# Patient Record
Sex: FEMALE | Race: BLACK OR AFRICAN AMERICAN | NOT HISPANIC OR LATINO | Employment: OTHER | URBAN - METROPOLITAN AREA
[De-identification: names, ages, dates, MRNs, and addresses within clinical notes are randomized per-mention and may not be internally consistent; named-entity substitution may affect disease eponyms.]

---

## 2017-01-04 ENCOUNTER — OFFICE VISIT (OUTPATIENT)
Dept: RHEUMATOLOGY | Facility: CLINIC | Age: 52
End: 2017-01-04
Payer: COMMERCIAL

## 2017-01-04 VITALS
DIASTOLIC BLOOD PRESSURE: 103 MMHG | WEIGHT: 228.19 LBS | BODY MASS INDEX: 38.96 KG/M2 | SYSTOLIC BLOOD PRESSURE: 160 MMHG | HEIGHT: 64 IN | HEART RATE: 80 BPM

## 2017-01-04 DIAGNOSIS — M16.11 PRIMARY OSTEOARTHRITIS OF RIGHT HIP: ICD-10-CM

## 2017-01-04 DIAGNOSIS — M25.619 DECREASED RANGE OF MOTION OF SHOULDER, UNSPECIFIED LATERALITY: ICD-10-CM

## 2017-01-04 DIAGNOSIS — M25.561 ACUTE PAIN OF RIGHT KNEE: Primary | ICD-10-CM

## 2017-01-04 DIAGNOSIS — M47.819 SPONDYLOARTHRITIS: ICD-10-CM

## 2017-01-04 DIAGNOSIS — M51.36 DDD (DEGENERATIVE DISC DISEASE), LUMBAR: ICD-10-CM

## 2017-01-04 DIAGNOSIS — Z86.19 HISTORY OF HEPATITIS B: ICD-10-CM

## 2017-01-04 DIAGNOSIS — M25.611 DECREASED RIGHT SHOULDER RANGE OF MOTION: ICD-10-CM

## 2017-01-04 PROCEDURE — 99215 OFFICE O/P EST HI 40 MIN: CPT | Mod: PBBFAC | Performed by: INTERNAL MEDICINE

## 2017-01-04 PROCEDURE — 99214 OFFICE O/P EST MOD 30 MIN: CPT | Mod: S$GLB,,, | Performed by: INTERNAL MEDICINE

## 2017-01-04 PROCEDURE — 99999 PR PBB SHADOW E&M-EST. PATIENT-LVL V: CPT | Mod: PBBFAC,,, | Performed by: INTERNAL MEDICINE

## 2017-01-04 RX ORDER — ENTECAVIR 0.5 MG/1
0.5 TABLET, FILM COATED ORAL DAILY
COMMUNITY
End: 2017-04-27 | Stop reason: SDUPTHER

## 2017-01-04 ASSESSMENT — ANKYLOSING SPONDYLITIS DISEASE ACTIVITY SCORE (ASDAS-CRP)
TOTAL_SCORE: 2.71
MORNING_STIFFNESS: 5
GLOBAL_ACTIVITY: 5
NBH_PAIN: 10
CRP_MG_PER_LITER: .5
PAIN_SWELLING: 6

## 2017-01-04 ASSESSMENT — ROUTINE ASSESSMENT OF PATIENT INDEX DATA (RAPID3)
PAIN SCORE: 6
FATIGUE SCORE: 6.5
WHEN YOU AWAKENED IN THE MORNING OVER THE LAST WEEK, PLEASE INDICATE THE AMOUNT OF TIME IT TAKES UNTIL YOU ARE AS LIMBER AS YOU WILL BE FOR THE DAY: 1HR
AM STIFFNESS SCORE: 1, YES
PATIENT GLOBAL ASSESSMENT SCORE: 6
PSYCHOLOGICAL DISTRESS SCORE: 6.6
MDHAQ FUNCTION SCORE: 1.4
TOTAL RAPID3 SCORE: 5.55

## 2017-01-04 NOTE — MR AVS SNAPSHOT
[facility] - Rheumatology  [address]  [facility] [address]  Phone: [phone]  Fax: [phone]                  Althea Vazquez   2017 1:00 PM   Office Visit    Description:  Female : 1965   Provider:  Gabriel Arvizu MD   Department:  [facility] - Rheumatology           Diagnoses this Visit        Comments    Acute pain of right knee    -  Primary     DDD (degenerative disc disease), lumbar         Spondyloarthritis         History of hepatitis B         Decreased right shoulder range of motion         Decreased range of motion of shoulder, unspecified laterality         Primary osteoarthritis of right hip                To Do List           Goals (5 Years of Data)              16    HEMOGLOBIN A1C < 7.0   6.0  6.4  6.4      Follow-Up and Disposition     Return in about 3 months (around 2017).      [facility] On Call     [facility] On Call Nurse Care Line - 24/7 Assistance  Registered nurses in the [facility] On Call Center provide clinical advisement, health education, appointment booking, and other advisory services.  Call for this free service at [phone].             Medications           Message regarding Medications     Verify the changes and/or additions to your medication regime listed below are the same as discussed with your clinician today.  If any of these changes or additions are incorrect, please notify your healthcare provider.        STOP taking these medications     sulindac (CLINORIL) 200 MG Tab Do not take this medication until you have spoken with your Rheumatologist.           Verify that the below list of medications is an accurate representation of the medications you are currently taking.  If none reported, the list may be blank. If incorrect, please contact your healthcare provider. Carry this list with you in case of emergency.           Current Medications     atorvastatin (LIPITOR) 40 MG tablet TAKE 1 TABLET BY MOUTH  EVERY DAY    clotrimazole-betamethasone 1-0.05% (LOTRISONE) cream Apply topically 2 (two) times daily as needed.     cyclobenzaprine (FLEXERIL) 10 MG tablet TAKE 1 TABLET BY MOUTH EVERY DAY AT BEDTIME IF NEEDED    docusate sodium (COLACE) 50 MG capsule Take 1 capsule (50 mg total) by mouth 2 (two) times daily as needed for Constipation.    entecavir (BARACLUDE) 0.5 MG Tab Take 0.5 mg by mouth once daily.    fluoxetine (PROZAC) 40 MG capsule Take 1 capsule (40 mg total) by mouth 2 (two) times daily.    homatropine (ISOPTO HOMATROPINE) 5 % ophthalmic solution Place 1 drop into the right eye 2 (two) times daily.    HUMIRA PEN PnKt injection INJECT 1 PEN (40MG) INTO THE SKIN EVERY 14 DAYS    hydroquinone 4 % Crea Apply to dark areas qhs.  Not more than 6 months straight in same location.Use sunscreen in am    INVOKANA 300 mg Tab TAKE 1 TABLET BY MOUTH EVERY DAY    lancets Misc To use with TrueResult meter to check BG twice daily    losartan (COZAAR) 100 MG tablet Take 100 mg by mouth once daily.     metformin (GLUCOPHAGE-XR) 500 MG 24 hr tablet Take 2 tablets (1,000 mg total) by mouth daily with breakfast.    naproxen (NAPROSYN) 500 MG tablet Take 1 tablet (500 mg total) by mouth 2 (two) times daily with meals.    promethazine (PHENERGAN) 25 MG tablet Take 1 tablet (25 mg total) by mouth every 6 (six) hours as needed for Nausea.    sulfaSALAzine (AZULFIDINE) 500 MG TbEC Take 2 tablets (1,000 mg total) by mouth 2 (two) times daily.    tramadol (ULTRAM) 50 mg tablet Take 1-2 tabs PO q6 hrs PRN pain    TRUETEST TEST STRIPS Strp TEST TWICE A DAY    zolpidem (AMBIEN) 5 MG Tab Take 1 tablet (5 mg total) by mouth nightly as needed.    aspirin (ECOTRIN) 81 MG EC tablet Take 1 tablet (81 mg total) by mouth once daily.    clonazePAM (KLONOPIN) 0.5 MG tablet Take 1 tablet (0.5 mg total) by mouth daily as needed for Anxiety.    hydrochlorothiazide (MICROZIDE) 12.5 mg capsule Take 1 capsule (12.5 mg total) by mouth once daily.     "hydrocortisone 2.5 % cream Apply topically 2 (two) times daily.    metoprolol succinate (TOPROL-XL) 100 MG 24 hr tablet Take 1 tablet (100 mg total) by mouth once daily.    pantoprazole (PROTONIX) 40 MG tablet Take 1 tablet (40 mg total) by mouth once daily.           Clinical Reference Information           Vital Signs - Last Recorded  Most recent update: 1/4/2017  1:25 PM by Giselle Nelson MA    BP Pulse Ht Wt LMP BMI    (!) 160/103 80 5' 3.6" (1.615 m) 103.5 kg (228 lb 3.2 oz) 11/25/2014 39.66 kg/m2      Blood Pressure          Most Recent Value    BP  (!)  160/103      Allergies as of 1/4/2017     Bactrim [Sulfamethoxazole-trimethoprim]    Diflucan [Fluconazole]      Immunizations Administered on Date of Encounter - 1/4/2017     None      Orders Placed During Today's Visit      Normal Orders This Visit    Ambulatory Referral to Physical/Occupational Therapy       "

## 2017-01-04 NOTE — PROGRESS NOTES
Subjective:       Patient ID: Althea Vazquez is a 51 y.o. female.    Chief Complaint: spondyloarthritis(non-radiographic axial and peripheral)    HPI    First dose of Humira 11/17/16.Continues entecavir for HB prophylaxis Still has bilateral shoulder pain with limited rom, attended PT([facility]) twice a week until end of Dec. Insurance stopped. The shoulders are much improved compared with prior to Humira, but still trouble reaching up, brushing hair etc. The back is fine. Some right hip flexion pain. Some pain right knee, s/p remote arthroscopy.  Am stiffness lasts 60 min shoulders, right hip      Saw Dr. De La Vega 10/7/16: No pulmonary contraindications to treatment with Humira.  Call/return if cough worsens, or new resp symptoms arise.  Saw Lucretia Berger 10/3/16:   have personally performed a face to face diagnostic evaluation on this patient. I have reviewed and agree with today's findings and the care plan outlined by Lucretia Berger, Saritha findings are as follows:  Patient presents with isolated HBcAb. Has received High dose prednisone and now to start humira. At mod risk of reactivation. Recommend use of entecavir until 6 months after stops therapy.   saw Dr. Neil had partial excision of recurrent sebaceous cyst 10/27/16 :     There are no hospital problems to display for this patient.     1. Sebaceous cyst  Tissue Specimen To Pathology, Surgery         POSTOPERATIVE DIAGNOSIS:   1. Sebaceous cyst  Tissue Specimen To Pathology, Surgery         PROCEDURE PERFORMED: excision of sebaceous cyst     ATTENDING SURGEON: Milton Neil MD      HOUSESTAFF SURGEON: Lenore Simms     ANESTHESIA: Local       Subjective S/p back cyst removal. It was unable to be completely removed as it would have been difficult to close the skin. She has no complaints today.  Objective Wound clear, sutures removed. Path reviewed.   Assessment & Plan Rtc prn. May start arthritis medications in one week. Cyst is  "likely to recur.        Milton Neil MD  11/10/2016    Review of Systems   Constitutional: Positive for fatigue. Negative for appetite change, fever and unexpected weight change.   HENT: Negative for mouth sores.    Eyes: Negative for visual disturbance.   Respiratory: Positive for shortness of breath. Negative for cough and wheezing.    Cardiovascular: Negative for chest pain and palpitations.   Gastrointestinal: Negative for abdominal pain, anal bleeding, blood in stool, constipation, diarrhea, nausea and vomiting.   Genitourinary: Negative for dysuria, frequency and urgency.   Musculoskeletal: Negative for arthralgias, back pain, gait problem, joint swelling, myalgias, neck pain and neck stiffness.   Skin: Positive for rash.   Neurological: Positive for numbness and headaches. Negative for weakness.   Hematological: Negative for adenopathy. Does not bruise/bleed easily.   Psychiatric/Behavioral: Negative for sleep disturbance. The patient is not nervous/anxious.              Objective:     Visit Vitals    BP (!) 160/103    Pulse 80    Ht 5' 3.6" (1.615 m)    Wt 103.5 kg (228 lb 3.2 oz)    LMP 11/25/2014    BMI 39.66 kg/m2        Physical Exam   Constitutional: She is oriented to person, place, and time and well-developed, well-nourished, and in no distress.   HENT:   Head: Normocephalic and atraumatic.   Mouth/Throat: Oropharynx is clear and moist.   Eyes: Conjunctivae and EOM are normal.   Neck: Normal range of motion. Neck supple. No thyromegaly present.   Cardiovascular: Normal rate, regular rhythm, normal heart sounds and intact distal pulses.  Exam reveals no gallop and no friction rub.    No murmur heard.  Pulmonary/Chest: Breath sounds normal. She has no wheezes. She has no rales. She exhibits no tenderness.   Abdominal: Soft. She exhibits no distension and no mass. There is no tenderness.       Right Side Rheumatological Exam     Examination finds the elbow, wrist, 1st PIP, 1st MCP, 2nd " PIP, 2nd MCP, 3rd PIP, 3rd MCP, 4th PIP, 4th MCP, 5th PIP and 5th MCP normal.    The patient is tender to palpation of the shoulder    The patient has an enlarged knee    Shoulder Exam   Tenderness Location: subacromion    Range of Motion   Active Abduction: abnormal   Passive Abduction: abnormal   Extension: abnormal   Forward Flexion: abnormal   Forward Elevation: abnormal  Adduction: abnormal  External Rotation 0 degrees: 30   Internal Rotation 0 degrees:  L5 abnormal   Internal Rotation 90 degrees: abnormal   Swelling: negative    Knee Exam     Range of Motion   Extension: normal   Flexion: abnormal   Patellofemoral Crepitus: positive  Effusion: negative  Warmth: negative    Hip Exam   Tenderness Location: anterior    Range of Motion   Extension: normal   Flexion:  50 abnormal   Internal Rotation:  20 abnormal   External Rotation:  20 abnormal   Abduction:  15 abnormal   Adduction:  10 abnormal     Left Side Rheumatological Exam     Examination finds the elbow, wrist, knee, 1st PIP, 1st MCP, 2nd PIP, 2nd MCP, 3rd PIP, 3rd MCP, 4th PIP, 4th MCP, 5th PIP and 5th MCP normal.    The patient is tender to palpation of the shoulder.    Shoulder Exam   Tenderness Location: subacromion    Range of Motion   Active Abduction: abnormal   Passive Abduction: abnormal   Extension: abnormal   Forward Flexion: abnormal   Forward Elevation: abnormal  Adduction: abnormal  External Rotation 0 degrees: 30   Internal Rotation 0 degrees: L5   Swelling: negative    Hip Exam     Range of Motion   Extension: normal   Flexion: normal   Internal Rotation: 25 abnormal   External Rotation: normal   Abduction: normal   Adduction: normal       Lymphadenopathy:     She has no cervical adenopathy.   Neurological: She is alert and oriented to person, place, and time. She displays normal reflexes. Gait normal.   Nl motor strength UE and LE bilateral   Musculoskeletal: She exhibits no edema.         Schober's: 10-14 cm  Chest expansion 6cm  Neck  rom mild gen decreased rom         Results for NABOR HERNANDEZ (MRN [id number]) as of 1/4/2017 14:12   Ref. Range 10/3/2016 11:25 10/3/2016 11:25 12/12/2016 11:20   Log HBV IU/mL Latest Ref Range: <1.00 Log (10) IU/mL <1.00 <1.00 <1.00   Results for NABOR HERNANDEZ (MRN [id number]) as of 1/4/2017 14:12   Ref. Range 12/12/2016 11:20 12/27/2016 10:06   WBC Latest Ref Range: 3.90 - 12.70 K/uL 4.54 5.34   RBC Latest Ref Range: 4.00 - 5.40 M/uL 4.65 4.89   Hemoglobin Latest Ref Range: 12.0 - 16.0 g/dL 13.2 14.2   Hematocrit Latest Ref Range: 37.0 - 48.5 % 41.3 42.6   MCV Latest Ref Range: 82 - 98 fL 89 87   MCH Latest Ref Range: 27.0 - 31.0 pg 28.4 29.0   MCHC Latest Ref Range: 32.0 - 36.0 % 32.0 33.3   RDW Latest Ref Range: 11.5 - 14.5 % 16.7 (H) 16.5 (H)   Platelets Latest Ref Range: 150 - 350 K/uL 220 243   MPV Latest Ref Range: 9.2 - 12.9 fL 11.6 10.2   Gran% Latest Ref Range: 38.0 - 73.0 % 36.2 (L) 34.4 (L)   Gran # Latest Ref Range: 1.8 - 7.7 K/uL 1.6 (L) 1.8   Lymph% Latest Ref Range: 18.0 - 48.0 % 49.3 (H) 51.3 (H)   Lymph # Latest Ref Range: 1.0 - 4.8 K/uL 2.2 2.7   Mono% Latest Ref Range: 4.0 - 15.0 % 10.8 11.6   Mono # Latest Ref Range: 0.3 - 1.0 K/uL 0.5 0.6   Eosinophil% Latest Ref Range: 0.0 - 8.0 % 2.6 1.5   Eos # Latest Ref Range: 0.0 - 0.5 K/uL 0.1 0.1   Basophil% Latest Ref Range: 0.0 - 1.9 % 0.7 0.6   Baso # Latest Ref Range: 0.00 - 0.20 K/uL 0.03 0.03   Sed Rate Latest Ref Range: 0 - 20 mm/Hr 10    Sodium Latest Ref Range: 136 - 145 mmol/L 139    Potassium Latest Ref Range: 3.5 - 5.1 mmol/L 3.7    Chloride Latest Ref Range: 95 - 110 mmol/L 102    CO2 Latest Ref Range: 23 - 29 mmol/L 29    Anion Gap Latest Ref Range: 8 - 16 mmol/L 8    BUN, Bld Latest Ref Range: 7 - 17 mg/dL 10    Creatinine Latest Ref Range: 0.50 - 1.40 mg/dL 0.55    eGFR if non African American Latest Ref Range: >60 mL/min/1.73 m^2 >60.0    eGFR if African American Latest Ref Range: >60 mL/min/1.73 m^2 >60.0    Glucose  Latest Ref Range: 70 - 110 mg/dL 153 (H)    Calcium Latest Ref Range: 8.7 - 10.5 mg/dL 9.0    Alkaline Phosphatase Latest Ref Range: 38 - 126 IU/L 72    Total Protein Latest Ref Range: 6.0 - 8.4 g/dL 7.0    Albumin Latest Ref Range: 3.5 - 5.2 g/dL 4.2    Total Bilirubin Latest Ref Range: 0.1 - 1.0 mg/dL 0.4    AST Latest Ref Range: 15 - 46 IU/L 23    ALT Latest Ref Range: 10 - 44 IU/L 28    CRP Latest Ref Range: 0.00 - 1.00 mg/dL <0.50    Hep B Viral DNA IU/ML Latest Ref Range: <10 IU/mL <10    Log HBV IU/mL Latest Ref Range: <1.00 Log (10) IU/mL <1.00    Results for NABOR HERNANDEZ (MRN [id number]) as of 1/4/2017 14:12   Ref. Range 4/9/2014 12:50 7/9/2014 12:20 9/4/2014 08:57 12/26/2014 09:12 11/11/2015 10:06 9/4/2016 02:09 9/27/2016 10:25 12/12/2016 11:20   Sed Rate Latest Ref Range: 0 - 20 mm/Hr 16 12 28 (H) 28 (H) 10 64 (H) 68 (H) 10   Results for NABOR HERNANDEZ (MRN [id number]) as of 1/4/2017 14:12   Ref. Range 4/9/2014 12:50 7/9/2014 12:20 9/4/2014 08:57 12/26/2014 09:12 11/11/2015 10:06 9/4/2016 02:09 9/27/2016 10:25 12/12/2016 11:20   CRP Latest Ref Range: 0.00 - 1.00 mg/dL 14.5 (H) 13.7 (H) 17.6 (H) 10.4 (H) 9.6 (H) 135.7 (H) 110.6 (H) <0.50     Technique: Routine MRI evaluation of the SI joints performed with and without the use of 10 cc of Gadavist IV contrast.      Comparison: Radiograph 09/09/2016.    Findings:    There are enhancing inflammatory changes about the anterior superior aspect of the left SI joint in the expected location of the medial lumbar or lumbosacral ligament, findings that are highly suggestive of enthesitis.  There is apparent trace enhancement on the posterior superior aspect of the right SI joint leads is overall nonspecific.    SI joints are symmetric.  No synovitis.  No joint effusions.  No osseous erosive changes.    Visualized musculature demonstrate normal bulk and signal intensity.  Piriformis muscles are symmetric.  Ischiofemoral spaces are  unremarkable.    Adductor and abductor tendons are unremarkable.  Hamstring tendons are normal.    Subcutaneous soft tissues are normal.    Limited evaluation of the lower abdominal and pelvic organs is unremarkable.  Uterus is surgically absent.   Impression       Inflammatory enthesitis affecting the anterior superior aspect of the left sacral ala with mild associated reactive marrow edema, findings that are favored to represent sequela of patient's reported history of spondyloarthropathy.  Note is made of possible trace inflammatory changes adjacent to the posterior aspect of the right SI joints which may relate to a prominent vessel or mild additional enthesitis.    Unremarkable evaluation of the SI joints specifically without imaging findings to suggest sacroiliitis. No osseous erosive changes or joint effusions. No synovitis.  ______________________________________     Electronically signed by: ALEX CRAIG MD  Date: 09/11/16  Time: 20:30     Encounter   View         MRI RIGHT SHOULDER    TECHNIQUE: MRI of right shoulder was performed on a 1.5T magnet utilizing the following sequences: Localizer; axial PD FS; coronal T2 FS and PD FS; sagittal T1, and T2 FS.    COMPARISON: 09/16/16    FINDINGS:    Rotator cuff: There is high-grade undersurface tear of the supraspinatus tendon measuring 1.4 x 1.4 cm, near the insertion.  There is supraspinatus tendinosis.  There is subscapularis tendinosis and interstitial tear.  Muscle bulk is maintained.Note made of intramuscular edema within the subscapularis.    Labrum: Unremarkable non-arthrographic evaluation.    Biceps: Long head biceps tendon is intact.    Bone: No fracture or infiltrative process.    Acromioclavicular joint: Mild arthrosis.    Cartilage: Articular cartilage of the glenohumeral joint is preserved.    Miscellaneous: There is narrowing of the coracohumeral interval, measuring approximately 7 mm.  There is a moderate-sized joint effusion with synovitis.   There is thickening of the subacromial subdeltoid bursa.   Impression     1.  High-grade undersurface supraspinatus tendon tear.  Infraspinatus tendinosis.  2.  Subscapularis tendinosis and interstitial tear with evidence for subcoracoid impingement.  3.  Moderate-sized joint effusion with synovitis and subacromial subdeltoid bursitis.  4.  Mild acromioclavicular arthrosis.  ______________________________________     Electronically signed by: LON STARR MD  Date: 09/24/16  Time: 14:44     Encounter   View Encounter      Reviewed By   Sherrie Webber PA-C on 9/27/2016  3:42 PM     Technique: Routine MRI evaluation of the cervical, thoracic and lumbar spine performed with and without the use of 10 cc of Gadavist IV contrast.    Comparison: MRI 01/20/2014.    Findings:    CERVICAL SPINE:    There is modest straightening of the normal cervical lordosis.  No spondylolisthesis.  Vertebral body heights are well-maintained without evidence for fracture.  Visualized osseous structures demonstrate intact pars signal intensity without findings to suggest an infiltrative process.    Cervical spinal cord demonstrates normal contour and signal intensity.  Cerebellar tonsils are in their expected location.  Cervicomedullary junction is unremarkable.  Postcontrast images demonstrate no abnormal enhancement.    Prevertebral soft tissues are normal.  Vertebral artery flow voids are present.  T2 hyperintense nodules are noted within the bilateral thyroid lobes.  The spinal musculature and trace normal bulk and signal intensity.  supraspinous ligament is unremarkable without findings to suggest enthesitis.    C2-C3: No spinal canal stenosis or neural foraminal narrowing.    C3-C4: There is mild bilateral facet arthropathy and uncovertebral joint spurring and mild bilateral neural foraminal narrowing.  No spinal canal stenosis.    C4-C5: No spinal canal stenosis or neural foraminal narrowing.    C5-C6: There is a moderate  broad-based posterior disc ossified complex that partially effaces the anterior thecal sac.  There is mild left-sided uncovertebral joint spurring.  Findings contribute to mild spinal canal stenosis and mild left-sided neural foraminal narrowing.    C6-C7: No spinal canal stenosis or neural foraminal narrowing.    T7-T1: No spinal canal stenosis or neural foraminal narrowing.    THORACIC SPINE:    Thoracic spine alignment is normal.  No spondylolisthesis.  Vertebral body heights are well-maintained without evidence for fracture.  There is enhancing inflammation of the left side zygapophyseal/facet joint at T5-T6.  Remaining visualized osseous structures demonstrate intact menisci and in density without findings to suggest an infiltrative process.  Note is made of the anterior vertebral body at this demonstrate intact signal intensity without findings to suggest spondylitis.    Visualized spinal cord demonstrates normal contour and signal intensity.  Postcontrast images demonstrate normal enhancement.    Visualized thoracic and upper abdominal organs are normal.    There is a low signal described focal area of signal abnormality within the posterior subcutaneous soft tissues and appears to demonstrate subtle enhancement and is favored to be benign though nonspecific.    No significant intervertebral disc abnormalities.  No spinal canal stenosis or neural foramina narrowing.    LUMBAR SPINE:    Lumbar spine alignment is normal.  No spondylolysis or spondylolisthesis.  Vertebral body heights are well-maintained without evidence for fracture.  Visualized osseous structures demonstrate intact pars signal intensity without findings to suggest an infiltrative process.    Visualized distal spinal cord demonstrates normal contour and signal intensity.  Cauda equina is normal mild without findings to suggest arachnoiditis.  Postcontrast images demonstrate normal enhancement.    There is a subcentimeter T2 hyperintense lesion  within the right renal cortex, too small to accurately guided thighs.  Paraspinal musculature demonstrates normal bulk and signal intensity.  Subcutaneous soft tissues are within normal limits.    L1-L2: No significant intervertebral disc abnormalities.  Facet joints are well maintained.  No spinal canal stenosis or neural foraminal narrowing.    L2-L3: There is mild intervertebral disc desiccation.  Facet joints are well maintained.  No spinal canal stenosis or neural foraminal narrowing.    L3-L4: There is mild intervertebral disc desiccation.  Facet joints are well maintained.  No spinal canal stenosis or neural foraminal narrowing.    L4-L5: There is mild intervertebral disc space narrowing and desiccation with small circumferential bulge.  There is mild bilateral facet arthropathy.  No spinal canal stenosis or neural foraminal narrowing.    L5-S1: There is mild intervertebral disc space narrowing desiccation with a small circumferential bulge and a small posterior annular fissure.  Findings contribute to mild left-sided neural foraminal narrowing. There is mild bilateral facet arthropathy.  No spinal canal stenosis.   Impression       Enhancing inflammatory changes involving the left zygapophyseal/facet joint at T5-T6 that is nonspecific but can be seen with inflammatory arthropathies. No MR imaging finding to suggest enthesitis, spondylitis or spondylodiskitis.    Mild cervical and lumbar degenerative changes without significant spinal canal stenosis.    Cystic subcutaneous soft tissues within the posterior back, favored to be benign, possibly a complex sebaceous cyst.  Consider follow-up examination in 12 months.  ______________________________________     Electronically signed by: ALEX CRAIG MD  Date: 09/11/16  Time: 20:03      Technique: Single AP pelvic radiograph.    Comparison: None.    Findings:    Osseous density is normal.  No lytic or blastic lesions.  No displaced fractures.  There is acetabular  over coverage bilaterally noting an increased center edge angle.  There is mild suspected narrowing of the articular space of the bilateral femoroacetabular joints.  Sacrum is unremarkable.  SI joints are normal and symmetric.  Pubic symphysis is within normal limits.  There is a moderate amount of fecal material throughout the visualized colon.   Impression       Bilateral acetabular over coverage.  ______________________________________     Electronically signed by: ALEX CRAIG MD  Date: 09/10/16  Time: 03:40          Assessment:     Non-radiographic axial and peripheral spondylarthritis:  ADAS-CRP = 2.71(high disease activity), was 6.22(very high disease activity);  BASDAI= 5.5(high activity) was 10(very high activity)  ;  BASFI = 7(severe functional limitation) was9.4(severe functional limitation) improving but still quite active. ESR and CRP now normal.  Synovitis right shoulder and right  rotator cuff tear per MRI 9/24/16(Dr. Agrawal)  OA right knee  OA right> left  hip, likely secondary to spondyloarthritis  T2 DM  Sebaceous cyst mid back, excised 10/27/16   hypertension, not controlled, likely exacerbated by naproxen which she desperately needs at this point until Humira takes further effect.         Acute pain of right knee  -     Ambulatory Referral to Physical/Occupational Therapy    DDD (degenerative disc disease), lumbar    Spondyloarthritis  -     Ambulatory Referral to Physical/Occupational Therapy  -     Ambulatory Referral to Physical/Occupational Therapy    History of hepatitis B    Decreased right shoulder range of motion  -     Ambulatory Referral to Physical/Occupational Therapy  -     Ambulatory Referral to Physical/Occupational Therapy    Decreased range of motion of shoulder, unspecified laterality  -     Ambulatory Referral to Physical/Occupational Therapy    Primary osteoarthritis of right hip  -     Ambulatory Referral to Physical/Occupational Therapy  -     Ambulatory Referral to  Physical/Occupational Therapy        Plan:   Continue adalimumab 40mg sc q 2wks started 11/17/16  naproxen 500mg twice daily with omeprazole 20mg before breakfast  Reordered PT  Cont sulfasalazine-EN 1000mg twice daily  F/u Dr. Agrawal regarding right rotator cuff tear ? left  See Dr. Begum about hypertension, likely exacerbated by naproxen, but needs NSAID for her severe spondyloarhritis.   RTC 3  Months with standing lab

## 2017-01-27 ENCOUNTER — TELEPHONE (OUTPATIENT)
Dept: PHARMACY | Facility: CLINIC | Age: 52
End: 2017-01-27

## 2017-01-27 DIAGNOSIS — M47.819 SPONDYLOARTHRITIS: ICD-10-CM

## 2017-01-27 RX ORDER — ADALIMUMAB 40MG/0.8ML
KIT SUBCUTANEOUS
Qty: 2 EACH | Refills: 1 | Status: SHIPPED | OUTPATIENT
Start: 2017-01-27 | End: 2017-03-28 | Stop reason: SDUPTHER

## 2017-02-09 ENCOUNTER — TELEPHONE (OUTPATIENT)
Dept: ORTHOPEDICS | Facility: CLINIC | Age: 52
End: 2017-02-09

## 2017-02-09 NOTE — TELEPHONE ENCOUNTER
Spoke to pt and she was notified her appointment was scheduled wrong and we had to reschedule it with the correct provider. Pt was pleased and it was rescheduled for the same day. The appointment slip was mailed.

## 2017-02-13 ENCOUNTER — LAB VISIT (OUTPATIENT)
Dept: LAB | Facility: HOSPITAL | Age: 52
End: 2017-02-13
Attending: INTERNAL MEDICINE
Payer: COMMERCIAL

## 2017-02-13 ENCOUNTER — OFFICE VISIT (OUTPATIENT)
Dept: ORTHOPEDICS | Facility: CLINIC | Age: 52
End: 2017-02-13
Payer: COMMERCIAL

## 2017-02-13 ENCOUNTER — OFFICE VISIT (OUTPATIENT)
Dept: INTERNAL MEDICINE | Facility: CLINIC | Age: 52
End: 2017-02-13
Payer: COMMERCIAL

## 2017-02-13 VITALS
WEIGHT: 226.63 LBS | SYSTOLIC BLOOD PRESSURE: 152 MMHG | TEMPERATURE: 98 F | HEART RATE: 87 BPM | DIASTOLIC BLOOD PRESSURE: 100 MMHG | BODY MASS INDEX: 36.42 KG/M2 | RESPIRATION RATE: 16 BRPM | HEIGHT: 66 IN

## 2017-02-13 VITALS
BODY MASS INDEX: 49.51 KG/M2 | DIASTOLIC BLOOD PRESSURE: 90 MMHG | RESPIRATION RATE: 16 BRPM | WEIGHT: 229.5 LBS | HEIGHT: 57 IN | HEART RATE: 91 BPM | SYSTOLIC BLOOD PRESSURE: 150 MMHG

## 2017-02-13 DIAGNOSIS — M67.911 DYSFUNCTION OF RIGHT ROTATOR CUFF: Primary | ICD-10-CM

## 2017-02-13 DIAGNOSIS — F41.9 ANXIETY AND DEPRESSION: ICD-10-CM

## 2017-02-13 DIAGNOSIS — F32.A ANXIETY AND DEPRESSION: ICD-10-CM

## 2017-02-13 DIAGNOSIS — M25.511 RIGHT SHOULDER PAIN, UNSPECIFIED CHRONICITY: ICD-10-CM

## 2017-02-13 DIAGNOSIS — I10 ESSENTIAL HYPERTENSION: Chronic | ICD-10-CM

## 2017-02-13 DIAGNOSIS — G47.00 INSOMNIA, UNSPECIFIED TYPE: Chronic | ICD-10-CM

## 2017-02-13 DIAGNOSIS — M02.30 REACTIVE ARTHRITIS: Chronic | ICD-10-CM

## 2017-02-13 DIAGNOSIS — M47.819 SPONDYLOARTHRITIS: ICD-10-CM

## 2017-02-13 DIAGNOSIS — E66.9 NON MORBID OBESITY, UNSPECIFIED OBESITY TYPE: Chronic | ICD-10-CM

## 2017-02-13 DIAGNOSIS — T78.3XXA ANGIOEDEMA OF LIPS, INITIAL ENCOUNTER: ICD-10-CM

## 2017-02-13 DIAGNOSIS — E78.5 HYPERLIPIDEMIA, UNSPECIFIED HYPERLIPIDEMIA TYPE: Chronic | ICD-10-CM

## 2017-02-13 DIAGNOSIS — M47.819 SPONDYLARTHRITIS: ICD-10-CM

## 2017-02-13 DIAGNOSIS — E11.42 TYPE 2 DIABETES MELLITUS WITH DIABETIC POLYNEUROPATHY, WITHOUT LONG-TERM CURRENT USE OF INSULIN: Primary | Chronic | ICD-10-CM

## 2017-02-13 DIAGNOSIS — K21.9 GASTROESOPHAGEAL REFLUX DISEASE, ESOPHAGITIS PRESENCE NOT SPECIFIED: ICD-10-CM

## 2017-02-13 DIAGNOSIS — E11.42 TYPE 2 DIABETES MELLITUS WITH DIABETIC POLYNEUROPATHY, WITHOUT LONG-TERM CURRENT USE OF INSULIN: Chronic | ICD-10-CM

## 2017-02-13 PROCEDURE — 99214 OFFICE O/P EST MOD 30 MIN: CPT | Mod: S$GLB,,, | Performed by: INTERNAL MEDICINE

## 2017-02-13 PROCEDURE — 20610 DRAIN/INJ JOINT/BURSA W/O US: CPT | Mod: RT,S$GLB,, | Performed by: PHYSICIAN ASSISTANT

## 2017-02-13 PROCEDURE — 36415 COLL VENOUS BLD VENIPUNCTURE: CPT | Mod: PO

## 2017-02-13 PROCEDURE — 83036 HEMOGLOBIN GLYCOSYLATED A1C: CPT

## 2017-02-13 PROCEDURE — 99213 OFFICE O/P EST LOW 20 MIN: CPT | Mod: 25,S$GLB,, | Performed by: PHYSICIAN ASSISTANT

## 2017-02-13 PROCEDURE — 99999 PR PBB SHADOW E&M-EST. PATIENT-LVL IV: CPT | Mod: PBBFAC,,, | Performed by: PHYSICIAN ASSISTANT

## 2017-02-13 PROCEDURE — 99999 PR PBB SHADOW E&M-EST. PATIENT-LVL III: CPT | Mod: PBBFAC,,, | Performed by: INTERNAL MEDICINE

## 2017-02-13 RX ORDER — PANTOPRAZOLE SODIUM 40 MG/1
40 TABLET, DELAYED RELEASE ORAL DAILY
Qty: 30 TABLET | Refills: 11 | Status: SHIPPED | OUTPATIENT
Start: 2017-02-13 | End: 2018-02-08 | Stop reason: SDUPTHER

## 2017-02-13 RX ORDER — HYDROCHLOROTHIAZIDE 25 MG/1
25 TABLET ORAL DAILY
Qty: 30 TABLET | Refills: 11 | Status: SHIPPED | OUTPATIENT
Start: 2017-02-13 | End: 2017-03-15

## 2017-02-13 RX ORDER — HYDROCORTISONE 25 MG/G
CREAM TOPICAL 2 TIMES DAILY
Qty: 28 G | Refills: 1 | Status: SHIPPED | OUTPATIENT
Start: 2017-02-13 | End: 2021-11-12 | Stop reason: SDUPTHER

## 2017-02-13 RX ORDER — AMLODIPINE BESYLATE 5 MG/1
5 TABLET ORAL DAILY
Qty: 30 TABLET | Refills: 0 | Status: SHIPPED | OUTPATIENT
Start: 2017-02-13 | End: 2017-02-27 | Stop reason: SDUPTHER

## 2017-02-13 RX ORDER — TRIAMCINOLONE ACETONIDE 40 MG/ML
40 INJECTION, SUSPENSION INTRA-ARTICULAR; INTRAMUSCULAR
Status: COMPLETED | OUTPATIENT
Start: 2017-02-13 | End: 2017-02-13

## 2017-02-13 RX ADMIN — TRIAMCINOLONE ACETONIDE 40 MG: 40 INJECTION, SUSPENSION INTRA-ARTICULAR; INTRAMUSCULAR at 10:02

## 2017-02-13 NOTE — PROGRESS NOTES
Subjective:       Patient ID: Althea Vazquez is a 51 y.o. female.    Chief Complaint: Follow-up    HPI   Pt with T2DM with neuropathy, HLD, Blanche's disease/spondylarthritis, anxiety/depression, obesity, Insomnia, GERD is here for f/u.   Review of Systems   Constitutional: Negative for activity change, appetite change, chills, diaphoresis, fatigue, fever and unexpected weight change.   HENT: Negative for postnasal drip, rhinorrhea, sinus pressure, sneezing, sore throat, trouble swallowing and voice change.    Respiratory: Negative for cough, shortness of breath and wheezing.    Cardiovascular: Negative for chest pain, palpitations and leg swelling.   Gastrointestinal: Negative for abdominal pain, blood in stool, constipation, diarrhea, nausea and vomiting.   Genitourinary: Negative for dysuria.   Musculoskeletal: Positive for arthralgias. Negative for myalgias.   Skin: Negative for rash and wound.   Allergic/Immunologic: Negative for environmental allergies and food allergies.   Hematological: Negative for adenopathy. Does not bruise/bleed easily.   Psychiatric/Behavioral: Positive for dysphoric mood and sleep disturbance. Negative for self-injury and suicidal ideas. The patient is nervous/anxious.        Objective:      Physical Exam   Constitutional: She is oriented to person, place, and time. She appears well-developed and well-nourished. No distress.   HENT:   Head: Normocephalic and atraumatic.   Eyes: Conjunctivae and EOM are normal. Pupils are equal, round, and reactive to light. Right eye exhibits no discharge. Left eye exhibits no discharge. No scleral icterus.   Neck: Neck supple. No JVD present.   Cardiovascular: Normal rate, regular rhythm, normal heart sounds and intact distal pulses.    Pulmonary/Chest: Effort normal and breath sounds normal. No respiratory distress. She has no wheezes. She has no rales.   Abdominal: Soft. Bowel sounds are normal. There is no tenderness.   Musculoskeletal:  She exhibits tenderness. She exhibits no edema.   Lymphadenopathy:     She has no cervical adenopathy.   Neurological: She is alert and oriented to person, place, and time.   Skin: Skin is warm and dry. No rash noted. She is not diaphoretic. No pallor.       Assessment:       1. Type 2 diabetes mellitus with diabetic polyneuropathy, without long-term current use of insulin    2. Essential hypertension    3. Insomnia, unspecified type    4. Anxiety and depression    5. Gastroesophageal reflux disease, esophagitis presence not specified    6. Hyperlipidemia, unspecified hyperlipidemia type    7. Non morbid obesity, unspecified obesity type    8. Reactive arthritis    9. Spondylarthritis    10. Spondyloarthritis        Plan:    1. T2DM with neuropathy- stable with last HA1C of 6.0(9/16)<--6.4(4/16)<--5.9(8/15)       Continue Glumetza 1 gm qam/Invokana 300 mg daily   2. HTN- not controlled on Losartan 100 mg/Toprol  mg/HCTZ 25 mg daily       Start Norvasc 5 mg daily       Document BP BID   3. HLD- stable, on Lipitor 40 mg qHS   4. Reactive arthritis/spondyloarthritis- fair control on Sulfasalazine 1 gm BID, Humira started recently       Managed by Rheumatology       Rx Tramadol  mg q6 hrs PRN severe pain and continue NSAIDs PRN   5. Anxiety/Depression- stable on Prozac/Klonopin, managed by [facility]   6. Obesity- pt advised on proper diet/exercise for weight loss   7. Insomnia- stable on Ambien 5 mg qHS PRN   8. GERD- stable on PPI   9. F/u in 2 weeks for HTN

## 2017-02-13 NOTE — PROGRESS NOTES
"Chief complaint: right shoulder pain    HPI: Althea Vazquez is 51 y.o. RHD female here for f/u of right shoulder pain. MRI revealed supraspinatus tear and impingement. She has been in PT which has helped some. She still has difficulty with overhead movements. She sees rheumatolgy.    ROS: negative fevers/chills, n/v/d, chest pain, shortness of breath    Exam:  Visit Vitals    BP (!) 150/90    Pulse 91    Resp 16    Ht 4' 9" (1.448 m)    Wt 104.1 kg (229 lb 8 oz)    LMP 11/25/2014    BMI 49.66 kg/m2     Right shoulder exam:   Skin is intact.   There is no effusion.  TTP diffuse at shoulder.  AROM to 120 FF. PROM to 150 without pain. Pulling sensation above 120 FF.   + Impingement tests    XR: no significant abnormality  MRI:  1.  High-grade undersurface supraspinatus tendon tear.  Infraspinatus tendinosis.  2.  Subscapularis tendinosis and interstitial tear with evidence for subcoracoid impingement.  3.  Moderate-sized joint effusion with synovitis and subacromial subdeltoid bursitis.  4.  Mild acromioclavicular arthrosis.    Assessment: Right shoulder pain, rotator cuff dysfunction    Plan:   Continue PT ([facility])  Continue meds as prescribed by other providers.  Steroid injection today. She will carefully monitor glucose levels.   RTC PRN.    Shoulder Injection Procedure Note    Pre-operative Diagnosis: right shoulder pain    Post-operative Diagnosis: same    Indications: right shoulder pain    Anesthesia: none    Procedure Details     Verbal consent was obtained for the procedure. The injection site was identified and the skin was prepared with alcohol. The right shoulder was injected from a posterior approach with 1 ml of Kenalog and 5 ml Lidocaine under sterile technique using a 22 gauge 1 1/2 inch needle. The needle was removed and the area cleansed and dressed.    Complications:  None; patient tolerated the procedure well.    she was advised to rest the shoulder today, using ice as " needed for comfort and swelling. she did receive immediate relief of the shoulder pain. she was told this would be short lived and is secondary to the lidocaine. she may have an increase in discomfort tonight followed by steady improvement over the next several days. It may take 1-3 weeks following the injection to get the full benefit of the medication.

## 2017-02-13 NOTE — MR AVS SNAPSHOT
[facility]   [facility] [place] [address]  Phone: [phone]  Fax: [phone]                  Althea Vazquez   2017 2:00 PM   Office Visit    Description:  Female : 1965   Provider:  Weston Begum DO   Department:  [facility]           Reason for Visit     Follow-up           Diagnoses this Visit        Comments    Type 2 diabetes mellitus with diabetic polyneuropathy, without long-term current use of insulin    -  Primary     Essential hypertension         Insomnia, unspecified type         Anxiety and depression         Gastroesophageal reflux disease, esophagitis presence not specified         Hyperlipidemia, unspecified hyperlipidemia type         Non morbid obesity, unspecified obesity type         Reactive arthritis         Spondylarthritis         Spondyloarthritis         Angioedema of lips, initial encounter                To Do List           Future Appointments        Provider Department Dept Phone    2017 8:00 AM LATOYA [place] [facility] [phone]    2017 11:00 AM LAB, [place] [facility] [phone]    2017 2:00 PM Gabriel Arvizu MD [facility] [phone]      Goals (5 Years of Data)              16    HEMOGLOBIN A1C < 7.0   6.0  6.4  6.4      Follow-Up and Disposition     Return in about 2 weeks (around 2017) for HTN.    Follow-up and Disposition History       These Medications        Disp Refills Start End    amlodipine (NORVASC) 5 MG tablet 30 tablet 0 2017     Take 1 tablet (5 mg total) by mouth once daily. - Oral    Pharmacy: [facility] [place] - [address] [address] #: [phone]       pantoprazole (PROTONIX) 40 MG tablet 30 tablet 11 2017    Take 1 tablet (40 mg total) by mouth once daily. - Oral    Pharmacy: [facility]  [facility]armacy - [place] [address] Ph #: [phone]       hydrochlorothiazide (HYDRODIURIL) 25 MG tablet 30 tablet 11 2/13/2017 3/15/2017    Take 1 tablet (25 mg total) by mouth once daily. - Oral    Pharmacy: [facility] [place] [address] Ph #: [phone]       hydrocortisone 2.5 % cream 28 g 1 2/13/2017 2/23/2017    Apply topically 2 (two) times daily. - Topical (Top)    Pharmacy: [place] [id number] Ph #: [phone]         [facility]     [facility] Nurse Care Line - 24/7 Assistance  Registered nurses in the [facility] provide clinical advisement, health education, appointment booking, and other advisory services.  Call for this free service at [phone].             Medications           Message regarding Medications     Verify the changes and/or additions to your medication regime listed below are the same as discussed with your clinician today.  If any of these changes or additions are incorrect, please notify your healthcare provider.        START taking these NEW medications        Refills    amlodipine (NORVASC) 5 MG tablet 0    Sig: Take 1 tablet (5 mg total) by mouth once daily.    Class: Normal    Route: Oral    hydrochlorothiazide (HYDRODIURIL) 25 MG tablet 11    Sig: Take 1 tablet (25 mg total) by mouth once daily.    Class: Normal    Route: Oral      CHANGE how you are taking these medications     Start Taking Instead of    hydrocortisone 2.5 % cream hydrocortisone 2.5 % cream    Dosage:  Apply topically 2 (two) times daily. Dosage:  Apply topically 2 (two) times daily.    Reason for Change:  Reorder       STOP taking these medications     hydrochlorothiazide (MICROZIDE) 12.5 mg capsule Take 1 capsule (12.5 mg total) by mouth once daily.           Verify that the below list of medications is an accurate representation of the medications you are currently taking.   If none reported, the list may be blank. If incorrect, please contact your healthcare provider. Carry this list with you in case of emergency.           Current Medications     amlodipine (NORVASC) 5 MG tablet Take 1 tablet (5 mg total) by mouth once daily.    aspirin (ECOTRIN) 81 MG EC tablet Take 1 tablet (81 mg total) by mouth once daily.    atorvastatin (LIPITOR) 40 MG tablet TAKE 1 TABLET BY MOUTH EVERY DAY    clonazePAM (KLONOPIN) 0.5 MG tablet Take 1 tablet (0.5 mg total) by mouth daily as needed for Anxiety.    clotrimazole-betamethasone 1-0.05% (LOTRISONE) cream Apply topically 2 (two) times daily as needed.     cyclobenzaprine (FLEXERIL) 10 MG tablet TAKE 1 TABLET BY MOUTH EVERY DAY AT BEDTIME IF NEEDED    docusate sodium (COLACE) 50 MG capsule Take 1 capsule (50 mg total) by mouth 2 (two) times daily as needed for Constipation.    entecavir (BARACLUDE) 0.5 MG Tab Take 0.5 mg by mouth once daily.    fluoxetine (PROZAC) 40 MG capsule Take 1 capsule (40 mg total) by mouth 2 (two) times daily.    homatropine (ISOPTO HOMATROPINE) 5 % ophthalmic solution Place 1 drop into the right eye 2 (two) times daily.    HUMIRA PEN PnKt injection INJECT 1 PEN INTO THE SKIN EVERY 14 DAYS    hydrochlorothiazide (HYDRODIURIL) 25 MG tablet Take 1 tablet (25 mg total) by mouth once daily.    hydrocortisone 2.5 % cream Apply topically 2 (two) times daily.    hydroquinone 4 % Crea Apply to dark areas qhs.  Not more than 6 months straight in same location.Use sunscreen in am    INVOKANA 300 mg Tab TAKE 1 TABLET BY MOUTH EVERY DAY    lancets Misc To use with TrueResult meter to check BG twice daily    losartan (COZAAR) 100 MG tablet Take 100 mg by mouth once daily.     metformin (GLUCOPHAGE-XR) 500 MG 24 hr tablet Take 2 tablets (1,000 mg total) by mouth daily with breakfast.    metoprolol succinate (TOPROL-XL) 100 MG 24 hr tablet Take 1 tablet (100 mg total) by mouth once daily.    pantoprazole (PROTONIX) 40 MG tablet Take 1  "tablet (40 mg total) by mouth once daily.    promethazine (PHENERGAN) 25 MG tablet Take 1 tablet (25 mg total) by mouth every 6 (six) hours as needed for Nausea.    sulfaSALAzine (AZULFIDINE) 500 MG TbEC Take 2 tablets (1,000 mg total) by mouth 2 (two) times daily.    tramadol (ULTRAM) 50 mg tablet Take 1-2 tabs PO q6 hrs PRN pain    TRUETEST TEST STRIPS Strp TEST TWICE A DAY    zolpidem (AMBIEN) 5 MG Tab Take 1 tablet (5 mg total) by mouth nightly as needed.           Clinical Reference Information           Your Vitals Were     BP Pulse Temp Resp Height Weight    152/100 (BP Location: Left arm, Patient Position: Sitting, BP Method: Automatic) 87 98.2 °F (36.8 °C) (Oral) 16 5' 6" (1.676 m) 102.8 kg (226 lb 10.1 oz)    Last Period BMI             11/25/2014 36.58 kg/m2         Blood Pressure          Most Recent Value    BP  (!)  152/100      Allergies as of 2/13/2017     Bactrim [Sulfamethoxazole-trimethoprim]    Diflucan [Fluconazole]      Immunizations Administered on Date of Encounter - 2/13/2017     None      Orders Placed During Today's Visit     Future Labs/Procedures Expected by Expires    Hemoglobin A1c  2/13/2017 4/14/2018      Language Assistance Services     ATTENTION: Language assistance services are available, free of charge. Please call [phone].      ATENCIÓN: Si habla español, tiene a pelayo disposición servicios gratuitos de asistencia lingüística. Llame al [phone].     CHÚ Ý: N?u b?n nói Ti?ng Vi?t, có các d?ch v? h? tr? ngôn ng? mi?n phí dành cho b?n. G?i s? [phone].         [facility] complies with applicable Federal civil rights laws and does not discriminate on the basis of race, color, national origin, age, disability, or sex.        "

## 2017-02-14 ENCOUNTER — CLINICAL SUPPORT (OUTPATIENT)
Dept: REHABILITATION | Facility: HOSPITAL | Age: 52
End: 2017-02-14
Attending: STUDENT IN AN ORGANIZED HEALTH CARE EDUCATION/TRAINING PROGRAM
Payer: COMMERCIAL

## 2017-02-14 DIAGNOSIS — G89.29 CHRONIC RIGHT SHOULDER PAIN: ICD-10-CM

## 2017-02-14 DIAGNOSIS — M25.511 CHRONIC RIGHT SHOULDER PAIN: ICD-10-CM

## 2017-02-14 DIAGNOSIS — M25.619 DECREASED RANGE OF MOTION (ROM) OF SHOULDER: ICD-10-CM

## 2017-02-14 DIAGNOSIS — M62.81 MUSCLE WEAKNESS OF UPPER EXTREMITY: ICD-10-CM

## 2017-02-14 LAB
ESTIMATED AVG GLUCOSE: 120 MG/DL
HBA1C MFR BLD HPLC: 5.8 %

## 2017-02-14 PROCEDURE — G8984 CARRY CURRENT STATUS: HCPCS | Mod: CM

## 2017-02-14 PROCEDURE — 97166 OT EVAL MOD COMPLEX 45 MIN: CPT

## 2017-02-14 PROCEDURE — G8985 CARRY GOAL STATUS: HCPCS | Mod: CK

## 2017-02-14 RX ORDER — LOSARTAN POTASSIUM 100 MG/1
TABLET ORAL
Qty: 30 TABLET | Refills: 5 | Status: SHIPPED | OUTPATIENT
Start: 2017-02-14 | End: 2017-08-09 | Stop reason: SDUPTHER

## 2017-02-14 NOTE — PLAN OF CARE
TIME RECORD    Date: 02/14/2017    Start Time:  8:10  Stop Time:  9:00    PROCEDURES:    TIMED  Procedure Min.                 UNTIMED  Procedure Min.   IE 50         Total Timed Minutes:  0  Total Timed Units:  0  Total Untimed Units:  1  Charges Billed/# of units:  1IE    OCCUPATIONAL THERAPY INITIAL EVALUATION & PLAN OF TREATMENT    Patient Name: Althea Mondragonchester  Physician Name:  Dr. Arvizu  Primary Diagnosis:  Spondyloarthritis, R shoulder pain and decreased ROM  Treatment Diagnosis:  B shoulder pain, B shoulder decreased ROM  Onset Date:  ~August 2016  Eval Date:  2/14/17  Certification Period:  2/14/17 to 3/31/17  Past Medical History:   Past Medical History   Diagnosis Date    Acid reflux     Anxiety 10/18/2012    Arthritis     Depression     Diabetic peripheral neuropathy - mild 10/21/2014    Difficult intubation     Dry eyes     Dry mouth     Fever blister     History of hepatitis B 10/3/2016     Hep B core total Ab (+), no active/chronic infection    Hyperlipidemia     Hypertension     Insomnia     Iritis 5/13/2014    Long-term current use of steroids 9/27/2012    Nausea & vomiting 2/4/2015    VAISHNAVI (obstructive sleep apnea)     Type II diabetes mellitus 10/1/2012     Past Surgical History   Procedure Laterality Date    Tubal ligation      Knee arthroscopy  5-14-14     right    Hysterectomy  12/3/2014       Precautions:  standard  Prior Therapy:  Pt has had prior therapy on B Ue, and has PT for B LE  Signs of Abuse: no  Medications: Althea Vazquez has a current medication list which includes the following prescription(s): amlodipine, aspirin, atorvastatin, clonazepam, clotrimazole-betamethasone 1-0.05%, cyclobenzaprine, docusate sodium, entecavir, fluoxetine, homatropine, humira pen, hydrochlorothiazide, hydrocortisone, hydroquinone, invokana, lancets, losartan, metformin, metoprolol succinate, pantoprazole, promethazine, sulfasalazine, tramadol, truetest test  "strips, and zolpidem.  Nutrition:  Over weight female  Prior Level of Function: Assistive Device and assist with ADLs since onset of medical issues  DME: walker and elevated toilet  Social History:  Pt living with  and sons at home. Pt has been driving, but only short distances. Pt is currently on disability.  Functional Deficits Leading to Referral/Nature of Injury:  Over time, repetitive strain  Patient Therapy Goals:  To get better and be able to do more  Hand dominance: Right  X-Rays/Tests: MRI of R shoulder on 16 indicates the followin.  High-grade undersurface supraspinatus tendon tear.  Infraspinatus tendinosis.  2.  Subscapularis tendinosis and interstitial tear with evidence for subcoracoid impingement.  3.  Moderate-sized joint effusion with synovitis and subacromial subdeltoid bursitis.  4.  Mild acromioclavicular arthrosis.    Subjective:  Pt states "I went to the doctor yesterday. She gave me an injection in my shoulder."    Pain:  During no work: 0/10  While workin/10  Sleepin/10, reports discomfort, does not sleep well  Location of pain: posterior shoulder    Objective:  Sensation Test: occasional numbness in R hand    Observation/Inspection:  scapular elevation with attempted shoulder ROM, forward head, depressed R scapula, protracted R scapula    Range of Motion:   Shoulder Right  Left  Pain/Dysfunction with Movement    AROM MMT AROM MMT    flexion 75 3-/5 100 3-/5 Pain at end range   extension 50 3/5 60 3/5    abduction 65 3-/5 65 3-/5 Pain at end range   adduction 10 3/5 5 3/5    Internal rotation 35 3-/5 70 3/5 Pain at end range   ER at 90° abd 50 3-/5 25 3-/5 Pain at end range and with movement   ER at 0° abd 75 4/5 70 3+/5      ROM Comments:   Soft end feel    Painful Arc:   Patient demonstrates no painful arc in shoulder flexion or abduction    Special Tests:  tba    Palpation: (for pain)    denied pain upon palpation       Limitations of Functional Status:   Self Care: " requires increase time to don clothes, inability or pain with donning bra and reaching overhead, unable to groom hair, difficulty washing back and bathing  Work: unable to lift her heavy pots at home or other heavy items, difficulty cleaning bathtub, unable to mop  Leisure: difficulty cooking; has been unable to participate in most leisure activities    FOTO (Focus on Therapeutic outcomes) score: 93% limitation with UE function  G-Code: carry  Current: CM  Goal: CK    Treatment included: OT evaluation, the following exercises (HEP) were instructed and Adrence was able to demonstrate them prior to the end of the session. HEP are as follows: reviewed theraband ex (rows, ext), wall slides, dowel ex (FF), and joint protection principles.  Also reviewed modalities for pain management such as paraffin bath.  Discussed benefits of wrist cock up brace for R hand CTS.  Pt reports she has been doing HEP since last OT session in December. She has been performing wall climbs, FF in supine, and ex with theraband.       Assessment  This 51 y.o. female referred to Outpatient Occupational Therapy with diagnosis of spondyloarthritis  Encounter Diagnoses   Name Primary?    Muscle weakness of upper extremity     Chronic right shoulder pain     Decreased range of motion (ROM) of shoulder     presents with limitations as described in problem list. Patient can benefit from Occupational Therapy services for Ultrasound, moist heat, PROM, AAROM, AROM, Theraputic exercises, joint mobs, home exercise program provied with written instructions, ice, Ice massage, strengthening, Theraband Ex, UBE and pulley ex in order to maximize painfree functional use of  bilateral UE. . The following goals were discussed with the patient and she is in agreement with them as to be addressed in the treatment plan.   History Examination Decision Making Complexity Score   Occupational Profile:   Performed extensive review of pt history through interview with  pt, chart review, and previous experience with pt.  -lives with sons and , is on disability, enjoys cooking, drives short distances  -R hand dominant    Medical and Therapy History:   See above in note. Hx of OP OT and PT last year.   Comorbidities: RTC tears noted in MRI in September 2016. BMI over 30, HTN, insomnia, depression, neck pain, knee and hip pain, spondyloarthritis, osteoarthritis, DMT2, anxiety   Performance Deficits     Physical  Due to limited ROM, strength, pain, and numbness, pt requires assist with grooming, bathing. She has difficulty cooking, reaching up into cabinets, cleaning bathtub. She is unable to mop    Cognitive  -n/a    Psychosocial:    -difficulty sleeping due to pain and discomfort in shoulder, unable to participate in social activities     Performed detailed assessment of pt. Pt required min modifications during evaluation with cuing, positioning, and with ADL MODERATE       Problem List:   Decreased function of Right UE, Decreased function of Left UE, Decreased ROM, Increased pain, Decreased strength, Inability to perform work/tasks, Difficulty sleeping, Inability to perform leisure activiites and Inability to perform self care tasks    Rehab Potential: good    Goals to be met in 4 weeks: (3/14/17)  1) Initiate Hep   2) Pt will increase B shoulder AROM by 10 degrees grossly for improved performance with overhead ADL's  3) Pt will report 4/10 pain in (B)shoulder at worst  4) Pt will demonstrate increased MMT to 4/5 grossly L shoulder    Goals to be met by discharge:  1) Independent with HEP  2) Pt will demonstrate (L/R) shoulder AROM WFL grossly for [place] with ADL's  3) Pt will demonstrate (L/R) shoulder MMT WFL grossly for [place] with functional activities  4) Independent and pain free with ADL's and IADL's  5) Patient will be able to achieve FOTO score less than or equal to 48% limitation with UE function.    Plan  Recommended Treatment Plan (2 times per week for  6-8 weeks): Therapeutic Exercise, Functional Activities, Patient Education, Home Exercise Program, ADL Training, Paraffin, Ultrasound/Phonophoresis, Edema Control, Electrical Stimulation/TENS/Interferential, Moist Heat/Ice and Manual Therapy  Other Recommendations:  KT, IASTM, joint protection, energy conservation    Therapist's Name: Lesleymalcolm MadridCANDELARIO ramsey   Date: 02/14/2017    I CERTIFY THE NEED FOR THESE SERVICES FURNISHED UNDER THIS PLAN OF TREATMENT AND WHILE UNDER MY CARE    Physician's comments: ________________________________________________________________________________________________________________________________________________      Physician's Name: ___________________________________

## 2017-02-15 ENCOUNTER — TELEPHONE (OUTPATIENT)
Dept: PHARMACY | Facility: CLINIC | Age: 52
End: 2017-02-15

## 2017-02-15 NOTE — TELEPHONE ENCOUNTER
"Incoming call from patient reporting some mild erythema/swelling/itching around injection site after she injects. This lasts roughly 48hrs..she is concerned she is injecting incorrectly. She is advised that this is perfectly normal and called "injection site reactions". I did advise that she place cold pack shortly after injecting or can take low dose tylenol after injecting or shortly before injecting. She acknowledged understanding.  "

## 2017-02-16 ENCOUNTER — OFFICE VISIT (OUTPATIENT)
Dept: PSYCHIATRY | Facility: CLINIC | Age: 52
End: 2017-02-16
Payer: COMMERCIAL

## 2017-02-16 VITALS
HEIGHT: 66 IN | WEIGHT: 224 LBS | BODY MASS INDEX: 36 KG/M2 | DIASTOLIC BLOOD PRESSURE: 86 MMHG | SYSTOLIC BLOOD PRESSURE: 140 MMHG

## 2017-02-16 DIAGNOSIS — F41.1 GENERALIZED ANXIETY DISORDER: ICD-10-CM

## 2017-02-16 DIAGNOSIS — F41.0 PANIC DISORDER WITHOUT AGORAPHOBIA: ICD-10-CM

## 2017-02-16 DIAGNOSIS — F34.1 DYSTHYMIC DISORDER: ICD-10-CM

## 2017-02-16 PROCEDURE — 99999 PR PBB SHADOW E&M-EST. PATIENT-LVL II: CPT | Mod: PBBFAC,,, | Performed by: PSYCHIATRY & NEUROLOGY

## 2017-02-16 PROCEDURE — 99214 OFFICE O/P EST MOD 30 MIN: CPT | Mod: S$GLB,,, | Performed by: PSYCHIATRY & NEUROLOGY

## 2017-02-16 PROCEDURE — 99212 OFFICE O/P EST SF 10 MIN: CPT | Mod: PBBFAC | Performed by: PSYCHIATRY & NEUROLOGY

## 2017-02-16 RX ORDER — ZOLPIDEM TARTRATE 5 MG/1
5 TABLET ORAL NIGHTLY PRN
Qty: 30 TABLET | Refills: 3 | Status: SHIPPED | OUTPATIENT
Start: 2017-02-16 | End: 2017-05-17 | Stop reason: SDUPTHER

## 2017-02-16 RX ORDER — CLONAZEPAM 0.5 MG/1
0.5 TABLET ORAL DAILY PRN
Qty: 30 TABLET | Refills: 3 | Status: SHIPPED | OUTPATIENT
Start: 2017-02-16 | End: 2017-05-17 | Stop reason: SDUPTHER

## 2017-02-16 RX ORDER — FLUOXETINE HYDROCHLORIDE 40 MG/1
40 CAPSULE ORAL 2 TIMES DAILY
Qty: 180 CAPSULE | Refills: 1 | Status: SHIPPED | OUTPATIENT
Start: 2017-02-16 | End: 2017-05-17 | Stop reason: SDUPTHER

## 2017-02-16 NOTE — PROGRESS NOTES
ESTABLISHED OUTPATIENT VISIT   E/M LEVEL 4: [id number]    ENCOUNTER DATE: 2/16/2017  SITE: [facility]    HISTORY    CHIEF COMPLAINT   lAthea Vazquez is a 51 y.o. female who presents for follow up of anxiety.    HPI Recent psychosocial stress[deaths in family, son incarcerated for 45 min]. Appears psychiatrically stable. Would like to maintain current psychotropic medication regimen.    Attends [employer] at times.    Psychiatric Review Of Systems - Is patient experiencing or having changes in:  sleep: poor due to pain[shoulder]  appetite: no  weight: has gained 6 lb's since previous visit  energy/anergy: no  interest/pleasure/anhedonia: no  somatic symptoms: no  libido: no  anxiety/panic: no  guilty/hopelessness: no  concentration: no  S.I.B.s/risky behavior: no  Irritability: no  Racing thoughts: no  Impulsive behaviors: no  Paranoia:no  AVH:no    Recent alcohol: rare small amount  Recent drug: no    Medical ROS   joint pain      PAST MEDICAL, FAMILY AND SOCIAL HISTORY: The patient's past medical, family and social history have been reviewed and updated as appropriate within the electronic medical record - see encounter notes.    PSYCHOTROPIC MEDICATIONS   Prozac 40 mg bid, Clonazepam 0.5 mg prn for anxiety[has been taking 3-4 times per week], Ambien 5 mg at bedtime prn    Scheduled and PRN Medications     Current Outpatient Prescriptions:     amlodipine (NORVASC) 5 MG tablet, Take 1 tablet (5 mg total) by mouth once daily., Disp: 30 tablet, Rfl: 0    aspirin (ECOTRIN) 81 MG EC tablet, Take 1 tablet (81 mg total) by mouth once daily., Disp: 30 tablet, Rfl: 0    atorvastatin (LIPITOR) 40 MG tablet, TAKE 1 TABLET BY MOUTH EVERY DAY, Disp: 30 tablet, Rfl: 11    clonazePAM (KLONOPIN) 0.5 MG tablet, Take 1 tablet (0.5 mg total) by mouth daily as needed for Anxiety., Disp: 30 tablet, Rfl: 3    clotrimazole-betamethasone 1-0.05% (LOTRISONE) cream, Apply topically 2 (two) times daily as  needed. , Disp: , Rfl:     cyclobenzaprine (FLEXERIL) 10 MG tablet, TAKE 1 TABLET BY MOUTH EVERY DAY AT BEDTIME IF NEEDED, Disp: 60 tablet, Rfl: 3    docusate sodium (COLACE) 50 MG capsule, Take 1 capsule (50 mg total) by mouth 2 (two) times daily as needed for Constipation. (Patient taking differently: Take 50 mg by mouth 2 (two) times daily. ), Disp: 30 capsule, Rfl: 0    entecavir (BARACLUDE) 0.5 MG Tab, Take 0.5 mg by mouth once daily., Disp: , Rfl:     fluoxetine (PROZAC) 40 MG capsule, Take 1 capsule (40 mg total) by mouth 2 (two) times daily., Disp: 180 capsule, Rfl: 1    homatropine (ISOPTO HOMATROPINE) 5 % ophthalmic solution, Place 1 drop into the right eye 2 (two) times daily. (Patient taking differently: Place 1 drop into the right eye 2 (two) times daily as needed. ), Disp: 5 mL, Rfl: 1    HUMIRA PEN PnKt injection, INJECT 1 PEN INTO THE SKIN EVERY 14 DAYS, Disp: 2 each, Rfl: 1    hydrochlorothiazide (HYDRODIURIL) 25 MG tablet, Take 1 tablet (25 mg total) by mouth once daily., Disp: 30 tablet, Rfl: 11    hydrocortisone 2.5 % cream, Apply topically 2 (two) times daily., Disp: 28 g, Rfl: 1    hydroquinone 4 % Crea, Apply to dark areas qhs.  Not more than 6 months straight in same location.Use sunscreen in am (Patient taking differently: continuous prn. Apply to dark areas qhs.  Not more than 6 months straight in same location.Use sunscreen in am), Disp: 46 g, Rfl: 1    INVOKANA 300 mg Tab, TAKE 1 TABLET BY MOUTH EVERY DAY, Disp: 30 tablet, Rfl: 11    lancets Misc, To use with TrueResult meter to check BG twice daily, Disp: 100 each, Rfl: 6    losartan (COZAAR) 100 MG tablet, TAKE 1 TABLET BY MOUTH EVERY DAY, Disp: 30 tablet, Rfl: 5    metformin (GLUCOPHAGE-XR) 500 MG 24 hr tablet, Take 2 tablets (1,000 mg total) by mouth daily with breakfast., Disp: 60 tablet, Rfl: 11    metoprolol succinate (TOPROL-XL) 100 MG 24 hr tablet, Take 1 tablet (100 mg total) by mouth once daily., Disp: 90 tablet,  Rfl: 3    pantoprazole (PROTONIX) 40 MG tablet, Take 1 tablet (40 mg total) by mouth once daily., Disp: 30 tablet, Rfl: 11    promethazine (PHENERGAN) 25 MG tablet, Take 1 tablet (25 mg total) by mouth every 6 (six) hours as needed for Nausea., Disp: 15 tablet, Rfl: 0    sulfaSALAzine (AZULFIDINE) 500 MG TbEC, Take 2 tablets (1,000 mg total) by mouth 2 (two) times daily., Disp: 120 tablet, Rfl: 6    tramadol (ULTRAM) 50 mg tablet, Take 1-2 tabs PO q6 hrs PRN pain, Disp: 120 tablet, Rfl: 0    TRUETEST TEST STRIPS Strp, TEST TWICE A DAY, Disp: 60 strip, Rfl: 11    zolpidem (AMBIEN) 5 MG Tab, Take 1 tablet (5 mg total) by mouth nightly as needed., Disp: 30 tablet, Rfl: 3    EXAMINATION    Vitals:    02/16/17 1236   BP: (!) 140/86     Weight: 224 lb's    CONSTITUTIONAL  General Appearance: well nourished    MUSCULOSKELETAL  Muscle Strength and Tone: normal strength and tone  Abnormal Involuntary Movements: no abnormal movement noted  Gait and Station: normal gait    PSYCHIATRIC   Level of Consciousness: alert  Orientation: oriented to person, place and time  Grooming: well groomed  Psychomotor Behavior: no restlessness/agitation  Speech: normal in rate, rhythm and volume  Language: normal vocabulary  Mood: anxiety at times  Affect: full range and appropriate  Thought Process: logical and goal directed  Associations: intact associations  Thought Content: no SI/HI  Memory: grossly intact  Attention: intact to content of interview  Fund of Knowledge: appears adequate  Insight: good  Judgement: good    MEDICAL DECISION MAKING    DIAGNOSES  Anxiety d/o N.O.S.    PROBLEM LIST AND MANAGEMENT PLANS    - anxiety: continue Prozac, Klonopin and Ambien as above  - rtc 3 months    Time with patient: 20 min    LABORATORY DATA  Lab Visit on 02/13/2017   Component Date Value Ref Range Status    Hemoglobin A1C 02/13/2017 5.8  4.5 - 6.2 % Final    Comment: According to ADA guidelines, hemoglobin A1C <7.0% represents  optimal  control in non-pregnant diabetic patients.  Different  metrics may apply to specific populations.   Standards of Medical Care in Diabetes - 2016.  For the purpose of screening for the presence of diabetes:  <5.7%     Consistent with the absence of diabetes  5.7-6.4%  Consistent with increasing risk for diabetes   (prediabetes)  >or=6.5%  Consistent with diabetes  Currently no consensus exists for use of hemoglobin A1C  for diagnosis of diabetes for children.      Estimated Avg Glucose 02/13/2017 120  68 - 131 mg/dL Final   Lab Visit on 12/27/2016   Component Date Value Ref Range Status    WBC 12/27/2016 5.34  3.90 - 12.70 K/uL Final    RBC 12/27/2016 4.89  4.00 - 5.40 M/uL Final    Hemoglobin 12/27/2016 14.2  12.0 - 16.0 g/dL Final    Hematocrit 12/27/2016 42.6  37.0 - 48.5 % Final    MCV 12/27/2016 87  82 - 98 fL Final    MCH 12/27/2016 29.0  27.0 - 31.0 pg Final    MCHC 12/27/2016 33.3  32.0 - 36.0 % Final    RDW 12/27/2016 16.5* 11.5 - 14.5 % Final    Platelets 12/27/2016 243  150 - 350 K/uL Final    MPV 12/27/2016 10.2  9.2 - 12.9 fL Final    Gran # 12/27/2016 1.8  1.8 - 7.7 K/uL Final    Lymph # 12/27/2016 2.7  1.0 - 4.8 K/uL Final    Mono # 12/27/2016 0.6  0.3 - 1.0 K/uL Final    Eos # 12/27/2016 0.1  0.0 - 0.5 K/uL Final    Baso # 12/27/2016 0.03  0.00 - 0.20 K/uL Final    Gran% 12/27/2016 34.4* 38.0 - 73.0 % Final    Lymph% 12/27/2016 51.3* 18.0 - 48.0 % Final    Mono% 12/27/2016 11.6  4.0 - 15.0 % Final    Eosinophil% 12/27/2016 1.5  0.0 - 8.0 % Final    Basophil% 12/27/2016 0.6  0.0 - 1.9 % Final    Differential Method 12/27/2016 Automated   Final   Lab Visit on 12/12/2016   Component Date Value Ref Range Status    WBC 12/12/2016 4.54  3.90 - 12.70 K/uL Final    RBC 12/12/2016 4.65  4.00 - 5.40 M/uL Final    Hemoglobin 12/12/2016 13.2  12.0 - 16.0 g/dL Final    Hematocrit 12/12/2016 41.3  37.0 - 48.5 % Final    MCV 12/12/2016 89  82 - 98 fL Final    MCH 12/12/2016 28.4  27.0 -  31.0 pg Final    MCHC 12/12/2016 32.0  32.0 - 36.0 % Final    RDW 12/12/2016 16.7* 11.5 - 14.5 % Final    Platelets 12/12/2016 220  150 - 350 K/uL Final    MPV 12/12/2016 11.6  9.2 - 12.9 fL Final    Gran # 12/12/2016 1.6* 1.8 - 7.7 K/uL Final    Lymph # 12/12/2016 2.2  1.0 - 4.8 K/uL Final    Mono # 12/12/2016 0.5  0.3 - 1.0 K/uL Final    Eos # 12/12/2016 0.1  0.0 - 0.5 K/uL Final    Baso # 12/12/2016 0.03  0.00 - 0.20 K/uL Final    Gran% 12/12/2016 36.2* 38.0 - 73.0 % Final    Lymph% 12/12/2016 49.3* 18.0 - 48.0 % Final    Mono% 12/12/2016 10.8  4.0 - 15.0 % Final    Eosinophil% 12/12/2016 2.6  0.0 - 8.0 % Final    Basophil% 12/12/2016 0.7  0.0 - 1.9 % Final    Differential Method 12/12/2016 Automated   Final    Sodium 12/12/2016 139  136 - 145 mmol/L Final    Potassium 12/12/2016 3.7  3.5 - 5.1 mmol/L Final    Chloride 12/12/2016 102  95 - 110 mmol/L Final    CO2 12/12/2016 29  23 - 29 mmol/L Final    Glucose 12/12/2016 153* 70 - 110 mg/dL Final    BUN, Bld 12/12/2016 10  7 - 17 mg/dL Final    Creatinine 12/12/2016 0.55  0.50 - 1.40 mg/dL Final    Calcium 12/12/2016 9.0  8.7 - 10.5 mg/dL Final    Total Protein 12/12/2016 7.0  6.0 - 8.4 g/dL Final    Albumin 12/12/2016 4.2  3.5 - 5.2 g/dL Final    Total Bilirubin 12/12/2016 0.4  0.1 - 1.0 mg/dL Final    Comment: For infants and newborns, interpretation of results should be based  on gestational age, weight and in agreement with clinical  observations.  Premature Infant recommended reference ranges:  Up to 24 hours.............<8.0 mg/dL  Up to 48 hours............<12.0 mg/dL  3-5 days..................<15.0 mg/dL  6-29 days.................<15.0 mg/dL      Alkaline Phosphatase 12/12/2016 72  38 - 126 IU/L Final    AST 12/12/2016 23  15 - 46 IU/L Final    ALT 12/12/2016 28  10 - 44 IU/L Final    Anion Gap 12/12/2016 8  8 - 16 mmol/L Final    eGFR if African American 12/12/2016 >60.0  >60 mL/min/1.73 m^2 Final    eGFR if non African  American 12/12/2016 >60.0  >60 mL/min/1.73 m^2 Final    Comment: Calculation used to obtain the estimated glomerular filtration  rate (eGFR) is the CKD-EPI equation. Since race is unknown   in our information system, the eGFR values for   -American and Non--American patients are given   for each creatinine result.      Sed Rate 12/12/2016 10  0 - 20 mm/Hr Final    CRP 12/12/2016 <0.50  0.00 - 1.00 mg/dL Final    Hep B Viral DNA IU/ML 12/12/2016 <10  <10 IU/mL Final    Log HBV IU/mL 12/12/2016 <1.00  <1.00 Log (10) IU/mL Final    Comment: This procedure utilizes a real-time polymerase chain  reaction test from [employer].  The amplification   target is a conserved region in the terminal third of  the hepatitis B surface antigen gene. The lower limit of   quantitation is 10 IU/mL (1.00 Log IU/mL) and the uppper  limit of quantitation is 1 billion IU/mL (9.00 Log IU/mL).  The qualitative limit of detection is 10 IU/mL   (1.00 Log IU/mL). A  Not detected  result does not rule   out infection.  Test performed at [facility],  [address], [place]  [address] [phone]  Milton Castro MD  - Medical Director      Hepatitis B Virus DNA 12/12/2016 Not detected  Not detected Final           Tip Oliveira

## 2017-02-16 NOTE — MR AVS SNAPSHOT
[facility] - Psychiatry  [address]  [facility] [address]  Phone: [phone]  Fax: [phone]                  Althea Vazquez   2017 1:00 PM   Office Visit    Description:  Female : 1965   Provider:  Tip Oliveira MD   Department:  [facility] - Psychiatry           Diagnoses this Visit        Comments    Generalized anxiety disorder         Panic disorder without agoraphobia         Dysthymic disorder                To Do List           Future Appointments        Provider Department Dept Phone    2017 8:00 AM Lesley Duffy, OT [facility] [phone]    2017 8:00 AM Lesley Duffy, OT [facility] [phone]    2017 9:40 AM Weston Begum, DO [facility] [phone]    3/7/2017 8:00 AM Lesley Duffy, OT [facility] [phone]    3/7/2017 9:00 AM Christy Rush, PT [facility] [phone]      Goals (5 Years of Data)              17    HEMOGLOBIN A1C < 7.0   5.8  6.0  6.4       These Medications        Disp Refills Start End    fluoxetine (PROZAC) 40 MG capsule 180 capsule 1 2017     Take 1 capsule (40 mg total) by mouth 2 (two) times daily. - Oral    Pharmacy: [facility] [id number] [address] Ph #: [phone]       clonazePAM (KLONOPIN) 0.5 MG tablet 30 tablet 3 2017 3/18/2017    Take 1 tablet (0.5 mg total) by mouth daily as needed for Anxiety. - Oral    Pharmacy: [facility] [address] [address] Ph #: [phone]       zolpidem (AMBIEN) 5 MG Tab 30 tablet 3 2017    Take 1 tablet (5 mg total) by mouth nightly as needed. - Oral    Pharmacy: [facility] [place]  [address]  [address] [address] Ph #: [phone]         [facility]     [facility] -   Assistance  Registered nurses in the [facility] provide clinical advisement, health education, appointment booking, and other advisory services.  Call for this free service at [phone].             Medications           Message regarding Medications     Verify the changes and/or additions to your medication regime listed below are the same as discussed with your clinician today.  If any of these changes or additions are incorrect, please notify your healthcare provider.             Verify that the below list of medications is an accurate representation of the medications you are currently taking.  If none reported, the list may be blank. If incorrect, please contact your healthcare provider. Carry this list with you in case of emergency.           Current Medications     amlodipine (NORVASC) 5 MG tablet Take 1 tablet (5 mg total) by mouth once daily.    aspirin (ECOTRIN) 81 MG EC tablet Take 1 tablet (81 mg total) by mouth once daily.    atorvastatin (LIPITOR) 40 MG tablet TAKE 1 TABLET BY MOUTH EVERY DAY    clonazePAM (KLONOPIN) 0.5 MG tablet Take 1 tablet (0.5 mg total) by mouth daily as needed for Anxiety.    clotrimazole-betamethasone 1-0.05% (LOTRISONE) cream Apply topically 2 (two) times daily as needed.     cyclobenzaprine (FLEXERIL) 10 MG tablet TAKE 1 TABLET BY MOUTH EVERY DAY AT BEDTIME IF NEEDED    docusate sodium (COLACE) 50 MG capsule Take 1 capsule (50 mg total) by mouth 2 (two) times daily as needed for Constipation.    entecavir (BARACLUDE) 0.5 MG Tab Take 0.5 mg by mouth once daily.    fluoxetine (PROZAC) 40 MG capsule Take 1 capsule (40 mg total) by mouth 2 (two) times daily.    homatropine (ISOPTO HOMATROPINE) 5 % ophthalmic solution Place 1 drop into the right eye 2 (two) times daily.    HUMIRA PEN PnKt injection INJECT 1 PEN INTO THE SKIN EVERY 14 DAYS    hydrochlorothiazide (HYDRODIURIL) 25 MG tablet Take 1 tablet (25 mg total) by mouth once daily.    hydrocortisone 2.5 %  "cream Apply topically 2 (two) times daily.    hydroquinone 4 % Crea Apply to dark areas qhs.  Not more than 6 months straight in same location.Use sunscreen in am    INVOKANA 300 mg Tab TAKE 1 TABLET BY MOUTH EVERY DAY    lancets Misc To use with TrueResult meter to check BG twice daily    losartan (COZAAR) 100 MG tablet TAKE 1 TABLET BY MOUTH EVERY DAY    metformin (GLUCOPHAGE-XR) 500 MG 24 hr tablet Take 2 tablets (1,000 mg total) by mouth daily with breakfast.    metoprolol succinate (TOPROL-XL) 100 MG 24 hr tablet Take 1 tablet (100 mg total) by mouth once daily.    pantoprazole (PROTONIX) 40 MG tablet Take 1 tablet (40 mg total) by mouth once daily.    promethazine (PHENERGAN) 25 MG tablet Take 1 tablet (25 mg total) by mouth every 6 (six) hours as needed for Nausea.    sulfaSALAzine (AZULFIDINE) 500 MG TbEC Take 2 tablets (1,000 mg total) by mouth 2 (two) times daily.    tramadol (ULTRAM) 50 mg tablet Take 1-2 tabs PO q6 hrs PRN pain    TRUETEST TEST STRIPS Strp TEST TWICE A DAY    zolpidem (AMBIEN) 5 MG Tab Take 1 tablet (5 mg total) by mouth nightly as needed.           Clinical Reference Information           Your Vitals Were     BP Height Weight Last Period BMI    140/86 5' 6" (1.676 m) 101.6 kg (224 lb) 11/25/2014 36.15 kg/m2      Blood Pressure          Most Recent Value    BP  (!)  140/86      Allergies as of 2/16/2017     Bactrim [Sulfamethoxazole-trimethoprim]    Diflucan [Fluconazole]      Immunizations Administered on Date of Encounter - 2/16/2017     None      Language Assistance Services     ATTENTION: Language assistance services are available, free of charge. Please call [phone].      ATENCIÓN: Si carmelo christopher, tiene a pelayo disposición servicios gratuitos de asistencia lingüística. Llame al [phone].     CHÚ Ý: N?u b?n nói Ti?ng Vi?t, có các d?ch v? h? tr? ngôn ng? mi?n phí dành cho b?n. G?i s? [phone].         [facility] complies with applicable Federal civil " rights laws and does not discriminate on the basis of race, color, national origin, age, disability, or sex.

## 2017-02-21 ENCOUNTER — CLINICAL SUPPORT (OUTPATIENT)
Dept: REHABILITATION | Facility: HOSPITAL | Age: 52
End: 2017-02-21
Attending: STUDENT IN AN ORGANIZED HEALTH CARE EDUCATION/TRAINING PROGRAM
Payer: COMMERCIAL

## 2017-02-21 DIAGNOSIS — M25.619 DECREASED RANGE OF MOTION (ROM) OF SHOULDER: ICD-10-CM

## 2017-02-21 DIAGNOSIS — G89.29 CHRONIC RIGHT SHOULDER PAIN: ICD-10-CM

## 2017-02-21 DIAGNOSIS — M25.511 CHRONIC RIGHT SHOULDER PAIN: ICD-10-CM

## 2017-02-21 PROCEDURE — 97110 THERAPEUTIC EXERCISES: CPT

## 2017-02-21 NOTE — PROGRESS NOTES
"TIME RECORD    Date:  02/21/2017    Start Time:  8:05  Stop Time:  8:55    PROCEDURES:    TIMED  Procedure Min.   TE 50   MT                  UNTIMED  Procedure Min.             Total Timed Minutes:  50  Total Timed Units:  3  Total Untimed Units:  0  Charges Billed/# of units:  3TE      Progress/Current Status    Subjective:     Patient ID: Althea Vazquez is a 51 y.o. female.  Diagnosis:   1. Chronic right shoulder pain     2. Decreased range of motion (ROM) of shoulder       Pain: 0 /10  Pt states "I'm feeling good today."    Objective:     Pt seen by OT this session. Treatment consisted of the following:     Date: 2/21/17    Visit:2   UBE, L3 2' forward, 2' back   pulleys 3 min FF   Dowel -FF 10 reps, supine with head elevated   SL ABD/ER 10 reps each   SL gator 10 reps    SL FF with table 10 reps   Saws  10 reps   theraband - green    -rows 2 x 10 reps   -ext 2 x 10 reps   -IR/ER 2 x 10 reps, B UE   -biceps curls 2 x 10 reps   Wall climbs - ABD 8 reps each     Pt declined MHP and cold pack today for pain management    Assessment:     Pt tolerated treatment fairly well today. Pt c/o soreness/pain with dowel ex and ER with theraband.  Pt performed ex as tolerated today. Pt declined modalities for pain and inflammation management today.  Pt cont to present with R shoulder pain, decreased ROM, and limited functional use of R UE.  Pt with good participation and compliant with HEP. Pt would cont to benefit from skilled OT services to improve functional independence and use of R UE.    Patient Education/Response:     Cont HEP. Added dowel ex and sidelying ex (ER and abd). Pt verbalized and demonstrated understanding of education.    Plans and Goals:     Cont OT poc 2x/week for 6 weeks during certification period 2/14/17 to 3/31/17 in pursuit of established goals.    Goals to be met in 4 weeks: (3/14/17)  1) Initiate Hep   2) Pt will increase B shoulder AROM by 10 degrees grossly for improved performance with " overhead ADL's  3) Pt will report 4/10 pain in (B)shoulder at worst  4) Pt will demonstrate increased MMT to 4/5 grossly L shoulder     Goals to be met by discharge:  1) Independent with HEP  2) Pt will demonstrate (L/R) shoulder AROM WFL grossly for [place] with ADL's  3) Pt will demonstrate (L/R) shoulder MMT WFL grossly for [place] with functional activities  4) Independent and pain free with ADL's and IADL's  5) Patient will be able to achieve FOTO score less than or equal to 48% limitation with UE function.    CANDELARIO Menon

## 2017-02-23 ENCOUNTER — CLINICAL SUPPORT (OUTPATIENT)
Dept: REHABILITATION | Facility: HOSPITAL | Age: 52
End: 2017-02-23
Attending: STUDENT IN AN ORGANIZED HEALTH CARE EDUCATION/TRAINING PROGRAM
Payer: COMMERCIAL

## 2017-02-23 DIAGNOSIS — M25.511 CHRONIC RIGHT SHOULDER PAIN: ICD-10-CM

## 2017-02-23 DIAGNOSIS — M25.619 DECREASED RANGE OF MOTION (ROM) OF SHOULDER: ICD-10-CM

## 2017-02-23 DIAGNOSIS — G89.29 CHRONIC RIGHT SHOULDER PAIN: ICD-10-CM

## 2017-02-23 PROCEDURE — 97110 THERAPEUTIC EXERCISES: CPT

## 2017-02-23 NOTE — PROGRESS NOTES
"TIME RECORD    Date:  02/23/2017    Start Time:  8:10  Stop Time:  8:55    PROCEDURES:    TIMED  Procedure Min.   TE 45 (15 min 1:1; 30 min supervised)   MT                  UNTIMED  Procedure Min.             Total Timed Minutes:  45  Total Timed Units:  3  Total Untimed Units:  0  Charges Billed/# of units:  1TE      Progress/Current Status    Subjective:     Patient ID: Althea Vazquez is a 51 y.o. female.  Diagnosis:   1. Chronic right shoulder pain     2. Decreased range of motion (ROM) of shoulder       Pain: 0 /10  Pt states "I'm feeling fine today."  Pt reports compliance with HEP.    Objective:     Pt seen by OT this session. Treatment consisted of the following:     Date: 2/23/17    Visit:3   UBE, L3 2' forward, 2' back   pulleys 3 min FF   Dowel -FF 10 reps, supine with head elevated   SL ABD/ER 10 reps each   SL gator 10 reps    SL FF with table 10 reps   Saws  10 reps   Prone ext 10 reps   theraband - green    -rows 2 x 10 reps   -ext 2 x 10 reps   -IR/ER 2 x 10 reps, B UE   -biceps curls 2 x 10 reps   Wall climbs - ABD 8 reps each     Pt declined MHP and cold pack today for pain management    Assessment:     Pt tolerated treatment fairly well today. Pt c/o soreness/fatigue with ER.  Pt performed ex as tolerated today. Pt declined modalities for pain and inflammation management again today.  Pt cont to present with R shoulder pain, decreased ROM, and limited functional use of R UE. Exercise performed as tolerated.  Improved ROM of B shoulders noted today.  Pt with good participation and compliant with HEP. Pt would cont to benefit from skilled OT services to improve functional independence and use of R UE.    Patient Education/Response:     Cont HEP. Added dowel ex and sidelying ex (ER and abd). Pt verbalized and demonstrated understanding of education.    Plans and Goals:     Cont OT poc 2x/week for 6 weeks during certification period 2/14/17 to 3/31/17 in pursuit of established " goals.    Goals to be met in 4 weeks: (3/14/17)  1) Initiate Hep   2) Pt will increase B shoulder AROM by 10 degrees grossly for improved performance with overhead ADL's  3) Pt will report 4/10 pain in (B)shoulder at worst  4) Pt will demonstrate increased MMT to 4/5 grossly L shoulder     Goals to be met by discharge:  1) Independent with HEP  2) Pt will demonstrate (L/R) shoulder AROM WFL grossly for [place] with ADL's  3) Pt will demonstrate (L/R) shoulder MMT WFL grossly for [place] with functional activities  4) Independent and pain free with ADL's and IADL's  5) Patient will be able to achieve FOTO score less than or equal to 48% limitation with UE function.    CANDELARIO Menon

## 2017-02-24 DIAGNOSIS — M47.819 SPONDYLARTHRITIS: ICD-10-CM

## 2017-02-24 DIAGNOSIS — M02.30 REITER'S DISEASE, REITER'S DISEASE OF UNSPECIFIED SITE: Chronic | ICD-10-CM

## 2017-02-24 RX ORDER — TRAMADOL HYDROCHLORIDE 50 MG/1
TABLET ORAL
Qty: 120 TABLET | Refills: 0 | Status: CANCELLED | OUTPATIENT
Start: 2017-02-24

## 2017-02-24 NOTE — TELEPHONE ENCOUNTER
Giselle, the tramadol #120 was prescribed by Dr. Begum needs to get refills from him. I don't give chronic tramadol. If he does not wish, she needs to go to [facility]. Thanks. CLEMENT

## 2017-02-27 ENCOUNTER — TELEPHONE (OUTPATIENT)
Dept: PHARMACY | Facility: CLINIC | Age: 52
End: 2017-02-27

## 2017-02-27 ENCOUNTER — OFFICE VISIT (OUTPATIENT)
Dept: INTERNAL MEDICINE | Facility: CLINIC | Age: 52
End: 2017-02-27
Payer: COMMERCIAL

## 2017-02-27 VITALS
TEMPERATURE: 99 F | HEIGHT: 67 IN | BODY MASS INDEX: 35.78 KG/M2 | DIASTOLIC BLOOD PRESSURE: 80 MMHG | SYSTOLIC BLOOD PRESSURE: 120 MMHG | WEIGHT: 227.94 LBS | HEART RATE: 76 BPM

## 2017-02-27 DIAGNOSIS — I10 ESSENTIAL HYPERTENSION: Primary | Chronic | ICD-10-CM

## 2017-02-27 PROCEDURE — 99999 PR PBB SHADOW E&M-EST. PATIENT-LVL III: CPT | Mod: PBBFAC,,, | Performed by: INTERNAL MEDICINE

## 2017-02-27 PROCEDURE — 3079F DIAST BP 80-89 MM HG: CPT | Mod: S$GLB,,, | Performed by: INTERNAL MEDICINE

## 2017-02-27 PROCEDURE — 1160F RVW MEDS BY RX/DR IN RCRD: CPT | Mod: S$GLB,,, | Performed by: INTERNAL MEDICINE

## 2017-02-27 PROCEDURE — 3074F SYST BP LT 130 MM HG: CPT | Mod: S$GLB,,, | Performed by: INTERNAL MEDICINE

## 2017-02-27 PROCEDURE — 99213 OFFICE O/P EST LOW 20 MIN: CPT | Mod: S$GLB,,, | Performed by: INTERNAL MEDICINE

## 2017-02-27 RX ORDER — AMLODIPINE BESYLATE 5 MG/1
5 TABLET ORAL DAILY
Qty: 90 TABLET | Refills: 1 | Status: SHIPPED | OUTPATIENT
Start: 2017-02-27 | End: 2017-06-03 | Stop reason: SDUPTHER

## 2017-02-27 NOTE — PROGRESS NOTES
Subjective:       Patient ID: Althea Vazquez is a 51 y.o. female.    Chief Complaint: Follow-up    HPI   Pt here for 2 week f/u regarding HTN. Norvasc was added to her other medications. BP readings at home have been running WNLs. No SE's from the medication.   Review of Systems   Constitutional: Negative for activity change, appetite change, chills, diaphoresis, fatigue, fever and unexpected weight change.   HENT: Negative for postnasal drip, rhinorrhea, sinus pressure, sneezing, sore throat, trouble swallowing and voice change.    Respiratory: Negative for cough, shortness of breath and wheezing.    Cardiovascular: Negative for chest pain, palpitations and leg swelling.   Gastrointestinal: Negative for abdominal pain, blood in stool, constipation, diarrhea, nausea and vomiting.   Genitourinary: Negative for dysuria.   Musculoskeletal: Negative for arthralgias and myalgias.   Skin: Negative for rash and wound.   Allergic/Immunologic: Negative for environmental allergies and food allergies.   Hematological: Negative for adenopathy. Does not bruise/bleed easily.       Objective:      Physical Exam   Constitutional: She is oriented to person, place, and time. She appears well-developed and well-nourished. No distress.   HENT:   Head: Normocephalic and atraumatic.   Eyes: Conjunctivae and EOM are normal. Pupils are equal, round, and reactive to light. Right eye exhibits no discharge. Left eye exhibits no discharge. No scleral icterus.   Neck: Neck supple. No JVD present.   Cardiovascular: Normal rate, regular rhythm, normal heart sounds and intact distal pulses.    Pulmonary/Chest: Effort normal and breath sounds normal. No respiratory distress. She has no wheezes. She has no rales.   Musculoskeletal: She exhibits no edema.   Lymphadenopathy:     She has no cervical adenopathy.   Neurological: She is alert and oriented to person, place, and time.   Skin: Skin is warm and dry. No rash noted. She is not  diaphoretic. No pallor.       Assessment:       1. Essential hypertension        Plan:    1. HTN- controlled on Norvasc 5 mg/Losartan 100 mg/Toprol  mg/HCTZ 25 mg daily   2. F/u in 5 months for annual

## 2017-03-07 ENCOUNTER — CLINICAL SUPPORT (OUTPATIENT)
Dept: REHABILITATION | Facility: HOSPITAL | Age: 52
End: 2017-03-07
Attending: STUDENT IN AN ORGANIZED HEALTH CARE EDUCATION/TRAINING PROGRAM
Payer: COMMERCIAL

## 2017-03-07 DIAGNOSIS — R29.898 WEAKNESS OF BOTH LEGS: ICD-10-CM

## 2017-03-07 DIAGNOSIS — G89.29 CHRONIC RIGHT SHOULDER PAIN: ICD-10-CM

## 2017-03-07 DIAGNOSIS — M25.619 DECREASED RANGE OF MOTION (ROM) OF SHOULDER: ICD-10-CM

## 2017-03-07 DIAGNOSIS — Z74.09 DECREASED FUNCTIONAL MOBILITY AND ENDURANCE: ICD-10-CM

## 2017-03-07 DIAGNOSIS — M25.511 CHRONIC RIGHT SHOULDER PAIN: ICD-10-CM

## 2017-03-07 PROCEDURE — G8979 MOBILITY GOAL STATUS: HCPCS | Mod: CJ

## 2017-03-07 PROCEDURE — 97110 THERAPEUTIC EXERCISES: CPT

## 2017-03-07 PROCEDURE — G8978 MOBILITY CURRENT STATUS: HCPCS | Mod: CL

## 2017-03-07 PROCEDURE — 97162 PT EVAL MOD COMPLEX 30 MIN: CPT

## 2017-03-07 NOTE — PROGRESS NOTES
"TIME RECORD    Date:  03/7/2017    Start Time:  8:10  Stop Time:  8:55  Visit: 4    PROCEDURES:    TIMED  Procedure Min.   TE 45    MT                  UNTIMED  Procedure Min.             Total Timed Minutes:  45  Total Timed Units:  3  Total Untimed Units:  0  Charges Billed/# of units:  3TE      Progress/Current Status    Subjective:     Patient ID: Althea Vazquez is a 51 y.o. female.  Diagnosis:   1. Chronic right shoulder pain     2. Decreased range of motion (ROM) of shoulder       Pain: 0 /10  Pt states "I'm feeling ok."  Pt reports compliance with HEP. Pt reports she may need to drop down to 1x/week due to limited insurance visits.    Objective:     Pt seen by OT this session. Treatment consisted of the following:     Date: 3/7/17    Visit:4   UBE, L3 2' forward, 2' back   pulleys 3 min FF/ABD   Dowel -FF 10 reps, supine with head elevated   SL ABD/ER 10 reps each, 1# with ER (2 x 10)   SL gator 10 reps    SL FF with table 10 reps   Saws  2 x 10 reps, 1#   Prone ext 10 reps   Prone scaption 10 reps   theraband - green    -rows 2 x 10 reps   -ext 2 x 10 reps   -IR/ER 2 x 10 reps, B UE   -biceps curls 2 x 10 reps   Wall climbs - ABD/FF 8 reps each     Pt declined MHP and cold pack today for pain management    Assessment:     Pt tolerated treatment fairly well today. Pt demonstrates improved tolerance of ex today.  Pt declined modalities for pain and inflammation management again today.  Pt cont to present with R shoulder pain, decreased ROM, and limited functional use of R UE. Exercise performed as tolerated.  Improved ROM of B shoulders noted today.  Pt with good participation and compliant with HEP. Pt would cont to benefit from skilled OT services to improve functional independence and use of R UE.    Patient Education/Response:     Cont HEP. Added dowel ex and sidelying ex (ER and abd). Pt verbalized and demonstrated understanding of education.    Plans and Goals:     Cont OT poc 1-2x/week for 6 " weeks during certification period 2/14/17 to 3/31/17 in pursuit of established goals. May need to drop down to 1x/week at this time. Reassess and FOTO next week.    Goals to be met in 4 weeks: (3/14/17)  1) Initiate Hep   2) Pt will increase B shoulder AROM by 10 degrees grossly for improved performance with overhead ADL's  3) Pt will report 4/10 pain in (B)shoulder at worst  4) Pt will demonstrate increased MMT to 4/5 grossly L shoulder     Goals to be met by discharge:  1) Independent with HEP  2) Pt will demonstrate (L/R) shoulder AROM WFL grossly for [place] with ADL's  3) Pt will demonstrate (L/R) shoulder MMT WFL grossly for [place] with functional activities  4) Independent and pain free with ADL's and IADL's  5) Patient will be able to achieve FOTO score less than or equal to 48% limitation with UE function.    CANDELARIO Menon

## 2017-03-07 NOTE — PLAN OF CARE
TIME RECORD    Date: 03/12/2017    Start Time:  9:00  Stop Time:  9:45    PROCEDURES:    TIMED  Procedure Min.   TE 10                     UNTIMED  Procedure Min.   PT eval 35         Total Timed Minutes:  10  Total Timed Units:  1  Total Untimed Units:  1  Charges Billed/# of units:  2    OUTPATIENT PHYSICAL THERAPY   PATIENT EVALUATION  Onset Date: Chronic  Primary Diagnosis:   1. Weakness of both legs     2. Decreased functional mobility and endurance       Treatment Diagnosis: LE weakness, decreased functional mobility  Past Medical History:   Diagnosis Date    Acid reflux     Anxiety 10/18/2012    Arthritis     Depression     Diabetic peripheral neuropathy - mild 10/21/2014    Difficult intubation     Dry eyes     Dry mouth     Fever blister     History of hepatitis B 10/3/2016    Hep B core total Ab (+), no active/chronic infection    Hyperlipidemia     Hypertension     Insomnia     Iritis 5/13/2014    Long-term current use of steroids 9/27/2012    Nausea & vomiting 2/4/2015    VAISHNAVI (obstructive sleep apnea)     Type II diabetes mellitus 10/1/2012     Precautions: Standard  Prior Therapy: Previous therapy in this facility for LE strengthening  Medications: Althea Murali Taylor has a current medication list which includes the following prescription(s): amlodipine, aspirin, atorvastatin, clonazepam, clotrimazole-betamethasone 1-0.05%, cyclobenzaprine, docusate sodium, entecavir, fluoxetine, homatropine, humira pen, hydrochlorothiazide, hydrocortisone, hydroquinone, invokana, lancets, losartan, metformin, metoprolol succinate, pantoprazole, promethazine, sulfasalazine, tramadol, truetest test strips, and zolpidem.  Nutrition:  Obese  History of Present Illness: Continued to have LE weakness, decreased endurance, and functional mobility  Prior Level of Function: Assistance - Min A with transfers at times.  Social History: Lives with family  Place of Residence (Steps/Adaptations): single  story, three steps into the home, one handrail  Functional Deficits Leading to Referral/Nature of Injury: Difficulty with prolonged ambulation, prolonged standing, ascending/descending steps, increased reliance with transfers from low surfaces  Patient Therapy Goals: To increase her strength and walking longer    Subjective     Althea Vazquez states she is still having trouble with her legs, and it feels like is still about the same as the last time she was in therapy. She reports still having trouble getting in/out of the bed some days, has to use her hands to lift her legs(R>L) in/out of the vehicle, still requires assistance with tub transfers, and getting up/down the steps in front of her home. She has tried to begin a walking program but is not able to walk a long distance. She knows she will never be a 100% but feels she can get better.     Pain:  Location: upper legs  Description: Aching  Activities Which Increase Pain: Sitting, Bending and Lifting  Activities Which Decrease Pain: pain medication, lying down and hot bath  Pain Scale: 2/10 at best 2/10 now  10/10 at worst    Objective     Posture: rounded shoulders in sitting, forward flexed trunk in sitting  Palpation: tenderness to palpation R knee medial joint line  Sensation: light touch intact  DTRs: 2+  Range of Motion/Strength:     Hip Right  Left  Pain/Dysfunction with Movement    AROM MMT AROM MMT    Flexion WFL 3/5 WFL 3/5    Extension NT 3/5 NT 3/5    Abduction WFL 3/5 WFL 3/5       Knee Right  Left  Pain/Dysfunction with Movement    AROM MMT AROM MMT    Flexion WFL 4/5 WFL 4/5    Extension WFL 5/5 WFL 4/5      Ankle Right  Left  Pain/Dysfunction with Movement    AROM MMT AROM MMT    Plantarflexion WFL 5/5 WFL 5/5    Dorsiflexion WFL 5/5 WFL 5/5          Flexibility: SLR L 55*, SLR R 53*   Gait: Without AD  Analysis: Assistance none, decreased jane  Bed Mobility:Independent  Transfers: Independent, relies on UE leverage  Special Tests:  TUG 8.62 seconds  Other: FOTO knee 35, G code CL 60%<80%, hip 38 G code CL 60%<80%  Treatment: Patient was educated on the plan of care and treatment options. She is in agreement with the plan of care. Patient performed therapeutic exercises 1:1 with PT of seated hip flexion, knee ext, and knee flexion.     Assessment       Initial Assessment (Pertinent finding, problem list and factors affecting outcome): Mrs. Vazquez is a 51 year old female, who presents to the clinic with complaints of R LE weakness > L LE weakness along with decreased endurance. She demonstrates B LE AROM WFL but has B LE weakness that limit she ability to perform transfers without Min A of family members and reliance on UE leverage. She is also unable to ascend/descend the steps in front of her home with a reciprocal gait pattern and requires UE leverage. When ambulating she has a normal gait pattern but with decreased jane. She uses a step to gait pattern when ascending/descending steps. Her scores on FOTO hip and FOTO knee places her in the 60%<80% impaired, limited, or restricted category. She would benefit from physical therapy to improve her strength, endurance, and functional mobility in order to improve her independence with ADLs.  History  Co-morbidities and personal factors that may impact the plan of care Examination  Body Structures and Functions, activity limitations and participation restrictions that may impact the plan of care Clinical Presentation   Decision Making/ Complexity Score   Co-morbidities:   DM peripheral neuropathy, reactive arthritis, depression, anxiety      Personal Factors:   none Body Regions:B LE    Body Systems: Musculoskeletal - B LE weakness, tight hamstrings; Neuromuscular - decreased endurance, increased reliance on UEs for transfers, decreased jane with ambulation; Cardiovascular - N/A; Integumentary - N/A        Activity limitations/Participation Restrictions: depression, anxiety, and reactive  arthritis may limit her ability to fully participate in her therapy sessions.           evolving with changing clinical characteristcs Moderate complexity    FOTO knee 35, 60%<80%  FOTO hip 38, 60%<80%       Rehab Potiential: fair    Short Term Goals (4 Weeks):   1. This patient will be independent with a basic HEP.  2. This patient will increase LE strength by 1 grade in order to perform vehicle transfers without UE leverage.  3. Patient will be able to achieve greater than or equal to 55 on the FOTO knee placing patient in 40%<60% impaired, limited, or restricted category demonstrating overall improved functional ability with lower extremity.   4. Patient will be able to achieve greater than or equal to 58 on the FOTO hip placing patient in 40%<60% impaired, limited, or restricted category demonstrating overall improved functional ability with lower extremity.   Long Term Goals (8 Weeks):   1. This patient will be independent with an updated HEP.  2. This patient will have B LE strength of 5/5 in order to be able to ascend/descend steps with a reciprocal gait pattern independently.  3. Patient will be able to achieve greater than or equal to 70 on the FOTO knee placing patient in 20%<40% impaired, limited, or restricted category demonstrating overall improved functional ability with lower extremity.   4. Patient will be able to achieve greater than or equal to 70 on the FOTO hip placing patient in 20%<40% impaired, limited, or restricted category demonstrating overall improved functional ability with lower extremity.     Plan     Certification Period: 03/07/17 to 05/07/17  Recommended Treatment Plan: 1-2 times per week for 8 weeks: Group Therapy, Manual Therapy, Moist Heat/ Ice, Neuromuscular Re-ed, Patient Education, Therapeutic Exercise and Other modalities prn.  Other Recommendations: None      Therapist: Christy Rush, PT    I CERTIFY THE NEED FOR THESE SERVICES FURNISHED UNDER THIS PLAN OF TREATMENT AND  WHILE UNDER MY CARE    Physician's comments: ________________________________________________________________________________________________________________________________________________      Physician's Name: ___________________________________

## 2017-03-12 PROBLEM — Z74.09 DECREASED FUNCTIONAL MOBILITY AND ENDURANCE: Status: ACTIVE | Noted: 2017-03-12

## 2017-03-12 PROBLEM — R29.898 WEAKNESS OF BOTH LEGS: Status: ACTIVE | Noted: 2017-03-12

## 2017-03-16 ENCOUNTER — CLINICAL SUPPORT (OUTPATIENT)
Dept: REHABILITATION | Facility: HOSPITAL | Age: 52
End: 2017-03-16
Attending: STUDENT IN AN ORGANIZED HEALTH CARE EDUCATION/TRAINING PROGRAM
Payer: COMMERCIAL

## 2017-03-16 DIAGNOSIS — M25.619 DECREASED RANGE OF MOTION (ROM) OF SHOULDER: ICD-10-CM

## 2017-03-16 DIAGNOSIS — Z74.09 DECREASED FUNCTIONAL MOBILITY AND ENDURANCE: ICD-10-CM

## 2017-03-16 DIAGNOSIS — R29.898 WEAKNESS OF BOTH LEGS: ICD-10-CM

## 2017-03-16 DIAGNOSIS — M25.511 CHRONIC RIGHT SHOULDER PAIN: ICD-10-CM

## 2017-03-16 DIAGNOSIS — G89.29 CHRONIC RIGHT SHOULDER PAIN: ICD-10-CM

## 2017-03-16 PROCEDURE — 97110 THERAPEUTIC EXERCISES: CPT

## 2017-03-16 RX ORDER — NAPROXEN 500 MG/1
500 TABLET ORAL 2 TIMES DAILY
Qty: 60 TABLET | Refills: 5 | Status: SHIPPED | OUTPATIENT
Start: 2017-03-16 | End: 2017-07-21 | Stop reason: SDUPTHER

## 2017-03-16 NOTE — PROGRESS NOTES
"TIME RECORD    Date:  03/16/2017    Start Time:  9:00  Stop Time:  9:55  Visit: 5    PROCEDURES:    TIMED  Procedure Min.   TE 55    MT                  UNTIMED  Procedure Min.             Total Timed Minutes:  55  Total Timed Units:  4  Total Untimed Units:  0  Charges Billed/# of units:  4TE      Progress/Current Status    Subjective:     Patient ID: Althea Vazquez is a 51 y.o. female.  Diagnosis:   No diagnosis found.  Pain: 0 /10  Pt states "I feel good today."  Pt reports compliance with HEP.     Objective:     Measurements taken today (3/16/17): (improved since eval)  Range of Motion:   Shoulder Right   Left   Pain/Dysfunction with Movement     AROM MMT AROM MMT     flexion 108 (+33) 3+/5 (+) 122 (+22) 4/5 (+) Pain at end range   extension 38 (-12) 5/5 (+) 65 (+5) 5/5 (+)     abduction 80 (+15) 3-/5 80 (+15) 3-/5 Pain at end range   adduction 12 (+2) 3/5 12 (+7) 3/5     Internal rotation 45 (+10) 4/5 (+) 80 (+10) 4/5 (+)    ER at 90° abd 55 (+5) 4/5 (+) 70 (+45) 4/5 (+)    ER at 0° abd 80 (+5) 5/5 (+) 80 (+10) 4/5 (+)       FOTO score: 50% limitation with UE function (improved by 47% since eval )    Pt seen by OT this session. Treatment consisted of the following:     Date: 3/16/17    Visit:5   UBE, L4 3' forward, 3' back   pulleys 3 min FF/ABD   Dowel -FF 10 reps, supine with head elevated   SL ABD/ER 10 reps each, 1# with ER (2 x 10)   SL gator 10 reps    SL FF with table 10 reps (not today)   Saws  2 x 10 reps, 1#, B UE   Prone ext 10 reps, B UE   Prone scaption 10 reps, B UE   theraband - green    -rows 2 x 10 reps   -ext 2 x 10 reps   -IR/ER 2 x 10 reps, B UE   -biceps curls 2 x 10 reps   Wall climbs - ABD/FF 8 reps each     Pt declined MHP and cold pack today for pain management    Assessment:     Pt tolerated treatment fairly well today. Pt demonstrates improved tolerance of ex today.  Pt declined modalities for pain and inflammation management again today.  Pt cont to present with occasional " R shoulder pain, decreased ROM, UE weakness, and limited functional use of R UE. Exercise performed as tolerated.  Improved ROM and strength of B shoulders noted today.  Improvement noted with FOTO score today since eval. Pt with good participation and compliant with HEP. Pt would cont to benefit from skilled OT services to improve functional independence and use of R UE.    Patient Education/Response:     Cont HEP. Added dowel ex and sidelying ex (ER and abd). Pt verbalized and demonstrated understanding of education.    Plans and Goals:     Cont OT poc 1-2x/week for 6 weeks during certification period 2/14/17 to 3/31/17 in pursuit of established goals. May need to drop down to 1x/week at this time.     Goals to be met in 4 weeks: (3/14/17)  1) Initiate Hep ---met  2) Pt will increase B shoulder AROM by 10 degrees grossly for improved performance with overhead ADL's---met  3) Pt will report 4/10 pain in (B)shoulder at worst---met, but not consistent  4) Pt will demonstrate increased MMT to 4/5 grossly L shoulder---partially met     Goals to be met by discharge:  1) Independent with HEP  2) Pt will demonstrate (L/R) shoulder AROM WFL grossly for [place] with ADL's  3) Pt will demonstrate (L/R) shoulder MMT WFL grossly for [place] with functional activities  4) Independent and pain free with ADL's and IADL's  5) Patient will be able to achieve FOTO score less than or equal to 48% limitation with UE function.    CANDELARIO Menon

## 2017-03-16 NOTE — PROGRESS NOTES
TIME RECORD    Date:  03/16/2017    Start Time:  8:15  Stop Time:  9:00    PROCEDURES:    TIMED  Procedure Min.   TE 25   TE sup 15NC   Bike 5NC             UNTIMED  Procedure Min.             Total Timed Minutes:  25  Total Timed Units:  2  Total Untimed Units:  0  Charges Billed/# of units:  2      Progress/Current Status    Subjective:     Patient ID: Althea Vazquez is a 51 y.o. female.  Diagnosis:   1. Weakness of both legs     2. Decreased functional mobility and endurance       Pain: 0 /10  She reports no pain this morning and doing a lot of walking earlier this week.    Objective:     Patient initiated the session on UBE/bike x 5 minutes supervised by PT. She was educated and performed therapeutic exercises as per log, supervised by PT x 15 minutes and 1:1 with PT x 25 minutes to improve strength, balance, and functional mobility. She declined a cold pack at the end of the session.    Date  03/16/17   VISIT 2/15   Gcode 2/10   FOTO 2/5   Cap Visit   Cap Total 63.96  139.56   MT --   Long Sit HS --   Gastroc Str. --   Bike L3 x 5'       SAQ 1 x 10 x 1#   SLR 1 x 10   SL Abd 1 x 10   SL Add 1 x 10   Seated HS curl 1 x 10 YTB   LAQs X 26 YTB   Seated Hip Flex 1 x 10 YTB   Leg Press --   TKE --   Hip Abd --   Hip Flex --   Hip Ext --   HS Curls --   Knee Ext --   Step Ups --   Step Downs --   Gait --   CP declined   Initials DG       Assessment:     Patient was able to begin making progress towards her goals as she was able to perform all of today's exercises with no increase in symptoms prior to leaving the clinic. She required occasional cues to avoid substitutions and for posture.     Patient Education/Response:     Patient was given handouts of today's exercises for her HEP along with yellow and red resistance bands for HEP. She was instructed to perform her HEP twice a day to her tolerance. She verbalized understanding instructions.    Plans and Goals:     Continue with plan of care and progress  as the patient tolerates.     Short Term Goals (4 Weeks):   1. This patient will be independent with a basic HEP.  2. This patient will increase LE strength by 1 grade in order to perform vehicle transfers without UE leverage.  3. Patient will be able to achieve greater than or equal to 55 on the FOTO knee placing patient in 40%<60% impaired, limited, or restricted category demonstrating overall improved functional ability with lower extremity.   4. Patient will be able to achieve greater than or equal to 58 on the FOTO hip placing patient in 40%<60% impaired, limited, or restricted category demonstrating overall improved functional ability with lower extremity.   Long Term Goals (8 Weeks):   1. This patient will be independent with an updated HEP.  2. This patient will have B LE strength of 5/5 in order to be able to ascend/descend steps with a reciprocal gait pattern independently.  3. Patient will be able to achieve greater than or equal to 70 on the FOTO knee placing patient in 20%<40% impaired, limited, or restricted category demonstrating overall improved functional ability with lower extremity.   4. Patient will be able to achieve greater than or equal to 70 on the FOTO hip placing patient in 20%<40% impaired, limited, or restricted category demonstrating overall improved functional ability with lower extremity.

## 2017-03-21 ENCOUNTER — TELEPHONE (OUTPATIENT)
Dept: HEPATOLOGY | Facility: CLINIC | Age: 52
End: 2017-03-21

## 2017-03-21 NOTE — TELEPHONE ENCOUNTER
MA spoke to pt and rescheduled her appt with Lucretia to 4/12 and rescheduled labs scheduled on that day in [facility] to here main campus. Pt confirmed. EMS

## 2017-03-22 ENCOUNTER — CLINICAL SUPPORT (OUTPATIENT)
Dept: REHABILITATION | Facility: HOSPITAL | Age: 52
End: 2017-03-22
Attending: STUDENT IN AN ORGANIZED HEALTH CARE EDUCATION/TRAINING PROGRAM
Payer: COMMERCIAL

## 2017-03-22 DIAGNOSIS — G89.29 CHRONIC RIGHT SHOULDER PAIN: ICD-10-CM

## 2017-03-22 DIAGNOSIS — R29.898 WEAKNESS OF BOTH LEGS: ICD-10-CM

## 2017-03-22 DIAGNOSIS — M25.511 CHRONIC RIGHT SHOULDER PAIN: ICD-10-CM

## 2017-03-22 DIAGNOSIS — M25.619 DECREASED RANGE OF MOTION (ROM) OF SHOULDER: ICD-10-CM

## 2017-03-22 DIAGNOSIS — Z74.09 DECREASED FUNCTIONAL MOBILITY AND ENDURANCE: ICD-10-CM

## 2017-03-22 PROCEDURE — 97110 THERAPEUTIC EXERCISES: CPT

## 2017-03-22 NOTE — PROGRESS NOTES
"TIME RECORD    Date:  03/22/2017    Start Time:  8:55  Stop Time:  9:40  Visit: 6    PROCEDURES:    TIMED  Procedure Min.   TE 45 (25 min 1:1; 20 min supervised)   MT                  UNTIMED  Procedure Min.             Total Timed Minutes:  45  Total Timed Units:  3  Total Untimed Units:  0  Charges Billed/# of units:  2TE      Progress/Current Status    Subjective:     Patient ID: Althea Vazquez is a 51 y.o. female.  Diagnosis:   1. Chronic right shoulder pain     2. Decreased range of motion (ROM) of shoulder       Pain: 0 /10  Pt states "I feel good today."  Pt reports compliance with HEP.     Objective:     Measurements taken today (3/16/17): (improved since eval)  Range of Motion:   Shoulder Right   Left   Pain/Dysfunction with Movement     AROM MMT AROM MMT     flexion 108 (+33) 3+/5 (+) 122 (+22) 4/5 (+) Pain at end range   extension 38 (-12) 5/5 (+) 65 (+5) 5/5 (+)     abduction 80 (+15) 3-/5 80 (+15) 3-/5 Pain at end range   adduction 12 (+2) 3/5 12 (+7) 3/5     Internal rotation 45 (+10) 4/5 (+) 80 (+10) 4/5 (+)    ER at 90° abd 55 (+5) 4/5 (+) 70 (+45) 4/5 (+)    ER at 0° abd 80 (+5) 5/5 (+) 80 (+10) 4/5 (+)       FOTO score: 50% limitation with UE function (improved by 47% since eval )    Pt seen by OT this session. Treatment consisted of the following:     Date: 3/22/17    Visit:6   UBE, L4 3' forward, 3' back (not today, performed with PT)   pulleys 3 min FF/ABD   Dowel -FF 2 x 10 reps, standing    SL ABD/ER 1#, 2 x 10 reps   SL gator 10 reps    SL FF with table 10 reps    Saws  2 x 10 reps, 1#, B UE   Prone ext 1#, 10 reps, B UE   Prone scaption 10 reps, B UE   theraband - green    -rows 3 x 10 reps   -ext 2 x 10 reps   -IR/ER 2 x 10 reps, B UE   -biceps curls 2 x 10 reps   -triceps (yellow) 20 reps, B UE   Wall climbs - ABD/FF 8 reps each     Pt declined MHP and cold pack today for pain management    Assessment:     Pt tolerated treatment fairly well today. Pt tolerated progression of ex " well today without c/o increased pain.  Pt declined modalities for pain and inflammation management again today.  Pt cont to present with occasional R shoulder pain, decreased ROM, UE weakness, and limited functional use of R UE. Exercise performed as tolerated.  Pt with good participation and compliant with HEP. Pt would cont to benefit from skilled OT services to improve functional independence and use of R UE.    Patient Education/Response:     Cont HEP. Added dowel ex and sidelying ex (ER and abd). Pt verbalized and demonstrated understanding of education.    Plans and Goals:     Cont OT poc 1-2x/week for 6 weeks during certification period 2/14/17 to 3/31/17 in pursuit of established goals. May need to drop down to 1x/week at this time. Reassess next visit.     Goals to be met in 4 weeks: (3/14/17)  1) Initiate Hep ---met  2) Pt will increase B shoulder AROM by 10 degrees grossly for improved performance with overhead ADL's---met  3) Pt will report 4/10 pain in (B)shoulder at worst---met, but not consistent  4) Pt will demonstrate increased MMT to 4/5 grossly L shoulder---partially met     Goals to be met by discharge:  1) Independent with HEP  2) Pt will demonstrate (L/R) shoulder AROM WFL grossly for [place] with ADL's  3) Pt will demonstrate (L/R) shoulder MMT WFL grossly for [place] with functional activities  4) Independent and pain free with ADL's and IADL's  5) Patient will be able to achieve FOTO score less than or equal to 48% limitation with UE function.    CANDELARIO Menon

## 2017-03-22 NOTE — PROGRESS NOTES
TIME RECORD    Date:  03/22/2017    Start Time:  8:11  Stop Time:  9:00    PROCEDURES:    TIMED  Procedure Min.   TE 44   Bike 5NC                 UNTIMED  Procedure Min.             Total Timed Minutes:  44  Total Timed Units:  3  Total Untimed Units:  0  Charges Billed/# of units:  3      Progress/Current Status    Subjective:     Patient ID: Althea Vazquez is a 51 y.o. female.  Diagnosis:   1. Weakness of both legs     2. Decreased functional mobility and endurance       Pain: 0 /10  She reports no pain this morning and she has been doing her HEP on a regular basis.    Objective:     Patient initiated the session on UBE/bike x 5 minutes supervised by PT. She was educated and performed therapeutic exercises as per log,  1:1 with PT x 44 minutes to improve strength, balance, and functional mobility. She declined a cold pack at the end of the session.    Date  03/22/17 03/16/17   VISIT 3/15 2/15   Gcode 3/10 2/10   FOTO 3/5 2/5   Cap Visit   Cap Total 95.94  235.50 63.96  139.56   MT -- --   Long Sit HS -- --   Gastroc Str. -- --   Bike L3 x 5' L3 x 5'        SAQ @ home 1 x 10 x 1#   SLR @ home 1 x 10   SL Abd @ home 1 x 10   SL Add @ home 1 x 10   Seated HS curl @ home 1 x 10 YTB   LAQs @ home X 26 YTB   Seated Hip Flex @ home 1 x 10 YTB   Leg Press -- --   Hip add 1 x 10 YTB --   Hip Abd 1 x 10 YTB --   Hip Flex 1 x 10YTB --   Hip Ext 1 x 10YTB --   HS Curls 1 x 15 YTB --   Knee Ext -- --   Step Ups str and lat 1 x 10 --   Step Downs 1 x 5 --   Mini squats 2 x 10    SL mini squats 1 x 10    SL calf raises  1 x 10    CP declined declined   Initials DG DG       Assessment:     Patient was able to perform all of today's exercises with no increase in symptoms prior to leaving the clinic. She required occasional cues to keep her exercises in a pain free range of motion.     Patient Education/Response:     Patient was given handouts of today's exercises for her HEP along with red resistance bands for HEP. She  was instructed to perform her HEP twice a day to her tolerance. She verbalized understanding instructions.    Plans and Goals:     Continue with plan of care and progress as the patient tolerates.     Short Term Goals (4 Weeks):   1. This patient will be independent with a basic HEP.  2. This patient will increase LE strength by 1 grade in order to perform vehicle transfers without UE leverage.  3. Patient will be able to achieve greater than or equal to 55 on the FOTO knee placing patient in 40%<60% impaired, limited, or restricted category demonstrating overall improved functional ability with lower extremity.   4. Patient will be able to achieve greater than or equal to 58 on the FOTO hip placing patient in 40%<60% impaired, limited, or restricted category demonstrating overall improved functional ability with lower extremity.   Long Term Goals (8 Weeks):   1. This patient will be independent with an updated HEP.  2. This patient will have B LE strength of 5/5 in order to be able to ascend/descend steps with a reciprocal gait pattern independently.  3. Patient will be able to achieve greater than or equal to 70 on the FOTO knee placing patient in 20%<40% impaired, limited, or restricted category demonstrating overall improved functional ability with lower extremity.   4. Patient will be able to achieve greater than or equal to 70 on the FOTO hip placing patient in 20%<40% impaired, limited, or restricted category demonstrating overall improved functional ability with lower extremity.

## 2017-03-28 ENCOUNTER — TELEPHONE (OUTPATIENT)
Dept: RHEUMATOLOGY | Facility: CLINIC | Age: 52
End: 2017-03-28

## 2017-03-28 ENCOUNTER — TELEPHONE (OUTPATIENT)
Dept: PHARMACY | Facility: CLINIC | Age: 52
End: 2017-03-28

## 2017-03-28 DIAGNOSIS — M47.819 SPONDYLOARTHRITIS: ICD-10-CM

## 2017-03-28 RX ORDER — ADALIMUMAB 40MG/0.8ML
KIT SUBCUTANEOUS
Qty: 2 EACH | Refills: 0 | Status: SHIPPED | OUTPATIENT
Start: 2017-03-28 | End: 2017-04-26 | Stop reason: SDUPTHER

## 2017-03-30 ENCOUNTER — TELEPHONE (OUTPATIENT)
Dept: PHARMACY | Facility: CLINIC | Age: 52
End: 2017-03-30

## 2017-03-30 ENCOUNTER — CLINICAL SUPPORT (OUTPATIENT)
Dept: REHABILITATION | Facility: HOSPITAL | Age: 52
End: 2017-03-30
Attending: STUDENT IN AN ORGANIZED HEALTH CARE EDUCATION/TRAINING PROGRAM
Payer: COMMERCIAL

## 2017-03-30 DIAGNOSIS — M25.511 CHRONIC RIGHT SHOULDER PAIN: ICD-10-CM

## 2017-03-30 DIAGNOSIS — G89.29 CHRONIC RIGHT SHOULDER PAIN: ICD-10-CM

## 2017-03-30 DIAGNOSIS — M25.619 DECREASED RANGE OF MOTION (ROM) OF SHOULDER: ICD-10-CM

## 2017-03-30 PROCEDURE — G8985 CARRY GOAL STATUS: HCPCS | Mod: CK

## 2017-03-30 PROCEDURE — G8986 CARRY D/C STATUS: HCPCS | Mod: CK

## 2017-03-30 PROCEDURE — 97110 THERAPEUTIC EXERCISES: CPT

## 2017-03-30 NOTE — PROGRESS NOTES
"REHAB SERVICES OUTPATIENT DISCHARGE SUMMARY  Occupational Therapy      Name:  Althea Vazquez  Date:  3/30/17  Date of Evaluation:  2/14/17  Physician:  Dr. Arvizu  Total # Of Visits:  7  Cancelled:  1  No Shows:  0  Diagnosis:    1. Chronic right shoulder pain     2. Decreased range of motion (ROM) of shoulder         Physical/Functional Status:  At time of discharge, patient was able to demonstrate improved AROM of B shoulders and B shoulder MMT.  Pt reports improved functional use of B UE, and decreased pain.  Pt FOTO score improved.    The patient is to be discharged from our Therapy service for the following reason(s):  Patient has completed the physician's prescription and Patient has reached the maximum rehab potential for the present time. Pt requested to continue with a home program.    Degree of Goal Achievement:  Patient has partially met goals. See goals in note below.    Patient Education:  Reviewed modalities prn, shoulder stretches, dowel ex, theraband ex, scapular strengthening, and shoulder AROM. Pt verbalized understanding of home program.    Discharge Plan:  Follow-up with MD as needed        TIME RECORD    Date:  03/30/2017    Start Time:  8:10  Stop Time:  8:35  Visit: 7    PROCEDURES:    TIMED  Procedure Min.   TE 25   MT                  UNTIMED  Procedure Min.             Total Timed Minutes:  25  Total Timed Units:  2  Total Untimed Units:  0  Charges Billed/# of units:  2TE      Progress/Current Status    Subjective:     Patient ID: Althea Vazquez is a 51 y.o. female.  Diagnosis:   1. Chronic right shoulder pain     2. Decreased range of motion (ROM) of shoulder       Pain: 0 /10  Pt states "I feel fine. I think I will be ok doing my exercises at home. I don't want to use up all my visits."  Pt reports compliance with HEP.     Objective:     Measurements taken today (3/30/17): (improvement since eval)  Range of Motion:   Shoulder Right   Left   Pain/Dysfunction with " Movement     AROM MMT AROM MMT     flexion 130 (+55) 3+/5 (+) 125 (+25) 4/5 (+) Pain at end range   extension 55 (+5) 5/5 (+) 70 (+10) 5/5 (+)     abduction 95 (+30) 4/5 (+) 115 (+50) 4/5 (+) Pain at end range   adduction 12 (+2) NT 20 (+15) NT     Internal rotation 65 (+30) 5/5 (+) 65 (-5) 5/5 (+)    ER at 90° abd 75 (+25) 5/5 (+) 75 (+50) 5/5 (+)    ER at 0° abd 75 (=) 5/5 (+) 75 (+5) 5/5 (+)       FOTO score: 47% limitation with UE function (improved by 50% since eval )    Pt seen by OT this session. Treatment consisted of the following:    Pt performed pulleys 3 min FF/ABD.  Reviewed scapular strengthening, shoulder ROM stretches, AROM, theraband ex, dowel ex, and modalities as needed.  Pt verbalized understanding of education.    Assessment:     Pt at end of OT poc.  Pt reassessed and plan to d/c from OT today. Pt demonstrates improved B shoulder AROM, strength, and pain.  See measurements above.  Pt cont to present with occasional R shoulder pain, somewhat limited ROM, UE weakness, and limited functional use of R UE. Pt reports she feels comfortable continuing with a home program.  Pt with good participation and compliant with HEP. Recommended to discharge from OT services at this time.    Patient Education/Response:     Cont HEP as tolerated.     Plans and Goals:     Discharge from skilled OT services at this time.    Goals to be met in 4 weeks: (3/14/17)  1) Initiate Hep ---met  2) Pt will increase B shoulder AROM by 10 degrees grossly for improved performance with overhead ADL's---met  3) Pt will report 4/10 pain in (B)shoulder at worst---met  4) Pt will demonstrate increased MMT to 4/5 grossly L shoulder---partially met     Goals to be met by discharge:  1) Independent with HEP---met  2) Pt will demonstrate (L/R) shoulder AROM WFL grossly for [place] with ADL's---met  3) Pt will demonstrate (L/R) shoulder MMT WFL grossly for [place] with functional activities---not fully met  4) Independent and  pain free with ADL's and IADL's---not consistently met  5) Patient will be able to achieve FOTO score less than or equal to 48% limitation with UE function.---met    CANDELARIO Menon

## 2017-04-11 ENCOUNTER — TELEPHONE (OUTPATIENT)
Dept: PHARMACY | Facility: CLINIC | Age: 52
End: 2017-04-11

## 2017-04-11 NOTE — TELEPHONE ENCOUNTER
Incoming call from Ms Vazquez.    Ms Clark last Humiar dose was 4/6. She states her dermatologist scraped a small section of her underarm which revealed yeast and she was prescribed Minocycline BID x 14 days.  Her next Humira is due 4/20 and she questions how to proceed.    Please advise.    Marvel Moran, PharmD, [facility]  [facility]  [phone]

## 2017-04-12 ENCOUNTER — OFFICE VISIT (OUTPATIENT)
Dept: HEPATOLOGY | Facility: CLINIC | Age: 52
End: 2017-04-12
Payer: COMMERCIAL

## 2017-04-12 ENCOUNTER — LAB VISIT (OUTPATIENT)
Dept: LAB | Facility: HOSPITAL | Age: 52
End: 2017-04-12
Attending: INTERNAL MEDICINE
Payer: COMMERCIAL

## 2017-04-12 VITALS
RESPIRATION RATE: 20 BRPM | WEIGHT: 237 LBS | HEART RATE: 82 BPM | DIASTOLIC BLOOD PRESSURE: 78 MMHG | HEIGHT: 67 IN | SYSTOLIC BLOOD PRESSURE: 133 MMHG | OXYGEN SATURATION: 98 % | BODY MASS INDEX: 37.2 KG/M2

## 2017-04-12 DIAGNOSIS — M47.819 SPONDYLOARTHRITIS: ICD-10-CM

## 2017-04-12 DIAGNOSIS — B18.1 CHRONIC VIRAL HEPATITIS B WITHOUT DELTA AGENT AND WITHOUT COMA: ICD-10-CM

## 2017-04-12 DIAGNOSIS — Z79.620 LONG-TERM USE OF ADALIMUMAB: ICD-10-CM

## 2017-04-12 DIAGNOSIS — Z86.19 HISTORY OF HEPATITIS B: Primary | ICD-10-CM

## 2017-04-12 LAB
ALBUMIN SERPL BCP-MCNC: 3.9 G/DL
ALP SERPL-CCNC: 74 U/L
ALT SERPL W/O P-5'-P-CCNC: 16 U/L
ANION GAP SERPL CALC-SCNC: 9 MMOL/L
AST SERPL-CCNC: 16 U/L
BASOPHILS # BLD AUTO: 0.04 K/UL
BASOPHILS NFR BLD: 0.4 %
BILIRUB SERPL-MCNC: 0.4 MG/DL
BUN SERPL-MCNC: 11 MG/DL
CALCIUM SERPL-MCNC: 9.1 MG/DL
CHLORIDE SERPL-SCNC: 99 MMOL/L
CO2 SERPL-SCNC: 30 MMOL/L
CREAT SERPL-MCNC: 0.8 MG/DL
CRP SERPL-MCNC: 20.5 MG/L
DIFFERENTIAL METHOD: NORMAL
EOSINOPHIL # BLD AUTO: 0.1 K/UL
EOSINOPHIL NFR BLD: 1.2 %
ERYTHROCYTE [DISTWIDTH] IN BLOOD BY AUTOMATED COUNT: 14.4 %
ERYTHROCYTE [SEDIMENTATION RATE] IN BLOOD BY WESTERGREN METHOD: 7 MM/HR
EST. GFR  (AFRICAN AMERICAN): >60 ML/MIN/1.73 M^2
EST. GFR  (NON AFRICAN AMERICAN): >60 ML/MIN/1.73 M^2
GLUCOSE SERPL-MCNC: 107 MG/DL
HCT VFR BLD AUTO: 41.9 %
HGB BLD-MCNC: 14.2 G/DL
LYMPHOCYTES # BLD AUTO: 3.9 K/UL
LYMPHOCYTES NFR BLD: 41.3 %
MCH RBC QN AUTO: 30.4 PG
MCHC RBC AUTO-ENTMCNC: 33.9 %
MCV RBC AUTO: 90 FL
MONOCYTES # BLD AUTO: 0.7 K/UL
MONOCYTES NFR BLD: 7.7 %
NEUTROPHILS # BLD AUTO: 4.5 K/UL
NEUTROPHILS NFR BLD: 48.5 %
PLATELET # BLD AUTO: 251 K/UL
PMV BLD AUTO: 10.6 FL
POTASSIUM SERPL-SCNC: 3.8 MMOL/L
PROT SERPL-MCNC: 7.1 G/DL
RBC # BLD AUTO: 4.67 M/UL
SODIUM SERPL-SCNC: 138 MMOL/L
WBC # BLD AUTO: 9.33 K/UL

## 2017-04-12 PROCEDURE — 99999 PR PBB SHADOW E&M-EST. PATIENT-LVL V: CPT | Mod: PBBFAC,,, | Performed by: NURSE PRACTITIONER

## 2017-04-12 PROCEDURE — 3075F SYST BP GE 130 - 139MM HG: CPT | Mod: S$GLB,,, | Performed by: NURSE PRACTITIONER

## 2017-04-12 PROCEDURE — 80053 COMPREHEN METABOLIC PANEL: CPT

## 2017-04-12 PROCEDURE — 3078F DIAST BP <80 MM HG: CPT | Mod: S$GLB,,, | Performed by: NURSE PRACTITIONER

## 2017-04-12 PROCEDURE — 99213 OFFICE O/P EST LOW 20 MIN: CPT | Mod: S$GLB,,, | Performed by: NURSE PRACTITIONER

## 2017-04-12 PROCEDURE — 85651 RBC SED RATE NONAUTOMATED: CPT

## 2017-04-12 PROCEDURE — 1160F RVW MEDS BY RX/DR IN RCRD: CPT | Mod: S$GLB,,, | Performed by: NURSE PRACTITIONER

## 2017-04-12 PROCEDURE — 86140 C-REACTIVE PROTEIN: CPT

## 2017-04-12 PROCEDURE — 36415 COLL VENOUS BLD VENIPUNCTURE: CPT

## 2017-04-12 PROCEDURE — 85025 COMPLETE CBC W/AUTO DIFF WBC: CPT

## 2017-04-12 NOTE — MR AVS SNAPSHOT
[facility] - Hepatology  [address]  [facility] [address]  Phone: [phone]  Fax: [phone]                  Althea Vazquez   2017 1:40 PM   Office Visit    Description:  Female : 1965   Provider:  Lucretia Berger NP   Department:  [facility] - Hepatology           Reason for Visit     Hepatitis B                To Do List           Future Appointments        Provider Department Dept Phone    2017 2:00 PM Gabriel Arvizu MD [facility] - Rheumatology [phone]      Goals (5 Years of Data)              17    HEMOGLOBIN A1C < 7.0   5.8  6.0  6.4      [facility]     [facility] On [facility] Nurse Care Line -  Assistance  Unless otherwise directed by your provider, please contact [facility], our nurse care line that is available for  assistance.     Registered nurses in the [facility] provide: appointment scheduling, clinical advisement, health education, and other advisory services.  Call: [phone] (toll free)               Medications           Message regarding Medications     Verify the changes and/or additions to your medication regime listed below are the same as discussed with your clinician today.  If any of these changes or additions are incorrect, please notify your healthcare provider.             Verify that the below list of medications is an accurate representation of the medications you are currently taking.  If none reported, the list may be blank. If incorrect, please contact your healthcare provider. Carry this list with you in case of emergency.           Current Medications     amlodipine (NORVASC) 5 MG tablet Take 1 tablet (5 mg total) by mouth once daily.    aspirin (ECOTRIN) 81 MG EC tablet Take 1 tablet (81 mg total) by mouth once daily.    atorvastatin (LIPITOR) 40 MG tablet TAKE 1 TABLET BY MOUTH EVERY DAY    clonazePAM (KLONOPIN) 0.5 MG tablet Take 1 tablet (0.5 mg total) by mouth daily as  needed for Anxiety.    clotrimazole-betamethasone 1-0.05% (LOTRISONE) cream Apply topically 2 (two) times daily as needed.     cyclobenzaprine (FLEXERIL) 10 MG tablet TAKE 1 TABLET BY MOUTH EVERY DAY AT BEDTIME IF NEEDED    docusate sodium (COLACE) 50 MG capsule Take 1 capsule (50 mg total) by mouth 2 (two) times daily as needed for Constipation.    entecavir (BARACLUDE) 0.5 MG Tab Take 0.5 mg by mouth once daily.    fluoxetine (PROZAC) 40 MG capsule Take 1 capsule (40 mg total) by mouth 2 (two) times daily.    homatropine (ISOPTO HOMATROPINE) 5 % ophthalmic solution Place 1 drop into the right eye 2 (two) times daily.    HUMIRA PEN PnKt injection INJECT 1 PEN INTO THE SKIN EVERY 14 DAYS    hydrochlorothiazide (HYDRODIURIL) 25 MG tablet Take 1 tablet (25 mg total) by mouth once daily.    hydrocortisone 2.5 % cream Apply topically 2 (two) times daily.    hydroquinone 4 % Crea Apply to dark areas qhs.  Not more than 6 months straight in same location.Use sunscreen in am    INVOKANA 300 mg Tab TAKE 1 TABLET BY MOUTH EVERY DAY    lancets Misc To use with TrueResult meter to check BG twice daily    losartan (COZAAR) 100 MG tablet TAKE 1 TABLET BY MOUTH EVERY DAY    metformin (GLUCOPHAGE-XR) 500 MG 24 hr tablet Take 2 tablets (1,000 mg total) by mouth daily with breakfast.    metoprolol succinate (TOPROL-XL) 100 MG 24 hr tablet Take 1 tablet (100 mg total) by mouth once daily.    naproxen (NAPROSYN) 500 MG tablet Take 1 tablet (500 mg total) by mouth 2 (two) times daily.    pantoprazole (PROTONIX) 40 MG tablet Take 1 tablet (40 mg total) by mouth once daily.    promethazine (PHENERGAN) 25 MG tablet Take 1 tablet (25 mg total) by mouth every 6 (six) hours as needed for Nausea.    sulfaSALAzine (AZULFIDINE) 500 MG TbEC Take 2 tablets (1,000 mg total) by mouth 2 (two) times daily.    tramadol (ULTRAM) 50 mg tablet Take 1-2 tabs PO q6 hrs PRN pain    TRUETEST TEST STRIPS Strp TEST TWICE A DAY    zolpidem (AMBIEN) 5 MG Tab  "Take 1 tablet (5 mg total) by mouth nightly as needed.           Clinical Reference Information           Your Vitals Were     BP Pulse Resp Height Weight Last Period    133/78 (BP Location: Right arm, Patient Position: Sitting, BP Method: Automatic) 82 20 5' 7" (1.702 m) 107.5 kg (236 lb 15.9 oz) 11/25/2014    SpO2 BMI             98% 37.12 kg/m2         Blood Pressure          Most Recent Value    BP  133/78      Allergies as of 4/12/2017     Bactrim [Sulfamethoxazole-trimethoprim]    Diflucan [Fluconazole]      Immunizations Administered on Date of Encounter - 4/12/2017     None      Language Assistance Services     ATTENTION: Language assistance services are available, free of charge. Please call [phone].      ATENCIÓN: Si aleahla ashwin, tiene a pelayo disposición servicios gratuitos de asistencia lingüística. Llame al [phone].     LEONELA Ý: N?u b?n nói Ti?ng Vi?t, có các d?ch v? h? tr? ngôn ng? mi?n phí dành cho b?n. G?i s? [phone].         [facility] complies with applicable Federal civil rights laws and does not discriminate on the basis of race, color, national origin, age, disability, or sex.        "

## 2017-04-12 NOTE — PROGRESS NOTES
[facility] Established Patient Visit    Reason for Visit:  F/u hepatitis B core antibody (+)    PCP: Weston Begum    HPI:  This is a 51 y.o. female here for f/u of history of hepatitis B. 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 Baraclude was started after initial visit for HBV prophylaxis prior to starting Humira. She has had labs for monitoring and HBV DNA remains undetectable and liver enzymes remain normal. She denies any h/o jaundice, dark urine, abdominal distention, hematemesis, melena, slowed mentation. She had labs today and results are pending.      ROS:   GENERAL: Denies fever, chills, weight loss/gain, (+) fatigue, (+) arthritis pains  HEENT: Denies headaches, dizziness, vision/hearing changes  CARDIOVASCULAR: Denies chest pain, palpitations, or edema  RESPIRATORY: Denies dyspnea, cough  GI: Denies abdominal pain, rectal bleeding, nausea, vomiting. No change in bowel pattern or color  : Denies dysuria, hematuria   SKIN: Denies rash, itching   NEURO: Denies confusion, memory loss, or mood changes  PSYCH: Denies depression or anxiety  HEME/LYMPH: Denies easy bruising or bleeding      PMHX:  has a past medical history of Acid reflux; Anxiety (10/18/2012); Arthritis; Depression; Diabetic peripheral neuropathy - mild (10/21/2014); Difficult intubation; Dry eyes; Dry mouth; Fever blister; History of hepatitis B (10/3/2016); Hyperlipidemia; Hypertension; Insomnia; Iritis (5/13/2014); Long-term current use of steroids (9/27/2012); Nausea & vomiting (2/4/2015); VAISHNAVI (obstructive sleep apnea); and Type II diabetes mellitus (10/1/2012).    PSHX:  has a past surgical history that includes Tubal ligation; Knee arthroscopy (5-14-14); and Hysterectomy (12/3/2014).    The patient's social and family histories were reviewed by me and updated in the appropriate section of the electronic medical record.    Review of patient's allergies indicates:   Allergen Reactions    Bactrim  "[sulfamethoxazole-trimethoprim] Itching    Diflucan [fluconazole] Other (See Comments)     Sore on mouth       Medications reviewed in Epic      Objective Findings:    PHYSICAL EXAM:   Friendly Black or  female, in no acute distress; alert and oriented to person, place and time  VITALS: /78 (BP Location: Right arm, Patient Position: Sitting, BP Method: Automatic)  Pulse 82  Resp 20  Ht 5' 7" (1.702 m)  Wt 107.5 kg (236 lb 15.9 oz)  LMP 11/25/2014  SpO2 98%  BMI 37.12 kg/m2  HENT: Normocephalic, without obvious abnormality. Oral mucosa pink and moist. Dentition good.  EYES: Sclerae anicteric.   CARDIOVASCULAR: Regular rate and rhythm. No murmurs.  RESPIRATORY: Normal respiratory effort. BBS CTA. No wheezes or crackles.  GI: Soft, non-tender, non-distended. No hepatosplenomegaly. No masses palpable. No ascites.  EXTREMITIES:  No clubbing, cyanosis or edema.  SKIN: Warm and dry. No jaundice. No rashes noted to exposed skin. No telangectasias noted. No palmar erythema.  NEURO:  Normal gate. No asterixis.  PSYCH:  Memory intact. Thought and speech pattern appropriate. Behavior normal. No depression or anxiety noted.        Labs:  Lab Results   Component Value Date    WBC 9.33 04/12/2017    HGB 14.2 04/12/2017    HCT 41.9 04/12/2017     04/12/2017    CHOL 198 07/14/2016    TRIG 72 07/14/2016    HDL 52 07/14/2016     12/12/2016    K 3.7 12/12/2016    CREATININE 0.55 12/12/2016    ALT 28 12/12/2016    AST 23 12/12/2016    ALKPHOS 72 12/12/2016    BILITOT 0.4 12/12/2016    ALBUMIN 4.2 12/12/2016    INR 1.0 09/09/2016       ASSESSMENT:  51 y.o. Black or  female with:  1.  Hepatitis B core total antibody (+)/history of hepatitis B  -- HBV surface antigen (-) and surface antibody grayzone, HBV DNA negative indicating h/o exposure but no active/chronic infection  -- normal liver enzymes and liver functioning  -- on Baraclude for HBV prophylaxis  -- (+) hep A immunity  2. " Spondyloarthritis, on Humira  -- following with rheumatology        PLAN:  1. Continue entecavir 0.5 mg PO daily for prophylaxis  2. She will need to remain on entavir for the entire duration of biologic therapy and for 6 months after if biologics are ever stopped  3. Await today's labs  4. Continue labs Q 3-6 months with rheumatology. Check HBV DNA and CMP with each lab to monitor  5. F/u in one year    Thank you for allowing me to participate in the care of Althea Berger, EUGENEC

## 2017-04-17 ENCOUNTER — OFFICE VISIT (OUTPATIENT)
Dept: RHEUMATOLOGY | Facility: CLINIC | Age: 52
End: 2017-04-17
Payer: COMMERCIAL

## 2017-04-17 VITALS
SYSTOLIC BLOOD PRESSURE: 129 MMHG | HEIGHT: 64 IN | DIASTOLIC BLOOD PRESSURE: 83 MMHG | BODY MASS INDEX: 39.93 KG/M2 | WEIGHT: 233.88 LBS | HEART RATE: 71 BPM

## 2017-04-17 DIAGNOSIS — M25.561 CHRONIC PAIN OF RIGHT KNEE: ICD-10-CM

## 2017-04-17 DIAGNOSIS — M62.838 MUSCLE SPASM: Primary | ICD-10-CM

## 2017-04-17 DIAGNOSIS — G89.29 CHRONIC RIGHT SHOULDER PAIN: ICD-10-CM

## 2017-04-17 DIAGNOSIS — G44.86 CERVICOGENIC HEADACHE: ICD-10-CM

## 2017-04-17 DIAGNOSIS — M25.511 CHRONIC RIGHT SHOULDER PAIN: ICD-10-CM

## 2017-04-17 DIAGNOSIS — M54.2 NECK PAIN, BILATERAL: ICD-10-CM

## 2017-04-17 DIAGNOSIS — M47.819 SPONDYLOARTHRITIS: ICD-10-CM

## 2017-04-17 DIAGNOSIS — G89.29 CHRONIC PAIN OF RIGHT KNEE: ICD-10-CM

## 2017-04-17 DIAGNOSIS — M62.838 MUSCLE SPASMS OF NECK: ICD-10-CM

## 2017-04-17 PROCEDURE — 99214 OFFICE O/P EST MOD 30 MIN: CPT | Mod: S$GLB,,, | Performed by: INTERNAL MEDICINE

## 2017-04-17 PROCEDURE — 3079F DIAST BP 80-89 MM HG: CPT | Mod: S$GLB,,, | Performed by: INTERNAL MEDICINE

## 2017-04-17 PROCEDURE — 3074F SYST BP LT 130 MM HG: CPT | Mod: S$GLB,,, | Performed by: INTERNAL MEDICINE

## 2017-04-17 PROCEDURE — 1160F RVW MEDS BY RX/DR IN RCRD: CPT | Mod: S$GLB,,, | Performed by: INTERNAL MEDICINE

## 2017-04-17 PROCEDURE — 99999 PR PBB SHADOW E&M-EST. PATIENT-LVL V: CPT | Mod: PBBFAC,,, | Performed by: INTERNAL MEDICINE

## 2017-04-17 RX ORDER — MINOCYCLINE HYDROCHLORIDE 100 MG/1
100 TABLET ORAL
COMMUNITY
End: 2017-10-24

## 2017-04-17 ASSESSMENT — ROUTINE ASSESSMENT OF PATIENT INDEX DATA (RAPID3)
PAIN SCORE: 0
FATIGUE SCORE: 7.5
MDHAQ FUNCTION SCORE: 1
TOTAL RAPID3 SCORE: 3.28
AM STIFFNESS SCORE: 1, YES
PATIENT GLOBAL ASSESSMENT SCORE: 6.5
WHEN YOU AWAKENED IN THE MORNING OVER THE LAST WEEK, PLEASE INDICATE THE AMOUNT OF TIME IT TAKES UNTIL YOU ARE AS LIMBER AS YOU WILL BE FOR THE DAY: 1 HR
PSYCHOLOGICAL DISTRESS SCORE: 6.6

## 2017-04-17 ASSESSMENT — ANKYLOSING SPONDYLITIS DISEASE ACTIVITY SCORE (ASDAS-CRP)
PAIN_SWELLING: 5
TOTAL_SCORE: 4.23
CRP_MG_PER_LITER: 20.5
MORNING_STIFFNESS: 4
CRP_MG_PER_LITER: 20.5
GLOBAL_ACTIVITY: 6
NBH_PAIN: 10

## 2017-04-17 NOTE — MR AVS SNAPSHOT
[facility] - Rheumatology  [address]  [facility] [address]  Phone: [phone]  Fax: [phone]                  Althea Vazquez   2017 2:00 PM   Office Visit    Description:  Female : 1965   Provider:  Gabriel Arvizu MD   Department:  [facility] - Rheumatology                To Do List           Goals (5 Years of Data)              17    HEMOGLOBIN A1C < 7.0   5.8  6.0  6.4      Follow-Up and Disposition     Return in about 3 months (around 2017).      [facility]     [facility] -  Assistance  Unless otherwise directed by your provider, please contact [facility], our nurse care line that is available for  assistance.     Registered nurses in the [facility] provide: appointment scheduling, clinical advisement, health education, and other advisory services.  Call: [phone] (toll free)               Medications           Message regarding Medications     Verify the changes and/or additions to your medication regime listed below are the same as discussed with your clinician today.  If any of these changes or additions are incorrect, please notify your healthcare provider.             Verify that the below list of medications is an accurate representation of the medications you are currently taking.  If none reported, the list may be blank. If incorrect, please contact your healthcare provider. Carry this list with you in case of emergency.           Current Medications     amlodipine (NORVASC) 5 MG tablet Take 1 tablet (5 mg total) by mouth once daily.    atorvastatin (LIPITOR) 40 MG tablet TAKE 1 TABLET BY MOUTH EVERY DAY    clotrimazole-betamethasone 1-0.05% (LOTRISONE) cream Apply topically 2 (two) times daily as needed.     cyclobenzaprine (FLEXERIL) 10 MG tablet TAKE 1 TABLET BY MOUTH EVERY DAY AT BEDTIME IF NEEDED    docusate sodium (COLACE) 50 MG capsule Take 1 capsule (50 mg total) by  mouth 2 (two) times daily as needed for Constipation.    entecavir (BARACLUDE) 0.5 MG Tab Take 0.5 mg by mouth once daily.    fluoxetine (PROZAC) 40 MG capsule Take 1 capsule (40 mg total) by mouth 2 (two) times daily.    homatropine (ISOPTO HOMATROPINE) 5 % ophthalmic solution Place 1 drop into the right eye 2 (two) times daily.    HUMIRA PEN PnKt injection INJECT 1 PEN INTO THE SKIN EVERY 14 DAYS    hydroquinone 4 % Crea Apply to dark areas qhs.  Not more than 6 months straight in same location.Use sunscreen in am    INVOKANA 300 mg Tab TAKE 1 TABLET BY MOUTH EVERY DAY    lancets Misc To use with TrueResult meter to check BG twice daily    losartan (COZAAR) 100 MG tablet TAKE 1 TABLET BY MOUTH EVERY DAY    metformin (GLUCOPHAGE-XR) 500 MG 24 hr tablet Take 2 tablets (1,000 mg total) by mouth daily with breakfast.    minocycline (DYNACIN) 100 MG tablet Take 100 mg by mouth every 12 (twelve) hours.    naproxen (NAPROSYN) 500 MG tablet Take 1 tablet (500 mg total) by mouth 2 (two) times daily.    pantoprazole (PROTONIX) 40 MG tablet Take 1 tablet (40 mg total) by mouth once daily.    promethazine (PHENERGAN) 25 MG tablet Take 1 tablet (25 mg total) by mouth every 6 (six) hours as needed for Nausea.    sulfaSALAzine (AZULFIDINE) 500 MG TbEC Take 2 tablets (1,000 mg total) by mouth 2 (two) times daily.    tramadol (ULTRAM) 50 mg tablet Take 1-2 tabs PO q6 hrs PRN pain    TRUETEST TEST STRIPS Strp TEST TWICE A DAY    zolpidem (AMBIEN) 5 MG Tab Take 1 tablet (5 mg total) by mouth nightly as needed.    aspirin (ECOTRIN) 81 MG EC tablet Take 1 tablet (81 mg total) by mouth once daily.    clonazePAM (KLONOPIN) 0.5 MG tablet Take 1 tablet (0.5 mg total) by mouth daily as needed for Anxiety.    hydrochlorothiazide (HYDRODIURIL) 25 MG tablet Take 1 tablet (25 mg total) by mouth once daily.    hydrocortisone 2.5 % cream Apply topically 2 (two) times daily.    metoprolol succinate (TOPROL-XL) 100 MG 24 hr tablet Take 1 tablet  "(100 mg total) by mouth once daily.           Clinical Reference Information           Your Vitals Were     BP Pulse Height Weight Last Period BMI    129/83 71 5' 3.6" (1.615 m) 106.1 kg (233 lb 14.4 oz) 11/25/2014 40.66 kg/m2      Blood Pressure          Most Recent Value    BP  129/83      Allergies as of 4/17/2017     Bactrim [Sulfamethoxazole-trimethoprim]    Diflucan [Fluconazole]      Immunizations Administered on Date of Encounter - 4/17/2017     None      Instructions      Please do the following exercises for your spondylitis. Also we will place a PT consultation and recommend Flexeril 5 mg qHS for your neck spasms which should help your headaches.  Lumbar Extension (Flexibility)    1. Lie face down on your stomach, forehead on the floor. You can lie on a mat or towel.  2. Bend your arms next to your body and lift your upper body up on your forearms. Your palms and forearms should be flat on the floor. Keep your stomach and hips on the floor.  3. Hold your upper body up with your forearms for 20 seconds. Slowly lower back down to the floor.  4. Repeat 2 times, or as instructed.  Date Last Reviewed: 3/10/2016  © 5135-1384 [employer]. [address] [place] [address]. All rights reserved. This information is not intended as a substitute for professional medical care. Always follow your healthcare professional's instructions.        Lumbar Flexion (Flexibility)    5. Lie on your back on the floor, with your knees bent and your feet flat on the floor.  6. Gently pull your knees up toward your chest. Put your hands under your thighs to help pull your knees up.  7. Press your lower back down to the floor. Hold for 20 seconds.  8. Lower your legs back down to the floor and relax.  9. Repeat 2 times, or as instructed.  Date Last Reviewed: 3/10/2016  © 0125-0340 [employer]. [address] [place] [address]. All rights reserved. This information is not intended as a " substitute for professional medical care. Always follow your healthcare professional's instructions.        Lumbar Stretch (Flexibility)    10. Lie on your back on the floor, with your knees bent and your feet flat on the floor. Dont press your neck or lower back to the floor.  11. Pull one knee up toward your chest. Clasp your hands under your thigh to help pull.  12. Hold for 30 to 60 seconds. Lower your leg back down to the floor.  13. Repeat 2 times, or as instructed.  14. Switch legs and repeat.  Date Last Reviewed: 3/10/2016  © 4388-0653 [employer]. [address] [place] [address]. All rights reserved. This information is not intended as a substitute for professional medical care. Always follow your healthcare professional's instructions.             Language Assistance Services     ATTENTION: Language assistance services are available, free of charge. Please call [phone].      ATENCIÓN: Si habla español, tiene a pelayo disposición servicios gratuitos de asistencia lingüística. Llame al [phone].     CHÚ Ý: N?u b?n nói Ti?ng Vi?t, có các d?ch v? h? tr? ngôn ng? mi?n phí dành cho b?n. G?i s? [phone].         [facility] complies with applicable Federal civil rights laws and does not discriminate on the basis of race, color, national origin, age, disability, or sex.

## 2017-04-17 NOTE — PROGRESS NOTES
I have personally taken the history and examined the patient and agree with the resident,s note as stated above   First dose of Humira 11/17/16.Continues entecavir for HB prophylaxis Still has bilateral shoulder pain with limited rom, attended PT([facility]) twice a week until end of Dec. Insurance stopped. The shoulders are much improved compared with prior to Humira, but still trouble reaching up, brushing hair etc. The back is fine. Some right hip flexion pain. Some pain right knee, s/p remote arthroscopy.  Am stiffness lasts 60 min shoulders, right hip      Saw Dr. De La Vega 10/7/16: No pulmonary contraindications to treatment with Humira.  Call/return if cough worsens, or new resp symptoms arise.  Saw Lucretia Berger 10/3/16: have personally performed a face to face diagnostic evaluation on this patient. I have reviewed and agree with today's findings and the care plan outlined by Lucretia Berger, Saritha findings are as follows:  Patient presents with isolated HBcAb. Has received High dose prednisone and now to start humira    Exam: Schober's 10-15 cm  Chest expansion 9 cm  Neck rom with minimal decrease lat flex only      Results for NABOR HERNANDEZ (MRN [id number]) as of 4/17/2017 14:13   Ref. Range 4/12/2017 13:26 4/12/2017 14:24   WBC Latest Ref Range: 3.90 - 12.70 K/uL 9.33    RBC Latest Ref Range: 4.00 - 5.40 M/uL 4.67    Hemoglobin Latest Ref Range: 12.0 - 16.0 g/dL 14.2    Hematocrit Latest Ref Range: 37.0 - 48.5 % 41.9    MCV Latest Ref Range: 82 - 98 fL 90    MCH Latest Ref Range: 27.0 - 31.0 pg 30.4    MCHC Latest Ref Range: 32.0 - 36.0 % 33.9    RDW Latest Ref Range: 11.5 - 14.5 % 14.4    Platelets Latest Ref Range: 150 - 350 K/uL 251    MPV Latest Ref Range: 9.2 - 12.9 fL 10.6    Gran% Latest Ref Range: 38.0 - 73.0 % 48.5    Gran # Latest Ref Range: 1.8 - 7.7 K/uL 4.5    Lymph% Latest Ref Range: 18.0 - 48.0 % 41.3    Lymph # Latest Ref Range: 1.0 - 4.8 K/uL 3.9    Mono% Latest Ref Range: 4.0 - 15.0  % 7.7    Mono # Latest Ref Range: 0.3 - 1.0 K/uL 0.7    Eosinophil% Latest Ref Range: 0.0 - 8.0 % 1.2    Eos # Latest Ref Range: 0.0 - 0.5 K/uL 0.1    Basophil% Latest Ref Range: 0.0 - 1.9 % 0.4    Baso # Latest Ref Range: 0.00 - 0.20 K/uL 0.04    Sed Rate Latest Ref Range: 0 - 20 mm/Hr 7    Sodium Latest Ref Range: 136 - 145 mmol/L 138    Potassium Latest Ref Range: 3.5 - 5.1 mmol/L 3.8    Chloride Latest Ref Range: 95 - 110 mmol/L 99    CO2 Latest Ref Range: 23 - 29 mmol/L 30 (H)    Anion Gap Latest Ref Range: 8 - 16 mmol/L 9    BUN, Bld Latest Ref Range: 6 - 20 mg/dL 11    Creatinine Latest Ref Range: 0.5 - 1.4 mg/dL 0.8    eGFR if non African American Latest Ref Range: >60 mL/min/1.73 m^2 >60.0    eGFR if African American Latest Ref Range: >60 mL/min/1.73 m^2 >60.0    Glucose Latest Ref Range: 70 - 110 mg/dL 107    Calcium Latest Ref Range: 8.7 - 10.5 mg/dL 9.1    Alkaline Phosphatase Latest Ref Range: 55 - 135 U/L 74    Total Protein Latest Ref Range: 6.0 - 8.4 g/dL 7.1    Albumin Latest Ref Range: 3.5 - 5.2 g/dL 3.9    Total Bilirubin Latest Ref Range: 0.1 - 1.0 mg/dL 0.4    AST Latest Ref Range: 10 - 40 U/L 16    ALT Latest Ref Range: 10 - 44 U/L 16    CRP Latest Ref Range: 0.0 - 8.2 mg/L 20.5 (H)    Hep B Viral DNA IU/ML Latest Ref Range: <10 IU/mL  <10   Log HBV IU/mL Latest Ref Range: <1.00 Log (10) IU/mL  <1.00   Differential Method Unknown Automated    Hepatitis B Virus DNA Latest Ref Range: Not detected   Not detected       Results for NABOR HERNANDEZ (MRN [id number]) as of 4/17/2017 14:13   Ref. Range 4/12/2017 13:26 4/12/2017 14:24   WBC Latest Ref Range: 3.90 - 12.70 K/uL 9.33    RBC Latest Ref Range: 4.00 - 5.40 M/uL 4.67    Hemoglobin Latest Ref Range: 12.0 - 16.0 g/dL 14.2    Hematocrit Latest Ref Range: 37.0 - 48.5 % 41.9    MCV Latest Ref Range: 82 - 98 fL 90    MCH Latest Ref Range: 27.0 - 31.0 pg 30.4    MCHC Latest Ref Range: 32.0 - 36.0 % 33.9    RDW Latest Ref Range: 11.5 - 14.5 %  14.4    Platelets Latest Ref Range: 150 - 350 K/uL 251    MPV Latest Ref Range: 9.2 - 12.9 fL 10.6    Gran% Latest Ref Range: 38.0 - 73.0 % 48.5    Gran # Latest Ref Range: 1.8 - 7.7 K/uL 4.5    Lymph% Latest Ref Range: 18.0 - 48.0 % 41.3    Lymph # Latest Ref Range: 1.0 - 4.8 K/uL 3.9    Mono% Latest Ref Range: 4.0 - 15.0 % 7.7    Mono # Latest Ref Range: 0.3 - 1.0 K/uL 0.7    Eosinophil% Latest Ref Range: 0.0 - 8.0 % 1.2    Eos # Latest Ref Range: 0.0 - 0.5 K/uL 0.1    Basophil% Latest Ref Range: 0.0 - 1.9 % 0.4    Baso # Latest Ref Range: 0.00 - 0.20 K/uL 0.04    Sed Rate Latest Ref Range: 0 - 20 mm/Hr 7    Sodium Latest Ref Range: 136 - 145 mmol/L 138    Potassium Latest Ref Range: 3.5 - 5.1 mmol/L 3.8    Chloride Latest Ref Range: 95 - 110 mmol/L 99    CO2 Latest Ref Range: 23 - 29 mmol/L 30 (H)    Anion Gap Latest Ref Range: 8 - 16 mmol/L 9    BUN, Bld Latest Ref Range: 6 - 20 mg/dL 11    Creatinine Latest Ref Range: 0.5 - 1.4 mg/dL 0.8    eGFR if non African American Latest Ref Range: >60 mL/min/1.73 m^2 >60.0    eGFR if African American Latest Ref Range: >60 mL/min/1.73 m^2 >60.0    Glucose Latest Ref Range: 70 - 110 mg/dL 107    Calcium Latest Ref Range: 8.7 - 10.5 mg/dL 9.1    Alkaline Phosphatase Latest Ref Range: 55 - 135 U/L 74    Total Protein Latest Ref Range: 6.0 - 8.4 g/dL 7.1    Albumin Latest Ref Range: 3.5 - 5.2 g/dL 3.9    Total Bilirubin Latest Ref Range: 0.1 - 1.0 mg/dL 0.4    AST Latest Ref Range: 10 - 40 U/L 16    ALT Latest Ref Range: 10 - 44 U/L 16    CRP Latest Ref Range: 0.0 - 8.2 mg/L 20.5 (H)    Hep B Viral DNA IU/ML Latest Ref Range: <10 IU/mL  <10   Log HBV IU/mL Latest Ref Range: <1.00 Log (10) IU/mL  <1.00   Differential Method Unknown Automated    Hepatitis B Virus DNA Latest Ref Range: Not detected   Not detected         Non-radiographic axial and peripheral spondylarthritis: ADAS-CRP = 4.23(high disease activity);  BASDAI= 5.4(high activity)  ;  BASFI = 7(severe functional  limitation) was 9.4(severe functional limitation) improving but still quite active. CRP re-elevated mildly.  Cervical myofascial pain, cervical spinal stenosis/spondylosis  Synovitis right shoulder and right  rotator cuff tear per MRI 9/24/16(Dr. Agrawal)  OA right knee  OA right> left hip, likely secondary to spondyloarthritis  T2 DM  Sebaceous cyst mid back, excised 10/27/16      Continue adalimumab 40mg sc q 2wks started 11/17/16  naproxen 500mg twice daily with omeprazole 20mg before breakfast  Reordered PT for neck this time  Cyclobenzaprine 10mg 1/2 tab hs(10mg causes daytime sedation)  Cont sulfasalazine-EN 1000mg twice daily  F/u Dr. Agrawal regarding right rotator cuff tear ? left  RTC 3 Months with standing lab

## 2017-04-26 ENCOUNTER — TELEPHONE (OUTPATIENT)
Dept: PHARMACY | Facility: CLINIC | Age: 52
End: 2017-04-26

## 2017-04-26 DIAGNOSIS — M47.819 SPONDYLOARTHRITIS: ICD-10-CM

## 2017-04-26 RX ORDER — ADALIMUMAB 40MG/0.8ML
KIT SUBCUTANEOUS
Qty: 2 EACH | Refills: 2 | Status: SHIPPED | OUTPATIENT
Start: 2017-04-26 | End: 2017-07-18 | Stop reason: SDUPTHER

## 2017-04-27 RX ORDER — ENTECAVIR 0.5 MG/1
0.5 TABLET, FILM COATED ORAL DAILY
Qty: 30 TABLET | Refills: 5 | Status: SHIPPED | OUTPATIENT
Start: 2017-04-27 | End: 2017-07-21

## 2017-05-17 ENCOUNTER — OFFICE VISIT (OUTPATIENT)
Dept: PSYCHIATRY | Facility: CLINIC | Age: 52
End: 2017-05-17
Payer: COMMERCIAL

## 2017-05-17 VITALS
SYSTOLIC BLOOD PRESSURE: 130 MMHG | HEART RATE: 77 BPM | BODY MASS INDEX: 37.57 KG/M2 | WEIGHT: 239.38 LBS | HEIGHT: 67 IN | DIASTOLIC BLOOD PRESSURE: 78 MMHG

## 2017-05-17 DIAGNOSIS — F34.1 DYSTHYMIC DISORDER: ICD-10-CM

## 2017-05-17 DIAGNOSIS — F41.1 GENERALIZED ANXIETY DISORDER: ICD-10-CM

## 2017-05-17 DIAGNOSIS — F41.0 PANIC DISORDER WITHOUT AGORAPHOBIA: ICD-10-CM

## 2017-05-17 PROCEDURE — 99212 OFFICE O/P EST SF 10 MIN: CPT | Mod: PBBFAC | Performed by: PSYCHIATRY & NEUROLOGY

## 2017-05-17 PROCEDURE — 99999 PR PBB SHADOW E&M-EST. PATIENT-LVL II: CPT | Mod: PBBFAC,,, | Performed by: PSYCHIATRY & NEUROLOGY

## 2017-05-17 PROCEDURE — 99213 OFFICE O/P EST LOW 20 MIN: CPT | Mod: S$GLB,,, | Performed by: PSYCHIATRY & NEUROLOGY

## 2017-05-17 RX ORDER — FLUOXETINE HYDROCHLORIDE 40 MG/1
40 CAPSULE ORAL 2 TIMES DAILY
Qty: 180 CAPSULE | Refills: 1 | Status: SHIPPED | OUTPATIENT
Start: 2017-05-17 | End: 2017-08-16 | Stop reason: SDUPTHER

## 2017-05-17 RX ORDER — CLONAZEPAM 0.5 MG/1
0.5 TABLET ORAL DAILY PRN
Qty: 30 TABLET | Refills: 3 | Status: SHIPPED | OUTPATIENT
Start: 2017-05-17 | End: 2017-08-16 | Stop reason: SDUPTHER

## 2017-05-17 RX ORDER — ZOLPIDEM TARTRATE 5 MG/1
5 TABLET ORAL NIGHTLY PRN
Qty: 30 TABLET | Refills: 3 | Status: SHIPPED | OUTPATIENT
Start: 2017-05-17 | End: 2017-08-16 | Stop reason: SDUPTHER

## 2017-05-17 NOTE — MR AVS SNAPSHOT
[facility] - Psychiatry  [address]  [facility] [address]  Phone: [phone]  Fax: [phone]                  Althea Vazquez   2017 1:00 PM   Office Visit    Description:  Female : 1965   Provider:  Tip Oliveira MD   Department:  [facility]           Diagnoses this Visit        Comments    Generalized anxiety disorder         Panic disorder without agoraphobia         Dysthymic disorder                To Do List           Future Appointments        Provider Department Dept Phone    2017 1:45 PM [facility] [id number] SCREEN [facility] [phone]    2017 10:00 AM LAB, [place] [facility] [phone]    2017 2:30 PM Gabriel Arvizu MD [facility] Rheumatology [phone]      Goals (5 Years of Data)              17    HEMOGLOBIN A1C < 7.0   5.8  6.0  6.4       These Medications        Disp Refills Start End    clonazePAM (KLONOPIN) 0.5 MG tablet 30 tablet 3 2017    Take 1 tablet (0.5 mg total) by mouth daily as needed for Anxiety. - Oral    Pharmacy: [facility] [place] [address] Ph #: [phone]       fluoxetine (PROZAC) 40 MG capsule 180 capsule 1 2017     Take 1 capsule (40 mg total) by mouth 2 (two) times daily. - Oral    Pharmacy: [facility] [place] [address] Ph #: [phone]       zolpidem (AMBIEN) 5 MG Tab 30 tablet 3 2017 11/15/2017    Take 1 tablet (5 mg total) by mouth nightly as needed. - Oral    Pharmacy: [place] [address] Ph #: [phone]         [facility]     [facility] -  Assistance  Unless otherwise directed by your provider, please contact [facility], our nurse care line that is available for 24/7 assistance.     Registered nurses in the [facility]  provide: appointment scheduling, clinical advisement, health education, and other advisory services.  Call: [phone] (toll free)               Medications           Message regarding Medications     Verify the changes and/or additions to your medication regime listed below are the same as discussed with your clinician today.  If any of these changes or additions are incorrect, please notify your healthcare provider.             Verify that the below list of medications is an accurate representation of the medications you are currently taking.  If none reported, the list may be blank. If incorrect, please contact your healthcare provider. Carry this list with you in case of emergency.           Current Medications     amlodipine (NORVASC) 5 MG tablet Take 1 tablet (5 mg total) by mouth once daily.    aspirin (ECOTRIN) 81 MG EC tablet Take 1 tablet (81 mg total) by mouth once daily.    atorvastatin (LIPITOR) 40 MG tablet TAKE 1 TABLET BY MOUTH EVERY DAY    clonazePAM (KLONOPIN) 0.5 MG tablet Take 1 tablet (0.5 mg total) by mouth daily as needed for Anxiety.    clotrimazole-betamethasone 1-0.05% (LOTRISONE) cream Apply topically 2 (two) times daily as needed.     cyclobenzaprine (FLEXERIL) 10 MG tablet TAKE 1 TABLET BY MOUTH EVERY DAY AT BEDTIME IF NEEDED    docusate sodium (COLACE) 50 MG capsule Take 1 capsule (50 mg total) by mouth 2 (two) times daily as needed for Constipation.    entecavir (BARACLUDE) 0.5 MG Tab Take 1 tablet (0.5 mg total) by mouth once daily.    fluoxetine (PROZAC) 40 MG capsule Take 1 capsule (40 mg total) by mouth 2 (two) times daily.    homatropine (ISOPTO HOMATROPINE) 5 % ophthalmic solution Place 1 drop into the right eye 2 (two) times daily.    HUMIRA PEN PnKt injection INJECT 1 PEN INTO THE SKIN EVERY 14 DAYS    hydrochlorothiazide (HYDRODIURIL) 25 MG tablet Take 1 tablet (25 mg total) by mouth once daily.    hydrocortisone 2.5 % cream Apply topically 2 (two) times daily.     "hydroquinone 4 % Crea Apply to dark areas qhs.  Not more than 6 months straight in same location.Use sunscreen in am    INVOKANA 300 mg Tab TAKE 1 TABLET BY MOUTH EVERY DAY    lancets Misc To use with TrueResult meter to check BG twice daily    losartan (COZAAR) 100 MG tablet TAKE 1 TABLET BY MOUTH EVERY DAY    metformin (GLUCOPHAGE-XR) 500 MG 24 hr tablet Take 2 tablets (1,000 mg total) by mouth daily with breakfast.    metoprolol succinate (TOPROL-XL) 100 MG 24 hr tablet Take 1 tablet (100 mg total) by mouth once daily.    minocycline (DYNACIN) 100 MG tablet Take 100 mg by mouth every 12 (twelve) hours.    naproxen (NAPROSYN) 500 MG tablet Take 1 tablet (500 mg total) by mouth 2 (two) times daily.    pantoprazole (PROTONIX) 40 MG tablet Take 1 tablet (40 mg total) by mouth once daily.    promethazine (PHENERGAN) 25 MG tablet Take 1 tablet (25 mg total) by mouth every 6 (six) hours as needed for Nausea.    sulfaSALAzine (AZULFIDINE) 500 MG TbEC Take 2 tablets (1,000 mg total) by mouth 2 (two) times daily.    tramadol (ULTRAM) 50 mg tablet Take 1-2 tabs PO q6 hrs PRN pain    TRUETEST TEST STRIPS Strp TEST TWICE A DAY    zolpidem (AMBIEN) 5 MG Tab Take 1 tablet (5 mg total) by mouth nightly as needed.           Clinical Reference Information           Your Vitals Were     BP Pulse Height Weight Last Period BMI    130/78 77 5' 7" (1.702 m) 108.6 kg (239 lb 6.4 oz) 11/25/2014 37.5 kg/m2      Blood Pressure          Most Recent Value    BP  130/78      Allergies as of 5/17/2017     Bactrim [Sulfamethoxazole-trimethoprim]    Diflucan [Fluconazole]      Immunizations Administered on Date of Encounter - 5/17/2017     None      Language Assistance Services     ATTENTION: Language assistance services are available, free of charge. Please call [phone].      ATENCIÓN: Si habla ibrahimaeloina, tiene a pelayo disposición servicios gratuitos de asistencia lingüística. Llame al [phone].     CHÚ Ý: N?u b?n nói Ti?ng Vi?t, có các " d?ch v? h? tr? ngôn ng? mi?n phí juanh cho b?n. G?i s? [phone].         [facility] complies with applicable Federal civil rights laws and does not discriminate on the basis of race, color, national origin, age, disability, or sex.

## 2017-05-17 NOTE — PROGRESS NOTES
"ESTABLISHED OUTPATIENT VISIT   E/M LEVEL 3: 76611    ENCOUNTER DATE: 2017  SITE: [facility], [address]    HISTORY    CHIEF COMPLAINT   Althea Vazquez is a 51 y.o. female who presents for follow up of anxiety.    HPI   Reports ongoing difficulty with anxiety. Mood is "up and down".    Stress level has decreased overall.    Mother's Day was somewhat difficult as pt.'s mother  13 years ago around Mother's Day.    Pt. Would like to maintain current psychotropic medication regimen.    At times forgets to take meds.    Psychiatric Review Of Systems - Is patient experiencing or having changes in:  sleep: poor due to pain[shoulder]  appetite: no  weight: has gained 15 lb's since previous visit  energy/anergy: no  interest/pleasure/anhedonia: no  somatic symptoms: no  libido: no  anxiety/panic: no  guilty/hopelessness: no  concentration: no  S.I.B.s/risky behavior: no  Irritability: no  Racing thoughts: no  Impulsive behaviors: no  Paranoia:no  AVH:no    Recent alcohol: rare small amount  Recent drug: no    Medical ROS   Denies any physical complaint during today's visit.     PAST MEDICAL, FAMILY AND SOCIAL HISTORY: The patient's past medical, family and social history have been reviewed and updated as appropriate within the electronic medical record - see encounter notes.    PSYCHOTROPIC MEDICATIONS   Prozac 40 mg bid, Clonazepam 0.5 mg prn for anxiety[has been taking 3-4 times per week], Ambien 5 mg at bedtime prn[has recently been taking almost every day]    Scheduled and PRN Medications     Current Outpatient Prescriptions:     amlodipine (NORVASC) 5 MG tablet, Take 1 tablet (5 mg total) by mouth once daily., Disp: 90 tablet, Rfl: 1    aspirin (ECOTRIN) 81 MG EC tablet, Take 1 tablet (81 mg total) by mouth once daily., Disp: 30 tablet, Rfl: 0    atorvastatin (LIPITOR) 40 MG tablet, TAKE 1 TABLET BY MOUTH EVERY DAY, Disp: 30 tablet, Rfl: 11    clonazePAM (KLONOPIN) 0.5 MG tablet, " Take 1 tablet (0.5 mg total) by mouth daily as needed for Anxiety., Disp: 30 tablet, Rfl: 3    clotrimazole-betamethasone 1-0.05% (LOTRISONE) cream, Apply topically 2 (two) times daily as needed. , Disp: , Rfl:     cyclobenzaprine (FLEXERIL) 10 MG tablet, TAKE 1 TABLET BY MOUTH EVERY DAY AT BEDTIME IF NEEDED, Disp: 60 tablet, Rfl: 3    docusate sodium (COLACE) 50 MG capsule, Take 1 capsule (50 mg total) by mouth 2 (two) times daily as needed for Constipation. (Patient taking differently: Take 50 mg by mouth 2 (two) times daily. ), Disp: 30 capsule, Rfl: 0    entecavir (BARACLUDE) 0.5 MG Tab, Take 1 tablet (0.5 mg total) by mouth once daily., Disp: 30 tablet, Rfl: 5    fluoxetine (PROZAC) 40 MG capsule, Take 1 capsule (40 mg total) by mouth 2 (two) times daily., Disp: 180 capsule, Rfl: 1    homatropine (ISOPTO HOMATROPINE) 5 % ophthalmic solution, Place 1 drop into the right eye 2 (two) times daily. (Patient taking differently: Place 1 drop into the right eye 2 (two) times daily as needed. ), Disp: 5 mL, Rfl: 1    HUMIRA PEN PnKt injection, INJECT 1 PEN INTO THE SKIN EVERY 14 DAYS, Disp: 2 each, Rfl: 2    hydrochlorothiazide (HYDRODIURIL) 25 MG tablet, Take 1 tablet (25 mg total) by mouth once daily., Disp: 30 tablet, Rfl: 11    hydrocortisone 2.5 % cream, Apply topically 2 (two) times daily., Disp: 28 g, Rfl: 1    hydroquinone 4 % Crea, Apply to dark areas qhs.  Not more than 6 months straight in same location.Use sunscreen in am (Patient taking differently: continuous prn. Apply to dark areas qhs.  Not more than 6 months straight in same location.Use sunscreen in am), Disp: 46 g, Rfl: 1    INVOKANA 300 mg Tab, TAKE 1 TABLET BY MOUTH EVERY DAY, Disp: 30 tablet, Rfl: 11    lancets Misc, To use with TrueResult meter to check BG twice daily, Disp: 100 each, Rfl: 6    losartan (COZAAR) 100 MG tablet, TAKE 1 TABLET BY MOUTH EVERY DAY, Disp: 30 tablet, Rfl: 5    metformin (GLUCOPHAGE-XR) 500 MG 24 hr tablet,  Take 2 tablets (1,000 mg total) by mouth daily with breakfast., Disp: 60 tablet, Rfl: 11    metoprolol succinate (TOPROL-XL) 100 MG 24 hr tablet, Take 1 tablet (100 mg total) by mouth once daily., Disp: 90 tablet, Rfl: 3    minocycline (DYNACIN) 100 MG tablet, Take 100 mg by mouth every 12 (twelve) hours., Disp: , Rfl:     naproxen (NAPROSYN) 500 MG tablet, Take 1 tablet (500 mg total) by mouth 2 (two) times daily., Disp: 60 tablet, Rfl: 5    pantoprazole (PROTONIX) 40 MG tablet, Take 1 tablet (40 mg total) by mouth once daily., Disp: 30 tablet, Rfl: 11    promethazine (PHENERGAN) 25 MG tablet, Take 1 tablet (25 mg total) by mouth every 6 (six) hours as needed for Nausea., Disp: 15 tablet, Rfl: 0    sulfaSALAzine (AZULFIDINE) 500 MG TbEC, Take 2 tablets (1,000 mg total) by mouth 2 (two) times daily., Disp: 120 tablet, Rfl: 6    tramadol (ULTRAM) 50 mg tablet, Take 1-2 tabs PO q6 hrs PRN pain, Disp: 120 tablet, Rfl: 0    TRUETEST TEST STRIPS Strp, TEST TWICE A DAY, Disp: 60 strip, Rfl: 11    zolpidem (AMBIEN) 5 MG Tab, Take 1 tablet (5 mg total) by mouth nightly as needed., Disp: 30 tablet, Rfl: 3    EXAMINATION    Vitals:    05/17/17 1255   BP: 130/78   Pulse: 77     Weight: 239 lb's    CONSTITUTIONAL  General Appearance: well nourished    MUSCULOSKELETAL  Muscle Strength and Tone: normal strength and tone  Abnormal Involuntary Movements: no abnormal movement noted  Gait and Station: normal gait    PSYCHIATRIC   Level of Consciousness: alert  Orientation: oriented to person, place and time  Grooming: well groomed  Psychomotor Behavior: no restlessness/agitation  Speech: normal in rate, rhythm and volume  Language: normal vocabulary  Mood: anxiety at times  Affect: full range and appropriate  Thought Process: logical and goal directed  Associations: intact associations  Thought Content: no SI/HI  Memory: grossly intact  Attention: intact to content of interview  Fund of Knowledge: appears adequate  Insight:  good  Judgement: good    MEDICAL DECISION MAKING    DIAGNOSES  Anxiety d/o N.O.S.    PROBLEM LIST AND MANAGEMENT PLANS    - anxiety: continue Prozac, Klonopin and Ambien as above  - rtc 3 months    Time with patient: 15 min    LABORATORY DATA  Lab Visit on 04/12/2017   Component Date Value Ref Range Status    Hep B Viral DNA IU/ML 04/12/2017 <10  <10 IU/mL Final    Log HBV IU/mL 04/12/2017 <1.00  <1.00 Log (10) IU/mL Final    Comment: This procedure utilizes a real-time polymerase chain  reaction test from [employer].  The amplification   target is a conserved region in the terminal third of  the hepatitis B surface antigen gene. The lower limit of   quantitation is 10 IU/mL (1.00 Log IU/mL) and the uppper  limit of quantitation is 1 billion IU/mL (9.00 Log IU/mL).  The qualitative limit of detection is 10 IU/mL   (1.00 Log IU/mL). A  Not detected  result does not rule   out infection.  Test performed at [facility],  [address] , [place]  [address]     [phone]  Milton Castro MD  - Medical Director      Hepatitis B Virus DNA 04/12/2017 Not detected  Not detected Final   Lab Visit on 04/12/2017   Component Date Value Ref Range Status    WBC 04/12/2017 9.33  3.90 - 12.70 K/uL Final    RBC 04/12/2017 4.67  4.00 - 5.40 M/uL Final    Hemoglobin 04/12/2017 14.2  12.0 - 16.0 g/dL Final    Hematocrit 04/12/2017 41.9  37.0 - 48.5 % Final    MCV 04/12/2017 90  82 - 98 fL Final    MCH 04/12/2017 30.4  27.0 - 31.0 pg Final    MCHC 04/12/2017 33.9  32.0 - 36.0 % Final    RDW 04/12/2017 14.4  11.5 - 14.5 % Final    Platelets 04/12/2017 251  150 - 350 K/uL Final    MPV 04/12/2017 10.6  9.2 - 12.9 fL Final    Gran # 04/12/2017 4.5  1.8 - 7.7 K/uL Final    Lymph # 04/12/2017 3.9  1.0 - 4.8 K/uL Final    Mono # 04/12/2017 0.7  0.3 - 1.0 K/uL Final    Eos # 04/12/2017 0.1  0.0 - 0.5 K/uL Final    Baso # 04/12/2017 0.04  0.00 - 0.20 K/uL Final    Gran% 04/12/2017 48.5  38.0 - 73.0 %  Final    Lymph% 04/12/2017 41.3  18.0 - 48.0 % Final    Mono% 04/12/2017 7.7  4.0 - 15.0 % Final    Eosinophil% 04/12/2017 1.2  0.0 - 8.0 % Final    Basophil% 04/12/2017 0.4  0.0 - 1.9 % Final    Differential Method 04/12/2017 Automated   Final    Sodium 04/12/2017 138  136 - 145 mmol/L Final    Potassium 04/12/2017 3.8  3.5 - 5.1 mmol/L Final    Chloride 04/12/2017 99  95 - 110 mmol/L Final    CO2 04/12/2017 30* 23 - 29 mmol/L Final    Glucose 04/12/2017 107  70 - 110 mg/dL Final    BUN, Bld 04/12/2017 11  6 - 20 mg/dL Final    Creatinine 04/12/2017 0.8  0.5 - 1.4 mg/dL Final    Calcium 04/12/2017 9.1  8.7 - 10.5 mg/dL Final    Total Protein 04/12/2017 7.1  6.0 - 8.4 g/dL Final    Albumin 04/12/2017 3.9  3.5 - 5.2 g/dL Final    Total Bilirubin 04/12/2017 0.4  0.1 - 1.0 mg/dL Final    Comment: For infants and newborns, interpretation of results should be based  on gestational age, weight and in agreement with clinical  observations.  Premature Infant recommended reference ranges:  Up to 24 hours.............<8.0 mg/dL  Up to 48 hours............<12.0 mg/dL  3-5 days..................<15.0 mg/dL  6-29 days.................<15.0 mg/dL      Alkaline Phosphatase 04/12/2017 74  55 - 135 U/L Final    AST 04/12/2017 16  10 - 40 U/L Final    ALT 04/12/2017 16  10 - 44 U/L Final    Anion Gap 04/12/2017 9  8 - 16 mmol/L Final    eGFR if African American 04/12/2017 >60.0  >60 mL/min/1.73 m^2 Final    eGFR if non African American 04/12/2017 >60.0  >60 mL/min/1.73 m^2 Final    Comment: Calculation used to obtain the estimated glomerular filtration  rate (eGFR) is the CKD-EPI equation. Since race is unknown   in our information system, the eGFR values for   -American and Non--American patients are given   for each creatinine result.      Sed Rate 04/12/2017 7  0 - 20 mm/Hr Final    CRP 04/12/2017 20.5* 0.0 - 8.2 mg/L Final           Tip Oliveira

## 2017-05-24 ENCOUNTER — HOSPITAL ENCOUNTER (OUTPATIENT)
Dept: RADIOLOGY | Facility: HOSPITAL | Age: 52
Discharge: HOME | End: 2017-05-24
Attending: OBSTETRICS & GYNECOLOGY
Payer: COMMERCIAL

## 2017-05-24 ENCOUNTER — TELEPHONE (OUTPATIENT)
Dept: PHARMACY | Facility: CLINIC | Age: 52
End: 2017-05-24

## 2017-05-24 DIAGNOSIS — Z12.31 VISIT FOR SCREENING MAMMOGRAM: ICD-10-CM

## 2017-05-24 DIAGNOSIS — Z12.39 BREAST CANCER SCREENING: ICD-10-CM

## 2017-05-24 PROCEDURE — 77067 SCR MAMMO BI INCL CAD: CPT | Mod: 26,,, | Performed by: RADIOLOGY

## 2017-05-24 PROCEDURE — 77067 SCR MAMMO BI INCL CAD: CPT | Mod: TC

## 2017-05-24 PROCEDURE — 77063 BREAST TOMOSYNTHESIS BI: CPT | Mod: 26,,, | Performed by: RADIOLOGY

## 2017-06-03 DIAGNOSIS — E78.5 HYPERLIPIDEMIA: ICD-10-CM

## 2017-06-03 DIAGNOSIS — I10 ESSENTIAL HYPERTENSION: Chronic | ICD-10-CM

## 2017-06-05 RX ORDER — ATORVASTATIN CALCIUM 40 MG/1
TABLET, FILM COATED ORAL
Qty: 90 TABLET | Refills: 3 | Status: SHIPPED | OUTPATIENT
Start: 2017-06-05 | End: 2018-07-05 | Stop reason: SDUPTHER

## 2017-06-05 RX ORDER — METOPROLOL SUCCINATE 100 MG/1
TABLET, EXTENDED RELEASE ORAL
Qty: 90 TABLET | Refills: 3 | Status: SHIPPED | OUTPATIENT
Start: 2017-06-05 | End: 2018-05-14

## 2017-06-05 RX ORDER — AMLODIPINE BESYLATE 5 MG/1
TABLET ORAL
Qty: 90 TABLET | Refills: 1 | Status: SHIPPED | OUTPATIENT
Start: 2017-06-05 | End: 2018-04-30

## 2017-06-05 RX ORDER — HYDROCHLOROTHIAZIDE 12.5 MG/1
CAPSULE ORAL
Qty: 90 CAPSULE | Refills: 3 | Status: SHIPPED | OUTPATIENT
Start: 2017-06-05 | End: 2018-04-09 | Stop reason: SDUPTHER

## 2017-06-20 ENCOUNTER — TELEPHONE (OUTPATIENT)
Dept: PHARMACY | Facility: CLINIC | Age: 52
End: 2017-06-20

## 2017-06-20 NOTE — TELEPHONE ENCOUNTER
Humira f/u complete. Name/ confirmed. Medication list reviewed and updated. Consultation included: indication; goals of treatment; administration; storage and handling; side effects; how to handle side effects; the importance of compliance; how to handle missed doses; the importance of laboratory monitoring; the importance of keeping all follow up appointments. Patient understands to report any medication changes to [facility] and provider. All questions answered and addressed to patients satisfaction. ramón endorses strict compliance; she feels well but still suffers with some pain usually in the morning; stiffness ~60mins; hips and shoulders hurt the most; she has no questions regarding self injection process; she declines QoL assessment as she was driving; f/u in 6mos indicated

## 2017-07-08 DIAGNOSIS — M19.90 INFLAMMATORY ARTHRITIS: ICD-10-CM

## 2017-07-08 DIAGNOSIS — Z79.899 HIGH RISK MEDICATION USE: ICD-10-CM

## 2017-07-08 DIAGNOSIS — M02.30 REACTIVE ARTHRITIS: ICD-10-CM

## 2017-07-08 RX ORDER — SULFASALAZINE 500 MG/1
TABLET, DELAYED RELEASE ORAL
Qty: 360 TABLET | Refills: 0 | Status: SHIPPED | OUTPATIENT
Start: 2017-07-08 | End: 2017-07-21 | Stop reason: SDUPTHER

## 2017-07-18 ENCOUNTER — TELEPHONE (OUTPATIENT)
Dept: PHARMACY | Facility: CLINIC | Age: 52
End: 2017-07-18

## 2017-07-18 ENCOUNTER — LAB VISIT (OUTPATIENT)
Dept: LAB | Facility: HOSPITAL | Age: 52
End: 2017-07-18
Attending: INTERNAL MEDICINE

## 2017-07-18 DIAGNOSIS — B18.1 CHRONIC VIRAL HEPATITIS B WITHOUT DELTA AGENT AND WITHOUT COMA: ICD-10-CM

## 2017-07-18 DIAGNOSIS — M47.819 SPONDYLOARTHRITIS: ICD-10-CM

## 2017-07-18 DIAGNOSIS — Z79.620 LONG-TERM USE OF ADALIMUMAB: ICD-10-CM

## 2017-07-18 LAB
ALBUMIN SERPL BCP-MCNC: 4.3 G/DL
ALP SERPL-CCNC: 63 U/L
ALT SERPL W/O P-5'-P-CCNC: 25 U/L
ANION GAP SERPL CALC-SCNC: 8 MMOL/L
AST SERPL-CCNC: 22 U/L
BASOPHILS # BLD AUTO: 0.03 K/UL
BASOPHILS NFR BLD: 0.5 %
BILIRUB SERPL-MCNC: 0.6 MG/DL
BUN SERPL-MCNC: 11 MG/DL
CALCIUM SERPL-MCNC: 8.9 MG/DL
CHLORIDE SERPL-SCNC: 105 MMOL/L
CO2 SERPL-SCNC: 28 MMOL/L
CREAT SERPL-MCNC: 0.6 MG/DL
CRP SERPL-MCNC: 0.63 MG/DL
DIFFERENTIAL METHOD: NORMAL
EOSINOPHIL # BLD AUTO: 0.1 K/UL
EOSINOPHIL NFR BLD: 1.5 %
ERYTHROCYTE [DISTWIDTH] IN BLOOD BY AUTOMATED COUNT: 13.4 %
ERYTHROCYTE [SEDIMENTATION RATE] IN BLOOD BY WESTERGREN METHOD: 10 MM/HR
EST. GFR  (AFRICAN AMERICAN): >60 ML/MIN/1.73 M^2
EST. GFR  (NON AFRICAN AMERICAN): >60 ML/MIN/1.73 M^2
GLUCOSE SERPL-MCNC: 140 MG/DL
HCT VFR BLD AUTO: 43.3 %
HGB BLD-MCNC: 14.6 G/DL
LYMPHOCYTES # BLD AUTO: 2.8 K/UL
LYMPHOCYTES NFR BLD: 41.9 %
MCH RBC QN AUTO: 30.2 PG
MCHC RBC AUTO-ENTMCNC: 33.7 %
MCV RBC AUTO: 90 FL
MONOCYTES # BLD AUTO: 0.8 K/UL
MONOCYTES NFR BLD: 12.3 %
NEUTROPHILS # BLD AUTO: 2.9 K/UL
NEUTROPHILS NFR BLD: 43 %
PLATELET # BLD AUTO: 252 K/UL
PMV BLD AUTO: 10.6 FL
POTASSIUM SERPL-SCNC: 4.1 MMOL/L
PROT SERPL-MCNC: 7.4 G/DL
RBC # BLD AUTO: 4.83 M/UL
SODIUM SERPL-SCNC: 141 MMOL/L
WBC # BLD AUTO: 6.66 K/UL

## 2017-07-18 PROCEDURE — 85652 RBC SED RATE AUTOMATED: CPT

## 2017-07-18 PROCEDURE — 86140 C-REACTIVE PROTEIN: CPT | Mod: PO

## 2017-07-18 PROCEDURE — 80053 COMPREHEN METABOLIC PANEL: CPT | Mod: PO

## 2017-07-18 PROCEDURE — 85025 COMPLETE CBC W/AUTO DIFF WBC: CPT | Mod: PO

## 2017-07-18 PROCEDURE — 36415 COLL VENOUS BLD VENIPUNCTURE: CPT | Mod: PO

## 2017-07-18 RX ORDER — ADALIMUMAB 40MG/0.8ML
KIT SUBCUTANEOUS
Qty: 2 EACH | Refills: 2 | Status: SHIPPED | OUTPATIENT
Start: 2017-07-18 | End: 2017-10-09 | Stop reason: SDUPTHER

## 2017-07-21 ENCOUNTER — OFFICE VISIT (OUTPATIENT)
Dept: RHEUMATOLOGY | Facility: CLINIC | Age: 52
End: 2017-07-21
Payer: COMMERCIAL

## 2017-07-21 VITALS
WEIGHT: 244 LBS | SYSTOLIC BLOOD PRESSURE: 147 MMHG | DIASTOLIC BLOOD PRESSURE: 91 MMHG | HEART RATE: 83 BPM | HEIGHT: 64 IN | BODY MASS INDEX: 41.66 KG/M2

## 2017-07-21 DIAGNOSIS — H20.9 IRITIS: ICD-10-CM

## 2017-07-21 DIAGNOSIS — E11.9 TYPE 2 DIABETES MELLITUS WITHOUT COMPLICATION: ICD-10-CM

## 2017-07-21 DIAGNOSIS — M02.30 REACTIVE ARTHRITIS: ICD-10-CM

## 2017-07-21 DIAGNOSIS — M25.561 CHRONIC PAIN OF RIGHT KNEE: ICD-10-CM

## 2017-07-21 DIAGNOSIS — E55.9 VITAMIN D DEFICIENCY DISEASE: ICD-10-CM

## 2017-07-21 DIAGNOSIS — M54.2 NECK PAIN: ICD-10-CM

## 2017-07-21 DIAGNOSIS — M47.819 SPONDYLOARTHRITIS: Primary | ICD-10-CM

## 2017-07-21 DIAGNOSIS — G89.29 CHRONIC PAIN OF RIGHT KNEE: ICD-10-CM

## 2017-07-21 DIAGNOSIS — M19.90 INFLAMMATORY ARTHRITIS: ICD-10-CM

## 2017-07-21 DIAGNOSIS — Z86.19 HISTORY OF HEPATITIS B: ICD-10-CM

## 2017-07-21 DIAGNOSIS — Z79.899 HIGH RISK MEDICATION USE: ICD-10-CM

## 2017-07-21 PROCEDURE — 99215 OFFICE O/P EST HI 40 MIN: CPT | Mod: PBBFAC | Performed by: INTERNAL MEDICINE

## 2017-07-21 PROCEDURE — 99999 PR PBB SHADOW E&M-EST. PATIENT-LVL V: CPT | Mod: PBBFAC,,, | Performed by: INTERNAL MEDICINE

## 2017-07-21 PROCEDURE — 99214 OFFICE O/P EST MOD 30 MIN: CPT | Mod: S$GLB,,, | Performed by: INTERNAL MEDICINE

## 2017-07-21 RX ORDER — SULFASALAZINE 500 MG/1
1500 TABLET, DELAYED RELEASE ORAL 2 TIMES DAILY
Qty: 540 TABLET | Refills: 0 | Status: SHIPPED | OUTPATIENT
Start: 2017-07-21 | End: 2017-11-07 | Stop reason: SDUPTHER

## 2017-07-21 RX ORDER — NAPROXEN 500 MG/1
500 TABLET ORAL 2 TIMES DAILY
Qty: 180 TABLET | Refills: 1 | Status: SHIPPED | OUTPATIENT
Start: 2017-07-21 | End: 2017-10-09 | Stop reason: SDUPTHER

## 2017-07-21 RX ORDER — ENTECAVIR 0.5 MG/1
0.5 TABLET, FILM COATED ORAL DAILY
COMMUNITY
End: 2017-11-09 | Stop reason: SDUPTHER

## 2017-07-21 ASSESSMENT — ANKYLOSING SPONDYLITIS DISEASE ACTIVITY SCORE (ASDAS-CRP)
GLOBAL_ACTIVITY: 4
TOTAL_SCORE: 1.89
NBH_PAIN: 5
PAIN_SWELLING: 4
MORNING_STIFFNESS: 5
CRP_MG_PER_LITER: .6

## 2017-07-21 NOTE — PROGRESS NOTES
Subjective:       Patient ID: Althea Vazquez is a 51 y.o. female.    Chief Complaint: Non radiographic axial and peripheral spondyloarthritis    HPI  Most symptomatic neck especially right side. Intermittent pain and swelling right knee. Intermittent low back or mid back pain. Has some ROSENTHAL walking with son, improving. No fever. Occ papular rash left shoulder, pruritic.   Review of Systems   Constitutional: Negative for fever and unexpected weight change.   HENT: Negative for trouble swallowing.    Eyes: Negative for redness.   Respiratory: Positive for shortness of breath. Negative for cough.    Cardiovascular: Negative for chest pain.   Gastrointestinal: Negative for constipation and diarrhea.   Genitourinary: Negative for dysuria and genital sores.   Skin: Positive for rash.   Neurological: Positive for headaches.   Hematological: Does not bruise/bleed easily.         Objective:   LMP 11/25/2014      Physical Exam   Constitutional: She is oriented to person, place, and time and well-developed, well-nourished, and in no distress.   HENT:   Head: Normocephalic and atraumatic.   Mouth/Throat: Oropharynx is clear and moist.   Eyes: Conjunctivae and EOM are normal.   Neck: Normal range of motion. Neck supple. No thyromegaly present.   Cardiovascular: Normal rate, regular rhythm, normal heart sounds and intact distal pulses.  Exam reveals no gallop and no friction rub.    No murmur heard.  Pulmonary/Chest: Breath sounds normal. She has no wheezes. She has no rales. She exhibits no tenderness.   Abdominal: Soft. She exhibits no distension and no mass. There is no tenderness.       Right Side Rheumatological Exam     Examination finds the elbow, wrist, 1st PIP, 1st MCP, 2nd PIP, 2nd MCP, 3rd PIP, 3rd MCP, 4th PIP, 4th MCP, 5th PIP and 5th MCP normal.    The patient is tender to palpation of the shoulder and knee    She has swelling of the knee    The patient has an enlarged shoulder and knee    Left Side  Rheumatological Exam     Examination finds the shoulder, elbow, wrist, 1st PIP, 1st MCP, 2nd PIP, 2nd MCP, 3rd PIP, 3rd MCP, 4th PIP, 4th MCP, 5th PIP and 5th MCP normal.    She has swelling of the knee    The patient has an enlarged knee.      Lymphadenopathy:     She has no cervical adenopathy.   Neurological: She is alert and oriented to person, place, and time. She displays normal reflexes. Gait normal.   Nl motor strength UE and LE bilateral   Musculoskeletal: She exhibits no edema.         Schober's 10-14.5 cm  Chest expansion 7 cm  Neck rom mildly reduced all directions       Results for NABOR HERNANDEZ (MRN [id number]) as of 7/21/2017 14:17   Ref. Range 7/18/2017 10:16   WBC Latest Ref Range: 3.90 - 12.70 K/uL 6.66   RBC Latest Ref Range: 4.00 - 5.40 M/uL 4.83   Hemoglobin Latest Ref Range: 12.0 - 16.0 g/dL 14.6   Hematocrit Latest Ref Range: 37.0 - 48.5 % 43.3   MCV Latest Ref Range: 82 - 98 fL 90   MCH Latest Ref Range: 27.0 - 31.0 pg 30.2   MCHC Latest Ref Range: 32.0 - 36.0 % 33.7   RDW Latest Ref Range: 11.5 - 14.5 % 13.4   Platelets Latest Ref Range: 150 - 350 K/uL 252   MPV Latest Ref Range: 9.2 - 12.9 fL 10.6   Gran% Latest Ref Range: 38.0 - 73.0 % 43.0   Gran # Latest Ref Range: 1.8 - 7.7 K/uL 2.9   Lymph% Latest Ref Range: 18.0 - 48.0 % 41.9   Lymph # Latest Ref Range: 1.0 - 4.8 K/uL 2.8   Mono% Latest Ref Range: 4.0 - 15.0 % 12.3   Mono # Latest Ref Range: 0.3 - 1.0 K/uL 0.8   Eosinophil% Latest Ref Range: 0.0 - 8.0 % 1.5   Eos # Latest Ref Range: 0.0 - 0.5 K/uL 0.1   Basophil% Latest Ref Range: 0.0 - 1.9 % 0.5   Baso # Latest Ref Range: 0.00 - 0.20 K/uL 0.03   Sed Rate Latest Ref Range: 0 - 20 mm/Hr 10   Sodium Latest Ref Range: 136 - 145 mmol/L 141   Potassium Latest Ref Range: 3.5 - 5.1 mmol/L 4.1   Chloride Latest Ref Range: 95 - 110 mmol/L 105   CO2 Latest Ref Range: 23 - 29 mmol/L 28   Anion Gap Latest Ref Range: 8 - 16 mmol/L 8   BUN, Bld Latest Ref Range: 7 - 17 mg/dL 11    Creatinine Latest Ref Range: 0.50 - 1.40 mg/dL 0.60   eGFR if non African American Latest Ref Range: >60 mL/min/1.73 m^2 >60.0   eGFR if African American Latest Ref Range: >60 mL/min/1.73 m^2 >60.0   Glucose Latest Ref Range: 70 - 110 mg/dL 140 (H)   Calcium Latest Ref Range: 8.7 - 10.5 mg/dL 8.9   Alkaline Phosphatase Latest Ref Range: 38 - 126 U/L 63   Total Protein Latest Ref Range: 6.0 - 8.4 g/dL 7.4   Albumin Latest Ref Range: 3.5 - 5.2 g/dL 4.3   Total Bilirubin Latest Ref Range: 0.1 - 1.0 mg/dL 0.6   AST Latest Ref Range: 15 - 46 U/L 22   ALT Latest Ref Range: 10 - 44 U/L 25   CRP Latest Ref Range: 0.00 - 1.00 mg/dL 0.63   Differential Method Unknown Automated     Results for NABOR HERNANDEZ (MRN [id number]) as of 7/21/2017 14:17   Ref. Range 9/27/2016 10:25 10/3/2016 11:25 10/3/2016 11:25 12/12/2016 11:20 4/12/2017 14:24   Hep B Core Total Ab Unknown Positive (A)       Hep B S Ab Unknown  Grayzone (A)      Hep B Viral DNA IU/ML Latest Ref Range: <10 IU/mL  <10 <10 <10 <10   Hepatitis B Surface Ag Unknown  Negative      Log HBV IU/mL Latest Ref Range: <1.00 Log (10) IU/mL  <1.00 <1.00 <1.00 <1.00   Mitogen - Nil Latest Ref Range: See text IU/mL 2.90       NIL Latest Ref Range: See text IU/mL 0.06       TB Antigen Latest Ref Range: See text IU/mL 0.06       TB Antigen - Nil Latest Ref Range: See text IU/mL 0.00       TB Gold Unknown Negative           Results for NABOR HERNANDEZ (MRN [id number]) as of 7/21/2017 14:53   Ref. Range 9/4/2014 08:57 12/26/2014 09:12 11/11/2015 10:06 9/4/2016 02:09 9/27/2016 10:25 12/12/2016 11:20 4/12/2017 13:26 7/18/2017 10:16   Sed Rate Latest Ref Range: 0 - 20 mm/Hr 28 (H) 28 (H) 10 64 (H) 68 (H) 10 7 10   Results for NABOR HERNANDEZ (MRN [id number]) as of 7/21/2017 14:53   Ref. Range 9/4/2014 08:57 12/26/2014 09:12 11/11/2015 10:06 9/4/2016 02:09 9/27/2016 10:25 12/12/2016 11:20 4/12/2017 13:26 7/18/2017 10:16   CRP Latest Ref Range: 0.00 - 1.00 mg/dL  17.6 (H) 10.4 (H) 9.6 (H) 135.7 (H) 110.6 (H) <0.50 20.5 (H) 0.63       Technique: Routine MRI evaluation of the SI joints performed with and without the use of 10 cc of Gadavist IV contrast.      Comparison: Radiograph 09/09/2016.    Findings:    There are enhancing inflammatory changes about the anterior superior aspect of the left SI joint in the expected location of the medial lumbar or lumbosacral ligament, findings that are highly suggestive of enthesitis.  There is apparent trace enhancement on the posterior superior aspect of the right SI joint leads is overall nonspecific.    SI joints are symmetric.  No synovitis.  No joint effusions.  No osseous erosive changes.    Visualized musculature demonstrate normal bulk and signal intensity.  Piriformis muscles are symmetric.  Ischiofemoral spaces are unremarkable.    Adductor and abductor tendons are unremarkable.  Hamstring tendons are normal.    Subcutaneous soft tissues are normal.    Limited evaluation of the lower abdominal and pelvic organs is unremarkable.  Uterus is surgically absent.   Impression         Inflammatory enthesitis affecting the anterior superior aspect of the left sacral ala with mild associated reactive marrow edema, findings that are favored to represent sequela of patient's reported history of spondyloarthropathy.  Note is made of possible trace inflammatory changes adjacent to the posterior aspect of the right SI joints which may relate to a prominent vessel or mild additional enthesitis.    Unremarkable evaluation of the SI joints specifically without imaging findings to suggest sacroiliitis. No osseous erosive changes or joint effusions. No synovitis.  ______________________________________     Electronically signed by: ALEX CRAIG MD  Date: 09/11/16  Time: 20:30      Technique: Routine MRI evaluation of the cervical, thoracic and lumbar spine performed with and without the use of 10 cc of Gadavist IV contrast.    Comparison: MRI  01/20/2014.    Findings:    CERVICAL SPINE:    There is modest straightening of the normal cervical lordosis.  No spondylolisthesis.  Vertebral body heights are well-maintained without evidence for fracture.  Visualized osseous structures demonstrate intact pars signal intensity without findings to suggest an infiltrative process.    Cervical spinal cord demonstrates normal contour and signal intensity.  Cerebellar tonsils are in their expected location.  Cervicomedullary junction is unremarkable.  Postcontrast images demonstrate no abnormal enhancement.    Prevertebral soft tissues are normal.  Vertebral artery flow voids are present.  T2 hyperintense nodules are noted within the bilateral thyroid lobes.  The spinal musculature and trace normal bulk and signal intensity.  supraspinous ligament is unremarkable without findings to suggest enthesitis.    C2-C3: No spinal canal stenosis or neural foraminal narrowing.    C3-C4: There is mild bilateral facet arthropathy and uncovertebral joint spurring and mild bilateral neural foraminal narrowing.  No spinal canal stenosis.    C4-C5: No spinal canal stenosis or neural foraminal narrowing.    C5-C6: There is a moderate broad-based posterior disc ossified complex that partially effaces the anterior thecal sac.  There is mild left-sided uncovertebral joint spurring.  Findings contribute to mild spinal canal stenosis and mild left-sided neural foraminal narrowing.    C6-C7: No spinal canal stenosis or neural foraminal narrowing.    T7-T1: No spinal canal stenosis or neural foraminal narrowing.    THORACIC SPINE:    Thoracic spine alignment is normal.  No spondylolisthesis.  Vertebral body heights are well-maintained without evidence for fracture.  There is enhancing inflammation of the left side zygapophyseal/facet joint at T5-T6.  Remaining visualized osseous structures demonstrate intact menisci and in density without findings to suggest an infiltrative process.  Note is  made of the anterior vertebral body at this demonstrate intact signal intensity without findings to suggest spondylitis.    Visualized spinal cord demonstrates normal contour and signal intensity.  Postcontrast images demonstrate normal enhancement.    Visualized thoracic and upper abdominal organs are normal.    There is a low signal described focal area of signal abnormality within the posterior subcutaneous soft tissues and appears to demonstrate subtle enhancement and is favored to be benign though nonspecific.    No significant intervertebral disc abnormalities.  No spinal canal stenosis or neural foramina narrowing.    LUMBAR SPINE:    Lumbar spine alignment is normal.  No spondylolysis or spondylolisthesis.  Vertebral body heights are well-maintained without evidence for fracture.  Visualized osseous structures demonstrate intact pars signal intensity without findings to suggest an infiltrative process.    Visualized distal spinal cord demonstrates normal contour and signal intensity.  Cauda equina is normal mild without findings to suggest arachnoiditis.  Postcontrast images demonstrate normal enhancement.    There is a subcentimeter T2 hyperintense lesion within the right renal cortex, too small to accurately guided thighs.  Paraspinal musculature demonstrates normal bulk and signal intensity.  Subcutaneous soft tissues are within normal limits.    L1-L2: No significant intervertebral disc abnormalities.  Facet joints are well maintained.  No spinal canal stenosis or neural foraminal narrowing.    L2-L3: There is mild intervertebral disc desiccation.  Facet joints are well maintained.  No spinal canal stenosis or neural foraminal narrowing.    L3-L4: There is mild intervertebral disc desiccation.  Facet joints are well maintained.  No spinal canal stenosis or neural foraminal narrowing.    L4-L5: There is mild intervertebral disc space narrowing and desiccation with small circumferential bulge.  There is  mild bilateral facet arthropathy.  No spinal canal stenosis or neural foraminal narrowing.    L5-S1: There is mild intervertebral disc space narrowing desiccation with a small circumferential bulge and a small posterior annular fissure.  Findings contribute to mild left-sided neural foraminal narrowing. There is mild bilateral facet arthropathy.  No spinal canal stenosis.   Impression         Enhancing inflammatory changes involving the left zygapophyseal/facet joint at T5-T6 that is nonspecific but can be seen with inflammatory arthropathies. No MR imaging finding to suggest enthesitis, spondylitis or spondylodiskitis.    Mild cervical and lumbar degenerative changes without significant spinal canal stenosis.    Cystic subcutaneous soft tissues within the posterior back, favored to be benign, possibly a complex sebaceous cyst.  Consider follow-up examination in 12 months.  ______________________________________     Electronically signed by: ALEX CRAIG MD  Date: 09/11/16  Time: 20:03      Technique: Single AP pelvic radiograph.    Comparison: None.    Findings:    Osseous density is normal.  No lytic or blastic lesions.  No displaced fractures.  There is acetabular over coverage bilaterally noting an increased center edge angle.  There is mild suspected narrowing of the articular space of the bilateral femoroacetabular joints.  Sacrum is unremarkable.  SI joints are normal and symmetric.  Pubic symphysis is within normal limits.  There is a moderate amount of fecal material throughout the visualized colon.   Impression         Bilateral acetabular over coverage.  ______________________________________     Electronically signed by: ALEX CRAIG MD  Date: 09/10/16         Assessment:     Non-radiographic axial and peripheral spondylarthritis: ADAS-CRP = 1.89(moderate) down from  4.23(high disease activity);  BASDAI=  5.1(high activity) down from5.4(high activity)  ;  BASFI = 4.67(severe functional limitation)  was 9.4(severe functional limitation) improving , not at goal  Cervical myofascial pain, cervical spinal stenosis/spondylosis  Synovitis right shoulder and right  rotator cuff tear per MRI 9/24/16(Dr. 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

## 2017-07-25 NOTE — TELEPHONE ENCOUNTER
Humira - patient is going out of town and will not be back until Friday late night @ 11pm. She usually takes her Humira every other Thursday and would like to keep that scheduled b/c she already has it programmed on her phone. She was advised to take the Humira as soon as she gets home on Friday and proceed with her regular Thursday dosing. Pt verbalized understanding and had no further questions. TTN.

## 2017-08-01 ENCOUNTER — CLINICAL SUPPORT (OUTPATIENT)
Dept: REHABILITATION | Facility: HOSPITAL | Age: 52
End: 2017-08-01
Attending: INTERNAL MEDICINE
Payer: COMMERCIAL

## 2017-08-01 DIAGNOSIS — R29.898 DECREASED RANGE OF MOTION OF NECK: ICD-10-CM

## 2017-08-01 DIAGNOSIS — R29.898 WEAKNESS OF BOTH LEGS: Primary | ICD-10-CM

## 2017-08-01 PROBLEM — Z74.09 DECREASED FUNCTIONAL MOBILITY AND ENDURANCE: Status: RESOLVED | Noted: 2017-03-12 | Resolved: 2017-08-01

## 2017-08-01 PROCEDURE — G8978 MOBILITY CURRENT STATUS: HCPCS | Mod: CK

## 2017-08-01 PROCEDURE — 97163 PT EVAL HIGH COMPLEX 45 MIN: CPT

## 2017-08-01 PROCEDURE — G8979 MOBILITY GOAL STATUS: HCPCS | Mod: CJ

## 2017-08-01 NOTE — PLAN OF CARE
TIME RECORD    Date: 08/02/2017    Start Time:  8:06  Stop Time:  9:00    PROCEDURES:    TIMED  Procedure Min.                         UNTIMED  Procedure Min.   PT eval 54         Total Timed Minutes:  0  Total Timed Units:  0  Total Untimed Units:  1  Charges Billed/# of units:  1    OUTPATIENT PHYSICAL THERAPY   PATIENT EVALUATION  Onset Date: Chronic  Primary Diagnosis:   1. Weakness of both legs     2. Decreased range of motion of neck       Treatment Diagnosis: weakness, decreased ROM  Past Medical History:   Diagnosis Date    Acid reflux     Anxiety 10/18/2012    Arthritis     Depression     Diabetic peripheral neuropathy - mild 10/21/2014    Difficult intubation     Dry eyes     Dry mouth     Fever blister     History of hepatitis B 10/3/2016    Hep B core total Ab (+), no active/chronic infection    Hyperlipidemia     Hypertension     Insomnia     Iritis 5/13/2014    Long-term current use of steroids 9/27/2012    Nausea & vomiting 2/4/2015    VAISHNAVI (obstructive sleep apnea)     Type II diabetes mellitus 10/1/2012     Precautions: Standard, fall  Prior Therapy: Pt seen previously in this clinic for PT for back, neck, and knee  Medications: Althea Vazquez has a current medication list which includes the following prescription(s): amlodipine, aspirin, atorvastatin, clonazepam, clotrimazole-betamethasone 1-0.05%, cyclobenzaprine, docusate sodium, entecavir, fluoxetine, homatropine, humira pen, hydrochlorothiazide, hydrocortisone, hydroquinone, invokana, lancets, losartan, metformin, metoprolol succinate, minocycline, naproxen, pantoprazole, promethazine, sulfasalazine, tramadol, truetest test strips, and zolpidem.  Nutrition:  Obese  History of Present Illness: recent exacerbation of symptoms  Prior Level of Function: Independent  Social History: Lives with family  Place of Residence (Steps/Adaptations): single story home, three steps into the home, one handrail  Functional Deficits  Leading to Referral/Nature of Injury: Difficulty turning her head when driving, prolonged walking, prolonged standing, getting comfortable to sleep, increased pain with reading or using cell phone  Patient Therapy Goals: To get better    Subjective     Adrenlissette Vazquez states she is not sure why her doctor wants her in therapy. She has good days and bad days with her illness and does what she can on good days, when she is having a bad day she knows to limit her activities. She feels her pain is currently at a level she can deal with and when it gets bad she knows to limit her activities. Currently she is having some increased stiffness in her neck that limits her ability to turn her head quickly when driving and limits her ability to work on her cell phone for extended periods of time. She is having some swelling in both of her knees but it is the right knee more than the left. Sometimes when walking for exercises her knees will hurt and she will have a hard time getting up/down the steps in front of her home. Currently her neck is bothering her more than her knees and back, yesterday her back was stiff and it took awhile before she could straighten up all the way.       Pain:  Location: back , knee  and neck   Description: Aching  Activities Which Increase Pain: Standing, Bending, Walking, Lifting and Getting out of bed/chair  Activities Which Decrease Pain: massage, hot bath, rest and movement  Pain Scale: 3/10 at best 3/10 now  10/10 at worst    Objective     Posture: decreased cervical lordosis in sitting, slumped sitting posture, decreased lumbar lordosis in standing  Palpation: TTP R upper trap and cervical paraspinals, medial joint line R knee > L knee, TTP over lumbar spinous processes  Sensation: light touch intact  DTRs: intact  Range of Motion/Strength:   Cervical AROM: Pain/Dysfunction with Movement:   Flexion 50*    Extension 25*    Right side bending 40*    Left side bending 30*    Right  rotation 60*  Stiffness   Left rotation 70* stiffness     Lumbar AROM: Pain/Dysfunction with Movement:   Flexion 60* Pulling across low back   Extension 20*    Right side bending 20*    Left side bending 20*        Hip Right  Left  Pain/Dysfunction with Movement    AROM MMT AROM MMT    Flexion WFL 3/5 WFL 3/5    Extension NT NT NT NT Patient unable to lie prone   Abduction WFL 4/5 WFL 4/5       Knee Right  Left  Pain/Dysfunction with Movement    AROM MMT AROM MMT    Flexion WFL 5/5 WFL 5/5    Extension WFL 5/5 WFL 5/5      Ankle Right  Left  Pain/Dysfunction with Movement    AROM MMT AROM MMT    Plantarflexion WFL 5/5 WFL 5/5    Dorsiflexion WFL 5/5 WFL 5/5        Flexibility: SLR L 65*, R 60*  Gait: Without AD  Analysis: Assistance none, forward flexed trunk, decreased jane  Bed Mobility:Independent  Transfers: Independent, increased reliance on UEs  Special Tests: Patella grind positive B with crepitus, valgus & varus stress tests negative, Lachman's and posterior drawer negative bilaterally  Other: FOTO Neck, Back, and Knee surveys to be completed next visit.   Treatment: Patient was educated on the plan of care and treatment options. She is in agreement with the plan of care.     Assessment       Initial Assessment (Pertinent finding, problem list and factors affecting outcome): Mrs. Vazquez is a 51 year old female, who presents to the clinic with complaints of chronic neck, back, and B knee pain. She demonstrated AROM of lumbar spine and B knees WFL with no complaints of pain but her cervical AROM was slightly limits with complaints of pain at end range of R and L rotation, which limits her ability to drive safely. Her LE weakness and poor core stability make it difficult for her to use correct body mechanics with ADLs and ascend/descend the steps in front of her home without UE leverage. When ambulating she has a slightly forward flexed trunk with decreased jane. In standing and sitting she has  decreased cervical lordosis with a forward head posture. She would benefit from physical therapy to improve her strength, ROM, and flexibility to improve her functional mobility and decrease her dependence on family members with ADLs.   History  Co-morbidities and personal factors that may impact the plan of care Examination  Body Structures and Functions, activity limitations and participation restrictions that may impact the plan of care Clinical Presentation   Decision Making/ Complexity Score   Co-morbidities:   Reactive arthritis, diabetic polyneuropathy, Obesity          Personal Factors:   Anxiety, depression Body Regions: cervical spine, lumbar spine, B LE  Body Systems: Musculoskeletal - decreased cervical ROM, B LE weakness, poor core stabilization, B HS tightness; Neuromuscular - poor posture, increased reliance on UEs for transfers, decreased jane with gait; Cardiovascular - N/A; Integumentary - N/A          Activity limitations/Participation Restrictions: Difficulty with prolonged ambulation, ascending/descending steps, performing usual household duties, driving         evolving with changing clinical characteristics High complexity           Rehab Potiential: fair    Short Term Goals (3 Weeks):   1. This patient will be independent with a basic HEP.  2. This patient will increase cervical AROM to WFL with no complaints of pain to increase her safety with driving.  3. This patient will increase LE strength by 1 grade in order to be able to perform sit to stand without UE leverage.   Long Term Goals (6 Weeks):   1. This patient will be independent with an updated HEP.  2. This patient will increase B LE strength to 5/5 in order to ascend/descend three steps in a reciprocal gait pattern with no LOB.     Plan     Certification Period: 08/01/17 to 09/12/17  Recommended Treatment Plan: 1-2 times per week for 6 weeks: Gait Training, Group Therapy, Manual Therapy, Moist Heat/ Ice, Patient Education,  Therapeutic Exercise and Other modalities prn.  Other Recommendations: None      Therapist: Christy Rush, PT    I CERTIFY THE NEED FOR THESE SERVICES FURNISHED UNDER THIS PLAN OF TREATMENT AND WHILE UNDER MY CARE    Physician's comments: ________________________________________________________________________________________________________________________________________________      Physician's Name: ___________________________________

## 2017-08-09 ENCOUNTER — CLINICAL SUPPORT (OUTPATIENT)
Dept: REHABILITATION | Facility: HOSPITAL | Age: 52
End: 2017-08-09
Attending: INTERNAL MEDICINE
Payer: COMMERCIAL

## 2017-08-09 DIAGNOSIS — R29.898 WEAKNESS OF BOTH LEGS: Primary | ICD-10-CM

## 2017-08-09 DIAGNOSIS — R29.898 DECREASED RANGE OF MOTION OF NECK: ICD-10-CM

## 2017-08-09 PROCEDURE — 97110 THERAPEUTIC EXERCISES: CPT

## 2017-08-09 NOTE — PROGRESS NOTES
"TIME RECORD    Date:  08/09/2017    Start Time:  8:10  Stop Time:  9:00    PROCEDURES:    TIMED  Procedure Min.   treadmill 5NC   TE 25   TE sup 20NC             UNTIMED  Procedure Min.             Total Timed Minutes:  25  Total Timed Units:  2  Total Untimed Units:  0  Charges Billed/# of units:  2      Progress/Current Status    Subjective:     Patient ID: Althea Vazquez is a 51 y.o. female.  Diagnosis:   1. Weakness of both legs     2. Decreased range of motion of neck       Pain: 0 /10  She reports no pain this morning. She is  just tired this morning    Objective:     She initiated the session on treadmill at 1.6 mph x 5 minutes supervised by PT. She was educated and performed therapeutic exercises as per log, supervised by PT x 20 minutes and 1:1 with PT x 25 minutes to improve her strength and flexibility. She declined a cold pack or moist heat at the end of the session.    Date  08/09/17   VISIT 2/6   Gcode 2/10   FOTO 2/5   Cap Visit   Cap Total 63.96  139.56   MT --   Seated HS 10 x 10"       Treadmill 1.6 mph x 5'   UT stretch 5 x 5"   Levator stretch 5 x 5"   LAQ 1 x 10 YTB   HS curls 1 x 10 YTB   Hip flexion 1 x 10 YTB   Hip add `1 x 10 YTB   LAQs        Rows 1 x 10 YTB   Shoulder abd 1 x 10 YTB                                   CP declined   Initials DG       Assessment:     Patient was able to begin making progress towards her goals as she was able to perform all of today's exercises with no increase in symptoms prior to leaving the clinic. She required occasional cues for posture with seated exercises and hand position with shoulder exercises to avoid impingement positions.     Patient Education/Response:     Patient given handouts of today's exercises for her HEP and instructed to perform her HEP to her tolerance. She verbalized understanding instructions.     Plans and Goals:     Continue with the plan of care and progress as the patient tolerates.    Short Term Goals (3 Weeks):   1. " This patient will be independent with a basic HEP.  2. This patient will increase cervical AROM to WFL with no complaints of pain to increase her safety with driving.  3. This patient will increase LE strength by 1 grade in order to be able to perform sit to stand without UE leverage.   Long Term Goals (6 Weeks):   1. This patient will be independent with an updated HEP.  2. This patient will increase B LE strength to 5/5 in order to ascend/descend three steps in a reciprocal gait pattern with no LOB.

## 2017-08-10 RX ORDER — LOSARTAN POTASSIUM 100 MG/1
TABLET ORAL
Qty: 90 TABLET | Refills: 1 | Status: SHIPPED | OUTPATIENT
Start: 2017-08-10 | End: 2017-10-09 | Stop reason: SDUPTHER

## 2017-08-11 DIAGNOSIS — M02.30 REACTIVE ARTHRITIS: ICD-10-CM

## 2017-08-11 RX ORDER — CYCLOBENZAPRINE HCL 10 MG
TABLET ORAL
Qty: 60 TABLET | Refills: 1 | Status: SHIPPED | OUTPATIENT
Start: 2017-08-11 | End: 2017-12-13 | Stop reason: SDUPTHER

## 2017-08-15 ENCOUNTER — TELEPHONE (OUTPATIENT)
Dept: PHARMACY | Facility: CLINIC | Age: 52
End: 2017-08-15

## 2017-08-16 ENCOUNTER — PATIENT MESSAGE (OUTPATIENT)
Dept: PSYCHIATRY | Facility: CLINIC | Age: 52
End: 2017-08-16

## 2017-08-16 ENCOUNTER — TELEPHONE (OUTPATIENT)
Dept: PSYCHIATRY | Facility: CLINIC | Age: 52
End: 2017-08-16

## 2017-08-16 DIAGNOSIS — F41.1 GENERALIZED ANXIETY DISORDER: ICD-10-CM

## 2017-08-16 DIAGNOSIS — F34.1 DYSTHYMIC DISORDER: ICD-10-CM

## 2017-08-16 DIAGNOSIS — F41.0 PANIC DISORDER WITHOUT AGORAPHOBIA: ICD-10-CM

## 2017-08-16 RX ORDER — ZOLPIDEM TARTRATE 5 MG/1
5 TABLET ORAL NIGHTLY PRN
Qty: 30 TABLET | Refills: 3 | Status: SHIPPED | OUTPATIENT
Start: 2017-08-16 | End: 2017-10-09 | Stop reason: SDUPTHER

## 2017-08-16 RX ORDER — FLUOXETINE HYDROCHLORIDE 40 MG/1
40 CAPSULE ORAL 2 TIMES DAILY
Qty: 180 CAPSULE | Refills: 1 | Status: SHIPPED | OUTPATIENT
Start: 2017-08-16 | End: 2018-01-24 | Stop reason: SDUPTHER

## 2017-08-16 RX ORDER — CLONAZEPAM 0.5 MG/1
0.5 TABLET ORAL DAILY PRN
Qty: 30 TABLET | Refills: 3 | Status: SHIPPED | OUTPATIENT
Start: 2017-08-16 | End: 2017-10-09 | Stop reason: SDUPTHER

## 2017-08-16 NOTE — TELEPHONE ENCOUNTER
Recently had another death in the family. This has caused some anxiety for the pt. Pt. Unable to come to today's appointment due to lack of transportation.

## 2017-08-22 ENCOUNTER — CLINICAL SUPPORT (OUTPATIENT)
Dept: REHABILITATION | Facility: HOSPITAL | Age: 52
End: 2017-08-22
Attending: INTERNAL MEDICINE
Payer: COMMERCIAL

## 2017-08-22 DIAGNOSIS — R29.898 WEAKNESS OF BOTH LEGS: Primary | ICD-10-CM

## 2017-08-22 DIAGNOSIS — R29.898 DECREASED RANGE OF MOTION OF NECK: ICD-10-CM

## 2017-08-22 PROCEDURE — 97110 THERAPEUTIC EXERCISES: CPT

## 2017-08-22 NOTE — PROGRESS NOTES
"TIME RECORD    Date:  08/22/2017    Start Time:  8:00  Stop Time:  9:00    PROCEDURES:    TIMED  Procedure Min.   Treadmill  5 NC   TE 25   TE 30             UNTIMED  Procedure Min.             Total Timed Minutes:  25  Total Timed Units:  2  Total Untimed Units:  0  Charges Billed/# of units:  2 (TE-2)      Progress/Current Status    Subjective:     Patient ID: Althea Vazquez is a 51 y.o. female.  Diagnosis:   1. Weakness of both legs     2. Decreased range of motion of neck       Pain: 0 /10  Patient reports having no pain today.    Objective:     Patient initiated the session on treadmill at 1.6 mph x 5 minutes supervised by PTA.  She was educated and performed therapeutic exercises as per log below, along with today's progressions, 1:1 with PTA x 25 minutes and supervised exercises by PTA x 30 minutes to improve her strength and flexibility. She declined a cold pack or moist heat at the end of the session.    Date  08/22/17 08/09/17   VISIT 3/6 2/6   Gcode 3/10 2/10   FOTO 3/5 2/5   Cap Visit   Cap Total $63.96  $203.52 63.96  139.56   Face to Face 09/01/17              Treadmill  1.6 mph x 5' 1.6 mph x 5'   Seated:     Seated HS 10 x 10" 10 x 10"   UT stretch 5 x 5" 5 x 5"   Levator stretch 5 x 5" 5 x 5"   LAQ 2 x 10 YTB 1 x 10 YTB   HS curls 2 x 10 YTB 1 x 10 YTB   Hip flexion 1 x 10 YTB 1 x 10 YTB   Hip add 1 x 10 YTB 1 x 10 YTB   Hip abd 2 x 10 YTB         Rows 1 x 10 YTB 1 x 10 YTB   Shoulder abd 1 x 10 YTB 1 x 10 YTB                  CP declined declined        Initials GWA 1/6 DG       Assessment:     Patient continues to require occasional cues for posture with seated exercises and hand position with shoulder exercises to avoid impingement positions.  Patient completed her therapy along with today's  Progressions with no increase in symptoms prior to leaving the clinic.     Patient Education/Response:     Patient was instructed to continue with her HEP and instructed to perform her HEP to her " tolerance. She verbalized understanding instructions.     Plans and Goals:     Continue with the plan of care and progress as the patient tolerates.    Short Term Goals (3 Weeks):   1. This patient will be independent with a basic HEP.  2. This patient will increase cervical AROM to WFL with no complaints of pain to increase her safety with driving.  3. This patient will increase LE strength by 1 grade in order to be able to perform sit to stand without UE leverage.     Long Term Goals (6 Weeks):   1. This patient will be independent with an updated HEP.  2. This patient will increase B LE strength to 5/5 in order to ascend/descend three steps in a reciprocal gait pattern with no LOB.

## 2017-08-23 ENCOUNTER — PATIENT MESSAGE (OUTPATIENT)
Dept: OPTOMETRY | Facility: CLINIC | Age: 52
End: 2017-08-23

## 2017-09-12 ENCOUNTER — TELEPHONE (OUTPATIENT)
Dept: PHARMACY | Facility: CLINIC | Age: 52
End: 2017-09-12

## 2017-09-15 DIAGNOSIS — E11.9 TYPE 2 DIABETES MELLITUS WITHOUT COMPLICATION: ICD-10-CM

## 2017-09-15 DIAGNOSIS — Z12.11 COLON CANCER SCREENING: ICD-10-CM

## 2017-10-09 ENCOUNTER — TELEPHONE (OUTPATIENT)
Dept: PHARMACY | Facility: CLINIC | Age: 52
End: 2017-10-09

## 2017-10-09 DIAGNOSIS — F41.0 PANIC DISORDER WITHOUT AGORAPHOBIA: ICD-10-CM

## 2017-10-09 DIAGNOSIS — M47.819 SPONDYLOARTHRITIS: ICD-10-CM

## 2017-10-09 DIAGNOSIS — E11.9 TYPE 2 DIABETES MELLITUS WITHOUT COMPLICATION, WITHOUT LONG-TERM CURRENT USE OF INSULIN: ICD-10-CM

## 2017-10-09 DIAGNOSIS — F41.1 GENERALIZED ANXIETY DISORDER: ICD-10-CM

## 2017-10-09 RX ORDER — METFORMIN HYDROCHLORIDE 500 MG/1
TABLET, EXTENDED RELEASE ORAL
Qty: 180 TABLET | Refills: 3 | Status: SHIPPED | OUTPATIENT
Start: 2017-10-09 | End: 2018-07-05 | Stop reason: SDUPTHER

## 2017-10-09 RX ORDER — CLONAZEPAM 0.5 MG/1
TABLET ORAL
Qty: 30 TABLET | Status: CANCELLED | OUTPATIENT
Start: 2017-10-09

## 2017-10-09 RX ORDER — CLONAZEPAM 0.5 MG/1
0.5 TABLET ORAL DAILY PRN
Qty: 30 TABLET | Refills: 3 | Status: SHIPPED | OUTPATIENT
Start: 2017-10-09 | End: 2018-01-24 | Stop reason: SDUPTHER

## 2017-10-09 RX ORDER — CANAGLIFLOZIN 300 MG/1
TABLET, FILM COATED ORAL
Qty: 30 TABLET | OUTPATIENT
Start: 2017-10-09

## 2017-10-09 RX ORDER — ZOLPIDEM TARTRATE 5 MG/1
5 TABLET ORAL NIGHTLY PRN
Qty: 30 TABLET | Status: CANCELLED | OUTPATIENT
Start: 2017-10-09 | End: 2018-04-09

## 2017-10-09 RX ORDER — NAPROXEN 500 MG/1
500 TABLET ORAL 2 TIMES DAILY PRN
Qty: 180 TABLET | Refills: 1 | Status: SHIPPED | OUTPATIENT
Start: 2017-10-09 | End: 2018-04-09 | Stop reason: SDUPTHER

## 2017-10-09 RX ORDER — ZOLPIDEM TARTRATE 5 MG/1
5 TABLET ORAL NIGHTLY PRN
Qty: 30 TABLET | Refills: 3 | Status: SHIPPED | OUTPATIENT
Start: 2017-10-09 | End: 2018-01-24 | Stop reason: SDUPTHER

## 2017-10-09 RX ORDER — LOSARTAN POTASSIUM 100 MG/1
TABLET ORAL
Qty: 90 TABLET | Refills: 1 | Status: SHIPPED | OUTPATIENT
Start: 2017-10-09 | End: 2018-04-09 | Stop reason: SDUPTHER

## 2017-10-09 RX ORDER — ADALIMUMAB 40MG/0.8ML
KIT SUBCUTANEOUS
Qty: 2 EACH | Refills: 0 | Status: SHIPPED | OUTPATIENT
Start: 2017-10-09 | End: 2017-11-07 | Stop reason: SDUPTHER

## 2017-10-18 NOTE — TELEPHONE ENCOUNTER
Last refilled for year sept 2016.  Last seen in February.    Last 2 appts with you she no showed and cancelled and has not rescheduled yet.    Thanks geno

## 2017-10-19 ENCOUNTER — LAB VISIT (OUTPATIENT)
Dept: LAB | Facility: HOSPITAL | Age: 52
End: 2017-10-19
Attending: INTERNAL MEDICINE
Payer: MEDICARE

## 2017-10-19 DIAGNOSIS — Z79.620 LONG-TERM USE OF ADALIMUMAB: ICD-10-CM

## 2017-10-19 DIAGNOSIS — M47.819 SPONDYLOARTHRITIS: ICD-10-CM

## 2017-10-19 DIAGNOSIS — B18.1 CHRONIC VIRAL HEPATITIS B WITHOUT DELTA AGENT AND WITHOUT COMA: ICD-10-CM

## 2017-10-19 LAB
ALBUMIN SERPL BCP-MCNC: 4.2 G/DL
ALP SERPL-CCNC: 75 U/L
ALT SERPL W/O P-5'-P-CCNC: 30 U/L
ANION GAP SERPL CALC-SCNC: 8 MMOL/L
AST SERPL-CCNC: 22 U/L
BASOPHILS # BLD AUTO: 0.03 K/UL
BASOPHILS NFR BLD: 0.4 %
BILIRUB SERPL-MCNC: 0.4 MG/DL
BUN SERPL-MCNC: 10 MG/DL
CALCIUM SERPL-MCNC: 9 MG/DL
CHLORIDE SERPL-SCNC: 102 MMOL/L
CO2 SERPL-SCNC: 31 MMOL/L
CREAT SERPL-MCNC: 0.63 MG/DL
CRP SERPL-MCNC: <0.5 MG/DL
DIFFERENTIAL METHOD: NORMAL
EOSINOPHIL # BLD AUTO: 0.1 K/UL
EOSINOPHIL NFR BLD: 1.2 %
ERYTHROCYTE [DISTWIDTH] IN BLOOD BY AUTOMATED COUNT: 13.4 %
ERYTHROCYTE [SEDIMENTATION RATE] IN BLOOD BY WESTERGREN METHOD: 8 MM/HR
EST. GFR  (AFRICAN AMERICAN): >60 ML/MIN/1.73 M^2
EST. GFR  (NON AFRICAN AMERICAN): >60 ML/MIN/1.73 M^2
GLUCOSE SERPL-MCNC: 163 MG/DL
HCT VFR BLD AUTO: 43.2 %
HGB BLD-MCNC: 14.2 G/DL
LYMPHOCYTES # BLD AUTO: 3 K/UL
LYMPHOCYTES NFR BLD: 44.6 %
MCH RBC QN AUTO: 29.7 PG
MCHC RBC AUTO-ENTMCNC: 32.9 G/DL
MCV RBC AUTO: 90 FL
MONOCYTES # BLD AUTO: 0.7 K/UL
MONOCYTES NFR BLD: 10.4 %
NEUTROPHILS # BLD AUTO: 2.9 K/UL
NEUTROPHILS NFR BLD: 42.5 %
PLATELET # BLD AUTO: 247 K/UL
PMV BLD AUTO: 10.5 FL
POTASSIUM SERPL-SCNC: 3.8 MMOL/L
PROT SERPL-MCNC: 7.2 G/DL
RBC # BLD AUTO: 4.78 M/UL
SODIUM SERPL-SCNC: 141 MMOL/L
WBC # BLD AUTO: 6.71 K/UL

## 2017-10-19 PROCEDURE — 80053 COMPREHEN METABOLIC PANEL: CPT | Mod: PO

## 2017-10-19 PROCEDURE — 87517 HEPATITIS B DNA QUANT: CPT | Mod: PO

## 2017-10-19 PROCEDURE — 86140 C-REACTIVE PROTEIN: CPT | Mod: PO

## 2017-10-19 PROCEDURE — 85652 RBC SED RATE AUTOMATED: CPT

## 2017-10-19 PROCEDURE — 85025 COMPLETE CBC W/AUTO DIFF WBC: CPT | Mod: PO

## 2017-10-23 LAB
HEPATITIS B VIRAL DNA - QUANTITATIVE: <10 IU/ML
HEPATITIS B VIRUS DNA: NOT DETECTED
LOG HBV IU/ML: <1 LOG (10) IU/ML

## 2017-10-24 ENCOUNTER — OFFICE VISIT (OUTPATIENT)
Dept: INTERNAL MEDICINE | Facility: CLINIC | Age: 52
End: 2017-10-24
Payer: COMMERCIAL

## 2017-10-24 ENCOUNTER — OFFICE VISIT (OUTPATIENT)
Dept: RHEUMATOLOGY | Facility: CLINIC | Age: 52
End: 2017-10-24
Payer: MEDICARE

## 2017-10-24 ENCOUNTER — OFFICE VISIT (OUTPATIENT)
Dept: OPTOMETRY | Facility: CLINIC | Age: 52
End: 2017-10-24
Payer: MEDICARE

## 2017-10-24 VITALS
HEART RATE: 69 BPM | RESPIRATION RATE: 16 BRPM | DIASTOLIC BLOOD PRESSURE: 86 MMHG | TEMPERATURE: 99 F | SYSTOLIC BLOOD PRESSURE: 137 MMHG | BODY MASS INDEX: 38.2 KG/M2 | HEIGHT: 67 IN | WEIGHT: 243.38 LBS

## 2017-10-24 VITALS
WEIGHT: 245.5 LBS | HEIGHT: 64 IN | BODY MASS INDEX: 41.91 KG/M2 | HEART RATE: 79 BPM | DIASTOLIC BLOOD PRESSURE: 71 MMHG | SYSTOLIC BLOOD PRESSURE: 117 MMHG

## 2017-10-24 DIAGNOSIS — Z86.19 HISTORY OF HEPATITIS B: ICD-10-CM

## 2017-10-24 DIAGNOSIS — M02.30 REACTIVE ARTHRITIS: Chronic | ICD-10-CM

## 2017-10-24 DIAGNOSIS — F32.A ANXIETY AND DEPRESSION: ICD-10-CM

## 2017-10-24 DIAGNOSIS — H52.4 MYOPIA WITH PRESBYOPIA OF BOTH EYES: ICD-10-CM

## 2017-10-24 DIAGNOSIS — H26.9 CORTICAL CATARACT OF BOTH EYES: Primary | ICD-10-CM

## 2017-10-24 DIAGNOSIS — Z00.00 ANNUAL PHYSICAL EXAM: Primary | ICD-10-CM

## 2017-10-24 DIAGNOSIS — M47.819 SPONDYLOARTHRITIS: ICD-10-CM

## 2017-10-24 DIAGNOSIS — H20.9 IRITIS: ICD-10-CM

## 2017-10-24 DIAGNOSIS — M51.36 DDD (DEGENERATIVE DISC DISEASE), LUMBAR: Primary | ICD-10-CM

## 2017-10-24 DIAGNOSIS — E66.9 OBESITY (BMI 30-39.9): ICD-10-CM

## 2017-10-24 DIAGNOSIS — I10 ESSENTIAL HYPERTENSION: Chronic | ICD-10-CM

## 2017-10-24 DIAGNOSIS — M17.11 PRIMARY OSTEOARTHRITIS OF RIGHT KNEE: ICD-10-CM

## 2017-10-24 DIAGNOSIS — F41.9 ANXIETY AND DEPRESSION: ICD-10-CM

## 2017-10-24 DIAGNOSIS — E11.42 TYPE 2 DIABETES MELLITUS WITH DIABETIC POLYNEUROPATHY, WITHOUT LONG-TERM CURRENT USE OF INSULIN: Chronic | ICD-10-CM

## 2017-10-24 DIAGNOSIS — E78.5 HYPERLIPIDEMIA, UNSPECIFIED HYPERLIPIDEMIA TYPE: Chronic | ICD-10-CM

## 2017-10-24 DIAGNOSIS — H52.13 MYOPIA WITH PRESBYOPIA OF BOTH EYES: ICD-10-CM

## 2017-10-24 DIAGNOSIS — Z13.5 SCREENING FOR EYE CONDITION: ICD-10-CM

## 2017-10-24 DIAGNOSIS — E11.9 TYPE 2 DIABETES MELLITUS WITHOUT OPHTHALMIC MANIFESTATIONS: ICD-10-CM

## 2017-10-24 DIAGNOSIS — M25.611 DECREASED RANGE OF MOTION OF RIGHT SHOULDER: ICD-10-CM

## 2017-10-24 DIAGNOSIS — G47.00 INSOMNIA, UNSPECIFIED TYPE: ICD-10-CM

## 2017-10-24 PROCEDURE — 99999 PR PBB SHADOW E&M-EST. PATIENT-LVL III: CPT | Mod: PBBFAC,,, | Performed by: INTERNAL MEDICINE

## 2017-10-24 PROCEDURE — 99396 PREV VISIT EST AGE 40-64: CPT | Mod: 25,S$GLB,, | Performed by: INTERNAL MEDICINE

## 2017-10-24 PROCEDURE — 92014 COMPRE OPH EXAM EST PT 1/>: CPT | Mod: S$GLB,,, | Performed by: OPTOMETRIST

## 2017-10-24 PROCEDURE — 99999 PR PBB SHADOW E&M-EST. PATIENT-LVL II: CPT | Mod: PBBFAC,,, | Performed by: OPTOMETRIST

## 2017-10-24 PROCEDURE — 90471 IMMUNIZATION ADMIN: CPT | Mod: S$GLB,,, | Performed by: INTERNAL MEDICINE

## 2017-10-24 PROCEDURE — 92015 DETERMINE REFRACTIVE STATE: CPT | Mod: S$GLB,,, | Performed by: OPTOMETRIST

## 2017-10-24 PROCEDURE — 99214 OFFICE O/P EST MOD 30 MIN: CPT | Mod: S$GLB,,, | Performed by: INTERNAL MEDICINE

## 2017-10-24 PROCEDURE — 90686 IIV4 VACC NO PRSV 0.5 ML IM: CPT | Mod: S$GLB,,, | Performed by: INTERNAL MEDICINE

## 2017-10-24 RX ORDER — TRIAMCINOLONE ACETONIDE 1 MG/G
CREAM TOPICAL 2 TIMES DAILY
Qty: 45 G | Refills: 0 | Status: SHIPPED | OUTPATIENT
Start: 2017-10-24 | End: 2020-11-11

## 2017-10-24 ASSESSMENT — DISEASE ACTIVITY SCORE (DAS28)
TOTAL_SCORE_CRP: 1.67
ESR_MM_PER_HR: 8
CRP_MG_PER_LITER: .5
TOTAL_SCORE_ESR: 2.02
TENDER_JOINTS_COUNT: 0
GLOBAL_HEALTH_SCORE: 40
SWOLLEN_JOINTS_COUNT: 0

## 2017-10-24 NOTE — PROGRESS NOTES
"Subjective:       Patient ID: Althea Vazquez is a 52 y.o. female.    Chief Complaint: nonradiographic axial and peripheral spondylarthritis, recurrent acute iritis    HPI She is doing well, and feels better than previous visit since sulfasalazine increased to 1500mg twice daily. Tolerating it and adalimumab 40mg sc q 2 wks.  Attended PT for a while, then transport issues, but doing HEP  Review of Systems   Constitutional: Positive for unexpected weight change. Negative for fever.   HENT: Negative for trouble swallowing.    Eyes: Negative for redness.   Respiratory: Positive for shortness of breath. Negative for cough.    Cardiovascular: Negative for chest pain.   Gastrointestinal: Positive for constipation. Negative for diarrhea.   Genitourinary: Negative for dysuria and genital sores.   Skin: Positive for rash.   Neurological: Positive for headaches.   Hematological: Does not bruise/bleed easily.         Objective:   /71   Pulse 79   Ht 5' 3.6" (1.615 m)   Wt 111.4 kg (245 lb 8 oz)   LMP 11/25/2014   BMI 42.67 kg/m²      Physical Exam   Constitutional: She is oriented to person, place, and time and well-developed, well-nourished, and in no distress.   HENT:   Head: Normocephalic and atraumatic.   Mouth/Throat: Oropharynx is clear and moist.   Eyes: Conjunctivae and EOM are normal.   Neck: Normal range of motion. Neck supple. No thyromegaly present.   Cardiovascular: Normal rate, regular rhythm, normal heart sounds and intact distal pulses.  Exam reveals no gallop and no friction rub.    No murmur heard.  Pulmonary/Chest: Breath sounds normal. She has no wheezes. She has no rales. She exhibits no tenderness.   Abdominal: Soft. She exhibits no distension and no mass. There is no tenderness.       Right Side Rheumatological Exam     Examination finds the shoulder, elbow, wrist, knee, 1st PIP, 1st MCP, 2nd PIP, 2nd MCP, 3rd PIP, 3rd MCP, 4th PIP, 4th MCP, 5th PIP and 5th MCP normal.    She has " swelling of the 3rd DIP    The patient has an enlarged 3rd DIP    Muscle Strength (0-5 scale):  Iliopsoas: 5  Quadriceps:  5     Left Side Rheumatological Exam     Examination finds the shoulder, elbow, wrist, knee, 1st PIP, 1st MCP, 2nd PIP, 2nd MCP, 3rd PIP, 3rd MCP, 4th PIP, 4th MCP, 5th PIP and 5th MCP normal.    The patient is tender to palpation of the 3rd DIP.    She has swelling of the 3rd DIP    The patient has an enlarged 3rd DIP.    Muscle Strength (0-5 scale):  Iliopsoas: 5  Quadriceps:  5       Lymphadenopathy:     She has no cervical adenopathy.   Neurological: She is alert and oriented to person, place, and time. She displays normal reflexes. Gait normal.   Nl motor strength UE and LE bilateral   Musculoskeletal: She exhibits no edema.         Schobers 10-15.5 cm  Lateral flexion mildly decreased  Extension moderately decreased  Chest expansion 6 cm   neck rom normal  Fabere neg  Results for NABOR HERNANDEZ (MRN [id number]) as of 10/24/2017 15:33   Ref. Range 10/19/2017 12:56   WBC Latest Ref Range: 3.90 - 12.70 K/uL 6.71   RBC Latest Ref Range: 4.00 - 5.40 M/uL 4.78   Hemoglobin Latest Ref Range: 12.0 - 16.0 g/dL 14.2   Hematocrit Latest Ref Range: 37.0 - 48.5 % 43.2   MCV Latest Ref Range: 82 - 98 fL 90   MCH Latest Ref Range: 27.0 - 31.0 pg 29.7   MCHC Latest Ref Range: 32.0 - 36.0 g/dL 32.9   RDW Latest Ref Range: 11.5 - 14.5 % 13.4   Platelets Latest Ref Range: 150 - 350 K/uL 247   MPV Latest Ref Range: 9.2 - 12.9 fL 10.5   Gran% Latest Ref Range: 38.0 - 73.0 % 42.5   Gran # Latest Ref Range: 1.8 - 7.7 K/uL 2.9   Lymph% Latest Ref Range: 18.0 - 48.0 % 44.6   Lymph # Latest Ref Range: 1.0 - 4.8 K/uL 3.0   Mono% Latest Ref Range: 4.0 - 15.0 % 10.4   Mono # Latest Ref Range: 0.3 - 1.0 K/uL 0.7   Eosinophil% Latest Ref Range: 0.0 - 8.0 % 1.2   Eos # Latest Ref Range: 0.0 - 0.5 K/uL 0.1   Basophil% Latest Ref Range: 0.0 - 1.9 % 0.4   Baso # Latest Ref Range: 0.00 - 0.20 K/uL 0.03   Sed Rate  Latest Ref Range: 0 - 20 mm/Hr 8   Sodium Latest Ref Range: 136 - 145 mmol/L 141   Potassium Latest Ref Range: 3.5 - 5.1 mmol/L 3.8   Chloride Latest Ref Range: 95 - 110 mmol/L 102   CO2 Latest Ref Range: 23 - 29 mmol/L 31 (H)   Anion Gap Latest Ref Range: 8 - 16 mmol/L 8   BUN, Bld Latest Ref Range: 7 - 17 mg/dL 10   Creatinine Latest Ref Range: 0.50 - 1.40 mg/dL 0.63   eGFR if non African American Latest Ref Range: >60 mL/min/1.73 m^2 >60.0   eGFR if African American Latest Ref Range: >60 mL/min/1.73 m^2 >60.0   Glucose Latest Ref Range: 70 - 110 mg/dL 163 (H)   Calcium Latest Ref Range: 8.7 - 10.5 mg/dL 9.0   Alkaline Phosphatase Latest Ref Range: 38 - 126 U/L 75   Total Protein Latest Ref Range: 6.0 - 8.4 g/dL 7.2   Albumin Latest Ref Range: 3.5 - 5.2 g/dL 4.2   Total Bilirubin Latest Ref Range: 0.1 - 1.0 mg/dL 0.4   AST Latest Ref Range: 15 - 46 U/L 22   ALT Latest Ref Range: 10 - 44 U/L 30   CRP Latest Ref Range: 0.00 - 1.00 mg/dL <0.50   Hep B Viral DNA IU/ML Latest Ref Range: <10 IU/mL <10   Log HBV IU/mL Latest Ref Range: <1.00 Log (10) IU/mL <1.00   Differential Method Unknown Automated   Hepatitis B Virus DNA Latest Ref Range: Not detected  Not detected        Assessment/Plan           Problem List Items Addressed This Visit     Spondyloarthritis    Overview     Non radiographic Axial and peripheral         Current Assessment & Plan     DAS28  2.02         [id number] 1.67(both remission)    DAPSA:  TJ 1  SJ1 global 4 Pt pain 2.5 crp 0.1   = 8.6(LDA)    BASDAI  7.55(high activity)  BASFI  4.05(high functional limitation)         Iritis    Current Assessment & Plan     No recent acute iritis  Cont adalimumab 40mg sc q 2 wks         DDD (degenerative disc disease), lumbar - Primary    Osteoarthritis of right knee    History of hepatitis B    Overview     Hep B core total Ab (+), no active/chronic infection         Current Assessment & Plan     HBV DNA negative, continue checking q 3 months  Continue entecavir  0.5mg daily         Decreased range of motion (ROM) of shoulder          Answers for HPI/ROS submitted by the patient on 10/24/2017   swollen glands: No  heartburn: Yes  tingling: No

## 2017-10-24 NOTE — LETTER
October 29, 2017      Weston Begum, DO  [address]  [place] [address]           [facility]  [address] [facility] [place] [address]  Phone: [phone]  Fax: [phone]          Patient: Althea Vazquez   MR Number: [id number]   YOB: 1965   Date of Visit: 10/24/2017       Dear Dr. Weston Begum:    Thank you for referring Althea Vazquez to me for evaluation. Attached you will find relevant portions of my assessment and plan of care.    If you have questions, please do not hesitate to call me. I look forward to following Althea Vazquez along with you.    Sincerely,    Rishi Tamayo, OD    Enclosure  CC:  No Recipients    If you would like to receive this communication electronically, please contact [email or web address] or [phone] to request more information on [employer] access.    For providers and/or their staff who would like to refer a patient to [facility], please contact us through our one-stop-shop provider referral line, [facility], at [phone].    If you feel you have received this communication in error or would no longer like to receive these types of communications, please e-mail [email or web address]
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
## 2017-10-24 NOTE — PATIENT INSTRUCTIONS
History of type 2 diabetes, without evidence of diabetic retinal changes.  Early peripheral cortical cataract in both eyes, and early axial posterior (subcapsular?) lens changes in both eyes.   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   No evidence of active inflammation today.  Myopia (nearsightedness) in each eye, with satisfactory correctable VA in each eye.  Presbyopia.    Recheck in 12 months, or prior if any problems in the interim.

## 2017-10-24 NOTE — PROGRESS NOTES
Subjective:       Patient ID: Althea Vazquez is a 52 y.o. female.    Chief Complaint: Annual Exam    HPI   52 y.o. Female here for annual exam.      Cholesterol: (needs)  Vaccines: Influenza (2017); Tetanus (2014) ; Pneumovax (2016)  Sexual Screening: declined  Eye exam: 2016  Mammogram: 5/17  Gyn exam: 4/16  Colonoscopy: needs     Exercise: no  Diet: regular     Past Medical History:    Acid reflux                                                   Anxiety                                         10/18/2012    Arthritis                                                     Depression                                          Diabetic peripheral neuropathy - mild           10/21/2014    Difficult intubation                                          Dry eyes                                                      Dry mouth                                                     Fever blister                                                 Hyperlipidemia                                                Hypertension                                                  Insomnia                                                      Iritis                                          5/13/2014     Long-term current use of steroids               9/27/2012     Nausea & vomiting                               2/4/2015      VAISHNAVI (obstructive sleep apnea)                                 Type II diabetes mellitus                       10/1/2012   Past Surgical History:    TUBAL LIGATION                                                 KNEE ARTHROSCOPY                                 5-14-14         Comment:right    HYSTERECTOMY                                     12/3/2014   Social History    Marital status:              Spouse name: Edward                 Years of education:                 Number of children: 2              Occupational History  Occupation          Employer            Comment               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 Vazquez does not currently have medications on file.     Review of Systems   Constitutional: Negative for activity change, appetite change, chills, diaphoresis, fatigue, fever and unexpected weight change.   HENT: Negative for congestion, mouth sores, postnasal drip, rhinorrhea, sinus pressure, sneezing, sore throat, trouble swallowing and voice change.    Eyes: Negative for pain, discharge and visual disturbance.   Respiratory: Negative for cough, shortness of breath and wheezing.    Cardiovascular: Negative for chest pain, palpitations and leg swelling.   Gastrointestinal: Negative for abdominal pain, blood in stool, constipation, diarrhea, nausea and vomiting.   Endocrine: Negative for cold intolerance and heat intolerance.   Genitourinary: Negative for difficulty urinating, dysuria, frequency, hematuria and urgency.   Musculoskeletal: Positive for arthralgias. Negative for myalgias.   Skin: Negative for rash and wound.   Allergic/Immunologic: Negative for environmental allergies and food allergies.   Neurological: Negative for dizziness, tremors, seizures, syncope, weakness, light-headedness and headaches.   Hematological: Negative for adenopathy. Does not bruise/bleed easily.   Psychiatric/Behavioral: Positive for dysphoric mood and sleep disturbance. Negative for confusion, self-injury and suicidal ideas. The patient is nervous/anxious.        Objective:      Physical Exam   Constitutional: She is  oriented to person, place, and time. She appears well-developed and well-nourished. No distress.   HENT:   Head: Normocephalic and atraumatic.   Right Ear: External ear normal.   Left Ear: External ear normal.   Nose: Nose normal.   Mouth/Throat: Oropharynx is clear and moist. No oropharyngeal exudate.   Eyes: Conjunctivae and EOM are normal. Pupils are equal, round, and reactive to light. Right eye exhibits no discharge. Left eye exhibits no discharge. No scleral icterus.   Neck: Neck supple. No JVD present. No thyromegaly present.   Cardiovascular: Normal rate, regular rhythm, normal heart sounds and intact distal pulses.    No murmur heard.  Pulses:       Dorsalis pedis pulses are 2+ on the right side, and 2+ on the left side.        Posterior tibial pulses are 2+ on the right side, and 2+ on the left side.   Pulmonary/Chest: Effort normal and breath sounds normal. No respiratory distress. She has no wheezes. She has no rales. She exhibits no tenderness.   Abdominal: Soft. Bowel sounds are normal. She exhibits no distension. There is no tenderness. There is no guarding.   Musculoskeletal: She exhibits no edema.   Feet:   Right Foot:   Protective Sensation: 4 sites tested. 4 sites sensed.   Skin Integrity: Negative for ulcer, blister or skin breakdown.   Left Foot:   Protective Sensation: 4 sites tested. 4 sites sensed.   Skin Integrity: Negative for ulcer, blister or skin breakdown.   Lymphadenopathy:     She has no cervical adenopathy.   Neurological: She is alert and oriented to person, place, and time. No cranial nerve deficit. Coordination normal.   Skin: Skin is warm and dry. No rash noted. She is not diaphoretic. No pallor.   Psychiatric: She has a normal mood and affect. Judgment normal.   Nursing note and vitals reviewed.      Assessment:       1. Annual physical exam    2. Type 2 diabetes mellitus with diabetic polyneuropathy, without long-term current use of insulin    3. Essential hypertension    4.  Hyperlipidemia, unspecified hyperlipidemia type    5. Reactive arthritis    6. Anxiety and depression    7. Obesity (BMI 30-39.9)    8. Insomnia, unspecified type        Plan:    1. Complete blood work, UA       Vaccines: Influenza (2017); Tetanus (2014) ; Pneumovax (2016)       Sexual Screening: declined       Eye exam: 2016       Mammogram: 5/17       Gyn exam: 4/16       Colonoscopy: needs   2. T2DM with neuropathy- stable with last HA1C of 5.8(2/17)<--6.4(4/16)<--5.9(8/15)       Continue Glumetza 1 gm qam/Invokana 300 mg daily   3. HTN- continue Losartan 100 mg,Toprol  mg and HCTZ 12.5 mg daily   4. HLD- stable, on Lipitor 40 mg qHS   5. Reactive arthritis- stable on Sulfasalazine/Naproxen       Followed by Rheumatology       Refill Norco 5-325 mg q6 hrs PRN severe pain   6. Anxiety/Depression- stable on Prozac/Klonopin, managed by [facility]   7. Obesity- pt advised on proper diet/exercise for weight loss   8. Insomnia- stable on Ambien 5 mg qHS PRN   9. F/u in 6 months

## 2017-10-24 NOTE — ASSESSMENT & PLAN NOTE
DAS28  2.02         [id number] 1.67(both remission)    DAPSA:  TJ 1  SJ1 global 4 Pt pain 2.5 crp 0.1   = 8.6(LDA)    BASDAI  7.55(high activity)  BASFI  4.05(high functional limitation)

## 2017-11-07 ENCOUNTER — TELEPHONE (OUTPATIENT)
Dept: PHARMACY | Facility: CLINIC | Age: 52
End: 2017-11-07

## 2017-11-07 DIAGNOSIS — M02.30 REACTIVE ARTHRITIS: ICD-10-CM

## 2017-11-07 DIAGNOSIS — M47.819 SPONDYLOARTHRITIS: ICD-10-CM

## 2017-11-07 DIAGNOSIS — M19.90 INFLAMMATORY ARTHRITIS: ICD-10-CM

## 2017-11-07 DIAGNOSIS — Z79.899 HIGH RISK MEDICATION USE: ICD-10-CM

## 2017-11-07 RX ORDER — ADALIMUMAB 40MG/0.8ML
KIT SUBCUTANEOUS
Qty: 2 EACH | Refills: 0 | Status: SHIPPED | OUTPATIENT
Start: 2017-11-07 | End: 2017-12-08 | Stop reason: SDUPTHER

## 2017-11-07 RX ORDER — SULFASALAZINE 500 MG/1
1500 TABLET, DELAYED RELEASE ORAL 2 TIMES DAILY
Qty: 540 TABLET | Refills: 0 | Status: SHIPPED | OUTPATIENT
Start: 2017-11-07 | End: 2018-06-08 | Stop reason: SDUPTHER

## 2017-11-08 NOTE — TELEPHONE ENCOUNTER
FOR DOCUMENTATION ONLY:   Humira prior authorization approved x 3 years  Dates: 10/9/17 through 11/7/20  Case ID: [id number]  $0 copay

## 2017-11-08 NOTE — TELEPHONE ENCOUNTER
Informed patient [facility] received a prescription for Humira refill and it is rejecting for prior authorization with the insurance company.We Will contact the patient plan and update patient of status as more information is received.

## 2017-11-09 ENCOUNTER — TELEPHONE (OUTPATIENT)
Dept: PHARMACY | Facility: CLINIC | Age: 52
End: 2017-11-09

## 2017-11-09 RX ORDER — ENTECAVIR 0.5 MG/1
0.5 TABLET, FILM COATED ORAL DAILY
Qty: 30 TABLET | Refills: 5 | Status: SHIPPED | OUTPATIENT
Start: 2017-11-09 | End: 2018-04-30 | Stop reason: SDUPTHER

## 2017-12-01 ENCOUNTER — OFFICE VISIT (OUTPATIENT)
Dept: INTERNAL MEDICINE | Facility: CLINIC | Age: 52
End: 2017-12-01
Payer: MEDICARE

## 2017-12-01 VITALS
RESPIRATION RATE: 18 BRPM | DIASTOLIC BLOOD PRESSURE: 88 MMHG | SYSTOLIC BLOOD PRESSURE: 138 MMHG | TEMPERATURE: 98 F | HEART RATE: 73 BPM | BODY MASS INDEX: 37.89 KG/M2 | WEIGHT: 241.38 LBS | HEIGHT: 67 IN

## 2017-12-01 DIAGNOSIS — E66.01 SEVERE OBESITY (BMI 35.0-39.9) WITH COMORBIDITY: ICD-10-CM

## 2017-12-01 DIAGNOSIS — E11.9 TYPE 2 DIABETES MELLITUS WITHOUT COMPLICATION, WITHOUT LONG-TERM CURRENT USE OF INSULIN: ICD-10-CM

## 2017-12-01 DIAGNOSIS — L30.9 DERMATITIS: Primary | ICD-10-CM

## 2017-12-01 DIAGNOSIS — S33.5XXA LUMBAR BACK SPRAIN, INITIAL ENCOUNTER: ICD-10-CM

## 2017-12-01 PROCEDURE — 99499 UNLISTED E&M SERVICE: CPT | Mod: S$GLB,,, | Performed by: INTERNAL MEDICINE

## 2017-12-01 PROCEDURE — 96372 THER/PROPH/DIAG INJ SC/IM: CPT | Mod: S$GLB,,, | Performed by: INTERNAL MEDICINE

## 2017-12-01 PROCEDURE — 99214 OFFICE O/P EST MOD 30 MIN: CPT | Mod: S$GLB,,, | Performed by: INTERNAL MEDICINE

## 2017-12-01 PROCEDURE — 99999 PR PBB SHADOW E&M-EST. PATIENT-LVL III: CPT | Mod: PBBFAC,,, | Performed by: INTERNAL MEDICINE

## 2017-12-01 RX ORDER — KETOROLAC TROMETHAMINE 30 MG/ML
30 INJECTION, SOLUTION INTRAMUSCULAR; INTRAVENOUS
Status: COMPLETED | OUTPATIENT
Start: 2017-12-01 | End: 2017-12-01

## 2017-12-01 RX ORDER — LANCETS
1 EACH MISCELLANEOUS 2 TIMES DAILY
Qty: 200 EACH | Refills: 11 | Status: SHIPPED | OUTPATIENT
Start: 2017-12-01 | End: 2020-04-27 | Stop reason: SDUPTHER

## 2017-12-01 RX ORDER — TRIAMCINOLONE ACETONIDE 40 MG/ML
40 INJECTION, SUSPENSION INTRA-ARTICULAR; INTRAMUSCULAR
Status: COMPLETED | OUTPATIENT
Start: 2017-12-01 | End: 2017-12-01

## 2017-12-01 RX ORDER — INSULIN PUMP SYRINGE, 3 ML
EACH MISCELLANEOUS
Qty: 1 EACH | Refills: 0 | Status: SHIPPED | OUTPATIENT
Start: 2017-12-01 | End: 2018-12-01

## 2017-12-01 RX ADMIN — KETOROLAC TROMETHAMINE 30 MG: 30 INJECTION, SOLUTION INTRAMUSCULAR; INTRAVENOUS at 01:12

## 2017-12-01 RX ADMIN — TRIAMCINOLONE ACETONIDE 40 MG: 40 INJECTION, SUSPENSION INTRA-ARTICULAR; INTRAMUSCULAR at 01:12

## 2017-12-01 NOTE — PROGRESS NOTES
Subjective:       Patient ID: Althea Vazquez is a 52 y.o. female.    Chief Complaint: Itching (facial) and Low-back Pain    HPI   Pt with severe obesity is here for evaluation of generalized facial itching associated with slight irritation. Pt unable to tolerate benadryl 2/2 sedation. No new medications, food allergies, throat swelling, fevers/chills.     She is also c/o acute onset of right sided low back pain since this am. It is described as a dull ache brought in by standing/walking. No radiatio of pain, recent trauma. Some relief with Naproxen.   Review of Systems   Constitutional: Negative for activity change, appetite change, chills, diaphoresis, fatigue, fever and unexpected weight change.   HENT: Negative for postnasal drip, rhinorrhea, sinus pressure, sneezing, sore throat, trouble swallowing and voice change.    Respiratory: Negative for cough, shortness of breath and wheezing.    Cardiovascular: Negative for chest pain, palpitations and leg swelling.   Gastrointestinal: Negative for abdominal pain, blood in stool, constipation, diarrhea, nausea and vomiting.   Genitourinary: Negative for dysuria.   Musculoskeletal: Positive for arthralgias and back pain. Negative for myalgias.   Skin: Negative for rash and wound.   Allergic/Immunologic: Negative for environmental allergies and food allergies.   Hematological: Negative for adenopathy. Does not bruise/bleed easily.       Objective:      Physical Exam   Constitutional: She is oriented to person, place, and time. She appears well-developed and well-nourished. No distress.   HENT:   Head: Normocephalic and atraumatic.   Eyes: Conjunctivae and EOM are normal. Pupils are equal, round, and reactive to light. Right eye exhibits no discharge. Left eye exhibits no discharge. No scleral icterus.   Neck: Neck supple. No JVD present.   Cardiovascular: Normal rate, regular rhythm, normal heart sounds and intact distal pulses.    Pulmonary/Chest: Effort  normal and breath sounds normal. No respiratory distress. She has no wheezes. She has no rales.   Musculoskeletal: She exhibits no edema.        Back:    Lymphadenopathy:     She has no cervical adenopathy.   Neurological: She is alert and oriented to person, place, and time.   Skin: Skin is warm and dry. No rash noted. She is not diaphoretic. No pallor.       Assessment:       1. Dermatitis    2. Lumbar back sprain, initial encounter    3. Type 2 diabetes mellitus without complication, without long-term current use of insulin    4. Severe obesity (BMI 35.0-39.9) with comorbidity        Plan:    1. Pt advised to start Rx Hydrocortisone cream BID and start Claritin    2. Kenalog 40 mg/Toradol 30 mg IM       May continue with Naproxen and start Heat PRN    3. Rx for Glucometer/supplies sent   4. Stable, continue to monitor

## 2017-12-01 NOTE — PROGRESS NOTES
Patient, Althea Vazquez (MRN #[id number]), presented with a recorded BMI of 37.81 kg/m^2 and a documented comorbidity(s):  - Diabetes Mellitus Type 2  to which the severe obesity is a contributing factor. This is consistent with the definition of severe obesity (BMI 35.0-35.9) with comorbidity (ICD-10 E66.01, Z68.35). The patient's severe obesity was monitored, evaluated, addressed and/or treated. This addendum to the medical record is made on 12/01/2017.

## 2017-12-04 DIAGNOSIS — Z12.11 SPECIAL SCREENING FOR MALIGNANT NEOPLASMS, COLON: Primary | ICD-10-CM

## 2017-12-04 RX ORDER — POLYETHYLENE GLYCOL 3350, SODIUM SULFATE ANHYDROUS, SODIUM BICARBONATE, SODIUM CHLORIDE, POTASSIUM CHLORIDE 236; 22.74; 6.74; 5.86; 2.97 G/4L; G/4L; G/4L; G/4L; G/4L
4 POWDER, FOR SOLUTION ORAL ONCE
Qty: 4000 ML | Refills: 0 | Status: SHIPPED | OUTPATIENT
Start: 2017-12-04 | End: 2017-12-04

## 2017-12-08 ENCOUNTER — TELEPHONE (OUTPATIENT)
Dept: PHARMACY | Facility: CLINIC | Age: 52
End: 2017-12-08

## 2017-12-08 DIAGNOSIS — M47.819 SPONDYLOARTHRITIS: ICD-10-CM

## 2017-12-08 RX ORDER — ADALIMUMAB 40MG/0.8ML
KIT SUBCUTANEOUS
Qty: 2 EACH | Refills: 0 | Status: SHIPPED | OUTPATIENT
Start: 2017-12-08 | End: 2018-01-03 | Stop reason: SDUPTHER

## 2017-12-13 DIAGNOSIS — M02.30 REACTIVE ARTHRITIS: ICD-10-CM

## 2017-12-13 RX ORDER — CYCLOBENZAPRINE HCL 10 MG
TABLET ORAL
Qty: 60 TABLET | Refills: 3 | Status: SHIPPED | OUTPATIENT
Start: 2017-12-13 | End: 2018-08-07 | Stop reason: SDUPTHER

## 2018-01-03 DIAGNOSIS — M47.819 SPONDYLOARTHRITIS: ICD-10-CM

## 2018-01-03 RX ORDER — ADALIMUMAB 40MG/0.8ML
KIT SUBCUTANEOUS
Qty: 2 EACH | Refills: 0 | Status: SHIPPED | OUTPATIENT
Start: 2018-01-03 | End: 2018-01-30 | Stop reason: SDUPTHER

## 2018-01-08 ENCOUNTER — TELEPHONE (OUTPATIENT)
Dept: PHARMACY | Facility: CLINIC | Age: 53
End: 2018-01-08

## 2018-01-09 ENCOUNTER — SURGERY (OUTPATIENT)
Age: 53
End: 2018-01-09

## 2018-01-09 ENCOUNTER — ANESTHESIA EVENT (OUTPATIENT)
Dept: ENDOSCOPY | Facility: HOSPITAL | Age: 53
End: 2018-01-09
Payer: MEDICARE

## 2018-01-09 ENCOUNTER — ANESTHESIA (OUTPATIENT)
Dept: ENDOSCOPY | Facility: HOSPITAL | Age: 53
End: 2018-01-09
Payer: MEDICARE

## 2018-01-09 ENCOUNTER — HOSPITAL ENCOUNTER (OUTPATIENT)
Facility: HOSPITAL | Age: 53
Discharge: HOME | End: 2018-01-09
Attending: INTERNAL MEDICINE | Admitting: INTERNAL MEDICINE
Payer: MEDICARE

## 2018-01-09 VITALS
RESPIRATION RATE: 16 BRPM | DIASTOLIC BLOOD PRESSURE: 85 MMHG | TEMPERATURE: 98 F | BODY MASS INDEX: 36.37 KG/M2 | HEIGHT: 68 IN | OXYGEN SATURATION: 100 % | HEART RATE: 74 BPM | SYSTOLIC BLOOD PRESSURE: 142 MMHG | WEIGHT: 240 LBS

## 2018-01-09 DIAGNOSIS — Z12.11 COLON CANCER SCREENING: Primary | ICD-10-CM

## 2018-01-09 LAB
POCT GLUCOSE: 102 MG/DL (ref 70–110)
POCT GLUCOSE: 113 MG/DL (ref 70–110)

## 2018-01-09 PROCEDURE — 82962 GLUCOSE BLOOD TEST: CPT | Performed by: INTERNAL MEDICINE

## 2018-01-09 PROCEDURE — 88305 TISSUE EXAM BY PATHOLOGIST: CPT | Mod: 26,,, | Performed by: PATHOLOGY

## 2018-01-09 PROCEDURE — D9220A PRA ANESTHESIA: Mod: CRNA,,, | Performed by: NURSE ANESTHETIST, CERTIFIED REGISTERED

## 2018-01-09 PROCEDURE — D9220A PRA ANESTHESIA: Mod: ANES,,, | Performed by: ANESTHESIOLOGY

## 2018-01-09 PROCEDURE — 27201012 HC FORCEPS, HOT/COLD, DISP: Performed by: INTERNAL MEDICINE

## 2018-01-09 PROCEDURE — 25000003 PHARM REV CODE 250: Performed by: INTERNAL MEDICINE

## 2018-01-09 PROCEDURE — 45380 COLONOSCOPY AND BIOPSY: CPT | Mod: 33,,, | Performed by: INTERNAL MEDICINE

## 2018-01-09 PROCEDURE — 37000008 HC ANESTHESIA 1ST 15 MINUTES: Performed by: INTERNAL MEDICINE

## 2018-01-09 PROCEDURE — 37000009 HC ANESTHESIA EA ADD 15 MINS: Performed by: INTERNAL MEDICINE

## 2018-01-09 PROCEDURE — 45380 COLONOSCOPY AND BIOPSY: CPT | Performed by: INTERNAL MEDICINE

## 2018-01-09 PROCEDURE — 63600175 PHARM REV CODE 636 W HCPCS: Performed by: NURSE ANESTHETIST, CERTIFIED REGISTERED

## 2018-01-09 PROCEDURE — 88305 TISSUE EXAM BY PATHOLOGIST: CPT | Performed by: PATHOLOGY

## 2018-01-09 RX ORDER — DIPHENHYDRAMINE HYDROCHLORIDE 50 MG/ML
25 INJECTION INTRAMUSCULAR; INTRAVENOUS EVERY 6 HOURS PRN
Status: DISCONTINUED | OUTPATIENT
Start: 2018-01-09 | End: 2018-01-09 | Stop reason: HOSPADM

## 2018-01-09 RX ORDER — LIDOCAINE HCL/PF 100 MG/5ML
SYRINGE (ML) INTRAVENOUS
Status: DISCONTINUED | OUTPATIENT
Start: 2018-01-09 | End: 2018-01-09

## 2018-01-09 RX ORDER — ONDANSETRON 2 MG/ML
4 INJECTION INTRAMUSCULAR; INTRAVENOUS ONCE AS NEEDED
Status: DISCONTINUED | OUTPATIENT
Start: 2018-01-09 | End: 2018-01-09 | Stop reason: HOSPADM

## 2018-01-09 RX ORDER — HYDROMORPHONE HYDROCHLORIDE 2 MG/ML
0.2 INJECTION, SOLUTION INTRAMUSCULAR; INTRAVENOUS; SUBCUTANEOUS EVERY 5 MIN PRN
Status: DISCONTINUED | OUTPATIENT
Start: 2018-01-09 | End: 2018-01-09 | Stop reason: HOSPADM

## 2018-01-09 RX ORDER — PROPOFOL 10 MG/ML
VIAL (ML) INTRAVENOUS
Status: DISCONTINUED | OUTPATIENT
Start: 2018-01-09 | End: 2018-01-09

## 2018-01-09 RX ORDER — SODIUM CHLORIDE 9 MG/ML
INJECTION, SOLUTION INTRAVENOUS CONTINUOUS
Status: DISCONTINUED | OUTPATIENT
Start: 2018-01-09 | End: 2018-01-09 | Stop reason: HOSPADM

## 2018-01-09 RX ORDER — PROPOFOL 10 MG/ML
VIAL (ML) INTRAVENOUS CONTINUOUS PRN
Status: DISCONTINUED | OUTPATIENT
Start: 2018-01-09 | End: 2018-01-09

## 2018-01-09 RX ADMIN — PROPOFOL 70 MG: 10 INJECTION, EMULSION INTRAVENOUS at 08:01

## 2018-01-09 RX ADMIN — PROPOFOL 175 MCG/KG/MIN: 10 INJECTION, EMULSION INTRAVENOUS at 08:01

## 2018-01-09 RX ADMIN — SODIUM CHLORIDE: 0.9 INJECTION, SOLUTION INTRAVENOUS at 08:01

## 2018-01-09 RX ADMIN — LIDOCAINE HYDROCHLORIDE 100 MG: 20 INJECTION, SOLUTION INTRAVENOUS at 08:01

## 2018-01-09 NOTE — ANESTHESIA POSTPROCEDURE EVALUATION
"Anesthesia Post Evaluation    Patient: Althea Mondragonchester    Procedure(s) Performed: Procedure(s) (LRB):  COLONOSCOPY (N/A)    Final Anesthesia Type: general  Patient location during evaluation: PACU  Patient participation: Yes- Able to Participate  Level of consciousness: awake and alert  Post-procedure vital signs: reviewed and stable  Pain management: adequate  Airway patency: patent  PONV status at discharge: No PONV  Anesthetic complications: no      Cardiovascular status: blood pressure returned to baseline  Respiratory status: unassisted  Hydration status: euvolemic  Follow-up not needed.        Visit Vitals  BP (!) 142/85 (BP Location: Right arm, Patient Position: Lying)   Pulse 74   Temp 36.6 °C (97.9 °F) (Temporal)   Resp 16   Ht 5' 8" (1.727 m)   Wt 108.9 kg (240 lb)   LMP 11/25/2014   SpO2 100%   Breastfeeding? No   BMI 36.49 kg/m²       Pain/Imelda Score: Pain Assessment Performed: Yes (1/9/2018  9:45 AM)  Presence of Pain: denies (1/9/2018  9:45 AM)  Imelda Score: 10 (1/9/2018  9:45 AM)      "

## 2018-01-09 NOTE — H&P
[facility]  History & Physical    Subjective:      Chief Complaint/Reason for Admission: screening    Althea Vazquez is a 52 y.o. female.    Past Medical History:   Diagnosis Date    Acid reflux     Anxiety 10/18/2012    Arthritis     Depression     Diabetic peripheral neuropathy - mild 10/21/2014    Difficult intubation     Dry eyes     Dry mouth     Fever blister     History of hepatitis B 10/3/2016    Hep B core total Ab (+), no active/chronic infection    Hyperlipidemia     Hypertension     Insomnia     Iritis 5/13/2014    Long-term current use of steroids 9/27/2012    Nausea & vomiting 2/4/2015    VAISHNAVI (obstructive sleep apnea)     Type II diabetes mellitus 10/1/2012     Past Surgical History:   Procedure Laterality Date    HYSTERECTOMY  12/3/2014    KNEE ARTHROSCOPY  5-14-14    right    TUBAL LIGATION       Family History   Problem Relation Age of Onset    Diabetes Mother     Cataracts Mother     Stroke Mother     Heart attack Mother     Depression Mother     Stroke Father     Hypertension Father     Hyperlipidemia Father     Diabetes Maternal Aunt     Heart attack Paternal Grandmother     ADD / ADHD Son     No Known Problems Sister     No Known Problems Brother     No Known Problems Maternal Uncle     No Known Problems Paternal Aunt     No Known Problems Paternal Uncle     No Known Problems Maternal Grandmother     No Known Problems Maternal Grandfather     Cancer Paternal Grandfather      Lung CA    Melanoma Neg Hx     Eczema Neg Hx     Lupus Neg Hx     Psoriasis Neg Hx     Amblyopia Neg Hx     Blindness Neg Hx     Glaucoma Neg Hx     Macular degeneration Neg Hx     Retinal detachment Neg Hx     Strabismus Neg Hx     Thyroid disease Neg Hx     Suicide Neg Hx     Acne Neg Hx     Cirrhosis Neg Hx     Asthma Neg Hx     Emphysema Neg Hx      Social History   Substance Use Topics    Smoking status: Never Smoker    Smokeless  tobacco: Never Used    Alcohol use No       PTA Medications   Medication Sig    amlodipine (NORVASC) 5 MG tablet TAKE 1 TABLET BY MOUTH EVERY DAY    aspirin (ECOTRIN) 81 MG EC tablet Take 1 tablet (81 mg total) by mouth once daily.    atorvastatin (LIPITOR) 40 MG tablet TAKE 1 TABLET BY MOUTH EVERY DAY    blood sugar diagnostic Strp 1 strip by Misc.(Non-Drug; Combo Route) route 2 (two) times daily.    blood-glucose meter (FREESTYLE SYSTEM KIT) kit Use as instructed    canagliflozin (INVOKANA) 300 mg Tab tablet Take 1 tablet (300 mg total) by mouth once daily.    clonazePAM (KLONOPIN) 0.5 MG tablet Take 1 tablet (0.5 mg total) by mouth daily as needed for Anxiety.    clotrimazole-betamethasone 1-0.05% (LOTRISONE) cream Apply topically 2 (two) times daily as needed.     cyclobenzaprine (FLEXERIL) 10 MG tablet TAKE 1 TABLET BY MOUTH AT BEDTIME IF NEEDED    docusate sodium (COLACE) 50 MG capsule Take 1 capsule (50 mg total) by mouth 2 (two) times daily as needed for Constipation. (Patient taking differently: Take 50 mg by mouth 2 (two) times daily. )    entecavir (BARACLUDE) 0.5 MG Tab Take 1 tablet (0.5 mg total) by mouth once daily.    fluoxetine (PROZAC) 40 MG capsule Take 1 capsule (40 mg total) by mouth 2 (two) times daily.    homatropine (ISOPTO HOMATROPINE) 5 % ophthalmic solution Place 1 drop into the right eye 2 (two) times daily. (Patient taking differently: Place 1 drop into the right eye 2 (two) times daily as needed. )    HUMIRA PEN PnKt injection INJECT 1 PEN INTO THE SKIN EVERY 14 DAYS    hydrochlorothiazide (MICROZIDE) 12.5 mg capsule TAKE 1 CAPSULE BY MOUTH ONCE A DAY    hydrocortisone 2.5 % cream Apply topically 2 (two) times daily.    hydroquinone 4 % Crea Apply to dark areas qhs.  Not more than 6 months straight in same location.Use sunscreen in am (Patient taking differently: continuous prn. Apply to dark areas qhs.  Not more than 6 months straight in same location.Use sunscreen in  am)    lancets (ACCU-CHEK SOFTCLIX LANCETS) Misc 1 Device by Misc.(Non-Drug; Combo Route) route 2 (two) times daily.    losartan (COZAAR) 100 MG tablet TAKE ONE TABLET BY MOUTH EVERY DAY    metformin (GLUCOPHAGE-XR) 500 MG 24 hr tablet TAKE 2 TABLETS BY MOUTH DAILY WITH BREAKFAST    metoprolol succinate (TOPROL-XL) 100 MG 24 hr tablet TAKE 1 TABLET BY MOUTH ONCE A DAY    naproxen (NAPROSYN) 500 MG tablet Take 1 tablet (500 mg total) by mouth 2 (two) times daily as needed.    pantoprazole (PROTONIX) 40 MG tablet Take 1 tablet (40 mg total) by mouth once daily.    promethazine (PHENERGAN) 25 MG tablet Take 1 tablet (25 mg total) by mouth every 6 (six) hours as needed for Nausea.    sulfaSALAzine (AZULFIDINE) 500 MG TbEC Take 3 tablets (1,500 mg total) by mouth 2 (two) times daily.    tramadol (ULTRAM) 50 mg tablet Take 1-2 tabs PO q6 hrs PRN pain    triamcinolone acetonide 0.1% (KENALOG) 0.1 % cream Apply topically 2 (two) times daily.    zolpidem (AMBIEN) 5 MG Tab Take 1 tablet (5 mg total) by mouth nightly as needed.     Review of patient's allergies indicates:   Allergen Reactions    Bactrim [sulfamethoxazole-trimethoprim] Itching    Diflucan [fluconazole] Other (See Comments)     Sore on mouth        Review of Systems   Respiratory: Negative.    Cardiovascular: Negative.        Objective:      Vital Signs (Most Recent)       Vital Signs Range (Last 24H):       Physical Exam   Constitutional: She is oriented to person, place, and time.   Neck: Normal range of motion.   Cardiovascular: Normal rate and regular rhythm.    Pulmonary/Chest: Effort normal and breath sounds normal.   Neurological: She is alert and oriented to person, place, and time.       Data Review:     ECG: .     Assessment:      There are no hospital problems to display for this patient.      Plan:    Indication for procedure:screening    ASA:II  Airway normal  Malampati class:per anes    Personal and family history negative for  anesthesia problems    Plan:colon  Anesthesia plan: general

## 2018-01-09 NOTE — PROVATION PATIENT INSTRUCTIONS
Discharge Summary/Instructions after an Endoscopic Procedure  Patient Name: Althea Vazquez  Patient MRN: [id number]  Patient YOB: 1965 Tuesday, January 09, 2018  Juwan Lopez MD  RESTRICTIONS:  During your procedure today, you received medications for sedation.  These   medications may affect your judgment, balance and coordination.  Therefore,   for 24 hours, you have the following restrictions:   - DO NOT drive a car, operate machinery, make legal/financial decisions,   sign important papers or drink alcohol.    ACTIVITY:  The following day: return to full activity including work, except no heavy   lifting, straining or running for 3 days if polyps were removed.  DIET:  Eat and drink normally unless instructed otherwise.     TREATMENT FOR COMMON SIDE EFFECTS:  - Mild abdominal pain, belching, bloating or excessive gas: rest, eat   lightly and use a heating pad.  - Sore Throat: treat with throat lozenges and/or gargle with warm salt   water.  SYMPTOMS TO WATCH FOR AND REPORT TO YOUR PHYSICIAN:  1. Abdominal pain or bloating, other than gas cramps.  2. Chest pain.  3. Back pain.  4. Chills or fever occurring within 24 hours after the procedure.  5. Rectal bleeding, which would show as bright red, maroon, or black stools.   (A tablespoon of blood from the rectum is not serious, especially if   hemorrhoids are present.)  6. Vomiting.  7. Weakness or dizziness.  8. Because air was used during the procedure, expelling large amounts of air   from your rectum or belching is normal.  9. If a bowel prep was taken, you may not have a bowel movement for 1-3   days.  This is normal.  GO DIRECTLY TO THE NEAREST EMERGENCY ROOM IF YOU HAVE ANY OF THE FOLLOWING:      Difficulty breathing  Chills and/or fever over 101 F   Persistent vomiting and/or vomiting blood   Severe abdominal pain   Severe chest pain   Black, tarry stools   Bleeding- more than one tablespoon   Any other symptom or condition that you may feel  needs urgent attention  Your doctor recommends these additional instructions:  If any biopsies were taken, your doctor s clinic will contact you in 1 to 2   weeks with any results.  You have a contact number available for emergencies.  The signs and symptoms   of potential delayed complications were discussed with you.  You may return   to normal activities tomorrow.  Written discharge instructions were   provided to you.   You are being discharged to home.   Resume your previous diet.   Continue your present medications.   We are waiting for your pathology results.   Your physician has recommended a repeat colonoscopy in five years for   surveillance.  For questions, problems or results please call your physician - Juwan Lopez MD at Work:  [phone].  [facility], EMERGENCY ROOM PHONE NUMBER: [phone]  IF A COMPLICATION OR EMERGENCY SITUATION ARISES AND YOU ARE UNABLE TO REACH   YOUR PHYSICIAN - GO DIRECTLY TO THE EMERGENCY ROOM.  Juwan Lopez MD  1/9/2018 9:05:15 AM  This report has been verified and signed electronically.

## 2018-01-09 NOTE — ANESTHESIA PREPROCEDURE EVALUATION
01/09/2018  Althea Vazquez is a 52 y.o., female.    Anesthesia Evaluation         Review of Systems  Anesthesia Hx:  No problems with previous Anesthesia   Social:  Non-Smoker    Cardiovascular:   Exercise tolerance: good Hypertension Denies CAD.   Dysrhythmias   Denies Angina.  Functional Capacity Can you climb two flights of stairs? ==> Yes    Pulmonary:   Denies Asthma. Shortness of breath (with SVT    )  Denies Recent URI.  Denies Sleep Apnea.    Renal/:  Renal/ Normal     Hepatic/GI:   Denies PUD. Denies Hiatal Hernia. GERD Denies Liver Disease.  Denies Hepatitis.    Neurological:   Denies CVA. Denies Seizures.    Endocrine:   Diabetes Denies Hypothyroidism.        Physical Exam  General:  Well nourished    Airway/Jaw/Neck:  Airway Findings: Mouth Opening: Normal Tongue: Normal  General Airway Assessment: Adult  Mallampati: I  TM Distance: Normal, at least 6 cm  Jaw/Neck Findings:  Neck ROM: Normal ROM  Neck Findings:     Eyes/Ears/Nose:  EYES/EARS/NOSE FINDINGS: Normal   Dental:  Dental Findings: In tact   Chest/Lungs:  Chest/Lungs Findings: Clear to auscultation     Heart/Vascular:  Heart Findings: Rate: Normal  Rhythm: Regular Rhythm  Sounds: Normal        Mental Status:  Mental Status Findings:  Alert and Oriented         Anesthesia Plan  Type of Anesthesia, risks & benefits discussed:  Anesthesia Type:  general  Patient's Preference: Proceed with anesthesia understanding that the risks are very small but could be serious or life threatening.  Intra-op Monitoring Plan: standard ASA monitors  Intra-op Monitoring Plan Comments:   Post Op Pain Control Plan:   Post Op Pain Control Plan Comments:   Induction:   IV  Beta Blocker:  Patient is not currently on a Beta-Blocker (No further documentation required).       Informed Consent: Patient understands risks and agrees with Anesthesia plan.   Questions answered. Anesthesia consent signed with patient.  ASA Score: 2     Day of Surgery Review of History & Physical: I have interviewed and examined the patient. I have reviewed the patient's H&P dated:            Ready For Surgery From Anesthesia Perspective.

## 2018-01-09 NOTE — PLAN OF CARE
Discharge instructions provided to patient.  Verbalized understanding.  PT stable, tolerating po fluids.  No complaints noted.  Adequate for d/c at this time.

## 2018-01-09 NOTE — DISCHARGE INSTRUCTIONS
Colonoscopy     A camera attached to a flexible tube with a viewing lens is used to take video pictures.     Colonoscopy is a test to view the inside of your lower digestive tract (colon and rectum). Sometimes it can show the last part of the small intestine (ileum). During the test, small pieces of tissue may be removed for testing. This is called a biopsy. Small growths, such as polyps, may also be removed.   Why is colonoscopy done?  The test is done to help look for colon cancer. And it can help find the source of abdominal pain, bleeding, and changes in bowel habits. It may be needed once a year, depending on factors such as your:  · Age  · Health history  · Family health history  · Symptoms  · Results from any prior colonoscopy  Risks and possible complications  These include:  · Bleeding               · A puncture or tear in the colon   · Risks of anesthesia  · A cancer lesion not being seen  Getting ready   To prepare for the test:  · Talk with your healthcare provider about the risks of the test (see below). Also ask your healthcare provider about alternatives to the test.  · Tell your healthcare provider about any medicines you take. Also tell him or her about any health conditions you may have.  · Make sure your rectum and colon are empty for the test. Follow the diet and bowel prep instructions exactly. If you dont, the test may need to be rescheduled.  · Plan for a friend or family member to drive you home after the test.     Colonoscopy provides an inside view of the entire colon.     You may discuss the results with your doctor right away or at a future visit.  During the test   The test is usually done in the hospital on an outpatient basis. This means you go home the same day. The procedure takes about 30 minutes. During that time:  · You are given relaxing (sedating) medicine through an IV line. You may be drowsy, or fully asleep.  · The healthcare provider will first give you a physical exam to  check for anal and rectal problems.  · Then the anus is lubricated and the scope inserted.  · If you are awake, you may have a feeling similar to needing to have a bowel movement. You may also feel pressure as air is pumped into the colon. Its OK to pass gas during the procedure.  · Biopsy, polyp removal, or other treatments may be done during the test.  After the test   You may have gas right after the test. It can help to try to pass it to help prevent later bloating. Your healthcare provider may discuss the results with you right away. Or you may need to schedule a follow-up visit to talk about the results. After the test, you can go back to your normal eating and other activities. You may be tired from the sedation and need to rest for a few hours.  Date Last Reviewed: 11/1/2016 © 2000-2017 [employer] [address] [place] [address], [place] [address]. All rights reserved. This information is not intended as a substitute for professional medical care. Always follow your healthcare professional's instructions.

## 2018-01-09 NOTE — TRANSFER OF CARE
"Anesthesia Transfer of Care Note    Patient: Althea Mondragonchester    Procedure(s) Performed: Procedure(s) (LRB):  COLONOSCOPY (N/A)    Patient location: [facility]    Anesthesia Type: general    Transport from OR: Transported from OR on 2-3 L/min O2 by NC with adequate spontaneous ventilation    Post pain: adequate analgesia    Post assessment: no apparent anesthetic complications and tolerated procedure well    Post vital signs: stable    Level of consciousness: awake, alert and oriented    Nausea/Vomiting: no nausea/vomiting    Complications: none    Transfer of care protocol was followed      Last vitals:   Visit Vitals  BP (!) 150/89 (BP Location: Left arm, Patient Position: Lying)   Pulse 75   Temp 36.6 °C (97.9 °F) (Oral)   Resp 16   Ht 5' 8" (1.727 m)   Wt 108.9 kg (240 lb)   LMP 11/25/2014   SpO2 98%   Breastfeeding? No   BMI 36.49 kg/m²     "

## 2018-01-12 ENCOUNTER — TELEPHONE (OUTPATIENT)
Dept: GASTROENTEROLOGY | Facility: CLINIC | Age: 53
End: 2018-01-12

## 2018-01-12 NOTE — TELEPHONE ENCOUNTER
----- Message from Juwan Lopez MD sent at 1/11/2018  5:30 PM CST -----  Please call, the small polyp was benign as expected

## 2018-01-24 ENCOUNTER — OFFICE VISIT (OUTPATIENT)
Dept: PSYCHIATRY | Facility: CLINIC | Age: 53
End: 2018-01-24
Payer: MEDICARE

## 2018-01-24 VITALS
HEART RATE: 74 BPM | BODY MASS INDEX: 36.65 KG/M2 | WEIGHT: 241.81 LBS | SYSTOLIC BLOOD PRESSURE: 147 MMHG | DIASTOLIC BLOOD PRESSURE: 92 MMHG | HEIGHT: 68 IN

## 2018-01-24 DIAGNOSIS — F41.1 GENERALIZED ANXIETY DISORDER: ICD-10-CM

## 2018-01-24 DIAGNOSIS — F34.1 DYSTHYMIC DISORDER: ICD-10-CM

## 2018-01-24 DIAGNOSIS — F41.0 PANIC DISORDER WITHOUT AGORAPHOBIA: ICD-10-CM

## 2018-01-24 PROCEDURE — 99213 OFFICE O/P EST LOW 20 MIN: CPT | Mod: S$GLB,,, | Performed by: PSYCHIATRY & NEUROLOGY

## 2018-01-24 RX ORDER — ZOLPIDEM TARTRATE 5 MG/1
5 TABLET ORAL NIGHTLY PRN
Qty: 30 TABLET | Refills: 3 | Status: SHIPPED | OUTPATIENT
Start: 2018-01-24 | End: 2018-02-08 | Stop reason: SDUPTHER

## 2018-01-24 RX ORDER — CLONAZEPAM 0.5 MG/1
0.5 TABLET ORAL DAILY PRN
Qty: 30 TABLET | Refills: 3 | Status: SHIPPED | OUTPATIENT
Start: 2018-01-24 | End: 2018-02-08 | Stop reason: SDUPTHER

## 2018-01-24 RX ORDER — FLUOXETINE HYDROCHLORIDE 40 MG/1
40 CAPSULE ORAL 2 TIMES DAILY
Qty: 180 CAPSULE | Refills: 1 | Status: SHIPPED | OUTPATIENT
Start: 2018-01-24 | End: 2019-01-30 | Stop reason: ALTCHOICE

## 2018-01-24 NOTE — PROGRESS NOTES
ESTABLISHED OUTPATIENT VISIT   E/M LEVEL 3: [id number]    ENCOUNTER DATE: 2018  SITE: [facility], [address]    HISTORY    CHIEF COMPLAINT   Althea Vazquez is a 52 y.o. female who presents for follow up of anxiety.    HPI     Overall appears stable. Anxiety at times.    Financial stress. Second cousin  2 days ago. Multiple deaths of loved ones since 2016. Spiritual beliefs are supportive.    Pt. Would like to maintain current psychotropic medication regimen.    Psychiatric Review Of Systems - Is patient experiencing or having changes in:  sleep: poor due to pain[shoulder]  appetite: no  weight: has gained 15 lb's since previous visit  energy/anergy: no  interest/pleasure/anhedonia: no  somatic symptoms: no  libido: no  anxiety/panic: no  guilty/hopelessness: no  concentration: no  S.I.B.s/risky behavior: no  Irritability: no  Racing thoughts: no  Impulsive behaviors: no  Paranoia:no  AVH:no    Recent alcohol: rare small amount  Recent drug: no    Medical ROS   Denies any physical complaint during today's visit.     PAST MEDICAL, FAMILY AND SOCIAL HISTORY: The patient's past medical, family and social history have been reviewed and updated as appropriate within the electronic medical record - see encounter notes.    PSYCHOTROPIC MEDICATIONS   Prozac 40 mg bid, Clonazepam 0.5 mg prn for anxiety[has been taking 3-4 times per week], Ambien 5 mg at bedtime prn[has recently been taking almost every day]    Scheduled and PRN Medications     Current Outpatient Prescriptions:     amlodipine (NORVASC) 5 MG tablet, TAKE 1 TABLET BY MOUTH EVERY DAY, Disp: 90 tablet, Rfl: 1    aspirin (ECOTRIN) 81 MG EC tablet, Take 1 tablet (81 mg total) by mouth once daily., Disp: 30 tablet, Rfl: 0    atorvastatin (LIPITOR) 40 MG tablet, TAKE 1 TABLET BY MOUTH EVERY DAY, Disp: 90 tablet, Rfl: 3    blood sugar diagnostic Strp, 1 strip by Misc.(Non-Drug; Combo Route) route 2 (two) times daily., Disp:  150 strip, Rfl: 11    blood-glucose meter (FREESTYLE SYSTEM KIT) kit, Use as instructed, Disp: 1 each, Rfl: 0    canagliflozin (INVOKANA) 300 mg Tab tablet, Take 1 tablet (300 mg total) by mouth once daily., Disp: 30 tablet, Rfl: 11    clonazePAM (KLONOPIN) 0.5 MG tablet, Take 1 tablet (0.5 mg total) by mouth daily as needed for Anxiety., Disp: 30 tablet, Rfl: 3    clotrimazole-betamethasone 1-0.05% (LOTRISONE) cream, Apply topically 2 (two) times daily as needed. , Disp: , Rfl:     cyclobenzaprine (FLEXERIL) 10 MG tablet, TAKE 1 TABLET BY MOUTH AT BEDTIME IF NEEDED, Disp: 60 tablet, Rfl: 3    docusate sodium (COLACE) 50 MG capsule, Take 1 capsule (50 mg total) by mouth 2 (two) times daily as needed for Constipation. (Patient taking differently: Take 50 mg by mouth 2 (two) times daily. ), Disp: 30 capsule, Rfl: 0    entecavir (BARACLUDE) 0.5 MG Tab, Take 1 tablet (0.5 mg total) by mouth once daily., Disp: 30 tablet, Rfl: 5    fluoxetine (PROZAC) 40 MG capsule, Take 1 capsule (40 mg total) by mouth 2 (two) times daily., Disp: 180 capsule, Rfl: 1    homatropine (ISOPTO HOMATROPINE) 5 % ophthalmic solution, Place 1 drop into the right eye 2 (two) times daily. (Patient taking differently: Place 1 drop into the right eye 2 (two) times daily as needed. ), Disp: 5 mL, Rfl: 1    HUMIRA PEN PnKt injection, INJECT 1 PEN INTO THE SKIN EVERY 14 DAYS, Disp: 2 each, Rfl: 0    hydrochlorothiazide (MICROZIDE) 12.5 mg capsule, TAKE 1 CAPSULE BY MOUTH ONCE A DAY, Disp: 90 capsule, Rfl: 3    hydrocortisone 2.5 % cream, Apply topically 2 (two) times daily., Disp: 28 g, Rfl: 1    hydroquinone 4 % Crea, Apply to dark areas qhs.  Not more than 6 months straight in same location.Use sunscreen in am (Patient taking differently: continuous prn. Apply to dark areas qhs.  Not more than 6 months straight in same location.Use sunscreen in am), Disp: 46 g, Rfl: 1    lancets (ACCU-CHEK SOFTCLIX LANCETS) Misc, 1 Device by  Misc.(Non-Drug; Combo Route) route 2 (two) times daily., Disp: 200 each, Rfl: 11    losartan (COZAAR) 100 MG tablet, TAKE ONE TABLET BY MOUTH EVERY DAY, Disp: 90 tablet, Rfl: 1    metformin (GLUCOPHAGE-XR) 500 MG 24 hr tablet, TAKE 2 TABLETS BY MOUTH DAILY WITH BREAKFAST, Disp: 180 tablet, Rfl: 3    metoprolol succinate (TOPROL-XL) 100 MG 24 hr tablet, TAKE 1 TABLET BY MOUTH ONCE A DAY, Disp: 90 tablet, Rfl: 3    naproxen (NAPROSYN) 500 MG tablet, Take 1 tablet (500 mg total) by mouth 2 (two) times daily as needed., Disp: 180 tablet, Rfl: 1    pantoprazole (PROTONIX) 40 MG tablet, Take 1 tablet (40 mg total) by mouth once daily., Disp: 30 tablet, Rfl: 11    promethazine (PHENERGAN) 25 MG tablet, Take 1 tablet (25 mg total) by mouth every 6 (six) hours as needed for Nausea., Disp: 15 tablet, Rfl: 0    sulfaSALAzine (AZULFIDINE) 500 MG TbEC, Take 3 tablets (1,500 mg total) by mouth 2 (two) times daily., Disp: 540 tablet, Rfl: 0    tramadol (ULTRAM) 50 mg tablet, Take 1-2 tabs PO q6 hrs PRN pain, Disp: 120 tablet, Rfl: 0    triamcinolone acetonide 0.1% (KENALOG) 0.1 % cream, Apply topically 2 (two) times daily., Disp: 45 g, Rfl: 0    zolpidem (AMBIEN) 5 MG Tab, Take 1 tablet (5 mg total) by mouth nightly as needed., Disp: 30 tablet, Rfl: 3    EXAMINATION    There were no vitals filed for this visit.  Weight: pt. To have vitals and weight done after today's visit    CONSTITUTIONAL  General Appearance: well nourished    MUSCULOSKELETAL  Muscle Strength and Tone: normal strength and tone  Abnormal Involuntary Movements: no abnormal movement noted  Gait and Station: normal gait    PSYCHIATRIC   Level of Consciousness: alert  Orientation: oriented to person, place and time  Grooming: well groomed  Psychomotor Behavior: no restlessness/agitation  Speech: normal in rate, rhythm and volume  Language: normal vocabulary  Mood: anxiety at times  Affect: full range and appropriate  Thought Process: logical and goal  directed  Associations: intact associations  Thought Content: no SI/HI  Memory: grossly intact  Attention: intact to content of interview  Fund of Knowledge: appears adequate  Insight: good  Judgement: good    MEDICAL DECISION MAKING    DIAGNOSES  Anxiety d/o N.O.S.    PROBLEM LIST AND MANAGEMENT PLANS    - anxiety: continue Prozac, Klonopin and Ambien as above  - rtc 3-6 months    Time with patient: 15 min    LABORATORY DATA  Admission on 01/09/2018, Discharged on 01/09/2018   Component Date Value Ref Range Status    POCT Glucose 01/09/2018 102  70 - 110 mg/dL Final    POCT Glucose 01/09/2018 113* 70 - 110 mg/dL Final   Lab Visit on 10/31/2017   Component Date Value Ref Range Status    Microalbum.,U,Random 10/31/2017 10.0  ug/mL Final    Creatinine, Random Ur 10/31/2017 132.0  15.0 - 325.0 mg/dL Final    Comment: The random urine reference ranges provided were established   for 24 hour urine collections.  No reference ranges exist for  random urine specimens.  Correlate clinically.      Microalb Creat Ratio 10/31/2017 7.6  0.0 - 30.0 ug/mg Final   Lab Visit on 10/31/2017   Component Date Value Ref Range Status    Cholesterol 10/31/2017 183  120 - 199 mg/dL Final    Comment: The National Cholesterol Education Program (NCEP) has set the  following guidelines (reference ranges) for Cholesterol:  Optimal.....................<200 mg/dL  Borderline High.............200-239 mg/dL  High........................> or = 240 mg/dL      Triglycerides 10/31/2017 65  30 - 150 mg/dL Final    Comment: The National Cholesterol Education Program (NCEP) has set the  following guidelines (reference values) for triglycerides:  Normal......................<150 mg/dL  Borderline High.............150-199 mg/dL  High........................200-499 mg/dL      HDL 10/31/2017 55  40 - 75 mg/dL Final    Comment: The National Cholesterol Education Program (NCEP) has set the  following guidelines (reference values) for HDL  Cholesterol:  Low...............<40 mg/dL  Optimal...........>60 mg/dL      LDL Cholesterol 10/31/2017 115.0  63.0 - 159.0 mg/dL Final    Comment: The National Cholesterol Education Program (NCEP) has set the  following guidelines (reference values) for LDL Cholesterol:  Optimal.......................<130 mg/dL  Borderline High...............130-159 mg/dL  High..........................160-189 mg/dL  Very High.....................>190 mg/dL      HDL/Chol Ratio 10/31/2017 30.1  20.0 - 50.0 % Final    Total Cholesterol/HDL Ratio 10/31/2017 3.3  2.0 - 5.0 Final    Non-HDL Cholesterol 10/31/2017 128  mg/dL Final    Comment: Risk category and Non-HDL cholesterol goals:  Coronary heart disease (CHD)or equivalent (10-year risk of CHD >20%):  Non-HDL cholesterol goal     <130 mg/dL  Two or more CHD risk factors and 10-year risk of CHD <= 20%:  Non-HDL cholesterol goal     <160 mg/dL  0 to 1 CHD risk factor:  Non-HDL cholesterol goal     <190 mg/dL      Hemoglobin A1C 10/31/2017 6.1* 4.0 - 5.6 % Final    Comment: According to ADA guidelines, hemoglobin A1c <7.0% represents  optimal control in non-pregnant diabetic patients. Different  metrics may apply to specific patient populations.   Standards of Medical Care in Diabetes-2016.  For the purpose of screening for the presence of diabetes:  <5.7%     Consistent with the absence of diabetes  5.7-6.4%  Consistent with increasing risk for diabetes   (prediabetes)  >or=6.5%  Consistent with diabetes  Currently, no consensus exists for use of hemoglobin A1c  for diagnosis of diabetes for children.  This Hemoglobin A1c assay has significant interference with fetal   hemoglobin   (HbF). The results are invalid for patients with abnormal amounts of   HbF,   including those with known Hereditary Persistence   of Fetal Hemoglobin. Heterozygous hemoglobin variants (HbAS, HbAC,   HbAD, HbAE, HbA2) do not significantly interfere with this assay;   however, presence of multiple  variants in a sample may impact the %   interference.      Estimated Avg Glucose 10/31/2017 128  68 - 131 mg/dL Final    TSH 10/31/2017 2.260  0.400 - 4.000 uIU/mL Final    Comment: Warning:  Heterophilic antibodies in serum or plasma of   certain individuals are known to cause interference with   immunoassays. These antibodies may be present in blood samples   from individuals regularly exposed to animal or who have been   treated with animal products.              Tip Oliveira

## 2018-01-30 ENCOUNTER — TELEPHONE (OUTPATIENT)
Dept: RHEUMATOLOGY | Facility: CLINIC | Age: 53
End: 2018-01-30

## 2018-01-30 DIAGNOSIS — M47.819 SPONDYLOARTHRITIS: ICD-10-CM

## 2018-01-30 RX ORDER — ADALIMUMAB 40MG/0.8ML
KIT SUBCUTANEOUS
Qty: 2 EACH | Refills: 0 | Status: SHIPPED | OUTPATIENT
Start: 2018-01-30 | End: 2018-04-10 | Stop reason: SDUPTHER

## 2018-01-30 NOTE — TELEPHONE ENCOUNTER
Please schedule overdue standing labs this week so I can refill meds.   Also overdue for f/u appt, please schedule. Thanks CLEMENT

## 2018-01-31 ENCOUNTER — PATIENT MESSAGE (OUTPATIENT)
Dept: RHEUMATOLOGY | Facility: CLINIC | Age: 53
End: 2018-01-31

## 2018-02-06 ENCOUNTER — TELEPHONE (OUTPATIENT)
Dept: PHARMACY | Facility: CLINIC | Age: 53
End: 2018-02-06

## 2018-02-08 DIAGNOSIS — F41.1 GENERALIZED ANXIETY DISORDER: ICD-10-CM

## 2018-02-08 DIAGNOSIS — F41.0 PANIC DISORDER WITHOUT AGORAPHOBIA: ICD-10-CM

## 2018-02-08 RX ORDER — ZOLPIDEM TARTRATE 5 MG/1
TABLET ORAL
Qty: 30 TABLET | OUTPATIENT
Start: 2018-02-08

## 2018-02-08 RX ORDER — CLONAZEPAM 0.5 MG/1
TABLET ORAL
Qty: 30 TABLET | OUTPATIENT
Start: 2018-02-08

## 2018-02-08 RX ORDER — PANTOPRAZOLE SODIUM 40 MG/1
TABLET, DELAYED RELEASE ORAL
Qty: 90 TABLET | Refills: 3 | Status: SHIPPED | OUTPATIENT
Start: 2018-02-08 | End: 2019-01-02 | Stop reason: SDUPTHER

## 2018-02-08 RX ORDER — CLONAZEPAM 0.5 MG/1
0.5 TABLET ORAL DAILY PRN
Qty: 30 TABLET | Refills: 3 | Status: SHIPPED | OUTPATIENT
Start: 2018-02-08 | End: 2018-07-05 | Stop reason: SDUPTHER

## 2018-02-08 RX ORDER — ZOLPIDEM TARTRATE 5 MG/1
5 TABLET ORAL NIGHTLY PRN
Qty: 30 TABLET | Refills: 3 | Status: SHIPPED | OUTPATIENT
Start: 2018-02-08 | End: 2018-06-07 | Stop reason: SDUPTHER

## 2018-02-20 NOTE — TELEPHONE ENCOUNTER
Dr Arvizu and Staff,    [facility] has been attempting to reach Mrs. Vazquez regarding prescription for Humira. After numerous unsuccessful attempts, we are sending a postcard to the address on file. At this point in time, no further contact will be made from [facility] until patient responds to our mail correspondence.    If there is anything else we can do to further assist, please let us know.     Thanks,  Sandra Mahajan, PharmD  [facility]- Clinical Pharmacist  [phone]

## 2018-02-23 ENCOUNTER — LAB VISIT (OUTPATIENT)
Dept: LAB | Facility: HOSPITAL | Age: 53
End: 2018-02-23
Attending: INTERNAL MEDICINE
Payer: MEDICARE

## 2018-02-23 DIAGNOSIS — M47.819 SPONDYLOARTHRITIS: ICD-10-CM

## 2018-02-23 DIAGNOSIS — Z79.620 LONG-TERM USE OF ADALIMUMAB: ICD-10-CM

## 2018-02-23 DIAGNOSIS — B18.1 CHRONIC VIRAL HEPATITIS B WITHOUT DELTA AGENT AND WITHOUT COMA: ICD-10-CM

## 2018-02-23 LAB
ALBUMIN SERPL BCP-MCNC: 4.6 G/DL
ALP SERPL-CCNC: 71 U/L
ALT SERPL W/O P-5'-P-CCNC: 19 U/L
ANION GAP SERPL CALC-SCNC: 11 MMOL/L
AST SERPL-CCNC: 23 U/L
BASOPHILS # BLD AUTO: 0.02 K/UL
BASOPHILS NFR BLD: 0.3 %
BILIRUB SERPL-MCNC: 0.4 MG/DL
BUN SERPL-MCNC: 12 MG/DL
CALCIUM SERPL-MCNC: 9.5 MG/DL
CHLORIDE SERPL-SCNC: 102 MMOL/L
CO2 SERPL-SCNC: 31 MMOL/L
CREAT SERPL-MCNC: 0.69 MG/DL
CRP SERPL-MCNC: <0.5 MG/DL
DIFFERENTIAL METHOD: NORMAL
EOSINOPHIL # BLD AUTO: 0.1 K/UL
EOSINOPHIL NFR BLD: 0.8 %
ERYTHROCYTE [DISTWIDTH] IN BLOOD BY AUTOMATED COUNT: 13.6 %
ERYTHROCYTE [SEDIMENTATION RATE] IN BLOOD BY WESTERGREN METHOD: 9 MM/HR
EST. GFR  (AFRICAN AMERICAN): >60 ML/MIN/1.73 M^2
EST. GFR  (NON AFRICAN AMERICAN): >60 ML/MIN/1.73 M^2
GLUCOSE SERPL-MCNC: 93 MG/DL
HCT VFR BLD AUTO: 46.8 %
HGB BLD-MCNC: 15.3 G/DL
LYMPHOCYTES # BLD AUTO: 2.7 K/UL
LYMPHOCYTES NFR BLD: 41.8 %
MCH RBC QN AUTO: 30.1 PG
MCHC RBC AUTO-ENTMCNC: 32.7 G/DL
MCV RBC AUTO: 92 FL
MONOCYTES # BLD AUTO: 0.7 K/UL
MONOCYTES NFR BLD: 11 %
NEUTROPHILS # BLD AUTO: 2.9 K/UL
NEUTROPHILS NFR BLD: 45.6 %
PLATELET # BLD AUTO: 252 K/UL
PMV BLD AUTO: 10.6 FL
POTASSIUM SERPL-SCNC: 4.4 MMOL/L
PROT SERPL-MCNC: 7.7 G/DL
RBC # BLD AUTO: 5.08 M/UL
SODIUM SERPL-SCNC: 144 MMOL/L
WBC # BLD AUTO: 6.43 K/UL

## 2018-02-23 PROCEDURE — 36415 COLL VENOUS BLD VENIPUNCTURE: CPT | Mod: PO

## 2018-02-23 PROCEDURE — 87517 HEPATITIS B DNA QUANT: CPT | Mod: PO

## 2018-02-23 PROCEDURE — 86140 C-REACTIVE PROTEIN: CPT | Mod: PO

## 2018-02-23 PROCEDURE — 80053 COMPREHEN METABOLIC PANEL: CPT | Mod: PO

## 2018-02-23 PROCEDURE — 85652 RBC SED RATE AUTOMATED: CPT

## 2018-02-23 PROCEDURE — 85025 COMPLETE CBC W/AUTO DIFF WBC: CPT | Mod: PO

## 2018-04-09 RX ORDER — HYDROCHLOROTHIAZIDE 12.5 MG/1
CAPSULE ORAL
Qty: 90 CAPSULE | Refills: 1 | Status: SHIPPED | OUTPATIENT
Start: 2018-04-09 | End: 2018-04-30

## 2018-04-09 RX ORDER — NAPROXEN 500 MG/1
500 TABLET ORAL 2 TIMES DAILY PRN
Qty: 180 TABLET | Refills: 0 | Status: SHIPPED | OUTPATIENT
Start: 2018-04-09 | End: 2018-06-08 | Stop reason: SDUPTHER

## 2018-04-09 RX ORDER — LOSARTAN POTASSIUM 100 MG/1
TABLET ORAL
Qty: 90 TABLET | Refills: 1 | Status: SHIPPED | OUTPATIENT
Start: 2018-04-09 | End: 2018-10-01 | Stop reason: SDUPTHER

## 2018-04-10 DIAGNOSIS — M47.819 SPONDYLOARTHRITIS: ICD-10-CM

## 2018-04-11 ENCOUNTER — TELEPHONE (OUTPATIENT)
Dept: PHARMACY | Facility: CLINIC | Age: 53
End: 2018-04-11

## 2018-04-23 ENCOUNTER — PATIENT MESSAGE (OUTPATIENT)
Dept: RHEUMATOLOGY | Facility: CLINIC | Age: 53
End: 2018-04-23

## 2018-04-23 ENCOUNTER — PATIENT MESSAGE (OUTPATIENT)
Dept: HEPATOLOGY | Facility: CLINIC | Age: 53
End: 2018-04-23

## 2018-04-30 ENCOUNTER — LAB VISIT (OUTPATIENT)
Dept: LAB | Facility: HOSPITAL | Age: 53
End: 2018-04-30
Attending: INTERNAL MEDICINE
Payer: MEDICARE

## 2018-04-30 ENCOUNTER — OFFICE VISIT (OUTPATIENT)
Dept: INTERNAL MEDICINE | Facility: CLINIC | Age: 53
End: 2018-04-30
Payer: MEDICARE

## 2018-04-30 ENCOUNTER — OFFICE VISIT (OUTPATIENT)
Dept: HEPATOLOGY | Facility: CLINIC | Age: 53
End: 2018-04-30
Payer: MEDICARE

## 2018-04-30 VITALS
TEMPERATURE: 97 F | WEIGHT: 244.94 LBS | HEIGHT: 67 IN | HEART RATE: 76 BPM | RESPIRATION RATE: 18 BRPM | BODY MASS INDEX: 38.44 KG/M2 | DIASTOLIC BLOOD PRESSURE: 86 MMHG | SYSTOLIC BLOOD PRESSURE: 149 MMHG | OXYGEN SATURATION: 97 %

## 2018-04-30 VITALS
BODY MASS INDEX: 38.65 KG/M2 | HEART RATE: 72 BPM | RESPIRATION RATE: 18 BRPM | HEIGHT: 67 IN | WEIGHT: 246.25 LBS | SYSTOLIC BLOOD PRESSURE: 156 MMHG | TEMPERATURE: 98 F | DIASTOLIC BLOOD PRESSURE: 98 MMHG

## 2018-04-30 DIAGNOSIS — E78.5 HYPERLIPIDEMIA ASSOCIATED WITH TYPE 2 DIABETES MELLITUS: ICD-10-CM

## 2018-04-30 DIAGNOSIS — I10 ESSENTIAL HYPERTENSION: ICD-10-CM

## 2018-04-30 DIAGNOSIS — E11.69 HYPERLIPIDEMIA ASSOCIATED WITH TYPE 2 DIABETES MELLITUS: ICD-10-CM

## 2018-04-30 DIAGNOSIS — M47.819 SPONDYLOARTHRITIS: ICD-10-CM

## 2018-04-30 DIAGNOSIS — Z86.19 HISTORY OF HEPATITIS B: Primary | ICD-10-CM

## 2018-04-30 DIAGNOSIS — R76.8 HEPATITIS B CORE ANTIBODY POSITIVE: ICD-10-CM

## 2018-04-30 DIAGNOSIS — E66.01 SEVERE OBESITY (BMI 35.0-39.9) WITH COMORBIDITY: ICD-10-CM

## 2018-04-30 DIAGNOSIS — F41.9 ANXIETY: ICD-10-CM

## 2018-04-30 DIAGNOSIS — G47.00 INSOMNIA, UNSPECIFIED TYPE: Chronic | ICD-10-CM

## 2018-04-30 DIAGNOSIS — E11.42 TYPE 2 DIABETES MELLITUS WITH DIABETIC POLYNEUROPATHY, WITHOUT LONG-TERM CURRENT USE OF INSULIN: Primary | Chronic | ICD-10-CM

## 2018-04-30 DIAGNOSIS — E11.42 TYPE 2 DIABETES MELLITUS WITH DIABETIC POLYNEUROPATHY, WITHOUT LONG-TERM CURRENT USE OF INSULIN: Chronic | ICD-10-CM

## 2018-04-30 PROBLEM — Z12.11 COLON CANCER SCREENING: Status: RESOLVED | Noted: 2018-01-09 | Resolved: 2018-04-30

## 2018-04-30 LAB
ALBUMIN SERPL BCP-MCNC: 4.3 G/DL
ALP SERPL-CCNC: 73 U/L
ALT SERPL W/O P-5'-P-CCNC: 18 U/L
ANION GAP SERPL CALC-SCNC: 7 MMOL/L
AST SERPL-CCNC: 18 U/L
BILIRUB SERPL-MCNC: 0.5 MG/DL
BUN SERPL-MCNC: 10 MG/DL
CALCIUM SERPL-MCNC: 9.4 MG/DL
CHLORIDE SERPL-SCNC: 102 MMOL/L
CO2 SERPL-SCNC: 28 MMOL/L
CREAT SERPL-MCNC: 0.7 MG/DL
EST. GFR  (AFRICAN AMERICAN): >60 ML/MIN/1.73 M^2
EST. GFR  (NON AFRICAN AMERICAN): >60 ML/MIN/1.73 M^2
ESTIMATED AVG GLUCOSE: 123 MG/DL
GLUCOSE SERPL-MCNC: 114 MG/DL
HBA1C MFR BLD HPLC: 5.9 %
POTASSIUM SERPL-SCNC: 4.1 MMOL/L
PROT SERPL-MCNC: 7.4 G/DL
SODIUM SERPL-SCNC: 137 MMOL/L

## 2018-04-30 PROCEDURE — 99214 OFFICE O/P EST MOD 30 MIN: CPT | Mod: S$GLB,,, | Performed by: INTERNAL MEDICINE

## 2018-04-30 PROCEDURE — 99999 PR PBB SHADOW E&M-EST. PATIENT-LVL III: CPT | Mod: PBBFAC,,, | Performed by: NURSE PRACTITIONER

## 2018-04-30 PROCEDURE — 36415 COLL VENOUS BLD VENIPUNCTURE: CPT | Mod: PO

## 2018-04-30 PROCEDURE — 83036 HEMOGLOBIN GLYCOSYLATED A1C: CPT

## 2018-04-30 PROCEDURE — 80053 COMPREHEN METABOLIC PANEL: CPT

## 2018-04-30 PROCEDURE — 3079F DIAST BP 80-89 MM HG: CPT | Mod: S$GLB,,, | Performed by: NURSE PRACTITIONER

## 2018-04-30 PROCEDURE — 99213 OFFICE O/P EST LOW 20 MIN: CPT | Mod: S$GLB,,, | Performed by: NURSE PRACTITIONER

## 2018-04-30 PROCEDURE — 3077F SYST BP >= 140 MM HG: CPT | Mod: S$GLB,,, | Performed by: NURSE PRACTITIONER

## 2018-04-30 PROCEDURE — 99999 PR PBB SHADOW E&M-EST. PATIENT-LVL III: CPT | Mod: PBBFAC,,, | Performed by: INTERNAL MEDICINE

## 2018-04-30 PROCEDURE — 87517 HEPATITIS B DNA QUANT: CPT

## 2018-04-30 PROCEDURE — 3044F HG A1C LEVEL LT 7.0%: CPT | Mod: S$GLB,,, | Performed by: INTERNAL MEDICINE

## 2018-04-30 PROCEDURE — 3080F DIAST BP >= 90 MM HG: CPT | Mod: S$GLB,,, | Performed by: INTERNAL MEDICINE

## 2018-04-30 PROCEDURE — 3077F SYST BP >= 140 MM HG: CPT | Mod: S$GLB,,, | Performed by: INTERNAL MEDICINE

## 2018-04-30 RX ORDER — AMLODIPINE BESYLATE 10 MG/1
10 TABLET ORAL DAILY
Qty: 90 TABLET | Refills: 3 | Status: SHIPPED | OUTPATIENT
Start: 2018-04-30 | End: 2019-04-01 | Stop reason: SDUPTHER

## 2018-04-30 RX ORDER — HYDROCHLOROTHIAZIDE 25 MG/1
25 TABLET ORAL DAILY
Qty: 90 TABLET | Refills: 3 | Status: SHIPPED | OUTPATIENT
Start: 2018-04-30 | End: 2019-04-01 | Stop reason: SDUPTHER

## 2018-04-30 RX ORDER — ENTECAVIR 0.5 MG/1
0.5 TABLET, FILM COATED ORAL DAILY
Qty: 30 TABLET | Refills: 11 | Status: SHIPPED | OUTPATIENT
Start: 2018-04-30 | End: 2020-06-25 | Stop reason: SDUPTHER

## 2018-04-30 NOTE — Clinical Note
Please continue to check HBV DNA Q 3-6 months. I checked it today so not due again until late July at the earliest. I have orders in if you want to use those.

## 2018-04-30 NOTE — PROGRESS NOTES
Subjective:       Patient ID: Althea Vazquez is a 52 y.o. female.    Chief Complaint: Follow-up (need lab) and Medication Refill (creams & lotions)    HPI   Pt with T2DM, HTN, HLD, Reactive arthritis, Severe obesity, Insomnia, Hx of Hep B is here for 6 month f/u. No acute complaints today.   Review of Systems   Constitutional: Negative for activity change, appetite change, chills, diaphoresis, fatigue, fever and unexpected weight change.   HENT: Negative for postnasal drip, rhinorrhea, sinus pressure, sneezing, sore throat, trouble swallowing and voice change.    Respiratory: Negative for cough, shortness of breath and wheezing.    Cardiovascular: Negative for chest pain, palpitations and leg swelling.   Gastrointestinal: Negative for abdominal pain, blood in stool, constipation, diarrhea, nausea and vomiting.   Genitourinary: Negative for dysuria.   Musculoskeletal: Positive for arthralgias. Negative for myalgias.   Skin: Negative for rash and wound.   Allergic/Immunologic: Negative for environmental allergies and food allergies.   Hematological: Negative for adenopathy. Does not bruise/bleed easily.   Psychiatric/Behavioral: Negative for self-injury and suicidal ideas. The patient is nervous/anxious.        Objective:      Physical Exam   Constitutional: She is oriented to person, place, and time. She appears well-developed and well-nourished. No distress.   HENT:   Head: Normocephalic and atraumatic.   Eyes: Conjunctivae and EOM are normal. Pupils are equal, round, and reactive to light. Right eye exhibits no discharge. Left eye exhibits no discharge. No scleral icterus.   Neck: Neck supple. No JVD present.   Cardiovascular: Normal rate, regular rhythm, normal heart sounds and intact distal pulses.    Pulmonary/Chest: Effort normal and breath sounds normal. No respiratory distress. She has no wheezes. She has no rales.   Abdominal: Soft. Bowel sounds are normal. There is no tenderness.    Musculoskeletal: She exhibits no edema.   Lymphadenopathy:     She has no cervical adenopathy.   Neurological: She is alert and oriented to person, place, and time.   Skin: Skin is warm and dry. No rash noted. She is not diaphoretic. No pallor.       Assessment:       1. Type 2 diabetes mellitus with diabetic polyneuropathy, without long-term current use of insulin    2. Essential hypertension    3. Hyperlipidemia associated with type 2 diabetes mellitus    4. Spondyloarthritis    5. Anxiety    6. Severe obesity (BMI 35.0-39.9) with comorbidity    7. Insomnia, unspecified type        Plan:    1. T2DM with neuropathy- stable with last HA1C of 5.8(2/17)<--6.4(4/16)<--5.9(8/15)       Continue Glumetza 1 gm qam/Invokana 300 mg daily   2. HTN- continue Losartan 100 mg, Toprol  mg, increase Norvasc to 10 mg and HCTZ to 25 mg daily   3. HLD- stable, on Lipitor 40 mg qHS   4. Spondyloarthritis- stable on Sulfasalazine/Naproxen       Followed by Rheumatology       Stable on Norco 5-325 mg q6 hrs PRN severe pain   5. Anxiety- stable on Prozac/Klonopin, managed by [facility]   6. Severe obesity- pt advised on proper diet/exercise for weight loss   7. Insomnia- stable on Ambien 5 mg qHS PRN   8.  Hx of Hep B- followed by Hepatology    9. F/u in 2 weeks for HTN

## 2018-04-30 NOTE — PROGRESS NOTES
[facility] Established Patient Visit    Reason for Visit:  F/u hepatitis B core antibody (+)    PCP: Weston Begum    HPI:  This is a 52 y.o. female here for f/u of history of hepatitis B. 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 Baraclude was started after initial visit 10/2016 for HBV prophylaxis prior to starting Humira. She has had labs for monitoring and HBV DNA remains undetectable and liver enzymes remain normal. She denies any h/o jaundice, dark urine, abdominal distention, hematemesis, melena, slowed mentation. She had labs today and results are pending.      ROS:   GENERAL: Denies fever, chills, weight loss/gain, (+) fatigue, (+) arthritis pains  HEENT: Denies headaches, dizziness, vision/hearing changes  CARDIOVASCULAR: Denies chest pain, palpitations, or edema  RESPIRATORY: Denies dyspnea, cough  GI: Denies abdominal pain, rectal bleeding, nausea, vomiting. No change in bowel pattern or color  : Denies dysuria, hematuria   SKIN: Denies rash, itching   NEURO: Denies confusion, memory loss, or mood changes  PSYCH: Denies depression or anxiety  HEME/LYMPH: Denies easy bruising or bleeding      PMHX:  has a past medical history of Acid reflux; Anxiety (10/18/2012); Arthritis; Depression; Diabetic peripheral neuropathy - mild (10/21/2014); Difficult intubation; Dry eyes; Dry mouth; Fever blister; History of hepatitis B (10/3/2016); Hyperlipidemia; Hypertension; Insomnia; Iritis (5/13/2014); Long-term current use of steroids (9/27/2012); Nausea & vomiting (2/4/2015); VAISHNAVI (obstructive sleep apnea); and Type II diabetes mellitus (10/1/2012).    PSHX:  has a past surgical history that includes Tubal ligation; Knee arthroscopy (5-14-14); Hysterectomy (12/3/2014); and Colonoscopy (N/A, 1/9/2018).    The patient's social and family histories were reviewed by me and updated in the appropriate section of the electronic medical record.    Review of patient's allergies indicates:   Allergen  "Reactions    Bactrim [sulfamethoxazole-trimethoprim] Itching    Diflucan [fluconazole] Other (See Comments)     Sore on mouth       Medications reviewed in Epic      Objective Findings:    PHYSICAL EXAM:   Friendly Black or  female, in no acute distress; alert and oriented to person, place and time  VITALS: BP (!) 149/86 (BP Location: Right arm, Patient Position: Sitting, BP Method: Medium (Automatic))   Pulse 76   Temp 97.1 °F (36.2 °C) (Oral)   Resp 18   Ht 5' 7" (1.702 m)   Wt 111.1 kg (244 lb 14.9 oz)   LMP 11/25/2014   SpO2 97%   BMI 38.36 kg/m²   HENT: Normocephalic, without obvious abnormality. Oral mucosa pink and moist. Dentition good.  EYES: Sclerae anicteric.   CARDIOVASCULAR: Regular rate and rhythm. No murmurs.  RESPIRATORY: Normal respiratory effort. BBS CTA. No wheezes or crackles.  GI: Soft, non-tender, non-distended. No hepatosplenomegaly. No masses palpable. No ascites.  EXTREMITIES:  No clubbing, cyanosis or edema.  SKIN: Warm and dry. No jaundice. No rashes noted to exposed skin. No telangectasias noted. No palmar erythema.  NEURO:  Normal gate.   PSYCH:  Memory intact. Thought and speech pattern appropriate. Behavior normal. No depression or anxiety noted.        Labs:  Lab Results   Component Value Date    WBC 6.43 02/23/2018    HGB 15.3 02/23/2018    HCT 46.8 02/23/2018     02/23/2018    CHOL 183 10/31/2017    TRIG 65 10/31/2017    HDL 55 10/31/2017     02/23/2018    K 4.4 02/23/2018    CREATININE 0.69 02/23/2018    ALT 19 02/23/2018    AST 23 02/23/2018    ALKPHOS 71 02/23/2018    BILITOT 0.4 02/23/2018    ALBUMIN 4.6 02/23/2018    INR 1.0 09/09/2016       ASSESSMENT:  52 y.o. Black or  female with:  1.  Hepatitis B core total antibody (+)/history of hepatitis B  -- HBV surface antigen (-) and surface antibody grayzone, HBV DNA negative indicating h/o exposure but no active/chronic infection  -- normal liver enzymes and liver " functioning  -- on Baraclude for HBV prophylaxis  -- (+) hep A immunity  2. Spondyloarthritis, on Humira  -- following with rheumatology        PLAN:  1. Continue entecavir 0.5 mg PO daily for prophylaxis, refilled  2. She will need to remain on entavir for the entire duration of biologic therapy and for 6 months after if biologics are ever stopped  3. Await today's labs  4. Continue labs Q 3-6 months with rheumatology. Check HBV DNA and CMP with each lab to monitor  5. F/u in one year    Thank you for allowing me to participate in the care of Althea Berger, NP-C

## 2018-05-14 ENCOUNTER — HOSPITAL ENCOUNTER (OUTPATIENT)
Dept: RADIOLOGY | Facility: HOSPITAL | Age: 53
Discharge: HOME | End: 2018-05-14
Attending: INTERNAL MEDICINE
Payer: MEDICARE

## 2018-05-14 ENCOUNTER — OFFICE VISIT (OUTPATIENT)
Dept: INTERNAL MEDICINE | Facility: CLINIC | Age: 53
End: 2018-05-14
Payer: MEDICARE

## 2018-05-14 VITALS
HEART RATE: 74 BPM | TEMPERATURE: 98 F | HEIGHT: 67 IN | SYSTOLIC BLOOD PRESSURE: 148 MMHG | WEIGHT: 245.81 LBS | DIASTOLIC BLOOD PRESSURE: 94 MMHG | BODY MASS INDEX: 38.58 KG/M2 | RESPIRATION RATE: 18 BRPM

## 2018-05-14 DIAGNOSIS — I10 HTN, GOAL BELOW 130/80: Primary | ICD-10-CM

## 2018-05-14 DIAGNOSIS — R04.2 HEMOPTYSIS: ICD-10-CM

## 2018-05-14 DIAGNOSIS — J20.9 ACUTE BRONCHITIS, UNSPECIFIED ORGANISM: ICD-10-CM

## 2018-05-14 PROCEDURE — 71046 X-RAY EXAM CHEST 2 VIEWS: CPT | Mod: 26,,, | Performed by: RADIOLOGY

## 2018-05-14 PROCEDURE — 3077F SYST BP >= 140 MM HG: CPT | Mod: S$GLB,,, | Performed by: INTERNAL MEDICINE

## 2018-05-14 PROCEDURE — 71046 X-RAY EXAM CHEST 2 VIEWS: CPT | Mod: TC,PO

## 2018-05-14 PROCEDURE — 99214 OFFICE O/P EST MOD 30 MIN: CPT | Mod: S$GLB,,, | Performed by: INTERNAL MEDICINE

## 2018-05-14 PROCEDURE — 3080F DIAST BP >= 90 MM HG: CPT | Mod: S$GLB,,, | Performed by: INTERNAL MEDICINE

## 2018-05-14 PROCEDURE — 99999 PR PBB SHADOW E&M-EST. PATIENT-LVL III: CPT | Mod: PBBFAC,,, | Performed by: INTERNAL MEDICINE

## 2018-05-14 PROCEDURE — 3008F BODY MASS INDEX DOCD: CPT | Mod: S$GLB,,, | Performed by: INTERNAL MEDICINE

## 2018-05-14 RX ORDER — ATENOLOL 100 MG/1
100 TABLET ORAL DAILY
Qty: 90 TABLET | Refills: 3 | Status: SHIPPED | OUTPATIENT
Start: 2018-05-14 | End: 2019-04-01 | Stop reason: SDUPTHER

## 2018-05-14 RX ORDER — CLONAZEPAM 0.5 MG/1
0.5 TABLET ORAL DAILY
Refills: 3 | COMMUNITY
Start: 2018-05-07 | End: 2019-01-17 | Stop reason: SDUPTHER

## 2018-05-14 NOTE — PROGRESS NOTES
Subjective:       Patient ID: Althea Vazquez is a 52 y.o. female.    Chief Complaint: Follow-up (2 week HTN); Back Pain; and Cough (blood tinged mucus)    HPI   Pt here for 2 week f/u regarding HTN. She is currently on Losartan 100, Toprol , Norvasc 10 and HCTZ 25. BP readings at home are still running slightly high.     Pt admits to 1 week of URI associated hemoptysis without any fevers/chills, wheezing/SOB, hx of TB, etc.   Review of Systems   Constitutional: Negative for activity change, appetite change, chills, diaphoresis, fatigue, fever and unexpected weight change.   HENT: Negative for postnasal drip, rhinorrhea, sinus pressure, sneezing, sore throat, trouble swallowing and voice change.    Respiratory: Negative for cough, shortness of breath and wheezing.    Cardiovascular: Negative for chest pain, palpitations and leg swelling.   Gastrointestinal: Negative for abdominal pain, blood in stool, constipation, diarrhea, nausea and vomiting.   Genitourinary: Negative for dysuria.   Musculoskeletal: Negative for arthralgias and myalgias.   Skin: Negative for rash and wound.   Allergic/Immunologic: Negative for environmental allergies and food allergies.   Hematological: Negative for adenopathy. Does not bruise/bleed easily.       Objective:      Physical Exam   Constitutional: She is oriented to person, place, and time. She appears well-developed and well-nourished. No distress.   HENT:   Head: Normocephalic and atraumatic.   Eyes: Conjunctivae and EOM are normal. Pupils are equal, round, and reactive to light. Right eye exhibits no discharge. Left eye exhibits no discharge. No scleral icterus.   Neck: Neck supple. No JVD present.   Cardiovascular: Normal rate, regular rhythm, normal heart sounds and intact distal pulses.    Pulmonary/Chest: Effort normal and breath sounds normal. No respiratory distress. She has no wheezes. She has no rales.   Musculoskeletal: She exhibits no edema.    Lymphadenopathy:     She has no cervical adenopathy.   Neurological: She is alert and oriented to person, place, and time.   Skin: Skin is warm and dry. No rash noted. She is not diaphoretic. No pallor.       Assessment:       1. HTN, goal below 130/80    2. Hemoptysis    3. Acute bronchitis, unspecified organism        Plan:    1. Change Toprol to Atenolol 100 mg and continue all other meds       Document BP BID       Message in 1 week with numbers    2/3. CXR today           Call if no resolution over the next week

## 2018-05-17 ENCOUNTER — TELEPHONE (OUTPATIENT)
Dept: PHARMACY | Facility: CLINIC | Age: 53
End: 2018-05-17

## 2018-06-04 ENCOUNTER — LAB VISIT (OUTPATIENT)
Dept: LAB | Facility: HOSPITAL | Age: 53
End: 2018-06-04
Attending: INTERNAL MEDICINE
Payer: MEDICARE

## 2018-06-04 DIAGNOSIS — M47.819 SPONDYLOARTHRITIS: ICD-10-CM

## 2018-06-04 DIAGNOSIS — Z79.620 LONG-TERM USE OF ADALIMUMAB: ICD-10-CM

## 2018-06-04 DIAGNOSIS — B18.1 CHRONIC VIRAL HEPATITIS B WITHOUT DELTA AGENT AND WITHOUT COMA: ICD-10-CM

## 2018-06-04 LAB
ALBUMIN SERPL BCP-MCNC: 4.4 G/DL
ALP SERPL-CCNC: 73 U/L
ALT SERPL W/O P-5'-P-CCNC: 31 U/L
ANION GAP SERPL CALC-SCNC: 12 MMOL/L
AST SERPL-CCNC: 25 U/L
BASOPHILS # BLD AUTO: 0.03 K/UL
BASOPHILS NFR BLD: 0.4 %
BILIRUB SERPL-MCNC: 0.4 MG/DL
BUN SERPL-MCNC: 13 MG/DL
CALCIUM SERPL-MCNC: 9.6 MG/DL
CHLORIDE SERPL-SCNC: 102 MMOL/L
CO2 SERPL-SCNC: 30 MMOL/L
CREAT SERPL-MCNC: 0.7 MG/DL
CRP SERPL-MCNC: 0.56 MG/DL
DIFFERENTIAL METHOD: NORMAL
EOSINOPHIL # BLD AUTO: 0.1 K/UL
EOSINOPHIL NFR BLD: 1.6 %
ERYTHROCYTE [DISTWIDTH] IN BLOOD BY AUTOMATED COUNT: 13.8 %
ERYTHROCYTE [SEDIMENTATION RATE] IN BLOOD BY WESTERGREN METHOD: 6 MM/HR
EST. GFR  (AFRICAN AMERICAN): >60 ML/MIN/1.73 M^2
EST. GFR  (NON AFRICAN AMERICAN): >60 ML/MIN/1.73 M^2
GLUCOSE SERPL-MCNC: 111 MG/DL
HCT VFR BLD AUTO: 44.9 %
HGB BLD-MCNC: 14.8 G/DL
LYMPHOCYTES # BLD AUTO: 3 K/UL
LYMPHOCYTES NFR BLD: 43.8 %
MCH RBC QN AUTO: 29.7 PG
MCHC RBC AUTO-ENTMCNC: 33 G/DL
MCV RBC AUTO: 90 FL
MONOCYTES # BLD AUTO: 0.7 K/UL
MONOCYTES NFR BLD: 10.6 %
NEUTROPHILS # BLD AUTO: 3 K/UL
NEUTROPHILS NFR BLD: 42.7 %
PLATELET # BLD AUTO: 270 K/UL
PMV BLD AUTO: 11.1 FL
POTASSIUM SERPL-SCNC: 3.8 MMOL/L
PROT SERPL-MCNC: 7.5 G/DL
RBC # BLD AUTO: 4.98 M/UL
SODIUM SERPL-SCNC: 144 MMOL/L
WBC # BLD AUTO: 6.9 K/UL

## 2018-06-04 PROCEDURE — 36415 COLL VENOUS BLD VENIPUNCTURE: CPT | Mod: PO

## 2018-06-04 PROCEDURE — 80053 COMPREHEN METABOLIC PANEL: CPT | Mod: PO

## 2018-06-04 PROCEDURE — 85025 COMPLETE CBC W/AUTO DIFF WBC: CPT | Mod: PO

## 2018-06-04 PROCEDURE — 86140 C-REACTIVE PROTEIN: CPT | Mod: PO

## 2018-06-04 PROCEDURE — 85652 RBC SED RATE AUTOMATED: CPT

## 2018-06-07 RX ORDER — ZOLPIDEM TARTRATE 5 MG/1
TABLET ORAL
Qty: 30 TABLET | Refills: 2 | Status: SHIPPED | OUTPATIENT
Start: 2018-06-07 | End: 2018-09-05 | Stop reason: SDUPTHER

## 2018-06-08 ENCOUNTER — OFFICE VISIT (OUTPATIENT)
Dept: RHEUMATOLOGY | Facility: CLINIC | Age: 53
End: 2018-06-08
Payer: MEDICARE

## 2018-06-08 ENCOUNTER — PATIENT MESSAGE (OUTPATIENT)
Dept: ADMINISTRATIVE | Facility: OTHER | Age: 53
End: 2018-06-08

## 2018-06-08 VITALS
WEIGHT: 240.31 LBS | DIASTOLIC BLOOD PRESSURE: 71 MMHG | SYSTOLIC BLOOD PRESSURE: 111 MMHG | BODY MASS INDEX: 41.03 KG/M2 | HEART RATE: 68 BPM | HEIGHT: 64 IN

## 2018-06-08 DIAGNOSIS — M54.50 CHRONIC MIDLINE LOW BACK PAIN WITHOUT SCIATICA: ICD-10-CM

## 2018-06-08 DIAGNOSIS — M45.A0 NON-RADIOGRAPHIC AXIAL SPONDYLOARTHRITIS: ICD-10-CM

## 2018-06-08 DIAGNOSIS — M47.819 SPONDYLOARTHRITIS: ICD-10-CM

## 2018-06-08 DIAGNOSIS — E66.01 MORBID OBESITY WITH BMI OF 40.0-44.9, ADULT: ICD-10-CM

## 2018-06-08 DIAGNOSIS — M19.90 INFLAMMATORY ARTHRITIS: ICD-10-CM

## 2018-06-08 DIAGNOSIS — G89.29 CHRONIC MIDLINE LOW BACK PAIN WITHOUT SCIATICA: ICD-10-CM

## 2018-06-08 DIAGNOSIS — E11.69 HYPERLIPIDEMIA ASSOCIATED WITH TYPE 2 DIABETES MELLITUS: Primary | ICD-10-CM

## 2018-06-08 DIAGNOSIS — Z79.620 LONG-TERM USE OF ADALIMUMAB: ICD-10-CM

## 2018-06-08 DIAGNOSIS — M02.30 REACTIVE ARTHRITIS: ICD-10-CM

## 2018-06-08 DIAGNOSIS — M46.90 AXIAL SPONDYLOARTHRITIS: Chronic | ICD-10-CM

## 2018-06-08 DIAGNOSIS — Z79.899 HIGH RISK MEDICATION USE: ICD-10-CM

## 2018-06-08 DIAGNOSIS — E78.5 HYPERLIPIDEMIA ASSOCIATED WITH TYPE 2 DIABETES MELLITUS: Primary | ICD-10-CM

## 2018-06-08 DIAGNOSIS — Z86.19 HISTORY OF HEPATITIS B: ICD-10-CM

## 2018-06-08 PROCEDURE — 3078F DIAST BP <80 MM HG: CPT | Mod: S$GLB,,, | Performed by: INTERNAL MEDICINE

## 2018-06-08 PROCEDURE — 3044F HG A1C LEVEL LT 7.0%: CPT | Mod: S$GLB,,, | Performed by: INTERNAL MEDICINE

## 2018-06-08 PROCEDURE — 99214 OFFICE O/P EST MOD 30 MIN: CPT | Mod: S$GLB,,, | Performed by: INTERNAL MEDICINE

## 2018-06-08 PROCEDURE — 99999 PR PBB SHADOW E&M-EST. PATIENT-LVL IV: CPT | Mod: PBBFAC,,, | Performed by: INTERNAL MEDICINE

## 2018-06-08 PROCEDURE — 3074F SYST BP LT 130 MM HG: CPT | Mod: S$GLB,,, | Performed by: INTERNAL MEDICINE

## 2018-06-08 PROCEDURE — 3008F BODY MASS INDEX DOCD: CPT | Mod: S$GLB,,, | Performed by: INTERNAL MEDICINE

## 2018-06-08 RX ORDER — NAPROXEN 500 MG/1
500 TABLET ORAL 2 TIMES DAILY PRN
Qty: 180 TABLET | Refills: 0 | Status: SHIPPED | OUTPATIENT
Start: 2018-06-08 | End: 2018-09-17 | Stop reason: SDUPTHER

## 2018-06-08 RX ORDER — SULFASALAZINE 500 MG/1
1500 TABLET, DELAYED RELEASE ORAL 2 TIMES DAILY
Qty: 540 TABLET | Refills: 0 | Status: SHIPPED | OUTPATIENT
Start: 2018-06-08 | End: 2018-07-05 | Stop reason: SDUPTHER

## 2018-06-08 ASSESSMENT — ANKYLOSING SPONDYLITIS DISEASE ACTIVITY SCORE (ASDAS-CRP)
GLOBAL_ACTIVITY: 4
PAIN_SWELLING: 4
CRP_MG_PER_LITER: .6
NBH_PAIN: 5
TOTAL_SCORE: 1.77
MORNING_STIFFNESS: 3

## 2018-06-08 ASSESSMENT — ROUTINE ASSESSMENT OF PATIENT INDEX DATA (RAPID3)
MDHAQ FUNCTION SCORE: 1.2
PATIENT GLOBAL ASSESSMENT SCORE: 5
PAIN SCORE: 5
WHEN YOU AWAKENED IN THE MORNING OVER THE LAST WEEK, PLEASE INDICATE THE AMOUNT OF TIME IT TAKES UNTIL YOU ARE AS LIMBER AS YOU WILL BE FOR THE DAY: 45 MINS
FATIGUE SCORE: 7
AM STIFFNESS SCORE: 1, YES
TOTAL RAPID3 SCORE: 4.67
PSYCHOLOGICAL DISTRESS SCORE: 3.3

## 2018-06-08 NOTE — PROGRESS NOTES
"Subjective:       Patient ID: Althea Vazquez is a 52 y.o. female.    Chief Complaint: non-radiographic axial spondyloarthritis; OA hips(mod severe)   HPI 45 min am stiffness The low back remains painful with walking or prolonged standing. Recently Lower back pain in am + loosens up with activity. Massage. Under lots of stress due to multiple close relatives deaths  Hips are fine. Only low back pain and stiffness at present. Taking only one naproxen daily  Review of Systems   Constitutional: Positive for fatigue. Negative for appetite change, fever and unexpected weight change.   HENT: Negative for mouth sores.    Eyes: Negative for visual disturbance.   Respiratory: Negative for cough, shortness of breath and wheezing.    Cardiovascular: Negative for chest pain and palpitations.   Gastrointestinal: Negative for abdominal pain, anal bleeding, blood in stool, constipation, diarrhea, nausea and vomiting.   Genitourinary: Negative for dysuria, frequency and urgency.   Musculoskeletal: Negative for arthralgias, back pain, gait problem, joint swelling, myalgias, neck pain and neck stiffness.   Skin: Negative for rash.   Neurological: Positive for headaches. Negative for weakness and numbness.   Hematological: Negative for adenopathy. Does not bruise/bleed easily.   Psychiatric/Behavioral: Negative for sleep disturbance. The patient is not nervous/anxious.          Objective:   /71   Pulse 68   Ht 5' 3.6" (1.615 m)   Wt 109 kg (240 lb 4.8 oz)   LMP 11/25/2014   BMI 41.77 kg/m²      Physical Exam   Constitutional: She is oriented to person, place, and time and well-developed, well-nourished, and in no distress.   HENT:   Head: Normocephalic and atraumatic.   Mouth/Throat: Oropharynx is clear and moist.   Eyes: Conjunctivae and EOM are normal.   Neck: Normal range of motion. Neck supple. No thyromegaly present.   Cardiovascular: Normal rate, regular rhythm, normal heart sounds and intact distal pulses.  " Exam reveals no gallop and no friction rub.    No murmur heard.  Pulmonary/Chest: Breath sounds normal. She has no wheezes. She has no rales. She exhibits no tenderness.   Abdominal: Soft. She exhibits no distension and no mass. There is no tenderness.       Right Side Rheumatological Exam     Examination finds the shoulder, elbow, wrist, knee, 1st PIP, 1st MCP, 2nd PIP, 2nd MCP, 3rd PIP, 3rd MCP, 4th PIP, 4th MCP, 5th PIP and 5th MCP normal.    Left Side Rheumatological Exam     Examination finds the shoulder, elbow, wrist, knee, 1st PIP, 1st MCP, 2nd PIP, 2nd MCP, 3rd PIP, 3rd MCP, 4th PIP, 4th MCP, 5th PIP and 5th MCP normal.      Lymphadenopathy:     She has no cervical adenopathy.   Neurological: She is alert and oriented to person, place, and time. She displays normal reflexes. Gait normal.   Nl motor strength UE and LE bilateral   Musculoskeletal: She exhibits no edema.       Schober's 10-14 cm  Lateral flex mildly reduced  Extension mildly reduced  Fabere -  ASIS-  Prone sacral compression + but < lumbar compression  Chest expansion 6 cm  Neck rom normal, O-W 0     Results for NABOR HERNANDEZ (MRN [id number]) as of 6/8/2018 15:12   Ref. Range 6/4/2018 12:39   WBC Latest Ref Range: 3.90 - 12.70 K/uL 6.90   RBC Latest Ref Range: 4.00 - 5.40 M/uL 4.98   Hemoglobin Latest Ref Range: 12.0 - 16.0 g/dL 14.8   Hematocrit Latest Ref Range: 37.0 - 48.5 % 44.9   MCV Latest Ref Range: 82 - 98 fL 90   MCH Latest Ref Range: 27.0 - 31.0 pg 29.7   MCHC Latest Ref Range: 32.0 - 36.0 g/dL 33.0   RDW Latest Ref Range: 11.5 - 14.5 % 13.8   Platelets Latest Ref Range: 150 - 350 K/uL 270   MPV Latest Ref Range: 9.2 - 12.9 fL 11.1   Gran% Latest Ref Range: 38.0 - 73.0 % 42.7   Gran # (ANC) Latest Ref Range: 1.8 - 7.7 K/uL 3.0   Lymph% Latest Ref Range: 18.0 - 48.0 % 43.8   Lymph # Latest Ref Range: 1.0 - 4.8 K/uL 3.0   Mono% Latest Ref Range: 4.0 - 15.0 % 10.6   Mono # Latest Ref Range: 0.3 - 1.0 K/uL 0.7   Eosinophil%  Latest Ref Range: 0.0 - 8.0 % 1.6   Eos # Latest Ref Range: 0.0 - 0.5 K/uL 0.1   Basophil% Latest Ref Range: 0.0 - 1.9 % 0.4   Baso # Latest Ref Range: 0.00 - 0.20 K/uL 0.03   Sed Rate Latest Ref Range: 0 - 20 mm/Hr 6   Sodium Latest Ref Range: 136 - 145 mmol/L 144   Potassium Latest Ref Range: 3.5 - 5.1 mmol/L 3.8   Chloride Latest Ref Range: 95 - 110 mmol/L 102   CO2 Latest Ref Range: 23 - 29 mmol/L 30 (H)   Anion Gap Latest Ref Range: 8 - 16 mmol/L 12   BUN, Bld Latest Ref Range: 7 - 17 mg/dL 13   Creatinine Latest Ref Range: 0.50 - 1.40 mg/dL 0.70   eGFR if non African American Latest Ref Range: >60 mL/min/1.73 m^2 >60.0   eGFR if African American Latest Ref Range: >60 mL/min/1.73 m^2 >60.0   Glucose Latest Ref Range: 70 - 110 mg/dL 111 (H)   Calcium Latest Ref Range: 8.7 - 10.5 mg/dL 9.6   Alkaline Phosphatase Latest Ref Range: 38 - 126 U/L 73   Total Protein Latest Ref Range: 6.0 - 8.4 g/dL 7.5   Albumin Latest Ref Range: 3.5 - 5.2 g/dL 4.4   Total Bilirubin Latest Ref Range: 0.1 - 1.0 mg/dL 0.4   AST Latest Ref Range: 15 - 46 U/L 25   ALT Latest Ref Range: 10 - 44 U/L 31   CRP Latest Ref Range: 0.00 - 1.00 mg/dL 0.56   Differential Method Unknown Automated     Assessment/Plan         Problem List Items Addressed This Visit     Axial spondyloarthritis (Chronic)    Overview     Non radiographic Axial and peripheral         Relevant Medications    sulfaSALAzine (AZULFIDINE) 500 MG TbEC    adalimumab (HUMIRA) PnKt injection    High risk medication use-sulfasalazine 2 gm    Relevant Medications    sulfaSALAzine (AZULFIDINE) 500 MG TbEC    Non-radiographic axial spondyloarthritis    Current Assessment & Plan     Schober's 10-14 cm  Lateral flex mildly reduced  Extension mildly reduced  Fabere -  ASIS-  Prone sacral compression + but < lumbar compression  Chest expansion 6 cm  Neck rom normal, O-W 0      ASDAS-CRP 1.77(mild activity)  BASDAI  4.9( high activity)  BASFI 4.85(severe functional  limitation)      Increase naproxen to 500mg twice daily wm with pantoprazole 40mg daily for  prevention  Cont daily home BP monitor with increased dose  Ref to PT(ext)  Lumbar and pelvis updated x-rays  ACR handout on secukinumab, spondyloarthritis  RTC 3 months if no improvement, change adalimumab to secukinumab         Relevant Orders    Ambulatory Referral to Physical/Occupational Therapy    Ambulatory Referral to Physical/Occupational Therapy    X-Ray Lumbar Spine AP And Lateral    X-Ray Hips Bilateral 2 View Inc AP Pelvis    History of hepatitis B    Overview     Hep B core total Ab (+), no active/chronic infection         Current Assessment & Plan     Results for NABOR HERNANDEZ (MRN [id number]) as of 6/8/2018 15:12   Ref. Range 4/30/2018 10:30   Hep B Viral DNA IU/ML Latest Ref Range: <10 IU/mL <10   Log HBV IU/mL Latest Ref Range: <1.00 Log (10) IU/mL <1.00            Hyperlipidemia associated with type 2 diabetes mellitus - Primary    Overview     Results for NABOR HERNANDEZ (MRN [id number]) as of 6/8/2018 15:12   Ref. Range 10/31/2017 08:35   Cholesterol Latest Ref Range: 120 - 199 mg/dL 183   HDL Latest Ref Range: 40 - 75 mg/dL 55   LDL Cholesterol Latest Ref Range: 63.0 - 159.0 mg/dL 115.0   Total Cholesterol/HDL Ratio Latest Ref Range: 2.0 - 5.0  3.3   Triglycerides Latest Ref Range: 30 - 150 mg/dL 65            Morbid obesity with BMI of 40.0-44.9, adult    Relevant Orders    Ambulatory Referral to Bariatric Medicine      Other Visit Diagnoses     Long-term use of adalimumab        Relevant Orders    (In Office Administered) Zoster Recombinant Vaccine    Quantiferon Gold TB    Chronic midline low back pain without sciatica        Relevant Orders    Ambulatory Referral to Physical/Occupational Therapy    Ambulatory Referral to Physical/Occupational Therapy    X-Ray Lumbar Spine AP And Lateral    X-Ray Hips Bilateral 2 View Inc AP Pelvis    Reactive arthritis        Relevant Medications     sulfaSALAzine (AZULFIDINE) 500 MG TbEC    Inflammatory arthritis        Relevant Medications    sulfaSALAzine (AZULFIDINE) 500 MG TbEC    Spondyloarthritis        Relevant Medications    sulfaSALAzine (AZULFIDINE) 500 MG TbEC    adalimumab (HUMIRA) PnKt injection    Other Relevant Orders    X-Ray Lumbar Spine AP And Lateral    X-Ray Hips Bilateral 2 View Inc AP Pelvis

## 2018-06-08 NOTE — ASSESSMENT & PLAN NOTE
Results for [place] (MRN [id number]) as of 6/8/2018 15:12   Ref. Range 4/30/2018 10:30   Hep B Viral DNA IU/ML Latest Ref Range: <10 IU/mL <10   Log HBV IU/mL Latest Ref Range: <1.00 Log (10) IU/mL <1.00

## 2018-06-08 NOTE — ASSESSMENT & PLAN NOTE
Schober's 10-14 cm  Lateral flex mildly reduced  Extension mildly reduced  Fabere -  ASIS-  Prone sacral compression + but < lumbar compression  Chest expansion 6 cm  Neck rom normal, O-W 0      ASDAS-CRP 1.77(mild activity)  BASDAI  4.9( high activity)  BASFI 4.85(severe functional limitation)      Increase naproxen to 500mg twice daily wm with pantoprazole 40mg daily for  prevention  Cont daily home BP monitor with increased dose  Ref to PT(ext)  Lumbar and pelvis updated x-rays  ACR handout on secukinumab, spondyloarthritis  RTC 3 months if no improvement, change adalimumab to secukinumab

## 2018-06-11 ENCOUNTER — TELEPHONE (OUTPATIENT)
Dept: PHARMACY | Facility: CLINIC | Age: 53
End: 2018-06-11

## 2018-07-05 DIAGNOSIS — F41.0 PANIC DISORDER WITHOUT AGORAPHOBIA: ICD-10-CM

## 2018-07-05 DIAGNOSIS — M47.819 SPONDYLOARTHRITIS: ICD-10-CM

## 2018-07-05 DIAGNOSIS — F41.1 GENERALIZED ANXIETY DISORDER: ICD-10-CM

## 2018-07-05 DIAGNOSIS — E78.5 HYPERLIPIDEMIA: ICD-10-CM

## 2018-07-05 DIAGNOSIS — M02.30 REACTIVE ARTHRITIS: ICD-10-CM

## 2018-07-05 DIAGNOSIS — M19.90 INFLAMMATORY ARTHRITIS: ICD-10-CM

## 2018-07-05 DIAGNOSIS — E11.9 TYPE 2 DIABETES MELLITUS WITHOUT COMPLICATION, WITHOUT LONG-TERM CURRENT USE OF INSULIN: ICD-10-CM

## 2018-07-05 DIAGNOSIS — Z79.899 HIGH RISK MEDICATION USE: ICD-10-CM

## 2018-07-05 RX ORDER — CLONAZEPAM 0.5 MG/1
TABLET ORAL
Qty: 30 TABLET | Status: CANCELLED | OUTPATIENT
Start: 2018-07-05

## 2018-07-05 RX ORDER — METOPROLOL SUCCINATE 100 MG/1
TABLET, EXTENDED RELEASE ORAL
Qty: 90 TABLET | Refills: 3 | OUTPATIENT
Start: 2018-07-05

## 2018-07-05 RX ORDER — ATORVASTATIN CALCIUM 40 MG/1
40 TABLET, FILM COATED ORAL DAILY
Qty: 90 TABLET | Refills: 3 | Status: SHIPPED | OUTPATIENT
Start: 2018-07-05 | End: 2019-04-01 | Stop reason: SDUPTHER

## 2018-07-05 RX ORDER — METFORMIN HYDROCHLORIDE 500 MG/1
TABLET, EXTENDED RELEASE ORAL
Qty: 180 TABLET | Refills: 3 | Status: SHIPPED | OUTPATIENT
Start: 2018-07-05 | End: 2018-08-01 | Stop reason: SDUPTHER

## 2018-07-05 RX ORDER — SULFASALAZINE 500 MG/1
1500 TABLET, DELAYED RELEASE ORAL 2 TIMES DAILY
Qty: 540 TABLET | Refills: 0 | Status: SHIPPED | OUTPATIENT
Start: 2018-07-05 | End: 2018-09-17 | Stop reason: SDUPTHER

## 2018-07-05 RX ORDER — CLONAZEPAM 0.5 MG/1
0.5 TABLET ORAL DAILY PRN
Qty: 30 TABLET | Refills: 3 | Status: SHIPPED | OUTPATIENT
Start: 2018-07-05 | End: 2018-10-12 | Stop reason: SDUPTHER

## 2018-07-13 ENCOUNTER — TELEPHONE (OUTPATIENT)
Dept: PHARMACY | Facility: CLINIC | Age: 53
End: 2018-07-13

## 2018-07-13 NOTE — TELEPHONE ENCOUNTER
Humira and entecavir refill and f/u attempted.  No answer and no ability to LVM.  Will f/u. Co-pay $0.00.

## 2018-07-17 NOTE — TELEPHONE ENCOUNTER
"Humira and entecavir refill confirmed. We will ship Humira and entecavir refill on  via fedex to arrive on . $0.00 copay- 004. Confirmed 2 patient identifiers - name and .     Patient has 7-8 tablets of entecavir and 0 of Humira (injects next Wednesday) remaining.  Pt reports they are not having any side effects so far. No missed doses, no new medications, no new allergies or health conditions reported at this time. All questions answered and addressed to patients satisfaction. Pt verbalized understanding.    Ms. Vazquez declined clinical consultation due to being in a rush and phone being on "1% charge."  Will attempt to f/u with patient in 3 months.   "

## 2018-07-31 ENCOUNTER — PATIENT MESSAGE (OUTPATIENT)
Dept: BARIATRICS | Facility: CLINIC | Age: 53
End: 2018-07-31

## 2018-08-01 ENCOUNTER — INITIAL CONSULT (OUTPATIENT)
Dept: BARIATRICS | Facility: CLINIC | Age: 53
End: 2018-08-01
Payer: MEDICARE

## 2018-08-01 VITALS
HEART RATE: 86 BPM | SYSTOLIC BLOOD PRESSURE: 120 MMHG | WEIGHT: 240.31 LBS | HEIGHT: 66 IN | BODY MASS INDEX: 38.62 KG/M2 | DIASTOLIC BLOOD PRESSURE: 66 MMHG

## 2018-08-01 DIAGNOSIS — E11.9 TYPE 2 DIABETES MELLITUS WITHOUT COMPLICATION, WITHOUT LONG-TERM CURRENT USE OF INSULIN: ICD-10-CM

## 2018-08-01 PROCEDURE — 3078F DIAST BP <80 MM HG: CPT | Mod: S$GLB,,, | Performed by: INTERNAL MEDICINE

## 2018-08-01 PROCEDURE — 99999 PR PBB SHADOW E&M-EST. PATIENT-LVL III: CPT | Mod: PBBFAC,,, | Performed by: INTERNAL MEDICINE

## 2018-08-01 PROCEDURE — 99215 OFFICE O/P EST HI 40 MIN: CPT | Mod: S$GLB,,, | Performed by: INTERNAL MEDICINE

## 2018-08-01 PROCEDURE — 3074F SYST BP LT 130 MM HG: CPT | Mod: S$GLB,,, | Performed by: INTERNAL MEDICINE

## 2018-08-01 PROCEDURE — 3044F HG A1C LEVEL LT 7.0%: CPT | Mod: S$GLB,,, | Performed by: INTERNAL MEDICINE

## 2018-08-01 PROCEDURE — 3008F BODY MASS INDEX DOCD: CPT | Mod: S$GLB,,, | Performed by: INTERNAL MEDICINE

## 2018-08-01 RX ORDER — METFORMIN HYDROCHLORIDE 500 MG/1
500 TABLET, EXTENDED RELEASE ORAL
Qty: 90 TABLET | Refills: 3
Start: 2018-08-01 | End: 2019-08-02

## 2018-08-01 NOTE — PROGRESS NOTES
Subjective:       Patient ID: Althea Vazquez is a 52 y.o. female.    Chief Complaint: Consult    CC:    Current attempts at weight loss: New pt to me, referred by Gabriel Arvizu MD  [address]  [place] [address] , with Patient Active Problem List:     Fatigue     Hyperpigmentation of skin     Axial spondyloarthritis     Idiopathic edema     Vitamin D deficiency disease     High risk medication use-sulfasalazine 2 gm     Iritis     Status post arthroscopy of right knee 5/14/2014     Numbness and tingling of right arm     Neck pain, bilateral     Menorrhagia     Obesity     S/P laparoscopic hysterectomy     Angioedema of lips     Insomnia     Type 2 diabetes mellitus with diabetic polyneuropathy     Weakness of right lower extremity     Weakness of right upper extremity     Decreased right shoulder range of motion     DDD (degenerative disc disease), lumbar     Osteoarthritis of right knee     Non-radiographic axial spondyloarthritis     History of hepatitis B     Abnormal chest CT     Decreased range of motion of neck     Gastroesophageal reflux disease     Right shoulder pain     Decreased range of motion (ROM) of shoulder     Weakness of both legs     Muscle spasms of neck     Cervicogenic headache     Severe obesity (BMI 35.0-39.9) with comorbidity     Essential hypertension     Hyperlipidemia associated with type 2 diabetes mellitus     Anxiety     Morbid obesity with BMI of 40.0-44.9, adult    Lab Results       Component                Value               Date                       HGBA1C                   5.9 (H)             04/30/2018                 HGBA1C                   6.1 (H)             10/31/2017                 HGBA1C                   5.8                 02/13/2017            Lab Results       Component                Value               Date                       LDLCALC                  115.0               10/31/2017                 CREATININE               0.70            "     06/04/2018               No efforts currently. No exercise.     Previous diet attempts: WW in past. States did well.     History of medication for loss:   checked today. States years ago took. Adipex.     Heaviest weight: 270 # . Was on steroids at the time    Lightest weight: 145#    Goal weight: 170#      Last eye exam:    No glaucoma per pt.                                    Provider:Dr. Tamayo.     Typical eating patterns:  Disabled. Lives with sons. Pt cooks.   Breakfast: grits or egg and turkey. Cheerios and milk.  Weekends: same.    Lunch: Red beans and rice with chicken or pork chop.     Dinner: Same as lunch. [employer]. Moss garden for pasta. Oysters all kinds of way.     Snacks: canned peaches. Candy.     Beverages: Soda- 1-2 /day. Water. Juice. ETOH- wine on occasion.     Willingness to change: 6/10    EKG:Normal sinus rhythm  Minimal voltage criteria for LVH, may be normal variant  Borderline Abnormal ECG  No previous ECGs available    BMR: 1728      Review of Systems   Constitutional: Negative for chills and fever.   Respiratory: Positive for shortness of breath.         + snores. Has had PSG in past. Did not need CPAP.    Cardiovascular: Positive for leg swelling. Negative for chest pain.   Gastrointestinal: Negative for constipation and diarrhea.        + GERD   Genitourinary: Negative for difficulty urinating and dysuria.   Musculoskeletal: Positive for arthralgias and back pain.   Neurological: Negative for dizziness and light-headedness.   Psychiatric/Behavioral: Negative for dysphoric mood. The patient is nervous/anxious.         Partly controled with meds.        Objective:     /66   Pulse 86   Ht 5' 6" (1.676 m)   Wt 109 kg (240 lb 4.8 oz)   LMP 11/25/2014   BMI 38.79 kg/m²     Physical Exam   Constitutional: She is oriented to person, place, and time. She appears well-developed. No distress.   Obese   HENT:   Head: Normocephalic and atraumatic.   Eyes: EOM are normal. " Pupils are equal, round, and reactive to light. No scleral icterus.   Neck: Normal range of motion. Neck supple. No thyromegaly present.   Cardiovascular: Normal rate and normal heart sounds.  Exam reveals no gallop and no friction rub.    No murmur heard.  Pulmonary/Chest: Effort normal and breath sounds normal. No respiratory distress. She has no wheezes.   Abdominal: Soft. Bowel sounds are normal. She exhibits no distension. There is no tenderness.   Musculoskeletal: Normal range of motion. She exhibits no edema.   Neurological: She is alert and oriented to person, place, and time. No cranial nerve deficit.   Skin: Skin is warm and dry. No erythema.   Psychiatric: She has a normal mood and affect. Her behavior is normal. Judgment normal.   Vitals reviewed.      Assessment:       1. Obesity, Class II, BMI 35-39.9, with comorbidity    2. Type 2 diabetes mellitus without complication, without long-term current use of insulin        Plan:         1. Obesity, Class II, BMI 35-39.9, with comorbidity  3 meals a day made up of the following:  Unlimited green vegetables, tomatoes, mushrooms, spaghetti squash, cauliflower, meat, poultry, seafood, eggs and hard cheeses.   Milk and plain yogurt  Dressings, seasonings, condiments, etc should have less than 2 g sugars.   Beans (1-1.5 cups) or nuts (1/4 cup) can have 1 x a day.   1-2 servings of citrus fruits, berries, pineapple or melon a day (1/2 cup)  Avoid fried foods    No grains, rice, pasta, potatoes, bread, corn, peas, oatmeal, grits, tortillas, crackers, chips    No soda, sweet tea, juices or lemonade    [email or web address] for recipes. Moderate carb intake      One meal a week you can have whatever you'd like    Meal ideas and spring recipes given.     2. Type 2 diabetes mellitus without complication, without long-term current use of insulin    - metFORMIN (GLUCOPHAGE-XR) 500 MG 24 hr tablet; Take 1 tablet (500 mg total) by mouth daily with breakfast.  Dispense: 90  tablet; Refill: 3    Start Trulicity once a week.     Decrease portions as soon as you start Trulicity. Some nausea in the first 2 weeks is not unusual.     If you get pain across the upper abdomen and around to your back, please call the office.

## 2018-08-01 NOTE — LETTER
August 2, 2018      Gabriel Arvizu MD  [address]  [facility] [address]           [facility] - Bariatric Surgery  [address]  [facility] [address]  Phone: [phone]  Fax: [phone]          Patient: Althea Vazquez   MR Number: [id number]   YOB: 1965   Date of Visit: 8/1/2018       Dear Dr. Gabriel Arvizu:    Thank you for referring Althea Vazquez to me for evaluation. Attached you will find relevant portions of my assessment and plan of care.    If you have questions, please do not hesitate to call me. I look forward to following Althea Vazquez along with you.    Sincerely,    Neelam Moya MD    Enclosure  CC:  No Recipients    If you would like to receive this communication electronically, please contact [email or web address] or [phone] to request more information on [employer] access.    For providers and/or their staff who would like to refer a patient to [facility], please contact us through our one-stop-shop provider referral line, [facility], at [phone].    If you feel you have received this communication in error or would no longer like to receive these types of communications, please e-mail [email or web address]

## 2018-08-01 NOTE — PATIENT INSTRUCTIONS
Start Trulicity once a week.     Decrease portions as soon as you start Trulicity. Some nausea in the first 2 weeks is not unusual.     If you get pain across the upper abdomen and around to your back, please call the office.       3 meals a day made up of the following:  Unlimited green vegetables, tomatoes, mushrooms, spaghetti squash, cauliflower, meat, poultry, seafood, eggs and hard cheeses.   Milk and plain yogurt  Dressings, seasonings, condiments, etc should have less than 2 g sugars.   Beans (1-1.5 cups) or nuts (1/4 cup) can have 1 x a day.   1-2 servings of citrus fruits, berries, pineapple or melon a day (1/2 cup)  Avoid fried foods    No grains, rice, pasta, potatoes, bread, corn, peas, oatmeal, grits, tortillas, crackers, chips    No soda, sweet tea, juices or lemonade    [email or web address].dietdoctor.[email or web address] for recipes. Moderate carb intake      One meal a week you can have whatever you'd like    Meal Ideas for Regular Bariatric Diet  *Recipes and products available at www.bariatriceating.com      Breakfast: (15-20g protein)    - Egg white omelet: 2 egg whites or ½ cup Egg Beaters. (Optional proteins: cheese, shrimp, black beans, chicken, sliced turkey) (Optional veggies: tomatoes, salsa, spinach, mushrooms, onions, green peppers, or small slice avocado)     - Egg and sausage: 1 egg or ¼ cup Egg Beaters (any variety), with 1 jonathon or 2 links of Turkey sausage or Veggie breakfast sausage ([employer] or [employer])    - Crust-less breakfast quiche: To make a glass pie dish, mix 4oz part skim Ricotta, 1 cup skim milk, and 2 eggs as your base. Add protein: shredded cheese, sliced lean ham or turkey, turkey kyle/sausage. Add veggies: tomato, onion, green onion, mushroom, green pepper, spinach, etc.    - Yogurt parfait: Mix 1 - 6oz container Dannon Light N Fit vanilla yogurt, with ¼ cup Kashi Go Lean cereal    - Cottage cheese and fruit: ½ cup part-skim cottage cheese or ricotta cheese topped with fresh fruit or sugar  free preserves     - Moni Booker's Vanilla Egg custard* (add 2 Tbsp instant coffee granules to make Cappuccino Custard*)    - Hi-Protein café latte (skim milk, decaf coffee, 1 scoop protein powder). Optional to add Sugar free syrup or extract flavoring.    Lunch: (20-30g protein)    - ½ cup Black bean soup (Homemade or Progresso), with ¼ cup shredded low-fat cheese. Top with chopped tomato or fresh salsa.     - Lean deli turkey breast and low-fat sliced cheese, mustard or light mendes to moisten, rolled up together, or wrapped in a [place] lettuce leaf    - Chicken salad made from dinner leftovers, moisten with low-fat salad dressing or light mendes. Also try leftover salmon, shrimp, tuna or boiled eggs. Serve ½ cup over dark green salad    - Fat-free canned refried beans, topped with ¼ cup shredded low-fat cheese. Top with chopped tomato or fresh salsa.     - Greek salad: Top mixed greens with 1-2oz grilled chicken, tomatoes, red onions, 2-3 kalamata olives, and sprinkle lightly with feta cheese. Spritz with Balsamic vinegar to taste.     - Crust-less lunch quiche: To make a glass pie dish, mix 4oz part skim Ricotta, 1 cup skim milk, and 2 eggs as your base. Add protein: shredded cheese, sliced lean ham or turkey, shrimp, chicken. Add veggies: tomato, onion, green onion, mushroom, green pepper, spinach, artichoke, broccoli, etc.    - Pizza bake: tomato sauce, low-fat shredded mozzarella and turkey pepperoni or Macedonian kyle. Add any veggies.    - Cucumber crab bites: Spread ¼ cup crab dip (lump crabmeat + light cream cheese and green onions) over sliced cucumber.     - Chicken with light spinach and artichoke dip*: Puree in : 6oz cooked and drained spinach, 2 cloves garlic, 1 can cannelloni beans, ½ cup chopped green onions, 1 can drained artichoke hearts (not marinated in oil), lemon juice and basil. Mix in 2oz chopped up chicken.    Supper: (20-30g protein)    - Serve grilled fish over dark green salad  tossed with low-fat dressing, served with grilled asparagus pickens     - Rotisserie chicken salad: served with sliced strawberries, walnuts, fat-free feta cheese crumbles and 1 tbsp Kees Own Light Raspberry [place] Vinaigrette    - Shrimp cocktail: Dip cold boiled shrimp in homemade low-sugar cocktail sauce (1/2 cup Elle One Carb ketchup, 2 tbsp horseradish, 1/4 tsp hot sauce, 1 tsp Worcestershire sauce, 1 tbsp freshly-squeezed lemon juice). Serve with dark green salad, walnuts, and crumbled blue cheese drizzled with olive oil and Balsamic vinegar    - Tuna Melt: Spread tuna salad onto 2 thick slices of tomato. Top with low-fat cheese and broil until cheese is melted. May also be made with chicken salad of shrimp salad. [place] with different types of cheeses.    - Homemade low-fat Chili using extra lean ground beef or ground turkey. Top with shredded cheese and salsa as desired. May add dollop fat-free sour cream if desired    - Dinner Omelet with shrimp or chicken and onion, green peppers and chives.    - No noodle lasagna: Use sliced zucchini or eggplant in place of noodles.  Layer with part skim ricotta cheese and low sugar meat sauce (use very lean ground beef or ground turkey).    - Mexican chicken bake: Bake chunks of chicken breast or thigh with taco seasoning, Pace brand enchilada sauce, green onions and low-fat cheese. Serve with ¼ cup black beans or fat free refried beans topped with chopped tomatoes or salsa.    - Sole frozen meatballs, simmered in Classico Marinara sauce. Different flavors of salsa or spaghetti sauce create different dishes! Sprinkle with parmesan cheese. Serve with grilled or steamed veggies, or a dark green salad.    - Simmer boneless skinless chicken thigh chunks in Classico Marinara sauce or roasted salsa until tender with chopped onion, bell pepper, garlic, mushrooms, spinach, etc.     - Hamburger, without the bun, dressed the way you like. Served with grilled or  steamed veggies.    - Eggplant parmesan: Bake slices of eggplant at 350 degrees for 15 minutes. Layer tomato sauce, sliced eggplant and low-fat mozzarella cheese in a baking dish and cover with foil. Bake 30-40 more minutes or until bubbly. Uncover and bake at 400 degrees for about 15 more minutes, or until top is slightly crisp.    - Fish tacos: grilled/baked white fish, wrapped in Haider lettuce leaf, topped with salsa, shredded low-fat cheese, and light coleslaw.    Snacks: (100-200 calories; >5g protein)    - 1 low-fat cheese stick with 8 cherry tomatoes or 1 serving fresh fruit  - 4 thin slices fat-free turkey breast and 1 slice low-fat cheese  - 4 thin slices fat-free honey ham with wedge of melon  - 1/4 cup unsalted nuts with ½ cup fruit  - 6-oz container Dannon Light n Fit vanilla yogurt, topped with 1oz unsalted nuts         - apple, celery or baby carrots spread with 2 Tbsp natural peanut butter or almond butter   - apple slices with 1 oz slice low-fat cheese  - celery, cucumber, bell pepper or baby carrots dipped in ¼ cup hummus bean spread or light spinach and artichoke dip (*recipe in lunch section)  - 100 calorie bag microwave light popcorn with 3 tbsp grated parmesan cheese  - Ron Links Beef Steak - 14g protein! (similar to beef jerky)  - 2 wedges [place] - Light Herb & Garlic Cheese with sliced cucumber or green bell pepper  - 1/2 cup low-fat cottage cheese with ¼ cup fruit or ¼ cup salsa  - RTD Protein drinks: Atkins, Low Carb Slim Fast, EAS light, Muscle Milk Light, etc.  - Homemade Protein drinks: GNC Soy95, Isopure, Nectar, UNJURY, Whey Gourmet, etc. Mix 1 scoop powder with 8oz skim/1% milk or light soymilk.  - Protein bars: Atkins, EAS, Pure Protein, Think Thin, Detour, etc. Must have 0-4 grams sugar - Read the label.    Takeout Options: No more than twice/week  Deli - Salads (no pasta or rice), meats, cheeses. Roasted chicken. Lox (salmon)    Mexican - Platters which don't include  "tortillas, chips, or rice. Go easy on the beans. Example: Fajitas without the tortillas. Ask the  not to bring chips to the table if they are too tempting.    Greek - Meat or fish and vegetable, but no bread or rice. Including hummus, baba ganoush, etc, is OK. Most sit-down Greek restaurants can provide you with cucumber slices for dipping instead of rio bread.    Fast Food (Avoid as much as possible) - Salads (no croutons and limit salad dressing to 2 tbsp), grilled chicken sandwich without the bun and ask for no mendes. Kianas low fat chili or Taco Bell pintos and cheese.    BBQ - The meats are fine if you ask for sauces on the side, but most of the traditional side dishes are loaded with carbs. Kevin slaw, baked beans and BBQ sauce are typically made with sugar.    Chinese - Nothing deep-fried, no rice or noodles. Many Chinese sauces have starch and sugar in them, so you'll have to use your judgement. If you find that these sauces trigger cravings, or cause Dumping, you can ask for the sauce to be made without sugar or just use soy sauce.    Spring Recipes:    Jeanie Aguilarke:     Ingredients  ½ cup low fat cottage cheese  ¼ cup vanilla protein powder  1/8 tsp mint extract  2-3 packets of stevia or artificial sweetener of choice  5-10 ice cubes (more or less depending on how thick you would like it)  4 oz of water  2-3 drops of green food coloring OR a small handful of spinach to make it green    Put all ingredients in  and  until desired consistency.      "Unstuffed" Cabbage Rolls:    Ingredients:  1 1/2 to 2 pounds lean ground beef or turkey  1 large onion, chopped  1 clove garlic, minced  1 small cabbage, chopped  2 cans (14.5 ounces each) diced tomatoes  1 can (8 ounces) tomato sauce  ¼ - ½ cup water depending on desired consistency  1 teaspoon ground black pepper, salt to taste    Spray large skillet with nonstick cookspray. Heat pan on medium heat. Sauté the onions until tender, and then " add the ground beef (or turkey) and cook until the meat is browned.    Add the garlic, cook an additional minute before adding the remaining ingredients. Cover, reduce the heat and simmer about 25 minutes (or until the cabbage is  fork tender)        Not so Quan's Pie:    Ingredients  2 (or more) pounds of lean ground beef, or turkey  2 heads of cauliflower or 3 bags of frozen cauliflower, chopped  1 bag of frozen mixed veggies          (NO Corn, Peas or Potatoes!)  1-2 onions  1 egg  1 teaspoon each of basil, garlic powder, pepper, oregano and a little cayenne  4-5 sprays of I cant believe its not butter spray  4 ounces of fat free cream cheese (optional)  Low fat Cheese to top (optional)    Roast cauliflower in oven on 350* F for about 15-20 minutes, until browned and fork tender. (begin storm meat while cauliflower is cooking). Place cooked cauliflower in . Add 4 ounces of fat free cream cheese, 4-5 sprays of I cant believe its not butter SPRAY, and spices and puree until creamy.     While cauliflower is roasting, brown meat in large skillet and season to taste. Set aside. Sauté diced onion in skillet until somewhat soft. Set aside with meat. Pour mixed veggies in the skillet to heat on low heat.     Mix the meat, onions, mixed veggies, raw egg and any additional seasonings and put in bottom of 9x13 baking dish. Spread mashed cauliflower mixture over it until smooth.    Bake at 350 for approximately 30 minutes. Add cheese and bake 5 additional minutes (optional). Serve warm (or reheat later).    Crock Pot Balsamic Pork Tenderloin:    Ingredients:  1 tsp dried thyme  1 tsp ground pepper  ½ tsp salt  3 tbsp dried minced onion  2 tsp dried basil  ¼ cup low sodium broth  ½ cup balsamic vinegar  2 cloves garlic, minced  2 lbs Pork Tenderloin    Mix first 5 ingredients together. Rub pork tenderloin with dry seasoning mixture.  In a large sauté pan, heat ¼ cup balsamic vinegar and garlic on medium  heat. Add pork to sauté solitario and sear, storm all sides. Add low sodium broth, 1 tbsp at a time to keep tenderloin from sticking or use nonstick cookspray.     Transfer meat to crock pot. Pour remaining balsamic vinegar into sauté pan and continue  to stir for a few minutes to deglaze the pan. Pour juices over the top of the tenderloin in crockpot. Cook on high for 3 hours or until meat thermometer says 170*. Let rest 5-10 minutes and then slice.       Side Dish: Steamed carrots w/ garlic and renaldo    Ingredients:  2 garlic cloves, minced  1 lb baby carrots  5-6 sprays of I cant believe its not butter SPRAY  1 tsp minced peeled fresh renaldo  1 tbsp chopped fresh cilantro  ½ tsp grated lime rind  1 tbsp fresh lime juice   ¼ tsp salt    Prepare garlic; let stand 10 minutes. Steam carrots, covered in pot, about 10 minutes or until tender.     In a nonstick skillet over medium heat, spray pan w/ I cant believe its not butter SPRAY. Add garlic and renaldo and cook 1 minute, stirring constantly. Remove from  heat; stir in carrots, cilantro and remaining ingredients.         Side Dish: Green Bean Almondine    Ingredients:  1 pound fresh green beans, trimmed  1 tbsp zora vinegar  1 ½ tsp water  1 tsp Sachin Mustard  ¼ tsp salt  ¼ tsp freshly ground black pepper  1 tbsp sliced almonds, toasted    Cook green beans in boiling water for 4 minutes or until crisp-tender. Drain and plunge beans into ice water. Drain well. Pat beans dry w/ paper towel.     Combine vinegar, water, mustard, salt and pepper in a medium bowl. Add beans to vinegar mixture; toss to coat well. Sprinkle with toasted almonds.      How to Cook the Perfect Hard Boiled Egg:    Steam in water: Fill a large pot with 1 inch of water. Place steamer insert inside, cover, and bring to a boil over high heat. Add eggs to steamer basket, cover, and continue cooking 12 minute. If serving cold, immediately place eggs in a bowl of ice water and allow to cool for at  least 15 minutes before peeling under cool running water. Store in the refrigerator for up to 5 days.    OR    Purchase Dash Go Rapid Egg Boiler: available @ [employer], [facility] and [employer], [employer], [employer] for ~$15-$20      Classic Egg Salad Recipe:    Ingredients:  8 hard cooked eggs, peeled and coarsely chopped  4-6 tbsp of low fat or fat free mendes  2 tbsp celery, chopped  2 tsp shanice mustard  Dash of cayenne pepper (for spice)  Black pepper, salt to taste    In a medium bowl, combine mendes, celery, mustard and cayenne pepper with chopped eggs. Season w/ pepper and salt. Serve over Lettuce leaves.     Get Creative! Add chopped turkey kyle and tomato and serve on lettuce leaves for an egg-cellent BLT egg salad or mix lean diced ham, low fat cheddar and tomatoes for  egg salad    Tuna Deviled Eggs:    Ingredients:  12 hard boiled eggs  1 can of tuna packed in water  1 rib celery, diced  ¼ cup low fat mendes  1 tsp mustard  Garlic powder, black pepper to taste  Dash of paprika (for finish)    Cut eggs in half lengthwise. Remove yolk and mash in bowl. In separate bowl, mix tuna, celery, low fat mendes, mustard and seasonings.  Stir in egg yolks until blended. Stuff eggs and sprinkle dash of paprika      Kyle Jalapeno Deviled Eggs:    Ingredients:  12 hard boiled eggs, peeled  ½ cup low fat mendes  1 ½ tsp rice vinegar  ¾ tsp ground mustard  ½ packet splenda  2 jalapenos, seeded and chopped, 6 pieces turkey kyle, cooked crisp and crumbled  Dash of paprika (for finish)    Cut eggs in half lengthwise. Remove yolk and mash in bowl. Add mendes, vinegar, spices and Splenda until well combined. Mix in jalapenos and kyle. Stuff eggs and sprinkle w/ dash of paprika      Sugar-Free High Protein Brownie Recipe:    Ingredients:  2 cups of pureed zucchini  4 oz fat free cream cheese  1 large egg  2 scoops chocolate protein powder  1 tbsp pure vanilla extract  1/3 cup unsweetened cocoa powder    ¼ tsp salt  2 tbsp chopped  nuts  *8-9 packets of splenda or artificial sweetener of choice    Preheat oven to 350* F.     Line an 8x8 pan w/ parchment paper or spray with nonstick cookspray.     In a medium bowl, blend the fat free cream cheese with egg until creamy. Add chocolate protein powder and cocoa powder until creamy. Add vanilla extract. Stir in pureed zucchini and salt.     The batter will be thick, but not dry. If batter seems dry, add 1-2 tbsp water. Fold in Nuts. Pour batter in prepared pan.     Bake for 30 minutes. Allow to cool completely. Cut into squares and chill to semi-set.     *best if eaten within 2 days but may last 3-4 days in fridge.         Lemon Whip:    Ingredients:  Small box of sugar-free vanilla instant pudding mix  1 small packet of crystal light lemonade mix  2 cups skim milk or fat free fairlife milk  Sugar-free, fat free cool whip     Make sugar-free pudding using 2 cups skim milk according to package. Stir in 1 small packet of crystal light lemonade mix.  Fold in a dollop of sugar-free, fat free cool whip and stir to combine.     Home-made Sugar-free Pudding Pops:    Ingredients:  1 small pkg of sugar-free instant pudding mix (flavor of choice!)  2 cups fat free milk     Beat ingredients with whisk for 2 minutes. Pour into 6 paper or plastic cups or into a popsicle mold. Insert wooden pop stick in center of each cup.     Freeze for 5 hours or until firm. Peel off paper or pull out of popsicle mold.     Variations: add sugar-free syrups to change up the flavor, such as sugar-free chocolate pudding + sugar free raspberry syrup = chocolate raspberry fudgesicle.

## 2018-08-02 ENCOUNTER — TELEPHONE (OUTPATIENT)
Dept: BARIATRICS | Facility: CLINIC | Age: 53
End: 2018-08-02

## 2018-08-02 DIAGNOSIS — E11.9 TYPE 2 DIABETES MELLITUS WITHOUT COMPLICATION, WITHOUT LONG-TERM CURRENT USE OF INSULIN: ICD-10-CM

## 2018-08-02 NOTE — TELEPHONE ENCOUNTER
----- Message from Elma Thakur sent at 8/2/2018 11:45 AM CDT -----  Regarding: Trulicity  Contact: [phone]  Good morning.    We received a prescription for Trulicity for Ms. Vazquez.  [facility] does not handle this medication.  The patient requested it be filled through [facility], which is in her [employer] profile.  Please send a new prescription for Trulicity to [facility] as soon as possible, as the patient is eager to get started on the medication.    Thank you,  Elma  [id number]

## 2018-08-07 DIAGNOSIS — M02.30 REACTIVE ARTHRITIS: ICD-10-CM

## 2018-08-07 RX ORDER — CYCLOBENZAPRINE HCL 10 MG
TABLET ORAL
Qty: 60 TABLET | Refills: 3 | Status: SHIPPED | OUTPATIENT
Start: 2018-08-07 | End: 2019-04-01 | Stop reason: SDUPTHER

## 2018-08-09 ENCOUNTER — TELEPHONE (OUTPATIENT)
Dept: PHARMACY | Facility: CLINIC | Age: 53
End: 2018-08-09

## 2018-08-09 DIAGNOSIS — M47.819 SPONDYLOARTHRITIS: ICD-10-CM

## 2018-08-16 ENCOUNTER — TELEPHONE (OUTPATIENT)
Dept: BARIATRICS | Facility: CLINIC | Age: 53
End: 2018-08-16

## 2018-08-16 DIAGNOSIS — R11.10 VOMITING, INTRACTABILITY OF VOMITING NOT SPECIFIED, PRESENCE OF NAUSEA NOT SPECIFIED, UNSPECIFIED VOMITING TYPE: Primary | ICD-10-CM

## 2018-08-16 RX ORDER — ONDANSETRON 4 MG/1
4 TABLET, ORALLY DISINTEGRATING ORAL EVERY 8 HOURS PRN
Qty: 20 TABLET | Refills: 0 | Status: SHIPPED | OUTPATIENT
Start: 2018-08-16 | End: 2022-05-06

## 2018-08-16 NOTE — TELEPHONE ENCOUNTER
----- Message from Ashley Jaramillo sent at 8/16/2018  8:57 AM CDT -----  Contact: Pt  Needs Advice    Reason for call: Pt calling to speak with someone in regards to her being in pain, pt didn't specify exactly where the pain is located.       Communication Preference: [phone]  Additional Information: N/A

## 2018-08-16 NOTE — TELEPHONE ENCOUNTER
I spoke to the patient about her first dosage of Trulicity which was August 6. She took her last dosage of Trulicity August 13th. I told her stop the metformin for now, which she stated she has not taken anything including the metformin for about 3 days because she cannot hold nothing down she throws it all up. I let her know that the Zofran was sent to the pharmacy and to also go buy the OTC Imodium.

## 2018-08-16 NOTE — TELEPHONE ENCOUNTER
When did she take her first dose of the trulicity?   When did she last take the trulicity?   She should stop metformin for now.   I will send in some zofran for the nausea.   Clear sugar free liquids, and bland soft food only for now.   She can try OTC immodium for a few doses as well.

## 2018-08-17 ENCOUNTER — LAB VISIT (OUTPATIENT)
Dept: LAB | Facility: HOSPITAL | Age: 53
End: 2018-08-17
Attending: INTERNAL MEDICINE
Payer: MEDICARE

## 2018-08-17 ENCOUNTER — PATIENT MESSAGE (OUTPATIENT)
Dept: BARIATRICS | Facility: CLINIC | Age: 53
End: 2018-08-17

## 2018-08-17 DIAGNOSIS — R11.10 VOMITING, INTRACTABILITY OF VOMITING NOT SPECIFIED, PRESENCE OF NAUSEA NOT SPECIFIED, UNSPECIFIED VOMITING TYPE: ICD-10-CM

## 2018-08-17 LAB
AMYLASE SERPL-CCNC: 81 U/L
LIPASE SERPL-CCNC: 46 U/L

## 2018-08-17 PROCEDURE — 83690 ASSAY OF LIPASE: CPT | Mod: PO

## 2018-08-17 PROCEDURE — 36415 COLL VENOUS BLD VENIPUNCTURE: CPT | Mod: PO

## 2018-08-17 PROCEDURE — 82150 ASSAY OF AMYLASE: CPT | Mod: PO

## 2018-08-20 DIAGNOSIS — E11.9 TYPE 2 DIABETES MELLITUS: ICD-10-CM

## 2018-08-30 ENCOUNTER — PATIENT MESSAGE (OUTPATIENT)
Dept: BARIATRICS | Facility: CLINIC | Age: 53
End: 2018-08-30

## 2018-08-30 DIAGNOSIS — E11.42 TYPE 2 DIABETES MELLITUS WITH DIABETIC POLYNEUROPATHY, WITHOUT LONG-TERM CURRENT USE OF INSULIN: Primary | Chronic | ICD-10-CM

## 2018-09-04 ENCOUNTER — PATIENT MESSAGE (OUTPATIENT)
Dept: BARIATRICS | Facility: CLINIC | Age: 53
End: 2018-09-04

## 2018-09-04 DIAGNOSIS — M47.819 SPONDYLOARTHRITIS: ICD-10-CM

## 2018-09-04 RX ORDER — TOPIRAMATE 25 MG/1
25 TABLET ORAL 2 TIMES DAILY
Qty: 60 TABLET | Refills: 3 | Status: SHIPPED | OUTPATIENT
Start: 2018-09-04 | End: 2018-09-05

## 2018-09-05 ENCOUNTER — TELEPHONE (OUTPATIENT)
Dept: BARIATRICS | Facility: CLINIC | Age: 53
End: 2018-09-05

## 2018-09-05 DIAGNOSIS — M47.819 SPONDYLOARTHRITIS: ICD-10-CM

## 2018-09-05 RX ORDER — ZOLPIDEM TARTRATE 5 MG/1
TABLET ORAL
Qty: 30 TABLET | Refills: 2 | Status: SHIPPED | OUTPATIENT
Start: 2018-09-05 | End: 2018-10-12 | Stop reason: SDUPTHER

## 2018-09-05 RX ORDER — CANAGLIFLOZIN 300 MG/1
TABLET, FILM COATED ORAL
Qty: 30 TABLET | Refills: 11 | Status: SHIPPED | OUTPATIENT
Start: 2018-09-05 | End: 2019-09-16 | Stop reason: SDUPTHER

## 2018-09-05 RX ORDER — TOPIRAMATE 100 MG/1
100 TABLET, FILM COATED ORAL 2 TIMES DAILY
COMMUNITY
End: 2023-02-16 | Stop reason: SDUPTHER

## 2018-09-05 NOTE — TELEPHONE ENCOUNTER
Called to let patient know to continue her Topiramate from Dr Dennison 100mg 2x's daily. And that I canceled the RX Dr Moya prescribed

## 2018-09-07 ENCOUNTER — TELEPHONE (OUTPATIENT)
Dept: PHARMACY | Facility: CLINIC | Age: 53
End: 2018-09-07

## 2018-09-12 ENCOUNTER — OFFICE VISIT (OUTPATIENT)
Dept: INTERNAL MEDICINE | Facility: CLINIC | Age: 53
End: 2018-09-12
Payer: MEDICARE

## 2018-09-12 ENCOUNTER — LAB VISIT (OUTPATIENT)
Dept: LAB | Facility: HOSPITAL | Age: 53
End: 2018-09-12
Attending: INTERNAL MEDICINE
Payer: MEDICARE

## 2018-09-12 VITALS
HEIGHT: 66 IN | SYSTOLIC BLOOD PRESSURE: 130 MMHG | WEIGHT: 231.94 LBS | DIASTOLIC BLOOD PRESSURE: 80 MMHG | TEMPERATURE: 98 F | HEART RATE: 63 BPM | RESPIRATION RATE: 18 BRPM | BODY MASS INDEX: 37.28 KG/M2

## 2018-09-12 DIAGNOSIS — Z79.620 LONG-TERM USE OF ADALIMUMAB: ICD-10-CM

## 2018-09-12 DIAGNOSIS — E11.59 HYPERTENSION ASSOCIATED WITH DIABETES: ICD-10-CM

## 2018-09-12 DIAGNOSIS — N30.00 ACUTE CYSTITIS WITHOUT HEMATURIA: ICD-10-CM

## 2018-09-12 DIAGNOSIS — M46.90 AXIAL SPONDYLOARTHRITIS: Chronic | ICD-10-CM

## 2018-09-12 DIAGNOSIS — E11.69 HYPERLIPIDEMIA ASSOCIATED WITH TYPE 2 DIABETES MELLITUS: ICD-10-CM

## 2018-09-12 DIAGNOSIS — I15.2 HYPERTENSION ASSOCIATED WITH DIABETES: ICD-10-CM

## 2018-09-12 DIAGNOSIS — Z86.19 HISTORY OF HEPATITIS B: ICD-10-CM

## 2018-09-12 DIAGNOSIS — E78.5 HYPERLIPIDEMIA ASSOCIATED WITH TYPE 2 DIABETES MELLITUS: ICD-10-CM

## 2018-09-12 DIAGNOSIS — F41.9 ANXIETY: ICD-10-CM

## 2018-09-12 DIAGNOSIS — G47.00 INSOMNIA, UNSPECIFIED TYPE: Chronic | ICD-10-CM

## 2018-09-12 DIAGNOSIS — R30.0 DYSURIA: ICD-10-CM

## 2018-09-12 DIAGNOSIS — E11.42 TYPE 2 DIABETES MELLITUS WITH DIABETIC POLYNEUROPATHY, WITHOUT LONG-TERM CURRENT USE OF INSULIN: Primary | Chronic | ICD-10-CM

## 2018-09-12 DIAGNOSIS — E66.01 SEVERE OBESITY (BMI 35.0-39.9) WITH COMORBIDITY: ICD-10-CM

## 2018-09-12 PROBLEM — I10 ESSENTIAL HYPERTENSION: Status: RESOLVED | Noted: 2018-04-30 | Resolved: 2018-09-12

## 2018-09-12 PROCEDURE — 86480 TB TEST CELL IMMUN MEASURE: CPT

## 2018-09-12 PROCEDURE — 99214 OFFICE O/P EST MOD 30 MIN: CPT | Mod: S$PBB,,, | Performed by: INTERNAL MEDICINE

## 2018-09-12 PROCEDURE — 99999 PR PBB SHADOW E&M-EST. PATIENT-LVL III: CPT | Mod: PBBFAC,,, | Performed by: INTERNAL MEDICINE

## 2018-09-12 PROCEDURE — 99213 OFFICE O/P EST LOW 20 MIN: CPT | Mod: PBBFAC,PO | Performed by: INTERNAL MEDICINE

## 2018-09-12 PROCEDURE — 36415 COLL VENOUS BLD VENIPUNCTURE: CPT | Mod: PO

## 2018-09-12 PROCEDURE — 3079F DIAST BP 80-89 MM HG: CPT | Mod: ,,, | Performed by: INTERNAL MEDICINE

## 2018-09-12 PROCEDURE — 3008F BODY MASS INDEX DOCD: CPT | Mod: ,,, | Performed by: INTERNAL MEDICINE

## 2018-09-12 PROCEDURE — 3075F SYST BP GE 130 - 139MM HG: CPT | Mod: ,,, | Performed by: INTERNAL MEDICINE

## 2018-09-12 PROCEDURE — 3044F HG A1C LEVEL LT 7.0%: CPT | Mod: ,,, | Performed by: INTERNAL MEDICINE

## 2018-09-12 RX ORDER — AMOXICILLIN AND CLAVULANATE POTASSIUM 875; 125 MG/1; MG/1
1 TABLET, FILM COATED ORAL EVERY 12 HOURS
Qty: 14 TABLET | Refills: 0 | Status: SHIPPED | OUTPATIENT
Start: 2018-09-12 | End: 2019-01-17 | Stop reason: ALTCHOICE

## 2018-09-12 RX ORDER — ACETAMINOPHEN AND CODEINE PHOSPHATE 300; 30 MG/1; MG/1
1 TABLET ORAL
Refills: 0 | COMMUNITY
Start: 2018-07-09 | End: 2019-05-20

## 2018-09-12 NOTE — PROGRESS NOTES
Subjective:       Patient ID: Althea Vazquez is a 53 y.o. female.    Chief Complaint: Medication Problem and Urinary Tract Infection    HPI   Pt with T2DM, HTN, HLD, Reactive arthritis, Severe obesity, Insomnia, Hx of Hep B is here for f/u. Pt was recently started on Trulicity but stopped it 2/2 severe diarrhea. She will restart Metformin/Invokana.     Pt c/o 1 week of intermittent dysuria, increased frequency with urgency. No blood, fevers/chills.   Review of Systems   Constitutional: Negative for activity change, appetite change, chills, diaphoresis, fatigue, fever and unexpected weight change.   HENT: Negative for postnasal drip, rhinorrhea, sinus pressure, sneezing, sore throat, trouble swallowing and voice change.    Respiratory: Negative for cough, shortness of breath and wheezing.    Cardiovascular: Negative for chest pain, palpitations and leg swelling.   Gastrointestinal: Negative for abdominal pain, blood in stool, constipation, diarrhea, nausea and vomiting.   Genitourinary: Positive for dysuria, frequency and urgency. Negative for hematuria, pelvic pain and vaginal pain.   Musculoskeletal: Positive for arthralgias. Negative for myalgias.   Skin: Negative for rash and wound.   Allergic/Immunologic: Negative for environmental allergies and food allergies.   Hematological: Negative for adenopathy. Does not bruise/bleed easily.   Psychiatric/Behavioral: Positive for sleep disturbance. Negative for self-injury and suicidal ideas. The patient is nervous/anxious.        Objective:      Physical Exam   Constitutional: She is oriented to person, place, and time. She appears well-developed and well-nourished. No distress.   HENT:   Head: Normocephalic and atraumatic.   Eyes: Conjunctivae and EOM are normal. Pupils are equal, round, and reactive to light. Right eye exhibits no discharge. Left eye exhibits no discharge. No scleral icterus.   Neck: Neck supple. No JVD present.   Cardiovascular: Normal  rate, regular rhythm, normal heart sounds and intact distal pulses.   Pulmonary/Chest: Effort normal and breath sounds normal. No respiratory distress. She has no wheezes. She has no rales.   Musculoskeletal: She exhibits no edema.   Lymphadenopathy:     She has no cervical adenopathy.   Neurological: She is alert and oriented to person, place, and time.   Skin: Skin is warm and dry. No rash noted. She is not diaphoretic. No pallor.       Assessment:       1. Type 2 diabetes mellitus with diabetic polyneuropathy, without long-term current use of insulin    2. Hypertension associated with diabetes    3. Hyperlipidemia associated with type 2 diabetes mellitus    4. Axial spondyloarthritis    5. Anxiety    6. Severe obesity (BMI 35.0-39.9) with comorbidity    7. Insomnia, unspecified type    8. History of hepatitis B    9. Dysuria    10. Acute cystitis without hematuria        Plan:    1. T2DM with neuropathy- stable with last HA1C of 5.9(4/18)<--5.8(2/17)<--6.4(4/16)<--5.9(8/15)       Restart Metformin 1 gm qam/Invokana 300 mg daily   2. HTN- continue Losartan 100 mg, Toprol  mg, Norvasc to 10 mg, HCTZ to 25 mg daily   3. HLD- stable, on Lipitor 40 mg qHS   4. Spondyloarthritis- stable on Sulfasalazine/Naproxen       Followed by Rheumatology       Stable on Norco 5-325 mg q6 hrs PRN severe pain   5. Anxiety- stable on Prozac/Klonopin, managed by Psyc   6. Severe obesity- pt advised on proper diet/exercise for weight loss   7. Insomnia- stable on Ambien 5 mg qHS PRN   8. Hx of Hep B- followed by Hepatology    9/10. Check UA/Urine Cx            Rx Augmentin BID x 7 days

## 2018-09-14 LAB
M TB IFN-G CD4+ BCKGRND COR BLD-ACNC: -0.07 IU/ML
MITOGEN IGNF BCKGRD COR BLD-ACNC: >10 IU/ML
MITOGEN IGNF BCKGRD COR BLD-ACNC: NEGATIVE [IU]/ML
NIL: 0.17 IU/ML
TB2 - NIL: -0.03 IU/ML

## 2018-09-17 ENCOUNTER — OFFICE VISIT (OUTPATIENT)
Dept: RHEUMATOLOGY | Facility: CLINIC | Age: 53
End: 2018-09-17
Payer: MEDICARE

## 2018-09-17 VITALS
SYSTOLIC BLOOD PRESSURE: 116 MMHG | WEIGHT: 226 LBS | BODY MASS INDEX: 36.32 KG/M2 | HEART RATE: 86 BPM | HEIGHT: 66 IN | DIASTOLIC BLOOD PRESSURE: 81 MMHG

## 2018-09-17 DIAGNOSIS — E66.01 MORBID OBESITY WITH BMI OF 40.0-44.9, ADULT: ICD-10-CM

## 2018-09-17 DIAGNOSIS — M46.90 AXIAL SPONDYLOARTHRITIS: Chronic | ICD-10-CM

## 2018-09-17 DIAGNOSIS — M02.30 REACTIVE ARTHRITIS: ICD-10-CM

## 2018-09-17 DIAGNOSIS — Z79.899 HIGH RISK MEDICATION USE: ICD-10-CM

## 2018-09-17 DIAGNOSIS — M47.819 SPONDYLOARTHRITIS: ICD-10-CM

## 2018-09-17 DIAGNOSIS — M19.90 INFLAMMATORY ARTHRITIS: ICD-10-CM

## 2018-09-17 DIAGNOSIS — R93.89 ABNORMAL CHEST CT: ICD-10-CM

## 2018-09-17 DIAGNOSIS — I15.2 HYPERTENSION ASSOCIATED WITH DIABETES: ICD-10-CM

## 2018-09-17 DIAGNOSIS — Z86.19 HISTORY OF HEPATITIS B: ICD-10-CM

## 2018-09-17 DIAGNOSIS — E11.59 HYPERTENSION ASSOCIATED WITH DIABETES: ICD-10-CM

## 2018-09-17 PROCEDURE — 3044F HG A1C LEVEL LT 7.0%: CPT | Mod: ,,, | Performed by: INTERNAL MEDICINE

## 2018-09-17 PROCEDURE — 99999 PR PBB SHADOW E&M-EST. PATIENT-LVL III: CPT | Mod: PBBFAC,,, | Performed by: INTERNAL MEDICINE

## 2018-09-17 PROCEDURE — 3008F BODY MASS INDEX DOCD: CPT | Mod: ,,, | Performed by: INTERNAL MEDICINE

## 2018-09-17 PROCEDURE — 3079F DIAST BP 80-89 MM HG: CPT | Mod: ,,, | Performed by: INTERNAL MEDICINE

## 2018-09-17 PROCEDURE — 99214 OFFICE O/P EST MOD 30 MIN: CPT | Mod: S$PBB,,, | Performed by: INTERNAL MEDICINE

## 2018-09-17 PROCEDURE — 99213 OFFICE O/P EST LOW 20 MIN: CPT | Mod: PBBFAC | Performed by: INTERNAL MEDICINE

## 2018-09-17 PROCEDURE — 3074F SYST BP LT 130 MM HG: CPT | Mod: ,,, | Performed by: INTERNAL MEDICINE

## 2018-09-17 RX ORDER — NAPROXEN 500 MG/1
500 TABLET ORAL 2 TIMES DAILY PRN
Qty: 180 TABLET | Refills: 0 | Status: SHIPPED | OUTPATIENT
Start: 2018-09-17 | End: 2019-01-30 | Stop reason: SDUPTHER

## 2018-09-17 RX ORDER — SULFASALAZINE 500 MG/1
1500 TABLET, DELAYED RELEASE ORAL 2 TIMES DAILY
Qty: 540 TABLET | Refills: 0 | Status: SHIPPED | OUTPATIENT
Start: 2018-09-17 | End: 2019-01-30 | Stop reason: SDUPTHER

## 2018-09-17 ASSESSMENT — ANKYLOSING SPONDYLITIS DISEASE ACTIVITY SCORE (ASDAS-CRP)
TOTAL_SCORE: 2.33
MORNING_STIFFNESS: 4
GLOBAL_ACTIVITY: 4
CRP_MG_PER_LITER: 4.5
PAIN_SWELLING: 6
NBH_PAIN: 2

## 2018-09-17 ASSESSMENT — ROUTINE ASSESSMENT OF PATIENT INDEX DATA (RAPID3)
PSYCHOLOGICAL DISTRESS SCORE: 6.6
TOTAL RAPID3 SCORE: 4.78
WHEN YOU AWAKENED IN THE MORNING OVER THE LAST WEEK, PLEASE INDICATE THE AMOUNT OF TIME IT TAKES UNTIL YOU ARE AS LIMBER AS YOU WILL BE FOR THE DAY: 1 HR
FATIGUE SCORE: 8
MDHAQ FUNCTION SCORE: 1
AM STIFFNESS SCORE: 1, YES
PAIN SCORE: 5
PATIENT GLOBAL ASSESSMENT SCORE: 6

## 2018-09-17 NOTE — PROGRESS NOTES
"Subjective:       Patient ID: Althea Vazquez is a 53 y.o. female.    Chief Complaint: non-radiographic axial spondyloarthritis: OA hips(mod-severe)  HPI 1 hr am stiffness. Took duraglutide  For 3 wks but stopped b/o persistent diarrhea, N/V, now resolved. Also currently under treatment for Klebsiella UTI on Augmentin, next dose of adalimumab not due until 9/24.  Back is fine at present. Yesterday "legs gave way" but no LOC, associated symptoms or pain.  Review of Systems   Constitutional: Positive for fatigue. Negative for appetite change, fever and unexpected weight change.   HENT: Negative for mouth sores.    Eyes: Negative for visual disturbance.   Respiratory: Negative for cough, shortness of breath and wheezing.    Cardiovascular: Negative for chest pain and palpitations.   Gastrointestinal: Negative for abdominal pain, anal bleeding, blood in stool, constipation, diarrhea, nausea and vomiting.   Genitourinary: Negative for dysuria, frequency and urgency.   Musculoskeletal: Negative for arthralgias, back pain, gait problem, joint swelling, myalgias, neck pain and neck stiffness.   Skin: Negative for rash.   Neurological: Negative for weakness, numbness and headaches.   Hematological: Negative for adenopathy. Does not bruise/bleed easily.   Psychiatric/Behavioral: Negative for sleep disturbance. The patient is not nervous/anxious.          Objective:   /81   Pulse 86   Ht 5' 6" (1.676 m)   Wt 102.5 kg (226 lb)   LMP 11/25/2014   BMI 36.48 kg/m²      Physical Exam   Constitutional: She is oriented to person, place, and time and well-developed, well-nourished, and in no distress.   HENT:   Head: Normocephalic and atraumatic.   Mouth/Throat: Oropharynx is clear and moist.   Eyes: Conjunctivae and EOM are normal.   Neck: Normal range of motion. Neck supple. No thyromegaly present.   Cardiovascular: Normal rate, regular rhythm, normal heart sounds and intact distal pulses.  Exam reveals no " gallop and no friction rub.    No murmur heard.  Pulmonary/Chest: Breath sounds normal. She has no wheezes. She has no rales. She exhibits no tenderness.   Abdominal: Soft. She exhibits no distension and no mass. There is no tenderness.       Right Side Rheumatological Exam     Examination finds the shoulder, elbow, wrist, knee, 1st PIP, 1st MCP, 2nd PIP, 2nd MCP, 3rd PIP, 3rd MCP, 4th PIP, 4th MCP, 5th PIP and 5th MCP normal.    Muscle Strength (0-5 scale):  Iliopsoas: 5  Quadriceps:  5   Distal Lower Extremity: 5    Left Side Rheumatological Exam     Examination finds the shoulder, elbow, wrist, knee, 1st PIP, 1st MCP, 2nd PIP, 2nd MCP, 3rd PIP, 3rd MCP, 4th PIP, 4th MCP, 5th PIP and 5th MCP normal.    Muscle Strength (0-5 scale):  Iliopsoas: 5  Quadriceps:  5   Distal Lower Extremity: 5      Lymphadenopathy:     She has no cervical adenopathy.   Neurological: She is alert and oriented to person, place, and time. She displays normal reflexes. Gait normal.   Reflex Scores:       Patellar reflexes are 2+ on the right side and 2+ on the left side.       Achilles reflexes are 1+ on the right side and 1+ on the left side.  Nl motor strength UE and LE bilateral   Musculoskeletal: She exhibits no edema.         Results for NABOR HERNANDEZ (MRN [id number]) as of 9/17/2018 15:21   Ref. Range 9/12/2018 11:11 9/12/2018 11:26   WBC Latest Ref Range: 3.90 - 12.70 K/uL  6.82   RBC Latest Ref Range: 4.00 - 5.40 M/uL  4.80   Hemoglobin Latest Ref Range: 12.0 - 16.0 g/dL  14.4   Hematocrit Latest Ref Range: 37.0 - 48.5 %  44.2   MCV Latest Ref Range: 82 - 98 fL  92   MCH Latest Ref Range: 27.0 - 31.0 pg  30.0   MCHC Latest Ref Range: 32.0 - 36.0 g/dL  32.6   RDW Latest Ref Range: 11.5 - 14.5 %  13.3   Platelets Latest Ref Range: 150 - 350 K/uL  307   MPV Latest Ref Range: 9.2 - 12.9 fL  11.3   Gran% Latest Ref Range: 38.0 - 73.0 %  39.8   Gran # (ANC) Latest Ref Range: 1.8 - 7.7 K/uL  2.7   Immature Granulocytes Latest Ref  Range: 0.0 - 0.5 %  0.6 (H)   Immature Grans (Abs) Latest Ref Range: 0.00 - 0.04 K/uL  0.04   Lymph% Latest Ref Range: 18.0 - 48.0 %  44.9   Lymph # Latest Ref Range: 1.0 - 4.8 K/uL  3.1   Mono% Latest Ref Range: 4.0 - 15.0 %  7.5   Mono # Latest Ref Range: 0.3 - 1.0 K/uL  0.5   Eosinophil% Latest Ref Range: 0.0 - 8.0 %  6.2   Eos # Latest Ref Range: 0.0 - 0.5 K/uL  0.4   Basophil% Latest Ref Range: 0.0 - 1.9 %  1.0   Baso # Latest Ref Range: 0.00 - 0.20 K/uL  0.07   nRBC Latest Ref Range: 0 /100 WBC  0   Sed Rate Latest Ref Range: 0 - 36 mm/Hr  7   Sodium Latest Ref Range: 136 - 145 mmol/L  139   Potassium Latest Ref Range: 3.5 - 5.1 mmol/L  3.7   Chloride Latest Ref Range: 95 - 110 mmol/L  105   CO2 Latest Ref Range: 23 - 29 mmol/L  27   Anion Gap Latest Ref Range: 8 - 16 mmol/L  7 (L)   BUN, Bld Latest Ref Range: 6 - 20 mg/dL  13   Creatinine Latest Ref Range: 0.5 - 1.4 mg/dL  0.8   eGFR if non African American Latest Ref Range: >60 mL/min/1.73 m^2  >60.0   eGFR if African American Latest Ref Range: >60 mL/min/1.73 m^2  >60.0   Glucose Latest Ref Range: 70 - 110 mg/dL  116 (H)   Calcium Latest Ref Range: 8.7 - 10.5 mg/dL  9.3   Alkaline Phosphatase Latest Ref Range: 55 - 135 U/L  78   Total Protein Latest Ref Range: 6.0 - 8.4 g/dL  7.5   Albumin Latest Ref Range: 3.5 - 5.2 g/dL  4.2   Total Bilirubin Latest Ref Range: 0.1 - 1.0 mg/dL  0.8   AST Latest Ref Range: 10 - 40 U/L  23   ALT Latest Ref Range: 10 - 44 U/L  27   CRP Latest Ref Range: 0.0 - 8.2 mg/L  4.5   Mitogen - Nil Latest Ref Range: See text IU/mL  >10.000   NIL Latest Ref Range: See text IU/mL  0.170   TB Gold Plus Unknown  Negative   TB1 - Nil Latest Ref Range: See text IU/mL  -0.070   TB2 - Nil Latest Ref Range: See text IU/mL  -0.030   Specimen UA Unknown Urine, Clean Catch    Color, UA Latest Ref Range: Yellow, Straw, Norma  Norma    pH, UA Latest Ref Range: 5.0 - 8.0  5.0    Specific Gravity, UA Latest Ref Range: 1.005 - 1.030  1.020     Appearance, UA Latest Ref Range: Clear  Cloudy (A)    Protein, UA Latest Ref Range: Negative  Negative    Glucose, UA Latest Ref Range: Negative  Negative    Ketones, UA Latest Ref Range: Negative  Negative    Occult Blood UA Latest Ref Range: Negative  1+ (A)    Nitrite, UA Latest Ref Range: Negative  Positive (A)    Urobilinogen, UA Latest Ref Range: <2.0 EU/dL Negative    Bilirubin (UA) Latest Ref Range: Negative  Negative    Leukocytes, UA Latest Ref Range: Negative  2+ (A)    RBC, UA Latest Ref Range: 0 - 4 /hpf 5 (H)    WBC, UA Latest Ref Range: 0 - 5 /hpf 16 (H)    WBC Clumps, UA Latest Ref Range: None-Rare  Rare    Bacteria, UA Latest Ref Range: None-Occ /hpf Many (A)    Yeast, UA Latest Ref Range: None  None    Microscopic Comment Unknown SEE COMMENT    CULTURE, URINE Unknown Rpt    Urine Culture, Routine Unknown KLEBSIELLA PNEUMO...    Differential Method Unknown  Automated     Assessment/Plan         Problem List Items Addressed This Visit     Axial spondyloarthritis (Chronic)    Overview     Non radiographic Axial and peripheral  Results for NABOR HERNANDEZ (MRN [id number]) as of 9/17/2018 15:21   Ref. Range 9/12/2018 11:26   Mitogen - Nil Latest Ref Range: See text IU/mL >10.000   NIL Latest Ref Range: See text IU/mL 0.170   TB Gold Plus Unknown Negative   TB1 - Nil Latest Ref Range: See text IU/mL -0.070   TB2 - Nil Latest Ref Range: See text IU/mL -0.030            Current Assessment & Plan     Schober's 10 -15.5 cm  Mildly decreased lateral flexion and extension  Fabere neg  Chest expansion 5 cm  O-W 0 cm  Neck rom normal    ASDAS- CRP 2.33(MDA)  BASDAI 4  BASFI  2.75(moderate functional limitation)    Cont adalimumab 40mg sc q 2 wks, next dose 9/20 can proceed if UTI resolved and Augmentin completed  Sulfasalazine  1500mg twice daily  Naproxen 500mg twice daily prn with pantoprazole 40mg daily  Resume stretches  Increase aerobic exercise: walk, bike, pool, gym  RTC 3 months with standing  labs if still active could consider change to secukinumab.          Relevant Medications    sulfaSALAzine (AZULFIDINE) 500 MG TbEC    adalimumab (HUMIRA) PnKt injection    High risk medication use-sulfasalazine 2 gm    Relevant Medications    sulfaSALAzine (AZULFIDINE) 500 MG TbEC    History of hepatitis B    Overview     Hep B core total Ab (+), no active/chronic infection         Current Assessment & Plan     Cont entecavir 0.5mg daily per Lucretiadeshaun Berger  HBV DNA q 3 months         Abnormal chest CT    Overview     Maru Hernández MA on 5/14/2018 14:54   Weston Begum DO on 5/14/2018 12:47   Result Notes for X-Ray Chest PA And Lateral     Notes recorded by Weston Begum DO on 5/14/2018 at 12:47 PM CDT  Normal X-ray of the chest   X-Ray Chest PA And Lateral   Order: [id number]   Status:  Final result   Visible to patient:  Yes (Patient Portal)   Next appt:  09/18/2018 at 09:00 AM in Obstetrics and Gynecology (Jodi Echeverria MD)   Dx:  Hemoptysis   Details     Reading Physician Reading Date Result Priority   Juwan Manzo MD 5/14/2018       Narrative     EXAMINATION:  XR CHEST PA AND LATERAL    CLINICAL HISTORY:  Hemoptysis    TECHNIQUE:  PA and lateral views of the chest were performed.    COMPARISON:  None    FINDINGS:  The lungs are clear, with normal appearance of pulmonary vasculature and no pleural effusion or pneumothorax.    The cardiac silhouette is normal in size. The hilar and mediastinal contours are unremarkable.    Bones are intact.      Impression       No acute abnormality.      Electronically signed by: Juwan Manzo MD  Date: 05/14/2018                    Morbid obesity with BMI of 40.0-44.9, adult    Current Assessment & Plan     GI intolerance of duraglutide  Now on topiramate 100mg twice daily  F/u Dr. Moya         Hypertension associated with diabetes    Current Assessment & Plan     116/81           Other Visit Diagnoses     Spondyloarthritis        Relevant Medications     sulfaSALAzine (AZULFIDINE) 500 MG TbEC    adalimumab (HUMIRA) PnKt injection    Reactive arthritis        Relevant Medications    sulfaSALAzine (AZULFIDINE) 500 MG TbEC    Inflammatory arthritis        Relevant Medications    sulfaSALAzine (AZULFIDINE) 500 MG TbEC

## 2018-09-17 NOTE — ASSESSMENT & PLAN NOTE
Schober's 10 -15.5 cm  Mildly decreased lateral flexion and extension  Fabere neg  Chest expansion 5 cm  O-W 0 cm  Neck rom normal    ASDAS- CRP 2.33(MDA)  BASDAI 4  BASFI  2.75(moderate functional limitation)    Cont adalimumab 40mg sc q 2 wks, next dose 9/20 can proceed if UTI resolved and Augmentin completed  Sulfasalazine  1500mg twice daily  Naproxen 500mg twice daily prn with pantoprazole 40mg daily  Resume stretches  Increase aerobic exercise: walk, bike, pool, gym  RTC 3 months with standing labs if still active could consider change to secukinumab.

## 2018-09-18 ENCOUNTER — OFFICE VISIT (OUTPATIENT)
Dept: OBSTETRICS AND GYNECOLOGY | Facility: CLINIC | Age: 53
End: 2018-09-18
Payer: MEDICARE

## 2018-09-18 VITALS
BODY MASS INDEX: 36.56 KG/M2 | WEIGHT: 227.5 LBS | HEIGHT: 66 IN | DIASTOLIC BLOOD PRESSURE: 96 MMHG | SYSTOLIC BLOOD PRESSURE: 140 MMHG

## 2018-09-18 DIAGNOSIS — Z90.710 S/P HYSTERECTOMY: ICD-10-CM

## 2018-09-18 DIAGNOSIS — E11.42 TYPE 2 DIABETES MELLITUS WITH DIABETIC POLYNEUROPATHY, WITHOUT LONG-TERM CURRENT USE OF INSULIN: ICD-10-CM

## 2018-09-18 DIAGNOSIS — Z12.39 BREAST CANCER SCREENING: ICD-10-CM

## 2018-09-18 DIAGNOSIS — Z01.419 ENCOUNTER FOR GYNECOLOGICAL EXAMINATION WITHOUT ABNORMAL FINDING: Primary | ICD-10-CM

## 2018-09-18 DIAGNOSIS — B37.9 CANDIDA INFECTION: ICD-10-CM

## 2018-09-18 DIAGNOSIS — I10 HYPERTENSION, UNSPECIFIED TYPE: ICD-10-CM

## 2018-09-18 PROCEDURE — 99213 OFFICE O/P EST LOW 20 MIN: CPT | Mod: PBBFAC,PN,25 | Performed by: OBSTETRICS & GYNECOLOGY

## 2018-09-18 PROCEDURE — 99999 PR PBB SHADOW E&M-EST. PATIENT-LVL III: CPT | Mod: PBBFAC,,, | Performed by: OBSTETRICS & GYNECOLOGY

## 2018-09-18 PROCEDURE — G0101 CA SCREEN;PELVIC/BREAST EXAM: HCPCS | Mod: PBBFAC,PN | Performed by: OBSTETRICS & GYNECOLOGY

## 2018-09-18 PROCEDURE — G0101 CA SCREEN;PELVIC/BREAST EXAM: HCPCS | Mod: S$PBB,,, | Performed by: OBSTETRICS & GYNECOLOGY

## 2018-09-18 RX ORDER — TERCONAZOLE 4 MG/G
1 CREAM VAGINAL NIGHTLY
Qty: 45 G | Refills: 2 | Status: SHIPPED | OUTPATIENT
Start: 2018-09-18 | End: 2020-02-06

## 2018-09-18 RX ORDER — NYSTATIN 100000 [USP'U]/G
POWDER TOPICAL
Qty: 1 BOTTLE | Refills: 3 | Status: SHIPPED | OUTPATIENT
Start: 2018-09-18 | End: 2023-11-09

## 2018-09-18 NOTE — PROGRESS NOTES
CC: Well woman exam    Althea Vazquez is a 53 y.o. female  presents for a well woman exam.  She is having yeast infection after   Taking Abx for UTI.  Trying to control her DM.   NO PMB.       Past Medical History:   Diagnosis Date    Acid reflux     Anxiety 10/18/2012    Arthritis     Depression     Diabetic peripheral neuropathy - mild 10/21/2014    Difficult intubation     Dry eyes     Dry mouth     Fever blister     History of hepatitis B 10/3/2016    Hep B core total Ab (+), no active/chronic infection    Hyperlipidemia     Hypertension     Insomnia     Iritis 2014    Long-term current use of steroids 2012    Nausea & vomiting 2015    VAISHNAVI (obstructive sleep apnea)     Type II diabetes mellitus 10/1/2012     Past Surgical History:   Procedure Laterality Date    ARTHROSCOPY-KNEE Right 2014    Performed by [facility] at [facility] OR 2ND FLR    COLONOSCOPY N/A 2018    Procedure: COLONOSCOPY;  Surgeon: Juwan Lopez MD;  Location: [facility] (2ND FLR);  Service: Endoscopy;  Laterality: N/A;  per anesthesia, pt. needs to be on 2nd floor due to past Anesthesia problems    COLONOSCOPY N/A 2018    Performed by Juwan Lopez MD at [facility] (2ND FLR)    CYSTOSCOPY  12/3/2014    [facility]    HYSTERECTOMY  12/3/2014    KNEE ARTHROSCOPY  14    right    ROBOTIC ASSISTED LAPAROSCOPIC HYSTERECTOMY N/A 12/3/2014    [facility] [place] OR    TUBAL LIGATION       Family History   Problem Relation Age of Onset    Diabetes Mother     Cataracts Mother     Stroke Mother     Heart attack Mother     Depression Mother     Stroke Father     Hypertension Father     Hyperlipidemia Father     Diabetes Maternal Aunt     Heart attack Paternal Grandmother     ADD / ADHD Son     No Known Problems Sister     No Known Problems Brother     No Known Problems Maternal Uncle     No Known Problems  "Paternal Aunt     No Known Problems Paternal Uncle     No Known Problems Maternal Grandmother     No Known Problems Maternal Grandfather     Cancer Paternal Grandfather         Lung CA    Melanoma Neg Hx     Eczema Neg Hx     Lupus Neg Hx     Psoriasis Neg Hx     Amblyopia Neg Hx     Blindness Neg Hx     Glaucoma Neg Hx     Macular degeneration Neg Hx     Retinal detachment Neg Hx     Strabismus Neg Hx     Thyroid disease Neg Hx     Suicide Neg Hx     Acne Neg Hx     Cirrhosis Neg Hx     Asthma Neg Hx     Emphysema Neg Hx      Social History     Tobacco Use    Smoking status: Never Smoker    Smokeless tobacco: Never Used   Substance Use Topics    Alcohol use: Yes    Drug use: No     OB History      Para Term  AB Living    2 2       2    SAB TAB Ectopic Multiple Live Births            2          BP (!) 140/96 (BP Location: Right arm, Patient Position: Sitting)   Ht 5' 6" (1.676 m)   Wt 103.2 kg (227 lb 8.2 oz)   LMP 2014   BMI 36.72 kg/m²     ROS:  GENERAL: Denies weight gain or weight loss. Feeling well overall.   SKIN: Denies rash or lesions.   HEAD: Denies head injury or headache.   NODES: Denies enlarged lymph nodes.   CHEST: Denies chest pain or shortness of breath.   CARDIOVASCULAR: Denies palpitations or left sided chest pain.   ABDOMEN: No abdominal pain, constipation, diarrhea, nausea, vomiting or rectal bleeding.   URINARY: No frequency, dysuria, hematuria, or burning on urination.  REPRODUCTIVE: See HPI.   BREASTS: The patient performs breast self-examination and denies pain, lumps, or nipple discharge.   HEMATOLOGIC: No easy bruisability or excessive bleeding.   MUSCULOSKELETAL: Denies joint pain or swelling.   NEUROLOGIC: Denies syncope or weakness.   PSYCHIATRIC: Denies depression, anxiety or mood swings.    PE:   APPEARANCE: Well nourished, well developed, in no acute distress.  AFFECT: WNL, alert and oriented x 3.  SKIN: No acne or hirsutism.  NECK: Neck " symmetric without masses or thyromegaly.  NODES: No inguinal, cervical, axillary or femoral lymph node enlargement.  CHEST: Good respiratory effort.   ABDOMEN: Soft. No tenderness or masses. No hepatosplenomegaly. No hernias.  BREASTS: Symmetrical, no skin changes or visible lesions. No palpable masses, nipple discharge bilaterally.  PELVIC: Normal external female genitalia without lesions. Normal hair distribution. Adequate perineal body, normal urethral meatus. Vagina atrophic without lesions or discharge. No significant cystocele or rectocele. Bimanual exam shows uterus and cervix to be surgically absent. Adnexa without masses or tenderness.  RECTAL: Rectovaginal exam confirms above with normal sphincter tone, no masses.  EXTREMITIES: No edema.      ICD-10-CM ICD-9-CM    1. Encounter for gynecological examination without abnormal finding Z01.419 V72.31    2. Breast cancer screening Z12.31 V76.10 Mammo Digital Screening Bilat with Tomosynthesis_CAD   3. S/P hysterectomy Z90.710 V88.01    4. Candida infection B37.9 112.9 terconazole (TERAZOL 7) 0.4 % Crea      nystatin (MYCOSTATIN) powder   5. Hypertension, unspecified type I10 401.9    6. Type 2 diabetes mellitus with diabetic polyneuropathy, without long-term current use of insulin E11.42 250.60      357.2    Vaginitis prevention including :   a. avoiding feminine products such as deoderant soaps, body wash, bubble bath, douches, scented toilet paper, deoderant tampons or pads, baby or feminine wipes, chronic pad use, etc. and   b. avoiding other vulvovaginal irritants such as long hot baths, humidity, tight, synthetic clothing, chlorine and sitting around in wet bathing suits and   c. wearing cotton underwear, avoiding thong underwear and no underwear to bed and   d. taking showers instead of baths and use a hair dryer on cool setting afterwards to dry and   e.wearing cotton to exercise and shower immediately after exercise and change clothes and   f. using  polyurethane condoms without spermicide if sexually active and symptoms are triggered by intercourse;   Diflucan and Mycolog cream use and potential side effects;   -pelvic rest until symptoms resolve.     Probiotics  Fiber  Exercise and diet        Patient was counseled today on A.C.S. Pap guidelines and recommendations for yearly pelvic exams, mammograms and monthly self breast exams; to see her PCP for other health maintenance.     Follow-up in about 1 year (around 9/18/2019).

## 2018-10-02 ENCOUNTER — TELEPHONE (OUTPATIENT)
Dept: OPTOMETRY | Facility: CLINIC | Age: 53
End: 2018-10-02

## 2018-10-02 ENCOUNTER — TELEPHONE (OUTPATIENT)
Dept: PHARMACY | Facility: CLINIC | Age: 53
End: 2018-10-02

## 2018-10-02 RX ORDER — LOSARTAN POTASSIUM 100 MG/1
TABLET ORAL
Qty: 90 TABLET | Refills: 1 | Status: SHIPPED | OUTPATIENT
Start: 2018-10-02 | End: 2019-04-01 | Stop reason: SDUPTHER

## 2018-10-12 ENCOUNTER — OFFICE VISIT (OUTPATIENT)
Dept: PSYCHIATRY | Facility: CLINIC | Age: 53
End: 2018-10-12
Payer: MEDICARE

## 2018-10-12 VITALS
WEIGHT: 227.19 LBS | DIASTOLIC BLOOD PRESSURE: 79 MMHG | BODY MASS INDEX: 36.67 KG/M2 | HEART RATE: 56 BPM | SYSTOLIC BLOOD PRESSURE: 138 MMHG

## 2018-10-12 DIAGNOSIS — F41.0 PANIC DISORDER WITHOUT AGORAPHOBIA: ICD-10-CM

## 2018-10-12 DIAGNOSIS — F41.1 GENERALIZED ANXIETY DISORDER: ICD-10-CM

## 2018-10-12 PROCEDURE — 99213 OFFICE O/P EST LOW 20 MIN: CPT | Mod: S$PBB,,, | Performed by: PSYCHIATRY & NEUROLOGY

## 2018-10-12 PROCEDURE — 3008F BODY MASS INDEX DOCD: CPT | Mod: ,,, | Performed by: PSYCHIATRY & NEUROLOGY

## 2018-10-12 RX ORDER — FLUOXETINE HYDROCHLORIDE 40 MG/1
40 CAPSULE ORAL EVERY MORNING
Qty: 90 CAPSULE | Refills: 1 | Status: SHIPPED | OUTPATIENT
Start: 2018-10-12 | End: 2019-01-02 | Stop reason: SDUPTHER

## 2018-10-12 RX ORDER — CLONAZEPAM 0.5 MG/1
0.5 TABLET ORAL DAILY PRN
Qty: 90 TABLET | Refills: 1 | Status: SHIPPED | OUTPATIENT
Start: 2018-10-12 | End: 2019-05-08 | Stop reason: SDUPTHER

## 2018-10-12 RX ORDER — ZOLPIDEM TARTRATE 5 MG/1
5 TABLET ORAL NIGHTLY PRN
Qty: 90 TABLET | Refills: 1 | Status: SHIPPED | OUTPATIENT
Start: 2018-10-12 | End: 2019-01-30 | Stop reason: SDUPTHER

## 2018-10-12 RX ORDER — FLUOXETINE HYDROCHLORIDE 20 MG/1
20 CAPSULE ORAL NIGHTLY
Qty: 90 CAPSULE | Refills: 1 | Status: SHIPPED | OUTPATIENT
Start: 2018-10-12 | End: 2019-01-02 | Stop reason: SDUPTHER

## 2018-10-12 NOTE — PROGRESS NOTES
ESTABLISHED OUTPATIENT VISIT   E/M LEVEL 3: [id number]    ENCOUNTER DATE: 10/12/2018  SITE: [facility], [address]    HISTORY    CHIEF COMPLAINT   lAthea Vazquez is a 53 y.o. female who presents for follow up of anxiety.    HPI     Overall appears stable. Anxiety at times. Significant stress recently.    Spiritual beliefs are supportive.    Pt. Interested in decreasing dose of Prozac.    Psychiatric Review Of Systems - Is patient experiencing or having changes in:  sleep: some difficulty  appetite: no  weight: working on losing weight  energy/anergy: no  interest/pleasure/anhedonia: no  somatic symptoms: no  libido: no  anxiety/panic: no  guilty/hopelessness: no  concentration: no  S.I.B.s/risky behavior: no  Irritability: no  Racing thoughts: no  Impulsive behaviors: no  Paranoia:no  AVH:no    Recent alcohol: rare small amount  Recent drug: no    Medical ROS   Denies any physical complaint during today's visit.     PAST MEDICAL, FAMILY AND SOCIAL HISTORY: The patient's past medical, family and social history have been reviewed and updated as appropriate within the electronic medical record - see encounter notes.    PSYCHOTROPIC MEDICATIONS   Prozac 40 mg bid, Clonazepam 0.5 mg prn for anxiety[has been taking up to 3 times per week], Ambien 5 mg at bedtime prn[has recently been taking every day], Topiramate 100 mg bid[for weight loss]    Scheduled and PRN Medications     Current Outpatient Medications:     acetaminophen-codeine 300-30mg (TYLENOL #3) 300-30 mg Tab, Take 1 tablet by mouth every 4 to 6 hours as needed., Disp: , Rfl: 0    adalimumab (HUMIRA) PnKt injection, Inject 0.8 mLs (40 mg total) into the skin every 14 (fourteen) days., Disp: 2 pen, Rfl: 2    amLODIPine (NORVASC) 10 MG tablet, Take 1 tablet (10 mg total) by mouth once daily., Disp: 90 tablet, Rfl: 3    amoxicillin-clavulanate 875-125mg (AUGMENTIN) 875-125 mg per tablet, Take 1 tablet by mouth every 12 (twelve) hours.,  Disp: 14 tablet, Rfl: 0    aspirin (ECOTRIN) 81 MG EC tablet, Take 1 tablet (81 mg total) by mouth once daily., Disp: 30 tablet, Rfl: 0    atenolol (TENORMIN) 100 MG tablet, Take 1 tablet (100 mg total) by mouth once daily., Disp: 90 tablet, Rfl: 3    atorvastatin (LIPITOR) 40 MG tablet, Take 1 tablet (40 mg total) by mouth once daily., Disp: 90 tablet, Rfl: 3    blood sugar diagnostic Strp, 1 strip by Misc.(Non-Drug; Combo Route) route 2 (two) times daily., Disp: 150 strip, Rfl: 11    blood-glucose meter (FREESTYLE SYSTEM KIT) kit, Use as instructed, Disp: 1 each, Rfl: 0    clonazePAM (KLONOPIN) 0.5 MG tablet, Take 0.5 mg by mouth once daily., Disp: , Rfl: 3    clotrimazole-betamethasone 1-0.05% (LOTRISONE) cream, Apply topically 2 (two) times daily as needed. , Disp: , Rfl:     cyclobenzaprine (FLEXERIL) 10 MG tablet, TAKE 1 TABLET BY MOUTH AT BEDTIME IF NEEDED, Disp: 60 tablet, Rfl: 3    docusate sodium (COLACE) 50 MG capsule, Take 1 capsule (50 mg total) by mouth 2 (two) times daily as needed for Constipation. (Patient taking differently: Take 50 mg by mouth 2 (two) times daily. ), Disp: 30 capsule, Rfl: 0    entecavir (BARACLUDE) 0.5 MG Tab, Take 1 tablet (0.5 mg total) by mouth once daily., Disp: 30 tablet, Rfl: 11    FLUoxetine (PROZAC) 40 MG capsule, Take 1 capsule (40 mg total) by mouth 2 (two) times daily., Disp: 180 capsule, Rfl: 1    homatropine (ISOPTO HOMATROPINE) 5 % ophthalmic solution, Place 1 drop into the right eye 2 (two) times daily. (Patient taking differently: Place 1 drop into the right eye 2 (two) times daily as needed. ), Disp: 5 mL, Rfl: 1    hydroCHLOROthiazide (HYDRODIURIL) 25 MG tablet, Take 1 tablet (25 mg total) by mouth once daily., Disp: 90 tablet, Rfl: 3    hydrocortisone 2.5 % cream, Apply topically 2 (two) times daily., Disp: 28 g, Rfl: 1    hydroquinone 4 % Crea, Apply to dark areas qhs.  Not more than 6 months straight in same location.Use sunscreen in am  (Patient taking differently: continuous prn. Apply to dark areas qhs.  Not more than 6 months straight in same location.Use sunscreen in am), Disp: 46 g, Rfl: 1    INVOKANA 300 mg Tab tablet, TAKE ONE TABLET BY MOUTH ONCE DAILY, Disp: 30 tablet, Rfl: 11    lancets (ACCU-CHEK SOFTCLIX LANCETS) Misc, 1 Device by Misc.(Non-Drug; Combo Route) route 2 (two) times daily., Disp: 200 each, Rfl: 11    losartan (COZAAR) 100 MG tablet, TAKE ONE TABLET BY MOUTH EVERY DAY, Disp: 90 tablet, Rfl: 1    metFORMIN (GLUCOPHAGE-XR) 500 MG 24 hr tablet, Take 1 tablet (500 mg total) by mouth daily with breakfast. (Patient taking differently: Take 1,000 mg by mouth daily with breakfast. ), Disp: 90 tablet, Rfl: 3    naproxen (NAPROSYN) 500 MG tablet, Take 1 tablet (500 mg total) by mouth 2 (two) times daily as needed., Disp: 180 tablet, Rfl: 0    nystatin (MYCOSTATIN) powder, Apply to affected area 3 times daily, Disp: 1 Bottle, Rfl: 3    ondansetron (ZOFRAN-ODT) 4 MG TbDL, Take 1 tablet (4 mg total) by mouth every 8 (eight) hours as needed (nausea and vomiting)., Disp: 20 tablet, Rfl: 0    pantoprazole (PROTONIX) 40 MG tablet, TAKE 1 TABLET BY MOUTH EVERY DAY, Disp: 90 tablet, Rfl: 3    promethazine (PHENERGAN) 25 MG tablet, Take 1 tablet (25 mg total) by mouth every 6 (six) hours as needed for Nausea., Disp: 15 tablet, Rfl: 0    sulfaSALAzine (AZULFIDINE) 500 MG TbEC, Take 3 tablets (1,500 mg total) by mouth 2 (two) times daily., Disp: 540 tablet, Rfl: 0    topiramate (TOPAMAX) 100 MG tablet, Take 100 mg by mouth 2 (two) times daily., Disp: , Rfl:     tramadol (ULTRAM) 50 mg tablet, Take 1-2 tabs PO q6 hrs PRN pain, Disp: 120 tablet, Rfl: 0    triamcinolone acetonide 0.1% (KENALOG) 0.1 % cream, Apply topically 2 (two) times daily., Disp: 45 g, Rfl: 0    zolpidem (AMBIEN) 5 MG Tab, TAKE ONE TABLET BY MOUTH NIGHTLY AS NEEDED, Disp: 30 tablet, Rfl: 2    EXAMINATION    There were no vitals filed for this visit.  Weight: pt. To  have vitals and weight done after today's visit    CONSTITUTIONAL  General Appearance: well nourished    MUSCULOSKELETAL  Muscle Strength and Tone: normal strength and tone  Abnormal Involuntary Movements: no abnormal movement noted  Gait and Station: normal gait    PSYCHIATRIC   Level of Consciousness: alert  Orientation: oriented to person, place and time  Grooming: well groomed  Psychomotor Behavior: no restlessness/agitation  Speech: normal in rate, rhythm and volume  Language: normal vocabulary  Mood: anxiety at times  Affect: full range and appropriate  Thought Process: logical and goal directed  Associations: intact associations  Thought Content: no SI/HI  Memory: grossly intact  Attention: intact to content of interview  Fund of Knowledge: appears adequate  Insight: good  Judgement: good    MEDICAL DECISION MAKING    DIAGNOSES  Anxiety d/o N.O.S.    PROBLEM LIST AND MANAGEMENT PLANS    - anxiety: decrease Prozac to 40 mg qam and 20 mg qpm, continue Klonopin and Ambien as above  - rtc 3-6 months    Time with patient: 20 min    LABORATORY DATA  Lab Visit on 09/12/2018   Component Date Value Ref Range Status    WBC 09/12/2018 6.82  3.90 - 12.70 K/uL Final    RBC 09/12/2018 4.80  4.00 - 5.40 M/uL Final    Hemoglobin 09/12/2018 14.4  12.0 - 16.0 g/dL Final    Hematocrit 09/12/2018 44.2  37.0 - 48.5 % Final    MCV 09/12/2018 92  82 - 98 fL Final    MCH 09/12/2018 30.0  27.0 - 31.0 pg Final    MCHC 09/12/2018 32.6  32.0 - 36.0 g/dL Final    RDW 09/12/2018 13.3  11.5 - 14.5 % Final    Platelets 09/12/2018 307  150 - 350 K/uL Final    MPV 09/12/2018 11.3  9.2 - 12.9 fL Final    Immature Granulocytes 09/12/2018 0.6* 0.0 - 0.5 % Final    Gran # (ANC) 09/12/2018 2.7  1.8 - 7.7 K/uL Final    Immature Grans (Abs) 09/12/2018 0.04  0.00 - 0.04 K/uL Final    Comment: Mild elevation in immature granulocytes is non specific and   can be seen in a variety of conditions including stress response,   acute  inflammation, trauma and pregnancy. Correlation with other   laboratory and clinical findings is essential.      Lymph # 09/12/2018 3.1  1.0 - 4.8 K/uL Final    Mono # 09/12/2018 0.5  0.3 - 1.0 K/uL Final    Eos # 09/12/2018 0.4  0.0 - 0.5 K/uL Final    Baso # 09/12/2018 0.07  0.00 - 0.20 K/uL Final    nRBC 09/12/2018 0  0 /100 WBC Final    Gran% 09/12/2018 39.8  38.0 - 73.0 % Final    Lymph% 09/12/2018 44.9  18.0 - 48.0 % Final    Mono% 09/12/2018 7.5  4.0 - 15.0 % Final    Eosinophil% 09/12/2018 6.2  0.0 - 8.0 % Final    Basophil% 09/12/2018 1.0  0.0 - 1.9 % Final    Differential Method 09/12/2018 Automated   Final    Sodium 09/12/2018 139  136 - 145 mmol/L Final    Potassium 09/12/2018 3.7  3.5 - 5.1 mmol/L Final    Chloride 09/12/2018 105  95 - 110 mmol/L Final    CO2 09/12/2018 27  23 - 29 mmol/L Final    Glucose 09/12/2018 116* 70 - 110 mg/dL Final    BUN, Bld 09/12/2018 13  6 - 20 mg/dL Final    Creatinine 09/12/2018 0.8  0.5 - 1.4 mg/dL Final    Calcium 09/12/2018 9.3  8.7 - 10.5 mg/dL Final    Total Protein 09/12/2018 7.5  6.0 - 8.4 g/dL Final    Albumin 09/12/2018 4.2  3.5 - 5.2 g/dL Final    Total Bilirubin 09/12/2018 0.8  0.1 - 1.0 mg/dL Final    Comment: For infants and newborns, interpretation of results should be based  on gestational age, weight and in agreement with clinical  observations.  Premature Infant recommended reference ranges:  Up to 24 hours.............<8.0 mg/dL  Up to 48 hours............<12.0 mg/dL  3-5 days..................<15.0 mg/dL  6-29 days.................<15.0 mg/dL      Alkaline Phosphatase 09/12/2018 78  55 - 135 U/L Final    AST 09/12/2018 23  10 - 40 U/L Final    ALT 09/12/2018 27  10 - 44 U/L Final    Anion Gap 09/12/2018 7* 8 - 16 mmol/L Final    eGFR if African American 09/12/2018 >60.0  >60 mL/min/1.73 m^2 Final    eGFR if non African American 09/12/2018 >60.0  >60 mL/min/1.73 m^2 Final    Comment: Calculation used to obtain the estimated  glomerular filtration  rate (eGFR) is the CKD-EPI equation.       Sed Rate 09/12/2018 7  0 - 36 mm/Hr Final    Comment: [place] [facility] [place] [facility] [place] [facility]  Normal range:  Male   0-10 mm/Hr  Female 0-20 mm/Hr      CRP 09/12/2018 4.5  0.0 - 8.2 mg/L Final   Lab Visit on 09/12/2018   Component Date Value Ref Range Status    NIL 09/12/2018 0.170  See text IU/mL Final    TB1 - Nil 09/12/2018 -0.070  See text IU/mL Final    TB2 - Nil 09/12/2018 -0.030  See text IU/mL Final    Mitogen - Nil 09/12/2018 >10.000  See text IU/mL Final    TB Gold Plus 09/12/2018 Negative   Final    Comment: The Nil tube value is used to determine if the patient has a  preexisting immune response which could cause a false-positive  reading on the test.  In order for a test to be valid, the   NIL tube must have a value of less than or equal to 8.0 IU/mL.  The mitogen control tube is used to assure the patient has a   healthy immune status and also serves as a control for correct  blood handling and incubation.  It is used to detect false-  negative readings.  The mitogen tube must have a gamma   interferon value of greater than or equal to 0.5 IU/mL higher  that the value of the Nil tube.  The TB antigen tubes are coated with the M. tuberculosis   specific antigens. For a test to be considered positive,the  TB antigen tube values minus the Nil tube value must be   greater than or equal to 0.35 IU/mL.  Diagnosing or excluding tuberculosis disease, and assessing  the probability of LTNI, requires a combination of   epidemiological, historical, medical, and diagnostic findings  that should be                            taken into account when interpreting   Quantiferon-TB Gold Plus results.     Lab Visit on 09/12/2018   Component Date Value Ref Range Status    Specimen UA 09/12/2018 Urine, Clean Catch   Final    Color, UA 09/12/2018 Norma  Yellow, Straw, Norma Final    Appearance, UA 09/12/2018 Cloudy* Clear Final    pH, UA  09/12/2018 5.0  5.0 - 8.0 Final    Specific Gravity, UA 09/12/2018 1.020  1.005 - 1.030 Final    Protein, UA 09/12/2018 Negative  Negative Final    Comment: Recommend a 24 hour urine protein or a urine   protein/creatinine ratio if globulin induced proteinuria is  clinically suspected.      Glucose, UA 09/12/2018 Negative  Negative Final    Ketones, UA 09/12/2018 Negative  Negative Final    Bilirubin (UA) 09/12/2018 Negative  Negative Final    Occult Blood UA 09/12/2018 1+* Negative Final    Nitrite, UA 09/12/2018 Positive* Negative Final    Urobilinogen, UA 09/12/2018 Negative  <2.0 EU/dL Final    Leukocytes, UA 09/12/2018 2+* Negative Final    Urine Culture, Routine 09/12/2018    Final                    Value:KLEBSIELLA PNEUMONIAE  >100,000 cfu/ml      RBC, UA 09/12/2018 5* 0 - 4 /hpf Final    WBC, UA 09/12/2018 16* 0 - 5 /hpf Final    WBC Clumps, UA 09/12/2018 Rare  None-Rare Final    Bacteria, UA 09/12/2018 Many* None-Occ /hpf Final    Yeast, UA 09/12/2018 None  None Final    Microscopic Comment 09/12/2018 SEE COMMENT   Final    Comment: Other formed elements not mentioned in the report are not   present in the microscopic examination.      Lab Visit on 08/17/2018   Component Date Value Ref Range Status    Lipase Result 08/17/2018 46  23 - 300 U/L Final    Amylase 08/17/2018 81  30 - 110 U/L Final           Tip Oliveira

## 2018-10-23 ENCOUNTER — TELEPHONE (OUTPATIENT)
Dept: DERMATOLOGY | Facility: CLINIC | Age: 53
End: 2018-10-23

## 2018-10-23 NOTE — TELEPHONE ENCOUNTER
----- Message from Geoffrey Magdaleno sent at 10/23/2018  8:01 AM CDT -----  Contact: [employer]  Message from [facility], System Message sent at 10/21/2018  4:43 AM CDT -----    Appointment Request From: Althea Vazquez    With Provider: Dermatologist    Preferred Date Range: Any date 10/25/2018 or later    Preferred Times: Wednesday Morning    Reason for visit: For my hair and neck    Comments:  Having problem with my scalp  itching all over and my ear and neck.

## 2018-10-25 ENCOUNTER — HOSPITAL ENCOUNTER (OUTPATIENT)
Dept: RADIOLOGY | Facility: HOSPITAL | Age: 53
Discharge: HOME | End: 2018-10-25
Attending: OBSTETRICS & GYNECOLOGY
Payer: MEDICARE

## 2018-10-25 ENCOUNTER — OFFICE VISIT (OUTPATIENT)
Dept: OPTOMETRY | Facility: CLINIC | Age: 53
End: 2018-10-25
Payer: COMMERCIAL

## 2018-10-25 DIAGNOSIS — Z13.5 SCREENING FOR EYE CONDITION: ICD-10-CM

## 2018-10-25 DIAGNOSIS — H52.11 MYOPIA OF RIGHT EYE: ICD-10-CM

## 2018-10-25 DIAGNOSIS — H52.222 REGULAR ASTIGMATISM OF LEFT EYE: ICD-10-CM

## 2018-10-25 DIAGNOSIS — E11.9 TYPE 2 DIABETES MELLITUS WITHOUT OPHTHALMIC MANIFESTATIONS: ICD-10-CM

## 2018-10-25 DIAGNOSIS — H52.4 PRESBYOPIA OF BOTH EYES: ICD-10-CM

## 2018-10-25 DIAGNOSIS — Z12.39 BREAST CANCER SCREENING: ICD-10-CM

## 2018-10-25 DIAGNOSIS — Z01.00 EXAMINATION OF EYES AND VISION: Primary | ICD-10-CM

## 2018-10-25 PROCEDURE — 77067 SCR MAMMO BI INCL CAD: CPT | Mod: TC

## 2018-10-25 PROCEDURE — 92014 COMPRE OPH EXAM EST PT 1/>: CPT | Mod: S$GLB,,, | Performed by: OPTOMETRIST

## 2018-10-25 PROCEDURE — 77063 BREAST TOMOSYNTHESIS BI: CPT | Mod: 26,,, | Performed by: RADIOLOGY

## 2018-10-25 PROCEDURE — 77067 SCR MAMMO BI INCL CAD: CPT | Mod: 26,,, | Performed by: RADIOLOGY

## 2018-10-25 PROCEDURE — 77063 BREAST TOMOSYNTHESIS BI: CPT | Mod: TC

## 2018-10-25 PROCEDURE — 92015 DETERMINE REFRACTIVE STATE: CPT | Mod: S$GLB,,, | Performed by: OPTOMETRIST

## 2018-10-25 PROCEDURE — 99999 PR PBB SHADOW E&M-EST. PATIENT-LVL II: CPT | Mod: PBBFAC,,, | Performed by: OPTOMETRIST
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
## 2018-10-25 NOTE — PATIENT INSTRUCTIONS
History of type 2 diabetes, without evidence of diabetic retinal changes.    Early peripheral cortical cataract in both eyes, and early axial posterior (subcapsular?) lens changes in both eyes, as noted previously.  Monitor.    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   No evidence of active inflammation today.    Myopia (nearsightedness) in the right eye.  Astigmatic refractive error in the left eye.  Satisfactory best-corrected VA in each eye.  Presbyopia.     Recheck in 12 months, or prior if any problems in the interim.

## 2018-10-30 ENCOUNTER — TELEPHONE (OUTPATIENT)
Dept: PHARMACY | Facility: CLINIC | Age: 53
End: 2018-10-30

## 2018-11-07 DIAGNOSIS — F41.0 PANIC DISORDER WITHOUT AGORAPHOBIA: ICD-10-CM

## 2018-11-07 DIAGNOSIS — F41.1 GENERALIZED ANXIETY DISORDER: ICD-10-CM

## 2018-11-07 RX ORDER — CLONAZEPAM 0.5 MG/1
TABLET ORAL
Qty: 30 TABLET | OUTPATIENT
Start: 2018-11-07

## 2018-11-09 DIAGNOSIS — E11.9 TYPE 2 DIABETES MELLITUS WITHOUT COMPLICATION: ICD-10-CM

## 2018-11-27 ENCOUNTER — TELEPHONE (OUTPATIENT)
Dept: PHARMACY | Facility: CLINIC | Age: 53
End: 2018-11-27

## 2018-12-21 ENCOUNTER — LAB VISIT (OUTPATIENT)
Dept: LAB | Facility: HOSPITAL | Age: 53
End: 2018-12-21
Attending: INTERNAL MEDICINE
Payer: MEDICARE

## 2018-12-21 DIAGNOSIS — Z79.620 LONG-TERM USE OF ADALIMUMAB: ICD-10-CM

## 2018-12-21 DIAGNOSIS — B18.1 CHRONIC VIRAL HEPATITIS B WITHOUT DELTA AGENT AND WITHOUT COMA: ICD-10-CM

## 2018-12-21 DIAGNOSIS — M47.819 SPONDYLOARTHRITIS: ICD-10-CM

## 2018-12-21 LAB
ALBUMIN SERPL BCP-MCNC: 4.7 G/DL
ALP SERPL-CCNC: 73 U/L
ALT SERPL W/O P-5'-P-CCNC: 17 U/L
ANION GAP SERPL CALC-SCNC: 8 MMOL/L
AST SERPL-CCNC: 22 U/L
BASOPHILS # BLD AUTO: 0.03 K/UL
BASOPHILS NFR BLD: 0.4 %
BILIRUB SERPL-MCNC: 0.5 MG/DL
BUN SERPL-MCNC: 15 MG/DL
CALCIUM SERPL-MCNC: 9.7 MG/DL
CHLORIDE SERPL-SCNC: 104 MMOL/L
CO2 SERPL-SCNC: 28 MMOL/L
CREAT SERPL-MCNC: 0.79 MG/DL
CRP SERPL-MCNC: 0.28 MG/DL
DIFFERENTIAL METHOD: ABNORMAL
EOSINOPHIL # BLD AUTO: 0.1 K/UL
EOSINOPHIL NFR BLD: 1.7 %
ERYTHROCYTE [DISTWIDTH] IN BLOOD BY AUTOMATED COUNT: 13.3 %
ERYTHROCYTE [SEDIMENTATION RATE] IN BLOOD BY WESTERGREN METHOD: 10 MM/HR
EST. GFR  (AFRICAN AMERICAN): >60 ML/MIN/1.73 M^2
EST. GFR  (NON AFRICAN AMERICAN): >60 ML/MIN/1.73 M^2
GLUCOSE SERPL-MCNC: 107 MG/DL
HCT VFR BLD AUTO: 45.9 %
HGB BLD-MCNC: 14.9 G/DL
LYMPHOCYTES # BLD AUTO: 3.7 K/UL
LYMPHOCYTES NFR BLD: 52.4 %
MCH RBC QN AUTO: 29.8 PG
MCHC RBC AUTO-ENTMCNC: 32.5 G/DL
MCV RBC AUTO: 92 FL
MONOCYTES # BLD AUTO: 0.7 K/UL
MONOCYTES NFR BLD: 9.9 %
NEUTROPHILS # BLD AUTO: 2.5 K/UL
NEUTROPHILS NFR BLD: 35.3 %
PLATELET # BLD AUTO: 241 K/UL
PMV BLD AUTO: 11.1 FL
POTASSIUM SERPL-SCNC: 4 MMOL/L
PROT SERPL-MCNC: 8.3 G/DL
RBC # BLD AUTO: 5 M/UL
SODIUM SERPL-SCNC: 140 MMOL/L
WBC # BLD AUTO: 7.08 K/UL

## 2018-12-21 PROCEDURE — 85025 COMPLETE CBC W/AUTO DIFF WBC: CPT | Mod: PO

## 2018-12-21 PROCEDURE — 87517 HEPATITIS B DNA QUANT: CPT | Mod: PO

## 2018-12-21 PROCEDURE — 80053 COMPREHEN METABOLIC PANEL: CPT | Mod: PO

## 2018-12-21 PROCEDURE — 85652 RBC SED RATE AUTOMATED: CPT

## 2018-12-21 PROCEDURE — 36415 COLL VENOUS BLD VENIPUNCTURE: CPT | Mod: PO

## 2018-12-21 PROCEDURE — 86140 C-REACTIVE PROTEIN: CPT | Mod: PO

## 2018-12-24 DIAGNOSIS — M47.819 SPONDYLOARTHRITIS: ICD-10-CM

## 2018-12-25 LAB
HBV DNA SERPL NAA+PROBE-ACNC: <10 IU/ML
HBV DNA SERPL NAA+PROBE-LOG IU: <1 LOG (10) IU/ML
HBV DNA SERPL QL NAA+PROBE: NOT DETECTED

## 2019-01-02 ENCOUNTER — OFFICE VISIT (OUTPATIENT)
Dept: INTERNAL MEDICINE | Facility: CLINIC | Age: 54
End: 2019-01-02
Payer: MEDICARE

## 2019-01-02 VITALS
SYSTOLIC BLOOD PRESSURE: 136 MMHG | HEIGHT: 66 IN | HEART RATE: 81 BPM | TEMPERATURE: 99 F | DIASTOLIC BLOOD PRESSURE: 80 MMHG | RESPIRATION RATE: 18 BRPM | BODY MASS INDEX: 38.16 KG/M2 | WEIGHT: 237.44 LBS

## 2019-01-02 DIAGNOSIS — E11.59 HYPERTENSION ASSOCIATED WITH DIABETES: ICD-10-CM

## 2019-01-02 DIAGNOSIS — E11.69 HYPERLIPIDEMIA ASSOCIATED WITH TYPE 2 DIABETES MELLITUS: ICD-10-CM

## 2019-01-02 DIAGNOSIS — M45.A0 NON-RADIOGRAPHIC AXIAL SPONDYLOARTHRITIS: ICD-10-CM

## 2019-01-02 DIAGNOSIS — I15.2 HYPERTENSION ASSOCIATED WITH DIABETES: ICD-10-CM

## 2019-01-02 DIAGNOSIS — E11.42 TYPE 2 DIABETES MELLITUS WITH DIABETIC POLYNEUROPATHY, WITHOUT LONG-TERM CURRENT USE OF INSULIN: Primary | Chronic | ICD-10-CM

## 2019-01-02 DIAGNOSIS — Z86.19 HISTORY OF HEPATITIS B: ICD-10-CM

## 2019-01-02 DIAGNOSIS — J32.9 VIRAL SINUSITIS: ICD-10-CM

## 2019-01-02 DIAGNOSIS — J20.9 ACUTE BRONCHITIS, UNSPECIFIED ORGANISM: ICD-10-CM

## 2019-01-02 DIAGNOSIS — G47.00 INSOMNIA, UNSPECIFIED TYPE: Chronic | ICD-10-CM

## 2019-01-02 DIAGNOSIS — E78.5 HYPERLIPIDEMIA ASSOCIATED WITH TYPE 2 DIABETES MELLITUS: ICD-10-CM

## 2019-01-02 DIAGNOSIS — F41.9 ANXIETY: ICD-10-CM

## 2019-01-02 DIAGNOSIS — B97.89 VIRAL SINUSITIS: ICD-10-CM

## 2019-01-02 DIAGNOSIS — E66.01 SEVERE OBESITY (BMI 35.0-39.9) WITH COMORBIDITY: ICD-10-CM

## 2019-01-02 PROCEDURE — 3044F PR MOST RECENT HEMOGLOBIN A1C LEVEL <7.0%: ICD-10-PCS | Mod: S$GLB,,, | Performed by: INTERNAL MEDICINE

## 2019-01-02 PROCEDURE — 3079F DIAST BP 80-89 MM HG: CPT | Mod: S$GLB,,, | Performed by: INTERNAL MEDICINE

## 2019-01-02 PROCEDURE — 96372 THER/PROPH/DIAG INJ SC/IM: CPT | Mod: S$GLB,,, | Performed by: INTERNAL MEDICINE

## 2019-01-02 PROCEDURE — 3075F SYST BP GE 130 - 139MM HG: CPT | Mod: S$GLB,,, | Performed by: INTERNAL MEDICINE

## 2019-01-02 PROCEDURE — 99999 PR PBB SHADOW E&M-EST. PATIENT-LVL III: ICD-10-PCS | Mod: PBBFAC,,, | Performed by: INTERNAL MEDICINE

## 2019-01-02 PROCEDURE — 3008F BODY MASS INDEX DOCD: CPT | Mod: S$GLB,,, | Performed by: INTERNAL MEDICINE

## 2019-01-02 PROCEDURE — 99999 PR PBB SHADOW E&M-EST. PATIENT-LVL III: CPT | Mod: PBBFAC,,, | Performed by: INTERNAL MEDICINE

## 2019-01-02 PROCEDURE — 3075F PR MOST RECENT SYSTOLIC BLOOD PRESS GE 130-139MM HG: ICD-10-PCS | Mod: S$GLB,,, | Performed by: INTERNAL MEDICINE

## 2019-01-02 PROCEDURE — 3044F HG A1C LEVEL LT 7.0%: CPT | Mod: S$GLB,,, | Performed by: INTERNAL MEDICINE

## 2019-01-02 PROCEDURE — 96372 PR INJECTION,THERAP/PROPH/DIAG2ST, IM OR SUBCUT: ICD-10-PCS | Mod: S$GLB,,, | Performed by: INTERNAL MEDICINE

## 2019-01-02 PROCEDURE — 3008F PR BODY MASS INDEX (BMI) DOCUMENTED: ICD-10-PCS | Mod: S$GLB,,, | Performed by: INTERNAL MEDICINE

## 2019-01-02 PROCEDURE — 99214 PR OFFICE/OUTPT VISIT, EST, LEVL IV, 30-39 MIN: ICD-10-PCS | Mod: 25,S$GLB,, | Performed by: INTERNAL MEDICINE

## 2019-01-02 PROCEDURE — 3079F PR MOST RECENT DIASTOLIC BLOOD PRESSURE 80-89 MM HG: ICD-10-PCS | Mod: S$GLB,,, | Performed by: INTERNAL MEDICINE

## 2019-01-02 PROCEDURE — 99214 OFFICE O/P EST MOD 30 MIN: CPT | Mod: 25,S$GLB,, | Performed by: INTERNAL MEDICINE

## 2019-01-02 RX ORDER — FLUOXETINE HYDROCHLORIDE 20 MG/1
CAPSULE ORAL
Qty: 90 CAPSULE | Refills: 1 | Status: SHIPPED | OUTPATIENT
Start: 2019-01-02 | End: 2019-02-27 | Stop reason: SDUPTHER

## 2019-01-02 RX ORDER — CODEINE PHOSPHATE AND GUAIFENESIN 10; 100 MG/5ML; MG/5ML
5 SOLUTION ORAL 3 TIMES DAILY PRN
Qty: 236 ML | Refills: 0 | Status: SHIPPED | OUTPATIENT
Start: 2019-01-02 | End: 2019-01-12

## 2019-01-02 RX ORDER — FLUTICASONE PROPIONATE 50 MCG
2 SPRAY, SUSPENSION (ML) NASAL DAILY
Qty: 16 G | Refills: 12 | Status: SHIPPED | OUTPATIENT
Start: 2019-01-02 | End: 2019-02-01

## 2019-01-02 RX ORDER — LANCETS 28 GAUGE
EACH MISCELLANEOUS
COMMUNITY
End: 2020-04-27 | Stop reason: SDUPTHER

## 2019-01-02 RX ORDER — TRIAMCINOLONE ACETONIDE 40 MG/ML
40 INJECTION, SUSPENSION INTRA-ARTICULAR; INTRAMUSCULAR
Status: COMPLETED | OUTPATIENT
Start: 2019-01-02 | End: 2019-01-02

## 2019-01-02 RX ORDER — LEVOCETIRIZINE DIHYDROCHLORIDE 5 MG/1
5 TABLET, FILM COATED ORAL NIGHTLY
Qty: 30 TABLET | Refills: 11 | Status: SHIPPED | OUTPATIENT
Start: 2019-01-02 | End: 2019-12-02

## 2019-01-02 RX ORDER — FLUOXETINE HYDROCHLORIDE 40 MG/1
CAPSULE ORAL
Qty: 90 CAPSULE | Refills: 1 | Status: SHIPPED | OUTPATIENT
Start: 2019-01-02 | End: 2019-01-17 | Stop reason: SDUPTHER

## 2019-01-02 RX ORDER — INSULIN PUMP SYRINGE, 3 ML
EACH MISCELLANEOUS
COMMUNITY
End: 2020-04-27 | Stop reason: SDUPTHER

## 2019-01-02 RX ORDER — PANTOPRAZOLE SODIUM 40 MG/1
TABLET, DELAYED RELEASE ORAL
Qty: 90 TABLET | Refills: 3 | Status: SHIPPED | OUTPATIENT
Start: 2019-01-02 | End: 2020-02-06

## 2019-01-02 RX ORDER — METHYLNALTREXONE BROMIDE 150 MG/1
450 TABLET ORAL
Status: ON HOLD | COMMUNITY
End: 2021-04-21

## 2019-01-02 RX ADMIN — TRIAMCINOLONE ACETONIDE 40 MG: 40 INJECTION, SUSPENSION INTRA-ARTICULAR; INTRAMUSCULAR at 02:01

## 2019-01-02 NOTE — PROGRESS NOTES
kenalogSubjective:       Patient ID: Althea Vazquez is a 53 y.o. female.    Chief Complaint: Cough; Otalgia (right); Sore Throat (right side); and Headache    HPI   Pt with T2DM, HTN, HLD, Reactive arthritis, Severe obesity, Insomnia, Hx of Hep B is here c/o 4 days of worsening sinus/chest congestion, mostly dry cough, sore throat, ear pressure, wheezing/SOB. She has not tried any OTC meds.   Review of Systems   Constitutional: Positive for fatigue. Negative for activity change, appetite change, chills, diaphoresis, fever and unexpected weight change.   HENT: Positive for congestion, postnasal drip, rhinorrhea, sinus pressure, sinus pain and sore throat. Negative for sneezing, trouble swallowing and voice change.    Respiratory: Positive for cough. Negative for shortness of breath and wheezing.    Cardiovascular: Negative for chest pain, palpitations and leg swelling.   Gastrointestinal: Negative for abdominal pain, blood in stool, constipation, diarrhea, nausea and vomiting.   Genitourinary: Negative for dysuria.   Musculoskeletal: Positive for arthralgias. Negative for myalgias.   Skin: Negative for rash and wound.   Allergic/Immunologic: Negative for environmental allergies and food allergies.   Hematological: Negative for adenopathy. Does not bruise/bleed easily.   Psychiatric/Behavioral: Positive for sleep disturbance. Negative for self-injury and suicidal ideas.       Objective:      Physical Exam   Constitutional: She is oriented to person, place, and time. She appears well-developed and well-nourished. No distress.   HENT:   Head: Normocephalic and atraumatic.   Right Ear: External ear normal.   Left Ear: External ear normal.   Nose: Mucosal edema and rhinorrhea present.   Mouth/Throat: Oropharynx is clear and moist. No oropharyngeal exudate.   Eyes: Conjunctivae and EOM are normal. Pupils are equal, round, and reactive to light. Right eye exhibits no discharge. Left eye exhibits no discharge. No  scleral icterus.   Neck: Neck supple. No JVD present.   Cardiovascular: Normal rate, regular rhythm, normal heart sounds and intact distal pulses.   Pulses:       Dorsalis pedis pulses are 2+ on the right side, and 2+ on the left side.        Posterior tibial pulses are 2+ on the right side, and 2+ on the left side.   Pulmonary/Chest: Effort normal and breath sounds normal. No respiratory distress. She has no wheezes. She has no rales.   Musculoskeletal: She exhibits no edema.   Feet:   Right Foot:   Protective Sensation: 4 sites tested. 4 sites sensed.   Skin Integrity: Negative for ulcer, blister or skin breakdown.   Left Foot:   Protective Sensation: 4 sites tested. 4 sites sensed.   Skin Integrity: Negative for ulcer, blister or skin breakdown.   Lymphadenopathy:     She has no cervical adenopathy.   Neurological: She is alert and oriented to person, place, and time.   Skin: Skin is warm and dry. No rash noted. She is not diaphoretic. No pallor.       Assessment:       1. Type 2 diabetes mellitus with diabetic polyneuropathy, without long-term current use of insulin    2. Hypertension associated with diabetes    3. Hyperlipidemia associated with type 2 diabetes mellitus    4. Non-radiographic axial spondyloarthritis    5. Anxiety    6. Severe obesity (BMI 35.0-39.9) with comorbidity    7. Insomnia, unspecified type    8. History of hepatitis B    9. Viral sinusitis    10. Acute bronchitis, unspecified organism        Plan:    1. T2DM with neuropathy- stable with last HA1C of 5.9(4/18)<--5.8(2/17)<--6.4(4/16)<--5.9(8/15)      Continue Metformin 1 gm qam/Invokana 300 mg daily   2. HTN- continue Losartan 100 mg, Toprol  mg, Norvasc to 10 mg, HCTZ to 25 mg daily   3. HLD- stable, on Lipitor 40 mg qHS   4. Spondyloarthritis- stable on Sulfasalazine/Naproxen       Followed by Rheumatology       Stable on Norco 5-325 mg q6 hrs PRN severe pain   5. Anxiety- stable on Prozac/Klonopin, managed by Psyc   6. Severe  obesity- pt advised on proper diet/exercise for weight loss   7. Insomnia- stable on Ambien 5 mg qHS PRN   8. Hx of Hep B- followed by Hepatology    9. Rx Xyzal/Flonase, Kenalog 40 mg IM  10. Rx Cheratussin AC TID PRN

## 2019-01-07 ENCOUNTER — TELEPHONE (OUTPATIENT)
Dept: INTERNAL MEDICINE | Facility: CLINIC | Age: 54
End: 2019-01-07

## 2019-01-07 NOTE — TELEPHONE ENCOUNTER
----- Message from Rosaura Figueroa sent at 1/7/2019 11:23 AM CST -----  Contact: Self [phone]  Pt will like to speak to the nurse or doctor in regards to last visit on 1/2, pt states she not feeling any better.

## 2019-01-17 ENCOUNTER — OFFICE VISIT (OUTPATIENT)
Dept: INTERNAL MEDICINE | Facility: CLINIC | Age: 54
End: 2019-01-17
Payer: MEDICARE

## 2019-01-17 ENCOUNTER — OFFICE VISIT (OUTPATIENT)
Dept: PSYCHIATRY | Facility: CLINIC | Age: 54
End: 2019-01-17
Payer: MEDICARE

## 2019-01-17 ENCOUNTER — LAB VISIT (OUTPATIENT)
Dept: LAB | Facility: HOSPITAL | Age: 54
End: 2019-01-17
Attending: INTERNAL MEDICINE
Payer: MEDICARE

## 2019-01-17 VITALS
HEIGHT: 67 IN | DIASTOLIC BLOOD PRESSURE: 76 MMHG | WEIGHT: 235.25 LBS | TEMPERATURE: 98 F | RESPIRATION RATE: 18 BRPM | HEART RATE: 89 BPM | SYSTOLIC BLOOD PRESSURE: 132 MMHG | BODY MASS INDEX: 36.92 KG/M2

## 2019-01-17 DIAGNOSIS — J32.9 VIRAL SINUSITIS: ICD-10-CM

## 2019-01-17 DIAGNOSIS — E11.9 TYPE 2 DIABETES MELLITUS WITHOUT COMPLICATION: ICD-10-CM

## 2019-01-17 DIAGNOSIS — F41.0 PANIC DISORDER WITHOUT AGORAPHOBIA: ICD-10-CM

## 2019-01-17 DIAGNOSIS — F41.1 GENERALIZED ANXIETY DISORDER: Primary | ICD-10-CM

## 2019-01-17 DIAGNOSIS — J20.8 VIRAL BRONCHITIS: Primary | ICD-10-CM

## 2019-01-17 DIAGNOSIS — B97.89 VIRAL SINUSITIS: ICD-10-CM

## 2019-01-17 LAB
CHOLEST SERPL-MCNC: 177 MG/DL
CHOLEST/HDLC SERPL: 2.8 {RATIO}
ESTIMATED AVG GLUCOSE: 134 MG/DL
HBA1C MFR BLD HPLC: 6.3 %
HDLC SERPL-MCNC: 63 MG/DL
HDLC SERPL: 35.6 %
LDLC SERPL CALC-MCNC: 104.6 MG/DL
NONHDLC SERPL-MCNC: 114 MG/DL
TRIGL SERPL-MCNC: 47 MG/DL

## 2019-01-17 PROCEDURE — 3075F SYST BP GE 130 - 139MM HG: CPT | Mod: S$GLB,,, | Performed by: INTERNAL MEDICINE

## 2019-01-17 PROCEDURE — 83036 HEMOGLOBIN GLYCOSYLATED A1C: CPT

## 2019-01-17 PROCEDURE — 3008F BODY MASS INDEX DOCD: CPT | Mod: S$GLB,,, | Performed by: INTERNAL MEDICINE

## 2019-01-17 PROCEDURE — 99213 OFFICE O/P EST LOW 20 MIN: CPT | Mod: S$GLB,,, | Performed by: INTERNAL MEDICINE

## 2019-01-17 PROCEDURE — 99999 PR PBB SHADOW E&M-EST. PATIENT-LVL I: CPT | Mod: PBBFAC,,, | Performed by: SOCIAL WORKER

## 2019-01-17 PROCEDURE — 3008F PR BODY MASS INDEX (BMI) DOCUMENTED: ICD-10-PCS | Mod: S$GLB,,, | Performed by: INTERNAL MEDICINE

## 2019-01-17 PROCEDURE — 99999 PR PBB SHADOW E&M-EST. PATIENT-LVL I: ICD-10-PCS | Mod: PBBFAC,,, | Performed by: SOCIAL WORKER

## 2019-01-17 PROCEDURE — 3078F PR MOST RECENT DIASTOLIC BLOOD PRESSURE < 80 MM HG: ICD-10-PCS | Mod: S$GLB,,, | Performed by: INTERNAL MEDICINE

## 2019-01-17 PROCEDURE — 3078F DIAST BP <80 MM HG: CPT | Mod: S$GLB,,, | Performed by: INTERNAL MEDICINE

## 2019-01-17 PROCEDURE — 3075F PR MOST RECENT SYSTOLIC BLOOD PRESS GE 130-139MM HG: ICD-10-PCS | Mod: S$GLB,,, | Performed by: INTERNAL MEDICINE

## 2019-01-17 PROCEDURE — 90791 PR PSYCHIATRIC DIAGNOSTIC EVALUATION: ICD-10-PCS | Mod: S$GLB,,, | Performed by: SOCIAL WORKER

## 2019-01-17 PROCEDURE — 99213 PR OFFICE/OUTPT VISIT, EST, LEVL III, 20-29 MIN: ICD-10-PCS | Mod: S$GLB,,, | Performed by: INTERNAL MEDICINE

## 2019-01-17 PROCEDURE — 80061 LIPID PANEL: CPT

## 2019-01-17 PROCEDURE — 90791 PSYCH DIAGNOSTIC EVALUATION: CPT | Mod: S$GLB,,, | Performed by: SOCIAL WORKER

## 2019-01-17 PROCEDURE — 36415 COLL VENOUS BLD VENIPUNCTURE: CPT | Mod: PO

## 2019-01-17 PROCEDURE — 99999 PR PBB SHADOW E&M-EST. PATIENT-LVL III: ICD-10-PCS | Mod: PBBFAC,,, | Performed by: INTERNAL MEDICINE

## 2019-01-17 PROCEDURE — 99999 PR PBB SHADOW E&M-EST. PATIENT-LVL III: CPT | Mod: PBBFAC,,, | Performed by: INTERNAL MEDICINE

## 2019-01-17 RX ORDER — VALACYCLOVIR HYDROCHLORIDE 1 G/1
TABLET, FILM COATED ORAL
Status: ON HOLD | COMMUNITY
End: 2024-02-07 | Stop reason: ALTCHOICE

## 2019-01-17 RX ORDER — METOPROLOL SUCCINATE 100 MG/1
TABLET, EXTENDED RELEASE ORAL
COMMUNITY
End: 2019-04-02

## 2019-01-17 RX ORDER — HYDROCODONE BITARTRATE AND ACETAMINOPHEN 10; 325 MG/1; MG/1
TABLET ORAL
COMMUNITY
End: 2019-05-20

## 2019-01-17 RX ORDER — BENZONATATE 200 MG/1
200 CAPSULE ORAL 3 TIMES DAILY PRN
Qty: 45 CAPSULE | Refills: 0 | Status: SHIPPED | OUTPATIENT
Start: 2019-01-17 | End: 2019-01-27

## 2019-01-17 RX ORDER — CODEINE PHOSPHATE AND GUAIFENESIN 10; 100 MG/5ML; MG/5ML
5 SOLUTION ORAL 3 TIMES DAILY PRN
Qty: 236 ML | Refills: 0 | Status: SHIPPED | OUTPATIENT
Start: 2019-01-17 | End: 2019-01-27

## 2019-01-17 NOTE — PROGRESS NOTES
Subjective:       Patient ID: Althea Vazquez is a 53 y.o. female.    Chief Complaint: Cough    HPI   Pt here for f/u regarding URI. She was seen on 1/2/19 and prescribed Xyzal/Flonase and cough medication which has helped some. Pt's main symptoms is a dry cough a/w wheezing, post nasal drip and runny nose.   Review of Systems   Constitutional: Negative for activity change, appetite change, chills, diaphoresis, fatigue, fever and unexpected weight change.   HENT: Positive for congestion, postnasal drip and rhinorrhea. Negative for sinus pressure, sneezing, sore throat, trouble swallowing and voice change.    Respiratory: Positive for cough and wheezing. Negative for shortness of breath.    Cardiovascular: Negative for chest pain, palpitations and leg swelling.   Gastrointestinal: Negative for abdominal pain, blood in stool, constipation, diarrhea, nausea and vomiting.   Genitourinary: Negative for dysuria.   Musculoskeletal: Negative for arthralgias and myalgias.   Skin: Negative for rash and wound.   Allergic/Immunologic: Negative for environmental allergies and food allergies.   Hematological: Negative for adenopathy. Does not bruise/bleed easily.       Objective:      Physical Exam   Constitutional: She is oriented to person, place, and time. She appears well-developed and well-nourished. No distress.   HENT:   Head: Normocephalic and atraumatic.   Right Ear: External ear normal.   Left Ear: External ear normal.   Nose: Mucosal edema and rhinorrhea present.   Mouth/Throat: Oropharynx is clear and moist. No oropharyngeal exudate.   Eyes: Conjunctivae and EOM are normal. Pupils are equal, round, and reactive to light. Right eye exhibits no discharge. Left eye exhibits no discharge. No scleral icterus.   Neck: Neck supple. No JVD present.   Cardiovascular: Normal rate, regular rhythm, normal heart sounds and intact distal pulses.   Pulmonary/Chest: Effort normal and breath sounds normal. No respiratory  distress. She has no wheezes. She has no rales.   Musculoskeletal: She exhibits no edema.   Lymphadenopathy:     She has no cervical adenopathy.   Neurological: She is alert and oriented to person, place, and time.   Skin: Skin is warm and dry. No rash noted. She is not diaphoretic. No pallor.       Assessment:       1. Viral bronchitis    2. Viral sinusitis        Plan:    1. Refill Cheratussin AC and tessalon perles for daytime    2. Continue Xyzal/Flonase
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
## 2019-01-30 ENCOUNTER — OFFICE VISIT (OUTPATIENT)
Dept: PSYCHIATRY | Facility: CLINIC | Age: 54
End: 2019-01-30
Payer: MEDICARE

## 2019-01-30 ENCOUNTER — HOSPITAL ENCOUNTER (OUTPATIENT)
Dept: RADIOLOGY | Facility: HOSPITAL | Age: 54
Discharge: HOME | End: 2019-01-30
Attending: INTERNAL MEDICINE
Payer: MEDICARE

## 2019-01-30 ENCOUNTER — TELEPHONE (OUTPATIENT)
Dept: PHARMACY | Facility: CLINIC | Age: 54
End: 2019-01-30

## 2019-01-30 ENCOUNTER — OFFICE VISIT (OUTPATIENT)
Dept: RHEUMATOLOGY | Facility: CLINIC | Age: 54
End: 2019-01-30
Payer: MEDICARE

## 2019-01-30 VITALS
HEART RATE: 86 BPM | WEIGHT: 235 LBS | DIASTOLIC BLOOD PRESSURE: 85 MMHG | BODY MASS INDEX: 37.77 KG/M2 | SYSTOLIC BLOOD PRESSURE: 135 MMHG | HEIGHT: 66 IN

## 2019-01-30 VITALS
HEART RATE: 77 BPM | DIASTOLIC BLOOD PRESSURE: 74 MMHG | HEIGHT: 67 IN | WEIGHT: 235 LBS | BODY MASS INDEX: 36.88 KG/M2 | SYSTOLIC BLOOD PRESSURE: 151 MMHG

## 2019-01-30 DIAGNOSIS — M45.A0 NON-RADIOGRAPHIC AXIAL SPONDYLOARTHRITIS: ICD-10-CM

## 2019-01-30 DIAGNOSIS — M47.819 SPONDYLOARTHRITIS: ICD-10-CM

## 2019-01-30 DIAGNOSIS — M02.30 REACTIVE ARTHRITIS: ICD-10-CM

## 2019-01-30 DIAGNOSIS — Z79.899 HIGH RISK MEDICATION USE: ICD-10-CM

## 2019-01-30 DIAGNOSIS — M54.50 CHRONIC MIDLINE LOW BACK PAIN WITHOUT SCIATICA: ICD-10-CM

## 2019-01-30 DIAGNOSIS — M19.90 INFLAMMATORY ARTHRITIS: ICD-10-CM

## 2019-01-30 DIAGNOSIS — R53.83 FATIGUE, UNSPECIFIED TYPE: ICD-10-CM

## 2019-01-30 DIAGNOSIS — G89.29 CHRONIC MIDLINE LOW BACK PAIN WITHOUT SCIATICA: ICD-10-CM

## 2019-01-30 DIAGNOSIS — F41.9 ANXIETY: Primary | ICD-10-CM

## 2019-01-30 DIAGNOSIS — M67.951 TENDINOPATHY OF RIGHT GLUTEAL REGION: ICD-10-CM

## 2019-01-30 DIAGNOSIS — M25.551 RIGHT HIP PAIN: Primary | ICD-10-CM

## 2019-01-30 PROCEDURE — 3078F DIAST BP <80 MM HG: CPT | Mod: S$GLB,,, | Performed by: PSYCHIATRY & NEUROLOGY

## 2019-01-30 PROCEDURE — 3079F PR MOST RECENT DIASTOLIC BLOOD PRESSURE 80-89 MM HG: ICD-10-PCS | Mod: S$GLB,,, | Performed by: INTERNAL MEDICINE

## 2019-01-30 PROCEDURE — 99213 PR OFFICE/OUTPT VISIT, EST, LEVL III, 20-29 MIN: ICD-10-PCS | Mod: S$GLB,,, | Performed by: PSYCHIATRY & NEUROLOGY

## 2019-01-30 PROCEDURE — 99214 OFFICE O/P EST MOD 30 MIN: CPT | Mod: 25,S$GLB,, | Performed by: INTERNAL MEDICINE

## 2019-01-30 PROCEDURE — 99999 PR PBB SHADOW E&M-EST. PATIENT-LVL II: CPT | Mod: PBBFAC,,, | Performed by: PSYCHIATRY & NEUROLOGY

## 2019-01-30 PROCEDURE — 73521 X-RAY EXAM HIPS BI 2 VIEWS: CPT | Mod: 26,,, | Performed by: RADIOLOGY

## 2019-01-30 PROCEDURE — 72100 X-RAY EXAM L-S SPINE 2/3 VWS: CPT | Mod: 26,,, | Performed by: RADIOLOGY

## 2019-01-30 PROCEDURE — 3008F PR BODY MASS INDEX (BMI) DOCUMENTED: ICD-10-PCS | Mod: S$GLB,,, | Performed by: PSYCHIATRY & NEUROLOGY

## 2019-01-30 PROCEDURE — 99214 PR OFFICE/OUTPT VISIT, EST, LEVL IV, 30-39 MIN: ICD-10-PCS | Mod: 25,S$GLB,, | Performed by: INTERNAL MEDICINE

## 2019-01-30 PROCEDURE — 73521 XR HIPS BILATERAL 2 VIEW INCL AP PELVIS: ICD-10-PCS | Mod: 26,,, | Performed by: RADIOLOGY

## 2019-01-30 PROCEDURE — 99999 PR PBB SHADOW E&M-EST. PATIENT-LVL V: ICD-10-PCS | Mod: PBBFAC,,, | Performed by: INTERNAL MEDICINE

## 2019-01-30 PROCEDURE — 3008F PR BODY MASS INDEX (BMI) DOCUMENTED: ICD-10-PCS | Mod: S$GLB,,, | Performed by: INTERNAL MEDICINE

## 2019-01-30 PROCEDURE — 72100 X-RAY EXAM L-S SPINE 2/3 VWS: CPT | Mod: TC

## 2019-01-30 PROCEDURE — 99999 PR PBB SHADOW E&M-EST. PATIENT-LVL II: ICD-10-PCS | Mod: PBBFAC,,, | Performed by: PSYCHIATRY & NEUROLOGY

## 2019-01-30 PROCEDURE — 3075F PR MOST RECENT SYSTOLIC BLOOD PRESS GE 130-139MM HG: ICD-10-PCS | Mod: S$GLB,,, | Performed by: INTERNAL MEDICINE

## 2019-01-30 PROCEDURE — 99999 PR PBB SHADOW E&M-EST. PATIENT-LVL V: CPT | Mod: PBBFAC,,, | Performed by: INTERNAL MEDICINE

## 2019-01-30 PROCEDURE — 73521 X-RAY EXAM HIPS BI 2 VIEWS: CPT | Mod: TC

## 2019-01-30 PROCEDURE — 20610 LARGE JOINT ASPIRATION/INJECTION: R GREATER TROCHANTERIC BURSA: ICD-10-PCS | Mod: RT,S$GLB,, | Performed by: INTERNAL MEDICINE

## 2019-01-30 PROCEDURE — 3075F SYST BP GE 130 - 139MM HG: CPT | Mod: S$GLB,,, | Performed by: INTERNAL MEDICINE

## 2019-01-30 PROCEDURE — 3008F BODY MASS INDEX DOCD: CPT | Mod: S$GLB,,, | Performed by: INTERNAL MEDICINE

## 2019-01-30 PROCEDURE — 99213 OFFICE O/P EST LOW 20 MIN: CPT | Mod: S$GLB,,, | Performed by: PSYCHIATRY & NEUROLOGY

## 2019-01-30 PROCEDURE — 3079F DIAST BP 80-89 MM HG: CPT | Mod: S$GLB,,, | Performed by: INTERNAL MEDICINE

## 2019-01-30 PROCEDURE — 3077F PR MOST RECENT SYSTOLIC BLOOD PRESSURE >= 140 MM HG: ICD-10-PCS | Mod: S$GLB,,, | Performed by: PSYCHIATRY & NEUROLOGY

## 2019-01-30 PROCEDURE — 20610 DRAIN/INJ JOINT/BURSA W/O US: CPT | Mod: RT,S$GLB,, | Performed by: INTERNAL MEDICINE

## 2019-01-30 PROCEDURE — 3008F BODY MASS INDEX DOCD: CPT | Mod: S$GLB,,, | Performed by: PSYCHIATRY & NEUROLOGY

## 2019-01-30 PROCEDURE — 3077F SYST BP >= 140 MM HG: CPT | Mod: S$GLB,,, | Performed by: PSYCHIATRY & NEUROLOGY

## 2019-01-30 PROCEDURE — 3078F PR MOST RECENT DIASTOLIC BLOOD PRESSURE < 80 MM HG: ICD-10-PCS | Mod: S$GLB,,, | Performed by: PSYCHIATRY & NEUROLOGY

## 2019-01-30 PROCEDURE — 72100 XR LUMBAR SPINE AP AND LATERAL: ICD-10-PCS | Mod: 26,,, | Performed by: RADIOLOGY

## 2019-01-30 RX ORDER — ZOLPIDEM TARTRATE 5 MG/1
5 TABLET ORAL NIGHTLY PRN
Qty: 90 TABLET | Refills: 1 | Status: SHIPPED | OUTPATIENT
Start: 2019-01-30 | End: 2019-05-08 | Stop reason: SDUPTHER

## 2019-01-30 RX ORDER — FLUOXETINE HYDROCHLORIDE 20 MG/1
20 CAPSULE ORAL 2 TIMES DAILY
Qty: 180 CAPSULE | Refills: 1 | Status: SHIPPED | OUTPATIENT
Start: 2019-01-30 | End: 2019-05-08 | Stop reason: SDUPTHER

## 2019-01-30 RX ORDER — SULFASALAZINE 500 MG/1
1500 TABLET, DELAYED RELEASE ORAL 2 TIMES DAILY
Qty: 540 TABLET | Refills: 0 | Status: SHIPPED | OUTPATIENT
Start: 2019-01-30 | End: 2019-05-20 | Stop reason: SDUPTHER

## 2019-01-30 RX ORDER — TRIAMCINOLONE ACETONIDE 40 MG/ML
40 INJECTION, SUSPENSION INTRA-ARTICULAR; INTRAMUSCULAR
Status: DISCONTINUED | OUTPATIENT
Start: 2019-01-30 | End: 2019-01-30 | Stop reason: HOSPADM

## 2019-01-30 RX ORDER — NAPROXEN 500 MG/1
500 TABLET ORAL 2 TIMES DAILY PRN
Qty: 180 TABLET | Refills: 0 | Status: SHIPPED | OUTPATIENT
Start: 2019-01-30 | End: 2020-07-30 | Stop reason: SDUPTHER

## 2019-01-30 RX ADMIN — TRIAMCINOLONE ACETONIDE 40 MG: 40 INJECTION, SUSPENSION INTRA-ARTICULAR; INTRAMUSCULAR at 01:01

## 2019-01-30 ASSESSMENT — ANKYLOSING SPONDYLITIS DISEASE ACTIVITY SCORE (ASDAS-CRP)
NBH_PAIN: 4
MORNING_STIFFNESS: 4
GLOBAL_ACTIVITY: 4
TOTAL_SCORE: 2.14
CRP_MG_PER_LITER: 2.8
PAIN_SWELLING: 3

## 2019-01-30 ASSESSMENT — ROUTINE ASSESSMENT OF PATIENT INDEX DATA (RAPID3)
FATIGUE SCORE: 7
PSYCHOLOGICAL DISTRESS SCORE: 6.6
PATIENT GLOBAL ASSESSMENT SCORE: 6
WHEN YOU AWAKENED IN THE MORNING OVER THE LAST WEEK, PLEASE INDICATE THE AMOUNT OF TIME IT TAKES UNTIL YOU ARE AS LIMBER AS YOU WILL BE FOR THE DAY: 1 HR
MDHAQ FUNCTION SCORE: 1
TOTAL RAPID3 SCORE: 5.44
PAIN SCORE: 7
AM STIFFNESS SCORE: 1, YES

## 2019-01-30 NOTE — PATIENT INSTRUCTIONS
-X-rays of hips and low back  -Rest hip for 72 hours and then gradually increase activity to baseline  -Please start physical therapy for right hip pain (prescription attached)  -Please follow up with Dr. Shine February 27  -Continue adalimumab 40mg sc q 2 wks  -Continue Sulfasalazine  1500mg twice daily  -Bloodwork before next visit  -Return to clinic in 3 months

## 2019-01-30 NOTE — PROGRESS NOTES
I have personally taken the history and examined the patient and agree with the resident,s note as stated above      Schober's 10 -13 cm  Mildly decreased lateral flexion and extension  Fabere neg  Chest expansion 4 cm  O-W 0 cm  Neck rom normal       Axial spondlyloarthritis:  ASDAS- CRP 2.14(LDA) was 2.33(MDA)  BASDAI 5, was 4  BASFI  2.45(moderate functional limitation)  Right gluteal teninopathy  Hepatitis B on entecavir, HBV DNA neg  Morbid obesity, following with Dr. Moya in Bariat[facility] Medicine on topiramate  hypertension       * After verbal consent and cleansing with Chloraprep the right gr. Trochanteric bursa injected with Kenalog 40mg with 1 ml 1 % lidocaine. Patient tolerated procedure well.  Cont adalimumab 40mg sc q 2 wks,   Cont Sulfasalazine  1500mg twice daily  Cont Naproxen 500mg twice daily prn with pantoprazole 40mg daily  Resume stretches  Increase aerobic exercise: walk, bike, pool, gym  X-ray hips and lumbar spine as previously ordered  RTC 3 months with standing labs if still active could consider change to secukinumab in the future.

## 2019-01-30 NOTE — PROGRESS NOTES
"Subjective:       Patient ID: Althea Vazquez is a 53 y.o. female.    Chief Complaint: "My right hip"    Althea Vazquez is a 53 y.o. female with non-radiographic axial spondyloarthritis, OA hips(mod-severe), and HBV that presents for 3 month f/u. Since last visit, she has had increased right pain. 3 weeks ago, it started when it was cold. Pain is sharp, lateral hip, 5/10 at rest, 710 at maximum, 4/10 today, and Naproxen 500mg once nightly everyday for 3 weeks not helping.  Laying on right hip provokes pain.  Walking does not affect it. Has not tried heat, cold, topicals, PT, previous injections or doctors, no recent scans.  Denies sleep interruption, swelling, or warmth of the joint. Also endorses increased fatigue and whole body coldness especially in the anterior legs.  She has not exercised due to fatigue and the cold.  There is no gym accessible nearby her home in [place].      She had bronchitis in the beginning of January and was treated with tessalon and flonase.  She stopped the Humira for 1 dose and has only received one dose on February 28.  Taking adalimumab 40mg sc q 2 wks,  Sulfasalazine  1500mg twice daily, and Naproxen 500mg twice daily prn with pantoprazole 40mg daily.  She has seen Dr. Shine.  Allergic reaction to Trulicity.  Now on 100mg topiramate.  Gained 4kgs.  Constipation controlled with miralax; BM every 2 days.    Review of Systems   Constitutional: Positive for activity change, fatigue and unexpected weight change. Negative for chills and fever.   HENT: Negative for congestion.         Dry Mouth   Eyes: Positive for redness.   Respiratory: Positive for cough and shortness of breath. Negative for chest tightness.    Cardiovascular: Negative for chest pain.   Gastrointestinal: Positive for constipation.   Endocrine: Negative for polyuria.   Genitourinary: Negative for dysuria.   Musculoskeletal: Positive for arthralgias, back pain and myalgias. Negative for gait " problem and joint swelling.   Skin: Negative for rash.   Neurological: Positive for headaches. Negative for dizziness, weakness and numbness.   Hematological: Negative for adenopathy.   Psychiatric/Behavioral: Negative for confusion and sleep disturbance.         Objective:   LMP 11/25/2014      Physical Exam   Vitals reviewed.  Constitutional: She is oriented to person, place, and time and well-developed, well-nourished, and in no distress.   HENT:   Head: Normocephalic and atraumatic.   Eyes: EOM are normal. Pupils are equal, round, and reactive to light.   Neck: Normal range of motion. Neck supple.   Cardiovascular: Normal rate and regular rhythm.    Pulmonary/Chest: Effort normal and breath sounds normal.   Abdominal: Soft. Bowel sounds are normal.       Right Side Rheumatological Exam     Hip Exam   Tenderness Location: greater trochanter    Range of Motion   The patient has normal right hip ROM.    Muscle Strength (0-5 scale):  Neck Flexion:  5  Neck Extension: 5  Deltoid:  5  Biceps: 5/5   Triceps:  5  : 5/5   Iliopsoas: 5  Quadriceps:  5   Distal Lower Extremity: 5    Left Side Rheumatological Exam     Hip Exam   Tenderness Location: no tenderness    Range of Motion   The patient has normal left hip ROM.    Muscle Strength (0-5 scale):  Neck Flexion:  5  Neck Extension: 5  Deltoid:  5  Biceps: 5/5   Triceps:  5  :  5/5   Iliopsoas: 5  Quadriceps:  5   Distal Lower Extremity: 5      Neurological: She is alert and oriented to person, place, and time.   Reflex Scores:       Tricep reflexes are 2+ on the right side and 2+ on the left side.       Bicep reflexes are 2+ on the right side and 2+ on the left side.       Brachioradialis reflexes are 2+ on the right side and 2+ on the left side.       Patellar reflexes are 2+ on the right side and 2+ on the left side.       Achilles reflexes are 2+ on the right side and 2+ on the left side.  Skin: Skin is warm and dry.     Musculoskeletal: Normal range of  motion.         Schober's 10 - 13 (15 cm last visit)  Mildly decreased lateral flexion and extension  Fabere neg  Chest expansion 4 cm (4cm last visit)  O-W 0 cm  Neck rom normal      Assessment:       Non-radiographic axial spondyloarthritis  OA hips(mod-severe)  HBV  Plan:       -X-rays of hips and low back  -Right GTB steroid injection today  -Advised patient to rest hip for 72 hours and then gradually increase activity to baseline  -Advised patient to start physical therapy for right hip pain (prescription attached)  -Follow up with Dr. Shine February 27  -Continue adalimumab 40mg sc q 2 wks  -Continue sulfasalazine  1500mg twice daily  -Bloodwork before next visit  -RTC 3 months with standing labs &TSH; may consider secukinumab.

## 2019-01-30 NOTE — PROCEDURES
Large Joint Aspiration/Injection: R greater trochanteric bursa  Date/Time: 1/30/2019 1:07 PM  Performed by: Gabriel Arvizu MD  Authorized by: Gabriel Arvizu MD     Consent Done?:  Yes (Verbal)  Indications:  Pain  Procedure site marked: Yes    Timeout: Prior to procedure the correct patient, procedure, and site was verified      Location:  Hip  Site:  R greater trochanteric bursa  Prep: Patient was prepped and draped in usual sterile fashion    Ultrasonic Guidance for needle placement: No  Needle size:  25 G  Medications:  40 mg triamcinolone acetonide 40 mg/mL  Aspirate amount (ml):  0  Patient tolerance:  Patient tolerated the procedure well with no immediate complications

## 2019-01-30 NOTE — PROGRESS NOTES
ESTABLISHED OUTPATIENT VISIT   E/M LEVEL 3: [id number]    ENCOUNTER DATE: 1/30/2019  SITE: [facility], [address]    HISTORY    CHIEF COMPLAINT   Althea Vazquez is a 53 y.o. female who presents for follow up of anxiety.    HPI     Overall appears stable. Anxiety at times.   Spiritual beliefs are supportive.    Pt. Interested in again decreasing dose of Prozac.    Psychiatric Review Of Systems - Is patient experiencing or having changes in:  sleep: no  appetite: no  weight: working on losing weight  energy/anergy: no  interest/pleasure/anhedonia: no  somatic symptoms: no  libido: no  anxiety/panic: no  guilty/hopelessness: no  concentration: no  S.I.B.s/risky behavior: no  Irritability: no  Racing thoughts: no  Impulsive behaviors: no  Paranoia:no  AVH:no    Recent alcohol: rare small amount  Recent drug: no    Medical ROS   Denies any physical complaint during today's visit.     PAST MEDICAL, FAMILY AND SOCIAL HISTORY: The patient's past medical, family and social history have been reviewed and updated as appropriate within the electronic medical record - see encounter notes.    PSYCHOTROPIC MEDICATIONS   Prozac 40 mg qam and 20 mg qpm, Clonazepam 0.5 mg prn for anxiety[has been taking up to 3 times per week], Ambien 5 mg at bedtime prn[does not take every day], Topiramate 100 mg bid[for weight loss]    Scheduled and PRN Medications     Current Outpatient Medications:     acetaminophen-codeine 300-30mg (TYLENOL #3) 300-30 mg Tab, Take 1 tablet by mouth every 4 to 6 hours as needed., Disp: , Rfl: 0    adalimumab (HUMIRA) PnKt injection, Inject 0.8 mLs (40 mg total) into the skin every 14 (fourteen) days., Disp: 2 pen, Rfl: 2    amLODIPine (NORVASC) 10 MG tablet, Take 1 tablet (10 mg total) by mouth once daily., Disp: 90 tablet, Rfl: 3    aspirin (ECOTRIN) 81 MG EC tablet, Take 1 tablet (81 mg total) by mouth once daily., Disp: 30 tablet, Rfl: 0    atenolol (TENORMIN) 100 MG tablet, Take 1  tablet (100 mg total) by mouth once daily., Disp: 90 tablet, Rfl: 3    atorvastatin (LIPITOR) 40 MG tablet, Take 1 tablet (40 mg total) by mouth once daily., Disp: 90 tablet, Rfl: 3    blood sugar diagnostic Strp, 1 strip by Misc.(Non-Drug; Combo Route) route 2 (two) times daily., Disp: 150 strip, Rfl: 11    blood-glucose meter (FREESTYLE LITE METER) kit, FreeStyle Lite Meter kit, Disp: , Rfl:     clonazePAM (KLONOPIN) 0.5 MG tablet, Take 1 tablet (0.5 mg total) by mouth daily as needed for Anxiety., Disp: 90 tablet, Rfl: 1    clotrimazole-betamethasone 1-0.05% (LOTRISONE) cream, Apply topically 2 (two) times daily as needed. , Disp: , Rfl:     cyclobenzaprine (FLEXERIL) 10 MG tablet, TAKE 1 TABLET BY MOUTH AT BEDTIME IF NEEDED, Disp: 60 tablet, Rfl: 3    docusate sodium (COLACE) 50 MG capsule, Take 1 capsule (50 mg total) by mouth 2 (two) times daily as needed for Constipation. (Patient taking differently: Take 50 mg by mouth 2 (two) times daily. ), Disp: 30 capsule, Rfl: 0    entecavir (BARACLUDE) 0.5 MG Tab, Take 1 tablet (0.5 mg total) by mouth once daily., Disp: 30 tablet, Rfl: 11    FLUoxetine (PROZAC) 40 MG capsule, Take 1 capsule (40 mg total) by mouth 2 (two) times daily., Disp: 180 capsule, Rfl: 1    FLUoxetine 20 MG capsule, TAKE ONE CAPSULE BY MOUTH EVERY EVENING, Disp: 90 capsule, Rfl: 1    fluticasone (FLONASE) 50 mcg/actuation nasal spray, 2 sprays (100 mcg total) by Each Nare route once daily., Disp: 16 g, Rfl: 12    homatropine (ISOPTO HOMATROPINE) 5 % ophthalmic solution, Place 1 drop into the right eye 2 (two) times daily. (Patient taking differently: Place 1 drop into the right eye 2 (two) times daily as needed. ), Disp: 5 mL, Rfl: 1    hydroCHLOROthiazide (HYDRODIURIL) 25 MG tablet, Take 1 tablet (25 mg total) by mouth once daily., Disp: 90 tablet, Rfl: 3    HYDROcodone-acetaminophen (NORCO)  mg per tablet, hydrocodone 10 mg-acetaminophen 325 mg tablet  Take 1 tablet every 4  hours by oral route as needed., Disp: , Rfl:     hydrocortisone 2.5 % cream, Apply topically 2 (two) times daily., Disp: 28 g, Rfl: 1    hydroquinone 4 % Crea, Apply to dark areas qhs.  Not more than 6 months straight in same location.Use sunscreen in am (Patient taking differently: continuous prn. Apply to dark areas qhs.  Not more than 6 months straight in same location.Use sunscreen in am), Disp: 46 g, Rfl: 1    INVOKANA 300 mg Tab tablet, TAKE ONE TABLET BY MOUTH ONCE DAILY, Disp: 30 tablet, Rfl: 11    lancets (ACCU-CHEK SOFTCLIX LANCETS) Misc, 1 Device by Misc.(Non-Drug; Combo Route) route 2 (two) times daily., Disp: 200 each, Rfl: 11    lancets (FREESTYLE LANCETS) 28 gauge Misc, FreeStyle Lancets 28 gauge, Disp: , Rfl:     levocetirizine (XYZAL) 5 MG tablet, Take 1 tablet (5 mg total) by mouth every evening., Disp: 30 tablet, Rfl: 11    losartan (COZAAR) 100 MG tablet, TAKE ONE TABLET BY MOUTH EVERY DAY, Disp: 90 tablet, Rfl: 1    metFORMIN (GLUCOPHAGE-XR) 500 MG 24 hr tablet, Take 1 tablet (500 mg total) by mouth daily with breakfast. (Patient taking differently: Take 1,000 mg by mouth daily with breakfast. ), Disp: 90 tablet, Rfl: 3    methylnaltrexone (RELISTOR) 150 mg Tab, Take 450 mg by mouth., Disp: , Rfl:     metoprolol succinate (TOPROL-XL) 100 MG 24 hr tablet, metoprolol succinate  mg tablet,extended release 24 hr  take one tablet daily, Disp: , Rfl:     naproxen (NAPROSYN) 500 MG tablet, Take 1 tablet (500 mg total) by mouth 2 (two) times daily as needed., Disp: 180 tablet, Rfl: 0    nystatin (MYCOSTATIN) powder, Apply to affected area 3 times daily, Disp: 1 Bottle, Rfl: 3    ondansetron (ZOFRAN-ODT) 4 MG TbDL, Take 1 tablet (4 mg total) by mouth every 8 (eight) hours as needed (nausea and vomiting)., Disp: 20 tablet, Rfl: 0    pantoprazole (PROTONIX) 40 MG tablet, TAKE ONE TABLET BY MOUTH EVERY DAY, Disp: 90 tablet, Rfl: 3    promethazine (PHENERGAN) 25 MG tablet, Take 1 tablet  (25 mg total) by mouth every 6 (six) hours as needed for Nausea., Disp: 15 tablet, Rfl: 0    sulfaSALAzine (AZULFIDINE) 500 MG TbEC, Take 3 tablets (1,500 mg total) by mouth 2 (two) times daily., Disp: 540 tablet, Rfl: 0    topiramate (TOPAMAX) 100 MG tablet, Take 100 mg by mouth 2 (two) times daily., Disp: , Rfl:     tramadol (ULTRAM) 50 mg tablet, Take 1-2 tabs PO q6 hrs PRN pain, Disp: 120 tablet, Rfl: 0    triamcinolone acetonide 0.1% (KENALOG) 0.1 % cream, Apply topically 2 (two) times daily., Disp: 45 g, Rfl: 0    valACYclovir (VALTREX) 1000 MG tablet, valacyclovir 1 gram tablet  Take 0.5 tablets every 12 hours by oral route., Disp: , Rfl:     zolpidem (AMBIEN) 5 MG Tab, Take 1 tablet (5 mg total) by mouth nightly as needed., Disp: 90 tablet, Rfl: 1    EXAMINATION    Vitals:    01/30/19 0904   BP: (!) 151/74   Pulse: 77     Weight: 235 lb's    CONSTITUTIONAL  General Appearance: well nourished    MUSCULOSKELETAL  Muscle Strength and Tone: normal strength and tone  Abnormal Involuntary Movements: no abnormal movement noted  Gait and Station: normal gait    PSYCHIATRIC   Level of Consciousness: alert  Orientation: oriented to person, place and time  Grooming: well groomed  Psychomotor Behavior: no restlessness/agitation  Speech: normal in rate, rhythm and volume  Language: normal vocabulary  Mood: anxiety at times  Affect: full range and appropriate  Thought Process: logical and goal directed  Associations: intact associations  Thought Content: no SI/HI  Memory: grossly intact  Attention: intact to content of interview  Fund of Knowledge: appears adequate  Insight: good  Judgement: good    MEDICAL DECISION MAKING    DIAGNOSES  Anxiety d/o N.O.S.    PROBLEM LIST AND MANAGEMENT PLANS    - anxiety: decrease Prozac to 20 mg bid, continue Klonopin and Ambien as above  - rtc 3 months    Time with patient: 20 min    LABORATORY DATA  Lab Visit on 01/17/2019   Component Date Value Ref Range Status    Hemoglobin  A1C 01/17/2019 6.3* 4.0 - 5.6 % Final    Comment: ADA Screening Guidelines:  5.7-6.4%  Consistent with prediabetes  >or=6.5%  Consistent with diabetes  High levels of fetal hemoglobin interfere with the HbA1C  assay. Heterozygous hemoglobin variants (HbS, HgC, etc)do  not significantly interfere with this assay.   However, presence of multiple variants may affect accuracy.      Estimated Avg Glucose 01/17/2019 134* 68 - 131 mg/dL Final    Cholesterol 01/17/2019 177  120 - 199 mg/dL Final    Comment: The National Cholesterol Education Program (NCEP) has set the  following guidelines (reference ranges) for Cholesterol:  Optimal.....................<200 mg/dL  Borderline High.............200-239 mg/dL  High........................> or = 240 mg/dL      Triglycerides 01/17/2019 47  30 - 150 mg/dL Final    Comment: The National Cholesterol Education Program (NCEP) has set the  following guidelines (reference values) for triglycerides:  Normal......................<150 mg/dL  Borderline High.............150-199 mg/dL  High........................200-499 mg/dL      HDL 01/17/2019 63  40 - 75 mg/dL Final    Comment: The National Cholesterol Education Program (NCEP) has set the  following guidelines (reference values) for HDL Cholesterol:  Low...............<40 mg/dL  Optimal...........>60 mg/dL      LDL Cholesterol 01/17/2019 104.6  63.0 - 159.0 mg/dL Final    Comment: The National Cholesterol Education Program (NCEP) has set the  following guidelines (reference values) for LDL Cholesterol:  Optimal.......................<130 mg/dL  Borderline High...............130-159 mg/dL  High..........................160-189 mg/dL  Very High.....................>190 mg/dL      HDL/Chol Ratio 01/17/2019 35.6  20.0 - 50.0 % Final    Total Cholesterol/HDL Ratio 01/17/2019 2.8  2.0 - 5.0 Final    Non-HDL Cholesterol 01/17/2019 114  mg/dL Final    Comment: Risk category and Non-HDL cholesterol goals:  Coronary heart disease (CHD)or  equivalent (10-year risk of CHD >20%):  Non-HDL cholesterol goal     <130 mg/dL  Two or more CHD risk factors and 10-year risk of CHD <= 20%:  Non-HDL cholesterol goal     <160 mg/dL  0 to 1 CHD risk factor:  Non-HDL cholesterol goal     <190 mg/dL     Lab Visit on 12/21/2018   Component Date Value Ref Range Status    WBC 12/21/2018 7.08  3.90 - 12.70 K/uL Final    RBC 12/21/2018 5.00  4.00 - 5.40 M/uL Final    Hemoglobin 12/21/2018 14.9  12.0 - 16.0 g/dL Final    Hematocrit 12/21/2018 45.9  37.0 - 48.5 % Final    MCV 12/21/2018 92  82 - 98 fL Final    MCH 12/21/2018 29.8  27.0 - 31.0 pg Final    MCHC 12/21/2018 32.5  32.0 - 36.0 g/dL Final    RDW 12/21/2018 13.3  11.5 - 14.5 % Final    Platelets 12/21/2018 241  150 - 350 K/uL Final    MPV 12/21/2018 11.1  9.2 - 12.9 fL Final    Gran # (ANC) 12/21/2018 2.5  1.8 - 7.7 K/uL Final    Lymph # 12/21/2018 3.7  1.0 - 4.8 K/uL Final    Mono # 12/21/2018 0.7  0.3 - 1.0 K/uL Final    Eos # 12/21/2018 0.1  0.0 - 0.5 K/uL Final    Baso # 12/21/2018 0.03  0.00 - 0.20 K/uL Final    Gran% 12/21/2018 35.3* 38.0 - 73.0 % Final    Lymph% 12/21/2018 52.4* 18.0 - 48.0 % Final    Mono% 12/21/2018 9.9  4.0 - 15.0 % Final    Eosinophil% 12/21/2018 1.7  0.0 - 8.0 % Final    Basophil% 12/21/2018 0.4  0.0 - 1.9 % Final    Differential Method 12/21/2018 Automated   Final    Sodium 12/21/2018 140  136 - 145 mmol/L Final    Potassium 12/21/2018 4.0  3.5 - 5.1 mmol/L Final    Chloride 12/21/2018 104  95 - 110 mmol/L Final    CO2 12/21/2018 28  23 - 29 mmol/L Final    Glucose 12/21/2018 107  70 - 110 mg/dL Final    BUN, Bld 12/21/2018 15  7 - 17 mg/dL Final    Creatinine 12/21/2018 0.79  0.50 - 1.40 mg/dL Final    Calcium 12/21/2018 9.7  8.7 - 10.5 mg/dL Final    Total Protein 12/21/2018 8.3  6.0 - 8.4 g/dL Final    Albumin 12/21/2018 4.7  3.5 - 5.2 g/dL Final    Total Bilirubin 12/21/2018 0.5  0.1 - 1.0 mg/dL Final    Comment: For infants and newborns,  interpretation of results should be based  on gestational age, weight and in agreement with clinical  observations.  Premature Infant recommended reference ranges:  Up to 24 hours.............<8.0 mg/dL  Up to 48 hours............<12.0 mg/dL  3-5 days..................<15.0 mg/dL  6-29 days.................<15.0 mg/dL      Alkaline Phosphatase 12/21/2018 73  38 - 126 U/L Final    AST 12/21/2018 22  15 - 46 U/L Final    ALT 12/21/2018 17  10 - 44 U/L Final    Anion Gap 12/21/2018 8  8 - 16 mmol/L Final    eGFR if African American 12/21/2018 >60.0  >60 mL/min/1.73 m^2 Final    eGFR if non African American 12/21/2018 >60.0  >60 mL/min/1.73 m^2 Final    Comment: Calculation used to obtain the estimated glomerular filtration  rate (eGFR) is the CKD-EPI equation.       Sed Rate 12/21/2018 10  0 - 20 mm/Hr Final    CRP 12/21/2018 0.28  0.00 - 1.00 mg/dL Final    Hep B Viral DNA IU/ML 12/21/2018 <10  <10 IU/mL Final    Log HBV IU/mL 12/21/2018 <1.00  <1.00 Log (10) IU/mL Final    Comment: This procedure utilizes a real-time polymerase chain  reaction test from [employer].  The amplification   target is a conserved region in the terminal third of  the hepatitis B surface antigen gene. The lower limit of   quantitation is 10 IU/mL (1.00 Log IU/mL) and the uppper  limit of quantitation is 1 billion IU/mL (9.00 Log IU/mL).  The qualitative limit of detection is 10 IU/mL   (1.00 Log IU/mL). A  Not detected  result does not rule   out infection.  Test performed at [facility],  [address], [place]  [address]     [phone]  Milton Castro MD  - Medical Director      Hepatitis B Virus DNA 12/21/2018 Not detected  Not detected Final           Tip Oliveira

## 2019-02-07 NOTE — TELEPHONE ENCOUNTER
Attempted to contact patient for refill/follow up Humira and entecavir.  Unable to contact patient - [facility].

## 2019-02-11 NOTE — TELEPHONE ENCOUNTER
Pt confirmed shipping of Humira on  to arrive on . Address and  verified. $8.50 copay in 004, pt was driving and will call back with CC. Pt has 1 pen on hand for today's dose. Next dose due . Pt does not need a new sharps container at this time. Pt does not need Entecavir refill at this time, she picked up refill at local pharmacy and reported 1 month on hand. Pt reported no missed doses. Pt did not start any new medications. Pt had no further questions or concerns.

## 2019-02-18 ENCOUNTER — OFFICE VISIT (OUTPATIENT)
Dept: PSYCHIATRY | Facility: CLINIC | Age: 54
End: 2019-02-18
Payer: MEDICARE

## 2019-02-18 DIAGNOSIS — F41.1 GENERALIZED ANXIETY DISORDER: Primary | ICD-10-CM

## 2019-02-18 PROCEDURE — 90834 PR PSYCHOTHERAPY W/PATIENT, 45 MIN: ICD-10-PCS | Mod: S$GLB,,, | Performed by: SOCIAL WORKER

## 2019-02-18 PROCEDURE — 90834 PSYTX W PT 45 MINUTES: CPT | Mod: S$GLB,,, | Performed by: SOCIAL WORKER

## 2019-02-18 NOTE — PROGRESS NOTES
"Individual Psychotherapy (PhD/LCSW)    2/18/2019    Site:  [facility]         Therapeutic Intervention: Met with patient.  Outpatient - Insight oriented psychotherapy 45 min - CPT code 37031    Chief complaint/reason for encounter: anxiety     Interval history and content of current session: First follow-up with pt.  She states that she is having trouble getting thoughts out of her mind about her  and his "relationship" with another woman which she feels he has lied about.  She finds herself getting irritable and angry with him often.  She feels she cannot control these thoughts and describes them as "flashbacks".  As she talks it becomes clearer that she is very touchy with , expects him to conform to her often unrealistic expectations which cause him to become defensive and angry in return.  Suggested she have him come in with her but she thinks he will refuse to do so.  She wants more attention from him and we discussed more positive ways to get it.  She has an aunt who she often talks to about all of this who seems to give her good advice.  She was able to handle a confrontation from me about how her behavior with her  is aggravating the situation and preventing her from getting what she wants.    Treatment plan:  · Target symptoms: anxiety   · Why chosen therapy is appropriate versus another modality: relevant to diagnosis  · Outcome monitoring methods: self-report, observation  · Therapeutic intervention type: insight oriented psychotherapy    Risk parameters:  Patient reports no suicidal ideation  Patient reports no homicidal ideation  Patient reports no self-injurious behavior  Patient reports no violent behavior    Verbal deficits: None    Patient's response to intervention:  The patient's response to intervention is motivated.    Progress toward goals and other mental status changes:  The patient's progress toward goals is fair .    Diagnosis:     ICD-10-CM ICD-9-CM   1. " Generalized anxiety disorder F41.1 300.02       Plan:  individual psychotherapy and medication management by physician    Return to clinic: as scheduled    Length of Service (minutes): 45

## 2019-02-27 ENCOUNTER — OFFICE VISIT (OUTPATIENT)
Dept: BARIATRICS | Facility: CLINIC | Age: 54
End: 2019-02-27
Payer: MEDICARE

## 2019-02-27 VITALS
HEIGHT: 66 IN | WEIGHT: 233.94 LBS | BODY MASS INDEX: 37.6 KG/M2 | DIASTOLIC BLOOD PRESSURE: 70 MMHG | HEART RATE: 70 BPM | SYSTOLIC BLOOD PRESSURE: 110 MMHG

## 2019-02-27 DIAGNOSIS — E66.01 CLASS 2 SEVERE OBESITY WITH BODY MASS INDEX (BMI) OF 35 TO 39.9 WITH SERIOUS COMORBIDITY: Primary | ICD-10-CM

## 2019-02-27 PROCEDURE — 3074F SYST BP LT 130 MM HG: CPT | Mod: S$GLB,,, | Performed by: INTERNAL MEDICINE

## 2019-02-27 PROCEDURE — 3008F BODY MASS INDEX DOCD: CPT | Mod: S$GLB,,, | Performed by: INTERNAL MEDICINE

## 2019-02-27 PROCEDURE — 3078F DIAST BP <80 MM HG: CPT | Mod: S$GLB,,, | Performed by: INTERNAL MEDICINE

## 2019-02-27 PROCEDURE — 99213 OFFICE O/P EST LOW 20 MIN: CPT | Mod: S$GLB,,, | Performed by: INTERNAL MEDICINE

## 2019-02-27 PROCEDURE — 3078F PR MOST RECENT DIASTOLIC BLOOD PRESSURE < 80 MM HG: ICD-10-PCS | Mod: S$GLB,,, | Performed by: INTERNAL MEDICINE

## 2019-02-27 PROCEDURE — 99999 PR PBB SHADOW E&M-EST. PATIENT-LVL III: ICD-10-PCS | Mod: PBBFAC,,, | Performed by: INTERNAL MEDICINE

## 2019-02-27 PROCEDURE — 99999 PR PBB SHADOW E&M-EST. PATIENT-LVL III: CPT | Mod: PBBFAC,,, | Performed by: INTERNAL MEDICINE

## 2019-02-27 PROCEDURE — 3008F PR BODY MASS INDEX (BMI) DOCUMENTED: ICD-10-PCS | Mod: S$GLB,,, | Performed by: INTERNAL MEDICINE

## 2019-02-27 PROCEDURE — 99213 PR OFFICE/OUTPT VISIT, EST, LEVL III, 20-29 MIN: ICD-10-PCS | Mod: S$GLB,,, | Performed by: INTERNAL MEDICINE

## 2019-02-27 PROCEDURE — 3074F PR MOST RECENT SYSTOLIC BLOOD PRESSURE < 130 MM HG: ICD-10-PCS | Mod: S$GLB,,, | Performed by: INTERNAL MEDICINE

## 2019-02-27 RX ORDER — PHENTERMINE HYDROCHLORIDE 37.5 MG/1
TABLET ORAL
Qty: 30 TABLET | Refills: 1 | Status: SHIPPED | OUTPATIENT
Start: 2019-02-27 | End: 2020-08-11

## 2019-02-27 NOTE — PATIENT INSTRUCTIONS
3 meals a day made up of the following:  Unlimited green vegetables, tomatoes, mushrooms, spaghetti squash, cauliflower, meat, poultry, seafood, eggs and hard cheeses.   Milk and plain yogurt  Dressings, seasonings, condiments, etc should have less than 2 g sugars.   Beans (1-1.5 cups) or nuts (1/4 cup) can have 1 x a day.   1-2 servings of citrus fruits, berries, pineapple or melon a day (1/2 cup)  Avoid fried foods    No grains, rice, pasta, potatoes, bread, corn, peas, oatmeal, grits, tortillas, crackers, chips    No soda, sweet tea, juices or lemonade    [email or web address] for recipes. Moderate carb intake      Eating well to be healthy and lose weight does not have to be hard. It also does not have to be time consuming or expensive. There a lots of ways you can work in healthy choices into your day. Many of these are easy, quick and even family friendly!    Homemade hazelnut au lait  Brew your favorite brand of hazelnut flavored coffee ([employer] makes a good one). Microwave 1/2 cup of milk that fits your eating plan (whole, skim or sugar-free almond milk can all work). Add half to 1 oz sugar free hazelnut syrup.     Quick and easy breakfast  1-2 boiled eggs or mini-frittatas with a tangerine. The boiled eggs and mini-frittatas can both be made ahead and last for up to 4 days in the refrigerator. Bonus if you portion them out in ready to go containers or zipper bags.     Breakfast Egg Muffins with Kyle and Spinach  Makes 12 muffins  Ingredients    6 eggs  ¼ cup milk  ¼ teaspoon salt  2 cups grated cheddar cheese  3/4 cup spinach, cooked and drained (about 8 oz fresh spinach)  6 kyle slices, cooked, drained of fat, and chopped  1/2 cup grated Parmesan cheese (optional)    Instructions      Preheat oven to 350 degrees. Use a regular 12-cup muffin pan. Spray the muffin pan with non-stick cooking spray.  In a large bowl, beat eggs until smooth. Add milk, salt, Cheddar cheese and mix. Stir spinach, cooked kyle  into the egg mixture. Ladle the egg mixture into greased muffin cups ¾ full.  Top each muffin cup with grated Parmesan cheese.  Bake for 25 minutes. Remove from the oven, let the muffins cool for 30 minutes before removing them from the pan.      Be a brown bagger! When you make dinner, plan for an extra helping. When you serve your plate for dinner, serve an additional helping into a container that you can take with you the next day. If you don't have a refrigerator available during the day, an insulated lunch bag and ice packs will help you safely store you lunch.     Cold Brewed Iced tea. Fill a pitcher with 64 oz filtered water. Add either 4 regular tea bags of your choice or a large iced tea bag. Refrigerate over night then remove the tea bags. The tea will not be bitter and is super flavorful. Get creative! Try combinations like green tea and hibiscus tea or black tea with lemon zinger. Add orange or lemon slices for even more flavor.     Snack wisely. Protein filled snacks will fill you up, allowing you to get by with fewer calories. String cheese, pork skins (chicharrones), turkey pepperoni, or celery with cream cheese will all fit the bill.       Ditch the take out. Turkey tacos (with or without a low carb tortilla), burgers (without the bun), or fun stir fries are all quick and easy. The whole family will be happy, and you can save calories and money.      Orange Chicken Stir lee with asparagus   Makes 6 servings  Ingredients:    1.5 lbs boneless skinless chicken breast/tenders, diced into 1-inch pieces  1 Tbsp extra virgin olive or avocado oil, divided  2 lb asparagus, end portions trimmed and remainder diced into 1 1/2-inch pieces  1 small yellow onion, sliced into thin strips  8 oz button mushrooms, sliced  1 Tbsp peeled and finely grated fresh renaldo  4 cloves garlic, minced  1/2 cup low-sodium chicken broth  Juice of 2 fresh oranges  2 Tbsp low sodium soy sauce  2 Tbsp cornstarch  Sea salt and freshly  ground black pepper    Directions:    In a 12-inch non-stick wok, heat 1/2 oil over moderately high heat. Once oil is hot, add diced chicken and season lightly with salt and pepper. Sauté until cooked through, tossing occasionally, about 5-6 minutes.  Place chicken on a large plate and set aside. Return wok, reduce to medium-high heat, add remaining oil.  Once oil is hot, add asparagus, yellow onion and mushrooms, and sauté until tender-crisp, about 4 - 5 minutes, adding in garlic and renaldo during the last 1 minute of sautéing.  Meanwhile, in a mixing bowl whisk together chicken broth, orange juice, soy sauce and cornstarch until well blended.  Pour chicken broth mixture into skillet with veggies, season with salt and pepper to taste, and bring mixture to a light boil, stirring constantly. Allow mixture to gently boil, stirring constantly, until thickened, about 1 minute.  Toss chicken into mixture and serve immediately over cauliflower rice or Shirataki noodles.      Skinny Chicken Tortilla Soup  Makes 7 servings    2 teaspoons olive oil  1 cup onion, chopped (about 1 small)  2 cups celery, sliced (about 4 medium stalks)  4 garlic cloves, minced  4 medium tomatoes, chopped  2 cups water  4 cups low-sodium organic chicken broth  3 cups chopped and/or shredded rotisserie chicken, skinless  2 cups sliced carrots (about 3 medium)  1 teaspoon dried oregano leaves  2 teaspoons chili powder  1 teaspoon garlic powder  2 teaspoons cumin  ½ teaspoon cayenne pepper (add less or omit, if you don't want a spicy soup)  ½ teaspoon sea salt + more to taste  ½ teaspoon pepper + more to taste    Directions:   Put all ingredients into a large crock pot. Cook on low for 5-6 hours.     Optional garnish with chopped avocado, chopped fresh cilantro, crumbled Cotija cheese, sour cream, Greek yogurt, your favorite hot sauce.           Vegan Avocado Banana Chocolate Pudding  Makes 4 servings  Ingredients    1 1/2 ripe avocados  2 ripe  bananas  6 tbsp raw cacao powder or unsweetened cocoa powder  2-3 tbsp maple syrup (or calorie free sweetener)  1/4 cup almond milk  Instructions    Blend everything together in a  until the consistency is smooth and velvety. Taste and see if more sweetener is needed and stir to make sure everything is evenly mixed. Blend a second time if needed.  Top with banana slices, raw cacao nibs, almond butter, or any other toppings and enjoy!

## 2019-02-27 NOTE — PROGRESS NOTES
"Subjective:       Patient ID: Althea Vazquez is a 53 y.o. female.    Chief Complaint: Follow-up    Pt here today for follow-up. Has lost 7 lbs. Started trulicity with LCHF diet. She had GI SE with the trulicity. So back on invokana. Has had a hard time with with diet changes. Started topiramate. Was notified by pharmacy she was already on 100 mg BID topiramate from neurology.  She does not notice any SE with it. Says she took phentermine in the past, but it just made her hyper and thirsty from dry mouth. Has not been using as much clonazepam.   checked today     Lab Results       Component                Value               Date                       HGBA1C                   6.3 (H)             01/17/2019                 HGBA1C                   5.9 (H)             04/30/2018                 HGBA1C                   6.1 (H)             10/31/2017            Lab Results       Component                Value               Date                       LDLCALC                  104.6               01/17/2019                 CREATININE               0.79                12/21/2018                  Review of Systems   Constitutional: Negative for chills and fever.   Respiratory: Positive for shortness of breath.         + snores. Has had PSG in past. Did not need CPAP.    Cardiovascular: Positive for leg swelling. Negative for chest pain.   Gastrointestinal: Negative for constipation and diarrhea.        + GERD   Genitourinary: Negative for difficulty urinating and dysuria.   Musculoskeletal: Positive for arthralgias and back pain.   Neurological: Negative for dizziness and light-headedness.   Psychiatric/Behavioral: Negative for dysphoric mood. The patient is nervous/anxious.         Partly controled with meds.        Objective:     /70   Pulse 70   Ht 5' 6" (1.676 m)   Wt 106.1 kg (233 lb 14.5 oz)   LMP 11/25/2014   BMI 37.75 kg/m²     Physical Exam   Constitutional: She is oriented to person, place, " and time. She appears well-developed. No distress.   Obese   HENT:   Head: Normocephalic and atraumatic.   Eyes: EOM are normal. Pupils are equal, round, and reactive to light. No scleral icterus.   Neck: Normal range of motion. Neck supple. No thyromegaly present.   Cardiovascular: Normal rate and normal heart sounds. Exam reveals no gallop and no friction rub.   No murmur heard.  Pulmonary/Chest: Effort normal and breath sounds normal. No respiratory distress. She has no wheezes.   Abdominal: Soft. Bowel sounds are normal. She exhibits no distension. There is no tenderness.   Musculoskeletal: Normal range of motion. She exhibits no edema.   Neurological: She is alert and oriented to person, place, and time. No cranial nerve deficit.   Skin: Skin is warm and dry. No erythema.   Psychiatric: She has a normal mood and affect. Her behavior is normal. Judgment normal.   Vitals reviewed.      Assessment:       1. Class 2 severe obesity with body mass index (BMI) of 35 to 39.9 with serious comorbidity        Plan:         Althea was seen today for follow-up.    Diagnoses and all orders for this visit:    Class 2 severe obesity with body mass index (BMI) of 35 to 39.9 with serious comorbidity  -     phentermine (ADIPEX-P) 37.5 mg tablet; 1/2 tab po q am      Patient warned of common side effects of phentermine including anxiety, insomnia, palpitations and increased blood pressure. It was also explained that it is for short-term usage along with diet and exercise, and that stopping the medication without making lifestyle changes will result in regain of weight. Patient states understanding.     Weight loss medications are controlled substances.  They require routine follow up. Prescription or pills that are lost or destroyed will not be replaced.           3 meals a day made up of the following:  Unlimited green vegetables, tomatoes, mushrooms, spaghetti squash, cauliflower, meat, poultry, seafood, eggs and hard cheeses.    Milk and plain yogurt  Dressings, seasonings, condiments, etc should have less than 2 g sugars.   Beans (1-1.5 cups) or nuts (1/4 cup) can have 1 x a day.   1-2 servings of citrus fruits, berries, pineapple or melon a day (1/2 cup)  Avoid fried foods    No grains, rice, pasta, potatoes, bread, corn, peas, oatmeal, grits, tortillas, crackers, chips    No soda, sweet tea, juices or lemonade    [email or web address] for recipes. Moderate carb intake      One meal a week you can have whatever you'd like    Healthy all day tips given.

## 2019-03-13 ENCOUNTER — TELEPHONE (OUTPATIENT)
Dept: PHARMACY | Facility: CLINIC | Age: 54
End: 2019-03-13

## 2019-03-28 NOTE — TELEPHONE ENCOUNTER
Humira refill confirmed. We will ship Humira refill on  via fedex to arrive on . $0.00 copay- 004. Confirmed 2 patient identifiers - name and .     Patient has 0 doses of Humira remaining and injections every other Monday; next injection scheduled for .  Pt reports they are not having any side effects so far. No missed doses, no new medications, no new allergies or health conditions reported at this time. She has been receiving entecavir at an outside pharmacy and will let [facility] know if she needs it. All questions answered and addressed to patients satisfaction. Pt verbalized understanding.

## 2019-04-01 DIAGNOSIS — M02.30 REACTIVE ARTHRITIS: ICD-10-CM

## 2019-04-01 DIAGNOSIS — E78.5 HYPERLIPIDEMIA: ICD-10-CM

## 2019-04-01 RX ORDER — ZOLPIDEM TARTRATE 5 MG/1
TABLET ORAL
Qty: 90 TABLET | Refills: 0 | Status: SHIPPED | OUTPATIENT
Start: 2019-04-01 | End: 2019-09-04 | Stop reason: SDUPTHER

## 2019-04-02 RX ORDER — ATENOLOL 100 MG/1
TABLET ORAL
Qty: 90 TABLET | Refills: 3 | Status: SHIPPED | OUTPATIENT
Start: 2019-04-02 | End: 2020-05-29

## 2019-04-02 RX ORDER — HYDROCHLOROTHIAZIDE 25 MG/1
TABLET ORAL
Qty: 90 TABLET | Refills: 3 | Status: SHIPPED | OUTPATIENT
Start: 2019-04-02 | End: 2020-05-29

## 2019-04-02 RX ORDER — ATORVASTATIN CALCIUM 40 MG/1
TABLET, FILM COATED ORAL
Qty: 90 TABLET | Refills: 3 | Status: SHIPPED | OUTPATIENT
Start: 2019-04-02 | End: 2020-05-29

## 2019-04-02 RX ORDER — CYCLOBENZAPRINE HCL 10 MG
TABLET ORAL
Qty: 60 TABLET | Refills: 3 | Status: SHIPPED | OUTPATIENT
Start: 2019-04-02 | End: 2020-02-06

## 2019-04-02 RX ORDER — LOSARTAN POTASSIUM 100 MG/1
TABLET ORAL
Qty: 90 TABLET | Refills: 3 | Status: SHIPPED | OUTPATIENT
Start: 2019-04-02 | End: 2020-05-29

## 2019-04-02 RX ORDER — AMLODIPINE BESYLATE 10 MG/1
TABLET ORAL
Qty: 90 TABLET | Refills: 3 | Status: SHIPPED | OUTPATIENT
Start: 2019-04-02 | End: 2020-05-29

## 2019-04-23 ENCOUNTER — TELEPHONE (OUTPATIENT)
Dept: PHARMACY | Facility: CLINIC | Age: 54
End: 2019-04-23

## 2019-05-08 ENCOUNTER — OFFICE VISIT (OUTPATIENT)
Dept: PSYCHIATRY | Facility: CLINIC | Age: 54
End: 2019-05-08
Payer: MEDICARE

## 2019-05-08 VITALS
HEART RATE: 79 BPM | WEIGHT: 235.88 LBS | DIASTOLIC BLOOD PRESSURE: 87 MMHG | BODY MASS INDEX: 37.91 KG/M2 | SYSTOLIC BLOOD PRESSURE: 149 MMHG | HEIGHT: 66 IN

## 2019-05-08 DIAGNOSIS — F41.1 GENERALIZED ANXIETY DISORDER: ICD-10-CM

## 2019-05-08 DIAGNOSIS — F41.0 PANIC DISORDER WITHOUT AGORAPHOBIA: ICD-10-CM

## 2019-05-08 PROCEDURE — 3008F BODY MASS INDEX DOCD: CPT | Mod: S$GLB,,, | Performed by: PSYCHIATRY & NEUROLOGY

## 2019-05-08 PROCEDURE — 3008F PR BODY MASS INDEX (BMI) DOCUMENTED: ICD-10-PCS | Mod: S$GLB,,, | Performed by: PSYCHIATRY & NEUROLOGY

## 2019-05-08 PROCEDURE — 99213 OFFICE O/P EST LOW 20 MIN: CPT | Mod: S$GLB,,, | Performed by: PSYCHIATRY & NEUROLOGY

## 2019-05-08 PROCEDURE — 99213 PR OFFICE/OUTPT VISIT, EST, LEVL III, 20-29 MIN: ICD-10-PCS | Mod: S$GLB,,, | Performed by: PSYCHIATRY & NEUROLOGY

## 2019-05-08 RX ORDER — FLUOXETINE HYDROCHLORIDE 20 MG/1
20 CAPSULE ORAL 2 TIMES DAILY
Qty: 180 CAPSULE | Refills: 1 | Status: SHIPPED | OUTPATIENT
Start: 2019-05-08 | End: 2019-08-02 | Stop reason: SDUPTHER

## 2019-05-08 RX ORDER — CLONAZEPAM 0.5 MG/1
0.5 TABLET ORAL DAILY PRN
Qty: 90 TABLET | Refills: 1 | Status: SHIPPED | OUTPATIENT
Start: 2019-05-08 | End: 2020-04-29 | Stop reason: SDUPTHER

## 2019-05-08 RX ORDER — ZOLPIDEM TARTRATE 5 MG/1
5 TABLET ORAL NIGHTLY PRN
Qty: 90 TABLET | Refills: 1 | Status: SHIPPED | OUTPATIENT
Start: 2019-05-08 | End: 2019-05-20

## 2019-05-08 NOTE — PROGRESS NOTES
ESTABLISHED OUTPATIENT VISIT   E/M LEVEL 3: [id number]    ENCOUNTER DATE: 5/8/2019  SITE: [facility], [address]    HISTORY    CHIEF COMPLAINT   Althea Vazquez is a 53 y.o. female who presents for follow up of anxiety.    HPI     Overall appears to be doing reasonably well psychiatrically.. Anxiety at times. Crowds of people are difficult to cope with at times.  Spiritual beliefs remain supportive.    Takes walks.    Psychiatric Review Of Systems - Is patient experiencing or having changes in:  sleep: no  appetite: no  weight: working on losing weight  energy/anergy: no  interest/pleasure/anhedonia: no  somatic symptoms: no  libido: no  anxiety/panic: no  guilty/hopelessness: no  concentration: no  S.I.B.s/risky behavior: no  Irritability: no  Racing thoughts: no  Impulsive behaviors: no  Paranoia:no  AVH:no    Recent alcohol: rare small amount  Recent drug: no    Medical ROS   Denies any physical complaint during today's visit.     PAST MEDICAL, FAMILY AND SOCIAL HISTORY: The patient's past medical, family and social history have been reviewed and updated as appropriate within the electronic medical record - see encounter notes.    PSYCHOTROPIC MEDICATIONS   Prozac 20 mg qam and 20 mg qpm, Clonazepam 0.5 mg prn for anxiety[has been taking up to 3 times per week], Ambien 5 mg at bedtime prn[does not take every day, recently has been taking about 3 times per week], Topiramate 100 mg bid[for weight loss], Phentermine 18.75 mg bid    Scheduled and PRN Medications     Current Outpatient Medications:     amLODIPine (NORVASC) 10 MG tablet, TAKE ONE TABLET BY MOUTH EVERY DAY, Disp: 90 tablet, Rfl: 3    atenolol (TENORMIN) 100 MG tablet, TAKE ONE TABLET BY MOUTH EVERY DAY, Disp: 90 tablet, Rfl: 3    atorvastatin (LIPITOR) 40 MG tablet, TAKE ONE TABLET BY MOUTH EVERY DAY, Disp: 90 tablet, Rfl: 3    cyclobenzaprine (FLEXERIL) 10 MG tablet, TAKE 1 TABLET BY MOUTH AT BEDTIME IF NEEDED, Disp: 60 tablet,  Rfl: 3    hydroCHLOROthiazide (HYDRODIURIL) 25 MG tablet, TAKE ONE TABLET BY MOUTH EVERY DAY, Disp: 90 tablet, Rfl: 3    losartan (COZAAR) 100 MG tablet, TAKE ONE TABLET BY MOUTH EVERY DAY, Disp: 90 tablet, Rfl: 3    acetaminophen-codeine 300-30mg (TYLENOL #3) 300-30 mg Tab, Take 1 tablet by mouth every 4 to 6 hours as needed., Disp: , Rfl: 0    adalimumab (HUMIRA) PnKt injection, Inject 0.8 mLs (40 mg total) into the skin every 14 (fourteen) days., Disp: 2 pen, Rfl: 2    aspirin (ECOTRIN) 81 MG EC tablet, Take 1 tablet (81 mg total) by mouth once daily., Disp: 30 tablet, Rfl: 0    blood sugar diagnostic Strp, 1 strip by Misc.(Non-Drug; Combo Route) route 2 (two) times daily., Disp: 150 strip, Rfl: 11    blood-glucose meter (FREESTYLE LITE METER) kit, FreeStyle Lite Meter kit, Disp: , Rfl:     clonazePAM (KLONOPIN) 0.5 MG tablet, Take 1 tablet (0.5 mg total) by mouth daily as needed for Anxiety., Disp: 90 tablet, Rfl: 1    clotrimazole-betamethasone 1-0.05% (LOTRISONE) cream, Apply topically 2 (two) times daily as needed. , Disp: , Rfl:     docusate sodium (COLACE) 50 MG capsule, Take 1 capsule (50 mg total) by mouth 2 (two) times daily as needed for Constipation. (Patient taking differently: Take 50 mg by mouth 2 (two) times daily. ), Disp: 30 capsule, Rfl: 0    entecavir (BARACLUDE) 0.5 MG Tab, Take 1 tablet (0.5 mg total) by mouth once daily., Disp: 30 tablet, Rfl: 11    FLUoxetine 20 MG capsule, Take 1 capsule (20 mg total) by mouth 2 (two) times daily., Disp: 180 capsule, Rfl: 1    homatropine (ISOPTO HOMATROPINE) 5 % ophthalmic solution, Place 1 drop into the right eye 2 (two) times daily. (Patient taking differently: Place 1 drop into the right eye 2 (two) times daily as needed. ), Disp: 5 mL, Rfl: 1    HYDROcodone-acetaminophen (NORCO)  mg per tablet, hydrocodone 10 mg-acetaminophen 325 mg tablet  Take 1 tablet every 4 hours by oral route as needed., Disp: , Rfl:     hydrocortisone 2.5 %  cream, Apply topically 2 (two) times daily., Disp: 28 g, Rfl: 1    hydroquinone 4 % Crea, Apply to dark areas qhs.  Not more than 6 months straight in same location.Use sunscreen in am (Patient taking differently: continuous prn. Apply to dark areas qhs.  Not more than 6 months straight in same location.Use sunscreen in am), Disp: 46 g, Rfl: 1    INVOKANA 300 mg Tab tablet, TAKE ONE TABLET BY MOUTH ONCE DAILY, Disp: 30 tablet, Rfl: 11    lancets (ACCU-CHEK SOFTCLIX LANCETS) Misc, 1 Device by Misc.(Non-Drug; Combo Route) route 2 (two) times daily., Disp: 200 each, Rfl: 11    lancets (FREESTYLE LANCETS) 28 gauge Misc, FreeStyle Lancets 28 gauge, Disp: , Rfl:     levocetirizine (XYZAL) 5 MG tablet, Take 1 tablet (5 mg total) by mouth every evening., Disp: 30 tablet, Rfl: 11    metFORMIN (GLUCOPHAGE-XR) 500 MG 24 hr tablet, Take 1 tablet (500 mg total) by mouth daily with breakfast. (Patient taking differently: Take 1,000 mg by mouth daily with breakfast. ), Disp: 90 tablet, Rfl: 3    methylnaltrexone (RELISTOR) 150 mg Tab, Take 450 mg by mouth., Disp: , Rfl:     naproxen (NAPROSYN) 500 MG tablet, Take 1 tablet (500 mg total) by mouth 2 (two) times daily as needed., Disp: 180 tablet, Rfl: 0    nystatin (MYCOSTATIN) powder, Apply to affected area 3 times daily, Disp: 1 Bottle, Rfl: 3    ondansetron (ZOFRAN-ODT) 4 MG TbDL, Take 1 tablet (4 mg total) by mouth every 8 (eight) hours as needed (nausea and vomiting)., Disp: 20 tablet, Rfl: 0    pantoprazole (PROTONIX) 40 MG tablet, TAKE ONE TABLET BY MOUTH EVERY DAY, Disp: 90 tablet, Rfl: 3    phentermine (ADIPEX-P) 37.5 mg tablet, 1/2 tab po q am, Disp: 30 tablet, Rfl: 1    promethazine (PHENERGAN) 25 MG tablet, Take 1 tablet (25 mg total) by mouth every 6 (six) hours as needed for Nausea., Disp: 15 tablet, Rfl: 0    sulfaSALAzine (AZULFIDINE) 500 MG TbEC, Take 3 tablets (1,500 mg total) by mouth 2 (two) times daily., Disp: 540 tablet, Rfl: 0    topiramate  (TOPAMAX) 100 MG tablet, Take 100 mg by mouth 2 (two) times daily., Disp: , Rfl:     tramadol (ULTRAM) 50 mg tablet, Take 1-2 tabs PO q6 hrs PRN pain, Disp: 120 tablet, Rfl: 0    triamcinolone acetonide 0.1% (KENALOG) 0.1 % cream, Apply topically 2 (two) times daily., Disp: 45 g, Rfl: 0    valACYclovir (VALTREX) 1000 MG tablet, valacyclovir 1 gram tablet  Take 0.5 tablets every 12 hours by oral route., Disp: , Rfl:     zolpidem (AMBIEN) 5 MG Tab, Take 1 tablet (5 mg total) by mouth nightly as needed., Disp: 90 tablet, Rfl: 1    zolpidem (AMBIEN) 5 MG Tab, TAKE ONE TABLET BY MOUTH nightly AS NEEDED, Disp: 90 tablet, Rfl: 0    EXAMINATION    There were no vitals filed for this visit.  Pt. To have vitals and weight done after today's visit.    CONSTITUTIONAL  General Appearance: well nourished    MUSCULOSKELETAL  Muscle Strength and Tone: normal strength and tone  Abnormal Involuntary Movements: no abnormal movement noted  Gait and Station: normal gait    PSYCHIATRIC   Level of Consciousness: alert  Orientation: oriented to person, place and time  Grooming: well groomed  Psychomotor Behavior: no restlessness/agitation  Speech: normal in rate, rhythm and volume  Language: normal vocabulary  Mood: anxiety at times  Affect: full range and appropriate  Thought Process: logical and goal directed  Associations: intact associations  Thought Content: no SI/HI  Memory: grossly intact  Attention: intact to content of interview  Fund of Knowledge: appears adequate  Insight: good  Judgement: good    MEDICAL DECISION MAKING    DIAGNOSES  Anxiety d/o N.O.S.    PROBLEM LIST AND MANAGEMENT PLANS    - anxiety: decrease Prozac to 20 mg bid, continue Klonopin and Ambien as above  - rtc 3 months    Time with patient: 20 min    LABORATORY DATA  No visits with results within 3 Month(s) from this visit.   Latest known visit with results is:   Lab Visit on 01/17/2019   Component Date Value Ref Range Status    Hemoglobin A1C 01/17/2019  6.3* 4.0 - 5.6 % Final    Comment: ADA Screening Guidelines:  5.7-6.4%  Consistent with prediabetes  >or=6.5%  Consistent with diabetes  High levels of fetal hemoglobin interfere with the HbA1C  assay. Heterozygous hemoglobin variants (HbS, HgC, etc)do  not significantly interfere with this assay.   However, presence of multiple variants may affect accuracy.      Estimated Avg Glucose 01/17/2019 134* 68 - 131 mg/dL Final    Cholesterol 01/17/2019 177  120 - 199 mg/dL Final    Comment: The National Cholesterol Education Program (NCEP) has set the  following guidelines (reference ranges) for Cholesterol:  Optimal.....................<200 mg/dL  Borderline High.............200-239 mg/dL  High........................> or = 240 mg/dL      Triglycerides 01/17/2019 47  30 - 150 mg/dL Final    Comment: The National Cholesterol Education Program (NCEP) has set the  following guidelines (reference values) for triglycerides:  Normal......................<150 mg/dL  Borderline High.............150-199 mg/dL  High........................200-499 mg/dL      HDL 01/17/2019 63  40 - 75 mg/dL Final    Comment: The National Cholesterol Education Program (NCEP) has set the  following guidelines (reference values) for HDL Cholesterol:  Low...............<40 mg/dL  Optimal...........>60 mg/dL      LDL Cholesterol 01/17/2019 104.6  63.0 - 159.0 mg/dL Final    Comment: The National Cholesterol Education Program (NCEP) has set the  following guidelines (reference values) for LDL Cholesterol:  Optimal.......................<130 mg/dL  Borderline High...............130-159 mg/dL  High..........................160-189 mg/dL  Very High.....................>190 mg/dL      Hdl/Cholesterol Ratio 01/17/2019 35.6  20.0 - 50.0 % Final    Total Cholesterol/HDL Ratio 01/17/2019 2.8  2.0 - 5.0 Final    Non-HDL Cholesterol 01/17/2019 114  mg/dL Final    Comment: Risk category and Non-HDL cholesterol goals:  Coronary heart disease (CHD)or  equivalent (10-year risk of CHD >20%):  Non-HDL cholesterol goal     <130 mg/dL  Two or more CHD risk factors and 10-year risk of CHD <= 20%:  Non-HDL cholesterol goal     <160 mg/dL  0 to 1 CHD risk factor:  Non-HDL cholesterol goal     <190 mg/dL             Tip Oliveira

## 2019-05-09 ENCOUNTER — TELEPHONE (OUTPATIENT)
Dept: PHARMACY | Facility: CLINIC | Age: 54
End: 2019-05-09

## 2019-05-16 ENCOUNTER — LAB VISIT (OUTPATIENT)
Dept: LAB | Facility: HOSPITAL | Age: 54
End: 2019-05-16
Attending: INTERNAL MEDICINE
Payer: MEDICARE

## 2019-05-16 DIAGNOSIS — M47.819 SPONDYLOARTHRITIS: ICD-10-CM

## 2019-05-16 DIAGNOSIS — Z79.620 LONG-TERM USE OF ADALIMUMAB: ICD-10-CM

## 2019-05-16 DIAGNOSIS — B18.1 CHRONIC VIRAL HEPATITIS B WITHOUT DELTA AGENT AND WITHOUT COMA: ICD-10-CM

## 2019-05-16 LAB
ALBUMIN SERPL BCP-MCNC: 4.5 G/DL (ref 3.5–5.2)
ALP SERPL-CCNC: 68 U/L (ref 38–126)
ALT SERPL W/O P-5'-P-CCNC: 18 U/L (ref 10–44)
ANION GAP SERPL CALC-SCNC: 9 MMOL/L (ref 8–16)
AST SERPL-CCNC: 18 U/L (ref 15–46)
BASOPHILS # BLD AUTO: 0.03 K/UL (ref 0–0.2)
BASOPHILS NFR BLD: 0.5 % (ref 0–1.9)
BILIRUB SERPL-MCNC: 0.4 MG/DL (ref 0.1–1)
BUN SERPL-MCNC: 10 MG/DL (ref 7–17)
CALCIUM SERPL-MCNC: 9.3 MG/DL (ref 8.7–10.5)
CHLORIDE SERPL-SCNC: 106 MMOL/L (ref 95–110)
CO2 SERPL-SCNC: 26 MMOL/L (ref 23–29)
CREAT SERPL-MCNC: 0.71 MG/DL (ref 0.5–1.4)
CRP SERPL-MCNC: 0.28 MG/DL (ref 0–1)
DIFFERENTIAL METHOD: NORMAL
EOSINOPHIL # BLD AUTO: 0.1 K/UL (ref 0–0.5)
EOSINOPHIL NFR BLD: 0.8 % (ref 0–8)
ERYTHROCYTE [DISTWIDTH] IN BLOOD BY AUTOMATED COUNT: 13.9 % (ref 11.5–14.5)
ERYTHROCYTE [SEDIMENTATION RATE] IN BLOOD BY WESTERGREN METHOD: 10 MM/HR (ref 0–20)
EST. GFR  (AFRICAN AMERICAN): >60 ML/MIN/1.73 M^2
EST. GFR  (NON AFRICAN AMERICAN): >60 ML/MIN/1.73 M^2
GLUCOSE SERPL-MCNC: 168 MG/DL (ref 70–110)
HCT VFR BLD AUTO: 46.7 % (ref 37–48.5)
HGB BLD-MCNC: 15.2 G/DL (ref 12–16)
LYMPHOCYTES # BLD AUTO: 2.9 K/UL (ref 1–4.8)
LYMPHOCYTES NFR BLD: 43.9 % (ref 18–48)
MCH RBC QN AUTO: 29.8 PG (ref 27–31)
MCHC RBC AUTO-ENTMCNC: 32.5 G/DL (ref 32–36)
MCV RBC AUTO: 92 FL (ref 82–98)
MONOCYTES # BLD AUTO: 0.6 K/UL (ref 0.3–1)
MONOCYTES NFR BLD: 9 % (ref 4–15)
NEUTROPHILS # BLD AUTO: 3 K/UL (ref 1.8–7.7)
NEUTROPHILS NFR BLD: 45.2 % (ref 38–73)
PLATELET # BLD AUTO: 222 K/UL (ref 150–350)
PMV BLD AUTO: 10.7 FL (ref 9.2–12.9)
POTASSIUM SERPL-SCNC: 3.8 MMOL/L (ref 3.5–5.1)
PROT SERPL-MCNC: 7.6 G/DL (ref 6–8.4)
RBC # BLD AUTO: 5.1 M/UL (ref 4–5.4)
SODIUM SERPL-SCNC: 141 MMOL/L (ref 136–145)
WBC # BLD AUTO: 6.54 K/UL (ref 3.9–12.7)

## 2019-05-16 PROCEDURE — 36415 COLL VENOUS BLD VENIPUNCTURE: CPT | Mod: PO

## 2019-05-16 PROCEDURE — 85652 RBC SED RATE AUTOMATED: CPT

## 2019-05-16 PROCEDURE — 80053 COMPREHEN METABOLIC PANEL: CPT | Mod: PO

## 2019-05-16 PROCEDURE — 86140 C-REACTIVE PROTEIN: CPT | Mod: PO

## 2019-05-16 PROCEDURE — 85025 COMPLETE CBC W/AUTO DIFF WBC: CPT | Mod: PO

## 2019-05-20 ENCOUNTER — LAB VISIT (OUTPATIENT)
Dept: LAB | Facility: HOSPITAL | Age: 54
End: 2019-05-20
Payer: MEDICARE

## 2019-05-20 ENCOUNTER — OFFICE VISIT (OUTPATIENT)
Dept: RHEUMATOLOGY | Facility: CLINIC | Age: 54
End: 2019-05-20
Payer: MEDICARE

## 2019-05-20 VITALS
DIASTOLIC BLOOD PRESSURE: 92 MMHG | HEART RATE: 80 BPM | WEIGHT: 240.88 LBS | SYSTOLIC BLOOD PRESSURE: 137 MMHG | HEIGHT: 67 IN | BODY MASS INDEX: 37.81 KG/M2

## 2019-05-20 DIAGNOSIS — Z79.899 HIGH RISK MEDICATION USE: ICD-10-CM

## 2019-05-20 DIAGNOSIS — M47.819 SPONDYLOARTHRITIS: ICD-10-CM

## 2019-05-20 DIAGNOSIS — Z86.19 HISTORY OF HEPATITIS B: ICD-10-CM

## 2019-05-20 DIAGNOSIS — M46.90 AXIAL SPONDYLOARTHRITIS: Chronic | ICD-10-CM

## 2019-05-20 DIAGNOSIS — I15.2 HYPERTENSION ASSOCIATED WITH DIABETES: ICD-10-CM

## 2019-05-20 DIAGNOSIS — M19.90 INFLAMMATORY ARTHRITIS: ICD-10-CM

## 2019-05-20 DIAGNOSIS — E11.59 HYPERTENSION ASSOCIATED WITH DIABETES: ICD-10-CM

## 2019-05-20 DIAGNOSIS — M02.30 REACTIVE ARTHRITIS: ICD-10-CM

## 2019-05-20 DIAGNOSIS — E66.01 SEVERE OBESITY (BMI 35.0-39.9) WITH COMORBIDITY: ICD-10-CM

## 2019-05-20 PROCEDURE — 99999 PR PBB SHADOW E&M-EST. PATIENT-LVL IV: CPT | Mod: PBBFAC,,, | Performed by: INTERNAL MEDICINE

## 2019-05-20 PROCEDURE — 3075F PR MOST RECENT SYSTOLIC BLOOD PRESS GE 130-139MM HG: ICD-10-PCS | Mod: S$GLB,,, | Performed by: INTERNAL MEDICINE

## 2019-05-20 PROCEDURE — 3080F DIAST BP >= 90 MM HG: CPT | Mod: S$GLB,,, | Performed by: INTERNAL MEDICINE

## 2019-05-20 PROCEDURE — 3044F PR MOST RECENT HEMOGLOBIN A1C LEVEL <7.0%: ICD-10-PCS | Mod: S$GLB,,, | Performed by: INTERNAL MEDICINE

## 2019-05-20 PROCEDURE — 3008F BODY MASS INDEX DOCD: CPT | Mod: S$GLB,,, | Performed by: INTERNAL MEDICINE

## 2019-05-20 PROCEDURE — 3080F PR MOST RECENT DIASTOLIC BLOOD PRESSURE >= 90 MM HG: ICD-10-PCS | Mod: S$GLB,,, | Performed by: INTERNAL MEDICINE

## 2019-05-20 PROCEDURE — 3075F SYST BP GE 130 - 139MM HG: CPT | Mod: S$GLB,,, | Performed by: INTERNAL MEDICINE

## 2019-05-20 PROCEDURE — 3044F HG A1C LEVEL LT 7.0%: CPT | Mod: S$GLB,,, | Performed by: INTERNAL MEDICINE

## 2019-05-20 PROCEDURE — 99214 OFFICE O/P EST MOD 30 MIN: CPT | Mod: S$GLB,,, | Performed by: INTERNAL MEDICINE

## 2019-05-20 PROCEDURE — 3008F PR BODY MASS INDEX (BMI) DOCUMENTED: ICD-10-PCS | Mod: S$GLB,,, | Performed by: INTERNAL MEDICINE

## 2019-05-20 PROCEDURE — 36415 COLL VENOUS BLD VENIPUNCTURE: CPT

## 2019-05-20 PROCEDURE — 99999 PR PBB SHADOW E&M-EST. PATIENT-LVL IV: ICD-10-PCS | Mod: PBBFAC,,, | Performed by: INTERNAL MEDICINE

## 2019-05-20 PROCEDURE — 87517 HEPATITIS B DNA QUANT: CPT

## 2019-05-20 PROCEDURE — 99214 PR OFFICE/OUTPT VISIT, EST, LEVL IV, 30-39 MIN: ICD-10-PCS | Mod: S$GLB,,, | Performed by: INTERNAL MEDICINE

## 2019-05-20 RX ORDER — SULFASALAZINE 500 MG/1
1500 TABLET, DELAYED RELEASE ORAL 2 TIMES DAILY
Qty: 540 TABLET | Refills: 0 | Status: SHIPPED | OUTPATIENT
Start: 2019-05-20 | End: 2019-08-30 | Stop reason: SDUPTHER

## 2019-05-20 ASSESSMENT — ANKYLOSING SPONDYLITIS DISEASE ACTIVITY SCORE (ASDAS-CRP)
GLOBAL_ACTIVITY: 4
CRP_MG_PER_LITER: 2.8
MORNING_STIFFNESS: 4
PAIN_SWELLING: 4
TOTAL_SCORE: 2.33
NBH_PAIN: 5

## 2019-05-20 NOTE — PROGRESS NOTES
"Subjective:       Patient ID: Althea Vazquez is a 53 y.o. female.    Chief Complaint: Axial spondyloarthritis B27+  HPI Feels well. No complaints. No infections. Taking naproxen only 2-3/wk. Walking for exercise.  Review of Systems   Constitutional: Negative for appetite change, fatigue, fever and unexpected weight change.   HENT: Negative for mouth sores.    Eyes: Negative for visual disturbance.   Respiratory: Negative for cough, shortness of breath and wheezing.    Cardiovascular: Negative for chest pain and palpitations.   Gastrointestinal: Negative for abdominal pain, anal bleeding, blood in stool, constipation, diarrhea, nausea and vomiting.   Genitourinary: Negative for dysuria, frequency and urgency.   Musculoskeletal: Negative for arthralgias, back pain, gait problem, joint swelling, myalgias, neck pain and neck stiffness.   Skin: Negative for rash.   Neurological: Negative for weakness, numbness and headaches.   Hematological: Negative for adenopathy. Does not bruise/bleed easily.   Psychiatric/Behavioral: Negative for sleep disturbance. The patient is not nervous/anxious.          Objective:   BP (!) 137/92   Pulse 80   Ht 5' 7.2" (1.707 m)   Wt 109.3 kg (240 lb 14.4 oz)   LMP 11/25/2014   BMI 37.51 kg/m²      Physical Exam   Constitutional: She is oriented to person, place, and time and well-developed, well-nourished, and in no distress.   HENT:   Head: Normocephalic and atraumatic.   Mouth/Throat: Oropharynx is clear and moist.   Eyes: Conjunctivae and EOM are normal.   Neck: Normal range of motion. Neck supple. No thyromegaly present.   Cardiovascular: Normal rate, regular rhythm, normal heart sounds and intact distal pulses.  Exam reveals no gallop and no friction rub.    No murmur heard.  Pulmonary/Chest: Breath sounds normal. She has no wheezes. She has no rales. She exhibits no tenderness.   Abdominal: Soft. She exhibits no distension and no mass. There is no tenderness.       Right " Side Rheumatological Exam     Examination finds the shoulder, elbow, wrist, knee, 1st PIP, 1st MCP, 2nd PIP, 2nd MCP, 3rd PIP, 3rd MCP, 4th PIP, 4th MCP, 5th PIP and 5th MCP normal.    Left Side Rheumatological Exam     Examination finds the shoulder, elbow, wrist, knee, 1st PIP, 1st MCP, 2nd PIP, 2nd MCP, 3rd PIP, 3rd MCP, 4th PIP, 4th MCP, 5th PIP and 5th MCP normal.      Lymphadenopathy:     She has no cervical adenopathy.   Neurological: She is alert and oriented to person, place, and time. She displays normal reflexes. Gait normal.   Nl motor strength UE and LE bilateral   Musculoskeletal: She exhibits no edema.           Assessment/Plan         Problem List Items Addressed This Visit     Axial spondyloarthritis (Chronic)    Overview     Results for NABOR HERNANDEZ (MRN [id number]) as of 5/20/2019 09:17   Ref. 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  Cont adalimumab 40mg sc q 2 wks  Cont sulfasalazine 1500mg twice daily  Cont naproxen 500mg prn            Relevant Medications    adalimumab (HUMIRA) PnKt injection    sulfaSALAzine (AZULFIDINE) 500 MG TbEC    High risk medication use-sulfasalazine 2 gm    Relevant Medications    sulfaSALAzine (AZULFIDINE) 500 MG  TbEC    History of hepatitis B    Overview     Hep B core total Ab (+), no active/chronic infection         Current Assessment & Plan     Cont entecavir, HBV DNA today         Severe obesity (BMI 35.0-39.9) with comorbidity    Current Assessment & Plan     Gained 5# since last visit         Hypertension associated with diabetes    Current Assessment & Plan     137/92, but didn't take anti-hypertensives today           Other Visit Diagnoses     Spondyloarthritis        Relevant Medications    adalimumab (HUMIRA) PnKt injection    sulfaSALAzine (AZULFIDINE) 500 MG TbEC    Reactive arthritis        Relevant Medications    sulfaSALAzine (AZULFIDINE) 500 MG TbEC    Inflammatory arthritis        Relevant Medications    sulfaSALAzine (AZULFIDINE) 500 MG TbEC

## 2019-05-20 NOTE — ASSESSMENT & PLAN NOTE
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  Cont adalimumab 40mg sc q 2 wks  Cont sulfasalazine 1500mg twice daily  Cont naproxen 500mg prn

## 2019-06-04 ENCOUNTER — TELEPHONE (OUTPATIENT)
Dept: PHARMACY | Facility: CLINIC | Age: 54
End: 2019-06-04

## 2019-06-04 ENCOUNTER — PATIENT MESSAGE (OUTPATIENT)
Dept: PHARMACY | Facility: CLINIC | Age: 54
End: 2019-06-04

## 2019-06-11 ENCOUNTER — TELEPHONE (OUTPATIENT)
Dept: PHARMACY | Facility: CLINIC | Age: 54
End: 2019-06-11

## 2019-06-11 NOTE — TELEPHONE ENCOUNTER
Patient confirmed shipping of Humira on  to arrive . Address and  verified. $0 copay in 004. Patient missed her dose Monday 6/3 due to lack of cell phone service and reports increased fatigue. Informed patient that she can use Patient Portal if that is an option next time she does not have phone service, and pt voiced understanding.  Next dose will be due upon arrival on . Patient reports no new medications. Patient had no further questions or concerns.

## 2019-07-12 ENCOUNTER — TELEPHONE (OUTPATIENT)
Dept: PHARMACY | Facility: CLINIC | Age: 54
End: 2019-07-12

## 2019-08-02 DIAGNOSIS — E11.9 TYPE 2 DIABETES MELLITUS WITHOUT COMPLICATION, WITHOUT LONG-TERM CURRENT USE OF INSULIN: ICD-10-CM

## 2019-08-02 DIAGNOSIS — E11.9 TYPE 2 DIABETES MELLITUS WITHOUT COMPLICATION: ICD-10-CM

## 2019-08-02 RX ORDER — FLUOXETINE HYDROCHLORIDE 20 MG/1
CAPSULE ORAL
Qty: 180 CAPSULE | Refills: 1 | Status: SHIPPED | OUTPATIENT
Start: 2019-08-02 | End: 2019-10-15

## 2019-08-02 RX ORDER — FLUOXETINE HYDROCHLORIDE 40 MG/1
CAPSULE ORAL
Qty: 90 CAPSULE | Refills: 1 | OUTPATIENT
Start: 2019-08-02

## 2019-08-02 RX ORDER — METFORMIN HYDROCHLORIDE 500 MG/1
TABLET, EXTENDED RELEASE ORAL
Qty: 180 TABLET | Refills: 1 | Status: SHIPPED | OUTPATIENT
Start: 2019-08-02 | End: 2019-10-28 | Stop reason: SDUPTHER

## 2019-08-26 ENCOUNTER — LAB VISIT (OUTPATIENT)
Dept: LAB | Facility: HOSPITAL | Age: 54
End: 2019-08-26
Attending: INTERNAL MEDICINE
Payer: MEDICARE

## 2019-08-26 ENCOUNTER — PATIENT MESSAGE (OUTPATIENT)
Dept: RHEUMATOLOGY | Facility: CLINIC | Age: 54
End: 2019-08-26

## 2019-08-26 ENCOUNTER — TELEPHONE (OUTPATIENT)
Dept: PHARMACY | Facility: CLINIC | Age: 54
End: 2019-08-26

## 2019-08-26 ENCOUNTER — TELEPHONE (OUTPATIENT)
Dept: RHEUMATOLOGY | Facility: CLINIC | Age: 54
End: 2019-08-26

## 2019-08-26 DIAGNOSIS — M47.819 SPONDYLOARTHRITIS: ICD-10-CM

## 2019-08-26 DIAGNOSIS — B18.1 CHRONIC VIRAL HEPATITIS B WITHOUT DELTA AGENT AND WITHOUT COMA: ICD-10-CM

## 2019-08-26 DIAGNOSIS — E83.51 HYPOCALCEMIA: Primary | ICD-10-CM

## 2019-08-26 DIAGNOSIS — Z79.620 LONG-TERM USE OF ADALIMUMAB: ICD-10-CM

## 2019-08-26 LAB
ALBUMIN SERPL BCP-MCNC: 4 G/DL (ref 3.5–5.2)
ALP SERPL-CCNC: 86 U/L (ref 55–135)
ALT SERPL W/O P-5'-P-CCNC: 21 U/L (ref 10–44)
ANION GAP SERPL CALC-SCNC: 9 MMOL/L (ref 8–16)
AST SERPL-CCNC: 18 U/L (ref 10–40)
BASOPHILS # BLD AUTO: 0.04 K/UL (ref 0–0.2)
BASOPHILS NFR BLD: 0.6 % (ref 0–1.9)
BILIRUB SERPL-MCNC: 0.5 MG/DL (ref 0.1–1)
BUN SERPL-MCNC: 9 MG/DL (ref 6–20)
CALCIUM SERPL-MCNC: 8.2 MG/DL (ref 8.7–10.5)
CHLORIDE SERPL-SCNC: 103 MMOL/L (ref 95–110)
CO2 SERPL-SCNC: 28 MMOL/L (ref 23–29)
CREAT SERPL-MCNC: 0.7 MG/DL (ref 0.5–1.4)
CRP SERPL-MCNC: 3.7 MG/L (ref 0–8.2)
DIFFERENTIAL METHOD: ABNORMAL
EOSINOPHIL # BLD AUTO: 0.1 K/UL (ref 0–0.5)
EOSINOPHIL NFR BLD: 1.4 % (ref 0–8)
ERYTHROCYTE [DISTWIDTH] IN BLOOD BY AUTOMATED COUNT: 13.2 % (ref 11.5–14.5)
ERYTHROCYTE [SEDIMENTATION RATE] IN BLOOD BY WESTERGREN METHOD: 13 MM/HR (ref 0–36)
EST. GFR  (AFRICAN AMERICAN): >60 ML/MIN/1.73 M^2
EST. GFR  (NON AFRICAN AMERICAN): >60 ML/MIN/1.73 M^2
GLUCOSE SERPL-MCNC: 142 MG/DL (ref 70–110)
HCT VFR BLD AUTO: 44.4 % (ref 37–48.5)
HGB BLD-MCNC: 14.4 G/DL (ref 12–16)
IMM GRANULOCYTES # BLD AUTO: 0.04 K/UL (ref 0–0.04)
IMM GRANULOCYTES NFR BLD AUTO: 0.6 % (ref 0–0.5)
LYMPHOCYTES # BLD AUTO: 2.8 K/UL (ref 1–4.8)
LYMPHOCYTES NFR BLD: 44.1 % (ref 18–48)
MCH RBC QN AUTO: 30.1 PG (ref 27–31)
MCHC RBC AUTO-ENTMCNC: 32.4 G/DL (ref 32–36)
MCV RBC AUTO: 93 FL (ref 82–98)
MONOCYTES # BLD AUTO: 0.6 K/UL (ref 0.3–1)
MONOCYTES NFR BLD: 9 % (ref 4–15)
NEUTROPHILS # BLD AUTO: 2.8 K/UL (ref 1.8–7.7)
NEUTROPHILS NFR BLD: 44.3 % (ref 38–73)
NRBC BLD-RTO: 0 /100 WBC
PLATELET # BLD AUTO: 247 K/UL (ref 150–350)
PMV BLD AUTO: 11 FL (ref 9.2–12.9)
POTASSIUM SERPL-SCNC: 3.7 MMOL/L (ref 3.5–5.1)
PROT SERPL-MCNC: 7 G/DL (ref 6–8.4)
RBC # BLD AUTO: 4.79 M/UL (ref 4–5.4)
SODIUM SERPL-SCNC: 140 MMOL/L (ref 136–145)
WBC # BLD AUTO: 6.23 K/UL (ref 3.9–12.7)

## 2019-08-26 PROCEDURE — 80053 COMPREHEN METABOLIC PANEL: CPT

## 2019-08-26 PROCEDURE — 36415 COLL VENOUS BLD VENIPUNCTURE: CPT

## 2019-08-26 PROCEDURE — 86140 C-REACTIVE PROTEIN: CPT

## 2019-08-26 PROCEDURE — 85025 COMPLETE CBC W/AUTO DIFF WBC: CPT

## 2019-08-26 PROCEDURE — 87517 HEPATITIS B DNA QUANT: CPT

## 2019-08-26 PROCEDURE — 85652 RBC SED RATE AUTOMATED: CPT

## 2019-08-26 NOTE — TELEPHONE ENCOUNTER
Patient confirmed shipping of Humira on  to arrive . Address and  verified. $0 copay in 004. Patient reports 0 doses on hand. Next dose due  . Patient stated she has missed 0 doses. Patient said she has not started any new medications or developed any new allergies/health conditions. Patient had no further questions or concerns.

## 2019-08-30 ENCOUNTER — OFFICE VISIT (OUTPATIENT)
Dept: RHEUMATOLOGY | Facility: CLINIC | Age: 54
End: 2019-08-30
Payer: MEDICARE

## 2019-08-30 VITALS
HEART RATE: 90 BPM | HEIGHT: 67 IN | BODY MASS INDEX: 37.67 KG/M2 | SYSTOLIC BLOOD PRESSURE: 133 MMHG | DIASTOLIC BLOOD PRESSURE: 86 MMHG | WEIGHT: 240 LBS

## 2019-08-30 DIAGNOSIS — M46.90 AXIAL SPONDYLOARTHRITIS: Chronic | ICD-10-CM

## 2019-08-30 DIAGNOSIS — M19.90 INFLAMMATORY ARTHRITIS: ICD-10-CM

## 2019-08-30 DIAGNOSIS — E78.00 PURE HYPERCHOLESTEROLEMIA: Chronic | ICD-10-CM

## 2019-08-30 DIAGNOSIS — M47.819 SPONDYLOARTHRITIS: ICD-10-CM

## 2019-08-30 DIAGNOSIS — R53.83 OTHER FATIGUE: ICD-10-CM

## 2019-08-30 DIAGNOSIS — E55.9 VITAMIN D DEFICIENCY: ICD-10-CM

## 2019-08-30 DIAGNOSIS — Z78.0 MENOPAUSE: Primary | ICD-10-CM

## 2019-08-30 DIAGNOSIS — E55.9 VITAMIN D DEFICIENCY DISEASE: ICD-10-CM

## 2019-08-30 DIAGNOSIS — Z79.899 HIGH RISK MEDICATION USE: ICD-10-CM

## 2019-08-30 DIAGNOSIS — M02.30 REACTIVE ARTHRITIS: ICD-10-CM

## 2019-08-30 DIAGNOSIS — E83.51 HYPOCALCEMIA: ICD-10-CM

## 2019-08-30 PROCEDURE — 3075F PR MOST RECENT SYSTOLIC BLOOD PRESS GE 130-139MM HG: ICD-10-PCS | Mod: S$GLB,,, | Performed by: INTERNAL MEDICINE

## 2019-08-30 PROCEDURE — 3008F BODY MASS INDEX DOCD: CPT | Mod: S$GLB,,, | Performed by: INTERNAL MEDICINE

## 2019-08-30 PROCEDURE — 99214 PR OFFICE/OUTPT VISIT, EST, LEVL IV, 30-39 MIN: ICD-10-PCS | Mod: S$GLB,,, | Performed by: INTERNAL MEDICINE

## 2019-08-30 PROCEDURE — 3079F PR MOST RECENT DIASTOLIC BLOOD PRESSURE 80-89 MM HG: ICD-10-PCS | Mod: S$GLB,,, | Performed by: INTERNAL MEDICINE

## 2019-08-30 PROCEDURE — 99999 PR PBB SHADOW E&M-EST. PATIENT-LVL V: CPT | Mod: PBBFAC,,, | Performed by: INTERNAL MEDICINE

## 2019-08-30 PROCEDURE — 3079F DIAST BP 80-89 MM HG: CPT | Mod: S$GLB,,, | Performed by: INTERNAL MEDICINE

## 2019-08-30 PROCEDURE — 99214 OFFICE O/P EST MOD 30 MIN: CPT | Mod: S$GLB,,, | Performed by: INTERNAL MEDICINE

## 2019-08-30 PROCEDURE — 3075F SYST BP GE 130 - 139MM HG: CPT | Mod: S$GLB,,, | Performed by: INTERNAL MEDICINE

## 2019-08-30 PROCEDURE — 99999 PR PBB SHADOW E&M-EST. PATIENT-LVL V: ICD-10-PCS | Mod: PBBFAC,,, | Performed by: INTERNAL MEDICINE

## 2019-08-30 PROCEDURE — 3008F PR BODY MASS INDEX (BMI) DOCUMENTED: ICD-10-PCS | Mod: S$GLB,,, | Performed by: INTERNAL MEDICINE

## 2019-08-30 RX ORDER — SULFASALAZINE 500 MG/1
1500 TABLET, DELAYED RELEASE ORAL 2 TIMES DAILY
Qty: 540 TABLET | Refills: 0 | Status: SHIPPED | OUTPATIENT
Start: 2019-08-30 | End: 2019-12-10 | Stop reason: SDUPTHER

## 2019-08-30 ASSESSMENT — ANKYLOSING SPONDYLITIS DISEASE ACTIVITY SCORE (ASDAS-CRP)
NBH_PAIN: 4
TOTAL_SCORE: 2.05
PAIN_SWELLING: 4
CRP_MG_PER_LITER: 3.7
GLOBAL_ACTIVITY: 3
MORNING_STIFFNESS: 1

## 2019-08-30 ASSESSMENT — ROUTINE ASSESSMENT OF PATIENT INDEX DATA (RAPID3)
PAIN SCORE: 4.5
AM STIFFNESS SCORE: 1, YES
PATIENT GLOBAL ASSESSMENT SCORE: 6
MDHAQ FUNCTION SCORE: .8
WHEN YOU AWAKENED IN THE MORNING OVER THE LAST WEEK, PLEASE INDICATE THE AMOUNT OF TIME IT TAKES UNTIL YOU ARE AS LIMBER AS YOU WILL BE FOR THE DAY: 1 HR
TOTAL RAPID3 SCORE: 4.39
PSYCHOLOGICAL DISTRESS SCORE: 3.3
FATIGUE SCORE: 7

## 2019-08-30 NOTE — ASSESSMENT & PLAN NOTE
Schober's 10 -15 cm  Mildly decreased lateral flexion and extension  Fabere neg  Chest expansion 5 cm  O-W 0 cm  Neck rom normal        Axial spondlyloarthritis:  ASDAS- CRP 2.05(LDA)  BASDAI 4.5(HDA)  BASFI  3.65(moderate functional limitation)    *Shingrix(not covered, would cost her $400 out of pocket  *high dose flu vaccine before end of Oct  DXA  Standing labs and HBV DNA q 3 months.  Cont adalimumab 40mg sc q 2 wks  Cont sulfasalazine 1500mg twice daily  Cont naproxen 500mg prn  Cont entecavir 0.5mg daily  RTC 3 months

## 2019-08-30 NOTE — PROGRESS NOTES
"Subjective:       Patient ID: Nabor Hernandez is a 53 y.o. female.    Chief Complaint: Axial spondyloarthritis with hip involvement  HPI major complaint is fatigue. Only exercise is walking. No specific musculoskeletal complaints  Review of Systems   Constitutional: Negative for fever and unexpected weight change.   HENT: Negative for trouble swallowing.    Eyes: Negative for redness.   Respiratory: Positive for shortness of breath. Negative for cough.    Cardiovascular: Negative for chest pain.   Gastrointestinal: Negative for constipation and diarrhea.   Genitourinary: Negative for dysuria and genital sores.   Skin: Positive for rash.   Neurological: Negative for headaches.   Hematological: Does not bruise/bleed easily.         Objective:   /86   Pulse 90   Ht 5' 7.2" (1.707 m)   Wt 108.9 kg (240 lb)   LMP 11/25/2014   BMI 37.37 kg/m²      Physical Exam    Results for NABOR HERNANDEZ (MRN [id number]) as of 8/30/2019 08:47   Ref. Range 8/26/2019 08:56   WBC Latest Ref Range: 3.90 - 12.70 K/uL 6.23   RBC Latest Ref Range: 4.00 - 5.40 M/uL 4.79   Hemoglobin Latest Ref Range: 12.0 - 16.0 g/dL 14.4   Hematocrit Latest Ref Range: 37.0 - 48.5 % 44.4   MCV Latest Ref Range: 82 - 98 fL 93   MCH Latest Ref Range: 27.0 - 31.0 pg 30.1   MCHC Latest Ref Range: 32.0 - 36.0 g/dL 32.4   RDW Latest Ref Range: 11.5 - 14.5 % 13.2   Platelets Latest Ref Range: 150 - 350 K/uL 247   MPV Latest Ref Range: 9.2 - 12.9 fL 11.0   Gran% Latest Ref Range: 38.0 - 73.0 % 44.3   Gran # (ANC) Latest Ref Range: 1.8 - 7.7 K/uL 2.8   Lymph% Latest Ref Range: 18.0 - 48.0 % 44.1   Lymph # Latest Ref Range: 1.0 - 4.8 K/uL 2.8   Mono% Latest Ref Range: 4.0 - 15.0 % 9.0   Mono # Latest Ref Range: 0.3 - 1.0 K/uL 0.6   Eosinophil% Latest Ref Range: 0.0 - 8.0 % 1.4   Eos # Latest Ref Range: 0.0 - 0.5 K/uL 0.1   Basophil% Latest Ref Range: 0.0 - 1.9 % 0.6   Baso # Latest Ref Range: 0.00 - 0.20 K/uL 0.04   nRBC Latest Ref Range: 0 " /100 WBC 0   Differential Method Unknown Automated   Immature Grans (Abs) Latest Ref Range: 0.00 - 0.04 K/uL 0.04   Immature Granulocytes Latest Ref Range: 0.0 - 0.5 % 0.6 (H)   Sed Rate Latest Ref Range: 0 - 36 mm/Hr 13   Sodium Latest Ref Range: 136 - 145 mmol/L 140   Potassium Latest Ref Range: 3.5 - 5.1 mmol/L 3.7   Chloride Latest Ref Range: 95 - 110 mmol/L 103   CO2 Latest Ref Range: 23 - 29 mmol/L 28   Anion Gap Latest Ref Range: 8 - 16 mmol/L 9   BUN, Bld Latest Ref Range: 6 - 20 mg/dL 9   Creatinine Latest Ref Range: 0.5 - 1.4 mg/dL 0.7   eGFR if non African American Latest Ref Range: >60 mL/min/1.73 m^2 >60.0   eGFR if African American Latest Ref Range: >60 mL/min/1.73 m^2 >60.0   Glucose Latest Ref Range: 70 - 110 mg/dL 142 (H)   Calcium Latest Ref Range: 8.7 - 10.5 mg/dL 8.2 (L)   Alkaline Phosphatase Latest Ref Range: 55 - 135 U/L 86   PROTEIN TOTAL Latest Ref Range: 6.0 - 8.4 g/dL 7.0   Albumin Latest Ref Range: 3.5 - 5.2 g/dL 4.0   BILIRUBIN TOTAL Latest Ref Range: 0.1 - 1.0 mg/dL 0.5   AST Latest Ref Range: 10 - 40 U/L 18   ALT Latest Ref Range: 10 - 44 U/L 21   CRP Latest Ref Range: 0.0 - 8.2 mg/L 3.7   Hep B Viral DNA IU/ML Latest Ref Range: <10 IU/mL <10   Hepatitis B Virus DNA Latest Ref Range: Not detected  Not detected   Log HBV IU/mL Latest Ref Range: <1.00 Log (10) IU/mL <1.00     Assessment/Plan         Menopause  -     DXA Bone Density Spine And Hip; Future; Expected date: 08/30/2019    Axial spondyloarthritis  -     adalimumab (HUMIRA) PnKt injection; Inject 0.8 mLs (40 mg total) into the skin every 14 (fourteen) days.  Dispense: 2 pen; Refill: 2    Spondyloarthritis  -     adalimumab (HUMIRA) PnKt injection; Inject 0.8 mLs (40 mg total) into the skin every 14 (fourteen) days.  Dispense: 2 pen; Refill: 2  -     sulfaSALAzine (AZULFIDINE) 500 MG TbEC; Take 3 tablets (1,500 mg total) by mouth 2 (two) times daily.  Dispense: 540 tablet; Refill: 0    Reactive arthritis  -     sulfaSALAzine  (AZULFIDINE) 500 MG TbEC; Take 3 tablets (1,500 mg total) by mouth 2 (two) times daily.  Dispense: 540 tablet; Refill: 0    Inflammatory arthritis  -     sulfaSALAzine (AZULFIDINE) 500 MG TbEC; Take 3 tablets (1,500 mg total) by mouth 2 (two) times daily.  Dispense: 540 tablet; Refill: 0    High risk medication use  -     sulfaSALAzine (AZULFIDINE) 500 MG TbEC; Take 3 tablets (1,500 mg total) by mouth 2 (two) times daily.  Dispense: 540 tablet; Refill: 0    Pure hypercholesterolemia    Other fatigue  -     TSH; Future; Expected date: 08/30/2019  -     T4, FREE; Future; Expected date: 08/30/2019    Vitamin D deficiency  -     Vitamin D; Future; Expected date: 08/30/2019    Hypocalcemia    Vitamin D deficiency disease    Other orders  -     Influenza - High Dose (65+) (PF) (IM)       Problem List Items Addressed This Visit     Axial spondyloarthritis (Chronic)    Overview     Results for NABOR HERNANDEZ (MRN [id number]) as of 5/20/2019 09:17   Ref. Range 9/12/2018 11:26 12/21/2018 11:15   Hep B Viral DNA IU/ML Latest Ref Range: <10 IU/mL  <10   Hepatitis B Virus DNA Latest Ref Range: Not detected   Not detected   Log HBV IU/mL Latest Ref Range: <1.00 Log (10) IU/mL  <1.00   Mitogen - Nil Latest Ref Range: See text IU/mL >10.000    NIL Latest Ref Range: See text IU/mL 0.170    TB Gold Plus Unknown Negative    TB1 - Nil Latest Ref Range: See text IU/mL -0.070    TB2 - Nil Latest Ref Range: See text IU/mL -0.030             Current Assessment & Plan     Schober's 10 -15 cm  Mildly decreased lateral flexion and extension  Fabere neg  Chest expansion 5 cm  O-W 0 cm  Neck rom normal        Axial spondlyloarthritis:  ASDAS- CRP 2.05(LDA)  BASDAI 4.5(HDA)  BASFI  3.65(moderate functional limitation)    *Shingrix(not covered, would cost her $400 out of pocket  *high dose flu vaccine before end of Oct  DXA  Standing labs and HBV DNA q 3 months.  Cont adalimumab 40mg sc q 2 wks  Cont sulfasalazine 1500mg twice daily  Cont  naproxen 500mg prn  Cont entecavir 0.5mg daily  RTC 3 months         Relevant Medications    adalimumab (HUMIRA) PnKt injection    sulfaSALAzine (AZULFIDINE) 500 MG TbEC    Fatigue    Current Assessment & Plan     Check TSH, free T4  F/u Dr. Begum         Relevant Orders    TSH    T4, FREE    RESOLVED: Hyperlipidemia (Chronic)    Overview     Results for NABOR HERNANDEZ (MRN [id number]) as of 8/30/2019 08:47   Ref. Range 1/17/2019 08:59   Cholesterol Latest Ref Range: 120 - 199 mg/dL 177   HDL Latest Ref Range: 40 - 75 mg/dL 63   Hdl/Cholesterol Ratio Latest Ref Range: 20.0 - 50.0 % 35.6   LDL Cholesterol External Latest Ref Range: 63.0 - 159.0 mg/dL 104.6   Non-HDL Cholesterol Latest Units: mg/dL 114   Total Cholesterol/HDL Ratio Latest Ref Range: 2.0 - 5.0  2.8   Triglycerides Latest Ref Range: 30 - 150 mg/dL 47            Current Assessment & Plan     Cont atorvastatin 40mg each pm         Vitamin D deficiency disease    Current Assessment & Plan     Vitamin D level         High risk medication use-sulfasalazine 2 gm    Relevant Medications    sulfaSALAzine (AZULFIDINE) 500 MG TbEC    Hypocalcemia    Current Assessment & Plan     Vitamin D, calcium, ionized calcium, PO4, Mg calcitriol, iPTH           Other Visit Diagnoses     Menopause    -  Primary    Relevant Orders    DXA Bone Density Spine And Hip    Spondyloarthritis        Relevant Medications    adalimumab (HUMIRA) PnKt injection    sulfaSALAzine (AZULFIDINE) 500 MG TbEC    Reactive arthritis        Relevant Medications    sulfaSALAzine (AZULFIDINE) 500 MG TbEC    Inflammatory arthritis        Relevant Medications    sulfaSALAzine (AZULFIDINE) 500 MG TbEC    Vitamin D deficiency        Relevant Orders    Vitamin D

## 2019-08-30 NOTE — PATIENT INSTRUCTIONS
* high dose flu vaccine by end of Oct.  See Dr. Begum for check up in view of the fatigue  Will check thyroid and vitamin with lab 9/4/19

## 2019-09-01 ENCOUNTER — PATIENT MESSAGE (OUTPATIENT)
Dept: INTERNAL MEDICINE | Facility: CLINIC | Age: 54
End: 2019-09-01

## 2019-09-01 DIAGNOSIS — Z00.00 ANNUAL PHYSICAL EXAM: ICD-10-CM

## 2019-09-01 DIAGNOSIS — E11.42 TYPE 2 DIABETES MELLITUS WITH DIABETIC POLYNEUROPATHY, WITHOUT LONG-TERM CURRENT USE OF INSULIN: Primary | ICD-10-CM

## 2019-09-04 ENCOUNTER — LAB VISIT (OUTPATIENT)
Dept: LAB | Facility: HOSPITAL | Age: 54
End: 2019-09-04
Attending: INTERNAL MEDICINE
Payer: MEDICARE

## 2019-09-04 ENCOUNTER — OFFICE VISIT (OUTPATIENT)
Dept: INTERNAL MEDICINE | Facility: CLINIC | Age: 54
End: 2019-09-04
Payer: MEDICARE

## 2019-09-04 ENCOUNTER — OFFICE VISIT (OUTPATIENT)
Dept: PSYCHIATRY | Facility: CLINIC | Age: 54
End: 2019-09-04
Payer: MEDICARE

## 2019-09-04 VITALS
SYSTOLIC BLOOD PRESSURE: 132 MMHG | BODY MASS INDEX: 37.34 KG/M2 | HEIGHT: 67 IN | HEART RATE: 76 BPM | WEIGHT: 237.88 LBS | DIASTOLIC BLOOD PRESSURE: 84 MMHG

## 2019-09-04 VITALS
DIASTOLIC BLOOD PRESSURE: 68 MMHG | BODY MASS INDEX: 37.35 KG/M2 | HEART RATE: 72 BPM | RESPIRATION RATE: 16 BRPM | SYSTOLIC BLOOD PRESSURE: 118 MMHG | TEMPERATURE: 98 F | WEIGHT: 238 LBS | HEIGHT: 67 IN

## 2019-09-04 DIAGNOSIS — E11.69 HYPERLIPIDEMIA ASSOCIATED WITH TYPE 2 DIABETES MELLITUS: ICD-10-CM

## 2019-09-04 DIAGNOSIS — E55.9 VITAMIN D DEFICIENCY: Primary | ICD-10-CM

## 2019-09-04 DIAGNOSIS — F41.9 ANXIETY: Primary | ICD-10-CM

## 2019-09-04 DIAGNOSIS — E78.5 HYPERLIPIDEMIA ASSOCIATED WITH TYPE 2 DIABETES MELLITUS: ICD-10-CM

## 2019-09-04 DIAGNOSIS — E11.59 HYPERTENSION ASSOCIATED WITH DIABETES: ICD-10-CM

## 2019-09-04 DIAGNOSIS — I15.2 HYPERTENSION ASSOCIATED WITH DIABETES: ICD-10-CM

## 2019-09-04 DIAGNOSIS — Z86.19 HISTORY OF HEPATITIS B: ICD-10-CM

## 2019-09-04 DIAGNOSIS — E11.42 TYPE 2 DIABETES MELLITUS WITH DIABETIC POLYNEUROPATHY, WITHOUT LONG-TERM CURRENT USE OF INSULIN: ICD-10-CM

## 2019-09-04 DIAGNOSIS — G47.00 INSOMNIA, UNSPECIFIED TYPE: Chronic | ICD-10-CM

## 2019-09-04 DIAGNOSIS — E83.51 HYPOCALCEMIA: ICD-10-CM

## 2019-09-04 DIAGNOSIS — M46.90 AXIAL SPONDYLOARTHRITIS: Chronic | ICD-10-CM

## 2019-09-04 DIAGNOSIS — F41.9 ANXIETY: ICD-10-CM

## 2019-09-04 DIAGNOSIS — E66.01 SEVERE OBESITY (BMI 35.0-39.9) WITH COMORBIDITY: ICD-10-CM

## 2019-09-04 DIAGNOSIS — E11.42 TYPE 2 DIABETES MELLITUS WITH DIABETIC POLYNEUROPATHY, WITHOUT LONG-TERM CURRENT USE OF INSULIN: Primary | Chronic | ICD-10-CM

## 2019-09-04 DIAGNOSIS — Z00.00 ANNUAL PHYSICAL EXAM: ICD-10-CM

## 2019-09-04 LAB
25(OH)D3+25(OH)D2 SERPL-MCNC: 11 NG/ML (ref 30–96)
25(OH)D3+25(OH)D2 SERPL-MCNC: 11 NG/ML (ref 30–96)
ALBUMIN SERPL BCP-MCNC: 4.5 G/DL (ref 3.5–5.2)
ALP SERPL-CCNC: 101 U/L (ref 55–135)
ALT SERPL W/O P-5'-P-CCNC: 24 U/L (ref 10–44)
ANION GAP SERPL CALC-SCNC: 11 MMOL/L (ref 8–16)
AST SERPL-CCNC: 21 U/L (ref 10–40)
BASOPHILS # BLD AUTO: 0.07 K/UL (ref 0–0.2)
BASOPHILS NFR BLD: 0.8 % (ref 0–1.9)
BILIRUB SERPL-MCNC: 0.3 MG/DL (ref 0.1–1)
BUN SERPL-MCNC: 10 MG/DL (ref 6–20)
CA-I BLDV-SCNC: 1.25 MMOL/L (ref 1.06–1.42)
CALCIUM SERPL-MCNC: 10.4 MG/DL (ref 8.7–10.5)
CALCIUM SERPL-MCNC: 10.4 MG/DL (ref 8.7–10.5)
CHLORIDE SERPL-SCNC: 104 MMOL/L (ref 95–110)
CHOLEST SERPL-MCNC: 209 MG/DL (ref 120–199)
CHOLEST/HDLC SERPL: 2.8 {RATIO} (ref 2–5)
CO2 SERPL-SCNC: 24 MMOL/L (ref 23–29)
CREAT SERPL-MCNC: 0.8 MG/DL (ref 0.5–1.4)
DIFFERENTIAL METHOD: ABNORMAL
EOSINOPHIL # BLD AUTO: 0.2 K/UL (ref 0–0.5)
EOSINOPHIL NFR BLD: 1.9 % (ref 0–8)
ERYTHROCYTE [DISTWIDTH] IN BLOOD BY AUTOMATED COUNT: 13.2 % (ref 11.5–14.5)
EST. GFR  (AFRICAN AMERICAN): >60 ML/MIN/1.73 M^2
EST. GFR  (NON AFRICAN AMERICAN): >60 ML/MIN/1.73 M^2
ESTIMATED AVG GLUCOSE: 146 MG/DL (ref 68–131)
GLUCOSE SERPL-MCNC: 132 MG/DL (ref 70–110)
HBA1C MFR BLD HPLC: 6.7 % (ref 4–5.6)
HCT VFR BLD AUTO: 50.1 % (ref 37–48.5)
HDLC SERPL-MCNC: 75 MG/DL (ref 40–75)
HDLC SERPL: 35.9 % (ref 20–50)
HGB BLD-MCNC: 16.4 G/DL (ref 12–16)
IMM GRANULOCYTES # BLD AUTO: 0.12 K/UL (ref 0–0.04)
IMM GRANULOCYTES NFR BLD AUTO: 1.4 % (ref 0–0.5)
IRON SERPL-MCNC: 65 UG/DL (ref 30–160)
LDLC SERPL CALC-MCNC: 118.8 MG/DL (ref 63–159)
LYMPHOCYTES # BLD AUTO: 3.4 K/UL (ref 1–4.8)
LYMPHOCYTES NFR BLD: 39.8 % (ref 18–48)
MAGNESIUM SERPL-MCNC: 2.2 MG/DL (ref 1.6–2.6)
MCH RBC QN AUTO: 29.9 PG (ref 27–31)
MCHC RBC AUTO-ENTMCNC: 32.7 G/DL (ref 32–36)
MCV RBC AUTO: 91 FL (ref 82–98)
MONOCYTES # BLD AUTO: 0.8 K/UL (ref 0.3–1)
MONOCYTES NFR BLD: 9 % (ref 4–15)
NEUTROPHILS # BLD AUTO: 4 K/UL (ref 1.8–7.7)
NEUTROPHILS NFR BLD: 47.1 % (ref 38–73)
NONHDLC SERPL-MCNC: 134 MG/DL
NRBC BLD-RTO: 0 /100 WBC
PHOSPHATE SERPL-MCNC: 3.1 MG/DL (ref 2.7–4.5)
PLATELET # BLD AUTO: 288 K/UL (ref 150–350)
PMV BLD AUTO: 10.9 FL (ref 9.2–12.9)
POTASSIUM SERPL-SCNC: 4.3 MMOL/L (ref 3.5–5.1)
PROT SERPL-MCNC: 8.4 G/DL (ref 6–8.4)
PTH-INTACT SERPL-MCNC: 52 PG/ML (ref 9–77)
RBC # BLD AUTO: 5.49 M/UL (ref 4–5.4)
SATURATED IRON: 16 % (ref 20–50)
SODIUM SERPL-SCNC: 139 MMOL/L (ref 136–145)
T4 FREE SERPL-MCNC: 0.84 NG/DL (ref 0.71–1.51)
TOTAL IRON BINDING CAPACITY: 401 UG/DL (ref 250–450)
TRANSFERRIN SERPL-MCNC: 271 MG/DL (ref 200–375)
TRIGL SERPL-MCNC: 76 MG/DL (ref 30–150)
WBC # BLD AUTO: 8.52 K/UL (ref 3.9–12.7)

## 2019-09-04 PROCEDURE — 83036 HEMOGLOBIN GLYCOSYLATED A1C: CPT

## 2019-09-04 PROCEDURE — 3078F PR MOST RECENT DIASTOLIC BLOOD PRESSURE < 80 MM HG: ICD-10-PCS | Mod: S$GLB,,, | Performed by: INTERNAL MEDICINE

## 2019-09-04 PROCEDURE — 3075F PR MOST RECENT SYSTOLIC BLOOD PRESS GE 130-139MM HG: ICD-10-PCS | Mod: S$GLB,,, | Performed by: PSYCHIATRY & NEUROLOGY

## 2019-09-04 PROCEDURE — 96372 PR INJECTION,THERAP/PROPH/DIAG2ST, IM OR SUBCUT: ICD-10-PCS | Mod: S$GLB,,, | Performed by: INTERNAL MEDICINE

## 2019-09-04 PROCEDURE — 99213 PR OFFICE/OUTPT VISIT, EST, LEVL III, 20-29 MIN: ICD-10-PCS | Mod: S$GLB,,, | Performed by: PSYCHIATRY & NEUROLOGY

## 2019-09-04 PROCEDURE — 83735 ASSAY OF MAGNESIUM: CPT

## 2019-09-04 PROCEDURE — 82330 ASSAY OF CALCIUM: CPT

## 2019-09-04 PROCEDURE — 83970 ASSAY OF PARATHORMONE: CPT

## 2019-09-04 PROCEDURE — 82306 VITAMIN D 25 HYDROXY: CPT

## 2019-09-04 PROCEDURE — 3074F PR MOST RECENT SYSTOLIC BLOOD PRESSURE < 130 MM HG: ICD-10-PCS | Mod: S$GLB,,, | Performed by: INTERNAL MEDICINE

## 2019-09-04 PROCEDURE — 3008F PR BODY MASS INDEX (BMI) DOCUMENTED: ICD-10-PCS | Mod: S$GLB,,, | Performed by: INTERNAL MEDICINE

## 2019-09-04 PROCEDURE — 84100 ASSAY OF PHOSPHORUS: CPT

## 2019-09-04 PROCEDURE — 3079F DIAST BP 80-89 MM HG: CPT | Mod: S$GLB,,, | Performed by: PSYCHIATRY & NEUROLOGY

## 2019-09-04 PROCEDURE — 99999 PR PBB SHADOW E&M-EST. PATIENT-LVL II: CPT | Mod: PBBFAC,,, | Performed by: PSYCHIATRY & NEUROLOGY

## 2019-09-04 PROCEDURE — 3008F PR BODY MASS INDEX (BMI) DOCUMENTED: ICD-10-PCS | Mod: S$GLB,,, | Performed by: PSYCHIATRY & NEUROLOGY

## 2019-09-04 PROCEDURE — 99999 PR PBB SHADOW E&M-EST. PATIENT-LVL III: ICD-10-PCS | Mod: PBBFAC,,, | Performed by: INTERNAL MEDICINE

## 2019-09-04 PROCEDURE — 80061 LIPID PANEL: CPT

## 2019-09-04 PROCEDURE — 3074F SYST BP LT 130 MM HG: CPT | Mod: S$GLB,,, | Performed by: INTERNAL MEDICINE

## 2019-09-04 PROCEDURE — 3079F PR MOST RECENT DIASTOLIC BLOOD PRESSURE 80-89 MM HG: ICD-10-PCS | Mod: S$GLB,,, | Performed by: PSYCHIATRY & NEUROLOGY

## 2019-09-04 PROCEDURE — 3044F HG A1C LEVEL LT 7.0%: CPT | Mod: S$GLB,,, | Performed by: INTERNAL MEDICINE

## 2019-09-04 PROCEDURE — 3044F PR MOST RECENT HEMOGLOBIN A1C LEVEL <7.0%: ICD-10-PCS | Mod: S$GLB,,, | Performed by: INTERNAL MEDICINE

## 2019-09-04 PROCEDURE — 3075F SYST BP GE 130 - 139MM HG: CPT | Mod: S$GLB,,, | Performed by: PSYCHIATRY & NEUROLOGY

## 2019-09-04 PROCEDURE — 3078F DIAST BP <80 MM HG: CPT | Mod: S$GLB,,, | Performed by: INTERNAL MEDICINE

## 2019-09-04 PROCEDURE — 80053 COMPREHEN METABOLIC PANEL: CPT

## 2019-09-04 PROCEDURE — 96372 THER/PROPH/DIAG INJ SC/IM: CPT | Mod: S$GLB,,, | Performed by: INTERNAL MEDICINE

## 2019-09-04 PROCEDURE — 84439 ASSAY OF FREE THYROXINE: CPT

## 2019-09-04 PROCEDURE — 99213 OFFICE O/P EST LOW 20 MIN: CPT | Mod: S$GLB,,, | Performed by: PSYCHIATRY & NEUROLOGY

## 2019-09-04 PROCEDURE — 99214 OFFICE O/P EST MOD 30 MIN: CPT | Mod: 25,S$GLB,, | Performed by: INTERNAL MEDICINE

## 2019-09-04 PROCEDURE — 3008F BODY MASS INDEX DOCD: CPT | Mod: S$GLB,,, | Performed by: PSYCHIATRY & NEUROLOGY

## 2019-09-04 PROCEDURE — 85025 COMPLETE CBC W/AUTO DIFF WBC: CPT

## 2019-09-04 PROCEDURE — 36415 COLL VENOUS BLD VENIPUNCTURE: CPT

## 2019-09-04 PROCEDURE — 99999 PR PBB SHADOW E&M-EST. PATIENT-LVL III: CPT | Mod: PBBFAC,,, | Performed by: INTERNAL MEDICINE

## 2019-09-04 PROCEDURE — 3008F BODY MASS INDEX DOCD: CPT | Mod: S$GLB,,, | Performed by: INTERNAL MEDICINE

## 2019-09-04 PROCEDURE — 99999 PR PBB SHADOW E&M-EST. PATIENT-LVL II: ICD-10-PCS | Mod: PBBFAC,,, | Performed by: PSYCHIATRY & NEUROLOGY

## 2019-09-04 PROCEDURE — 83540 ASSAY OF IRON: CPT

## 2019-09-04 PROCEDURE — 99214 PR OFFICE/OUTPT VISIT, EST, LEVL IV, 30-39 MIN: ICD-10-PCS | Mod: 25,S$GLB,, | Performed by: INTERNAL MEDICINE

## 2019-09-04 RX ORDER — ERGOCALCIFEROL 1.25 MG/1
50000 CAPSULE ORAL
Qty: 12 CAPSULE | Refills: 3 | Status: SHIPPED | OUTPATIENT
Start: 2019-09-04 | End: 2020-04-13 | Stop reason: SDUPTHER

## 2019-09-04 RX ORDER — TRIAMCINOLONE ACETONIDE 40 MG/ML
40 INJECTION, SUSPENSION INTRA-ARTICULAR; INTRAMUSCULAR
Status: COMPLETED | OUTPATIENT
Start: 2019-09-04 | End: 2019-09-04

## 2019-09-04 RX ORDER — ZOLPIDEM TARTRATE 5 MG/1
5 TABLET ORAL NIGHTLY PRN
Qty: 90 TABLET | Refills: 0 | Status: SHIPPED | OUTPATIENT
Start: 2019-09-04 | End: 2020-04-29 | Stop reason: SDUPTHER

## 2019-09-04 RX ADMIN — TRIAMCINOLONE ACETONIDE 40 MG: 40 INJECTION, SUSPENSION INTRA-ARTICULAR; INTRAMUSCULAR at 01:09

## 2019-09-04 NOTE — PROGRESS NOTES
Subjective:       Patient ID: Althea Vazquez is a 53 y.o. female.    Chief Complaint: Back Pain (pain at 9.  started weekend.   ) and Diabetes (ck up)    HPI   Pt with T2DM, HTN, HLD, Reactive arthritis, Severe obesity, Insomnia, Hx of Hep B is here for f/u to review labs. Pt is c/o a few days of right sided mid back pain without radiation. Minimal relief with Naproxen. No urinary symptoms, hematuria, fevers/chills.   Review of Systems   Constitutional: Negative for activity change, appetite change, chills, diaphoresis, fatigue, fever and unexpected weight change.   HENT: Positive for congestion, ear pain and sore throat. Negative for drooling, ear discharge, mouth sores, postnasal drip, rhinorrhea, sinus pressure, sneezing, trouble swallowing and voice change.    Eyes: Negative for pain, discharge and visual disturbance.   Respiratory: Negative for cough, shortness of breath, wheezing and stridor.    Cardiovascular: Negative for chest pain, palpitations and leg swelling.   Gastrointestinal: Negative for abdominal pain, blood in stool, constipation, diarrhea, nausea and vomiting.   Endocrine: Negative for cold intolerance and heat intolerance.   Genitourinary: Negative for difficulty urinating, dysuria, frequency, hematuria and urgency.   Musculoskeletal: Positive for back pain. Negative for arthralgias, myalgias and neck pain.   Skin: Negative for rash and wound.   Allergic/Immunologic: Negative for environmental allergies and food allergies.   Neurological: Positive for headaches. Negative for dizziness, tremors, seizures, syncope, weakness and light-headedness.   Hematological: Negative for adenopathy. Does not bruise/bleed easily.   Psychiatric/Behavioral: Negative for confusion and sleep disturbance. The patient is not nervous/anxious.        Objective:      Physical Exam   Constitutional: She is oriented to person, place, and time. She appears well-developed and well-nourished. No distress.   HENT:    Head: Normocephalic and atraumatic.   Right Ear: External ear normal.   Left Ear: External ear normal.   Nose: Nose normal.   Mouth/Throat: Oropharynx is clear and moist. No oropharyngeal exudate.   Eyes: Pupils are equal, round, and reactive to light. Conjunctivae and EOM are normal. Right eye exhibits no discharge. Left eye exhibits no discharge. No scleral icterus.   Neck: Neck supple. No JVD present. No thyromegaly present.   Cardiovascular: Normal rate, regular rhythm, normal heart sounds and intact distal pulses.   No murmur heard.  Pulmonary/Chest: Effort normal and breath sounds normal. No respiratory distress. She has no wheezes. She has no rales. She exhibits no tenderness.   Abdominal: Soft. Bowel sounds are normal. She exhibits no distension. There is no tenderness. There is no guarding.   Musculoskeletal: She exhibits no edema.        Lumbar back: She exhibits tenderness.   Lymphadenopathy:     She has no cervical adenopathy.   Neurological: She is alert and oriented to person, place, and time. No cranial nerve deficit. Coordination normal.   Skin: Skin is warm and dry. No rash noted. She is not diaphoretic. No pallor.   Psychiatric: She has a normal mood and affect. Judgment normal.   Nursing note and vitals reviewed.      Assessment:       1. Type 2 diabetes mellitus with diabetic polyneuropathy, without long-term current use of insulin    2. Hypertension associated with diabetes    3. Hyperlipidemia associated with type 2 diabetes mellitus    4. Axial spondyloarthritis    5. Anxiety    6. Severe obesity (BMI 35.0-39.9) with comorbidity    7. Insomnia, unspecified type    8. History of hepatitis B        Plan:    1. T2DM with neuropathy- stable with last HA1C of 6.7(9/19)<--5.9(4/18)<--5.8(2/17)<--6.4(4/16)       Continue Metformin 1 gm qam/Invokana 300 mg daily   2. HTN- continue Losartan 100 mg, Toprol  mg, Norvasc 10 mg, HCTZ 25 mg daily   3. HLD- stable, on Lipitor 40 mg  qHS   4. Spondyloarthritis- stable on Sulfasalazine/Naproxen        Followed by Rheumatology       Kenalog 40 mg IM for acute back sprain   5. Anxiety- stable on Prozac/Klonopin, managed by [facility]   6. Severe obesity- pt advised on proper diet/exercise for weight loss   7. Insomnia- stable on Ambien 5 mg qHS PRN   8. Hx of Hep B- followed by Hepatology    9. F/u in 6 months

## 2019-09-04 NOTE — PROGRESS NOTES
ESTABLISHED OUTPATIENT VISIT   E/M LEVEL 3: [id number]    ENCOUNTER DATE: 9/4/2019  SITE: [facility], [address]    HISTORY    CHIEF COMPLAINT   Althea Vazquez is a 53 y.o. female who presents for follow up of anxiety.    HPI     Reports doing well. Plans to go on vacation for her upcoming birthday.  Overall appears to be doing reasonably well psychiatrically.   Spiritual beliefs remain supportive.    Takes walks.    Psychiatric Review Of Systems - Is patient experiencing or having changes in:  sleep: 4-6 hours at night, at times takes naps during the day  appetite: no  weight: working on losing weight  energy/anergy: no  interest/pleasure/anhedonia: no  somatic symptoms: no  libido: no  anxiety/panic: no  guilty/hopelessness: no  concentration: no  S.I.B.s/risky behavior: no  Irritability: no  Racing thoughts: no  Impulsive behaviors: no  Paranoia:no  AVH:no    Recent alcohol: rare small amount  Recent drug: no    Medical ROS   Denies any physical complaint during today's visit.     PAST MEDICAL, FAMILY AND SOCIAL HISTORY: The patient's past medical, family and social history have been reviewed and updated as appropriate within the electronic medical record - see encounter notes.    PSYCHOTROPIC MEDICATIONS   Prozac 20 mg qam and 20 mg qpm, Clonazepam 0.5 mg prn for anxiety[has been taking up to 2 times per week], Ambien 5 mg at bedtime prn[does not take every day, recently has been taking about 3 times per week], Topiramate 100 mg bid[for weight loss]    Scheduled and PRN Medications     Current Outpatient Medications:     adalimumab (HUMIRA) PnKt injection, Inject 0.8 mLs (40 mg total) into the skin every 14 (fourteen) days., Disp: 2 pen, Rfl: 2    amLODIPine (NORVASC) 10 MG tablet, TAKE ONE TABLET BY MOUTH EVERY DAY, Disp: 90 tablet, Rfl: 3    aspirin (ECOTRIN) 81 MG EC tablet, Take 1 tablet (81 mg total) by mouth once daily., Disp: 30 tablet, Rfl: 0    atenolol (TENORMIN) 100 MG  tablet, TAKE ONE TABLET BY MOUTH EVERY DAY, Disp: 90 tablet, Rfl: 3    atorvastatin (LIPITOR) 40 MG tablet, TAKE ONE TABLET BY MOUTH EVERY DAY, Disp: 90 tablet, Rfl: 3    blood sugar diagnostic Strp, 1 strip by Misc.(Non-Drug; Combo Route) route 2 (two) times daily., Disp: 150 strip, Rfl: 11    blood-glucose meter (FREESTYLE LITE METER) kit, FreeStyle Lite Meter kit, Disp: , Rfl:     clonazePAM (KLONOPIN) 0.5 MG tablet, Take 1 tablet (0.5 mg total) by mouth daily as needed for Anxiety., Disp: 90 tablet, Rfl: 1    clotrimazole-betamethasone 1-0.05% (LOTRISONE) cream, Apply topically 2 (two) times daily as needed. , Disp: , Rfl:     cyclobenzaprine (FLEXERIL) 10 MG tablet, TAKE 1 TABLET BY MOUTH AT BEDTIME IF NEEDED, Disp: 60 tablet, Rfl: 3    docusate sodium (COLACE) 50 MG capsule, Take 1 capsule (50 mg total) by mouth 2 (two) times daily as needed for Constipation. (Patient taking differently: Take 50 mg by mouth 2 (two) times daily. ), Disp: 30 capsule, Rfl: 0    entecavir (BARACLUDE) 0.5 MG Tab, Take 1 tablet (0.5 mg total) by mouth once daily., Disp: 30 tablet, Rfl: 11    FLUoxetine 20 MG capsule, TAKE ONE CAPSULE BY MOUTH TWICE DAILY, Disp: 180 capsule, Rfl: 1    homatropine (ISOPTO HOMATROPINE) 5 % ophthalmic solution, Place 1 drop into the right eye 2 (two) times daily. (Patient taking differently: Place 1 drop into the right eye 2 (two) times daily as needed. ), Disp: 5 mL, Rfl: 1    hydroCHLOROthiazide (HYDRODIURIL) 25 MG tablet, TAKE ONE TABLET BY MOUTH EVERY DAY, Disp: 90 tablet, Rfl: 3    hydrocortisone 2.5 % cream, Apply topically 2 (two) times daily., Disp: 28 g, Rfl: 1    hydroquinone 4 % Crea, Apply to dark areas qhs.  Not more than 6 months straight in same location.Use sunscreen in am (Patient taking differently: continuous prn. Apply to dark areas qhs.  Not more than 6 months straight in same location.Use sunscreen in am), Disp: 46 g, Rfl: 1    INVOKANA 300 mg Tab tablet, TAKE ONE  TABLET BY MOUTH ONCE DAILY, Disp: 30 tablet, Rfl: 11    lancets (ACCU-CHEK SOFTCLIX LANCETS) Misc, 1 Device by Misc.(Non-Drug; Combo Route) route 2 (two) times daily., Disp: 200 each, Rfl: 11    lancets (FREESTYLE LANCETS) 28 gauge Misc, FreeStyle Lancets 28 gauge, Disp: , Rfl:     levocetirizine (XYZAL) 5 MG tablet, Take 1 tablet (5 mg total) by mouth every evening., Disp: 30 tablet, Rfl: 11    losartan (COZAAR) 100 MG tablet, TAKE ONE TABLET BY MOUTH EVERY DAY, Disp: 90 tablet, Rfl: 3    metFORMIN (GLUCOPHAGE-XR) 500 MG 24 hr tablet, TAKE TWO TABLETS BY MOUTH AT BREAKFAST, Disp: 180 tablet, Rfl: 1    methylnaltrexone (RELISTOR) 150 mg Tab, Take 450 mg by mouth., Disp: , Rfl:     naproxen (NAPROSYN) 500 MG tablet, Take 1 tablet (500 mg total) by mouth 2 (two) times daily as needed., Disp: 180 tablet, Rfl: 0    nystatin (MYCOSTATIN) powder, Apply to affected area 3 times daily, Disp: 1 Bottle, Rfl: 3    ondansetron (ZOFRAN-ODT) 4 MG TbDL, Take 1 tablet (4 mg total) by mouth every 8 (eight) hours as needed (nausea and vomiting)., Disp: 20 tablet, Rfl: 0    pantoprazole (PROTONIX) 40 MG tablet, TAKE ONE TABLET BY MOUTH EVERY DAY, Disp: 90 tablet, Rfl: 3    phentermine (ADIPEX-P) 37.5 mg tablet, 1/2 tab po q am, Disp: 30 tablet, Rfl: 1    sulfaSALAzine (AZULFIDINE) 500 MG TbEC, Take 3 tablets (1,500 mg total) by mouth 2 (two) times daily., Disp: 540 tablet, Rfl: 0    topiramate (TOPAMAX) 100 MG tablet, Take 100 mg by mouth 2 (two) times daily., Disp: , Rfl:     tramadol (ULTRAM) 50 mg tablet, Take 1-2 tabs PO q6 hrs PRN pain, Disp: 120 tablet, Rfl: 0    triamcinolone acetonide 0.1% (KENALOG) 0.1 % cream, Apply topically 2 (two) times daily., Disp: 45 g, Rfl: 0    valACYclovir (VALTREX) 1000 MG tablet, valacyclovir 1 gram tablet  Take 0.5 tablets every 12 hours by oral route., Disp: , Rfl:     zolpidem (AMBIEN) 5 MG Tab, TAKE ONE TABLET BY MOUTH nightly AS NEEDED, Disp: 90 tablet, Rfl:  0    EXAMINATION    Vitals:    09/04/19 1000   BP: 132/84   Pulse: 76     Pt. To have vitals and weight done after today's visit.    CONSTITUTIONAL  General Appearance: well nourished    MUSCULOSKELETAL  Muscle Strength and Tone: normal strength and tone  Abnormal Involuntary Movements: no abnormal movement noted  Gait and Station: normal gait    PSYCHIATRIC   Level of Consciousness: alert  Orientation: oriented to person, place and time  Grooming: well groomed  Psychomotor Behavior: no restlessness/agitation  Speech: normal in rate, rhythm and volume  Language: normal vocabulary  Mood: anxiety at times  Affect: full range and appropriate  Thought Process: logical and goal directed  Associations: intact associations  Thought Content: no SI/HI  Memory: grossly intact  Attention: intact to content of interview  Fund of Knowledge: appears adequate  Insight: good  Judgement: good    MEDICAL DECISION MAKING    DIAGNOSES  Anxiety d/o N.O.S.    PROBLEM LIST AND MANAGEMENT PLANS    - anxiety: continue Prozac, Klonopin and Ambien as above  - rtc 3 months    Time with patient: 20 min    LABORATORY DATA  Lab Visit on 08/26/2019   Component Date Value Ref Range Status    WBC 08/26/2019 6.23  3.90 - 12.70 K/uL Final    RBC 08/26/2019 4.79  4.00 - 5.40 M/uL Final    Hemoglobin 08/26/2019 14.4  12.0 - 16.0 g/dL Final    Hematocrit 08/26/2019 44.4  37.0 - 48.5 % Final    Mean Corpuscular Volume 08/26/2019 93  82 - 98 fL Final    Mean Corpuscular Hemoglobin 08/26/2019 30.1  27.0 - 31.0 pg Final    Mean Corpuscular Hemoglobin Conc 08/26/2019 32.4  32.0 - 36.0 g/dL Final    RDW 08/26/2019 13.2  11.5 - 14.5 % Final    Platelets 08/26/2019 247  150 - 350 K/uL Final    MPV 08/26/2019 11.0  9.2 - 12.9 fL Final    Immature Granulocytes 08/26/2019 0.6* 0.0 - 0.5 % Final    Gran # (ANC) 08/26/2019 2.8  1.8 - 7.7 K/uL Final    Immature Grans (Abs) 08/26/2019 0.04  0.00 - 0.04 K/uL Final    Comment: Mild elevation in immature  granulocytes is non specific and   can be seen in a variety of conditions including stress response,   acute inflammation, trauma and pregnancy. Correlation with other   laboratory and clinical findings is essential.      Lymph # 08/26/2019 2.8  1.0 - 4.8 K/uL Final    Mono # 08/26/2019 0.6  0.3 - 1.0 K/uL Final    Eos # 08/26/2019 0.1  0.0 - 0.5 K/uL Final    Baso # 08/26/2019 0.04  0.00 - 0.20 K/uL Final    nRBC 08/26/2019 0  0 /100 WBC Final    Gran% 08/26/2019 44.3  38.0 - 73.0 % Final    Lymph% 08/26/2019 44.1  18.0 - 48.0 % Final    Mono% 08/26/2019 9.0  4.0 - 15.0 % Final    Eosinophil% 08/26/2019 1.4  0.0 - 8.0 % Final    Basophil% 08/26/2019 0.6  0.0 - 1.9 % Final    Differential Method 08/26/2019 Automated   Final    Sodium 08/26/2019 140  136 - 145 mmol/L Final    Potassium 08/26/2019 3.7  3.5 - 5.1 mmol/L Final    Chloride 08/26/2019 103  95 - 110 mmol/L Final    CO2 08/26/2019 28  23 - 29 mmol/L Final    Glucose 08/26/2019 142* 70 - 110 mg/dL Final    BUN, Bld 08/26/2019 9  6 - 20 mg/dL Final    Creatinine 08/26/2019 0.7  0.5 - 1.4 mg/dL Final    Calcium 08/26/2019 8.2* 8.7 - 10.5 mg/dL Final    Total Protein 08/26/2019 7.0  6.0 - 8.4 g/dL Final    Albumin 08/26/2019 4.0  3.5 - 5.2 g/dL Final    Total Bilirubin 08/26/2019 0.5  0.1 - 1.0 mg/dL Final    Comment: For infants and newborns, interpretation of results should be based  on gestational age, weight and in agreement with clinical  observations.  Premature Infant recommended reference ranges:  Up to 24 hours.............<8.0 mg/dL  Up to 48 hours............<12.0 mg/dL  3-5 days..................<15.0 mg/dL  6-29 days.................<15.0 mg/dL      Alkaline Phosphatase 08/26/2019 86  55 - 135 U/L Final    AST 08/26/2019 18  10 - 40 U/L Final    ALT 08/26/2019 21  10 - 44 U/L Final    Anion Gap 08/26/2019 9  8 - 16 mmol/L Final    eGFR if African American 08/26/2019 >60.0  >60 mL/min/1.73 m^2 Final    eGFR if non African  American 08/26/2019 >60.0  >60 mL/min/1.73 m^2 Final    Comment: Calculation used to obtain the estimated glomerular filtration  rate (eGFR) is the CKD-EPI equation.       Sed Rate 08/26/2019 13  0 - 36 mm/Hr Final    CRP 08/26/2019 3.7  0.0 - 8.2 mg/L Final    Hep B Viral DNA IU/ML 08/26/2019 <10  <10 IU/mL Final    Log HBV IU/mL 08/26/2019 <1.00  <1.00 Log (10) IU/mL Final    Comment: This procedure utilizes a real-time polymerase chain  reaction test from [employer].  The amplification   target is a conserved region in the terminal third of  the hepatitis B surface antigen gene. The lower limit of   quantitation is 10 IU/mL (1.00 Log IU/mL) and the uppper  limit of quantitation is 1 billion IU/mL (9.00 Log IU/mL).  The qualitative limit of detection is 10 IU/mL   (1.00 Log IU/mL). A  Not detected  result does not rule   out infection.  Test performed at [facility],  [address] , [place]  [address]     [phone]  Milton Castro MD  - Medical Director      Hepatitis B Virus DNA 08/26/2019 Not detected  Not detected Final           Tip Oliveira

## 2019-09-16 ENCOUNTER — HOSPITAL ENCOUNTER (OUTPATIENT)
Dept: RADIOLOGY | Facility: HOSPITAL | Age: 54
Discharge: HOME | End: 2019-09-16
Attending: INTERNAL MEDICINE
Payer: MEDICARE

## 2019-09-16 DIAGNOSIS — Z78.0 MENOPAUSE: ICD-10-CM

## 2019-09-16 PROCEDURE — 77080 DXA BONE DENSITY AXIAL: CPT | Mod: TC,PO

## 2019-09-24 ENCOUNTER — TELEPHONE (OUTPATIENT)
Dept: INTERNAL MEDICINE | Facility: CLINIC | Age: 54
End: 2019-09-24

## 2019-09-24 ENCOUNTER — TELEPHONE (OUTPATIENT)
Dept: PHARMACY | Facility: CLINIC | Age: 54
End: 2019-09-24

## 2019-09-24 NOTE — TELEPHONE ENCOUNTER
----- Message from Tessy Guillen sent at 9/23/2019  4:44 PM CDT -----  Contact: [place]/Shai Bruner [phone]  Please call to gp over medication mgmt as required by insurance.    Please call and advise.    Thank You

## 2019-09-24 NOTE — TELEPHONE ENCOUNTER
Refill call regarding Humira at [facility]. Will prepare for shipment on  to arrive  with pt consent. Copay 0.00. Patient has not started any new medications, no missed doses/ side effects. Patient did not have any concerns or questions for the pharmacist at this time.  & address verified.  ~ next injection with 0 doses on hand.

## 2019-10-12 NOTE — PROGRESS NOTES
Internal Medicine Subjective:       Patient ID: Althea Vazquez is a 51 y.o. female.    Chief Complaint: spondyloarthritis    HPI 51 yF with spondyloarthritis and currently on entecavir for HB prophylaxis last seen on 1/4/17 presents for f/u. Humira on hold since last dose on 4/6/17 while on minocycline (14 days from 4/11) for yeast infection.    Today, patient reports that she is doing pretty well. She remarks that she has improved greatly since she started this combination of medications, remarking that she can now raise her arms above her head and her hips are also bothering her less. She has received a shoulder injection into her right shoulder in February which has also provided significant relief. She has completed PT at this point and continues home exercises. She continues the adalimumab 40 mg sc q 2 weeks (on hold currently as above), naproxen 500 mg twice daily with omeprazole, and sulfasalazine 1000 mg twice daily.    She reports that she experiences episodes of fatigue regularly 2x/week. Unsure of how long this has been going on for. She also reports that she has started having headaches for the last month, 3-4x/week, frontal headache, no radiation. She does note that she does always have neck pain associated with the headache.    2/13/17 Received right shoulder injection for rotator cuff tear and tendinosis, also with synovitis with joint effusion and subdeltoid bursitis, also with mild ac arthrosis    2/13 T2DM, HTN, HLD, obesity, insomnia, and GERD managed by Internal Medicine    2/16 Anxiety/Depression managed by Psych (Prozac, Klonopin, and Ambien)    4/12 Followed by Hepatology for entecavir therapy. Will need to stay on this medication for as long as biologics are used and then 6 months after if ever stopped.    Review of Systems   Constitutional: Negative for fever and unexpected weight change.   HENT: Negative for mouth sores and trouble swallowing.    Eyes: Negative for redness.   Respiratory: Positive  "for shortness of breath. Negative for cough.    Cardiovascular: Negative for chest pain.   Gastrointestinal: Negative for constipation and diarrhea.        Heartburn   Genitourinary: Negative for dysuria and genital sores.   Skin: Negative for color change and rash.   Neurological: Positive for headaches.   Hematological: Does not bruise/bleed easily.   Psychiatric/Behavioral: Negative for agitation.         Objective:   /83  Pulse 71  Ht 5' 3.6" (1.615 m)  Wt 106.1 kg (233 lb 14.4 oz)  LMP 11/25/2014  BMI 40.66 kg/m2     Physical Exam   Constitutional: She is oriented to person, place, and time and well-developed, well-nourished, and in no distress.   HENT:   Head: Normocephalic and atraumatic.   Eyes: EOM are normal.   Cardiovascular: Normal rate, regular rhythm, normal heart sounds and intact distal pulses.    Pulmonary/Chest: Effort normal and breath sounds normal.       Right Side Rheumatological Exam     Examination finds the elbow, wrist, knee, 1st PIP, 1st MCP, 2nd PIP, 2nd MCP, 3rd PIP, 3rd MCP, 4th PIP, 4th MCP, 5th PIP and 5th MCP normal.    The patient is tender to palpation of the shoulder and 3rd DIP    The patient has an enlarged 3rd DIP    Shoulder Exam   Tenderness Location: acromioclavicular joint    Range of Motion   Active Abduction:  100 abnormal   Passive Abduction:  100 abnormal   Forward Flexion:  90 abnormal     Crepitus: negative  Swelling: negative  Sensation: normal    Knee Exam   Sensation: normal    Hip Exam   Sensation: normal    Elbow/Wrist Exam   Sensation: normal    Muscle Strength (0-5 scale):  Neck Flexion:  5  Neck Extension: 5  Deltoid:  5  Biceps: 5/5   Triceps:  5  : 5/5   Iliopsoas: 5  Quadriceps:  5   Distal Lower Extremity: 5    Left Side Rheumatological Exam     Examination finds the shoulder, elbow, wrist, knee, 1st PIP, 1st MCP, 2nd PIP, 2nd MCP, 3rd PIP, 3rd MCP, 4th PIP, 4th MCP, 5th PIP and 5th MCP normal.    Shoulder Exam   Sensation: normal    Knee " Exam   Sensation: normal    Hip Exam   Sensation: normal    Elbow/Wrist Exam   Sensation: normal    Muscle Strength (0-5 scale):  Neck Extension: 5  Deltoid:  5  Biceps: 5/5   Triceps:  5  :  5/5   Iliopsoas: 5  Quadriceps:  5   Distal Lower Extremity: 5      Back/Neck Exam     Comments:  Tightness of paraspinal muscles appreciated bilaterally in upper c-spine    Neurological: She is alert and oriented to person, place, and time.   Reflex Scores:       Tricep reflexes are 2+ on the right side and 2+ on the left side.       Bicep reflexes are 2+ on the right side and 2+ on the left side.       Brachioradialis reflexes are 2+ on the right side and 2+ on the left side.       Patellar reflexes are 2+ on the right side and 2+ on the left side.       Achilles reflexes are 2+ on the right side and 2+ on the left side.  Skin: Skin is warm and dry.     Psychiatric: Affect normal.   Musculoskeletal: She exhibits no edema.        Schober's: 10-15 cm  Chest expansion 9 cm  Neck rom mild gen decreased rom on lateral flexion    Results for NABOR HERNANDEZ (MRN [id number]) as of 4/17/2017 14:13   Ref. Range 2/13/2017 14:40 4/12/2017 13:26 4/12/2017 14:24   WBC Latest Ref Range: 3.90 - 12.70 K/uL  9.33    RBC Latest Ref Range: 4.00 - 5.40 M/uL  4.67    Hemoglobin Latest Ref Range: 12.0 - 16.0 g/dL  14.2    Hematocrit Latest Ref Range: 37.0 - 48.5 %  41.9    MCV Latest Ref Range: 82 - 98 fL  90    MCH Latest Ref Range: 27.0 - 31.0 pg  30.4    MCHC Latest Ref Range: 32.0 - 36.0 %  33.9    RDW Latest Ref Range: 11.5 - 14.5 %  14.4    Platelets Latest Ref Range: 150 - 350 K/uL  251    MPV Latest Ref Range: 9.2 - 12.9 fL  10.6    Gran% Latest Ref Range: 38.0 - 73.0 %  48.5    Gran # Latest Ref Range: 1.8 - 7.7 K/uL  4.5    Lymph% Latest Ref Range: 18.0 - 48.0 %  41.3    Lymph # Latest Ref Range: 1.0 - 4.8 K/uL  3.9    Mono% Latest Ref Range: 4.0 - 15.0 %  7.7    Mono # Latest Ref Range: 0.3 - 1.0 K/uL  0.7    Eosinophil%  Latest Ref Range: 0.0 - 8.0 %  1.2    Eos # Latest Ref Range: 0.0 - 0.5 K/uL  0.1    Basophil% Latest Ref Range: 0.0 - 1.9 %  0.4    Baso # Latest Ref Range: 0.00 - 0.20 K/uL  0.04    Sed Rate Latest Ref Range: 0 - 20 mm/Hr  7    Sodium Latest Ref Range: 136 - 145 mmol/L  138    Potassium Latest Ref Range: 3.5 - 5.1 mmol/L  3.8    Chloride Latest Ref Range: 95 - 110 mmol/L  99    CO2 Latest Ref Range: 23 - 29 mmol/L  30 (H)    Anion Gap Latest Ref Range: 8 - 16 mmol/L  9    BUN, Bld Latest Ref Range: 6 - 20 mg/dL  11    Creatinine Latest Ref Range: 0.5 - 1.4 mg/dL  0.8    eGFR if non African American Latest Ref Range: >60 mL/min/1.73 m^2  >60.0    eGFR if African American Latest Ref Range: >60 mL/min/1.73 m^2  >60.0    Glucose Latest Ref Range: 70 - 110 mg/dL  107    Calcium Latest Ref Range: 8.7 - 10.5 mg/dL  9.1    Alkaline Phosphatase Latest Ref Range: 55 - 135 U/L  74    Total Protein Latest Ref Range: 6.0 - 8.4 g/dL  7.1    Albumin Latest Ref Range: 3.5 - 5.2 g/dL  3.9    Total Bilirubin Latest Ref Range: 0.1 - 1.0 mg/dL  0.4    AST Latest Ref Range: 10 - 40 U/L  16    ALT Latest Ref Range: 10 - 44 U/L  16    CRP Latest Ref Range: 0.0 - 8.2 mg/L  20.5 (H)    Hemoglobin A1C Latest Ref Range: 4.5 - 6.2 % 5.8     Estimated Avg Glucose Latest Ref Range: 68 - 131 mg/dL 120     Hep B Viral DNA IU/ML Latest Ref Range: <10 IU/mL   <10   Log HBV IU/mL Latest Ref Range: <1.00 Log (10) IU/mL   <1.00   Differential Method Unknown  Automated    Hepatitis B Virus DNA Latest Ref Range: Not detected    Not detected     MRI C-spine 9/11/16  There is modest straightening of the normal cervical lordosis.  Assessment:       Non-radiographic axial and peripheral spondylarthritis:  ASDAS-CRP = 4.23 (severe disease activity);  BASDAI= 5.4 ;  BASFI = 6.1 (severe functional limitation). Patient clinically does not appear to this limited but she does have a fungal infection which could be affecting her CRP.CRP now 20.5.  Synovitis right  shoulder and right  rotator cuff tear per MRI 9/24/16(Dr. 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 Agrawal regarding right rotator cuff tear   Continue follow up with Dr. Begum about hypertension, much improved on today's visit.  Continue entecavir therapy for Hep B prophylaxis per Hepatology recommendations  RTC 3 Months with standing labs

## 2019-10-13 ENCOUNTER — PATIENT OUTREACH (OUTPATIENT)
Dept: ADMINISTRATIVE | Facility: OTHER | Age: 54
End: 2019-10-13

## 2019-10-15 ENCOUNTER — OFFICE VISIT (OUTPATIENT)
Dept: OBSTETRICS AND GYNECOLOGY | Facility: CLINIC | Age: 54
End: 2019-10-15
Payer: MEDICARE

## 2019-10-15 VITALS — WEIGHT: 240.31 LBS | BODY MASS INDEX: 37.64 KG/M2

## 2019-10-15 DIAGNOSIS — B37.9 CANDIDA INFECTION: ICD-10-CM

## 2019-10-15 DIAGNOSIS — N39.0 URINARY TRACT INFECTION WITH HEMATURIA, SITE UNSPECIFIED: Primary | ICD-10-CM

## 2019-10-15 DIAGNOSIS — R31.9 URINARY TRACT INFECTION WITH HEMATURIA, SITE UNSPECIFIED: Primary | ICD-10-CM

## 2019-10-15 LAB
BILIRUB SERPL-MCNC: ABNORMAL MG/DL
BLOOD, POC UA: 250
GLUCOSE UR QL STRIP: 1000
KETONES UR QL STRIP: ABNORMAL
LEUKOCYTE ESTERASE URINE, POC: 2
NITRITE, POC UA: ABNORMAL
PH, POC UA: 5
PROTEIN, POC: 2
SPECIFIC GRAVITY, POC UA: 1.02
UROBILINOGEN, POC UA: ABNORMAL

## 2019-10-15 PROCEDURE — 81003 URINALYSIS AUTO W/O SCOPE: CPT | Mod: QW,S$GLB,, | Performed by: OBSTETRICS & GYNECOLOGY

## 2019-10-15 PROCEDURE — 90686 FLU VACCINE (QUAD) GREATER THAN OR EQUAL TO 3YO PRESERVATIVE FREE IM: ICD-10-PCS | Mod: S$GLB,,, | Performed by: OBSTETRICS & GYNECOLOGY

## 2019-10-15 PROCEDURE — 87186 SC STD MICRODIL/AGAR DIL: CPT

## 2019-10-15 PROCEDURE — 99999 PR PBB SHADOW E&M-EST. PATIENT-LVL IV: ICD-10-PCS | Mod: PBBFAC,,, | Performed by: OBSTETRICS & GYNECOLOGY

## 2019-10-15 PROCEDURE — 81003 POCT URINALYSIS: ICD-10-PCS | Mod: QW,S$GLB,, | Performed by: OBSTETRICS & GYNECOLOGY

## 2019-10-15 PROCEDURE — 3008F BODY MASS INDEX DOCD: CPT | Mod: S$GLB,,, | Performed by: OBSTETRICS & GYNECOLOGY

## 2019-10-15 PROCEDURE — 99214 OFFICE O/P EST MOD 30 MIN: CPT | Mod: 25,S$GLB,, | Performed by: OBSTETRICS & GYNECOLOGY

## 2019-10-15 PROCEDURE — 3008F PR BODY MASS INDEX (BMI) DOCUMENTED: ICD-10-PCS | Mod: S$GLB,,, | Performed by: OBSTETRICS & GYNECOLOGY

## 2019-10-15 PROCEDURE — 87088 URINE BACTERIA CULTURE: CPT

## 2019-10-15 PROCEDURE — 99999 PR PBB SHADOW E&M-EST. PATIENT-LVL IV: CPT | Mod: PBBFAC,,, | Performed by: OBSTETRICS & GYNECOLOGY

## 2019-10-15 PROCEDURE — 99214 PR OFFICE/OUTPT VISIT, EST, LEVL IV, 30-39 MIN: ICD-10-PCS | Mod: 25,S$GLB,, | Performed by: OBSTETRICS & GYNECOLOGY

## 2019-10-15 PROCEDURE — 90686 IIV4 VACC NO PRSV 0.5 ML IM: CPT | Mod: S$GLB,,, | Performed by: OBSTETRICS & GYNECOLOGY

## 2019-10-15 PROCEDURE — 87077 CULTURE AEROBIC IDENTIFY: CPT

## 2019-10-15 PROCEDURE — 87086 URINE CULTURE/COLONY COUNT: CPT

## 2019-10-15 PROCEDURE — G0008 ADMIN INFLUENZA VIRUS VAC: HCPCS | Mod: S$GLB,,, | Performed by: OBSTETRICS & GYNECOLOGY

## 2019-10-15 PROCEDURE — G0008 FLU VACCINE (QUAD) GREATER THAN OR EQUAL TO 3YO PRESERVATIVE FREE IM: ICD-10-PCS | Mod: S$GLB,,, | Performed by: OBSTETRICS & GYNECOLOGY

## 2019-10-15 RX ORDER — NITROFURANTOIN 25; 75 MG/1; MG/1
100 CAPSULE ORAL 2 TIMES DAILY
Qty: 10 CAPSULE | Refills: 0 | Status: SHIPPED | OUTPATIENT
Start: 2019-10-15 | End: 2019-10-20

## 2019-10-15 RX ORDER — PHENAZOPYRIDINE HYDROCHLORIDE 200 MG/1
200 TABLET, FILM COATED ORAL 3 TIMES DAILY PRN
Qty: 20 TABLET | Refills: 0 | Status: SHIPPED | OUTPATIENT
Start: 2019-10-15 | End: 2020-10-14

## 2019-10-15 NOTE — PROGRESS NOTES
.After obtaining consent, and per orders of Dr. Echeverria, injection of FLUARIX Lot [id number] Exp 06/30/20 given in the RD by Clifford aCceres. Patient tolerated well and band aid applied. Patient instructed to remain in clinic for 15 minutes afterwards, and to report any adverse reaction to me immediately.

## 2019-10-15 NOTE — PROGRESS NOTES
CC: dysuria  Urinary pressure  Vaginal itching and irritation    Althea Vazquez is a 54 y.o. female  presents dysuria, urinary hesitancy.  She has bladder spasms.    Past Medical History:   Diagnosis Date    Acid reflux     Anxiety 10/18/2012    Arthritis     Depression     Diabetic peripheral neuropathy - mild 10/21/2014    Difficult intubation     Dry eyes     Dry mouth     Fever blister     History of hepatitis B 10/3/2016    Hep B core total Ab (+), no active/chronic infection    Hyperlipidemia     Hypertension     Insomnia     Iritis 2014    Long-term current use of steroids 2012    Nausea & vomiting 2015    VAISHNAVI (obstructive sleep apnea)     Type II diabetes mellitus 10/1/2012       Past Surgical History:   Procedure Laterality Date    COLONOSCOPY N/A 2018    Procedure: COLONOSCOPY;  Surgeon: Juwan Lopez MD;  Location: [facility] ([address] [place] [address]);  Service: Endoscopy;  Laterality: N/A;  per anesthesia, pt. needs to be on 2nd floor due to past Anesthesia problems    HYSTERECTOMY  12/3/2014    KNEE ARTHROSCOPY  14    right    TUBAL LIGATION         OB History    Para Term  AB Living   4 4 2     2   SAB TAB Ectopic Multiple Live Births           2      # Outcome Date GA Lbr Nikhil/2nd Weight Sex Delivery Anes PTL Lv   4 Term            3 Term            2 Para     M Vag-Spont  N NATALIA   1 Para     M Vag-Spont  N NATALIA       Family History   Problem Relation Age of Onset    Diabetes Mother     Cataracts Mother     Stroke Mother     Heart attack Mother     Depression Mother     Stroke Father     Hypertension Father     Hyperlipidemia Father     Diabetes Maternal Aunt     Heart attack Paternal Grandmother     ADD / ADHD Son     No Known Problems Sister     No Known Problems Brother     No Known Problems Maternal Uncle     No Known Problems Paternal Aunt     No Known Problems Paternal Uncle     No Known Problems Maternal Grandmother      No Known Problems Maternal Grandfather     Cancer Paternal Grandfather         Lung CA    Melanoma Neg Hx     Eczema Neg Hx     Lupus Neg Hx     Psoriasis Neg Hx     Amblyopia Neg Hx     Blindness Neg Hx     Glaucoma Neg Hx     Macular degeneration Neg Hx     Retinal detachment Neg Hx     Strabismus Neg Hx     Thyroid disease Neg Hx     Suicide Neg Hx     Acne Neg Hx     Cirrhosis Neg Hx     Asthma Neg Hx     Emphysema Neg Hx        Social History     Tobacco Use    Smoking status: Never Smoker    Smokeless tobacco: Never Used   Substance Use Topics    Alcohol use: Yes     Comment: occas.    Drug use: No       Wt 109 kg (240 lb 4.8 oz)   LMP 11/25/2014   BMI 37.64 kg/m²     ROS:  GENERAL: Denies weight gain or weight loss. Feeling well overall.   SKIN: Denies rash or lesions.   HEAD: Denies head injury or headache.   NODES: Denies enlarged lymph nodes.   CHEST: Denies chest pain or shortness of breath.   CARDIOVASCULAR: Denies palpitations or left sided chest pain.   ABDOMEN: No abdominal pain, constipation, diarrhea, nausea, vomiting or rectal bleeding.   URINARY: No frequency, dysuria, hematuria, or burning on urination.  REPRODUCTIVE: See HPI.   BREASTS: The patient performs breast self-examination and denies pain, lumps, or nipple discharge.   HEMATOLOGIC: No easy bruisability or excessive bleeding.  MUSCULOSKELETAL: Denies joint pain or swelling.   NEUROLOGIC: Denies syncope or weakness.   PSYCHIATRIC: Denies depression, anxiety or mood swings.    Physical Exam:    APPEARANCE: Well nourished, well developed, in no acute distress.  AFFECT: WNL, alert and oriented x 3  SKIN: No acne or hirsutism  NECK: Neck symmetric without masses or thyromegaly  NODES: No inguinal, cervical, axillary, or femoral lymph node enlargement  CHEST: Good respiratory effect      ASSESSMENT AND PLAN  1. Urinary tract infection with hematuria, site unspecified  POCT URINALYSIS    Urine culture    nitrofurantoin,  macrocrystal-monohydrate, (MACROBID) 100 MG capsule    phenazopyridine (PYRIDIUM) 200 MG tablet    CANCELED: POCT Urine Pregnancy   2. Candida infection  boric acid (BORIC ACID) vaginal suppository     Boric acid Supp Rx sent    Patient was counseled today on A.C.S. Pap guidelines and recommendations for yearly pelvic exams, mammograms and monthly self breast exams; to see her PCP for other health maintenance.     Follow up if symptoms worsen or fail to improve.

## 2019-10-16 ENCOUNTER — TELEPHONE (OUTPATIENT)
Dept: OBSTETRICS AND GYNECOLOGY | Facility: CLINIC | Age: 54
End: 2019-10-16

## 2019-10-16 DIAGNOSIS — B37.9 CANDIDA INFECTION: Primary | ICD-10-CM

## 2019-10-16 NOTE — TELEPHONE ENCOUNTER
Patient is requesting a different medication for yeast. Patient is unable to get her boric acid and is allergic to diflucan. Pharmacy stated that it does not dispense boric acid and the next available compounding pharmacy is too far away from patient.

## 2019-10-16 NOTE — TELEPHONE ENCOUNTER
Boric acid can be mailed from [facility] pharmacy  Please notify the patient and will send there AP

## 2019-10-16 NOTE — TELEPHONE ENCOUNTER
----- Message from Sofia Caceres LPN sent at 10/15/2019  5:15 PM CDT -----  Contact: Buster with [facility] pharmacy       ----- Message -----  From: Karolyn Donnelly  Sent: 10/15/2019  12:03 PM CDT  To: Jacki HERNANDEZ Staff    Name of Who is Calling: Buster with [facility] pharmacy     What is the request in detail: Buster states pharmacy does not dispense Boric Acid and would like medication sent to another pharmacy. Please call     Can the clinic reply by [facility]: no    What Number to Call Back if not in [facility]: [phone]

## 2019-10-18 LAB — BACTERIA UR CULT: ABNORMAL

## 2019-10-18 NOTE — TELEPHONE ENCOUNTER
Per pharmacy, patient needs to call to to set up payment information before medication can be shipped. Patient verbalized understanding.

## 2019-10-22 ENCOUNTER — TELEPHONE (OUTPATIENT)
Dept: PHARMACY | Facility: CLINIC | Age: 54
End: 2019-10-22

## 2019-10-27 ENCOUNTER — PATIENT OUTREACH (OUTPATIENT)
Dept: ADMINISTRATIVE | Facility: OTHER | Age: 54
End: 2019-10-27

## 2019-10-28 ENCOUNTER — OFFICE VISIT (OUTPATIENT)
Dept: OPTOMETRY | Facility: CLINIC | Age: 54
End: 2019-10-28
Payer: MEDICARE

## 2019-10-28 DIAGNOSIS — H25.13 NUCLEAR SCLEROSIS OF BOTH EYES: ICD-10-CM

## 2019-10-28 DIAGNOSIS — Z01.00 DIABETIC EYE EXAM: Primary | ICD-10-CM

## 2019-10-28 DIAGNOSIS — E11.9 DIABETIC EYE EXAM: Primary | ICD-10-CM

## 2019-10-28 DIAGNOSIS — H52.4 PRESBYOPIA OF BOTH EYES: ICD-10-CM

## 2019-10-28 DIAGNOSIS — F41.0 PANIC DISORDER WITHOUT AGORAPHOBIA: ICD-10-CM

## 2019-10-28 DIAGNOSIS — H52.11 MYOPIA OF RIGHT EYE: ICD-10-CM

## 2019-10-28 DIAGNOSIS — H52.222 REGULAR ASTIGMATISM OF LEFT EYE: ICD-10-CM

## 2019-10-28 DIAGNOSIS — Z13.5 SCREENING FOR EYE CONDITION: ICD-10-CM

## 2019-10-28 DIAGNOSIS — F41.1 GENERALIZED ANXIETY DISORDER: ICD-10-CM

## 2019-10-28 DIAGNOSIS — E11.9 TYPE 2 DIABETES MELLITUS WITHOUT COMPLICATION, WITHOUT LONG-TERM CURRENT USE OF INSULIN: ICD-10-CM

## 2019-10-28 DIAGNOSIS — E11.9 TYPE 2 DIABETES MELLITUS WITHOUT OPHTHALMIC MANIFESTATIONS: ICD-10-CM

## 2019-10-28 PROCEDURE — 92014 COMPRE OPH EXAM EST PT 1/>: CPT | Mod: S$GLB,,, | Performed by: OPTOMETRIST

## 2019-10-28 PROCEDURE — 92014 PR EYE EXAM, EST PATIENT,COMPREHESV: ICD-10-PCS | Mod: S$GLB,,, | Performed by: OPTOMETRIST

## 2019-10-28 PROCEDURE — 99999 PR PBB SHADOW E&M-EST. PATIENT-LVL III: CPT | Mod: PBBFAC,,, | Performed by: OPTOMETRIST

## 2019-10-28 PROCEDURE — 92015 PR REFRACTION: ICD-10-PCS | Mod: S$GLB,,, | Performed by: OPTOMETRIST

## 2019-10-28 PROCEDURE — 99999 PR PBB SHADOW E&M-EST. PATIENT-LVL III: ICD-10-PCS | Mod: PBBFAC,,, | Performed by: OPTOMETRIST

## 2019-10-28 PROCEDURE — 92015 DETERMINE REFRACTIVE STATE: CPT | Mod: S$GLB,,, | Performed by: OPTOMETRIST

## 2019-10-28 RX ORDER — ZOLPIDEM TARTRATE 5 MG/1
TABLET ORAL
Qty: 90 TABLET | OUTPATIENT
Start: 2019-10-28

## 2019-10-28 RX ORDER — METFORMIN HYDROCHLORIDE 500 MG/1
TABLET, EXTENDED RELEASE ORAL
Qty: 180 TABLET | Refills: 1 | Status: SHIPPED | OUTPATIENT
Start: 2019-10-28 | End: 2020-03-17

## 2019-10-28 RX ORDER — FLUOXETINE HYDROCHLORIDE 40 MG/1
CAPSULE ORAL
Qty: 90 CAPSULE | Refills: 1 | OUTPATIENT
Start: 2019-10-28

## 2019-10-28 RX ORDER — CLONAZEPAM 0.5 MG/1
TABLET ORAL
Qty: 90 TABLET | OUTPATIENT
Start: 2019-10-28

## 2019-10-28 NOTE — PATIENT INSTRUCTIONS
History of type 2 diabetes, without evidence of diabetic retinal changes.     Early peripheral cortical cataract in both eyes, and early axial posterior (subcapsular?) lens changes in both eyes, as noted previously.  No need for cataract surgery in either eye.  Monitor only.    Ms. Vazquez reports concern about yellowing of eyes, but no evidence of icterus in either eye.  Reassured Ms. Vazquez.     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   No evidence of active inflammation today.     Myopia (nearsightedness) in the right eye.  Astigmatic refractive error in the left eye.  Satisfactory best-corrected VA in each eye.  Presbyopia.     Recheck in 12 months, or prior if any problems in the interim.
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
## 2019-11-05 ENCOUNTER — TELEPHONE (OUTPATIENT)
Dept: PHARMACY | Facility: CLINIC | Age: 54
End: 2019-11-05

## 2019-11-05 NOTE — TELEPHONE ENCOUNTER
Patient confirmed shipping of Humira on  to arrive . Address and  verified. $0 copay in 004. Patient stated she has 0 doses on hand. Patient reported 0 missed doses. Per dates with previous shipment, patient would have missed 1 dose. She stated she had a UTI that is now treated. She stated she knows to hold her Humira with infxn present, and said she did not need to miss any doses with the course of treatment. Appears patient may have missed 1 when reviewing notes and shipment dates. Patient denies any new medications or dose changes. She stated she has not developed any new allergies or health conditions. Patient had no further questions or concerns.

## 2019-11-27 ENCOUNTER — TELEPHONE (OUTPATIENT)
Dept: INTERNAL MEDICINE | Facility: CLINIC | Age: 54
End: 2019-11-27

## 2019-11-27 PROBLEM — M62.838 MUSCLE SPASMS OF NECK: Status: RESOLVED | Noted: 2017-04-17 | Resolved: 2019-11-27

## 2019-12-02 ENCOUNTER — OFFICE VISIT (OUTPATIENT)
Dept: INTERNAL MEDICINE | Facility: CLINIC | Age: 54
End: 2019-12-02
Payer: MEDICARE

## 2019-12-02 ENCOUNTER — TELEPHONE (OUTPATIENT)
Dept: PHARMACY | Facility: CLINIC | Age: 54
End: 2019-12-02

## 2019-12-02 VITALS
DIASTOLIC BLOOD PRESSURE: 90 MMHG | RESPIRATION RATE: 18 BRPM | HEART RATE: 72 BPM | HEIGHT: 67 IN | WEIGHT: 242 LBS | SYSTOLIC BLOOD PRESSURE: 130 MMHG | TEMPERATURE: 99 F | BODY MASS INDEX: 37.98 KG/M2

## 2019-12-02 VITALS
SYSTOLIC BLOOD PRESSURE: 130 MMHG | DIASTOLIC BLOOD PRESSURE: 78 MMHG | BODY MASS INDEX: 38.13 KG/M2 | HEIGHT: 67 IN | WEIGHT: 242.94 LBS

## 2019-12-02 DIAGNOSIS — E66.01 SEVERE OBESITY (BMI 35.0-39.9) WITH COMORBIDITY: ICD-10-CM

## 2019-12-02 DIAGNOSIS — E11.9 DM TYPE 2, NOT AT GOAL: ICD-10-CM

## 2019-12-02 DIAGNOSIS — R29.898 WEAKNESS OF BOTH LEGS: ICD-10-CM

## 2019-12-02 DIAGNOSIS — Z00.00 ENCOUNTER FOR PREVENTIVE HEALTH EXAMINATION: Primary | ICD-10-CM

## 2019-12-02 DIAGNOSIS — E11.69 HYPERLIPIDEMIA ASSOCIATED WITH TYPE 2 DIABETES MELLITUS: ICD-10-CM

## 2019-12-02 DIAGNOSIS — M25.611 DECREASED RIGHT SHOULDER RANGE OF MOTION: ICD-10-CM

## 2019-12-02 DIAGNOSIS — M67.951 TENDINOPATHY OF RIGHT GLUTEAL REGION: ICD-10-CM

## 2019-12-02 DIAGNOSIS — G89.29 CHRONIC RIGHT SHOULDER PAIN: ICD-10-CM

## 2019-12-02 DIAGNOSIS — J20.9 ACUTE BRONCHITIS, UNSPECIFIED ORGANISM: ICD-10-CM

## 2019-12-02 DIAGNOSIS — E11.59 HYPERTENSION ASSOCIATED WITH DIABETES: ICD-10-CM

## 2019-12-02 DIAGNOSIS — Z86.19 HISTORY OF HEPATITIS B: ICD-10-CM

## 2019-12-02 DIAGNOSIS — E11.65 TYPE 2 DIABETES MELLITUS WITH HYPERGLYCEMIA, UNSPECIFIED WHETHER LONG TERM INSULIN USE: ICD-10-CM

## 2019-12-02 DIAGNOSIS — M45.A0 NON-RADIOGRAPHIC AXIAL SPONDYLOARTHRITIS: ICD-10-CM

## 2019-12-02 DIAGNOSIS — G47.00 INSOMNIA, UNSPECIFIED TYPE: Chronic | ICD-10-CM

## 2019-12-02 DIAGNOSIS — M17.11 OSTEOARTHRITIS OF RIGHT KNEE, UNSPECIFIED OSTEOARTHRITIS TYPE: ICD-10-CM

## 2019-12-02 DIAGNOSIS — E08.65 DIABETES MELLITUS DUE TO UNDERLYING CONDITION WITH HYPERGLYCEMIA, UNSPECIFIED WHETHER LONG TERM INSULIN USE: ICD-10-CM

## 2019-12-02 DIAGNOSIS — R29.898 WEAKNESS OF RIGHT UPPER EXTREMITY: ICD-10-CM

## 2019-12-02 DIAGNOSIS — R29.898 WEAKNESS OF RIGHT LOWER EXTREMITY: ICD-10-CM

## 2019-12-02 DIAGNOSIS — F41.9 ANXIETY: ICD-10-CM

## 2019-12-02 DIAGNOSIS — I15.2 HYPERTENSION ASSOCIATED WITH DIABETES: ICD-10-CM

## 2019-12-02 DIAGNOSIS — M46.90 AXIAL SPONDYLOARTHRITIS: Chronic | ICD-10-CM

## 2019-12-02 DIAGNOSIS — R60.9 IDIOPATHIC EDEMA: ICD-10-CM

## 2019-12-02 DIAGNOSIS — M51.36 DDD (DEGENERATIVE DISC DISEASE), LUMBAR: ICD-10-CM

## 2019-12-02 DIAGNOSIS — E55.9 VITAMIN D DEFICIENCY DISEASE: ICD-10-CM

## 2019-12-02 DIAGNOSIS — Z12.39 SCREENING FOR BREAST CANCER: ICD-10-CM

## 2019-12-02 DIAGNOSIS — Z13.31 POSITIVE DEPRESSION SCREENING: ICD-10-CM

## 2019-12-02 DIAGNOSIS — J32.9 VIRAL SINUSITIS: Primary | ICD-10-CM

## 2019-12-02 DIAGNOSIS — B97.89 VIRAL SINUSITIS: Primary | ICD-10-CM

## 2019-12-02 DIAGNOSIS — G44.86 CERVICOGENIC HEADACHE: ICD-10-CM

## 2019-12-02 DIAGNOSIS — D84.9 IMMUNOSUPPRESSED STATUS: ICD-10-CM

## 2019-12-02 DIAGNOSIS — R93.89 ABNORMAL CHEST CT: ICD-10-CM

## 2019-12-02 DIAGNOSIS — K21.9 GASTROESOPHAGEAL REFLUX DISEASE, ESOPHAGITIS PRESENCE NOT SPECIFIED: ICD-10-CM

## 2019-12-02 DIAGNOSIS — E11.9 DIABETES MELLITUS TYPE 2 WITHOUT RETINOPATHY: ICD-10-CM

## 2019-12-02 DIAGNOSIS — E78.5 HYPERLIPIDEMIA ASSOCIATED WITH TYPE 2 DIABETES MELLITUS: ICD-10-CM

## 2019-12-02 DIAGNOSIS — R51.9 SINUS HEADACHE: ICD-10-CM

## 2019-12-02 DIAGNOSIS — E83.51 HYPOCALCEMIA: ICD-10-CM

## 2019-12-02 DIAGNOSIS — M54.2 NECK PAIN, BILATERAL: ICD-10-CM

## 2019-12-02 DIAGNOSIS — M25.511 CHRONIC RIGHT SHOULDER PAIN: ICD-10-CM

## 2019-12-02 DIAGNOSIS — Z79.899 HIGH RISK MEDICATION USE: ICD-10-CM

## 2019-12-02 DIAGNOSIS — E11.42 TYPE 2 DIABETES MELLITUS WITH DIABETIC POLYNEUROPATHY, UNSPECIFIED WHETHER LONG TERM INSULIN USE: Chronic | ICD-10-CM

## 2019-12-02 PROBLEM — M25.619 DECREASED RANGE OF MOTION (ROM) OF SHOULDER: Status: RESOLVED | Noted: 2017-02-14 | Resolved: 2019-12-02

## 2019-12-02 PROCEDURE — 3075F PR MOST RECENT SYSTOLIC BLOOD PRESS GE 130-139MM HG: ICD-10-PCS | Mod: S$GLB,,, | Performed by: NURSE PRACTITIONER

## 2019-12-02 PROCEDURE — G0439 PPPS, SUBSEQ VISIT: HCPCS | Mod: S$GLB,,, | Performed by: NURSE PRACTITIONER

## 2019-12-02 PROCEDURE — 3078F DIAST BP <80 MM HG: CPT | Mod: S$GLB,,, | Performed by: NURSE PRACTITIONER

## 2019-12-02 PROCEDURE — 3044F PR MOST RECENT HEMOGLOBIN A1C LEVEL <7.0%: ICD-10-PCS | Mod: S$GLB,,, | Performed by: NURSE PRACTITIONER

## 2019-12-02 PROCEDURE — 3078F PR MOST RECENT DIASTOLIC BLOOD PRESSURE < 80 MM HG: ICD-10-PCS | Mod: S$GLB,,, | Performed by: NURSE PRACTITIONER

## 2019-12-02 PROCEDURE — 3044F HG A1C LEVEL LT 7.0%: CPT | Mod: S$GLB,,, | Performed by: NURSE PRACTITIONER

## 2019-12-02 PROCEDURE — 96372 THER/PROPH/DIAG INJ SC/IM: CPT | Mod: S$GLB,,, | Performed by: INTERNAL MEDICINE

## 2019-12-02 PROCEDURE — 3078F DIAST BP <80 MM HG: CPT | Mod: S$GLB,,, | Performed by: INTERNAL MEDICINE

## 2019-12-02 PROCEDURE — 99999 PR PBB SHADOW E&M-EST. PATIENT-LVL V: ICD-10-PCS | Mod: PBBFAC,,, | Performed by: NURSE PRACTITIONER

## 2019-12-02 PROCEDURE — 3075F SYST BP GE 130 - 139MM HG: CPT | Mod: S$GLB,,, | Performed by: INTERNAL MEDICINE

## 2019-12-02 PROCEDURE — 3008F PR BODY MASS INDEX (BMI) DOCUMENTED: ICD-10-PCS | Mod: S$GLB,,, | Performed by: INTERNAL MEDICINE

## 2019-12-02 PROCEDURE — 3078F PR MOST RECENT DIASTOLIC BLOOD PRESSURE < 80 MM HG: ICD-10-PCS | Mod: S$GLB,,, | Performed by: INTERNAL MEDICINE

## 2019-12-02 PROCEDURE — 99214 PR OFFICE/OUTPT VISIT, EST, LEVL IV, 30-39 MIN: ICD-10-PCS | Mod: 25,S$GLB,, | Performed by: INTERNAL MEDICINE

## 2019-12-02 PROCEDURE — G0439 PR MEDICARE ANNUAL WELLNESS SUBSEQUENT VISIT: ICD-10-PCS | Mod: S$GLB,,, | Performed by: NURSE PRACTITIONER

## 2019-12-02 PROCEDURE — 99999 PR PBB SHADOW E&M-EST. PATIENT-LVL V: CPT | Mod: PBBFAC,,, | Performed by: NURSE PRACTITIONER

## 2019-12-02 PROCEDURE — 99999 PR PBB SHADOW E&M-EST. PATIENT-LVL III: ICD-10-PCS | Mod: PBBFAC,,, | Performed by: INTERNAL MEDICINE

## 2019-12-02 PROCEDURE — 3075F PR MOST RECENT SYSTOLIC BLOOD PRESS GE 130-139MM HG: ICD-10-PCS | Mod: S$GLB,,, | Performed by: INTERNAL MEDICINE

## 2019-12-02 PROCEDURE — 3008F BODY MASS INDEX DOCD: CPT | Mod: S$GLB,,, | Performed by: INTERNAL MEDICINE

## 2019-12-02 PROCEDURE — 99999 PR PBB SHADOW E&M-EST. PATIENT-LVL III: CPT | Mod: PBBFAC,,, | Performed by: INTERNAL MEDICINE

## 2019-12-02 PROCEDURE — 3075F SYST BP GE 130 - 139MM HG: CPT | Mod: S$GLB,,, | Performed by: NURSE PRACTITIONER

## 2019-12-02 PROCEDURE — 99214 OFFICE O/P EST MOD 30 MIN: CPT | Mod: 25,S$GLB,, | Performed by: INTERNAL MEDICINE

## 2019-12-02 PROCEDURE — 96372 PR INJECTION,THERAP/PROPH/DIAG2ST, IM OR SUBCUT: ICD-10-PCS | Mod: S$GLB,,, | Performed by: INTERNAL MEDICINE

## 2019-12-02 RX ORDER — FLUTICASONE PROPIONATE 50 MCG
2 SPRAY, SUSPENSION (ML) NASAL DAILY
Qty: 16 G | Refills: 12 | Status: SHIPPED | OUTPATIENT
Start: 2019-12-02 | End: 2022-02-07 | Stop reason: SDUPTHER

## 2019-12-02 RX ORDER — LEVOCETIRIZINE DIHYDROCHLORIDE 5 MG/1
5 TABLET, FILM COATED ORAL NIGHTLY
Qty: 30 TABLET | Refills: 11 | Status: SHIPPED | OUTPATIENT
Start: 2019-12-02 | End: 2020-11-06

## 2019-12-02 RX ORDER — CODEINE PHOSPHATE AND GUAIFENESIN 10; 100 MG/5ML; MG/5ML
5 SOLUTION ORAL 3 TIMES DAILY PRN
Qty: 236 ML | Refills: 0 | Status: SHIPPED | OUTPATIENT
Start: 2019-12-02 | End: 2019-12-12

## 2019-12-02 RX ORDER — TRIAMCINOLONE ACETONIDE 40 MG/ML
40 INJECTION, SUSPENSION INTRA-ARTICULAR; INTRAMUSCULAR
Status: COMPLETED | OUTPATIENT
Start: 2019-12-02 | End: 2019-12-02

## 2019-12-02 RX ADMIN — TRIAMCINOLONE ACETONIDE 40 MG: 40 INJECTION, SUSPENSION INTRA-ARTICULAR; INTRAMUSCULAR at 08:12

## 2019-12-02 NOTE — PROGRESS NOTES
Subjective:       Patient ID: Althea Vazquez is a 54 y.o. female.    Chief Complaint: Otalgia and Sore Throat    HPI   Pt c/o 1 wk of persistent sinus/chest congestion, dry cough, post nasal drip, sore throat, sinus headaches. Minimal relief with OTC meds.   Review of Systems   Constitutional: Negative for activity change, appetite change, chills, diaphoresis, fatigue, fever and unexpected weight change.   HENT: Positive for congestion, postnasal drip, rhinorrhea, sinus pressure, sinus pain and sore throat. Negative for sneezing, trouble swallowing and voice change.    Respiratory: Positive for cough. Negative for shortness of breath and wheezing.    Cardiovascular: Negative for chest pain, palpitations and leg swelling.   Gastrointestinal: Negative for abdominal pain, blood in stool, constipation, diarrhea, nausea and vomiting.   Genitourinary: Negative for dysuria.   Musculoskeletal: Negative for arthralgias and myalgias.   Skin: Negative for rash and wound.   Allergic/Immunologic: Negative for environmental allergies and food allergies.   Neurological: Positive for headaches.   Hematological: Negative for adenopathy. Does not bruise/bleed easily.       Objective:      Physical Exam   Constitutional: She is oriented to person, place, and time. She appears well-developed and well-nourished. No distress.   HENT:   Head: Normocephalic and atraumatic.   Right Ear: External ear normal.   Left Ear: External ear normal.   Nose: Mucosal edema and rhinorrhea present.   Mouth/Throat: Oropharynx is clear and moist. No oropharyngeal exudate.   Eyes: Pupils are equal, round, and reactive to light. Conjunctivae and EOM are normal. Right eye exhibits no discharge. Left eye exhibits no discharge. No scleral icterus.   Neck: Neck supple. No JVD present.   Cardiovascular: Normal rate, regular rhythm, normal heart sounds and intact distal pulses.   Pulmonary/Chest: Effort normal and breath sounds normal. No respiratory  distress. She has no wheezes. She has no rales.   Abdominal: Soft. Bowel sounds are normal. There is no tenderness.   Musculoskeletal: She exhibits no edema.   Lymphadenopathy:     She has no cervical adenopathy.   Neurological: She is alert and oriented to person, place, and time.   Skin: Skin is warm and dry. No rash noted. She is not diaphoretic. No pallor.       Assessment:       1. Viral sinusitis    2. Acute bronchitis, unspecified organism    3. Sinus headache        Plan:    1. Rx Xyzal/Flonase, Kenalog 40 mg IM   2. Rx Cheratussin AC TID PRN   3. NSAIDs PRN

## 2019-12-02 NOTE — PATIENT INSTRUCTIONS
Counseling and Referral of Other Preventative  (Italic type indicates deductible and co-insurance are waived)    Patient Name: Althea Vazquez  Today's Date: 12/2/2019    Health Maintenance       Date Due Completion Date    Foot Exam 01/02/2020 1/2/2019    Override on 1/2/2019: Done    Shingles Vaccine (1 of 2) cdc handout given-    ---    Hemoglobin A1c 03/04/2020 9/4/2019    Lipid Panel 09/04/2020 9/4/2019    Mammogram 10/25/2020   10/25/2018    Eye Exam 10/28/2020   10/28/2019    Low Dose Statin 11/02/2020 11/2/2019    TETANUS VACCINE 08/05/2025 8/5/2015    Colonoscopy    Bone density  01/09/2023 9/16/2021 1/9/2018 9/16/2019          No orders of the defined types were placed in this encounter.    The following information is provided to all patients.  This information is to help you find resources for any of the problems found today that may be affecting your health:                Living healthy guide: www.[facility].[place].[facility]      Understanding Diabetes: www.diabetes.org      Eating healthy: www.cdc.gov/healthyweight      [facility] home safety checklist: www.cdc.gov/steadi/patient.html      Agency on Aging: www.goea.[place].gov      Alcoholics anonymous (AA): www.aa.org      Physical Activity: www.anabelle.nih.gov/io9rpdy      Tobacco use: www.quitwithusla.org     Understanding Carbohydrates, Fats, and Protein  Food is a source of fuel and nourishment for your body. Its also a source of pleasure. Having diabetes doesnt mean you have to eat special foods or give up desserts. Instead, your dietitian can show you how to plan meals to suit your body. To start, learn how different foods affect blood sugar.  Carbohydrates  Carbohydrates are the main source of fuel for the body. Carbohydrates raise blood sugar. Many people think carbohydrates are only found in pasta or bread. But carbohydrates are actually in many kinds of foods:  · Sugars occur naturally in foods such as fruit, milk, honey, and molasses.  Sugars can also be added to many foods, from cereals and yogurt to candy and desserts. Sugars raise blood sugar.  · Starches are found in bread, cereals, pasta, and dried beans. Theyre also found in corn, peas, potatoes, yam, acorn squash, and butternut squash. Starches also raise blood sugar.   · Fiber is found in foods such as vegetables, fruits, beans, and whole grains. Unlike other carbs, fiber isnt digested or absorbed. So it doesnt raise blood sugar. In fact, fiber can help keep blood sugar from rising too fast. It also helps keep blood cholesterol at a healthy level.  Did you know?  Even though carbohydrates raise blood sugar, its best to have some in every meal. They are an important part of a healthy diet.   Fat  Fat is an energy source that can be stored until needed. Fat does not raise blood sugar. However, it can raise blood cholesterol, increasing the risk of heart disease. Fat is also high in calories, which can cause weight gain. Not all types of fat are the same.  More Healthy:  · Monounsaturated fats are mostly found in vegetable oils, such as olive, canola, and peanut oils. They are also found in avocados and some nuts. Monounsaturated fats are healthy for your heart. Thats because they lower LDL (unhealthy) cholesterol.  · Polyunsaturated fats are mostly found in vegetable oils, such as corn, safflower, and soybean oils. They are also found in some seeds, nuts, and fish. Polyunsaturated fats lower LDL (unhealthy) cholesterol. So, choosing them instead of saturated fats is healthy for your heart. Certain unsaturated fats can help lower triglycerides.   Less Healthy:  · Saturated fats are found in animal products, such as meat, poultry, whole milk, lard, and butter. Saturated fats raise LDL cholesterol and are not healthy for your heart.  · Hydrogenated oils and trans fats are formed when vegetable oils are processed into solid fats. They are found in many processed foods. Hydrogenated oils and  trans fats raise LDL cholesterol and lower HDL (healthy) cholesterol. They are not healthy for your heart.  Protein  Protein helps the body build and repair muscle and other tissue. Protein has little or no effect on blood sugar. However, many foods that contain protein also contain saturated fat. By choosing low-fat protein sources, you can get the benefits of protein without the extra fat:  · Plant protein is found in dry beans and peas, nuts, and soy products, such as tofu and soymilk. These sources tend to be cholesterol-free and low in saturated fat.  · Animal protein is found in fish, poultry, meat, cheese, milk, and eggs. These contain cholesterol and can be high in saturated fat. Aim for lean, lower-fat choices.  Date Last Reviewed: 3/1/2016  © 5617-0550 [employer]. [address] [place] [address]. All rights reserved. This information is not intended as a substitute for professional medical care. Always follow your healthcare professional's instructions.        Diet: Diabetes  Food is an important tool that you can use to control diabetes and stay healthy. Eating well-balanced meals in the correct amounts will help you control your blood glucose levels and prevent low blood sugar reactions. It will also help you reduce the health risks of diabetes. There is no one specific diet that is right for everyone with diabetes. But there are general guidelines to follow. A registered dietitian (RD) will create a tailored diet approach thats just right for you. He or she will also help you plan healthy meals and snacks. If you have any questions, call your dietitian for advice.     Guidelines for success  Talk with your healthcare provider before starting a diabetes diet or weight loss program. If you haven't talked with a dietitian yet, ask your provider for a referral. The following guidelines can help you succeed:  · Select foods from the 6 food groups below. Your dietitian will help you  find food choices within each group. He or she will also show you serving sizes and how many servings you can have at each meal.  ¨ Grains, beans, and starchy vegetables  ¨ Vegetables  ¨ Fruit  ¨ Milk or yogurt  ¨ Meat, poultry, fish, or tofu  ¨ Healthy fats  · Check your blood sugar levels as directed by your provider. Take any medicine as prescribed by your provider.  · Learn to read food labels and pick the right portion sizes.  · Eat only the amount of food in your meal plan. Eat about the same amount of food at regular times each day. Dont skip meals. Eat meals 4 to 5 hours apart, with snacks in between.  · Limit alcohol. It raises blood sugar levels. Drink water or calorie-free diet drinks that use safe sweeteners.  · Eat less fat to help lower your risk of heart disease. Use nonfat or low-fat dairy products and lean meats. Avoid fried foods. Use cooking oils that are unsaturated, such as olive, canola, or peanut oil.  · Talk with your dietitian about safe sugar substitutes.  · Avoid added salt. It can contribute to high blood pressure, which can cause heart disease. People with diabetes already have a risk of high blood pressure and heart disease.  · Stay at a healthy weight. If you need to lose weight, cut down on your portion sizes. But dont skip meals. Exercise is an important part of any weight management program. Talk with your provider about an exercise program thats right for you.  · For more information about the best diet plan for you, talk with a registered dietitian (RD). To find an RD in your area, contact:  ¨ Academy of Nutrition and Dietetics www.eatright.org  ¨ The American Diabetes Association [phone] www.diabetes.org  Date Last Reviewed: 8/1/2016  © 8128-7558 [employer]. [address] [place] [address], [place] [address]. All rights reserved. This information is not intended as a substitute for professional medical care. Always follow your healthcare professional's  instructions.        Diabetes: Meal Planning    You can help keep your blood sugar level in your target range by eating healthy foods. Your healthcare team can help you create a low-fat, nutritious meal plan. Take an active role in your diabetes management by following your meal plan and working with your healthcare team.  Make your meal plan  A meal plan gives guidelines for the types and amounts of food you should eat. The goal is to balance food and insulin (or other diabetes medications) so your blood sugars will be in your target range. Your dietitian will help you make a flexible meal plan that includes many foods that you like.  Watch serving sizes  Your meal plan will group foods by servings. To learn how much a serving is, start by measuring food portions at each meal. Soon youll know what a serving looks like on your plate. Ask your healthcare provider about how to balance servings of different foods.  Eat from all the food groups  The basis of a healthy meal plan is variety (eating lots of different foods). Choose lean meats, fresh fruits and vegetables, whole grains, and low-fat or nonfat dairy products. Eating a wide variety of foods provides the nutrients your body needs. It can also keep you from getting bored with your meal plan.  Learn about carbohydrates, fats, and protein  · Carbohydrates are starches, sugars, and fiber. They are found in many foods, including fruit, bread, pasta, milk, and sweets. Of all the foods you eat, carbohydrates have the most effect on your blood sugar. Your dietitian may teach you about carb counting, a way to figure out the number of carbohydrates in a meal.  · Fats have the most calories. They also have the most effect on your weight and your risk of heart disease. When you have diabetes, its important to control your weight and protect your heart. Foods that are high in fat include whole milk, cheese, snack foods, and desserts.  · Protein is important for building  and repairing muscles and bones. Choose low-fat protein sources, such as fish, egg whites, and skinless chicken.  Reduce liquid sugars  Extra calories from sodas, sports drinks, and fruit drinks make it hard to keep blood sugar in range. Cut as many liquid sugars from your meal plan as you can.  This includes most fruit juices, which are often high in natural or added sugar. Instead, drink plenty of water and other sugar-free beverages.  Eat less fat  If you need to lose weight, try to reduce the amount of fat in your diet. This can also help lower your cholesterol level to keep blood vessels healthier. Cut fat by using only small amounts of liquid oil for cooking. Read food labels carefully to avoid foods with unhealthy trans fats.  Timing your meals  When it comes to blood sugar control, when you eat is as important as what you eat. You may need to eat several small meals spaced evenly throughout the day to stay in your target range. So dont skip breakfast or wait until late in the day to get most of your calories. Doing so can cause your blood sugar to rise too high or fall too low.   Date Last Reviewed: 3/1/2016  © 3220-5256 [employer]. [address] [place] [address], [place] [address]. All rights reserved. This information is not intended as a substitute for professional medical care. Always follow your healthcare professional's instructions.

## 2019-12-02 NOTE — TELEPHONE ENCOUNTER
Pt confirmed shipping of Humira on  to arrive on 12/3. Address and  verified. $0 copay in 004. Pt is in need of a new sharps container. Pt has 0 doses on hand, next dose due . Pt reported no missed doses. Pt did not start any new medications. Pt had no further questions or concerns.

## 2019-12-02 NOTE — PROGRESS NOTES
"Althea Vazquez presented for a  Medicare AWV and comprehensive Health Risk Assessment today. The following components were reviewed and updated:    · Medical history  · Family History  · Social history  · Allergies and Current Medications  · Health Risk Assessment  · Health Maintenance  · Care Team     ** See Completed Assessments for Annual Wellness Visit within the encounter summary.**       The following assessments were completed:  · Living Situation  · CAGE  · Depression Screening  · Timed Get Up and Go  · Whisper Test  · Cognitive Function Screening  · Nutrition Screening  · ADL Screening  · PAQ Screening    Vitals:    12/02/19 0734   BP: 130/78   BP Location: Left arm   Patient Position: Sitting   Weight: 110.2 kg (242 lb 15.2 oz)   Height: 5' 7" (1.702 m)     Body mass index is 38.05 kg/m².  Physical Exam   Constitutional: She is oriented to person, place, and time. She appears well-developed and well-nourished.   HENT:   Head: Normocephalic.   Right Ear: External ear normal.   Mouth/Throat: No oropharyngeal exudate.   Eyes: Pupils are equal, round, and reactive to light. Conjunctivae and EOM are normal. No scleral icterus.   Neck: Normal range of motion. Neck supple.   Cardiovascular: Normal rate and regular rhythm.   Pulmonary/Chest: Effort normal and breath sounds normal. No respiratory distress.   Abdominal: Soft. Bowel sounds are normal. She exhibits no distension.   obese   Neurological: She is alert and oriented to person, place, and time. She has normal reflexes. She displays normal reflexes. No cranial nerve deficit. She exhibits normal muscle tone. Coordination normal.   Skin: Skin is warm and dry. No rash noted.   Psychiatric: She has a normal mood and affect. Her behavior is normal. Judgment and thought content normal.         Lab Results   Component Value Date    HGBA1C 6.7 (H) 09/04/2019    CHOL 209 (H) 09/04/2019    HDL 75 09/04/2019    LDLCALC 118.8 09/04/2019    TRIG 76 09/04/2019    " CHOLHDL 35.9 09/04/2019      Results for orders placed during the hospital encounter of 09/16/19   DXA Bone Density Spine And Hip    Narrative EXAMINATION:  DEXA BONE DENSITY SPINE HIP    CLINICAL HISTORY:  Asymptomatic menopausal statesuspected osteoporosis;    COMPARISON:  None    FINDINGS:  The T score associated with the lumbar spine is 2.8.  The T score associated with the left femoral neck is 1.4.  The T score associated with the right femoral neck is 0.9.      Impression Normal study      Electronically signed by: Gabriel Duarte MD  Date:    09/16/2019  Time:    12:15     Results for orders placed during the hospital encounter of 11/13/15   Mammo Digital Screening Bilat with CAD    Narrative The present examination has been compared to a prior imaging study dated  10/14/2014.    BILATERAL MAMMOGRAM FINDINGS:  There are scattered fibroglandular densities.    No significant abnormalities are seen.  There are no significant changes from  the prior study.    These images  were processed to produce digital images analyzed for potential  abnormalities.      Impression There is no mammographic evidence of malignancy.    Mammogram in 1 year is recommended.    ACR BI-RADS Category 1: Negative      NORA GRUBBS, TITO  11/13/2015       Diagnoses and health risks identified today and associated recommendations/orders:    1. Idiopathic edema  Stable- followed by PCP    2. Vitamin D deficiency disease  Stable- followed by PCP    3. High risk medication use-sulfasalazine 2 gm  Stable- followed by rheumatology    4. DDD (degenerative disc disease), lumbar  Stable- followed by rheumatology    5. Osteoarthritis of right knee, unspecified osteoarthritis type  Stable- followed by rheumatology    6. Non-radiographic axial spondyloarthritis  Stable- followed by rheumatology    7. History of hepatitis B  Stable- followed by PCp    8. Abnormal chest CT  Stable- followed by PCP    9. Gastroesophageal reflux disease, esophagitis  presence not specified  Stable- followed by PCP    10. Type 2 diabetes mellitus with diabetic polyneuropathy, unspecified whether long term insulin use  Stable- followed by PCp  - Ambulatory consult to Diabetic Education; Future    11. Insomnia, unspecified type  Stable- followed by PCP    12. Axial spondyloarthritis  Stable- followed by rheumatology    13. Hypertension associated with diabetes  Stable- followed by PCP    14. Anxiety  Stable- followed by psych    15. Hyperlipidemia associated with type 2 diabetes mellitus  Stable- followed by PCP    16. Cervicogenic headache  Stable- followed by PCP    17. Screening for breast cancer  Stable- followed by PCP  - Mammo Digital Screening Bilat w/ Martinez; Future    18. Encounter for preventive health examination  Assessments completed. Preventative health recommendations reviewed.       19. Type 2 diabetes mellitus with hyperglycemia, unspecified whether long term insulin use  Stable- followed by PCP  - Ambulatory consult to Diabetic Education; Future    20. Neck pain, bilateral  Stable- followed by PCP    21. Weakness of right lower extremity  Stable- followed by PCP    22. Weakness of right upper extremity  Stable- followed by PCP    23. Decreased right shoulder range of motion  Stable- followed by PCP    24. Chronic right shoulder pain  Stable- followed by PCP    25. Weakness of both legs  Stable- followed by PCP    26. Severe obesity (BMI 35.0-39.9) with comorbidity  Chronic. Followed by PCP.   Centers for Disease Control and Prevention (CDC)  weight recommendations for current BMI & ideal BMI range discussed with patient.  Recommended  Low fat DM diet, regular exercise  as tolerated every day.  States exercise limited by pain.     27. Tendinopathy of right gluteal region  Stable- followed by PCP    28. Hypocalcemia  Stable- followed by PCP    29. Diabetes mellitus type 2 without retinopathy  Stable- followed by PCP, opth    30. Diabetes mellitus due to underlying  condition with hyperglycemia, unspecified whether long term insulin use  Stable- followed by PCP    31. DM type 2, not at goal  Stable- followed by PCP    32. Immunosuppressed status  Stable- followed by PCP, rheumatology    33. Positive depression screening  Stable- followed by PCP      Provided Adrence with a 5-10 year written screening schedule and personal prevention plan. Recommendations were developed using the USPSTF age appropriate recommendations. Education, counseling, and referrals were provided as needed. After Visit Summary printed and given to patient which includes a list of additional screenings\tests needed. Appt scheduled w PCP today for c/o sore throat-immunosuppressed. Home blood sugar nonfasting range 129-150. H/o falling - pt states r/t tripping over feet. Recommended PMR eval for gait & fall prevention- Declined.fall risk- handouts given. referral fro additional DM education.    Follow up in about 1 year (around 12/2/2020) for HRA.    Abril Morrow NP I offered to discuss end of life issues, including information on how to make advance directives that the patient could use to name someone who would make medical decisions on their behalf if they became too ill to make themselves.    _X_Patient declined  ___Patient is interested, I provided paper work and offered to discuss.

## 2019-12-04 ENCOUNTER — HOSPITAL ENCOUNTER (OUTPATIENT)
Dept: RADIOLOGY | Facility: HOSPITAL | Age: 54
Discharge: HOME | End: 2019-12-04
Attending: NURSE PRACTITIONER
Payer: MEDICARE

## 2019-12-04 DIAGNOSIS — Z12.39 SCREENING FOR BREAST CANCER: ICD-10-CM

## 2019-12-04 PROCEDURE — 77063 BREAST TOMOSYNTHESIS BI: CPT | Mod: 26,,, | Performed by: RADIOLOGY

## 2019-12-04 PROCEDURE — 77067 SCR MAMMO BI INCL CAD: CPT | Mod: TC,PO

## 2019-12-04 PROCEDURE — 77067 MAMMO DIGITAL SCREENING BILAT WITH TOMOSYNTHESIS_CAD: ICD-10-PCS | Mod: 26,,, | Performed by: RADIOLOGY

## 2019-12-04 PROCEDURE — 77063 MAMMO DIGITAL SCREENING BILAT WITH TOMOSYNTHESIS_CAD: ICD-10-PCS | Mod: 26,,, | Performed by: RADIOLOGY

## 2019-12-04 PROCEDURE — 77067 SCR MAMMO BI INCL CAD: CPT | Mod: 26,,, | Performed by: RADIOLOGY

## 2019-12-10 ENCOUNTER — TELEPHONE (OUTPATIENT)
Dept: RHEUMATOLOGY | Facility: CLINIC | Age: 54
End: 2019-12-10

## 2019-12-10 ENCOUNTER — OFFICE VISIT (OUTPATIENT)
Dept: RHEUMATOLOGY | Facility: CLINIC | Age: 54
End: 2019-12-10
Payer: MEDICARE

## 2019-12-10 VITALS
HEIGHT: 67 IN | BODY MASS INDEX: 38.4 KG/M2 | WEIGHT: 244.69 LBS | DIASTOLIC BLOOD PRESSURE: 98 MMHG | SYSTOLIC BLOOD PRESSURE: 158 MMHG | HEART RATE: 74 BPM

## 2019-12-10 DIAGNOSIS — M19.90 INFLAMMATORY ARTHRITIS: ICD-10-CM

## 2019-12-10 DIAGNOSIS — M46.90 AXIAL SPONDYLOARTHRITIS: Chronic | ICD-10-CM

## 2019-12-10 DIAGNOSIS — G89.29 CHRONIC LOW BACK PAIN, UNSPECIFIED BACK PAIN LATERALITY, UNSPECIFIED WHETHER SCIATICA PRESENT: Primary | ICD-10-CM

## 2019-12-10 DIAGNOSIS — M47.819 SPONDYLOARTHRITIS: ICD-10-CM

## 2019-12-10 DIAGNOSIS — M54.50 CHRONIC LOW BACK PAIN, UNSPECIFIED BACK PAIN LATERALITY, UNSPECIFIED WHETHER SCIATICA PRESENT: Primary | ICD-10-CM

## 2019-12-10 DIAGNOSIS — R10.9 FLANK PAIN: Primary | ICD-10-CM

## 2019-12-10 DIAGNOSIS — Z79.899 HIGH RISK MEDICATION USE: ICD-10-CM

## 2019-12-10 DIAGNOSIS — M02.30 REACTIVE ARTHRITIS: ICD-10-CM

## 2019-12-10 PROCEDURE — 3008F BODY MASS INDEX DOCD: CPT | Mod: S$GLB,,, | Performed by: INTERNAL MEDICINE

## 2019-12-10 PROCEDURE — 3077F SYST BP >= 140 MM HG: CPT | Mod: S$GLB,,, | Performed by: INTERNAL MEDICINE

## 2019-12-10 PROCEDURE — 99999 PR PBB SHADOW E&M-EST. PATIENT-LVL V: ICD-10-PCS | Mod: PBBFAC,,, | Performed by: INTERNAL MEDICINE

## 2019-12-10 PROCEDURE — 99214 PR OFFICE/OUTPT VISIT, EST, LEVL IV, 30-39 MIN: ICD-10-PCS | Mod: S$GLB,,, | Performed by: INTERNAL MEDICINE

## 2019-12-10 PROCEDURE — 99214 OFFICE O/P EST MOD 30 MIN: CPT | Mod: S$GLB,,, | Performed by: INTERNAL MEDICINE

## 2019-12-10 PROCEDURE — 3077F PR MOST RECENT SYSTOLIC BLOOD PRESSURE >= 140 MM HG: ICD-10-PCS | Mod: S$GLB,,, | Performed by: INTERNAL MEDICINE

## 2019-12-10 PROCEDURE — 3080F PR MOST RECENT DIASTOLIC BLOOD PRESSURE >= 90 MM HG: ICD-10-PCS | Mod: S$GLB,,, | Performed by: INTERNAL MEDICINE

## 2019-12-10 PROCEDURE — 3008F PR BODY MASS INDEX (BMI) DOCUMENTED: ICD-10-PCS | Mod: S$GLB,,, | Performed by: INTERNAL MEDICINE

## 2019-12-10 PROCEDURE — 3080F DIAST BP >= 90 MM HG: CPT | Mod: S$GLB,,, | Performed by: INTERNAL MEDICINE

## 2019-12-10 PROCEDURE — 99999 PR PBB SHADOW E&M-EST. PATIENT-LVL V: CPT | Mod: PBBFAC,,, | Performed by: INTERNAL MEDICINE

## 2019-12-10 RX ORDER — SULFASALAZINE 500 MG/1
1500 TABLET, DELAYED RELEASE ORAL 2 TIMES DAILY
Qty: 540 TABLET | Refills: 0 | Status: SHIPPED | OUTPATIENT
Start: 2019-12-10 | End: 2020-07-30 | Stop reason: SDUPTHER

## 2019-12-10 RX ORDER — SULFASALAZINE 500 MG/1
1500 TABLET, DELAYED RELEASE ORAL 2 TIMES DAILY
Qty: 540 TABLET | Refills: 0 | Status: CANCELLED | OUTPATIENT
Start: 2019-12-10

## 2019-12-10 ASSESSMENT — ANKYLOSING SPONDYLITIS DISEASE ACTIVITY SCORE (ASDAS-CRP)
CRP_MG_PER_LITER: 2.7
GLOBAL_ACTIVITY: 4
TOTAL_SCORE: 2.56
NBH_PAIN: 7
PAIN_SWELLING: 4
MORNING_STIFFNESS: 4

## 2019-12-10 ASSESSMENT — ROUTINE ASSESSMENT OF PATIENT INDEX DATA (RAPID3)
PSYCHOLOGICAL DISTRESS SCORE: 6.6
MDHAQ FUNCTION SCORE: 1
PAIN SCORE: 7.5
PATIENT GLOBAL ASSESSMENT SCORE: 5
TOTAL RAPID3 SCORE: 5.28
WHEN YOU AWAKENED IN THE MORNING OVER THE LAST WEEK, PLEASE INDICATE THE AMOUNT OF TIME IT TAKES UNTIL YOU ARE AS LIMBER AS YOU WILL BE FOR THE DAY: 50 MINS
AM STIFFNESS SCORE: 1, YES
FATIGUE SCORE: 7.5

## 2019-12-10 NOTE — TELEPHONE ENCOUNTER
Please tell pt that since her low back pain is predominantly in the flank and her plain x-rays of the low back with minimal abnormalities, would like to be sure the pain is not kidney related. Please schedule UA and renal ultrasound. [facility]. Thanks  CLEMENT

## 2019-12-10 NOTE — PROGRESS NOTES
I have personally taken the history and examined the patient and agree with the resident,s note as stated above     Axial spondyloarthritis with hip involvement:    Schober's 10 -14.5 cm  moderately decreased lateral flexion and extension  Fabere neg  Chest expansion 5 cm  O-W 0 cm  Neck rom normal        Axial spondlyloarthritis:  ASDAS- CRP 2.56(HDA)  BASDAI 5 (HDA)  BASFI  2.9(moderate functional limitation)      Low back pain, major issue at present       [facility]  *Shingrix(not covered, would cost her $400 out of pocket  HBV DNA today  Standing labs and HBV DNA q 3 months.  Cont adalimumab 40mg sc q 2 wks  Cont sulfasalazine 1500mg twice daily  Cont naproxen 500mg prn  Cont entecavir 0.5mg daily  RTC 3 months

## 2019-12-10 NOTE — PROGRESS NOTES
"Subjective:       Patient ID: Althea Vazquez is a 54 y.o. female.    Chief Complaint: Axial spondyloarthritis with hip involvement    Overall, she is doing well. She continues to have right sided low back pain that prevents her from doing many home exercises or walking. She denies radiation or numbness. She also is still fatigued and only getting 4 hours of interrupted sleep each night. She does not like taking medication to sleep because it makes her more fatigued the next day. Otherwise, she denies any new issues at this time.       Review of Systems   Constitutional: Positive for fatigue. Negative for chills and fever.   HENT: Negative for ear pain, mouth sores and trouble swallowing.    Eyes: Negative for pain and redness.   Respiratory: Negative for cough and shortness of breath.    Cardiovascular: Negative for chest pain and palpitations.   Gastrointestinal: Negative for nausea and vomiting.   Genitourinary: Negative for difficulty urinating and flank pain.   Musculoskeletal: Positive for arthralgias and myalgias.   Skin: Negative for rash and wound.   Neurological: Positive for headaches. Negative for dizziness and weakness.   Hematological: Does not bruise/bleed easily.   All other systems reviewed and are negative.        Objective:   BP (!) 158/98   Pulse 74   Ht 5' 7.2" (1.707 m)   Wt 111 kg (244 lb 11.2 oz)   LMP 11/25/2014   BMI 38.10 kg/m²      Physical Exam   Constitutional: She is oriented to person, place, and time and well-developed, well-nourished, and in no distress.   HENT:   Head: Normocephalic and atraumatic.   Eyes: Conjunctivae and EOM are normal. Pupils are equal, round, and reactive to light.   Neck: Normal range of motion. Neck supple.   Cardiovascular: Normal rate, regular rhythm and normal heart sounds.    Pulmonary/Chest: Effort normal and breath sounds normal.   Abdominal: Soft. Bowel sounds are normal.       Right Side Rheumatological Exam     Muscle Strength (0-5 " scale):  Deltoid:  5  Biceps: 5/5   Triceps:  5  : 5/5   Iliopsoas: 5  Quadriceps:  5     Left Side Rheumatological Exam     Muscle Strength (0-5 scale):  Deltoid:  5  Biceps: 5/5   Triceps:  5  :  5/5   Iliopsoas: 5  Quadriceps:  5       Back/Neck Exam   Tenderness Right paramedian tenderness of the Lower L-Spine.    Back Range of Motion   Extension: 30  Flexion: 70  Lateral Bend Right: normalBack lateral bend right: painful.  Lateral Bend Left: normalBack lateral bend left: painful.  Rotation Right: normal  Rotation Left: normal      Neurological: She is alert and oriented to person, place, and time.   Skin: Skin is warm and dry.           Assessment:       1. Chronic low back pain, unspecified back pain laterality, unspecified whether sciatica present    2. Spondyloarthritis    3. Axial spondyloarthritis    4. Reactive arthritis    5. Inflammatory arthritis    6. High risk medication use            Plan:       Problem List Items Addressed This Visit        Neuro    Axial spondyloarthritis (Chronic)    Relevant Medications    adalimumab (HUMIRA) PnKt injection    sulfaSALAzine (AZULFIDINE) 500 MG TbEC    Other Relevant Orders    Ambulatory Referral to [facility]       Other    High risk medication use-sulfasalazine 2 gm    Relevant Medications    sulfaSALAzine (AZULFIDINE) 500 MG TbEC      Other Visit Diagnoses     Chronic low back pain, unspecified back pain laterality, unspecified whether sciatica present    -  Primary    Relevant Orders    Ambulatory Referral to [facility]    Spondyloarthritis        Relevant Medications    adalimumab (HUMIRA) PnKt injection    sulfaSALAzine (AZULFIDINE) 500 MG TbEC    Other Relevant Orders    Ambulatory Referral to [facility]    Reactive arthritis        Relevant Medications    sulfaSALAzine (AZULFIDINE) 500 MG TbEC    Inflammatory arthritis        Relevant Medications    sulfaSALAzine (AZULFIDINE) 500 MG TbEC        Axial  Spondyloarthritis  - DXA stable  - Cont Humira 40mg every other week  - Cont Sulfasalazine 1500mg twice daily  - Continue HEP    Low back pain  - right lumbar paraspinal tenderness without radiation or numbness  - does not take her flexeril due to increased fatigue the next day  - Amb referral to [facility]    RTC 3 months with standing labs

## 2019-12-11 ENCOUNTER — TELEPHONE (OUTPATIENT)
Dept: RHEUMATOLOGY | Facility: CLINIC | Age: 54
End: 2019-12-11

## 2019-12-24 ENCOUNTER — TELEPHONE (OUTPATIENT)
Dept: PHARMACY | Facility: CLINIC | Age: 54
End: 2019-12-24

## 2019-12-26 ENCOUNTER — TELEPHONE (OUTPATIENT)
Dept: PHARMACY | Facility: CLINIC | Age: 54
End: 2019-12-26

## 2019-12-26 NOTE — TELEPHONE ENCOUNTER
Patient confirmed shipping of Humira on  to arrive .  Address and  verified. $0 copay in 004. Patient reported 0 doses on hand. She stated she has missed 0 doses. Next dose due . Patient stated she has not started any new medications or dose changes. She stated she has not developed any new allergies or health conditions. Patient had no further questions or concerns.

## 2020-01-21 ENCOUNTER — TELEPHONE (OUTPATIENT)
Dept: PHARMACY | Facility: CLINIC | Age: 55
End: 2020-01-21

## 2020-02-06 DIAGNOSIS — B37.9 CANDIDA INFECTION: ICD-10-CM

## 2020-02-06 DIAGNOSIS — M02.30 REACTIVE ARTHRITIS: ICD-10-CM

## 2020-02-06 RX ORDER — FLUOXETINE HYDROCHLORIDE 40 MG/1
CAPSULE ORAL
Qty: 90 CAPSULE | Refills: 1 | OUTPATIENT
Start: 2020-02-06

## 2020-02-06 RX ORDER — CYCLOBENZAPRINE HCL 10 MG
TABLET ORAL
Qty: 90 TABLET | Refills: 1 | Status: SHIPPED | OUTPATIENT
Start: 2020-02-06 | End: 2020-11-06

## 2020-02-06 RX ORDER — PANTOPRAZOLE SODIUM 40 MG/1
TABLET, DELAYED RELEASE ORAL
Qty: 90 TABLET | Refills: 3 | Status: SHIPPED | OUTPATIENT
Start: 2020-02-06 | End: 2020-11-06

## 2020-02-06 RX ORDER — TERCONAZOLE 4 MG/G
CREAM VAGINAL
Qty: 45 G | Refills: 2 | Status: SHIPPED | OUTPATIENT
Start: 2020-02-06 | End: 2020-04-23 | Stop reason: SDUPTHER

## 2020-02-07 RX ORDER — FLUOXETINE HYDROCHLORIDE 40 MG/1
CAPSULE ORAL
Qty: 90 CAPSULE | Refills: 1 | OUTPATIENT
Start: 2020-02-07

## 2020-03-03 ENCOUNTER — TELEPHONE (OUTPATIENT)
Dept: PHARMACY | Facility: CLINIC | Age: 55
End: 2020-03-03

## 2020-03-17 ENCOUNTER — OFFICE VISIT (OUTPATIENT)
Dept: INTERNAL MEDICINE | Facility: CLINIC | Age: 55
End: 2020-03-17
Payer: MEDICARE

## 2020-03-17 VITALS
BODY MASS INDEX: 37.67 KG/M2 | HEIGHT: 67 IN | SYSTOLIC BLOOD PRESSURE: 110 MMHG | DIASTOLIC BLOOD PRESSURE: 78 MMHG | RESPIRATION RATE: 16 BRPM | HEART RATE: 68 BPM | WEIGHT: 240 LBS | TEMPERATURE: 98 F

## 2020-03-17 DIAGNOSIS — E11.42 TYPE 2 DIABETES MELLITUS WITH DIABETIC POLYNEUROPATHY, UNSPECIFIED WHETHER LONG TERM INSULIN USE: Chronic | ICD-10-CM

## 2020-03-17 DIAGNOSIS — F41.9 ANXIETY: ICD-10-CM

## 2020-03-17 DIAGNOSIS — E66.01 SEVERE OBESITY (BMI 35.0-39.9) WITH COMORBIDITY: ICD-10-CM

## 2020-03-17 DIAGNOSIS — I15.2 HYPERTENSION ASSOCIATED WITH DIABETES: ICD-10-CM

## 2020-03-17 DIAGNOSIS — M46.90 AXIAL SPONDYLOARTHRITIS: Chronic | ICD-10-CM

## 2020-03-17 DIAGNOSIS — E11.69 HYPERLIPIDEMIA ASSOCIATED WITH TYPE 2 DIABETES MELLITUS: ICD-10-CM

## 2020-03-17 DIAGNOSIS — G47.00 INSOMNIA, UNSPECIFIED TYPE: Chronic | ICD-10-CM

## 2020-03-17 DIAGNOSIS — Z00.00 ANNUAL PHYSICAL EXAM: Primary | ICD-10-CM

## 2020-03-17 DIAGNOSIS — Z86.19 HISTORY OF HEPATITIS B: ICD-10-CM

## 2020-03-17 DIAGNOSIS — E11.59 HYPERTENSION ASSOCIATED WITH DIABETES: ICD-10-CM

## 2020-03-17 DIAGNOSIS — E78.5 HYPERLIPIDEMIA ASSOCIATED WITH TYPE 2 DIABETES MELLITUS: ICD-10-CM

## 2020-03-17 PROBLEM — E11.9 DM TYPE 2, NOT AT GOAL: Status: RESOLVED | Noted: 2019-12-02 | Resolved: 2020-03-17

## 2020-03-17 PROBLEM — D84.9 IMMUNOSUPPRESSED STATUS: Status: RESOLVED | Noted: 2019-12-02 | Resolved: 2020-03-17

## 2020-03-17 PROBLEM — E11.9 DIABETES MELLITUS TYPE 2 WITHOUT RETINOPATHY: Status: RESOLVED | Noted: 2019-12-02 | Resolved: 2020-03-17

## 2020-03-17 PROCEDURE — 3044F PR MOST RECENT HEMOGLOBIN A1C LEVEL <7.0%: ICD-10-PCS | Mod: S$GLB,,, | Performed by: INTERNAL MEDICINE

## 2020-03-17 PROCEDURE — 99396 PREV VISIT EST AGE 40-64: CPT | Mod: S$GLB,,, | Performed by: INTERNAL MEDICINE

## 2020-03-17 PROCEDURE — 99999 PR PBB SHADOW E&M-EST. PATIENT-LVL III: ICD-10-PCS | Mod: PBBFAC,,, | Performed by: INTERNAL MEDICINE

## 2020-03-17 PROCEDURE — 3044F HG A1C LEVEL LT 7.0%: CPT | Mod: S$GLB,,, | Performed by: INTERNAL MEDICINE

## 2020-03-17 PROCEDURE — 99999 PR PBB SHADOW E&M-EST. PATIENT-LVL III: CPT | Mod: PBBFAC,,, | Performed by: INTERNAL MEDICINE

## 2020-03-17 PROCEDURE — 3078F PR MOST RECENT DIASTOLIC BLOOD PRESSURE < 80 MM HG: ICD-10-PCS | Mod: S$GLB,,, | Performed by: INTERNAL MEDICINE

## 2020-03-17 PROCEDURE — 3074F PR MOST RECENT SYSTOLIC BLOOD PRESSURE < 130 MM HG: ICD-10-PCS | Mod: S$GLB,,, | Performed by: INTERNAL MEDICINE

## 2020-03-17 PROCEDURE — 3078F DIAST BP <80 MM HG: CPT | Mod: S$GLB,,, | Performed by: INTERNAL MEDICINE

## 2020-03-17 PROCEDURE — 99396 PR PREVENTIVE VISIT,EST,40-64: ICD-10-PCS | Mod: S$GLB,,, | Performed by: INTERNAL MEDICINE

## 2020-03-17 PROCEDURE — 3074F SYST BP LT 130 MM HG: CPT | Mod: S$GLB,,, | Performed by: INTERNAL MEDICINE

## 2020-03-17 RX ORDER — FLUOXETINE HYDROCHLORIDE 20 MG/1
20 CAPSULE ORAL 2 TIMES DAILY
COMMUNITY
Start: 2020-02-06 | End: 2020-04-29 | Stop reason: SDUPTHER

## 2020-03-17 RX ORDER — FLUTICASONE PROPIONATE 50 MCG
2 SPRAY, SUSPENSION (ML) NASAL DAILY
Qty: 16 G | Refills: 12 | Status: SHIPPED | OUTPATIENT
Start: 2020-03-17 | End: 2022-02-07 | Stop reason: SDUPTHER

## 2020-03-17 NOTE — PROGRESS NOTES
Subjective:       Patient ID: Althea Vazquez is a 54 y.o. female.    Chief Complaint: Annual Exam (also have allergies problems.  allergy meds knock her out. 0 pan. )    HPI   54 y.o. Female here for annual exam.      Vaccines: Influenza (2019); Tetanus (2014); PNA (done)  Sexual Screening: declined  Eye exam: 2016  Mammogram: 12/19  Gyn exam: 9/18  Colonoscopy: 1/18     Exercise: no  Diet: regular     Past Medical History:    Acid reflux                                                   Anxiety                                         10/18/2012    Arthritis                                                     Depression                                          Diabetic peripheral neuropathy - mild           10/21/2014    Difficult intubation                                          Dry eyes                                                      Dry mouth                                                     Fever blister                                                 Hyperlipidemia                                                Hypertension                                                  Insomnia                                                      Iritis                                          5/13/2014     Long-term current use of steroids               9/27/2012     Nausea & vomiting                               2/4/2015      VAISHNAVI (obstructive sleep apnea)                                 Type II diabetes mellitus                       10/1/2012   Past Surgical History:    TUBAL LIGATION                                                 KNEE ARTHROSCOPY                                 5-14-14         Comment:right    HYSTERECTOMY                                     12/3/2014   Social History    Marital status:              Spouse name: Edward                 Years of education:                 Number of children: 2              Occupational History  Occupation          Employer            Comment                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[employer]                       DISABLED                 Social History Main Topics    Smoking status: Never Smoker                                                                 Smokeless status: Never Used                        Alcohol use: No              Drug use: No              Sexual activity: Yes               Partners with: Male     Other Topics            Concern  Are you pregnant or th* No  Breast-feeding          No      -- Diflucan [Fluconazole] -- Other (See Comments)    --  Sore on mouth  Review of Systems   Constitutional: Negative for activity change, appetite change, chills, diaphoresis, fatigue, fever and unexpected weight change.   HENT: Positive for congestion, ear pain and sore throat. Negative for drooling, ear discharge, mouth sores, postnasal drip, rhinorrhea, sinus pressure, sneezing, trouble swallowing and voice change.    Eyes: Negative for pain, discharge and visual disturbance.   Respiratory: Negative for cough, shortness of breath, wheezing and stridor.    Cardiovascular: Negative for chest pain, palpitations and leg swelling.   Gastrointestinal: Negative for abdominal pain, blood in stool, constipation, diarrhea, nausea and vomiting.   Endocrine: Negative for cold intolerance and heat intolerance.   Genitourinary: Negative for difficulty urinating, dysuria, frequency, hematuria and urgency.   Musculoskeletal: Positive for neck pain. Negative for arthralgias and myalgias.   Skin: Negative for rash and wound.   Allergic/Immunologic: Negative for environmental allergies and food allergies.   Neurological: Positive for headaches. Negative for dizziness, tremors, seizures, syncope, weakness and light-headedness.   Hematological: Negative for adenopathy. Does not bruise/bleed easily.   Psychiatric/Behavioral: Negative for confusion and sleep disturbance. The patient is not nervous/anxious.        Objective:      Physical Exam   Constitutional:  She is oriented to person, place, and time. She appears well-developed and well-nourished. No distress.   HENT:   Head: Normocephalic and atraumatic.   Right Ear: External ear normal.   Left Ear: External ear normal.   Nose: Nose normal.   Mouth/Throat: Oropharynx is clear and moist. No oropharyngeal exudate.   Eyes: Pupils are equal, round, and reactive to light. Conjunctivae and EOM are normal. Right eye exhibits no discharge. Left eye exhibits no discharge. No scleral icterus.   Neck: Neck supple. No JVD present. No thyromegaly present.   Cardiovascular: Normal rate, regular rhythm, normal heart sounds and intact distal pulses.   No murmur heard.  Pulses:       Dorsalis pedis pulses are 2+ on the right side, and 2+ on the left side.        Posterior tibial pulses are 2+ on the right side, and 2+ on the left side.   Pulmonary/Chest: Effort normal and breath sounds normal. No respiratory distress. She has no wheezes. She has no rales. She exhibits no tenderness.   Abdominal: Soft. She exhibits no distension. There is no tenderness. There is no guarding.   Musculoskeletal: She exhibits no edema.   Feet:   Right Foot:   Protective Sensation: 4 sites tested. 4 sites sensed.   Skin Integrity: Negative for ulcer, blister or skin breakdown.   Left Foot:   Protective Sensation: 4 sites tested. 4 sites sensed.   Skin Integrity: Negative for ulcer, blister or skin breakdown.   Lymphadenopathy:     She has no cervical adenopathy.   Neurological: She is alert and oriented to person, place, and time.   Skin: Skin is warm and dry. No rash noted. She is not diaphoretic. No pallor.   Psychiatric: She has a normal mood and affect. Judgment normal.   Nursing note and vitals reviewed.      Assessment:       1. Annual physical exam    2. Type 2 diabetes mellitus with diabetic polyneuropathy, unspecified whether long term insulin use    3. Hypertension associated with diabetes    4. Hyperlipidemia associated with type 2 diabetes  mellitus    5. Axial spondyloarthritis    6. Anxiety    7. Severe obesity (BMI 35.0-39.9) with comorbidity    8. Insomnia, unspecified type    9. History of hepatitis B        Plan:    Blood work ordered   Vaccines: Influenza (2019); Tetanus (2014); PNA (done)   Sexual Screening: declined   Eye exam: 2016   Mammogram: 12/19   Gyn exam: 9/18   Colonoscopy: 1/18      1. T2DM with neuropathy- stable with last HA1C of 6.7(9/19)<--5.9(4/18)<--5.8(2/17)<--6.4(4/16)       Continue on Invokana 300 mg daily, pt stopped the Metformin 2/2 GI SE's   2. HTN- continue Losartan 100 mg, Toprol  mg, Norvasc 10 mg, HCTZ 25 mg daily   3. HLD- stable, on Lipitor 40 mg qHS   4. Spondyloarthritis- stable on Sulfasalazine/Naproxen        Followed by Rheumatology   5. Anxiety- stable on Prozac/Klonopin, managed by [facility]   6. Severe obesity- pt advised on proper diet/exercise for weight loss   7. Insomnia- stable on Ambien 5 mg qHS PRN   8. Hx of Hep B- followed by Hepatology

## 2020-03-18 ENCOUNTER — PATIENT MESSAGE (OUTPATIENT)
Dept: INTERNAL MEDICINE | Facility: CLINIC | Age: 55
End: 2020-03-18

## 2020-03-18 DIAGNOSIS — R05.9 COUGH: Primary | ICD-10-CM

## 2020-03-18 RX ORDER — CODEINE PHOSPHATE AND GUAIFENESIN 10; 100 MG/5ML; MG/5ML
5 SOLUTION ORAL 3 TIMES DAILY PRN
Qty: 236 ML | Refills: 0 | Status: SHIPPED | OUTPATIENT
Start: 2020-03-18 | End: 2020-03-28

## 2020-03-25 ENCOUNTER — TELEPHONE (OUTPATIENT)
Dept: PHARMACY | Facility: CLINIC | Age: 55
End: 2020-03-25

## 2020-04-01 ENCOUNTER — PATIENT MESSAGE (OUTPATIENT)
Dept: INTERNAL MEDICINE | Facility: CLINIC | Age: 55
End: 2020-04-01

## 2020-04-08 ENCOUNTER — TELEPHONE (OUTPATIENT)
Dept: RHEUMATOLOGY | Facility: CLINIC | Age: 55
End: 2020-04-08

## 2020-04-09 ENCOUNTER — TELEPHONE (OUTPATIENT)
Dept: RHEUMATOLOGY | Facility: CLINIC | Age: 55
End: 2020-04-09

## 2020-04-09 ENCOUNTER — LAB VISIT (OUTPATIENT)
Dept: LAB | Facility: HOSPITAL | Age: 55
End: 2020-04-09
Attending: INTERNAL MEDICINE
Payer: MEDICARE

## 2020-04-09 DIAGNOSIS — Z79.620 LONG-TERM USE OF ADALIMUMAB: ICD-10-CM

## 2020-04-09 DIAGNOSIS — M47.819 SPONDYLOARTHRITIS: ICD-10-CM

## 2020-04-09 DIAGNOSIS — B18.1 CHRONIC VIRAL HEPATITIS B WITHOUT DELTA AGENT AND WITHOUT COMA: ICD-10-CM

## 2020-04-09 LAB
ALBUMIN SERPL BCP-MCNC: 4.8 G/DL (ref 3.5–5.2)
ALP SERPL-CCNC: 96 U/L (ref 38–126)
ALT SERPL W/O P-5'-P-CCNC: 19 U/L (ref 10–44)
ANION GAP SERPL CALC-SCNC: 12 MMOL/L (ref 8–16)
AST SERPL-CCNC: 21 U/L (ref 15–46)
BASOPHILS # BLD AUTO: 0.04 K/UL (ref 0–0.2)
BASOPHILS NFR BLD: 0.5 % (ref 0–1.9)
BILIRUB SERPL-MCNC: 0.7 MG/DL (ref 0.1–1)
BUN SERPL-MCNC: 12 MG/DL (ref 7–17)
CALCIUM SERPL-MCNC: 9.6 MG/DL (ref 8.7–10.5)
CHLORIDE SERPL-SCNC: 105 MMOL/L (ref 95–110)
CO2 SERPL-SCNC: 23 MMOL/L (ref 23–29)
CREAT SERPL-MCNC: 0.69 MG/DL (ref 0.5–1.4)
CRP SERPL-MCNC: 0.44 MG/DL (ref 0–1)
DIFFERENTIAL METHOD: ABNORMAL
EOSINOPHIL # BLD AUTO: 0.1 K/UL (ref 0–0.5)
EOSINOPHIL NFR BLD: 1.9 % (ref 0–8)
ERYTHROCYTE [DISTWIDTH] IN BLOOD BY AUTOMATED COUNT: 13 % (ref 11.5–14.5)
ERYTHROCYTE [SEDIMENTATION RATE] IN BLOOD BY WESTERGREN METHOD: 10 MM/HR (ref 0–20)
EST. GFR  (AFRICAN AMERICAN): >60 ML/MIN/1.73 M^2
EST. GFR  (NON AFRICAN AMERICAN): >60 ML/MIN/1.73 M^2
GLUCOSE SERPL-MCNC: 204 MG/DL (ref 70–110)
HCT VFR BLD AUTO: 47.9 % (ref 37–48.5)
HGB BLD-MCNC: 15.8 G/DL (ref 12–16)
IMM GRANULOCYTES # BLD AUTO: 0.05 K/UL (ref 0–0.04)
IMM GRANULOCYTES NFR BLD AUTO: 0.7 % (ref 0–0.5)
LYMPHOCYTES # BLD AUTO: 3.8 K/UL (ref 1–4.8)
LYMPHOCYTES NFR BLD: 52.5 % (ref 18–48)
MCH RBC QN AUTO: 29.1 PG (ref 27–31)
MCHC RBC AUTO-ENTMCNC: 33 G/DL (ref 32–36)
MCV RBC AUTO: 88 FL (ref 82–98)
MONOCYTES # BLD AUTO: 0.5 K/UL (ref 0.3–1)
MONOCYTES NFR BLD: 7 % (ref 4–15)
NEUTROPHILS # BLD AUTO: 2.7 K/UL (ref 1.8–7.7)
NEUTROPHILS NFR BLD: 37.4 % (ref 38–73)
NRBC BLD-RTO: 0 /100 WBC
PLATELET # BLD AUTO: 296 K/UL (ref 150–350)
PMV BLD AUTO: 11.6 FL (ref 9.2–12.9)
POTASSIUM SERPL-SCNC: 3.6 MMOL/L (ref 3.5–5.1)
PROT SERPL-MCNC: 8 G/DL (ref 6–8.4)
RBC # BLD AUTO: 5.43 M/UL (ref 4–5.4)
SODIUM SERPL-SCNC: 140 MMOL/L (ref 136–145)
WBC # BLD AUTO: 7.32 K/UL (ref 3.9–12.7)

## 2020-04-09 PROCEDURE — 87517 HEPATITIS B DNA QUANT: CPT | Mod: PO

## 2020-04-09 PROCEDURE — 85652 RBC SED RATE AUTOMATED: CPT

## 2020-04-09 PROCEDURE — 80053 COMPREHEN METABOLIC PANEL: CPT | Mod: PO

## 2020-04-09 PROCEDURE — 86140 C-REACTIVE PROTEIN: CPT | Mod: PO

## 2020-04-09 PROCEDURE — 85025 COMPLETE CBC W/AUTO DIFF WBC: CPT | Mod: PO

## 2020-04-13 ENCOUNTER — TELEPHONE (OUTPATIENT)
Dept: PHARMACY | Facility: CLINIC | Age: 55
End: 2020-04-13

## 2020-04-13 ENCOUNTER — OFFICE VISIT (OUTPATIENT)
Dept: RHEUMATOLOGY | Facility: CLINIC | Age: 55
End: 2020-04-13
Payer: MEDICARE

## 2020-04-13 VITALS
DIASTOLIC BLOOD PRESSURE: 80 MMHG | HEIGHT: 66 IN | BODY MASS INDEX: 38.57 KG/M2 | WEIGHT: 240 LBS | SYSTOLIC BLOOD PRESSURE: 126 MMHG | HEART RATE: 71 BPM

## 2020-04-13 DIAGNOSIS — Z79.620 ADALIMUMAB (HUMIRA) LONG-TERM USE: Primary | ICD-10-CM

## 2020-04-13 DIAGNOSIS — E55.9 VITAMIN D DEFICIENCY: ICD-10-CM

## 2020-04-13 DIAGNOSIS — M46.90 AXIAL SPONDYLOARTHRITIS: ICD-10-CM

## 2020-04-13 PROCEDURE — 99213 PR OFFICE/OUTPT VISIT, EST, LEVL III, 20-29 MIN: ICD-10-PCS | Mod: 95,,, | Performed by: INTERNAL MEDICINE

## 2020-04-13 PROCEDURE — 3008F BODY MASS INDEX DOCD: CPT | Mod: ,,, | Performed by: INTERNAL MEDICINE

## 2020-04-13 PROCEDURE — 3079F PR MOST RECENT DIASTOLIC BLOOD PRESSURE 80-89 MM HG: ICD-10-PCS | Mod: ,,, | Performed by: INTERNAL MEDICINE

## 2020-04-13 PROCEDURE — 3074F PR MOST RECENT SYSTOLIC BLOOD PRESSURE < 130 MM HG: ICD-10-PCS | Mod: ,,, | Performed by: INTERNAL MEDICINE

## 2020-04-13 PROCEDURE — 3074F SYST BP LT 130 MM HG: CPT | Mod: ,,, | Performed by: INTERNAL MEDICINE

## 2020-04-13 PROCEDURE — 99213 OFFICE O/P EST LOW 20 MIN: CPT | Mod: 95,,, | Performed by: INTERNAL MEDICINE

## 2020-04-13 PROCEDURE — 3008F PR BODY MASS INDEX (BMI) DOCUMENTED: ICD-10-PCS | Mod: ,,, | Performed by: INTERNAL MEDICINE

## 2020-04-13 PROCEDURE — 3079F DIAST BP 80-89 MM HG: CPT | Mod: ,,, | Performed by: INTERNAL MEDICINE

## 2020-04-13 RX ORDER — ERGOCALCIFEROL 1.25 MG/1
50000 CAPSULE ORAL
Qty: 12 CAPSULE | Refills: 3 | Status: SHIPPED | OUTPATIENT
Start: 2020-04-13 | End: 2020-07-30 | Stop reason: SDUPTHER

## 2020-04-13 ASSESSMENT — ANKYLOSING SPONDYLITIS DISEASE ACTIVITY SCORE (ASDAS-CRP)
NBH_PAIN: 5
GLOBAL_ACTIVITY: 4
MORNING_STIFFNESS: 5
CRP_MG_PER_LITER: 4.4
PAIN_SWELLING: 5
TOTAL_SCORE: 2.67

## 2020-04-13 NOTE — PATIENT INSTRUCTIONS
-Finish Humira that you have at home and then stop wait 2 wks then start start new medication, SIMPONI® (golimumab) 50mg sc every 30 days.  -Cont sulfasalazine 1500mg twice daily  -Cont entecavir 0.5mg daily  -Please get labs including Vitamin D level and Quantiferon gold in 3 months with standing labs  -Continue doing home exercises and recommend walking or riding your bike for 30minutes every day.  -Ref to [facility]  -Continue Naproxen 500mg up to twice daily as needed for pain  -Please take Vitamin D2 50,000 units once a week    Many of our rheumatology patients are concerned about the novel corona virus, Covid-19. Rheumatology patients are at increased risk of complications from seasonal influenza as well as other viruses including Covid-19. Appropriate precautions include frequent thorough handwashing, avoiding hand-face and hand-mouth contact, and keeping a distance from friends and family who are sick with fever. You should avoid travel to high risk areas as noted on the Center for Disease Control website ( https://www.cdc.gov/coronavirus/2019-ncov/index.html).   If you develop flu-like symptoms with fever, you should call your primary care physician for guidance, or seek help at an urgent care center. Tell them you have fever when you first check in so that they will treat you appropriately.     If any member of your  household is diagnosed with Covid-19, please stop all immunosuppressive medications except prednisone and contact your primary care physician (PCP) immediately.       Most rheumatology medicines can be temporarily stopped for any illness with fever, except for prednisone. If you are unsure about your rheumatology medicine please ask  your rheumatologist. At this time you should continue your prescribed medicine while you are well.     The importance of hand washing cannot be stressed enough during this COVID 19 pandemic. Here is a YouTube video that shows you the proper 20 sec  technique for hand washing.   https://www.[employer].com/watch?v=[email or web address]

## 2020-04-13 NOTE — PROGRESS NOTES
Pre chart  Answers for HPI/ROS submitted by the patient on 4/10/2020   fever: No  eye redness: No  headaches: No  shortness of breath: No  chest pain: No  trouble swallowing: No  diarrhea: No  constipation: No  unexpected weight change: No  genital sore: No  dysuria: No  During the last 3 days, have you had a skin rash?: No  Bruises or bleeds easily: No  cough: No

## 2020-04-13 NOTE — PROGRESS NOTES
Subjective:   The patient location is: Home  The chief complaint leading to consultation is: Axial spondyloarthritis  Visit type: Virtual visit with synchronous audio and video  Total time spent with patient: 30 minutes  Each patient to whom he or she provides medical services by telemedicine is:  (1) informed of the relationship between the physician and patient and the respective role of any other health care provider with respect to management of the patient; and (2) notified that he or she may decline to receive medical services by telemedicine and may withdraw from such care at any time.      Patient ID: Althea Vazquez is a 54 y.o. female.    Chief Complaint: Axial spondyloarthritis with hip involvement    Mrs. Vazquez is a 53 y/o woman with PMHx of Axial spondyloarthritis with hip involvement presenting today for a 3 mo follow-up. Overall, she is doing well. She continues to have right sided low back pain and never heard from the [facility] clinic. The pain varies day to day. She feels like most of the pain is in the lower right on her hip. It feels stiff and it takes her about an hour in the morning to not feel stiff. Sometimes the pain radiates down her leg. She uses a topical cream and a wrap and warm water. Aleve helps but she has not been taking it due to concerns about interaction with coronavirus. She denies any numbness or weakness. She is having some fatigue but she thinks it is due to her DM2 as she recently stopped taking her Metformin due to diarrhea. She is not sleeping well but this is not due to pain. She made an appointment with her Psychiatrist who has prescribing Ambien. Her pain improves with movement.    She denies fever, weight loss, dry eyes or mouth, photosensitivity, rash, ulcer, raynaud's phenomenon, alopecia, dysphagia, diarrhea or blood in the stools.      Review of Systems   Constitutional: Negative for fever and unexpected weight change.   HENT: Negative for  trouble swallowing.    Eyes: Negative for redness.   Respiratory: Negative for cough and shortness of breath.    Cardiovascular: Negative for chest pain.   Gastrointestinal: Negative for constipation and diarrhea.   Genitourinary: Negative for dysuria and genital sores.   Skin: Negative for rash.   Neurological: Negative for headaches.   Hematological: Does not bruise/bleed easily.         Objective:   [facility] 11/25/2014      Physical Exam   Constitutional: She is oriented to person, place, and time and well-developed, well-nourished, and in no distress.   HENT:   Head: Normocephalic.   Eyes: EOM are normal. No scleral icterus.   Neck: Normal range of motion.   Pulmonary/Chest: Effort normal.   Neurological: She is alert and oriented to person, place, and time. No cranial nerve deficit.   Skin: No rash noted.     Musculoskeletal: She exhibits tenderness (Right lower back paraspinals).         Flexion: 85, Extension: 30 degrees with pain in right lower back.  Pain with lateral bending      Results for NABOR HERNANDEZ (MRN [id number]) as of 4/13/2020 09:33   Ref. Range 4/9/2020 09:45   WBC Latest Ref Range: 3.90 - 12.70 K/uL 7.32   RBC Latest Ref Range: 4.00 - 5.40 M/uL 5.43 (H)   Hemoglobin Latest Ref Range: 12.0 - 16.0 g/dL 15.8   Hematocrit Latest Ref Range: 37.0 - 48.5 % 47.9   MCV Latest Ref Range: 82 - 98 fL 88   MCH Latest Ref Range: 27.0 - 31.0 pg 29.1   MCHC Latest Ref Range: 32.0 - 36.0 g/dL 33.0   RDW Latest Ref Range: 11.5 - 14.5 % 13.0   Platelets Latest Ref Range: 150 - 350 K/uL 296   MPV Latest Ref Range: 9.2 - 12.9 fL 11.6   Gran% Latest Ref Range: 38.0 - 73.0 % 37.4 (L)   Gran # (ANC) Latest Ref Range: 1.8 - 7.7 K/uL 2.7   Lymph% Latest Ref Range: 18.0 - 48.0 % 52.5 (H)   Lymph # Latest Ref Range: 1.0 - 4.8 K/uL 3.8   Mono% Latest Ref Range: 4.0 - 15.0 % 7.0   Mono # Latest Ref Range: 0.3 - 1.0 K/uL 0.5   Eosinophil% Latest Ref Range: 0.0 - 8.0 % 1.9   Eos # Latest Ref Range: 0.0 - 0.5 K/uL 0.1    Basophil% Latest Ref Range: 0.0 - 1.9 % 0.5   Baso # Latest Ref Range: 0.00 - 0.20 K/uL 0.04   nRBC Latest Ref Range: 0 /100 WBC 0   Differential Method Unknown Automated   Immature Grans (Abs) Latest Ref Range: 0.00 - 0.04 K/uL 0.05 (H)   Immature Granulocytes Latest Ref Range: 0.0 - 0.5 % 0.7 (H)   Sed Rate Latest Ref Range: 0 - 20 mm/Hr 10   Sodium Latest Ref Range: 136 - 145 mmol/L 140   Potassium Latest Ref Range: 3.5 - 5.1 mmol/L 3.6   Chloride Latest Ref Range: 95 - 110 mmol/L 105   CO2 Latest Ref Range: 23 - 29 mmol/L 23   Anion Gap Latest Ref Range: 8 - 16 mmol/L 12   BUN, Bld Latest Ref Range: 7 - 17 mg/dL 12   Creatinine Latest Ref Range: 0.50 - 1.40 mg/dL 0.69   eGFR if non African American Latest Ref Range: >60 mL/min/1.73 m^2 >60.0   eGFR if African American Latest Ref Range: >60 mL/min/1.73 m^2 >60.0   Glucose Latest Ref Range: 70 - 110 mg/dL 204 (H)   Calcium Latest Ref Range: 8.7 - 10.5 mg/dL 9.6   Alkaline Phosphatase Latest Ref Range: 38 - 126 U/L 96   PROTEIN TOTAL Latest Ref Range: 6.0 - 8.4 g/dL 8.0   Albumin Latest Ref Range: 3.5 - 5.2 g/dL 4.8   BILIRUBIN TOTAL Latest Ref Range: 0.1 - 1.0 mg/dL 0.7   AST Latest Ref Range: 15 - 46 U/L 21   ALT Latest Ref Range: 10 - 44 U/L 19   CRP Latest Ref Range: 0.00 - 1.00 mg/dL 0.44   Triglycerides Latest Ref Range: 30 - 150 mg/dL 93   Cholesterol Latest Ref Range: 120 - 199 mg/dL 205 (H)   HDL Latest Ref Range: 40 - 75 mg/dL 60   Hdl/Cholesterol Ratio Latest Ref Range: 20.0 - 50.0 % 29.3   LDL Cholesterol External Latest Ref Range: 63.0 - 159.0 mg/dL 126.4   Non-HDL Cholesterol Latest Units: mg/dL 145   Total Cholesterol/HDL Ratio Latest Ref Range: 2.0 - 5.0  3.4   Hemoglobin A1C External Latest Ref Range: 4.0 - 5.6 % 8.3 (H)   Estimated Avg Glucose Latest Ref Range: 68 - 131 mg/dL 192 (H)   TSH Latest Ref Range: 0.400 - 4.000 uIU/mL 1.910        Assessment:       1. Adalimumab (Humira) long-term use    2. Axial spondyloarthritis    3. Vitamin D  deficiency            Plan:       Problem List Items Addressed This Visit        Neuro    Axial spondyloarthritis (Chronic)    Relevant Medications    golimumab (SIMPONI) 50 mg/0.5 mL PnIj      Other Visit Diagnoses     Adalimumab (Humira) long-term use    -  Primary    Relevant Orders    Quantiferon Gold TB    Vitamin D deficiency        Relevant Medications    ergocalciferol (ERGOCALCIFEROL) 50,000 unit Cap        Axial Spondyloarthritis  ASDAS- CRP 2.67(HDA)  BASDAI 5.6 (HDA)  At last visit Schober's 10 -14.5 cm, Moderately decreased lateral flexion and extension, At last visit chest expansion 5 cm, O-W 0 cm, Neck rom normal  - DXA 9/16/2019 normal  - Cont Humira 40mg every other week until finished with what you have at home and then start taking Simponi (golimumab) every 30 days.  - Cont Sulfasalazine 1500mg twice daily  - Cont Naproxen 500mg BID PRN  - Continue HEP and daily walking/biking     Low back pain  - right lumbar paraspinal tenderness without radiation or numbness  - does not want to take her flexeril due to increased fatigue the next day  - Referral to [facility] again as she never heard from them.    Vitamin D Deficiency  -Retest lab in 3 months  -Pt to resume taking Vitamin D 50,000 U every 7 days     RTC 3 months with standing labs, Vitamin D level, and Quantiferon Gold.     Pt seen and discussed with Dr. Arvizu who is in agreement with the plan.  Nano Paulino MD PM&R PGY1

## 2020-04-13 NOTE — PROGRESS NOTES
I have personally taken the history but not able to physically examine the patient and agree with the resident,s note as stated above     The patient location is: home  The chief complaint leading to consultation is: AS  Visit type: Virtual visit with synchronous audio and video  Total time spent with patient:  30 min  Each patient to whom he or she provides medical services by telemedicine is:  (1) informed of the relationship between the physician and patient and the respective role of any other health care provider with respect to management of the patient; and (2) notified that he or she may decline to receive medical services by telemedicine and may withdraw from such care at any time.    Notes:    The 10-year ASCVD risk score (Darío REY Jr., et al., 2013) is: 8.1%    Values used to calculate the score:      Age: 54 years      Sex: Female      Is Non- : Yes      Diabetic: Yes      Tobacco smoker: No      Systolic Blood Pressure: 126 mmHg      Is BP treated: Yes      HDL Cholesterol: 60 mg/dL      Total Cholesterol: 205 mg/dL      Axial spondyloarthritis with hip involvement ASDAS-CRP 2.67(HDA) BASDAI 5.6(HDA) not significantly changed from prior. Secondary inefficacy, try change to Simponi 50mg sc q 4 wks  Chronic lumbar and cervical spondylosis  T2 DM HbA1C 8.3 on canaglifozin 300mg before breakfast off metformin d/t GI  issues  Hyperlipidemia with intermediate risk, on atorvastatin 40mg every evening consider increase to 80mg every evening, will leave to 's opinion  Hypertension 126/80        *Shingrix series    Finish Humira x 1 month  Then stop wait 2 wks then start Simponi sc   Rx for Simponi 50mg sc q 4 wks sent to [facility]  Cont sulfasalazine 1500mg twice daily  resume naproxen 500mg daily prn  Cont entecavir 0.5mg daily  Resume   vitamin D2 50,000 units once a week  Start biking, low back pain  RTC 3 months with standing labs and QG-TB, vitamin D    Answers for HPI/ROS  submitted by the patient on 4/10/2020   fever: No  eye redness: No  headaches: No  shortness of breath: No  chest pain: No  trouble swallowing: No  diarrhea: No  constipation: No  unexpected weight change: No  genital sore: No  dysuria: No  During the last 3 days, have you had a skin rash?: No  Bruises or bleeds easily: No  cough: No

## 2020-04-14 ENCOUNTER — TELEPHONE (OUTPATIENT)
Dept: RHEUMATOLOGY | Facility: CLINIC | Age: 55
End: 2020-04-14

## 2020-04-14 ENCOUNTER — TELEPHONE (OUTPATIENT)
Dept: PHARMACY | Facility: CLINIC | Age: 55
End: 2020-04-14

## 2020-04-14 ENCOUNTER — TELEPHONE (OUTPATIENT)
Dept: INTERNAL MEDICINE | Facility: CLINIC | Age: 55
End: 2020-04-14

## 2020-04-14 DIAGNOSIS — M46.90 AXIAL SPONDYLOARTHRITIS: Primary | ICD-10-CM

## 2020-04-14 NOTE — TELEPHONE ENCOUNTER
Giselle, please tell pt her insurance won't cover monthly Simponi, but will cover weekly Enbrel so I have sent Rx for that to [facility]. Tell her has no difference in side effects than Humira or Simponi, and is also an anti-TNF. CLEMENT

## 2020-04-14 NOTE — TELEPHONE ENCOUNTER
DOCUMENTATION ONLY:  The prior authorization request for Simponi was denied by the patient's insurance.   Forwarded to the clinical pharmacist for review. Fernanda JUNG

## 2020-04-15 ENCOUNTER — OFFICE VISIT (OUTPATIENT)
Dept: INTERNAL MEDICINE | Facility: CLINIC | Age: 55
End: 2020-04-15
Payer: MEDICARE

## 2020-04-15 DIAGNOSIS — E11.42 TYPE 2 DIABETES MELLITUS WITH DIABETIC POLYNEUROPATHY, UNSPECIFIED WHETHER LONG TERM INSULIN USE: Primary | Chronic | ICD-10-CM

## 2020-04-15 PROCEDURE — 3052F PR MOST RECENT HEMOGLOBIN A1C LEVEL 8.0 - < 9.0%: ICD-10-PCS | Mod: ,,, | Performed by: INTERNAL MEDICINE

## 2020-04-15 PROCEDURE — 99213 OFFICE O/P EST LOW 20 MIN: CPT | Mod: 95,,, | Performed by: INTERNAL MEDICINE

## 2020-04-15 PROCEDURE — 99213 PR OFFICE/OUTPT VISIT, EST, LEVL III, 20-29 MIN: ICD-10-PCS | Mod: 95,,, | Performed by: INTERNAL MEDICINE

## 2020-04-15 PROCEDURE — 3052F HG A1C>EQUAL 8.0%<EQUAL 9.0%: CPT | Mod: ,,, | Performed by: INTERNAL MEDICINE

## 2020-04-15 NOTE — TELEPHONE ENCOUNTER
DOCUMENTATION ONLY:     Prior Authorization for Enbrel APPROVED from 3/15/20 to 7/13/20.      Case ID# [id number]    Co-Pay: $0.00    Patient Assistance IS NOT required.     Forward to clinical pharmacist for consult & shipment.      JESSICA

## 2020-04-15 NOTE — TELEPHONE ENCOUNTER
Initial Enbrel Sureclick 50mg/ml Injection consult completed on 4/15. Shipment scheduled to arrive at patient's home on  via [employer] $0 copay. Patient will start Enbrel Sureclick 50mg/ml Injection on 20 - two weeks after last Humira dose. Address confirmed, CC on file. Confirmed 2 patient identifiers - name and . Therapy Appropriate.    Counseled patient on administration directions:  -  Inject Enbrel 50mg into the skin every 7 days.  .- Take out of the refrigerator 30-60 minutes prior to injection.  - Wash hands before and after injection.  - Monthly RX will come with gauze, bandaids, and alcohol swabs.  - Patient may inject in either the tops of the thighs, abdomen- but at least 2 inches away from her belly button, or the outer part of her upper arm.  Patient was instructed to rotate injections sites.  - Patient is to wipe down the injection site with the alcohol pad, wait to dry.  Gently squeeze the area of the cleaned skin and hold it firmly.   Place the pen flat against the raised area of skin that is being squeezed, then push down on the button and hold for 10-15 seconds, until the window has gone from clear to yellow.  - Patient should rotate injection sites.   - Patient will use sharps container; once full, per [place] law, she may lock the sharps container and place in her trash. She can then contact the Pharmacy and we will replace the sharps at no additional charge.    Patient was counseled on possible side effects:  - Injection site reaction: redness, soreness, itching, bruising, which should resolve within 3-5 days.  - Increased risk for infections. If patient becomes sick, patient is to hold Enbrel use until she/ he is better.     DDIs:  Medication list reviewed and potential DDIs addressed.    Patient verbalized understanding. Compliance stressed. Patient advised to keep a calendar marking dates of injections to ensure better compliance. Patient advised to call myself or provider should any  questions arise. Patient plans to start Enbrel on 5/4/20. Consultation included: indication; goals of treatment; administration; storage and handling; side effects; how to handle side effects; the importance of compliance; how to handle missed doses; the importance of laboratory monitoring; the importance of keeping all follow up appointments.  Patient understands to report any medication changes to [facility] and provider. All questions answered and addressed to patients satisfaction.    Maryanne Maier, PharmD  [facility]  [phone]

## 2020-04-15 NOTE — PROGRESS NOTES
Subjective:       Patient ID: Althea Vazquez is a 54 y.o. female.    Chief Complaint: No chief complaint on file.    HPI   The patient location is: [place], [place]  The chief complaint leading to consultation is: Diabetes f/u  Visit type: audiovisual  Total time spent with patient: 5 minutes  Each patient to whom he or she provides medical services by telemedicine is:  (1) informed of the relationship between the physician and patient and the respective role of any other health care provider with respect to management of the patient; and (2) notified that he or she may decline to receive medical services by telemedicine and may withdraw from such care at any time.    Pt here for f/u regarding DM. Pt stopped the Metformin 2/2 GI issues and has continue on the Invokana. Her last A1c was up to 8.3.     Review of Systems   Constitutional: Negative for activity change, appetite change, chills, diaphoresis, fatigue, fever and unexpected weight change.   HENT: Negative for postnasal drip, rhinorrhea, sinus pressure, sneezing, sore throat, trouble swallowing and voice change.    Respiratory: Negative for cough, shortness of breath and wheezing.    Cardiovascular: Negative for chest pain, palpitations and leg swelling.   Gastrointestinal: Negative for abdominal pain, blood in stool, constipation, diarrhea, nausea and vomiting.   Genitourinary: Negative for dysuria.   Musculoskeletal: Negative for arthralgias and myalgias.   Skin: Negative for rash and wound.   Allergic/Immunologic: Negative for environmental allergies and food allergies.   Hematological: Negative for adenopathy. Does not bruise/bleed easily.       Objective:      Physical Exam   Constitutional: She is oriented to person, place, and time. She appears well-developed and well-nourished. No distress.   Pulmonary/Chest: No respiratory distress.   Neurological: She is alert and oriented to person, place, and time.   Skin: She is not diaphoretic.        Assessment:       1. Type 2 diabetes mellitus with diabetic polyneuropathy, unspecified whether long term insulin use        Plan:    1. Rx Tradjenta 5 mg daily and continue Invokana 300 mg daily       Repeat HA1C in 3 months

## 2020-04-21 ENCOUNTER — TELEPHONE (OUTPATIENT)
Dept: INTERNAL MEDICINE | Facility: CLINIC | Age: 55
End: 2020-04-21

## 2020-04-21 NOTE — TELEPHONE ENCOUNTER
----- Message from Keily Terrell sent at 4/21/2020  4:01 PM CDT -----  Contact: [phone]  Patient would like to speak to the nurse to follow up on a prior authorization for her diabetic medications. Please advise.

## 2020-04-23 DIAGNOSIS — B37.9 CANDIDA INFECTION: ICD-10-CM

## 2020-04-23 RX ORDER — TERCONAZOLE 4 MG/G
1 CREAM VAGINAL NIGHTLY
Qty: 45 G | Refills: 2 | Status: SHIPPED | OUTPATIENT
Start: 2020-04-23 | End: 2023-11-09

## 2020-04-25 ENCOUNTER — PATIENT MESSAGE (OUTPATIENT)
Dept: INTERNAL MEDICINE | Facility: CLINIC | Age: 55
End: 2020-04-25

## 2020-04-25 DIAGNOSIS — E11.9 TYPE 2 DIABETES MELLITUS WITHOUT COMPLICATION, WITHOUT LONG-TERM CURRENT USE OF INSULIN: ICD-10-CM

## 2020-04-27 RX ORDER — INSULIN PUMP SYRINGE, 3 ML
EACH MISCELLANEOUS
Qty: 1 EACH | Refills: 0 | Status: SHIPPED | OUTPATIENT
Start: 2020-04-27 | End: 2020-05-29

## 2020-04-27 RX ORDER — LANCETS
1 EACH MISCELLANEOUS 2 TIMES DAILY
Qty: 200 EACH | Refills: 11 | Status: SHIPPED | OUTPATIENT
Start: 2020-04-27 | End: 2021-05-31 | Stop reason: SDUPTHER

## 2020-04-27 RX ORDER — LANCETS 28 GAUGE
EACH MISCELLANEOUS
Qty: 200 EACH | Refills: 11 | Status: SHIPPED | OUTPATIENT
Start: 2020-04-27 | End: 2021-05-31

## 2020-04-29 ENCOUNTER — OFFICE VISIT (OUTPATIENT)
Dept: PSYCHIATRY | Facility: CLINIC | Age: 55
End: 2020-04-29
Payer: MEDICARE

## 2020-04-29 DIAGNOSIS — F41.0 PANIC DISORDER WITHOUT AGORAPHOBIA: ICD-10-CM

## 2020-04-29 DIAGNOSIS — F41.1 GENERALIZED ANXIETY DISORDER: ICD-10-CM

## 2020-04-29 PROCEDURE — 99213 OFFICE O/P EST LOW 20 MIN: CPT | Mod: 95,,, | Performed by: PSYCHIATRY & NEUROLOGY

## 2020-04-29 PROCEDURE — 99213 PR OFFICE/OUTPT VISIT, EST, LEVL III, 20-29 MIN: ICD-10-PCS | Mod: 95,,, | Performed by: PSYCHIATRY & NEUROLOGY

## 2020-04-29 RX ORDER — FLUOXETINE HYDROCHLORIDE 20 MG/1
20 CAPSULE ORAL 2 TIMES DAILY
Qty: 180 CAPSULE | Refills: 1 | Status: SHIPPED | OUTPATIENT
Start: 2020-04-29 | End: 2020-07-23

## 2020-04-29 RX ORDER — ZOLPIDEM TARTRATE 5 MG/1
5 TABLET ORAL NIGHTLY PRN
Qty: 90 TABLET | Refills: 0 | Status: SHIPPED | OUTPATIENT
Start: 2020-04-29 | End: 2020-07-22 | Stop reason: SDUPTHER

## 2020-04-29 RX ORDER — CLONAZEPAM 0.5 MG/1
0.5 TABLET ORAL DAILY PRN
Qty: 90 TABLET | Refills: 0 | Status: SHIPPED | OUTPATIENT
Start: 2020-04-29 | End: 2020-07-22 | Stop reason: SDUPTHER

## 2020-04-29 NOTE — PROGRESS NOTES
The patient location is: [facility]  The chief complaint leading to consultation is: anxiety  Visit type: audiovisual  Total time spent with patient: 20 min  Each patient to whom he or she provides medical services by telemedicine is:  (1) informed of the relationship between the physician and patient and the respective role of any other health care provider with respect to management of the patient; and (2) notified that he or she may decline to receive medical services by telemedicine and may withdraw from such care at any time.    Notes:     ESTABLISHED OUTPATIENT VISIT   E/M LEVEL 3: [id number]    ENCOUNTER DATE: 2020  SITE: [facility], [address]    HISTORY    CHIEF COMPLAINT   Althea Vazquez is a 54 y.o. female who presents for follow up of anxiety.    HPI     Pt. Reports recent significant anxiety, family members and friends have  due to COVID-19. Has been taking Klonopin more frequently.    Takes walks.    Psychiatric Review Of Systems - Is patient experiencing or having changes in:  sleep: interrupted  appetite: no  weight: working on losing weight  energy/anergy: no  interest/pleasure/anhedonia: no  somatic symptoms: no  libido: no  anxiety/panic: no  guilty/hopelessness: no  concentration: no  S.I.B.s/risky behavior: no  Irritability: no  Racing thoughts: no  Impulsive behaviors: no  Paranoia:no  AVH:no    Recent alcohol: rare small amount  Recent drug: no    Medical ROS   Denies any physical complaint during today's visit.     PAST MEDICAL, FAMILY AND SOCIAL HISTORY: The patient's past medical, family and social history have been reviewed and updated as appropriate within the electronic medical record - see encounter notes.    PSYCHOTROPIC MEDICATIONS   Prozac 20 mg qam and 20 mg qpm, Clonazepam 0.5 mg prn for anxiety[has recently been taking more frequently], Ambien 5 mg at bedtime prn[does not take every day, recently has been taking 3-4 times per week], Topiramate 100 mg  bid[for weight loss]    Scheduled and PRN Medications     Current Outpatient Medications:     amLODIPine (NORVASC) 10 MG tablet, TAKE ONE TABLET BY MOUTH EVERY DAY, Disp: 90 tablet, Rfl: 3    aspirin (ECOTRIN) 81 MG EC tablet, Take 1 tablet (81 mg total) by mouth once daily., Disp: 30 tablet, Rfl: 0    atenolol (TENORMIN) 100 MG tablet, TAKE ONE TABLET BY MOUTH EVERY DAY, Disp: 90 tablet, Rfl: 3    atorvastatin (LIPITOR) 40 MG tablet, TAKE ONE TABLET BY MOUTH EVERY DAY, Disp: 90 tablet, Rfl: 3    blood sugar diagnostic Strp, 1 strip by Misc.(Non-Drug; Combo Route) route 2 (two) times daily., Disp: 150 strip, Rfl: 11    blood-glucose meter (FREESTYLE LITE METER) kit, FreeStyle Lite Meter kit, Disp: 1 each, Rfl: 0    boric acid (BORIC ACID) vaginal suppository, Place 1 each (650 mg total) vaginally every evening., Disp: 14 suppository, Rfl: 6    canagliflozin (INVOKANA) 300 mg Tab tablet, Take 1 tablet (300 mg total) by mouth before breakfast., Disp: 30 tablet, Rfl: 11    clonazePAM (KLONOPIN) 0.5 MG tablet, Take 1 tablet (0.5 mg total) by mouth daily as needed for Anxiety., Disp: 90 tablet, Rfl: 1    clotrimazole-betamethasone 1-0.05% (LOTRISONE) cream, Apply topically 2 (two) times daily as needed. , Disp: , Rfl:     cyclobenzaprine (FLEXERIL) 10 MG tablet, TAKE ONE TABLET BY MOUTH AT BEDTIME AS NEEDED, Disp: 90 tablet, Rfl: 1    docusate sodium (COLACE) 50 MG capsule, Take 1 capsule (50 mg total) by mouth 2 (two) times daily as needed for Constipation. (Patient taking differently: Take 50 mg by mouth 2 (two) times daily. ), Disp: 30 capsule, Rfl: 0    entanercept (ENBREL) 50 mg/mL injection, Inject 1 mL (50 mg total) into the skin once a week., Disp: 4 mL, Rfl: 2    entecavir (BARACLUDE) 0.5 MG Tab, Take 1 tablet (0.5 mg total) by mouth once daily., Disp: 30 tablet, Rfl: 11    ergocalciferol (ERGOCALCIFEROL) 50,000 unit Cap, Take 1 capsule (50,000 Units total) by mouth every 7 days., Disp: 12  capsule, Rfl: 3    FLUoxetine 20 MG capsule, Take 20 mg by mouth 2 (two) times daily., Disp: , Rfl:     homatropine (ISOPTO HOMATROPINE) 5 % ophthalmic solution, Place 1 drop into the right eye 2 (two) times daily., Disp: 5 mL, Rfl: 1    hydroCHLOROthiazide (HYDRODIURIL) 25 MG tablet, TAKE ONE TABLET BY MOUTH EVERY DAY, Disp: 90 tablet, Rfl: 3    hydrocortisone 2.5 % cream, Apply topically 2 (two) times daily., Disp: 28 g, Rfl: 1    hydroquinone 4 % Crea, Apply to dark areas qhs.  Not more than 6 months straight in same location.Use sunscreen in am (Patient taking differently: continuous prn. Apply to dark areas qhs.  Not more than 6 months straight in same location.Use sunscreen in am), Disp: 46 g, Rfl: 1    lancets (ACCU-CHEK SOFTCLIX LANCETS) Misc, 1 Device by Misc.(Non-Drug; Combo Route) route 2 (two) times daily., Disp: 200 each, Rfl: 11    lancets (FREESTYLE LANCETS) 28 gauge Misc, Check blood sugars BID, FreeStyle Lancets 28 gauge, Disp: 200 each, Rfl: 11    levocetirizine (XYZAL) 5 MG tablet, Take 1 tablet (5 mg total) by mouth every evening., Disp: 30 tablet, Rfl: 11    linaGLIPtin (TRADJENTA) 5 mg Tab tablet, Take 1 tablet (5 mg total) by mouth once daily., Disp: 90 tablet, Rfl: 3    losartan (COZAAR) 100 MG tablet, TAKE ONE TABLET BY MOUTH EVERY DAY, Disp: 90 tablet, Rfl: 3    methylnaltrexone (RELISTOR) 150 mg Tab, Take 450 mg by mouth., Disp: , Rfl:     naproxen (NAPROSYN) 500 MG tablet, Take 1 tablet (500 mg total) by mouth 2 (two) times daily as needed. (Patient not taking: Reported on 4/13/2020), Disp: 180 tablet, Rfl: 0    nystatin (MYCOSTATIN) powder, Apply to affected area 3 times daily, Disp: 1 Bottle, Rfl: 3    ondansetron (ZOFRAN-ODT) 4 MG TbDL, Take 1 tablet (4 mg total) by mouth every 8 (eight) hours as needed (nausea and vomiting)., Disp: 20 tablet, Rfl: 0    pantoprazole (PROTONIX) 40 MG tablet, TAKE ONE TABLET BY MOUTH EVERY DAY, Disp: 90 tablet, Rfl: 3    phenazopyridine  (PYRIDIUM) 200 MG tablet, Take 1 tablet (200 mg total) by mouth 3 (three) times daily as needed for Pain. (Patient not taking: Reported on 3/17/2020), Disp: 20 tablet, Rfl: 0    phentermine (ADIPEX-P) 37.5 mg tablet, 1/2 tab po q am, Disp: 30 tablet, Rfl: 1    sulfaSALAzine (AZULFIDINE) 500 MG TbEC, Take 3 tablets (1,500 mg total) by mouth 2 (two) times daily., Disp: 540 tablet, Rfl: 0    terconazole (TERAZOL 7) 0.4 % Crea, Place 1 applicator vaginally every evening., Disp: 45 g, Rfl: 2    topiramate (TOPAMAX) 100 MG tablet, Take 100 mg by mouth 2 (two) times daily., Disp: , Rfl:     triamcinolone acetonide 0.1% (KENALOG) 0.1 % cream, Apply topically 2 (two) times daily., Disp: 45 g, Rfl: 0    valACYclovir (VALTREX) 1000 MG tablet, valacyclovir 1 gram tablet  Take 0.5 tablets every 12 hours by oral route., Disp: , Rfl:     zolpidem (AMBIEN) 5 MG Tab, Take 1 tablet (5 mg total) by mouth nightly as needed., Disp: 90 tablet, Rfl: 0    EXAMINATION    There were no vitals filed for this visit.      CONSTITUTIONAL  General Appearance: well nourished    MUSCULOSKELETAL  Muscle Strength and Tone: normal strength and tone  Abnormal Involuntary Movements: no abnormal movement noted  Gait and Station: normal gait    PSYCHIATRIC   Level of Consciousness: alert  Orientation: oriented to person, place and time  Grooming: well groomed  Psychomotor Behavior: no restlessness/agitation  Speech: normal in rate, rhythm and volume  Language: normal vocabulary  Mood: anxiety at times  Affect: full range and appropriate  Thought Process: logical and goal directed  Associations: intact associations  Thought Content: no SI/HI  Memory: grossly intact  Attention: intact to content of interview  Fund of Knowledge: appears adequate  Insight: good  Judgement: good    MEDICAL DECISION MAKING    DIAGNOSES  Anxiety d/o N.O.S.    PROBLEM LIST AND MANAGEMENT PLANS    - anxiety: continue Prozac, Klonopin and Ambien as above  - rtc 3  months    Time with patient: 20 min    LABORATORY DATA  Lab Visit on 04/09/2020   Component Date Value Ref Range Status    WBC 04/09/2020 7.32  3.90 - 12.70 K/uL Final    RBC 04/09/2020 5.43* 4.00 - 5.40 M/uL Final    Hemoglobin 04/09/2020 15.8  12.0 - 16.0 g/dL Final    Hematocrit 04/09/2020 47.9  37.0 - 48.5 % Final    Mean Corpuscular Volume 04/09/2020 88  82 - 98 fL Final    Mean Corpuscular Hemoglobin 04/09/2020 29.1  27.0 - 31.0 pg Final    Mean Corpuscular Hemoglobin Conc 04/09/2020 33.0  32.0 - 36.0 g/dL Final    RDW 04/09/2020 13.0  11.5 - 14.5 % Final    Platelets 04/09/2020 296  150 - 350 K/uL Final    MPV 04/09/2020 11.6  9.2 - 12.9 fL Final    Immature Granulocytes 04/09/2020 0.7* 0.0 - 0.5 % Final    Gran # (ANC) 04/09/2020 2.7  1.8 - 7.7 K/uL Final    Immature Grans (Abs) 04/09/2020 0.05* 0.00 - 0.04 K/uL Final    Comment: Mild elevation in immature granulocytes is non specific and   can be seen in a variety of conditions including stress response,   acute inflammation, trauma and pregnancy. Correlation with other   laboratory and clinical findings is essential.      Lymph # 04/09/2020 3.8  1.0 - 4.8 K/uL Final    Mono # 04/09/2020 0.5  0.3 - 1.0 K/uL Final    Eos # 04/09/2020 0.1  0.0 - 0.5 K/uL Final    Baso # 04/09/2020 0.04  0.00 - 0.20 K/uL Final    nRBC 04/09/2020 0  0 /100 WBC Final    Gran% 04/09/2020 37.4* 38.0 - 73.0 % Final    Lymph% 04/09/2020 52.5* 18.0 - 48.0 % Final    Mono% 04/09/2020 7.0  4.0 - 15.0 % Final    Eosinophil% 04/09/2020 1.9  0.0 - 8.0 % Final    Basophil% 04/09/2020 0.5  0.0 - 1.9 % Final    Differential Method 04/09/2020 Automated   Final    Sodium 04/09/2020 140  136 - 145 mmol/L Final    Potassium 04/09/2020 3.6  3.5 - 5.1 mmol/L Final    Chloride 04/09/2020 105  95 - 110 mmol/L Final    CO2 04/09/2020 23  23 - 29 mmol/L Final    Glucose 04/09/2020 204* 70 - 110 mg/dL Final    BUN, Bld 04/09/2020 12  7 - 17 mg/dL Final    Creatinine  04/09/2020 0.69  0.50 - 1.40 mg/dL Final    Calcium 04/09/2020 9.6  8.7 - 10.5 mg/dL Final    Total Protein 04/09/2020 8.0  6.0 - 8.4 g/dL Final    Albumin 04/09/2020 4.8  3.5 - 5.2 g/dL Final    Total Bilirubin 04/09/2020 0.7  0.1 - 1.0 mg/dL Final    Comment: For infants and newborns, interpretation of results should be based  on gestational age, weight and in agreement with clinical  observations.  Premature Infant recommended reference ranges:  Up to 24 hours.............<8.0 mg/dL  Up to 48 hours............<12.0 mg/dL  3-5 days..................<15.0 mg/dL  6-29 days.................<15.0 mg/dL      Alkaline Phosphatase 04/09/2020 96  38 - 126 U/L Final    AST 04/09/2020 21  15 - 46 U/L Final    ALT 04/09/2020 19  10 - 44 U/L Final    Anion Gap 04/09/2020 12  8 - 16 mmol/L Final    eGFR if African American 04/09/2020 >60.0  >60 mL/min/1.73 m^2 Final    eGFR if non African American 04/09/2020 >60.0  >60 mL/min/1.73 m^2 Final    Comment: Calculation used to obtain the estimated glomerular filtration  rate (eGFR) is the CKD-EPI equation.       Sed Rate 04/09/2020 10  0 - 20 mm/Hr Final    CRP 04/09/2020 0.44  0.00 - 1.00 mg/dL Final    Hep B Viral DNA IU/ML 04/09/2020 <10  <10 IU/mL Final    Log HBV IU/mL 04/09/2020 <1.00  <1.00 Log (10) IU/mL Final    Comment: This procedure utilizes a real-time polymerase chain  reaction test from [employer].  The amplification   target is a conserved region in the terminal third of  the hepatitis B surface antigen gene. The lower limit of   quantitation is 10 IU/mL (1.00 Log IU/mL) and the uppper  limit of quantitation is 1 billion IU/mL (9.00 Log IU/mL).  The qualitative limit of detection is 10 IU/mL   (1.00 Log IU/mL). A  Not detected  result does not rule   out infection.  Test performed at [facility],  [facility] [address] [place]  [address]     [phone]  Milton Castro MD  - Medical Director      Hepatitis B Virus DNA 04/09/2020  Not detected  Not detected Final   Lab Visit on 04/09/2020   Component Date Value Ref Range Status    Hemoglobin A1C 04/09/2020 8.3* 4.0 - 5.6 % Final    Comment: ADA Screening Guidelines:  5.7-6.4%  Consistent with prediabetes  >or=6.5%  Consistent with diabetes  High levels of fetal hemoglobin interfere with the HbA1C  assay. Heterozygous hemoglobin variants (HbS, HgC, etc)do  not significantly interfere with this assay.   However, presence of multiple variants may affect accuracy.      Estimated Avg Glucose 04/09/2020 192* 68 - 131 mg/dL Final    TSH 04/09/2020 1.910  0.400 - 4.000 uIU/mL Final    Comment: Warning:  Heterophilic antibodies in serum or plasma of   certain individuals are known to cause interference with   immunoassays. These antibodies may be present in blood samples   from individuals regularly exposed to animal or who have been   treated with animal products.   Patients taking high doses of supplemental biotin may have  negatively biased results.       Cholesterol 04/09/2020 205* 120 - 199 mg/dL Final    Comment: The National Cholesterol Education Program (NCEP) has set the  following guidelines (reference ranges) for Cholesterol:  Optimal.....................<200 mg/dL  Borderline High.............200-239 mg/dL  High........................> or = 240 mg/dL      Triglycerides 04/09/2020 93  30 - 150 mg/dL Final    Comment: The National Cholesterol Education Program (NCEP) has set the  following guidelines (reference values) for triglycerides:  Normal......................<150 mg/dL  Borderline High.............150-199 mg/dL  High........................200-499 mg/dL      HDL 04/09/2020 60  40 - 75 mg/dL Final    Comment: The National Cholesterol Education Program (NCEP) has set the  following guidelines (reference values) for HDL Cholesterol:  Low...............<40 mg/dL  Optimal...........>60 mg/dL      LDL Cholesterol 04/09/2020 126.4  63.0 - 159.0 mg/dL Final    Comment: The National  Cholesterol Education Program (NCEP) has set the  following guidelines (reference values) for LDL Cholesterol:  Optimal.......................<130 mg/dL  Borderline High...............130-159 mg/dL  High..........................160-189 mg/dL  Very High.....................>190 mg/dL      Hdl/Cholesterol Ratio 04/09/2020 29.3  20.0 - 50.0 % Final    Total Cholesterol/HDL Ratio 04/09/2020 3.4  2.0 - 5.0 Final    Non-HDL Cholesterol 04/09/2020 145  mg/dL Final    Comment: Risk category and Non-HDL cholesterol goals:  Coronary heart disease (CHD)or equivalent (10-year risk of CHD >20%):  Non-HDL cholesterol goal     <130 mg/dL  Two or more CHD risk factors and 10-year risk of CHD <= 20%:  Non-HDL cholesterol goal     <160 mg/dL  0 to 1 CHD risk factor:  Non-HDL cholesterol goal     <190 mg/dL             Tip Oliveira

## 2020-05-04 ENCOUNTER — PATIENT MESSAGE (OUTPATIENT)
Dept: INTERNAL MEDICINE | Facility: CLINIC | Age: 55
End: 2020-05-04

## 2020-05-04 DIAGNOSIS — Z79.4 TYPE 2 DIABETES MELLITUS WITHOUT COMPLICATION, WITH LONG-TERM CURRENT USE OF INSULIN: Primary | ICD-10-CM

## 2020-05-04 DIAGNOSIS — E11.9 TYPE 2 DIABETES MELLITUS WITHOUT COMPLICATION, WITH LONG-TERM CURRENT USE OF INSULIN: Primary | ICD-10-CM

## 2020-05-05 RX ORDER — REPAGLINIDE 1 MG/1
1 TABLET ORAL
Qty: 270 TABLET | Refills: 3 | Status: SHIPPED | OUTPATIENT
Start: 2020-05-05 | End: 2020-06-04

## 2020-05-22 ENCOUNTER — PATIENT MESSAGE (OUTPATIENT)
Dept: RHEUMATOLOGY | Facility: CLINIC | Age: 55
End: 2020-05-22

## 2020-05-25 ENCOUNTER — TELEPHONE (OUTPATIENT)
Dept: PHARMACY | Facility: CLINIC | Age: 55
End: 2020-05-25

## 2020-05-29 DIAGNOSIS — F41.1 GENERALIZED ANXIETY DISORDER: ICD-10-CM

## 2020-05-29 DIAGNOSIS — E11.9 TYPE 2 DIABETES MELLITUS WITHOUT COMPLICATION, WITHOUT LONG-TERM CURRENT USE OF INSULIN: ICD-10-CM

## 2020-05-29 DIAGNOSIS — F41.0 PANIC DISORDER WITHOUT AGORAPHOBIA: ICD-10-CM

## 2020-05-29 DIAGNOSIS — E78.5 HYPERLIPIDEMIA: ICD-10-CM

## 2020-05-29 RX ORDER — ZOLPIDEM TARTRATE 5 MG/1
TABLET ORAL
Qty: 90 TABLET | Refills: 0 | OUTPATIENT
Start: 2020-05-29

## 2020-05-29 RX ORDER — AMLODIPINE BESYLATE 10 MG/1
TABLET ORAL
Qty: 90 TABLET | Refills: 0 | Status: SHIPPED | OUTPATIENT
Start: 2020-05-29 | End: 2020-07-23 | Stop reason: SDUPTHER

## 2020-05-29 RX ORDER — INSULIN PUMP SYRINGE, 3 ML
EACH MISCELLANEOUS
Qty: 1 EACH | Refills: 0 | Status: SHIPPED | OUTPATIENT
Start: 2020-05-29 | End: 2023-03-03 | Stop reason: SDUPTHER

## 2020-05-29 RX ORDER — LOSARTAN POTASSIUM 100 MG/1
TABLET ORAL
Qty: 90 TABLET | Refills: 0 | Status: SHIPPED | OUTPATIENT
Start: 2020-05-29 | End: 2020-07-23 | Stop reason: SDUPTHER

## 2020-05-29 RX ORDER — HYDROCHLOROTHIAZIDE 25 MG/1
TABLET ORAL
Qty: 90 TABLET | Refills: 0 | Status: SHIPPED | OUTPATIENT
Start: 2020-05-29 | End: 2020-07-23 | Stop reason: SDUPTHER

## 2020-05-29 RX ORDER — ATENOLOL 100 MG/1
TABLET ORAL
Qty: 90 TABLET | Refills: 0 | Status: SHIPPED | OUTPATIENT
Start: 2020-05-29 | End: 2020-07-23 | Stop reason: SDUPTHER

## 2020-05-29 RX ORDER — ATORVASTATIN CALCIUM 40 MG/1
TABLET, FILM COATED ORAL
Qty: 90 TABLET | Refills: 0 | Status: SHIPPED | OUTPATIENT
Start: 2020-05-29 | End: 2020-07-23 | Stop reason: SDUPTHER

## 2020-05-29 RX ORDER — CLONAZEPAM 0.5 MG/1
TABLET ORAL
Qty: 90 TABLET | OUTPATIENT
Start: 2020-05-29

## 2020-06-04 ENCOUNTER — OFFICE VISIT (OUTPATIENT)
Dept: INTERNAL MEDICINE | Facility: CLINIC | Age: 55
End: 2020-06-04
Payer: MEDICARE

## 2020-06-04 DIAGNOSIS — E78.5 HYPERLIPIDEMIA ASSOCIATED WITH TYPE 2 DIABETES MELLITUS: ICD-10-CM

## 2020-06-04 DIAGNOSIS — I15.2 HYPERTENSION ASSOCIATED WITH DIABETES: Primary | ICD-10-CM

## 2020-06-04 DIAGNOSIS — M46.90 AXIAL SPONDYLOARTHRITIS: Chronic | ICD-10-CM

## 2020-06-04 DIAGNOSIS — F41.9 ANXIETY: ICD-10-CM

## 2020-06-04 DIAGNOSIS — Z79.4 TYPE 2 DIABETES MELLITUS WITHOUT COMPLICATION, WITH LONG-TERM CURRENT USE OF INSULIN: ICD-10-CM

## 2020-06-04 DIAGNOSIS — E11.59 HYPERTENSION ASSOCIATED WITH DIABETES: Primary | ICD-10-CM

## 2020-06-04 DIAGNOSIS — E11.9 TYPE 2 DIABETES MELLITUS WITHOUT COMPLICATION, WITH LONG-TERM CURRENT USE OF INSULIN: ICD-10-CM

## 2020-06-04 DIAGNOSIS — E66.01 SEVERE OBESITY (BMI 35.0-39.9) WITH COMORBIDITY: ICD-10-CM

## 2020-06-04 DIAGNOSIS — E11.69 HYPERLIPIDEMIA ASSOCIATED WITH TYPE 2 DIABETES MELLITUS: ICD-10-CM

## 2020-06-04 DIAGNOSIS — G47.00 INSOMNIA, UNSPECIFIED TYPE: Chronic | ICD-10-CM

## 2020-06-04 PROCEDURE — 99214 OFFICE O/P EST MOD 30 MIN: CPT | Mod: 95,,, | Performed by: INTERNAL MEDICINE

## 2020-06-04 PROCEDURE — 99214 PR OFFICE/OUTPT VISIT, EST, LEVL IV, 30-39 MIN: ICD-10-PCS | Mod: 95,,, | Performed by: INTERNAL MEDICINE

## 2020-06-04 PROCEDURE — 3052F HG A1C>EQUAL 8.0%<EQUAL 9.0%: CPT | Mod: ,,, | Performed by: INTERNAL MEDICINE

## 2020-06-04 PROCEDURE — 3052F PR MOST RECENT HEMOGLOBIN A1C LEVEL 8.0 - < 9.0%: ICD-10-PCS | Mod: ,,, | Performed by: INTERNAL MEDICINE

## 2020-06-04 NOTE — PROGRESS NOTES
Subjective:       Patient ID: Althea Vazquez is a 54 y.o. female.    Chief Complaint: No chief complaint on file.    HPI   The patient location is: [place]  The chief complaint leading to consultation is: f/u    Visit type: audiovisual    Face to Face time with patient:   7 minutes of total time spent on the encounter, which includes face to face time and non-face to face time preparing to see the patient (eg, review of tests), Obtaining and/or reviewing separately obtained history, Documenting clinical information in the electronic or other health record, Independently interpreting results (not separately reported) and communicating results to the patient/family/caregiver, or Care coordination (not separately reported).         Each patient to whom he or she provides medical services by telemedicine is:  (1) informed of the relationship between the physician and patient and the respective role of any other health care provider with respect to management of the patient; and (2) notified that he or she may decline to receive medical services by telemedicine and may withdraw from such care at any time.    Pt with T2DM is here for f/u. We have been trying to add a second agent to her Invokana but her insurance would not cover multiple meds. She is also unable to tolerate metformin 2/2 SE's.     Review of Systems   Constitutional: Negative for activity change, appetite change, chills, diaphoresis, fatigue, fever and unexpected weight change.   HENT: Negative for postnasal drip, rhinorrhea, sinus pressure, sneezing, sore throat, trouble swallowing and voice change.    Respiratory: Negative for cough, shortness of breath and wheezing.    Cardiovascular: Negative for chest pain, palpitations and leg swelling.   Gastrointestinal: Negative for abdominal pain, blood in stool, constipation, diarrhea, nausea and vomiting.   Genitourinary: Negative for dysuria.   Musculoskeletal: Negative for arthralgias and  myalgias.   Skin: Negative for rash and wound.   Allergic/Immunologic: Negative for environmental allergies and food allergies.   Hematological: Negative for adenopathy. Does not bruise/bleed easily.   Psychiatric/Behavioral: Positive for sleep disturbance. Negative for self-injury and suicidal ideas. The patient is nervous/anxious.        Objective:      Physical Exam   Constitutional: She is oriented to person, place, and time. She appears well-developed and well-nourished. No distress.   HENT:   Head: Normocephalic and atraumatic.   Pulmonary/Chest: No respiratory distress.   Neurological: She is alert and oriented to person, place, and time.   Skin: She is not diaphoretic.       Assessment:       1. Hypertension associated with diabetes    2. Type 2 diabetes mellitus without complication, with long-term current use of insulin    3. Hyperlipidemia associated with type 2 diabetes mellitus    4. Axial spondyloarthritis    5. Anxiety    6. Severe obesity (BMI 35.0-39.9) with comorbidity    7. Insomnia, unspecified type        Plan:    1. T2DM with neuropathy- stable with last HA1C of 8.3(4/20)<--6.7(9/19)<--5.9(4/18)<--5.8(2/17)<--6.4(4/16)       Continue on Invokana 300 mg daily, Rx Januvia 100 mg daily       Pt stopped the Metformin 2/2 GI SE's       Repeat HA1C in 3 months    2. HTN- continue Losartan 100 mg, Toprol  mg, Norvasc 10 mg, HCTZ 25 mg daily   3. HLD- stable, on Lipitor 40 mg qHS   4. Spondyloarthritis- stable on Sulfasalazine/Naproxen        Followed by Rheumatology   5. Anxiety- stable on Prozac/Klonopin, managed by [facility]   6. Severe obesity- pt advised on proper diet/exercise for weight loss   7. Insomnia- stable on Ambien 5 mg qHS PRN   8. Hx of Hep B- followed by Hepatology

## 2020-06-24 ENCOUNTER — TELEPHONE (OUTPATIENT)
Dept: PHARMACY | Facility: CLINIC | Age: 55
End: 2020-06-24

## 2020-06-25 DIAGNOSIS — Z86.19 HISTORY OF HEPATITIS B: ICD-10-CM

## 2020-06-25 RX ORDER — ENTECAVIR 0.5 MG/1
0.5 TABLET, FILM COATED ORAL DAILY
Qty: 90 TABLET | Refills: 3 | Status: SHIPPED | OUTPATIENT
Start: 2020-06-25 | End: 2021-05-07 | Stop reason: SDUPTHER

## 2020-06-26 ENCOUNTER — TELEPHONE (OUTPATIENT)
Dept: PHARMACY | Facility: CLINIC | Age: 55
End: 2020-06-26

## 2020-06-26 NOTE — TELEPHONE ENCOUNTER
Informed Patient  that [facility] received prescription for Entecavir and benefits investigation is required.  [facility] will be back in touch once insurance determination is received.

## 2020-06-27 NOTE — TELEPHONE ENCOUNTER
Patient reached in regards to Entecavir RX. She confirms two patient identifiers - name + . She admits to getting medication elsewhere and having ~10 days of medication left on hand. She has been on it for several years, so declines further consultation or follow up. She would like [facility] to ship out Entecavir on 20 to arrive at her home on 20 via [employer]. Address confirmed. $0 copay. No supplies needed. Patient advised to store at room temperature. No further questions or concerns. [facility] to reach out to patient monthly for refills. TTN

## 2020-07-01 DIAGNOSIS — M46.90 AXIAL SPONDYLOARTHRITIS: ICD-10-CM

## 2020-07-02 RX ORDER — ETANERCEPT 50 MG/ML
50 SOLUTION SUBCUTANEOUS WEEKLY
Qty: 4 ML | Refills: 2 | Status: SHIPPED | OUTPATIENT
Start: 2020-07-02 | End: 2020-10-26 | Stop reason: SDUPTHER

## 2020-07-15 ENCOUNTER — HOSPITAL ENCOUNTER (OUTPATIENT)
Dept: RADIOLOGY | Facility: HOSPITAL | Age: 55
Discharge: HOME | End: 2020-07-15
Attending: INTERNAL MEDICINE
Payer: MEDICARE

## 2020-07-15 ENCOUNTER — TELEPHONE (OUTPATIENT)
Dept: INTERNAL MEDICINE | Facility: CLINIC | Age: 55
End: 2020-07-15

## 2020-07-15 DIAGNOSIS — E11.9 TYPE 2 DIABETES MELLITUS WITHOUT COMPLICATION, WITH LONG-TERM CURRENT USE OF INSULIN: ICD-10-CM

## 2020-07-15 DIAGNOSIS — R10.9 FLANK PAIN: ICD-10-CM

## 2020-07-15 DIAGNOSIS — Z00.00 ANNUAL PHYSICAL EXAM: Primary | ICD-10-CM

## 2020-07-15 DIAGNOSIS — Z79.4 TYPE 2 DIABETES MELLITUS WITHOUT COMPLICATION, WITH LONG-TERM CURRENT USE OF INSULIN: ICD-10-CM

## 2020-07-15 PROCEDURE — 76770 US EXAM ABDO BACK WALL COMP: CPT | Mod: TC,PO

## 2020-07-15 NOTE — TELEPHONE ENCOUNTER
----- Message from Aimee Coulter sent at 7/15/2020  9:42 AM CDT -----  Regarding: Lab orders  ... I have mrn#[id number] [facility] for labs...can we get orders in Epic..the patient has an appt next week with Dr Begum. Thanks

## 2020-07-16 DIAGNOSIS — Z51.81 ENCOUNTER FOR MONITORING OF ADALIMUMAB THERAPY: Primary | ICD-10-CM

## 2020-07-16 DIAGNOSIS — Z79.620 ENCOUNTER FOR MONITORING OF ADALIMUMAB THERAPY: Primary | ICD-10-CM

## 2020-07-17 ENCOUNTER — TELEPHONE (OUTPATIENT)
Dept: PHARMACY | Facility: CLINIC | Age: 55
End: 2020-07-17

## 2020-07-17 NOTE — TELEPHONE ENCOUNTER
Patient confirmed shipping of Enbrel on  to arrive . Address and  verified. $0 copay (004). Patient stated she has not missed any doses. She said she may have 1 pen on hand, but could not confirm because she said she was away from home. She stated she has not missed any doses. She reported no new medications or dose changes. She stated she has not developed any new allergies or health conditions. Pt had no further questions or concerns.

## 2020-07-22 ENCOUNTER — OFFICE VISIT (OUTPATIENT)
Dept: PSYCHIATRY | Facility: CLINIC | Age: 55
End: 2020-07-22
Payer: MEDICARE

## 2020-07-22 DIAGNOSIS — F41.1 GENERALIZED ANXIETY DISORDER: ICD-10-CM

## 2020-07-22 DIAGNOSIS — F41.0 PANIC DISORDER WITHOUT AGORAPHOBIA: ICD-10-CM

## 2020-07-22 PROCEDURE — 99214 PR OFFICE/OUTPT VISIT, EST, LEVL IV, 30-39 MIN: ICD-10-PCS | Mod: 95,,, | Performed by: PSYCHIATRY & NEUROLOGY

## 2020-07-22 PROCEDURE — 99214 OFFICE O/P EST MOD 30 MIN: CPT | Mod: 95,,, | Performed by: PSYCHIATRY & NEUROLOGY

## 2020-07-22 RX ORDER — ZOLPIDEM TARTRATE 5 MG/1
5 TABLET ORAL NIGHTLY PRN
Qty: 90 TABLET | Refills: 0 | Status: SHIPPED | OUTPATIENT
Start: 2020-07-22 | End: 2020-11-09

## 2020-07-22 RX ORDER — CLONAZEPAM 0.5 MG/1
0.5 TABLET ORAL DAILY PRN
Qty: 90 TABLET | Refills: 0 | Status: SHIPPED | OUTPATIENT
Start: 2020-07-22 | End: 2021-03-26 | Stop reason: SDUPTHER

## 2020-07-22 NOTE — PROGRESS NOTES
The patient location is: [facility]  The chief complaint leading to consultation is: anxiety  Visit type: audiovisual  Total time spent with patient: 20 min  Each patient to whom he or she provides medical services by telemedicine is:  (1) informed of the relationship between the physician and patient and the respective role of any other health care provider with respect to management of the patient; and (2) notified that he or she may decline to receive medical services by telemedicine and may withdraw from such care at any time.    Notes:     ESTABLISHED OUTPATIENT VISIT   E/M LEVEL 4: [id number]    ENCOUNTER DATE: 7/22/2020  SITE: [facility], [address] HISTORY    CHIEF COMPLAINT   Althea Vazquez is a 54 y.o. female who presents for follow up of anxiety.    HPI     Reports psychosocial stress related to COVID-19.  Taking Klonopin less frequently.    Overall appears to be doing reasonably well psychiatrically.    Takes walks.    Psychiatric Review Of Systems - Is patient experiencing or having changes in:  sleep: interrupted  appetite: no  weight: working on losing weight  energy/anergy: no  interest/pleasure/anhedonia: no  somatic symptoms: no  libido: no  anxiety/panic: no  guilty/hopelessness: no  concentration: no  S.I.B.s/risky behavior: no  Irritability: no  Racing thoughts: no  Impulsive behaviors: no  Paranoia:no  AVH:no    Recent alcohol: rare small amount  Recent drug: no    Medical ROS   Denies any physical complaint during today's visit.     PAST MEDICAL, FAMILY AND SOCIAL HISTORY: The patient's past medical, family and social history have been reviewed and updated as appropriate within the electronic medical record - see encounter notes.    PSYCHOTROPIC MEDICATIONS   Prozac 20 mg qam and 20 mg qpm, Clonazepam 0.5 mg prn for anxiety[taking occasionally], Ambien 5 mg at bedtime prn[does not take every day, recently has been taking 4-5 times per week], Topiramate 100 mg  qam    Scheduled and PRN Medications     Current Outpatient Medications:     amLODIPine (NORVASC) 10 MG tablet, TAKE ONE TABLET BY MOUTH EVERY DAY, Disp: 90 tablet, Rfl: 0    aspirin (ECOTRIN) 81 MG EC tablet, Take 1 tablet (81 mg total) by mouth once daily., Disp: 30 tablet, Rfl: 0    atenoloL (TENORMIN) 100 MG tablet, TAKE ONE TABLET BY MOUTH EVERY DAY, Disp: 90 tablet, Rfl: 0    atorvastatin (LIPITOR) 40 MG tablet, TAKE ONE TABLET BY MOUTH EVERY DAY, Disp: 90 tablet, Rfl: 0    blood sugar diagnostic Strp, 1 strip by Misc.(Non-Drug; Combo Route) route 2 (two) times daily., Disp: 150 strip, Rfl: 11    blood-glucose meter kit, Use twice daily., Disp: 1 each, Rfl: 0    boric acid (BORIC ACID) vaginal suppository, Place 1 each (650 mg total) vaginally every evening., Disp: 14 suppository, Rfl: 6    canagliflozin (INVOKANA) 300 mg Tab tablet, Take 1 tablet (300 mg total) by mouth before breakfast., Disp: 30 tablet, Rfl: 11    clonazePAM (KLONOPIN) 0.5 MG tablet, Take 1 tablet (0.5 mg total) by mouth daily as needed for Anxiety., Disp: 90 tablet, Rfl: 0    clotrimazole-betamethasone 1-0.05% (LOTRISONE) cream, Apply topically 2 (two) times daily as needed. , Disp: , Rfl:     cyclobenzaprine (FLEXERIL) 10 MG tablet, TAKE ONE TABLET BY MOUTH AT BEDTIME AS NEEDED, Disp: 90 tablet, Rfl: 1    docusate sodium (COLACE) 50 MG capsule, Take 1 capsule (50 mg total) by mouth 2 (two) times daily as needed for Constipation. (Patient taking differently: Take 50 mg by mouth 2 (two) times daily. ), Disp: 30 capsule, Rfl: 0    entecavir (BARACLUDE) 0.5 MG Tab, Take 1 tablet (0.5 mg total) by mouth once daily., Disp: 90 tablet, Rfl: 3    ergocalciferol (ERGOCALCIFEROL) 50,000 unit Cap, Take 1 capsule (50,000 Units total) by mouth every 7 days., Disp: 12 capsule, Rfl: 3    etanercept (ENBREL SURECLICK) 50 mg/mL (1 mL), Inject 1 mL (50 mg total) into the skin once a week., Disp: 4 mL, Rfl: 2    FLUoxetine 20 MG capsule, Take  1 capsule (20 mg total) by mouth 2 (two) times daily., Disp: 180 capsule, Rfl: 1    homatropine (ISOPTO HOMATROPINE) 5 % ophthalmic solution, Place 1 drop into the right eye 2 (two) times daily., Disp: 5 mL, Rfl: 1    hydroCHLOROthiazide (HYDRODIURIL) 25 MG tablet, TAKE ONE TABLET BY MOUTH EVERY DAY, Disp: 90 tablet, Rfl: 0    hydrocortisone 2.5 % cream, Apply topically 2 (two) times daily., Disp: 28 g, Rfl: 1    hydroquinone 4 % Crea, Apply to dark areas qhs.  Not more than 6 months straight in same location.Use sunscreen in am (Patient taking differently: continuous prn. Apply to dark areas qhs.  Not more than 6 months straight in same location.Use sunscreen in am), Disp: 46 g, Rfl: 1    lancets (ACCU-CHEK SOFTCLIX LANCETS) Misc, 1 Device by Misc.(Non-Drug; Combo Route) route 2 (two) times daily., Disp: 200 each, Rfl: 11    lancets (FREESTYLE LANCETS) 28 gauge Misc, Check blood sugars BID, FreeStyle Lancets 28 gauge, Disp: 200 each, Rfl: 11    levocetirizine (XYZAL) 5 MG tablet, Take 1 tablet (5 mg total) by mouth every evening., Disp: 30 tablet, Rfl: 11    losartan (COZAAR) 100 MG tablet, TAKE ONE TABLET BY MOUTH EVERY DAY, Disp: 90 tablet, Rfl: 0    methylnaltrexone (RELISTOR) 150 mg Tab, Take 450 mg by mouth., Disp: , Rfl:     naproxen (NAPROSYN) 500 MG tablet, Take 1 tablet (500 mg total) by mouth 2 (two) times daily as needed. (Patient not taking: Reported on 4/13/2020), Disp: 180 tablet, Rfl: 0    nystatin (MYCOSTATIN) powder, Apply to affected area 3 times daily, Disp: 1 Bottle, Rfl: 3    ondansetron (ZOFRAN-ODT) 4 MG TbDL, Take 1 tablet (4 mg total) by mouth every 8 (eight) hours as needed (nausea and vomiting)., Disp: 20 tablet, Rfl: 0    pantoprazole (PROTONIX) 40 MG tablet, TAKE ONE TABLET BY MOUTH EVERY DAY, Disp: 90 tablet, Rfl: 3    phenazopyridine (PYRIDIUM) 200 MG tablet, Take 1 tablet (200 mg total) by mouth 3 (three) times daily as needed for Pain. (Patient not taking: Reported on  3/17/2020), Disp: 20 tablet, Rfl: 0    phentermine (ADIPEX-P) 37.5 mg tablet, 1/2 tab po q am, Disp: 30 tablet, Rfl: 1    SITagliptin (JANUVIA) 100 MG Tab, Take 1 tablet (100 mg total) by mouth once daily., Disp: 90 tablet, Rfl: 3    sulfaSALAzine (AZULFIDINE) 500 MG TbEC, Take 3 tablets (1,500 mg total) by mouth 2 (two) times daily., Disp: 540 tablet, Rfl: 0    terconazole (TERAZOL 7) 0.4 % Crea, Place 1 applicator vaginally every evening., Disp: 45 g, Rfl: 2    topiramate (TOPAMAX) 100 MG tablet, Take 100 mg by mouth 2 (two) times daily., Disp: , Rfl:     triamcinolone acetonide 0.1% (KENALOG) 0.1 % cream, Apply topically 2 (two) times daily., Disp: 45 g, Rfl: 0    valACYclovir (VALTREX) 1000 MG tablet, valacyclovir 1 gram tablet  Take 0.5 tablets every 12 hours by oral route., Disp: , Rfl:     zolpidem (AMBIEN) 5 MG Tab, Take 1 tablet (5 mg total) by mouth nightly as needed., Disp: 90 tablet, Rfl: 0    EXAMINATION    There were no vitals filed for this visit.      CONSTITUTIONAL  General Appearance: well nourished    MUSCULOSKELETAL  Muscle Strength and Tone: normal strength and tone  Abnormal Involuntary Movements: no abnormal movement noted  Gait and Station: normal gait    PSYCHIATRIC   Level of Consciousness: alert  Orientation: oriented to person, place and time  Grooming: well groomed  Psychomotor Behavior: no restlessness/agitation  Speech: normal in rate, rhythm and volume  Language: normal vocabulary  Mood: anxiety at times  Affect: full range and appropriate  Thought Process: logical and goal directed  Associations: intact associations  Thought Content: no SI/HI  Memory: grossly intact  Attention: intact to content of interview  Fund of Knowledge: appears adequate  Insight: good  Judgement: good    MEDICAL DECISION MAKING    DIAGNOSES  Anxiety d/o N.O.S.    PROBLEM LIST AND MANAGEMENT PLANS    - anxiety: continue Prozac, Klonopin and Ambien as above  - rtc 3 months    Time with patient: 20  min    LABORATORY DATA  No visits with results within 3 Month(s) from this visit.   Latest known visit with results is:   Lab Visit on 04/09/2020   Component Date Value Ref Range Status    WBC 04/09/2020 7.32  3.90 - 12.70 K/uL Final    RBC 04/09/2020 5.43* 4.00 - 5.40 M/uL Final    Hemoglobin 04/09/2020 15.8  12.0 - 16.0 g/dL Final    Hematocrit 04/09/2020 47.9  37.0 - 48.5 % Final    Mean Corpuscular Volume 04/09/2020 88  82 - 98 fL Final    Mean Corpuscular Hemoglobin 04/09/2020 29.1  27.0 - 31.0 pg Final    Mean Corpuscular Hemoglobin Conc 04/09/2020 33.0  32.0 - 36.0 g/dL Final    RDW 04/09/2020 13.0  11.5 - 14.5 % Final    Platelets 04/09/2020 296  150 - 350 K/uL Final    MPV 04/09/2020 11.6  9.2 - 12.9 fL Final    Immature Granulocytes 04/09/2020 0.7* 0.0 - 0.5 % Final    Gran # (ANC) 04/09/2020 2.7  1.8 - 7.7 K/uL Final    Immature Grans (Abs) 04/09/2020 0.05* 0.00 - 0.04 K/uL Final    Comment: Mild elevation in immature granulocytes is non specific and   can be seen in a variety of conditions including stress response,   acute inflammation, trauma and pregnancy. Correlation with other   laboratory and clinical findings is essential.      Lymph # 04/09/2020 3.8  1.0 - 4.8 K/uL Final    Mono # 04/09/2020 0.5  0.3 - 1.0 K/uL Final    Eos # 04/09/2020 0.1  0.0 - 0.5 K/uL Final    Baso # 04/09/2020 0.04  0.00 - 0.20 K/uL Final    nRBC 04/09/2020 0  0 /100 WBC Final    Gran% 04/09/2020 37.4* 38.0 - 73.0 % Final    Lymph% 04/09/2020 52.5* 18.0 - 48.0 % Final    Mono% 04/09/2020 7.0  4.0 - 15.0 % Final    Eosinophil% 04/09/2020 1.9  0.0 - 8.0 % Final    Basophil% 04/09/2020 0.5  0.0 - 1.9 % Final    Differential Method 04/09/2020 Automated   Final    Sodium 04/09/2020 140  136 - 145 mmol/L Final    Potassium 04/09/2020 3.6  3.5 - 5.1 mmol/L Final    Chloride 04/09/2020 105  95 - 110 mmol/L Final    CO2 04/09/2020 23  23 - 29 mmol/L Final    Glucose 04/09/2020 204* 70 - 110 mg/dL Final     BUN, Bld 04/09/2020 12  7 - 17 mg/dL Final    Creatinine 04/09/2020 0.69  0.50 - 1.40 mg/dL Final    Calcium 04/09/2020 9.6  8.7 - 10.5 mg/dL Final    Total Protein 04/09/2020 8.0  6.0 - 8.4 g/dL Final    Albumin 04/09/2020 4.8  3.5 - 5.2 g/dL Final    Total Bilirubin 04/09/2020 0.7  0.1 - 1.0 mg/dL Final    Comment: For infants and newborns, interpretation of results should be based  on gestational age, weight and in agreement with clinical  observations.  Premature Infant recommended reference ranges:  Up to 24 hours.............<8.0 mg/dL  Up to 48 hours............<12.0 mg/dL  3-5 days..................<15.0 mg/dL  6-29 days.................<15.0 mg/dL      Alkaline Phosphatase 04/09/2020 96  38 - 126 U/L Final    AST 04/09/2020 21  15 - 46 U/L Final    ALT 04/09/2020 19  10 - 44 U/L Final    Anion Gap 04/09/2020 12  8 - 16 mmol/L Final    eGFR if African American 04/09/2020 >60.0  >60 mL/min/1.73 m^2 Final    eGFR if non African American 04/09/2020 >60.0  >60 mL/min/1.73 m^2 Final    Comment: Calculation used to obtain the estimated glomerular filtration  rate (eGFR) is the CKD-EPI equation.       Sed Rate 04/09/2020 10  0 - 20 mm/Hr Final    CRP 04/09/2020 0.44  0.00 - 1.00 mg/dL Final    Hep B Viral DNA IU/ML 04/09/2020 <10  <10 IU/mL Final    Log HBV IU/mL 04/09/2020 <1.00  <1.00 Log (10) IU/mL Final    Comment: This procedure utilizes a real-time polymerase chain  reaction test from [employer].  The amplification   target is a conserved region in the terminal third of  the hepatitis B surface antigen gene. The lower limit of   quantitation is 10 IU/mL (1.00 Log IU/mL) and the uppper  limit of quantitation is 1 billion IU/mL (9.00 Log IU/mL).  The qualitative limit of detection is 10 IU/mL   (1.00 Log IU/mL). A  Not detected  result does not rule   out infection.  Test performed at [facility],  [address] , [place]  [address] [phone]  Milton Castro,  MD  - Medical Director      Hepatitis B Virus DNA 04/09/2020 Not detected  Not detected Final           Tip Oliveira

## 2020-07-23 ENCOUNTER — LAB VISIT (OUTPATIENT)
Dept: LAB | Facility: HOSPITAL | Age: 55
End: 2020-07-23
Attending: INTERNAL MEDICINE
Payer: MEDICARE

## 2020-07-23 DIAGNOSIS — Z79.620 ENCOUNTER FOR MONITORING OF ADALIMUMAB THERAPY: ICD-10-CM

## 2020-07-23 DIAGNOSIS — E78.5 HYPERLIPIDEMIA: ICD-10-CM

## 2020-07-23 DIAGNOSIS — Z51.81 ENCOUNTER FOR MONITORING OF ADALIMUMAB THERAPY: ICD-10-CM

## 2020-07-23 LAB
ALBUMIN SERPL BCP-MCNC: 4.4 G/DL (ref 3.5–5.2)
ALP SERPL-CCNC: 82 U/L (ref 38–126)
ALT SERPL W/O P-5'-P-CCNC: 20 U/L (ref 10–44)
ANION GAP SERPL CALC-SCNC: 9 MMOL/L (ref 8–16)
AST SERPL-CCNC: 27 U/L (ref 15–46)
BASOPHILS # BLD AUTO: 0.03 K/UL (ref 0–0.2)
BASOPHILS NFR BLD: 0.5 % (ref 0–1.9)
BILIRUB SERPL-MCNC: 0.6 MG/DL (ref 0.1–1)
BUN SERPL-MCNC: 14 MG/DL (ref 7–17)
CALCIUM SERPL-MCNC: 9.6 MG/DL (ref 8.7–10.5)
CHLORIDE SERPL-SCNC: 105 MMOL/L (ref 95–110)
CO2 SERPL-SCNC: 27 MMOL/L (ref 23–29)
CREAT SERPL-MCNC: 0.76 MG/DL (ref 0.5–1.4)
CRP SERPL-MCNC: 0.29 MG/DL (ref 0–1)
DIFFERENTIAL METHOD: ABNORMAL
EOSINOPHIL # BLD AUTO: 0.1 K/UL (ref 0–0.5)
EOSINOPHIL NFR BLD: 1.3 % (ref 0–8)
ERYTHROCYTE [DISTWIDTH] IN BLOOD BY AUTOMATED COUNT: 13.2 % (ref 11.5–14.5)
ERYTHROCYTE [SEDIMENTATION RATE] IN BLOOD BY WESTERGREN METHOD: 8 MM/HR (ref 0–20)
EST. GFR  (AFRICAN AMERICAN): >60 ML/MIN/1.73 M^2
EST. GFR  (NON AFRICAN AMERICAN): >60 ML/MIN/1.73 M^2
GLUCOSE SERPL-MCNC: 120 MG/DL (ref 70–110)
HCT VFR BLD AUTO: 43.4 % (ref 37–48.5)
HGB BLD-MCNC: 14.4 G/DL (ref 12–16)
IMM GRANULOCYTES # BLD AUTO: 0.06 K/UL (ref 0–0.04)
IMM GRANULOCYTES NFR BLD AUTO: 1 % (ref 0–0.5)
LYMPHOCYTES # BLD AUTO: 2.6 K/UL (ref 1–4.8)
LYMPHOCYTES NFR BLD: 43.4 % (ref 18–48)
MCH RBC QN AUTO: 30.7 PG (ref 27–31)
MCHC RBC AUTO-ENTMCNC: 33.2 G/DL (ref 32–36)
MCV RBC AUTO: 93 FL (ref 82–98)
MONOCYTES # BLD AUTO: 0.6 K/UL (ref 0.3–1)
MONOCYTES NFR BLD: 9.9 % (ref 4–15)
NEUTROPHILS # BLD AUTO: 2.6 K/UL (ref 1.8–7.7)
NEUTROPHILS NFR BLD: 43.9 % (ref 38–73)
NRBC BLD-RTO: 0 /100 WBC
PLATELET # BLD AUTO: 257 K/UL (ref 150–350)
PMV BLD AUTO: 10.8 FL (ref 9.2–12.9)
POTASSIUM SERPL-SCNC: 4 MMOL/L (ref 3.5–5.1)
PROT SERPL-MCNC: 7.6 G/DL (ref 6–8.4)
RBC # BLD AUTO: 4.69 M/UL (ref 4–5.4)
SODIUM SERPL-SCNC: 141 MMOL/L (ref 136–145)
WBC # BLD AUTO: 5.97 K/UL (ref 3.9–12.7)

## 2020-07-23 PROCEDURE — 80053 COMPREHEN METABOLIC PANEL: CPT | Mod: PO

## 2020-07-23 PROCEDURE — 87516 HEPATITIS B DNA AMP PROBE: CPT | Mod: PO

## 2020-07-23 PROCEDURE — 85652 RBC SED RATE AUTOMATED: CPT

## 2020-07-23 PROCEDURE — 86140 C-REACTIVE PROTEIN: CPT | Mod: PO

## 2020-07-23 PROCEDURE — 85025 COMPLETE CBC W/AUTO DIFF WBC: CPT | Mod: PO

## 2020-07-23 RX ORDER — LOSARTAN POTASSIUM 100 MG/1
100 TABLET ORAL DAILY
Qty: 90 TABLET | Refills: 1 | Status: SHIPPED | OUTPATIENT
Start: 2020-07-23 | End: 2020-12-14

## 2020-07-23 RX ORDER — AMLODIPINE BESYLATE 10 MG/1
10 TABLET ORAL DAILY
Qty: 90 TABLET | Refills: 1 | Status: SHIPPED | OUTPATIENT
Start: 2020-07-23 | End: 2020-12-14

## 2020-07-23 RX ORDER — ATORVASTATIN CALCIUM 40 MG/1
40 TABLET, FILM COATED ORAL DAILY
Qty: 90 TABLET | Refills: 1 | Status: SHIPPED | OUTPATIENT
Start: 2020-07-23 | End: 2020-12-14

## 2020-07-23 RX ORDER — ATENOLOL 100 MG/1
100 TABLET ORAL DAILY
Qty: 90 TABLET | Refills: 1 | Status: SHIPPED | OUTPATIENT
Start: 2020-07-23 | End: 2020-12-14

## 2020-07-23 RX ORDER — HYDROCHLOROTHIAZIDE 25 MG/1
25 TABLET ORAL DAILY
Qty: 90 TABLET | Refills: 1 | Status: SHIPPED | OUTPATIENT
Start: 2020-07-23 | End: 2020-12-14

## 2020-07-27 ENCOUNTER — TELEPHONE (OUTPATIENT)
Dept: PHARMACY | Facility: CLINIC | Age: 55
End: 2020-07-27

## 2020-07-28 LAB — HBV DNA SERPL QL NAA+PROBE: NOT DETECTED

## 2020-07-30 ENCOUNTER — HOSPITAL ENCOUNTER (OUTPATIENT)
Dept: RADIOLOGY | Facility: HOSPITAL | Age: 55
Discharge: HOME | End: 2020-07-30
Attending: STUDENT IN AN ORGANIZED HEALTH CARE EDUCATION/TRAINING PROGRAM
Payer: MEDICARE

## 2020-07-30 ENCOUNTER — OFFICE VISIT (OUTPATIENT)
Dept: RHEUMATOLOGY | Facility: CLINIC | Age: 55
End: 2020-07-30
Payer: MEDICARE

## 2020-07-30 VITALS
DIASTOLIC BLOOD PRESSURE: 76 MMHG | WEIGHT: 241.19 LBS | HEART RATE: 70 BPM | BODY MASS INDEX: 38.93 KG/M2 | SYSTOLIC BLOOD PRESSURE: 118 MMHG

## 2020-07-30 DIAGNOSIS — Z86.19 HISTORY OF HEPATITIS B: ICD-10-CM

## 2020-07-30 DIAGNOSIS — M54.12 CERVICAL RADICULOPATHY: ICD-10-CM

## 2020-07-30 DIAGNOSIS — E55.9 VITAMIN D DEFICIENCY: ICD-10-CM

## 2020-07-30 DIAGNOSIS — M02.30 REACTIVE ARTHRITIS: ICD-10-CM

## 2020-07-30 DIAGNOSIS — M46.90 AXIAL SPONDYLOARTHRITIS: Primary | Chronic | ICD-10-CM

## 2020-07-30 DIAGNOSIS — Z79.899 HIGH RISK MEDICATION USE: ICD-10-CM

## 2020-07-30 DIAGNOSIS — M19.90 INFLAMMATORY ARTHRITIS: ICD-10-CM

## 2020-07-30 DIAGNOSIS — M25.551 RIGHT HIP PAIN: ICD-10-CM

## 2020-07-30 DIAGNOSIS — M15.9 OSTEOARTHRITIS INVOLVING MULTIPLE JOINTS ON BOTH SIDES OF BODY: ICD-10-CM

## 2020-07-30 DIAGNOSIS — M47.819 SPONDYLOARTHRITIS: ICD-10-CM

## 2020-07-30 PROCEDURE — 99214 OFFICE O/P EST MOD 30 MIN: CPT | Mod: S$GLB,,, | Performed by: INTERNAL MEDICINE

## 2020-07-30 PROCEDURE — 3078F PR MOST RECENT DIASTOLIC BLOOD PRESSURE < 80 MM HG: ICD-10-PCS | Mod: S$GLB,,, | Performed by: INTERNAL MEDICINE

## 2020-07-30 PROCEDURE — 3008F PR BODY MASS INDEX (BMI) DOCUMENTED: ICD-10-PCS | Mod: S$GLB,,, | Performed by: INTERNAL MEDICINE

## 2020-07-30 PROCEDURE — 72052 X-RAY EXAM NECK SPINE 6/>VWS: CPT | Mod: TC

## 2020-07-30 PROCEDURE — 99999 PR PBB SHADOW E&M-EST. PATIENT-LVL V: ICD-10-PCS | Mod: PBBFAC,,, | Performed by: INTERNAL MEDICINE

## 2020-07-30 PROCEDURE — 3008F BODY MASS INDEX DOCD: CPT | Mod: S$GLB,,, | Performed by: INTERNAL MEDICINE

## 2020-07-30 PROCEDURE — 72052 X-RAY EXAM NECK SPINE 6/>VWS: CPT | Mod: 26,,, | Performed by: RADIOLOGY

## 2020-07-30 PROCEDURE — 72052 XR CERVICAL SPINE 5 VIEW WITH FLEX AND EXT: ICD-10-PCS | Mod: 26,,, | Performed by: RADIOLOGY

## 2020-07-30 PROCEDURE — 3074F SYST BP LT 130 MM HG: CPT | Mod: S$GLB,,, | Performed by: INTERNAL MEDICINE

## 2020-07-30 PROCEDURE — 3074F PR MOST RECENT SYSTOLIC BLOOD PRESSURE < 130 MM HG: ICD-10-PCS | Mod: S$GLB,,, | Performed by: INTERNAL MEDICINE

## 2020-07-30 PROCEDURE — 99214 PR OFFICE/OUTPT VISIT, EST, LEVL IV, 30-39 MIN: ICD-10-PCS | Mod: S$GLB,,, | Performed by: INTERNAL MEDICINE

## 2020-07-30 PROCEDURE — 3078F DIAST BP <80 MM HG: CPT | Mod: S$GLB,,, | Performed by: INTERNAL MEDICINE

## 2020-07-30 PROCEDURE — 99999 PR PBB SHADOW E&M-EST. PATIENT-LVL V: CPT | Mod: PBBFAC,,, | Performed by: INTERNAL MEDICINE

## 2020-07-30 RX ORDER — ERGOCALCIFEROL 1.25 MG/1
50000 CAPSULE ORAL
Qty: 12 CAPSULE | Refills: 3 | Status: SHIPPED | OUTPATIENT
Start: 2020-07-30 | End: 2021-05-07

## 2020-07-30 RX ORDER — SULFASALAZINE 500 MG/1
1500 TABLET, DELAYED RELEASE ORAL 2 TIMES DAILY
Qty: 540 TABLET | Refills: 0 | Status: SHIPPED | OUTPATIENT
Start: 2020-07-30 | End: 2021-05-07 | Stop reason: SDUPTHER

## 2020-07-30 RX ORDER — NAPROXEN 500 MG/1
500 TABLET ORAL 2 TIMES DAILY PRN
Qty: 180 TABLET | Refills: 0 | Status: SHIPPED | OUTPATIENT
Start: 2020-07-30 | End: 2023-02-15 | Stop reason: SDUPTHER

## 2020-07-30 ASSESSMENT — ANKYLOSING SPONDYLITIS DISEASE ACTIVITY SCORE (ASDAS-CRP)
TOTAL_SCORE: 2.29
GLOBAL_ACTIVITY: 4
PAIN_SWELLING: 4
CRP_MG_PER_LITER: 2.9
NBH_PAIN: 4
MORNING_STIFFNESS: 5

## 2020-07-30 NOTE — ASSESSMENT & PLAN NOTE
Reduced ROM of hip, X-ray hip 2018 showed moderate OA in bilateral hips. OA contributing to low back and hip pain. Likely cause of cervical radiculopathy as well.  - Naproxen 500mg BID PRN  - Continue exercise: Bike riding and walking

## 2020-07-30 NOTE — ASSESSMENT & PLAN NOTE
Vit D, 25-Hydroxy Latest Ref Range: 30 - 96 ng/mL 11 (L)   - Continue ergocalciferol 50k units q week   98

## 2020-07-30 NOTE — PROGRESS NOTES
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  Fabere neg but limited ROM right hip  Chest expansion 7 cm(nl)  Neck rom nl                 Axial spondyloarthritis with hip involvement   ASDAS-CRP 2.29(HDA) slightly improved;  BASDAI 3.5(MDA)) significantly improved  BASFI 2.55(moderate functional limitation)  Humira with Secondary inefficacy, tried to change to Simponi 50mg sc q 4 wks but insurance wouldn't cover, now on Enbrel Sureclick 50mg s q 7  days  Chronic lumbar and cervical spondylosis  With 1 wk h/o left cervical radiculopathy, neck with FROM, Spurling's negative, Neuro exam normal except trace left handgrip weakness  T2 DM HbA1C 8.3 4/9/20  on sitagliptin 100mg daily  Hyperlipidemia with intermediate risk, on atorvastatin 40mg every evening consider increase to 80mg every evening, will leave to 's opinion  Hypertension 118/76           *Shingrix series  Continue Enbrel Sureclick 50mg sc q 7 days. If no further/continued improvement, consider change to anti IL-17, ixekizumab(Taltz) rather than 3rd anti-TNF  Cont sulfasalazine 1500mg twice daily   naproxen 500mg  Twice daily prn for neck and left cervical radiculopathy  Cervical spine x-rays  Ref to PT  Ref to [facility]  Cont entecavir 0.5mg daily  vitamin D2 50,000 units once a week  Start biking, low back pain  RTC 3 months with standing labs,  vitamin D        Answers for HPI/ROS submitted by the patient on 7/29/2020   fever: No  eye redness: No  headaches: No  shortness of breath:  No  chest pain: No  trouble swallowing: No  diarrhea: No  constipation: No  unexpected weight change: No  genital sore: No  dysuria: No  During the last 3 days, have you had a skin rash?: No  Bruises or bleeds easily: No  cough: No

## 2020-07-30 NOTE — ASSESSMENT & PLAN NOTE
ASDAS CRP 2.14 (high) ASDAS ESR 2.29 (high). BASDAI 3.5 (moderate) BASFI 2.55 (Moderate). Schober's 10 to 15, chest excursion 7. Reports improved symptoms. Was unable to get insurance authorization for golimumab, swtiched to etanercept from adalimumab  - Continue etanercept 50mg weekly inj  - Continue sulfasalazine 1500mg BID  - Continue Naproxen 500mg BID PRN  - Return to clinic in 3 months

## 2020-07-30 NOTE — ASSESSMENT & PLAN NOTE
Reports L sided neck pain that radiates down C6 and C7 dermatome for past week, history of neck pain in remote past has had MRI in 2016 showing some stenosis at C6. Reports L sided  weakness, subtle weakness noted on exam for L side, would correspond with C8. Tender to palpation near C6 and C7. Normal reflexes on exam, Neville negative, Spurling negative. Will start with neck x-ray and leave further imaging to back spine  - Neck X-ray  - PT Neck   - Referral Back Spine clinic

## 2020-07-30 NOTE — PATIENT INSTRUCTIONS
- Obtain Xray Neck  - Physical Therapy Referral for the neck   - Back and Sp[facility] clinic referral for the neck (in addition to you lower back appointment in August)  - Continue Sulfasalazine, Enbrel, Naproxen, and entecavir.   - Return to clinic in 3 months with standing labs

## 2020-07-30 NOTE — PROGRESS NOTES
Subjective:       Patient ID: Althea Vazquez is a 54 y.o. female PMH of DDD, Cervical Radiculopathy, HLD, HTN, DM2, OA, SpA     Chief Complaint: SpA, Neck Pain    HPI   Last Visit: 4/13/2020  Stopped Humira and started etanercept, 50 mg weekly, insurance would not cover golimumab. Continued sulfasalazine and entecavir. Referred to back and spine clinic.     Today's Visit:  Feels doing better this week than previous weeks. Has new sharp pains in L neck that radiates down L arm with a tight muscular feeling in the arm. Denies numbness and tingling in the hands Has been going on for about 1 week. Onset is random, cant identify an aggravating factors. Takes naproxen, does not think it helps, nothing relieves pain. Uses topical icy hot and hot cold compress with mild temporary relief. Reports weakness in L arm and dropping things in hands, hand weakness has been present longer than pain.     Continued low back pain, R>L, with occasional radiation down the leg.    Has an appointment with sports spine Aug 11, was difficult to make apt w/covid. Reports stiffness, leg weakness. Has tried physical therapy, continues home exercises. Started riding home bike, rides every other day for up to 30 minutes. Occasionally walks for 30 minutes.      Reports cold intolerance      Review of Systems   Constitutional: Negative for fever and unexpected weight change.   HENT: Negative for trouble swallowing.    Eyes: Negative for redness.   Respiratory: Negative for cough and shortness of breath.    Cardiovascular: Negative for chest pain.   Gastrointestinal: Negative for constipation and diarrhea.   Genitourinary: Negative for dysuria and genital sores.   Skin: Negative for rash.   Neurological: Negative for headaches.   Hematological: Does not bruise/bleed easily.         Objective:   /76 (BP Location: Left arm, Patient Position: Sitting)   Pulse 70   Wt 109.4 kg (241 lb 2.9 oz)   LMP 11/25/2014   BMI 38.93 kg/m²       Physical Exam   Constitutional: No distress.   Eyes: Conjunctivae are normal. Right eye exhibits no discharge. Left eye exhibits no discharge. No scleral icterus.   Neck: Normal range of motion.   Cardiovascular: Normal rate, regular rhythm and normal heart sounds.  Exam reveals no gallop and no friction rub.    No murmur heard.  Pulmonary/Chest: Effort normal and breath sounds normal. No respiratory distress. She has no wheezes. She has no rales.       Right Side Rheumatological Exam     Hip Exam     Range of Motion   Flexion: abnormal   Internal rotation: abnormal     Muscle Strength (0-5 scale):  Neck Flexion:  5  Neck Extension: 5  Deltoid:  5  Biceps: 5/5   Triceps:  5  : 5/5   Iliopsoas: 5  Quadriceps:  5   Distal Lower Extremity: 5    Left Side Rheumatological Exam     Muscle Strength (0-5 scale):  Neck Flexion:  5  Neck Extension: 5  Deltoid:  5  Biceps: 5/5   Triceps:  5  :  4.8/5   Iliopsoas: 5  Quadriceps:  5   Distal Lower Extremity: 5      Back/Neck Exam   Back Range of Motion   Extension: normal  Flexion: normal  Lateral Bend Right: normal  Lateral Bend Left: normal  Rotation Right: normal  Rotation Left: normal    Neck Range of Motion   Flexion: Normal  Extension: Normal  Right Lateral Bend: normal  Left Lateral Bend: normal  Right Rotation: normal  Left Rotation: normal    Neck Tests    Spurling's Test   Right: negative  Left:  negative  Neurological:   Left Neville's Sign:  Absent  Reflex Scores:       Tricep reflexes are 2+ on the left side.       Bicep reflexes are 2+ on the left side.       Brachioradialis reflexes are 2+ on the left side.  Skin: She is not diaphoretic.        SpA  Spondyloarthritis Exam:  Chest Excursion: 7 cm  Schober's 10 to 15cm  ASDAS CRP 2.14 (high)   ASDAS ESR 2.29 (high)   BASDAI 3.5 (moderate)   BASFI 2.55 (Moderate)    No data to display    LABS  Lab Results   Component Value Date    WBC 5.97 07/23/2020    RBC 4.69 07/23/2020    HGB 14.4 07/23/2020    MCV 93  07/23/2020    MCH 30.7 07/23/2020    MCHC 33.2 07/23/2020    RDW 13.2 07/23/2020     07/23/2020    MPV 10.8 07/23/2020    GRAN 2.6 07/23/2020    GRAN 43.9 07/23/2020    LYMPH 2.6 07/23/2020    LYMPH 43.4 07/23/2020    MONO 0.6 07/23/2020    MONO 9.9 07/23/2020    EOS 0.1 07/23/2020    BASO 0.03 07/23/2020       Chemistry        Component Value Date/Time     07/23/2020 1225    K 4.0 07/23/2020 1225     07/23/2020 1225    CO2 27 07/23/2020 1225    BUN 14 07/23/2020 1225    BUN 12 08/11/2015 1030    CREATININE 0.76 07/23/2020 1225     (H) 07/23/2020 1225        Component Value Date/Time    CALCIUM 9.6 07/23/2020 1225    ALKPHOS 82 07/23/2020 1225    ALKPHOS 75 08/11/2015 1030    AST 27 07/23/2020 1225    AST 22 08/11/2015 1030    ALT 20 07/23/2020 1225    BILITOT 0.6 07/23/2020 1225    ESTGFRAFRICA >60.0 07/23/2020 1225    EGFRNONAA >60.0 07/23/2020 1225        Results for NABOR HERNANDEZ (MRN [id number]) as of 7/30/2020 10:19   Ref. Range 7/23/2020 12:25   Sed Rate Latest Ref Range: 0 - 20 mm/Hr 8     CRP Latest Ref Range: 0.00 - 1.00 mg/dL 0.29      Ref. Range 4/9/2020 09:45   Cholesterol Latest Ref Range: 120 - 199 mg/dL 205 (H)   HDL Latest Ref Range: 40 - 75 mg/dL 60   Hdl/Cholesterol Ratio Latest Ref Range: 20.0 - 50.0 % 29.3   LDL Cholesterol External Latest Ref Range: 63.0 - 159.0 mg/dL 126.4   Non-HDL Cholesterol Latest Units: mg/dL 145   Total Cholesterol/HDL Ratio Latest Ref Range: 2.0 - 5.0  3.4   Triglycerides Latest Ref Range: 30 - 150 mg/dL 93      Results for NABOR HERNANDEZ (MRN [id number]) as of 7/30/2020 10:19   Ref. Range 7/23/2020 12:25   Mitogen - Nil Latest Ref Range: See text IU/mL 6.380   NIL Latest Ref Range: See text IU/mL 0.100   TB Gold Plus Unknown Negative   TB1 - Nil Latest Ref Range: See text IU/mL -0.050   TB2 - Nil Latest Ref Range: See text IU/mL -0.040        Ref. Range 9/4/2019 10:30   Vit D, 25-Hydroxy Latest Ref Range: 30 - 96 ng/mL 11 (L)        Assessment:       1. Axial spondyloarthritis    2. Cervical radiculopathy    3. Osteoarthritis involving multiple joints on both sides of body    4. Reactive arthritis    5. Inflammatory arthritis    6. High risk medication use    7. Spondyloarthritis    8. Right hip pain    9. Vitamin D deficiency    10. History of hepatitis B            Plan:       Problem List Items Addressed This Visit        Neuro    Axial spondyloarthritis - Primary (Chronic)     ASDAS CRP 2.14 (high) ASDAS ESR 2.29 (high). BASDAI 3.5 (moderate) BASFI 2.55 (Moderate). Schober's 10 to 15, chest excursion 7. Reports improved symptoms. Was unable to get insurance authorization for golimumab, swtiched to etanercept from adalimumab  - Continue etanercept 50mg weekly inj  - Continue sulfasalazine 1500mg BID  - Continue Naproxen 500mg BID PRN  - Return to clinic in 3 months          Relevant Medications    sulfaSALAzine (AZULFIDINE) 500 MG EC tablet    Cervical radiculopathy     Reports L sided neck pain that radiates down C6 and C7 dermatome for past week, history of neck pain in remote past has had MRI in 2016 showing some stenosis at C6. Reports L sided  weakness, subtle weakness noted on exam for L side, would correspond with C8. Tender to palpation near C6 and C7. Normal reflexes on exam, Neville negative, Spurling negative. Will start with neck x-ray and leave further imaging to back spine  - Neck X-ray  - PT Neck   - Referral Back Spine clinic          Relevant Orders    X-Ray Cervical Spine 5 View W Flex Extxt (Completed)    Ambulatory referral/consult to Physical/Occupational Therapy    Ambulatory referral/consult to Back & Spine Clinic       ID    History of hepatitis B     - Continue entecavir 0.5mg qd            Endocrine    Vitamin D deficiency     Vit D, 25-Hydroxy Latest Ref Range: 30 - 96 ng/mL 11 (L)   - Continue ergocalciferol 50k units q week         Relevant Medications    ergocalciferol (ERGOCALCIFEROL) 50,000 unit Cap        Orthopedic    Osteoarthritis involving multiple joints on both sides of body     Reduced ROM of hip, X-ray hip 2018 showed moderate OA in bilateral hips. OA contributing to low back and hip pain. Likely cause of cervical radiculopathy as well.  - Naproxen 500mg BID PRN  - Continue exercise: Bike riding and walking          Spondyloarthritis    Relevant Medications    sulfaSALAzine (AZULFIDINE) 500 MG EC tablet       Other    High risk medication use-sulfasalazine 2 gm    Relevant Medications    sulfaSALAzine (AZULFIDINE) 500 MG EC tablet      Other Visit Diagnoses     Reactive arthritis        Relevant Medications    sulfaSALAzine (AZULFIDINE) 500 MG EC tablet    Inflammatory arthritis        Relevant Medications    sulfaSALAzine (AZULFIDINE) 500 MG EC tablet    Right hip pain        Relevant Medications    naproxen (NAPROSYN) 500 MG tablet        I performed a history, review of systems, and physical exam with the patient. The assessment and plan were discussed and agreed upon with Dr. Arvizu, the attending of record, before sharing with the patient. The face to face encounter time, including answering all patient questions, was 45 minutes.     Guido Beth, PGY 1

## 2020-07-30 NOTE — PROGRESS NOTES
Rapid3 Question Responses and Scores 7/29/2020   MDHAQ Score 1   Psychologic Score 6.6   Pain Score 4.5   When you awakened in the morning OVER THE LAST WEEK, did you feel stiff? Yes   If Yes, please indicate the number of hours until you are as limber as you will be for the day 1   Fatigue Score 5   Global Health Score 4   RAPID3 Score 3.94         Answers for HPI/ROS submitted by the patient on 7/29/2020   fever: No  eye redness: No  headaches: No  shortness of breath: No  chest pain: No  trouble swallowing: No  diarrhea: No  constipation: No  unexpected weight change: No  genital sore: No  dysuria: No  During the last 3 days, have you had a skin rash?: No  Bruises or bleeds easily: No  cough: No

## 2020-08-04 ENCOUNTER — CLINICAL SUPPORT (OUTPATIENT)
Dept: REHABILITATION | Facility: HOSPITAL | Age: 55
End: 2020-08-04
Payer: MEDICARE

## 2020-08-04 DIAGNOSIS — R29.898 UPPER EXTREMITY WEAKNESS: ICD-10-CM

## 2020-08-04 DIAGNOSIS — M54.12 CERVICAL RADICULOPATHY: ICD-10-CM

## 2020-08-04 DIAGNOSIS — M25.511 BILATERAL SHOULDER PAIN, UNSPECIFIED CHRONICITY: ICD-10-CM

## 2020-08-04 DIAGNOSIS — M25.512 BILATERAL SHOULDER PAIN, UNSPECIFIED CHRONICITY: ICD-10-CM

## 2020-08-04 PROCEDURE — 97162 PT EVAL MOD COMPLEX 30 MIN: CPT | Mod: PO

## 2020-08-04 PROCEDURE — 97110 THERAPEUTIC EXERCISES: CPT | Mod: PO

## 2020-08-04 NOTE — PLAN OF CARE
[facility]  Physical Therapy Initial Evaluation    Date: 8/4/2020   Name: Althea Carrion [facility] Number: [id number]    Therapy Diagnosis:   Encounter Diagnoses   Name Primary?    Cervical radiculopathy     Bilateral shoulder pain, unspecified chronicity     Upper extremity weakness      Physician: Guido Beth MD    Physician Orders: PT Eval and Treat   Medical Diagnosis from Referral: M54.12 (ICD-10-CM) - Cervical radiculopathy  Evaluation Date: 8/4/2020  Authorization Period Expiration: 07/30/2021  Plan of Care Expiration: 10/04/2020  Visit # / Visits authorized:1/1    Time In: 12:00 pm  Time Out: 12:45 pm  Total Appointment Time (timed & untimed codes): 45 minutes    Precautions: Standard and Diabetes    Subjective   Date of onset: one week prior July 2020  History of current condition - Althea reports: she is having neck pain that is traveling down into her L shoulder. Patient states her L shoulder bothers her more than her neck. Patient denies any specific injury or incident that brought out pain, rather pain was insidious in nature. Patient denies any numbness or tingling traveling down her L shoulder. Pain feel tight. Patient denies using any pain medication nor is she using any ice or heat for pain management. Patient has difficulty with OH activity, holding objects in her hand, frequently drops items. Patient has difficulty rotating her neck when driving, but has been having issue with cervical ROM for years.      Medical History:   Past Medical History:   Diagnosis Date    Acid reflux     Anxiety 10/18/2012    Arthritis     Depression     Diabetic peripheral neuropathy - mild 10/21/2014    Difficult intubation     Dry eyes     Dry mouth     Fever blister     History of hepatitis B 10/3/2016    Hep B core total Ab (+), no active/chronic infection    Hyperlipidemia     Hypertension     Insomnia     Iritis 5/13/2014    Long-term current use of  steroids 9/27/2012    Nausea & vomiting 2/4/2015    VAISHNAVI (obstructive sleep apnea)     Type II diabetes mellitus 10/1/2012       Surgical History:   Althea Vazquez  has a past surgical history that includes Tubal ligation; Knee arthroscopy (5-14-14); Hysterectomy (12/3/2014); and Colonoscopy (N/A, 1/9/2018).    Medications:   Althea has a current medication list which includes the following prescription(s): amlodipine, aspirin, atenolol, atorvastatin, blood sugar diagnostic, blood-glucose meter, boric acid, canagliflozin, clonazepam, clotrimazole-betamethasone 1-0.05%, cyclobenzaprine, docusate sodium, entecavir, ergocalciferol, enbrel sureclick, fluoxetine, homatropine, hydrochlorothiazide, hydrocortisone, hydroquinone, lancets, lancets, levocetirizine, losartan, metformin, relistor, naproxen, nystatin, ondansetron, pantoprazole, phenazopyridine, phentermine, sitagliptin, sulfasalazine, terconazole, topiramate, triamcinolone acetonide 0.1%, valacyclovir, and zolpidem.    Allergies:   Review of patient's allergies indicates:   Allergen Reactions    Bactrim [sulfamethoxazole-trimethoprim] Itching    Trulicity [dulaglutide] Diarrhea and Other (See Comments)     Vomiting, abdominal pain    Diflucan [fluconazole] Swelling and Other (See Comments)     Sore on mouth        Imaging, X-ray: Five views: Odontoid prevertebral soft tissues and posterior elements are intact.  There is mild DJD and DISH.  The neural foramina are grossly patent.  No instability seen.  No trauma seen.    Prior Therapy: yes for other conditions   Social History:  lives with their family  Occupation: not working   Prior Level of Function: independent   Current Level of Function: independent with pain     Pain:  Current 5/10, worst 8/10, best 5/10   Location: bilateral neck   Description: tight, sharp, throbbing pain   Aggravating Factors: Lifting and gripping, OH activities   Easing Factors: warm shower    Patients goals: reduce  pain as much as possible     Objective     Observation: unremarkable     Posture: rounded shoulders     Cervical Range of Motion:    Degrees Pain   Flexion 60 No pain      Extension 50 No pain   Right Rotation 55 No pain    Left Rotation 45 No pain   Right Side Bending 25 No pain   Left Side Bending 30 No pain       Shoulder Range of Motion:   Shoulder Left Right   Flexion WFL WFL   Abduction WFL WFL   ER WFL WFL   IR WFL WFL     Strength:  Cervical MMT   Flexion 4/5   Extension 4+/5   Right Side Bend 5/5   Left Side Bend 5/5     Upper Extremity Strength  (R) UE  (L) UE    Shoulder flexion: 4-/5 Shoulder flexion: 4/5   Shoulder Abduction: 4-/5 Shoulder abduction: 4/5   Shoulder ER 4+/5 Shoulder ER 5/5   Shoulder IR 4+/5 Shoulder IR 5/5   Elbow flexion: 4+/5 Elbow flexion: 5/5    4-/5 : 4+/5   Lower Trap 4-/5 Lower Trap 4/5   Middle Trap 4-/5 Middle Trap 4/5   Rhomboids 4-/5 Rhomboids 4/5       Special Tests:  Distraction Negative    Compression Negative    Spurlings Negative    VA test Negative          Joint Mobility: Cervical PA's hypomobile G2,   Transverse glides slight hypomobile of lower cervical spine,  1st Rib mobilizations normal bilaterally     Thoracic mobility: hypomile upper thoracic G2     Palpation: L GHJ and surrounding musculature unremarkable - right upper trapezius tender to deep palpation     Sensation: light touch intact     Flexibility: minimal tightness of the L upper trapezius, moderate tightness of the R upper trapezius       Limitation/Restriction for FOTO Cervical Survey    Therapist reviewed FOTO scores for Althea Carrion Lee on 8/4/2020.   FOTO documents entered into [employer] - see Media section.    Limitation Score: 44%         TREATMENT   Treatment Time In: 12:30 pm  Treatment Time Out: 12:45 pm   Total Treatment time (time-based codes) separate from Evaluation: 15 minutes    Althea received therapeutic exercises to develop strength, endurance, ROM, flexibility and posture  "for 10 minutes including:    Date  08/04/2020   VISIT 1/1   POC EXP. DATE 10/04/2020   FACE-TO-FACE 09/04/2020   FOTO 1/5       SEATED:    UT stretch 5 x 5"   Levator scap stretch --   Cervical ROM  - flex/ext  - rotation R/L  - side bend R/L   1 x 10   1 x 10   1 x 10    Posterior shoulder rolls  --   Scapula squeeze 5 x 3"   Chin Tucks         TABLE:    Supine Pec Stretch w/towel roll  5 x 5"   Snow angels --   Butterflies  --   Supine T's  --   Supine Chin Tucks  --       Prone:  Hold    - Y's   - T's   - I's         STANDING:    Pectoral stretch --   Theraband  - W's  - T's  - I's   - Rows   - ER         1 x 10 RTB           Initials BJ       Althea received the following manual therapy techniques: Joint mobilizations, Manual traction and Soft tissue Mobilization were applied to the: cervical spine for 5 minutes, including: cervical PA mobilizations, 1st Rib mobilizations, cervical PROM, manual upper trapezius stretching     Home Exercises and Patient Education Provided    Education provided:   - HEP   - PT/pt. Roles and responsibilities     Written Home Exercises Provided: yes.  Exercises were reviewed and Althea was able to demonstrate them prior to the end of the session.  Althea demonstrated good  understanding of the education provided.     See EMR under Patient Instructions for exercises provided 8/4/2020.    Assessment   Althea is a 54 y.o. female referred to outpatient Physical Therapy with a medical diagnosis of cervical radiculopathy. Patient presents with decreased strength, ROM, flexibility and overall functional mobility. Patient demonstrated weakness of (B) UE, with more weakness noted in the RUE as compared to the L. The L shoulder pain was the main complaint, however as we progressed through the evaluation the RUE became aggravated. Patient stated she does sometime have pain travel into both of her upper extremities. Patient demonstrated within functional limits of (B) upper extremity ROM, " however demonstrated poor FHP and rounded shoulders with end range motion. Cervical ROM was limited in rotation and sidebending. Patient also was noted to have tenderness to deep palpation of the R upper trapezius. Minimal to moderated tightness was detected of (B) upper trapexius muscles, along with some hypomobility of the cervical and thoracic spinal joints.  Patient's signs and symptoms consistent with current diagnosis. Patient is 44 % limited at this time.     Patient prognosis is Good.   Patientt will benefit from skilled outpatient Physical Therapy to address the deficits stated above and in the chart below, provide patient /family education, and to maximize patientt's level of independence.     Plan of care discussed with patient: Yes  Patient's spiritual, cultural and educational needs considered and patient is agreeable to the plan of care and goals as stated below:     Anticipated Barriers for therapy: none     Medical Necessity is demonstrated by the following  History  Co-morbidities and personal factors that may impact the plan of care Co-morbidities:   Arthritis, BMI over 30, Diabetes Type I or II, Headaches, High Blood Pressure    Personal Factors:   no deficits     high   Examination  Body Structures and Functions, activity limitations and participation restrictions that may impact the plan of care Body Regions:   neck  upper extremities    Body Systems:    ROM  strength  flexibility    Participation Restrictions:   OH activities, ADL's     Activity limitations:   Learning and applying knowledge  no deficits    General Tasks and Commands  no deficits    Communication  no deficits    Mobility  lifting and carrying objects    Self care  washing oneself (bathing, drying, washing hands)  dressing    Domestic Life  shopping  cooking  doing house work (cleaning house, washing dishes, laundry)    Interactions/Relationships  no deficits    Life Areas  no deficits    Community and Social Life  community  life  recreation and leisure         moderate   Clinical Presentation evolving clinical presentation with changing clinical characteristics moderate   Decision Making/ Complexity Score: moderate     Goals:  Short Term Goals: 4 weeks  1. This patient will be independent with a basic HEP.  2. This patient will have cervical AROM WFL and pain free in order to ease difficulty of driving   3. This patient will increase B UE strength by 1 grade in order to be able to ease difficulty of performing home ADL's  4. This patient will have a pain rating of 6/10 at worst with ADLs.  5. Patient able to score less than or equal to 37 on the FOTO Neck Survey placing patient in 35-45 impaired, limited, or restricted category demonstrating overall decreased neck pain with functional activities.  Long Term Goals: 8 weeks  1. This patient will be independent with an updated HEP.  2. This patient will have B UE strength of 5/5 in order to be able to ease difficulty of perform ADL's and reduce instances of dropping items   3. This patient will have a pain rating of 4/10 at worst with ADLs.  4. Patient able to score less than or equal to 28 on the FOTO Neck Survey placing patient in 20-30% impaired, limited, or restricted category demonstrating overall decreased neck pain with functional activities    Plan   Plan of care Certification: 8/4/2020 to 10/04/2020.    Outpatient Physical Therapy 2 times weekly for 8 weeks to include the following interventions: Cervical/Lumbar Traction, Manual Therapy, Moist Heat/ Ice, Neuromuscular Re-ed, Patient Education and Therapeutic Exercise. Dry needling with manual therapy techniques to decrease pain, inflammation and swelling, increase circulation and promote healing process. Kinesiotaping and cupping.    Kelsey Preciado, PT, DPT

## 2020-08-04 NOTE — PATIENT INSTRUCTIONS
AROM: Neck Flexion        Bend head forward. Hold 3 seconds.  Repeat 10 times per set. Do 1 sets per session. Do 2 sessions per day.     AROM: Neck Extension        Bend head backward. Hold 3 seconds.  Repeat 10 times per set. Do 1 sets per session. Do 2 sessions per day.      AROM: Lateral Neck Flexion        Slowly tilt head toward one shoulder, then the other. Hold each position 3 seconds.  Repeat 10 times per set. Do 1 sets per session. Do 2 sessions per day.      AROM: Neck Rotation        Turn head slowly to look over one shoulder, then the other. Hold each position 3 seconds.  Repeat 10 times per set. Do 1 sets per session. Do 2 sessions per day.     Flexibility: Upper Trapezius Stretch        Gently grasp right side of head while reaching behind back with other hand. Tilt head away until a gentle stretch is felt. Hold 5 seconds.  Repeat 5 times per set. Do 1 sets per session. Do 2 sessions per day.   .   Flexibility: Corner Stretch        Standing in corner with hands just above shoulder level and feet 8 inches from corner, lean forward until a comfortable stretch is felt across chest. Hold 10 seconds.  Repeat 5 times per set. Do 1 sets per session. Do 2 sessions per day.     Flexibility: Neck Retraction        Pull head straight back, keeping eyes and jaw level.  Repeat 10 times per set. Do 1 sets per session. Do 2 sessions per day.     Scapular Retraction (Standing)        With arms at sides, pinch shoulder blades together.  Repeat 10 times per set. Do 1 sets per session. Do 2 sessions per day.  Scapular Retraction: Bilateral        Facing anchor, pull arms back, bringing shoulder blades together.  Repeat 10 times per set. Do 3 sets per session. Do 2 sessions per day.

## 2020-08-10 NOTE — PROGRESS NOTES
Subjective:      Patient ID: Althea Vazquez is a 54 y.o. female.    Chief Complaint: Neck Pain and Arm Pain (bilateral)    Ms Vazquez is a 55 yo female with spondyloarthritis sent in consultation by Dr. Alexandra for evaluation of neck and arm pain.  The pain started in the right side in feb 2020 and then the left side started a week ago.  The right side does not hurt as much as the left leg pain.  She feels like therapy made the pain worse.  She was given some exercises to do at home.  The pain is worse with lifting arm above her head or turning quickly.  She has pain with reaching behind her.  The pain is worst in the top of the shoulder.  She feels like she wants someone to hit on the muscle.  The pain is a sharp pain.  She has just started PT, no chiropractor, she has not had injections, or surgery.  The pain is 5/10 now, worst 7/10 end of the day, best 4/10 in the morning.  She has been taking naproxen twice a day, she has been taking flexeril at night.  She cannot tolerate it during the day.  She feels like it makes it hard for ehr to get going.  There is a tightness in her left hand.      X-ray cervical  Five views: Odontoid prevertebral soft tissues and posterior elements are intact.  There is mild DJD and DISH.  The neural foramina are grossly patent.  No instability seen.  No trauma seen.     Impression:     Chronic change as above.    MRI cervical, thoracic, and lumbar 9/11/2016  There is modest straightening of the normal cervical lordosis.  No spondylolisthesis.  Vertebral body heights are well-maintained without evidence for fracture.  Visualized osseous structures demonstrate intact pars signal intensity without findings to suggest an infiltrative process.     Cervical spinal cord demonstrates normal contour and signal intensity.  Cerebellar tonsils are in their expected location.  Cervicomedullary junction is unremarkable.  Postcontrast images demonstrate no abnormal  enhancement.     Prevertebral soft tissues are normal.  Vertebral artery flow voids are present.  T2 hyperintense nodules are noted within the bilateral thyroid lobes.  The spinal musculature and trace normal bulk and signal intensity.  supraspinous ligament is unremarkable without findings to suggest enthesitis.     C2-C3: No spinal canal stenosis or neural foraminal narrowing.     C3-C4: There is mild bilateral facet arthropathy and uncovertebral joint spurring and mild bilateral neural foraminal narrowing.  No spinal canal stenosis.     C4-C5: No spinal canal stenosis or neural foraminal narrowing.     C5-C6: There is a moderate broad-based posterior disc ossified complex that partially effaces the anterior thecal sac.  There is mild left-sided uncovertebral joint spurring.  Findings contribute to mild spinal canal stenosis and mild left-sided neural foraminal narrowing.     C6-C7: No spinal canal stenosis or neural foraminal narrowing.     T7-T1: No spinal canal stenosis or neural foraminal narrowing.     THORACIC SPINE:     Thoracic spine alignment is normal.  No spondylolisthesis.  Vertebral body heights are well-maintained without evidence for fracture.  There is enhancing inflammation of the left side zygapophyseal/facet joint at T5-T6.  Remaining visualized osseous structures demonstrate intact menisci and in density without findings to suggest an infiltrative process.  Note is made of the anterior vertebral body at this demonstrate intact signal intensity without findings to suggest spondylitis.     Visualized spinal cord demonstrates normal contour and signal intensity.  Postcontrast images demonstrate normal enhancement.     Visualized thoracic and upper abdominal organs are normal.     There is a low signal described focal area of signal abnormality within the posterior subcutaneous soft tissues and appears to demonstrate subtle enhancement and is favored to be benign though nonspecific.     No  significant intervertebral disc abnormalities.  No spinal canal stenosis or neural foramina narrowing.     LUMBAR SPINE:     Lumbar spine alignment is normal.  No spondylolysis or spondylolisthesis.  Vertebral body heights are well-maintained without evidence for fracture.  Visualized osseous structures demonstrate intact pars signal intensity without findings to suggest an infiltrative process.     Visualized distal spinal cord demonstrates normal contour and signal intensity.  Cauda equina is normal mild without findings to suggest arachnoiditis.  Postcontrast images demonstrate normal enhancement.     There is a subcentimeter T2 hyperintense lesion within the right renal cortex, too small to accurately guided thighs.  Paraspinal musculature demonstrates normal bulk and signal intensity.  Subcutaneous soft tissues are within normal limits.     L1-L2: No significant intervertebral disc abnormalities.  Facet joints are well maintained.  No spinal canal stenosis or neural foraminal narrowing.     L2-L3: There is mild intervertebral disc desiccation.  Facet joints are well maintained.  No spinal canal stenosis or neural foraminal narrowing.     L3-L4: There is mild intervertebral disc desiccation.  Facet joints are well maintained.  No spinal canal stenosis or neural foraminal narrowing.     L4-L5: There is mild intervertebral disc space narrowing and desiccation with small circumferential bulge.  There is mild bilateral facet arthropathy.  No spinal canal stenosis or neural foraminal narrowing.     L5-S1: There is mild intervertebral disc space narrowing desiccation with a small circumferential bulge and a small posterior annular fissure.  Findings contribute to mild left-sided neural foraminal narrowing. There is mild bilateral facet arthropathy.  No spinal canal stenosis.  IMPRESSION:         Enhancing inflammatory changes involving the left zygapophyseal/facet joint at T5-T6 that is nonspecific but can be seen  with inflammatory arthropathies. No MR imaging finding to suggest enthesitis, spondylitis or spondylodiskitis.     Mild cervical and lumbar degenerative changes without significant spinal canal stenosis.     Cystic subcutaneous soft tissues within the posterior back, favored to be benign, possibly a complex sebaceous cyst.  Consider follow-up examination in 12 months.    MRI si joint  There are enhancing inflammatory changes about the anterior superior aspect of the left SI joint in the expected location of the medial lumbar or lumbosacral ligament, findings that are highly suggestive of enthesitis.  There is apparent trace enhancement on the posterior superior aspect of the right SI joint leads is overall nonspecific.     SI joints are symmetric.  No synovitis.  No joint effusions.  No osseous erosive changes.     Visualized musculature demonstrate normal bulk and signal intensity.  Piriformis muscles are symmetric.  Ischiofemoral spaces are unremarkable.     Adductor and abductor tendons are unremarkable.  Hamstring tendons are normal.     Subcutaneous soft tissues are normal.     Limited evaluation of the lower abdominal and pelvic organs is unremarkable.  Uterus is surgically absent.  IMPRESSION:         Inflammatory enthesitis affecting the anterior superior aspect of the left sacral ala with mild associated reactive marrow edema, findings that are favored to represent sequela of patient's reported history of spondyloarthropathy.  Note is made of possible trace inflammatory changes adjacent to the posterior aspect of the right SI joints which may relate to a prominent vessel or mild additional enthesitis.     Unremarkable evaluation of the SI joints specifically without imaging findings to suggest sacroiliitis. No osseous erosive changes or joint effusions. No synovitis.    Past Medical History:  No date: Acid reflux  10/18/2012: Anxiety  No date: Arthritis  No date: Depression  10/21/2014: Diabetic peripheral  neuropathy - mild  No date: Difficult intubation  No date: Dry eyes  No date: Dry mouth  No date: Fever blister  10/3/2016: History of hepatitis B      Comment:  Hep B core total Ab (+), no active/chronic infection  No date: Hyperlipidemia  No date: Hypertension  No date: Insomnia  5/13/2014: Iritis  9/27/2012: Long-term current use of steroids  2/4/2015: Nausea & vomiting  No date: VAISHNAVI (obstructive sleep apnea)  10/1/2012: Type II diabetes mellitus    Past Surgical History:  1/9/2018: COLONOSCOPY; N/A      Comment:  Procedure: COLONOSCOPY;  Surgeon: Juwan Lopez MD;                 Location: [facility] ([address] [place] [address]);  Service: Endoscopy;                 Laterality: N/A;  per anesthesia, pt. needs to be on 2nd                floor due to past Anesthesia problems  12/3/2014: HYSTERECTOMY  5-14-14: KNEE ARTHROSCOPY      Comment:  right  No date: TUBAL LIGATION    Review of patient's family history indicates:  Problem: Diabetes      Relation: Mother          Age of Onset: (Not Specified)  Problem: Cataracts      Relation: Mother          Age of Onset: (Not Specified)  Problem: Stroke      Relation: Mother          Age of Onset: (Not Specified)  Problem: Heart attack      Relation: Mother          Age of Onset: (Not Specified)  Problem: Depression      Relation: Mother          Age of Onset: (Not Specified)  Problem: Stroke      Relation: Father          Age of Onset: (Not Specified)  Problem: Hypertension      Relation: Father          Age of Onset: (Not Specified)  Problem: Hyperlipidemia      Relation: Father          Age of Onset: (Not Specified)  Problem: Diabetes      Relation: Maternal Aunt          Age of Onset: (Not Specified)  Problem: Heart attack      Relation: Paternal Grandmother          Age of Onset: (Not Specified)  Problem: ADD / ADHD      Relation: Son          Age of Onset: (Not Specified)  Problem: No Known Problems      Relation: Sister          Age of Onset: (Not Specified)  Problem: No Known  Problems      Relation: Brother          Age of Onset: (Not Specified)  Problem: No Known Problems      Relation: Maternal Uncle          Age of Onset: (Not Specified)  Problem: No Known Problems      Relation: Paternal Aunt          Age of Onset: (Not Specified)  Problem: No Known Problems      Relation: Paternal Uncle          Age of Onset: (Not Specified)  Problem: No Known Problems      Relation: Maternal Grandmother          Age of Onset: (Not Specified)  Problem: No Known Problems      Relation: Maternal Grandfather          Age of Onset: (Not Specified)  Problem: Cancer      Relation: Paternal Grandfather          Age of Onset: (Not Specified)          Comment: Lung CA  Problem: Melanoma      Relation: Neg Hx          Age of Onset: (Not Specified)  Problem: Eczema      Relation: Neg Hx          Age of Onset: (Not Specified)  Problem: Lupus      Relation: Neg Hx          Age of Onset: (Not Specified)  Problem: Psoriasis      Relation: Neg Hx          Age of Onset: (Not Specified)  Problem: Amblyopia      Relation: Neg Hx          Age of Onset: (Not Specified)  Problem: Blindness      Relation: Neg Hx          Age of Onset: (Not Specified)  Problem: Glaucoma      Relation: Neg Hx          Age of Onset: (Not Specified)  Problem: Macular degeneration      Relation: Neg Hx          Age of Onset: (Not Specified)  Problem: Retinal detachment      Relation: Neg Hx          Age of Onset: (Not Specified)  Problem: Strabismus      Relation: Neg Hx          Age of Onset: (Not Specified)  Problem: Thyroid disease      Relation: Neg Hx          Age of Onset: (Not Specified)  Problem: Suicide      Relation: Neg Hx          Age of Onset: (Not Specified)  Problem: Acne      Relation: Neg Hx          Age of Onset: (Not Specified)  Problem: Cirrhosis      Relation: Neg Hx          Age of Onset: (Not Specified)  Problem: Asthma      Relation: Neg Hx          Age of Onset: (Not Specified)  Problem: Emphysema      Relation: Neg  Hx          Age of Onset: (Not Specified)      Social History    Socioeconomic History      Marital status:       Spouse name: Edward      Number of children: 2      Years of education: Not on file      Highest education level: Not on file    Occupational History      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      Drug use: No      Sexual activity: Yes        Partners: Male    Lifestyle      Physical activity        Days per week: 3 days        Minutes per session: 30 min      Stress: Very much    Relationships      Social connections        Talks on phone: Twice a week        Gets together: Once a week        Attends Jainism service: Not on file        Active member of club or organization: No        Attends meetings of clubs or organizations: Patient refused        Relationship status:     Other Topics      Concerns:        Are you pregnant or think you may be?: No        Breast-feeding: No    Social History Narrative      Not on file      Current Outpatient Medications:  amLODIPine (NORVASC) 10 MG tablet, Take 1 tablet (10 mg total) by mouth once daily., Disp: 90 tablet, Rfl: 1  aspirin (ECOTRIN) 81 MG EC tablet, Take 1 tablet (81 mg total) by mouth once daily., Disp: 30 tablet, Rfl: 0  atenoloL (TENORMIN) 100 MG tablet, Take 1 tablet (100 mg total) by mouth once daily., Disp: 90 tablet, Rfl: 1  atorvastatin (LIPITOR) 40 MG tablet, Take 1 tablet (40 mg total) by mouth once daily., Disp: 90 tablet, Rfl: 1  blood sugar diagnostic Strp, 1 strip by Misc.(Non-Drug;  Combo Route) route 2 (two) times daily., Disp: 150 strip, Rfl: 11  blood-glucose meter kit, Use twice daily., Disp: 1 each, Rfl: 0  boric acid (BORIC ACID) vaginal suppository, Place 1 each (650 mg total) vaginally every evening. (Patient not taking: Reported on 7/30/2020), Disp: 14 suppository, Rfl: 6  canagliflozin (INVOKANA) 300 mg Tab tablet, Take 1 tablet (300 mg total) by mouth before breakfast., Disp: 30 tablet, Rfl: 11  clonazePAM (KLONOPIN) 0.5 MG tablet, Take 1 tablet (0.5 mg total) by mouth daily as needed for Anxiety., Disp: 90 tablet, Rfl: 0  clotrimazole-betamethasone 1-0.05% (LOTRISONE) cream, Apply topically 2 (two) times daily as needed. , Disp: , Rfl:   cyclobenzaprine (FLEXERIL) 10 MG tablet, TAKE ONE TABLET BY MOUTH AT BEDTIME AS NEEDED, Disp: 90 tablet, Rfl: 1  docusate sodium (COLACE) 50 MG capsule, Take 1 capsule (50 mg total) by mouth 2 (two) times daily as needed for Constipation. (Patient taking differently: Take 50 mg by mouth 2 (two) times daily. ), Disp: 30 capsule, Rfl: 0  entecavir (BARACLUDE) 0.5 MG Tab, Take 1 tablet (0.5 mg total) by mouth once daily., Disp: 90 tablet, Rfl: 3  ergocalciferol (ERGOCALCIFEROL) 50,000 unit Cap, Take 1 capsule (50,000 Units total) by mouth every 7 days., Disp: 12 capsule, Rfl: 3  etanercept (ENBREL SURECLICK) 50 mg/mL (1 mL), Inject 1 mL (50 mg total) into the skin once a week., Disp: 4 mL, Rfl: 2  FLUoxetine 20 MG capsule, TAKE ONE CAPSULE BY MOUTH TWICE DAILY, Disp: 180 capsule, Rfl: 1  homatropine (ISOPTO HOMATROPINE) 5 % ophthalmic solution, Place 1 drop into the right eye 2 (two) times daily., Disp: 5 mL, Rfl: 1  hydroCHLOROthiazide (HYDRODIURIL) 25 MG tablet, Take 1 tablet (25 mg total) by mouth once daily., Disp: 90 tablet, Rfl: 1  hydrocortisone 2.5 % cream, Apply topically 2 (two) times daily., Disp: 28 g, Rfl: 1  hydroquinone 4 % Crea, Apply to dark areas qhs.  Not more than 6 months straight in same location.Use sunscreen in am (Patient  taking differently: continuous prn. Apply to dark areas qhs.  Not more than 6 months straight in same location.Use sunscreen in am), Disp: 46 g, Rfl: 1  lancets (ACCU-CHEK SOFTCLIX LANCETS) Misc, 1 Device by Misc.(Non-Drug; Combo Route) route 2 (two) times daily., Disp: 200 each, Rfl: 11  lancets (FREESTYLE LANCETS) 28 gauge Misc, Check blood sugars BID, FreeStyle Lancets 28 gauge, Disp: 200 each, Rfl: 11  levocetirizine (XYZAL) 5 MG tablet, Take 1 tablet (5 mg total) by mouth every evening., Disp: 30 tablet, Rfl: 11  losartan (COZAAR) 100 MG tablet, Take 1 tablet (100 mg total) by mouth once daily., Disp: 90 tablet, Rfl: 1  metFORMIN (GLUCOPHAGE-XR) 500 MG XR 24hr tablet, TAKE TWO TABLETS BY MOUTH AT BREAKFAST (Patient not taking: Reported on 7/30/2020), Disp: 180 tablet, Rfl: 1  methylnaltrexone (RELISTOR) 150 mg Tab, Take 450 mg by mouth., Disp: , Rfl:    naproxen (NAPROSYN) 500 MG tablet, Take 1 tablet (500 mg total) by mouth 2 (two) times daily as needed., Disp: 180 tablet, Rfl: 0  nystatin (MYCOSTATIN) powder, Apply to affected area 3 times daily, Disp: 1 Bottle, Rfl: 3  ondansetron (ZOFRAN-ODT) 4 MG TbDL, Take 1 tablet (4 mg total) by mouth every 8 (eight) hours as needed (nausea and vomiting)., Disp: 20 tablet, Rfl: 0  pantoprazole (PROTONIX) 40 MG tablet, TAKE ONE TABLET BY MOUTH EVERY DAY, Disp: 90 tablet, Rfl: 3  phenazopyridine (PYRIDIUM) 200 MG tablet, Take 1 tablet (200 mg total) by mouth 3 (three) times daily as needed for Pain., Disp: 20 tablet, Rfl: 0  phentermine (ADIPEX-P) 37.5 mg tablet, 1/2 tab po q am (Patient not taking: Reported on 7/30/2020), Disp: 30 tablet, Rfl: 1  SITagliptin (JANUVIA) 100 MG Tab, Take 1 tablet (100 mg total) by mouth once daily., Disp: 90 tablet, Rfl: 3  sulfaSALAzine (AZULFIDINE) 500 MG EC tablet, Take 3 tablets (1,500 mg total) by mouth 2 (two) times daily., Disp: 540 tablet, Rfl: 0  terconazole (TERAZOL 7) 0.4 % Crea, Place 1 applicator vaginally every evening., Disp:  45 g, Rfl: 2  topiramate (TOPAMAX) 100 MG tablet, Take 100 mg by mouth 2 (two) times daily., Disp: , Rfl:   triamcinolone acetonide 0.1% (KENALOG) 0.1 % cream, Apply topically 2 (two) times daily., Disp: 45 g, Rfl: 0  valACYclovir (VALTREX) 1000 MG tablet, valacyclovir 1 gram tablet Take 0.5 tablets every 12 hours by oral route., Disp: , Rfl:   zolpidem (AMBIEN) 5 MG Tab, Take 1 tablet (5 mg total) by mouth nightly as needed., Disp: 90 tablet, Rfl: 0    No current facility-administered medications for this visit.       Review of patient's allergies indicates:   -- Bactrim (sulfamethoxazole-trimethoprim) -- Itching   -- Trulicity (dulaglutide) -- Diarrhea and Other (See Comments)    --  Vomiting, abdominal pain   -- Diflucan (fluconazole) -- Swelling and Other (See Comments)    --  Sore on mouth        Review of Systems   Constitution: Negative for weight gain and weight loss.   Cardiovascular: Negative for chest pain.   Respiratory: Negative for shortness of breath.    Musculoskeletal: Positive for joint pain and neck pain (left greater than right shoulder and arm). Negative for back pain and joint swelling.   Gastrointestinal: Negative for abdominal pain, bowel incontinence, nausea and vomiting.   Genitourinary: Negative for bladder incontinence.   Neurological: Negative for numbness and paresthesias.         Objective:        General: Althea is well-developed, well-nourished, appears stated age, in no acute distress, alert and oriented to time, place and person.     General    Vitals reviewed.  Constitutional: She is oriented to person, place, and time. She appears well-developed and well-nourished.   HENT:   Head: Normocephalic and atraumatic.   Pulmonary/Chest: Effort normal.   Neurological: She is alert and oriented to person, place, and time.   Psychiatric: She has a normal mood and affect. Her behavior is normal. Judgment and thought content normal.     General Musculoskeletal Exam   Gait: normal     Right  Ankle/Foot Exam     Tests   Heel Walk: able to perform  Tiptoe Walk: able to perform    Left Ankle/Foot Exam     Tests   Heel Walk: able to perform  Tiptoe Walk: able to perform  Back (L-Spine & T-Spine) / Neck (C-Spine) Exam     Tenderness   The patient is tender to palpation of the right trapezial, left trapezial, right scapular and left scapular. Right paramedian tenderness of the Upper T-Spine and Lower C-Spine. Left paramedian tenderness of the Upper T-Spine and Lower C-Spine.     Neck (C-Spine) Range of Motion   Flexion:     40  Extension: 40Right Lateral Bend: 20 (with pain on the left) Left Lateral Bend: 20 Right Rotation: 40 (with pain on the left) Left Rotation: 40     Spinal Sensation   Right Side Sensation  C-Spine Level: normal   L-Spine Level: normal  S-Spine Level: normal  Left Side Sensation  C-Spine Level: normal  L-Spine Level: normal  S-Spine Level: normal    Back (L-Spine & T-Spine) Tests   Right Side Tests  Straight leg raise:      Sitting SLR: > 70 degrees      Left Side Tests  Straight leg raise:     Sitting SLR: > 70 degrees          Other She has no scoliosis .  Spinal Kyphosis:  Absent    Comments:  Diffuse tenderness  Right Shoulder Exam     Range of Motion   Active abduction: 170   Forward Flexion: 170     Left Shoulder Exam     Range of Motion   Active abduction: 170   Forward Flexion: 170     Tests & Signs   Rotator Cuff Painful Arc/Range: moderate      Muscle Strength   Right Upper Extremity   Biceps: 5/5/5   Deltoid:  5/5  Triceps:  5/5  Wrist extension: 5/5/5   Finger Flexors:  5/5  Left Upper Extremity  Biceps: 5/5/5   Deltoid:  5/5  Triceps:  5/5  Wrist extension: 5/5/5   Finger Flexors:  5/5  Right Lower Extremity   Hip Flexion: 5/5   Quadriceps:  5/5   Anterior tibial:  5/5/5  EHL:  5/5  Left Lower Extremity   Hip Flexion: 5/5   Quadriceps:  5/5   Anterior tibial:  5/5/5   EHL:  5/5    Reflexes     Left Side  Biceps:  2+  Triceps:  2+  Brachioradialis:  2+  Quadriceps:   2+  Achilles:  2+  Left Neville's Sign:  Absent  Babinski Sign:  absent    Right Side   Biceps:  2+  Triceps:  2+  Brachioradialis:  2+  Quadriceps:  2+  Achilles:  2+  Right Neville's Sign:  absent  Babinski Sign:  absent    Vascular Exam     Right Pulses        Carotid:                  2+    Left Pulses        Carotid:                  2+              Assessment:       1. Muscle spasm    2. Neck pain    3. Osteoarthritis of spine with radiculopathy, cervical region           Plan:       Orders Placed This Encounter    tiZANidine (ZANAFLEX) 2 MG tablet    methylPREDNISolone (MEDROL DOSEPACK) 4 mg tablet     1.  We discussed his neck posture and trying to sit with a small curve in lower back.  We discussed being more aware of posture and setting an alarm to help work on posture.  disucssed that her pain complaints seem to be muscle currently.    2.  We discussed his head is coming forward, and the weight of head on spine and the muscles get tired of holding head up.  She does have diffuse muscle soreness.  She is going to be going to therapy.    3.  Tizanidine 2-4mg po TID, we discussed taking it throughout the day to help the pain and not just at night.  Flexeril makes her sleepy.    4.  Medrol dose victor m, and then restart naproxen  5.  PT in [place].  She has been for the eval.    6.  RTC 3 months    More than 50% of the total time  of 45 minutes was spent face to face in counseling on diagnosis and treatment options. I also counseled patient  on common and most usual side effect of prescribed medications.  I reviewed Primary care , and other specialty's notes to better coordinate patient's care. All questions were answered, and patient voiced understanding.       Follow-up: Follow up in about 3 months (around 11/11/2020). If there are any questions prior to this, the patient was instructed to contact the office.

## 2020-08-11 ENCOUNTER — OFFICE VISIT (OUTPATIENT)
Dept: SPINE | Facility: CLINIC | Age: 55
End: 2020-08-11
Attending: PHYSICAL MEDICINE & REHABILITATION
Payer: MEDICARE

## 2020-08-11 VITALS
SYSTOLIC BLOOD PRESSURE: 134 MMHG | WEIGHT: 241.19 LBS | BODY MASS INDEX: 38.76 KG/M2 | HEART RATE: 69 BPM | HEIGHT: 66 IN | DIASTOLIC BLOOD PRESSURE: 75 MMHG

## 2020-08-11 DIAGNOSIS — M54.2 NECK PAIN: ICD-10-CM

## 2020-08-11 DIAGNOSIS — M47.22 OSTEOARTHRITIS OF SPINE WITH RADICULOPATHY, CERVICAL REGION: ICD-10-CM

## 2020-08-11 DIAGNOSIS — M62.838 MUSCLE SPASM: Primary | ICD-10-CM

## 2020-08-11 PROCEDURE — 99204 PR OFFICE/OUTPT VISIT, NEW, LEVL IV, 45-59 MIN: ICD-10-PCS | Mod: S$GLB,,, | Performed by: PHYSICAL MEDICINE & REHABILITATION

## 2020-08-11 PROCEDURE — 3008F BODY MASS INDEX DOCD: CPT | Mod: S$GLB,,, | Performed by: PHYSICAL MEDICINE & REHABILITATION

## 2020-08-11 PROCEDURE — 99204 OFFICE O/P NEW MOD 45 MIN: CPT | Mod: S$GLB,,, | Performed by: PHYSICAL MEDICINE & REHABILITATION

## 2020-08-11 PROCEDURE — 99999 PR PBB SHADOW E&M-EST. PATIENT-LVL V: CPT | Mod: PBBFAC,,, | Performed by: PHYSICAL MEDICINE & REHABILITATION

## 2020-08-11 PROCEDURE — 3078F DIAST BP <80 MM HG: CPT | Mod: S$GLB,,, | Performed by: PHYSICAL MEDICINE & REHABILITATION

## 2020-08-11 PROCEDURE — 3078F PR MOST RECENT DIASTOLIC BLOOD PRESSURE < 80 MM HG: ICD-10-PCS | Mod: S$GLB,,, | Performed by: PHYSICAL MEDICINE & REHABILITATION

## 2020-08-11 PROCEDURE — 99999 PR PBB SHADOW E&M-EST. PATIENT-LVL V: ICD-10-PCS | Mod: PBBFAC,,, | Performed by: PHYSICAL MEDICINE & REHABILITATION

## 2020-08-11 PROCEDURE — 3075F PR MOST RECENT SYSTOLIC BLOOD PRESS GE 130-139MM HG: ICD-10-PCS | Mod: S$GLB,,, | Performed by: PHYSICAL MEDICINE & REHABILITATION

## 2020-08-11 PROCEDURE — 3075F SYST BP GE 130 - 139MM HG: CPT | Mod: S$GLB,,, | Performed by: PHYSICAL MEDICINE & REHABILITATION

## 2020-08-11 PROCEDURE — 3008F PR BODY MASS INDEX (BMI) DOCUMENTED: ICD-10-PCS | Mod: S$GLB,,, | Performed by: PHYSICAL MEDICINE & REHABILITATION

## 2020-08-11 RX ORDER — METHYLPREDNISOLONE 4 MG/1
TABLET ORAL
Qty: 1 PACKAGE | Refills: 0 | Status: SHIPPED | OUTPATIENT
Start: 2020-08-11 | End: 2020-09-01

## 2020-08-11 RX ORDER — TIZANIDINE 2 MG/1
2-4 TABLET ORAL EVERY 8 HOURS PRN
Qty: 90 TABLET | Refills: 2 | Status: SHIPPED | OUTPATIENT
Start: 2020-08-11

## 2020-08-11 NOTE — LETTER
August 11, 2020      Milton Alexandra MD  [address]  [facility] [address]           [facility] [address]  [address], [address]  [facility] [address]  Phone: [phone]  Fax: [phone]          Patient: Althea Vazquez   MR Number: [id number]   YOB: 1965   Date of Visit: 8/11/2020       Dear Dr. Milton Alexandra:    Thank you for referring Althea Vazquez to me for evaluation. Attached you will find relevant portions of my assessment and plan of care.    If you have questions, please do not hesitate to call me. I look forward to following Althea Vazquez along with you.    Sincerely,    Odilia Novak MD    Enclosure  CC:  No Recipients    If you would like to receive this communication electronically, please contact [email or web address] or [phone] to request more information on [employer] access.    For providers and/or their staff who would like to refer a patient to [facility], please contact us through our one-stop-shop provider referral line, [facility], at [phone].    If you feel you have received this communication in error or would no longer like to receive these types of communications, please e-mail [email or web address]

## 2020-08-13 ENCOUNTER — TELEPHONE (OUTPATIENT)
Dept: PHARMACY | Facility: CLINIC | Age: 55
End: 2020-08-13

## 2020-08-13 NOTE — TELEPHONE ENCOUNTER
Patient confirmed shipment of Enbrel on  to arrive . Address and  verified. $0 copay (004). Patient stated she has 0 doses on hand. She reported 0 missed doses. Next dose due . Patient reported two new medications- Tizanidine 2mg and Medrol dosepack. Patient stated she takes Flexeril at night and to take Tizanidine during the day, since Flexeril makes her tired during the day. She stated Medrol was prescribed for pain. She stated she uses Nystatin and Terconazole cream PRN fungal infection under breasts. She stated she does not have her med list with her to confirm, but she believes she is using pyridium PRN as well. She reported no current UTI or fungal infxns at this time. Pt stated she uses Valtrex PRN fever blisters. Reviewed infxn precautions and she voiced understanding.  Reviewed no DDIs of concern with Enbrel. Cautioned CNS depression risks with use of Tizanidine, Xyzal, Ambien, Topiramate, Klonopin -mentioning may cause drowsiness or in severe cases slow breathing. Patient voiced understanding. She stated she is tolerating regimen well, and reported no concerns with treatment at this time. Patient stated she has not developed any new allergies or health conditions. Patient had no further questions or concerns.

## 2020-08-27 ENCOUNTER — CLINICAL SUPPORT (OUTPATIENT)
Dept: REHABILITATION | Facility: HOSPITAL | Age: 55
End: 2020-08-27
Payer: MEDICARE

## 2020-08-27 ENCOUNTER — TELEPHONE (OUTPATIENT)
Dept: PHARMACY | Facility: CLINIC | Age: 55
End: 2020-08-27

## 2020-08-27 ENCOUNTER — LAB VISIT (OUTPATIENT)
Dept: LAB | Facility: HOSPITAL | Age: 55
End: 2020-08-27
Attending: INTERNAL MEDICINE
Payer: MEDICARE

## 2020-08-27 DIAGNOSIS — R29.898 UPPER EXTREMITY WEAKNESS: ICD-10-CM

## 2020-08-27 DIAGNOSIS — M25.511 BILATERAL SHOULDER PAIN, UNSPECIFIED CHRONICITY: ICD-10-CM

## 2020-08-27 DIAGNOSIS — E11.9 TYPE 2 DIABETES MELLITUS WITHOUT COMPLICATION, WITH LONG-TERM CURRENT USE OF INSULIN: ICD-10-CM

## 2020-08-27 DIAGNOSIS — M25.512 BILATERAL SHOULDER PAIN, UNSPECIFIED CHRONICITY: ICD-10-CM

## 2020-08-27 DIAGNOSIS — Z79.4 TYPE 2 DIABETES MELLITUS WITHOUT COMPLICATION, WITH LONG-TERM CURRENT USE OF INSULIN: ICD-10-CM

## 2020-08-27 LAB
ALBUMIN SERPL BCP-MCNC: 4.5 G/DL (ref 3.5–5.2)
ALP SERPL-CCNC: 72 U/L (ref 38–126)
ALT SERPL W/O P-5'-P-CCNC: 16 U/L (ref 10–44)
ANION GAP SERPL CALC-SCNC: 8 MMOL/L (ref 8–16)
AST SERPL-CCNC: 21 U/L (ref 15–46)
BASOPHILS # BLD AUTO: 0.05 K/UL (ref 0–0.2)
BASOPHILS NFR BLD: 0.6 % (ref 0–1.9)
BILIRUB SERPL-MCNC: 0.4 MG/DL (ref 0.1–1)
BUN SERPL-MCNC: 15 MG/DL (ref 7–17)
CALCIUM SERPL-MCNC: 9.2 MG/DL (ref 8.7–10.5)
CHLORIDE SERPL-SCNC: 105 MMOL/L (ref 95–110)
CO2 SERPL-SCNC: 29 MMOL/L (ref 23–29)
CREAT SERPL-MCNC: 0.78 MG/DL (ref 0.5–1.4)
DIFFERENTIAL METHOD: ABNORMAL
EOSINOPHIL # BLD AUTO: 0.1 K/UL (ref 0–0.5)
EOSINOPHIL NFR BLD: 1.3 % (ref 0–8)
ERYTHROCYTE [DISTWIDTH] IN BLOOD BY AUTOMATED COUNT: 13.6 % (ref 11.5–14.5)
EST. GFR  (AFRICAN AMERICAN): >60 ML/MIN/1.73 M^2
EST. GFR  (NON AFRICAN AMERICAN): >60 ML/MIN/1.73 M^2
ESTIMATED AVG GLUCOSE: 137 MG/DL (ref 68–131)
GLUCOSE SERPL-MCNC: 202 MG/DL (ref 70–110)
HBA1C MFR BLD HPLC: 6.4 % (ref 4–5.6)
HCT VFR BLD AUTO: 45.4 % (ref 37–48.5)
HGB BLD-MCNC: 14.6 G/DL (ref 12–16)
IMM GRANULOCYTES # BLD AUTO: 0.09 K/UL (ref 0–0.04)
IMM GRANULOCYTES NFR BLD AUTO: 1.1 % (ref 0–0.5)
LYMPHOCYTES # BLD AUTO: 4 K/UL (ref 1–4.8)
LYMPHOCYTES NFR BLD: 50.6 % (ref 18–48)
MCH RBC QN AUTO: 30.5 PG (ref 27–31)
MCHC RBC AUTO-ENTMCNC: 32.2 G/DL (ref 32–36)
MCV RBC AUTO: 95 FL (ref 82–98)
MONOCYTES # BLD AUTO: 0.8 K/UL (ref 0.3–1)
MONOCYTES NFR BLD: 10.2 % (ref 4–15)
NEUTROPHILS # BLD AUTO: 2.9 K/UL (ref 1.8–7.7)
NEUTROPHILS NFR BLD: 36.2 % (ref 38–73)
NRBC BLD-RTO: 0 /100 WBC
PLATELET # BLD AUTO: 250 K/UL (ref 150–350)
PMV BLD AUTO: 11.2 FL (ref 9.2–12.9)
POTASSIUM SERPL-SCNC: 4.1 MMOL/L (ref 3.5–5.1)
PROT SERPL-MCNC: 7.6 G/DL (ref 6–8.4)
RBC # BLD AUTO: 4.79 M/UL (ref 4–5.4)
SODIUM SERPL-SCNC: 142 MMOL/L (ref 136–145)
WBC # BLD AUTO: 7.93 K/UL (ref 3.9–12.7)

## 2020-08-27 PROCEDURE — 36415 COLL VENOUS BLD VENIPUNCTURE: CPT | Mod: PO

## 2020-08-27 PROCEDURE — 83036 HEMOGLOBIN GLYCOSYLATED A1C: CPT

## 2020-08-27 PROCEDURE — 97110 THERAPEUTIC EXERCISES: CPT | Mod: PO

## 2020-08-27 PROCEDURE — 85025 COMPLETE CBC W/AUTO DIFF WBC: CPT | Mod: PO

## 2020-08-27 PROCEDURE — 80053 COMPREHEN METABOLIC PANEL: CPT | Mod: PO

## 2020-08-27 NOTE — PROGRESS NOTES
"  Physical Therapy Treatment Note     Name: Althea Carrion [facility] Number: [id number]    Therapy Diagnosis:   Encounter Diagnoses   Name Primary?    Bilateral shoulder pain, unspecified chronicity     Upper extremity weakness      Physician: Guido Beth MD    Visit Date: 8/27/2020    Physician Orders: PT Eval and Treat   Medical Diagnosis from Referral: M54.12 (ICD-10-CM) - Cervical radiculopathy  Evaluation Date: 8/4/2020  Authorization Period Expiration: 07/30/2021  Plan of Care Expiration: 10/04/2020  Visit # / Visits authorized:1/10    Time In: 10:00 am  Time Out: 10:30 am  Total Billable Time: 30 minutes    Precautions: Standard and Diabetes    Subjective     Pt reports: her pain is primarily located on left side of neck and into upper trap region.  She is reporting intermittent headache that is in temporal region.  She was compliant with home exercise program.  Response to previous treatment: muscle soreness  Functional change: none at this time    Pain: 4/10  Location:  Neck with left > right     Objective     Althea received therapeutic exercises to develop strength, endurance, ROM, flexibility and posture for 30 minutes including:     Date  8/27/2020 08/04/2020   VISIT 1/10 1/1   POC EXP. DATE 10/4/2020 10/04/2020   FACE-TO-FACE 9/4/2020 09/04/2020   FOTO 2/5 1/5        UBE  3'     SEATED:      UT stretch 5 x 10" 5 x 5"   Levator scap stretch 5 x 10" --   Cervical ROM  - flex/ext  - rotation R/L  - side bend R/L    1 x 10   1 x 10   1 x 10     1 x 10   1 x 10   1 x 10    Posterior shoulder rolls  1 x 15 --   Scapula squeeze 15 x 3" 5 x 3"   Chin Tucks  2 x 10            TABLE:      Supine Pec Stretch w/towel roll  --- 5 x 5"   Snow angels 1 x 10 --   Butterflies  1 x 15 --   Supine T's  1 x 15 RTB --   Supine Chin Tucks  1 x 10 --          Prone:   Hold    - Y's   - T's   - I's    NT            STANDING:      Pectoral stretch 3 x 10" --   Theraband  - W's  - T's  - I's   - Rows   - ER "   NT             1 x 10 RTB                 Initials MA TEODORA         Home Exercises Provided and Patient Education Provided     Education provided:   - HEP    Written Home Exercises Provided: Patient instructed to cont prior HEP.  Exercises were reviewed and Althea was able to demonstrate them prior to the end of the session.  Althea demonstrated good  understanding of the education provided.     See EMR under Patient Instructions for exercises provided prior visit.    Assessment     Althea arrives to therapy 15 minutes late, therefore limited time to perform full treatment.  Pt requires verbal cues for proper form and technique during upper trap stretch, and snow angels.  She will require progression of scapular stabilization exercises and addition of manual therapy to decrease pain, improve mobility, and return to prior level of function.  Althea is progressing well towards her goals.   Pt prognosis is Good.     Pt will continue to benefit from skilled outpatient physical therapy to address the deficits listed in the problem list box on initial evaluation, provide pt/family education and to maximize pt's level of independence in the home and community environment.     Pt's spiritual, cultural and educational needs considered and pt agreeable to plan of care and goals.     Anticipated barriers to physical therapy: none    Goals:   Short Term Goals: 4 weeks  1. This patient will be independent with a basic HEP. In Progress, Not Met  2. This patient will have cervical AROM WFL and pain free in order to ease difficulty of driving In Progress, Not Met  3. This patient will increase B UE strength by 1 grade in order to be able to ease difficulty of performing home ADL's In Progress, Not Met  4. This patient will have a pain rating of 6/10 at worst with ADLs. In Progress, Not Met  5. Patient able to score less than or equal to 37 on the FOTO Neck Survey placing patient in 35-45 impaired, limited, or restricted category  demonstrating overall decreased neck pain with functional activities. In Progress, Not Met  Long Term Goals: 8 weeks  1. This patient will be independent with an updated HEP. In Progress, Not Met  2. This patient will have B UE strength of 5/5 in order to be able to ease difficulty of perform ADL's and reduce instances of dropping items  In Progress, Not Met  3. This patient will have a pain rating of 4/10 at worst with ADLs. In Progress, Not Met  4. Patient able to score less than or equal to 28 on the FOTO Neck Survey placing patient in 20-30% impaired, limited, or restricted category demonstrating overall decreased neck pain with functional activities In Progress, Not Met      Plan     Continue with PT POC and progress to scapular stabilization exercises next visit.     Ajith Ang, PT

## 2020-09-01 ENCOUNTER — CLINICAL SUPPORT (OUTPATIENT)
Dept: REHABILITATION | Facility: HOSPITAL | Age: 55
End: 2020-09-01
Payer: MEDICARE

## 2020-09-01 DIAGNOSIS — R29.898 UPPER EXTREMITY WEAKNESS: ICD-10-CM

## 2020-09-01 DIAGNOSIS — M25.511 BILATERAL SHOULDER PAIN, UNSPECIFIED CHRONICITY: ICD-10-CM

## 2020-09-01 DIAGNOSIS — M25.512 BILATERAL SHOULDER PAIN, UNSPECIFIED CHRONICITY: ICD-10-CM

## 2020-09-01 PROCEDURE — 97110 THERAPEUTIC EXERCISES: CPT | Mod: PO

## 2020-09-01 NOTE — PROGRESS NOTES
"  Physical Therapy Treatment Note     Name: Althea Carrion [facility] Number: [id number]    Therapy Diagnosis:   Encounter Diagnoses   Name Primary?    Bilateral shoulder pain, unspecified chronicity     Upper extremity weakness      Physician: Guido Beth MD    Visit Date: 9/1/2020    Physician Orders: PT Eval and Treat   Medical Diagnosis from Referral: M54.12 (ICD-10-CM) - Cervical radiculopathy  Evaluation Date: 8/4/2020  Authorization Period Expiration: 07/30/2021  Plan of Care Expiration: 10/04/2020  Visit # / Visits authorized:2/10    Time In: 9:45 am  Time Out: 10:30 am  Total Billable Time: 30 minutes    Precautions: Standard and Diabetes    Subjective     Pt reports: continued pain in neck and radiating into left arm with intermittent headache.  She was compliant with home exercise program.  Response to previous treatment: muscle soreness  Functional change: none at this time    Pain: 4/10  Location:  Neck with left > right     Objective     Althea received therapeutic exercises to develop strength, endurance, ROM, flexibility and posture for 38 minutes including:     Date  9/1/2020 8/27/2020 08/04/2020   VISIT 2/10 1/10 1/1   POC EXP. DATE 10/4/2020 10/4/2020 10/04/2020   FACE-TO-FACE 9/4/2020 9/4/2020 09/04/2020   FOTO 3/5 2/5 1/5         UBE  L1 x 3' 3'     SEATED:       UT stretch 5 x 10" 5 x 10" 5 x 5"   Levator scap stretch 5 x 10" 5 x 10" --   Cervical ROM  - flex/ext  - rotation R/L  - side bend R/L   1 x 15  1 x 15  1 x 15    1 x 10   1 x 10   1 x 10     1 x 10   1 x 10   1 x 10    Posterior shoulder rolls  2 x 10 1 x 15 --   Scapula squeeze 20 x 3" 15 x 3" 5 x 3"   Chin Tucks  2 x 10 2 x 10             TABLE:       Supine Pec Stretch w/towel roll  --- --- 5 x 5"   Snow angels 1 x 10 1 x 10 --   Butterflies  1 x 15 1 x 15 --   Supine T's  2 x 10 RTB 1 x 15 RTB --   Supine Chin Tucks  1 x 10 1 x 10 --           Prone:    Hold    - Y's   - T's   - I's    NT   NT             STANDING: " "      Pectoral stretch 3 x 15" 3 x 10" --   Theraband  - W's  - T's  - I's   - Rows   - ER   --  --  2 x 10 YTB  2 x 10 RTB  --   NT             1 x 10 RTB                   Initials KAT STEPHENS BJ        Pt receives manual therapy x 5 minutes to cervical region to decrease pain, improve posture, and improve mobility:   - STM to upper trapezius, levator scapulae,  posterior cervical, and sub-occipital musculature  - light manual cervical distractions     Home Exercises   Provided and Patient Education Provided     Education provided:   - HEP    Written Home Exercises Provided: Patient instructed to cont prior HEP.  Exercises were reviewed and Althea was able to demonstrate them prior to the end of the session.  Althea demonstrated good  understanding of the education provided.     See EMR under Patient Instructions for exercises provided prior visit.    Assessment     Althea is progressed to manual therapy, but has voluntary muscle guarding throughout STM.  She is noted to have tenderness along scalenes and sub-occipital musculature.  Pt is progressing with therex and had improved stability during scapular stabilization exercises and reports no increase in symptoms prior to leaving clinic.    Althea is progressing well towards her goals.   Pt prognosis is Good.     Pt will continue to benefit from skilled outpatient physical therapy to address the deficits listed in the problem list box on initial evaluation, provide pt/family education and to maximize pt's level of independence in the home and community environment.     Pt's spiritual, cultural and educational needs considered and pt agreeable to plan of care and goals.     Anticipated barriers to physical therapy: none    Goals:   Short Term Goals: 4 weeks  1. This patient will be independent with a basic HEP. In Progress, Not Met  2. This patient will have cervical AROM WFL and pain free in order to ease difficulty of driving In Progress, Not Met  3. This patient " will increase B UE strength by 1 grade in order to be able to ease difficulty of performing home ADL's In Progress, Not Met  4. This patient will have a pain rating of 6/10 at worst with ADLs. In Progress, Not Met  5. Patient able to score less than or equal to 37 on the FOTO Neck Survey placing patient in 35-45 impaired, limited, or restricted category demonstrating overall decreased neck pain with functional activities. In Progress, Not Met  Long Term Goals: 8 weeks  1. This patient will be independent with an updated HEP. In Progress, Not Met  2. This patient will have B UE strength of 5/5 in order to be able to ease difficulty of perform ADL's and reduce instances of dropping items  In Progress, Not Met  3. This patient will have a pain rating of 4/10 at worst with ADLs. In Progress, Not Met  4. Patient able to score less than or equal to 28 on the FOTO Neck Survey placing patient in 20-30% impaired, limited, or restricted category demonstrating overall decreased neck pain with functional activities In Progress, Not Met      Plan     Continue with PT POC and progress to scapular stabilization exercises next visit.     Ajith Ang, PT

## 2020-09-03 ENCOUNTER — DOCUMENTATION ONLY (OUTPATIENT)
Dept: REHABILITATION | Facility: HOSPITAL | Age: 55
End: 2020-09-03

## 2020-09-03 ENCOUNTER — CLINICAL SUPPORT (OUTPATIENT)
Dept: REHABILITATION | Facility: HOSPITAL | Age: 55
End: 2020-09-03
Payer: MEDICARE

## 2020-09-03 DIAGNOSIS — M25.512 BILATERAL SHOULDER PAIN, UNSPECIFIED CHRONICITY: ICD-10-CM

## 2020-09-03 DIAGNOSIS — M25.511 BILATERAL SHOULDER PAIN, UNSPECIFIED CHRONICITY: ICD-10-CM

## 2020-09-03 DIAGNOSIS — R29.898 UPPER EXTREMITY WEAKNESS: ICD-10-CM

## 2020-09-03 PROCEDURE — 97110 THERAPEUTIC EXERCISES: CPT | Mod: PO

## 2020-09-03 NOTE — PROGRESS NOTES
Face to Face PTA Conference performed with Mary Ferrell PTA regarding patient's current status, overall progress, and plan of care. Pt will be seen by a physical therapist minimally every 6th visit or every 30 days.    Face to Face PTA Conference performed with Ajith Ang PT regarding patient's current status, overall progress, and plan of care. Pt will be seen by a physical therapist minimally every 6th visit or every 30 days.    Mary Ferrell PTA  9/3/2020

## 2020-09-03 NOTE — PROGRESS NOTES
"  Physical Therapy Treatment Note     Name: Althea Carrion [facility] Number: [id number]    Therapy Diagnosis:   Encounter Diagnoses   Name Primary?    Bilateral shoulder pain, unspecified chronicity     Upper extremity weakness      Physician: Guido Beth MD    Visit Date: 9/3/2020    Physician Orders: PT Eval and Treat   Medical Diagnosis from Referral: M54.12 (ICD-10-CM) - Cervical radiculopathy  Evaluation Date: 8/4/2020  Authorization Period Expiration: 07/30/2021  Plan of Care Expiration: 10/04/2020  Visit # / Visits authorized:3/10    Time In: 9:45 am  Time Out: 10:30 am  Total Billable Time: 40 minutes    Precautions: Standard and Diabetes    Subjective     Pt reports: having decreased pain this morning with only tightness in left levator scap region.  She was compliant with home exercise program.  Response to previous treatment: muscle soreness  Functional change: none at this time    Pain: 2/10  Location:  Neck with left > right     Objective     Althea received therapeutic exercises to develop strength, endurance, ROM, flexibility and posture for 40 minutes including:     Date  9/3/2020 9/1/2020 8/27/2020 08/04/2020   VISIT 3/10 2/10 1/10 1/1   POC EXP. DATE 10/4/2020 10/4/2020 10/4/2020 10/04/2020   FACE-TO-FACE 10/3/2020 9/4/2020 9/4/2020 09/04/2020   FOTO 4/5 3/5 2/5 1/5          UBE  L1 x 3' L1 x 3' 3'     SEATED:        UT stretch 5 x 10" 5 x 10" 5 x 10" 5 x 5"   Levator scap stretch 5 x 10" 5 x 10" 5 x 10" --   Cervical ROM  - flex/ext  - rotation R/L  - side bend R/L   1 x 15  1 x 15  1 x 15   1 x 15  1 x 15  1 x 15    1 x 10   1 x 10   1 x 10     1 x 10   1 x 10   1 x 10    Posterior shoulder rolls  NT 2 x 10 1 x 15 --   Scapula squeeze NT 20 x 3" 15 x 3" 5 x 3"   Chin Tucks  2 x 10 2 x 10 2 x 10              TABLE:        Supine Pec Stretch w/towel roll  -- --- --- 5 x 5"   Snow angels 1 x 15 1 x 10 1 x 10 --   Butterflies  1 x 15 1 x 15 1 x 15 --   Supine T's  2 x 10 GTB 2 x 10 " "RTB 1 x 15 RTB --   Supine Chin Tucks  1 x 10 1 x 10 1 x 10 --                     STANDING:        Pectoral stretch 3 x 20" 3 x 15" 3 x 10" --   Theraband  - W's  - T's  - I's   - Rows   - ER   --  1 x 10 YTB  2 x 10 RTB  2 x 10 GTB  1 x 15 RTB   --  --  2 x 10 YTB  2 x 10 RTB  --   NT             1 x 10 RTB   Posture ex: on wall:  Horizontal abduction  ER  diagonals     next                                      Initials KAT ARAIZA        Pt receives manual therapy x 5 minutes to cervical region to decrease pain, improve posture, and improve mobility:   - STM to upper trapezius, levator scapulae,  posterior cervical, and sub-occipital musculature  - light manual cervical distractions     Home Exercises   Provided and Patient Education Provided     Education provided:   - HEP    Written Home Exercises Provided: Patient instructed to cont prior HEP.  Exercises were reviewed and Althea was able to demonstrate them prior to the end of the session.  Althea demonstrated good  understanding of the education provided.     See EMR under Patient Instructions for exercises provided prior visit.    Assessment     Althea continues with manual therapy and noted to have decreased tenderness in scalenes and suboccipital musculature.  She is progressed with scapular stabilization exercises with no increase in symptoms reported prior to leaving clinic.  Pt will continue with progression with addition of posture exercises next visit.    Althea is progressing well towards her goals.   Pt prognosis is Good.     Pt will continue to benefit from skilled outpatient physical therapy to address the deficits listed in the problem list box on initial evaluation, provide pt/family education and to maximize pt's level of independence in the home and community environment.     Pt's spiritual, cultural and educational needs considered and pt agreeable to plan of care and goals.     Anticipated barriers to physical therapy: none    Goals: "   Short Term Goals: 4 weeks  1. This patient will be independent with a basic HEP. In Progress, Not Met  2. This patient will have cervical AROM WFL and pain free in order to ease difficulty of driving In Progress, Not Met  3. This patient will increase B UE strength by 1 grade in order to be able to ease difficulty of performing home ADL's In Progress, Not Met  4. This patient will have a pain rating of 6/10 at worst with ADLs. In Progress, Not Met  5. Patient able to score less than or equal to 37 on the FOTO Neck Survey placing patient in 35-45 impaired, limited, or restricted category demonstrating overall decreased neck pain with functional activities. In Progress, Not Met  Long Term Goals: 8 weeks  1. This patient will be independent with an updated HEP. In Progress, Not Met  2. This patient will have B UE strength of 5/5 in order to be able to ease difficulty of perform ADL's and reduce instances of dropping items  In Progress, Not Met  3. This patient will have a pain rating of 4/10 at worst with ADLs. In Progress, Not Met  4. Patient able to score less than or equal to 28 on the FOTO Neck Survey placing patient in 20-30% impaired, limited, or restricted category demonstrating overall decreased neck pain with functional activities In Progress, Not Met      Plan     Continue with PT POC and progress to posture exercises next visit.     Ajith Ang, PT

## 2020-09-08 ENCOUNTER — CLINICAL SUPPORT (OUTPATIENT)
Dept: REHABILITATION | Facility: HOSPITAL | Age: 55
End: 2020-09-08
Payer: MEDICARE

## 2020-09-08 DIAGNOSIS — M25.512 BILATERAL SHOULDER PAIN, UNSPECIFIED CHRONICITY: ICD-10-CM

## 2020-09-08 DIAGNOSIS — M25.511 BILATERAL SHOULDER PAIN, UNSPECIFIED CHRONICITY: ICD-10-CM

## 2020-09-08 DIAGNOSIS — R29.898 UPPER EXTREMITY WEAKNESS: ICD-10-CM

## 2020-09-08 PROCEDURE — 97110 THERAPEUTIC EXERCISES: CPT | Mod: PO

## 2020-09-08 NOTE — PROGRESS NOTES
"  Physical Therapy Treatment Note     Name: Althea Carrion [facility] Number: [id number]    Therapy Diagnosis:   Encounter Diagnoses   Name Primary?    Bilateral shoulder pain, unspecified chronicity     Upper extremity weakness      Physician: Guido Beth MD    Visit Date: 9/8/2020    Physician Orders: PT Eval and Treat   Medical Diagnosis from Referral: M54.12 (ICD-10-CM) - Cervical radiculopathy  Evaluation Date: 8/4/2020  Authorization Period Expiration: 07/30/2021  Plan of Care Expiration: 10/04/2020  Visit # / Visits authorized:4/10    Time In: 9:45 am  Time Out: 10:30 am  Total Billable Time: 40 minutes    Precautions: Standard and Diabetes    Subjective     Pt reports: increased tension in left upper trap/levator scap region this morning.  She was compliant with home exercise program.  Response to previous treatment: muscle soreness  Functional change: none at this time    Pain: 3/10  Location:  Neck with left > right     Objective     Althea received therapeutic exercises to develop strength, endurance, ROM, flexibility and posture for 40 minutes including:     Date  9/8/2020 9/3/2020 9/1/2020 8/27/2020 08/04/2020   VISIT 4/10 3/10 2/10 1/10 1/1   POC EXP. DATE 10/4/2020 10/4/2020 10/4/2020 10/4/2020 10/04/2020   FACE-TO-FACE 10/3/2020 10/3/2020 9/4/2020 9/4/2020 09/04/2020   FOTO 5/5 4/5 3/5 2/5 1/5           UBE  L1 x 3' L1 x 3' L1 x 3' 3'     SEATED:         UT stretch 5 x 10" 5 x 10" 5 x 10" 5 x 10" 5 x 5"   Levator scap stretch 5 x 10" 5 x 10" 5 x 10" 5 x 10" --   Cervical ROM  - flex/ext  - rotation R/L  - side bend R/L   1 x 15  1 x 15  1 x 15   1 x 15  1 x 15  1 x 15   1 x 15  1 x 15  1 x 15    1 x 10   1 x 10   1 x 10     1 x 10   1 x 10   1 x 10    Posterior shoulder rolls  NT NT 2 x 10 1 x 15 --   Scapula squeeze NT NT 20 x 3" 15 x 3" 5 x 3"   Chin Tucks  2 x 10 2 x 10 2 x 10 2 x 10               TABLE:         Supine Pec Stretch w/towel roll  -- -- --- --- 5 x 5"   Snow angels 1 " "x 15 1 x 15 1 x 10 1 x 10 --   Butterflies  1 x 15 1 x 15 1 x 15 1 x 15 --   Supine T's  2 x 10 GTB 2 x 10 GTB 2 x 10 RTB 1 x 15 RTB --   Supine Chin Tucks  1 x 10 1 x 10 1 x 10 1 x 10 --                       STANDING:         Pectoral stretch 3 x 20" 3 x 20" 3 x 15" 3 x 10" --   Theraband  - W's  - T's  - I's   - Rows   - ER   --  1 x 15 YTB  2 x 10 GTB  2 x 10 GTB  2 x 10 RTB   --  1 x 10 YTB  2 x 10 RTB  2 x 10 GTB  1 x 15 RTB   --  --  2 x 10 YTB  2 x 10 RTB  --   NT             1 x 10 RTB   Posture ex: on wall:  Horizontal abduction  ER  diagonals   1 x 10 YTB  1 x 10 YTB  1 x 10 YTB     next                                          Initials KAT ARAIZA      Functional limitation reporting disability percentage was obtained through use of FOTO Neck Survey indicating 54% disability     Pt receives manual therapy x 5 minutes to cervical region to decrease pain, improve posture, and improve mobility: NOT TODAY  - STM to upper trapezius, levator scapulae,  posterior cervical, and sub-occipital musculature  - light manual cervical distractions     Home Exercises   Provided and Patient Education Provided     Education provided:   - HEP    Written Home Exercises Provided: Patient instructed to cont prior HEP.  Exercises were reviewed and Althea was able to demonstrate them prior to the end of the session.  Althea demonstrated good  understanding of the education provided.     See EMR under Patient Instructions for exercises provided prior visit.    Assessment     Althea continues to require frequent verbal as well as tactile cues for proper form and technique during scapular stabilization exercises.  Pt has increased limitation percentage on FOTO survey on this date which may be attributed to increased pain levels on this date.  She did not have time for manual therapy during today's treatment and will resume next visit to help decrease pain and muscle tension.  Althea is progressing well towards her goals. " "  Pt prognosis is Good.     Pt will continue to benefit from skilled outpatient physical therapy to address the deficits listed in the problem list box on initial evaluation, provide pt/family education and to maximize pt's level of independence in the home and community environment.     Pt's spiritual, cultural and educational needs considered and pt agreeable to plan of care and goals.     Anticipated barriers to physical therapy: none    Goals:   Short Term Goals: 4 weeks  1. This patient will be independent with a basic HEP. In Progress, Not Met  2. This patient will have cervical AROM WFL and pain free in order to ease difficulty of driving In Progress, Not Met  3. This patient will increase B UE strength by 1 grade in order to be able to ease difficulty of performing home ADL's In Progress, Not Met  4. This patient will have a pain rating of 6/10 at worst with ADLs. In Progress, Not Met  5. Patient able to score less than or equal to 37 on the FOTO Neck Survey placing patient in 35-45 impaired, limited, or restricted category demonstrating overall decreased neck pain with functional activities. In Progress, Not Met  Long Term Goals: 8 weeks  1. This patient will be independent with an updated HEP. In Progress, Not Met  2. This patient will have B UE strength of 5/5 in order to be able to ease difficulty of perform ADL's and reduce instances of dropping items  In Progress, Not Met  3. This patient will have a pain rating of 4/10 at worst with ADLs. In Progress, Not Met  4. Patient able to score less than or equal to 28 on the FOTO Neck Survey placing patient in 20-30% impaired, limited, or restricted category demonstrating overall decreased neck pain with functional activities In Progress, Not Met      Plan     Continue with PT POC and progress to "W" next visit    Ajith Ang PT     "

## 2020-09-10 ENCOUNTER — CLINICAL SUPPORT (OUTPATIENT)
Dept: REHABILITATION | Facility: HOSPITAL | Age: 55
End: 2020-09-10
Payer: MEDICARE

## 2020-09-10 DIAGNOSIS — R29.898 UPPER EXTREMITY WEAKNESS: ICD-10-CM

## 2020-09-10 DIAGNOSIS — M25.512 BILATERAL SHOULDER PAIN, UNSPECIFIED CHRONICITY: ICD-10-CM

## 2020-09-10 DIAGNOSIS — M25.511 BILATERAL SHOULDER PAIN, UNSPECIFIED CHRONICITY: ICD-10-CM

## 2020-09-10 PROCEDURE — 97110 THERAPEUTIC EXERCISES: CPT | Mod: PO

## 2020-09-10 PROCEDURE — 97140 MANUAL THERAPY 1/> REGIONS: CPT | Mod: PO

## 2020-09-10 NOTE — PROGRESS NOTES
"  Physical Therapy Treatment Note     Name: Althea Carrion [facility] Number: [id number]    Therapy Diagnosis:   Encounter Diagnoses   Name Primary?    Bilateral shoulder pain, unspecified chronicity     Upper extremity weakness      Physician: Guido Beth MD    Visit Date: 9/10/2020    Physician Orders: PT Eval and Treat   Medical Diagnosis from Referral: M54.12 (ICD-10-CM) - Cervical radiculopathy  Evaluation Date: 8/4/2020  Authorization Period Expiration: 07/30/2021  Plan of Care Expiration: 10/04/2020  Visit # / Visits authorized:5/10    Time In: 9:45 am  Time Out: 10:30 am  Total Billable Time: 40 minutes    Precautions: Standard and Diabetes    Subjective     Pt reports: continued tension in neck, left upper trap region, and difficulty turning head quickly.  She was compliant with home exercise program.  Response to previous treatment: muscle soreness  Functional change: none at this time    Pain: 3/10  Location:  Neck with left > right     Objective     Althea received therapeutic exercises to develop strength, endurance, ROM, flexibility and posture for 30 minutes including:     Date  9/10/2020 9/8/2020 9/3/2020 9/1/2020 8/27/2020 08/04/2020   VISIT 5/10 4/10 3/10 2/10 1/10 1/1   POC EXP. DATE 10/4/2020 10/4/2020 10/4/2020 10/4/2020 10/4/2020 10/04/2020   FACE-TO-FACE 10/3/2020 10/3/2020 10/3/2020 9/4/2020 9/4/2020 09/04/2020   FOTO --- 5/5 4/5 3/5 2/5 1/5            UBE  L1 x 3' L1 x 3' L1 x 3' L1 x 3' 3'     SEATED:          UT stretch 5 x 10" 5 x 10" 5 x 10" 5 x 10" 5 x 10" 5 x 5"   Levator scap stretch 5 x 10" 5 x 10" 5 x 10" 5 x 10" 5 x 10" --   Cervical ROM  - flex/ext  - rotation R/L  - side bend R/L   NT   1 x 15  1 x 15  1 x 15   1 x 15  1 x 15  1 x 15   1 x 15  1 x 15  1 x 15    1 x 10   1 x 10   1 x 10     1 x 10   1 x 10   1 x 10    Posterior shoulder rolls  NT NT NT 2 x 10 1 x 15 --   Scapula squeeze NT NT NT 20 x 3" 15 x 3" 5 x 3"   Chin Tucks  2 x 10 2 x 10 2 x 10 2 x 10 2 x " "10                TABLE:          Supine Pec Stretch w/towel roll  -- -- -- --- --- 5 x 5"   Snow angels 2 x 10 1 x 15 1 x 15 1 x 10 1 x 10 --   Butterflies  2 x 10 1 x 15 1 x 15 1 x 15 1 x 15 --   Supine T's  --- 2 x 10 GTB 2 x 10 GTB 2 x 10 RTB 1 x 15 RTB --   Supine Chin Tucks  2 x 10 1 x 10 1 x 10 1 x 10 1 x 10 --                         STANDING:          Pectoral stretch 3 x 20" 3 x 20" 3 x 20" 3 x 15" 3 x 10" --   Theraband  - W's  - T's  - I's   - Rows   - ER   1 x 15 YTB  1 x 15 YTB  2 x 10 GTB  3 x 10 GTB  2 x 10 RTB   --  1 x 15 YTB  2 x 10 GTB  2 x 10 GTB  2 x 10 RTB   --  1 x 10 YTB  2 x 10 RTB  2 x 10 GTB  1 x 15 RTB   --  --  2 x 10 YTB  2 x 10 RTB  --   NT             1 x 10 RTB   Posture ex: on wall:  Horizontal abduction  ER  diagonals   1 x 15 YTB  1 x 15 YTB  1 x 15 YTB   1 x 10 YTB  1 x 10 YTB  1 x 10 YTB     next                                              Initials KAT ARAIZA        Pt receives manual therapy x 10 minutes to cervical region to decrease pain, improve posture, and improve mobility:   - STM to upper trapezius, levator scapulae,  posterior cervical, and sub-occipital musculature  - light manual cervical distractions  - IASTM to upper trapezius, levator scapulae, and cervical erector spinae     Home Exercises   Provided and Patient Education Provided     Education provided:   - HEP    Written Home Exercises Provided: Patient instructed to cont prior HEP.  Exercises were reviewed and Althea was able to demonstrate them prior to the end of the session.  Althea demonstrated good  understanding of the education provided.     See EMR under Patient Instructions for exercises provided prior visit.    Assessment     Althea receives IASTM and STM to upper trapezius, levator scapulae, and cervical erector spinae to decrease pain and muscle guarding in cervical region.  She is progressing with posture and scapular stabilization exercises and only required one verbal cue for proper " technique of posture exercise.    Althea is progressing well towards her goals.   Pt prognosis is Good.     Pt will continue to benefit from skilled outpatient physical therapy to address the deficits listed in the problem list box on initial evaluation, provide pt/family education and to maximize pt's level of independence in the home and community environment.     Pt's spiritual, cultural and educational needs considered and pt agreeable to plan of care and goals.     Anticipated barriers to physical therapy: none    Goals:   Short Term Goals: 4 weeks  1. This patient will be independent with a basic HEP. In Progress, Not Met  2. This patient will have cervical AROM WFL and pain free in order to ease difficulty of driving In Progress, Not Met  3. This patient will increase B UE strength by 1 grade in order to be able to ease difficulty of performing home ADL's In Progress, Not Met  4. This patient will have a pain rating of 6/10 at worst with ADLs. In Progress, Not Met  5. Patient able to score less than or equal to 37 on the FOTO Neck Survey placing patient in 35-45 impaired, limited, or restricted category demonstrating overall decreased neck pain with functional activities. In Progress, Not Met  Long Term Goals: 8 weeks  1. This patient will be independent with an updated HEP. In Progress, Not Met  2. This patient will have B UE strength of 5/5 in order to be able to ease difficulty of perform ADL's and reduce instances of dropping items  In Progress, Not Met  3. This patient will have a pain rating of 4/10 at worst with ADLs. In Progress, Not Met  4. Patient able to score less than or equal to 28 on the FOTO Neck Survey placing patient in 20-30% impaired, limited, or restricted category demonstrating overall decreased neck pain with functional activities In Progress, Not Met      Plan     Continue with PT POC.    Ajith Ang, PT

## 2020-09-12 ENCOUNTER — TELEPHONE (OUTPATIENT)
Dept: PHARMACY | Facility: CLINIC | Age: 55
End: 2020-09-12

## 2020-09-15 ENCOUNTER — CLINICAL SUPPORT (OUTPATIENT)
Dept: REHABILITATION | Facility: HOSPITAL | Age: 55
End: 2020-09-15
Payer: MEDICARE

## 2020-09-15 DIAGNOSIS — M25.512 BILATERAL SHOULDER PAIN, UNSPECIFIED CHRONICITY: ICD-10-CM

## 2020-09-15 DIAGNOSIS — R29.898 UPPER EXTREMITY WEAKNESS: ICD-10-CM

## 2020-09-15 DIAGNOSIS — M25.511 BILATERAL SHOULDER PAIN, UNSPECIFIED CHRONICITY: ICD-10-CM

## 2020-09-15 PROCEDURE — 97110 THERAPEUTIC EXERCISES: CPT | Mod: PO

## 2020-09-15 PROCEDURE — 97140 MANUAL THERAPY 1/> REGIONS: CPT | Mod: PO

## 2020-09-15 NOTE — PROGRESS NOTES
"  Physical Therapy Treatment Note     Name: Althea Carrion [facility] Number: [id number]    Therapy Diagnosis:   Encounter Diagnoses   Name Primary?    Bilateral shoulder pain, unspecified chronicity     Upper extremity weakness      Physician: Guido Beth MD    Visit Date: 9/15/2020    Physician Orders: PT Eval and Treat   Medical Diagnosis from Referral: M54.12 (ICD-10-CM) - Cervical radiculopathy  Evaluation Date: 8/4/2020  Authorization Period Expiration: 07/30/2021  Plan of Care Expiration: 10/04/2020  Visit # / Visits authorized:6/10    Time In: 9:45 am  Time Out: 10:30 am  Total Billable Time: 40 minutes    Precautions: Standard and Diabetes    Subjective     Pt reports: continues to have daily neck pain but the intensity has decreased since beginning therapy.  She was compliant with home exercise program.  Response to previous treatment: muscle soreness  Functional change: none at this time    Pain: 3/10  Location:  Neck with left > right     Objective     Althea received therapeutic exercises to develop strength, endurance, ROM, flexibility and posture for 35 minutes including:     Date  9/15/2020 9/10/2020 9/8/2020 9/3/2020 9/1/2020 8/27/2020 08/04/2020   VISIT 6/10 5/10 4/10 3/10 2/10 1/10 1/1   POC EXP. DATE 10/4/2020 10/4/2020 10/4/2020 10/4/2020 10/4/2020 10/4/2020 10/04/2020   FACE-TO-FACE 10/3/2020 10/3/2020 10/3/2020 10/3/2020 9/4/2020 9/4/2020 09/04/2020   FOTO  --- 5/5 4/5 3/5 2/5 1/5             UBE  L2 x 3' L1 x 3' L1 x 3' L1 x 3' L1 x 3' 3'     SEATED:           UT stretch 5 x 10" 5 x 10" 5 x 10" 5 x 10" 5 x 10" 5 x 10" 5 x 5"   Levator scap stretch 5 x 10" 5 x 10" 5 x 10" 5 x 10" 5 x 10" 5 x 10" --   Cervical ROM  - flex/ext  - rotation R/L  - side bend R/L    1 x 10   1 x 10   1 x 10   NT   1 x 15  1 x 15  1 x 15   1 x 15  1 x 15  1 x 15   1 x 15  1 x 15  1 x 15    1 x 10   1 x 10   1 x 10     1 x 10   1 x 10   1 x 10    Posterior shoulder rolls  --- NT NT NT 2 x 10 1 x 15 -- " "  Scapula squeeze --- NT NT NT 20 x 3" 15 x 3" 5 x 3"   Chin Tucks  2 x 10 2 x 10 2 x 10 2 x 10 2 x 10 2 x 10                 TABLE:           Snow angels -- 2 x 10 1 x 15 1 x 15 1 x 10 1 x 10 --   Butterflies  -- 2 x 10 1 x 15 1 x 15 1 x 15 1 x 15 --   Supine T's  -- --- 2 x 10 GTB 2 x 10 GTB 2 x 10 RTB 1 x 15 RTB --   Supine Chin Tucks  -- 2 x 10 1 x 10 1 x 10 1 x 10 1 x 10 --                           STANDING:           Pectoral stretch 3 x 20" 3 x 20" 3 x 20" 3 x 20" 3 x 15" 3 x 10" --   Theraband  - W's  - T's  - I's   - Rows   - ER   2 x 10 YTB  2 x 10 YTB  3 x 10 GTB  3 x 10 GTB  3 x 10 RTB   1 x 15 YTB  1 x 15 YTB  2 x 10 GTB  3 x 10 GTB  2 x 10 RTB   --  1 x 15 YTB  2 x 10 GTB  2 x 10 GTB  2 x 10 RTB   --  1 x 10 YTB  2 x 10 RTB  2 x 10 GTB  1 x 15 RTB   --  --  2 x 10 YTB  2 x 10 RTB  --   NT             1 x 10 RTB   Posture ex: on wall:  Horizontal abduction  ER  diagonals   1 x 15 YTB  1 x 15 YTB  1 x 15 YTB   1 x 15 YTB  1 x 15 YTB  1 x 15 YTB   1 x 10 YTB  1 x 10 YTB  1 x 10 YTB     next                                                  Initials KAT STEPHENS MA, MA, MA, MA BJ        Pt receives manual therapy x 10 minutes to cervical region to decrease pain, improve posture, and improve mobility:   - STM to upper trapezius, levator scapulae,  posterior cervical, and sub-occipital musculature  - IASTM to upper trapezius, levator scapulae, and cervical erector spinae     Home Exercises   Provided and Patient Education Provided     Education provided:   - HEP    Written Home Exercises Provided: Patient instructed to cont prior HEP.  Exercises were reviewed and Adrenlissette was able to demonstrate them prior to the end of the session.  Adrence demonstrated good  understanding of the education provided.     See EMR under Patient Instructions for exercises provided prior visit.    Assessment     Adrence is noted to have increased tissue tension in right upper trap, posterior cervical musculature as compared to last visit " as well as greater difficulty performing posture exercises with right UE.  She is able to progress with scapular stabilization exercises and will continue with manual therapy to improve CROM and decrease pain.    Althea is progressing well towards her goals.   Pt prognosis is Good.     Pt will continue to benefit from skilled outpatient physical therapy to address the deficits listed in the problem list box on initial evaluation, provide pt/family education and to maximize pt's level of independence in the home and community environment.     Pt's spiritual, cultural and educational needs considered and pt agreeable to plan of care and goals.     Anticipated barriers to physical therapy: none    Goals:   Short Term Goals: 4 weeks  1. This patient will be independent with a basic HEP. In Progress, Not Met  2. This patient will have cervical AROM WFL and pain free in order to ease difficulty of driving In Progress, Not Met  3. This patient will increase B UE strength by 1 grade in order to be able to ease difficulty of performing home ADL's In Progress, Not Met  4. This patient will have a pain rating of 6/10 at worst with ADLs. In Progress, Not Met  5. Patient able to score less than or equal to 37 on the FOTO Neck Survey placing patient in 35-45 impaired, limited, or restricted category demonstrating overall decreased neck pain with functional activities. In Progress, Not Met  Long Term Goals: 8 weeks  1. This patient will be independent with an updated HEP. In Progress, Not Met  2. This patient will have B UE strength of 5/5 in order to be able to ease difficulty of perform ADL's and reduce instances of dropping items  In Progress, Not Met  3. This patient will have a pain rating of 4/10 at worst with ADLs. In Progress, Not Met  4. Patient able to score less than or equal to 28 on the FOTO Neck Survey placing patient in 20-30% impaired, limited, or restricted category demonstrating overall decreased neck pain with  functional activities In Progress, Not Met      Plan     Continue with PT POC. Progress posture exercises to 2 x 10 reps next visit.    Ajith Ang, PT

## 2020-09-17 ENCOUNTER — CLINICAL SUPPORT (OUTPATIENT)
Dept: REHABILITATION | Facility: HOSPITAL | Age: 55
End: 2020-09-17
Payer: MEDICARE

## 2020-09-17 DIAGNOSIS — M25.512 BILATERAL SHOULDER PAIN, UNSPECIFIED CHRONICITY: ICD-10-CM

## 2020-09-17 DIAGNOSIS — R29.898 UPPER EXTREMITY WEAKNESS: ICD-10-CM

## 2020-09-17 DIAGNOSIS — M25.511 BILATERAL SHOULDER PAIN, UNSPECIFIED CHRONICITY: ICD-10-CM

## 2020-09-17 PROCEDURE — 97110 THERAPEUTIC EXERCISES: CPT | Mod: PO

## 2020-09-17 PROCEDURE — 97140 MANUAL THERAPY 1/> REGIONS: CPT | Mod: PO

## 2020-09-17 NOTE — PROGRESS NOTES
"  Physical Therapy Treatment Note     Name: Althea Carrion [facility] Number: [id number]    Therapy Diagnosis:   Encounter Diagnoses   Name Primary?    Bilateral shoulder pain, unspecified chronicity     Upper extremity weakness      Physician: Guido Beth MD    Visit Date: 9/17/2020    Physician Orders: PT Eval and Treat   Medical Diagnosis from Referral: M54.12 (ICD-10-CM) - Cervical radiculopathy  Evaluation Date: 8/4/2020  Authorization Period Expiration: 07/30/2021  Plan of Care Expiration: 10/04/2020  Visit # / Visits authorized:7/10    Time In: 9:45 am  Time Out: 10:30 am  Total Billable Time: 40 minutes    Precautions: Standard and Diabetes    Subjective     Pt reports: decreased pain this morning.  She was compliant with home exercise program.  Response to previous treatment: decreased tension  Functional change: none at this time    Pain: 3/10  Location:  Neck with left > right     Objective     Althea received therapeutic exercises to develop strength, endurance, ROM, flexibility and posture for 35 minutes including:     Date  9/17/2020 9/15/2020 9/10/2020 9/8/2020 9/3/2020 9/1/2020 8/27/2020 08/04/2020   VISIT 7/10 6/10 5/10 4/10 3/10 2/10 1/10 1/1   POC EXP. DATE 10/4/2020 10/4/2020 10/4/2020 10/4/2020 10/4/2020 10/4/2020 10/4/2020 10/04/2020   FACE-TO-FACE 10/3/2020 10/3/2020 10/3/2020 10/3/2020 10/3/2020 9/4/2020 9/4/2020 09/04/2020   FOTO   --- 5/5 4/5 3/5 2/5 1/5              UBE  L2 x 3' L2 x 3' L1 x 3' L1 x 3' L1 x 3' L1 x 3' 3'     SEATED:            UT stretch 5 x 10" 5 x 10" 5 x 10" 5 x 10" 5 x 10" 5 x 10" 5 x 10" 5 x 5"   Levator scap stretch 5 x 10" 5 x 10" 5 x 10" 5 x 10" 5 x 10" 5 x 10" 5 x 10" --   Cervical ROM  - flex/ext  - rotation R/L  - side bend R/L   NT    1 x 10   1 x 10   1 x 10   NT   1 x 15  1 x 15  1 x 15   1 x 15  1 x 15  1 x 15   1 x 15  1 x 15  1 x 15    1 x 10   1 x 10   1 x 10     1 x 10   1 x 10   1 x 10    Posterior shoulder rolls  --- --- NT NT NT 2 x 10 " "1 x 15 --   Scapula squeeze --- --- NT NT NT 20 x 3" 15 x 3" 5 x 3"   Chin Tucks  3 x 10 2 x 10 2 x 10 2 x 10 2 x 10 2 x 10 2 x 10                  TABLE:            Snow angels 2 x 10 -- 2 x 10 1 x 15 1 x 15 1 x 10 1 x 10 --   Butterflies  3 x 10 -- 2 x 10 1 x 15 1 x 15 1 x 15 1 x 15 --   Supine T's  --- -- --- 2 x 10 GTB 2 x 10 GTB 2 x 10 RTB 1 x 15 RTB --   Supine Chin Tucks  --- -- 2 x 10 1 x 10 1 x 10 1 x 10 1 x 10 --                             STANDING:            Pectoral stretch 3 x 20" 3 x 20" 3 x 20" 3 x 20" 3 x 20" 3 x 15" 3 x 10" --   Theraband  - W's  - T's  - I's   - Rows   - ER   2 x 10 YTB  2 x 10 YTB  3 x 10 GTB  3 x 10 GTB  3 x 10 RTB   2 x 10 YTB  2 x 10 YTB  3 x 10 GTB  3 x 10 GTB  3 x 10 RTB   1 x 15 YTB  1 x 15 YTB  2 x 10 GTB  3 x 10 GTB  2 x 10 RTB   --  1 x 15 YTB  2 x 10 GTB  2 x 10 GTB  2 x 10 RTB   --  1 x 10 YTB  2 x 10 RTB  2 x 10 GTB  1 x 15 RTB   --  --  2 x 10 YTB  2 x 10 RTB  --   NT             1 x 10 RTB   Posture ex: on wall:  Horizontal abduction  ER  diagonals   1 x 15 YTB  1 x 15 YTB  1 x 15 YTB   1 x 15 YTB  1 x 15 YTB  1 x 15 YTB   1 x 15 YTB  1 x 15 YTB  1 x 15 YTB   1 x 10 YTB  1 x 10 YTB  1 x 10 YTB     next                                                      Initials KAT ARAIZA        Pt receives manual therapy x 10 minutes to cervical region to decrease pain, improve posture, and improve mobility:   - STM to upper trapezius, levator scapulae,  posterior cervical, and sub-occipital musculature  - IASTM to upper trapezius, levator scapulae, and cervical erector spinae     Home Exercises   Provided and Patient Education Provided     Education provided:   - HEP    Written Home Exercises Provided: Patient instructed to cont prior HEP.  Exercises were reviewed and Althea was able to demonstrate them prior to the end of the session.  Althea demonstrated good  understanding of the education provided.     See EMR under Patient Instructions for exercises provided " prior visit.    Assessment     Althea has improved form and technique during scapular stabilization exercises, but continues to have difficulty performing diagonals during posture exercises and unable to progress to 2 x 10.  She remains tight and tender in bilateral upper trap, levator scap with right > left today.  Pt will continue with current plan of care and progress posture exercises as tolerated.   Althea is progressing well towards her goals.   Pt prognosis is Good.     Pt will continue to benefit from skilled outpatient physical therapy to address the deficits listed in the problem list box on initial evaluation, provide pt/family education and to maximize pt's level of independence in the home and community environment.     Pt's spiritual, cultural and educational needs considered and pt agreeable to plan of care and goals.     Anticipated barriers to physical therapy: none    Goals:   Short Term Goals: 4 weeks  1. This patient will be independent with a basic HEP. In Progress, Not Met  2. This patient will have cervical AROM WFL and pain free in order to ease difficulty of driving In Progress, Not Met  3. This patient will increase B UE strength by 1 grade in order to be able to ease difficulty of performing home ADL's In Progress, Not Met  4. This patient will have a pain rating of 6/10 at worst with ADLs. In Progress, Not Met  5. Patient able to score less than or equal to 37 on the FOTO Neck Survey placing patient in 35-45 impaired, limited, or restricted category demonstrating overall decreased neck pain with functional activities. In Progress, Not Met  Long Term Goals: 8 weeks  1. This patient will be independent with an updated HEP. In Progress, Not Met  2. This patient will have B UE strength of 5/5 in order to be able to ease difficulty of perform ADL's and reduce instances of dropping items  In Progress, Not Met  3. This patient will have a pain rating of 4/10 at worst with ADLs. In Progress,  Not Met  4. Patient able to score less than or equal to 28 on the FOTO Neck Survey placing patient in 20-30% impaired, limited, or restricted category demonstrating overall decreased neck pain with functional activities In Progress, Not Met      Plan     Continue with PT POC. Attempt to progress posture exercises to 2 x 10 reps next visit.    Ajith Ang, PT

## 2020-09-24 ENCOUNTER — CLINICAL SUPPORT (OUTPATIENT)
Dept: REHABILITATION | Facility: HOSPITAL | Age: 55
End: 2020-09-24
Payer: MEDICARE

## 2020-09-24 DIAGNOSIS — M25.511 BILATERAL SHOULDER PAIN, UNSPECIFIED CHRONICITY: ICD-10-CM

## 2020-09-24 DIAGNOSIS — M25.512 BILATERAL SHOULDER PAIN, UNSPECIFIED CHRONICITY: ICD-10-CM

## 2020-09-24 DIAGNOSIS — R29.898 UPPER EXTREMITY WEAKNESS: ICD-10-CM

## 2020-09-24 PROCEDURE — 97110 THERAPEUTIC EXERCISES: CPT | Mod: PO

## 2020-09-24 PROCEDURE — 97140 MANUAL THERAPY 1/> REGIONS: CPT | Mod: PO

## 2020-09-24 NOTE — PROGRESS NOTES
"  Physical Therapy Treatment Note     Name: Althea Carrion [facility] Number: [id number]    Therapy Diagnosis:   Encounter Diagnoses   Name Primary?    Bilateral shoulder pain, unspecified chronicity     Upper extremity weakness      Physician: Guido Beth MD    Visit Date: 9/24/2020    Physician Orders: PT Eval and Treat   Medical Diagnosis from Referral: M54.12 (ICD-10-CM) - Cervical radiculopathy  Evaluation Date: 8/4/2020  Authorization Period Expiration: 07/30/2021  Plan of Care Expiration: 10/04/2020  Visit # / Visits authorized:8/10    Time In: 9:45 am  Time Out: 10:30 am  Total Billable Time: 40 minutes    Precautions: Standard and Diabetes    Subjective     Pt reports: having decrease in the intensity of her pain over the past few days.  .  She was compliant with home exercise program.  Response to previous treatment: decreased tension  Functional change: none at this time    Pain: 0/10  Location:  Neck with left > right     Objective     Althea received therapeutic exercises to develop strength, endurance, ROM, flexibility and posture for 35 minutes including:     Date  9/24/2020 9/17/2020 9/15/2020 9/10/2020 9/8/2020 9/3/2020 9/1/2020 8/27/2020 08/04/2020   VISIT 8/10 7/10 6/10 5/10 4/10 3/10 2/10 1/10 1/1   POC EXP. DATE 10/4/2020 10/4/2020 10/4/2020 10/4/2020 10/4/2020 10/4/2020 10/4/2020 10/4/2020 10/04/2020   FACE-TO-FACE 10/3/2020 10/3/2020 10/3/2020 10/3/2020 10/3/2020 10/3/2020 9/4/2020 9/4/2020 09/04/2020   FOTO    --- 5/5 4/5 3/5 2/5 1/5               UBE  L2 x 3' L2 x 3' L2 x 3' L1 x 3' L1 x 3' L1 x 3' L1 x 3' 3'     SEATED:             UT stretch 5 x 10" 5 x 10" 5 x 10" 5 x 10" 5 x 10" 5 x 10" 5 x 10" 5 x 10" 5 x 5"   Levator scap stretch -- 5 x 10" 5 x 10" 5 x 10" 5 x 10" 5 x 10" 5 x 10" 5 x 10" --   Cervical ROM  - flex/ext  - rotation R/L  - side bend R/L   NT   NT    1 x 10   1 x 10   1 x 10   NT   1 x 15  1 x 15  1 x 15   1 x 15  1 x 15  1 x 15   1 x 15  1 x 15  1 x 15    1 " "x 10   1 x 10   1 x 10     1 x 10   1 x 10   1 x 10    Posterior shoulder rolls  --- --- --- NT NT NT 2 x 10 1 x 15 --   Scapula squeeze --- --- --- NT NT NT 20 x 3" 15 x 3" 5 x 3"   Chin Tucks  3 x 10 3 x 10 2 x 10 2 x 10 2 x 10 2 x 10 2 x 10 2 x 10                   TABLE:             Snow angels 2 x 10 2 x 10 -- 2 x 10 1 x 15 1 x 15 1 x 10 1 x 10 --   Butterflies  3 x 10 3 x 10 -- 2 x 10 1 x 15 1 x 15 1 x 15 1 x 15 --   Supine T's  --- --- -- --- 2 x 10 GTB 2 x 10 GTB 2 x 10 RTB 1 x 15 RTB --   Supine Chin Tucks  --- --- -- 2 x 10 1 x 10 1 x 10 1 x 10 1 x 10 --                               STANDING:             Pectoral stretch 3 x 20" 3 x 20" 3 x 20" 3 x 20" 3 x 20" 3 x 20" 3 x 15" 3 x 10" --   Theraband  - W's  - T's  - I's   - Rows   - ER   3 x 10 YTB  3 x 10 YTB  3 x 10 BTB  3 x 10 BTB  3 x 10 GTB   2 x 10 YTB  2 x 10 YTB  3 x 10 GTB  3 x 10 GTB  3 x 10 RTB   2 x 10 YTB  2 x 10 YTB  3 x 10 GTB  3 x 10 GTB  3 x 10 RTB   1 x 15 YTB  1 x 15 YTB  2 x 10 GTB  3 x 10 GTB  2 x 10 RTB   --  1 x 15 YTB  2 x 10 GTB  2 x 10 GTB  2 x 10 RTB   --  1 x 10 YTB  2 x 10 RTB  2 x 10 GTB  1 x 15 RTB   --  --  2 x 10 YTB  2 x 10 RTB  --   NT             1 x 10 RTB   Posture ex: on wall:  Horizontal abduction  ER  diagonals   2 x 10 YTB  2 x 10 YTB   2 x 10 YTB   1 x 15 YTB  1 x 15 YTB  1 x 15 YTB   1 x 15 YTB  1 x 15 YTB  1 x 15 YTB   1 x 15 YTB  1 x 15 YTB  1 x 15 YTB   1 x 10 YTB  1 x 10 YTB  1 x 10 YTB     next                                                          Initials KAT STEPHENS MA, MA, MA, MA, MA, MA BJ        Pt receives manual therapy x 10 minutes to cervical region to decrease pain, improve posture, and improve mobility:   - STM to upper trapezius, levator scapulae,  posterior cervical, and sub-occipital musculature  - IASTM to upper trapezius, levator scapulae, and cervical erector spinae     Home Exercises   Provided and Patient Education Provided     Education provided:   - HEP    Written Home Exercises Provided: " Patient instructed to cont prior HEP.  Exercises were reviewed and Althea was able to demonstrate them prior to the end of the session.  Althea demonstrated good  understanding of the education provided.     See EMR under Patient Instructions for exercises provided prior visit.    Assessment     Althea continues to progress with scapular stabilization exercises and is noted to have decreased tension in bilateral upper trap musculature during manual therapy.  Pt is reporting improved tolerance to ADL's and decrease intensity of her normal daily pain level.     Althea is progressing well towards her goals.   Pt prognosis is Good.     Pt will continue to benefit from skilled outpatient physical therapy to address the deficits listed in the problem list box on initial evaluation, provide pt/family education and to maximize pt's level of independence in the home and community environment.     Pt's spiritual, cultural and educational needs considered and pt agreeable to plan of care and goals.     Anticipated barriers to physical therapy: none    Goals:   Short Term Goals: 4 weeks  1. This patient will be independent with a basic HEP. In Progress, Not Met  2. This patient will have cervical AROM WFL and pain free in order to ease difficulty of driving In Progress, Not Met  3. This patient will increase B UE strength by 1 grade in order to be able to ease difficulty of performing home ADL's In Progress, Not Met  4. This patient will have a pain rating of 6/10 at worst with ADLs. In Progress, Not Met  5. Patient able to score less than or equal to 37 on the FOTO Neck Survey placing patient in 35-45 impaired, limited, or restricted category demonstrating overall decreased neck pain with functional activities. In Progress, Not Met  Long Term Goals: 8 weeks  1. This patient will be independent with an updated HEP. In Progress, Not Met  2. This patient will have B UE strength of 5/5 in order to be able to ease difficulty of  perform ADL's and reduce instances of dropping items  In Progress, Not Met  3. This patient will have a pain rating of 4/10 at worst with ADLs. In Progress, Not Met  4. Patient able to score less than or equal to 28 on the FOTO Neck Survey placing patient in 20-30% impaired, limited, or restricted category demonstrating overall decreased neck pain with functional activities In Progress, Not Met      Plan     Continue with PT POC.     Ajith Ang, PT

## 2020-09-29 ENCOUNTER — TELEPHONE (OUTPATIENT)
Dept: RHEUMATOLOGY | Facility: CLINIC | Age: 55
End: 2020-09-29

## 2020-09-30 ENCOUNTER — PATIENT MESSAGE (OUTPATIENT)
Dept: RHEUMATOLOGY | Facility: CLINIC | Age: 55
End: 2020-09-30

## 2020-10-23 ENCOUNTER — PATIENT MESSAGE (OUTPATIENT)
Dept: RHEUMATOLOGY | Facility: CLINIC | Age: 55
End: 2020-10-23

## 2020-10-26 DIAGNOSIS — M46.90 AXIAL SPONDYLOARTHRITIS: ICD-10-CM

## 2020-10-26 RX ORDER — ETANERCEPT 50 MG/ML
50 SOLUTION SUBCUTANEOUS WEEKLY
Qty: 4 ML | Refills: 2 | Status: SHIPPED | OUTPATIENT
Start: 2020-10-26 | End: 2021-01-11

## 2020-11-01 ENCOUNTER — PATIENT OUTREACH (OUTPATIENT)
Dept: ADMINISTRATIVE | Facility: OTHER | Age: 55
End: 2020-11-01

## 2020-11-01 NOTE — PROGRESS NOTES
Chart reviewed.   Immunizations: updated  Upcoming appts to satisfy JOSEFINA topics: Optometry 11/02

## 2020-11-02 ENCOUNTER — NURSE TRIAGE (OUTPATIENT)
Dept: ADMINISTRATIVE | Facility: CLINIC | Age: 55
End: 2020-11-02

## 2020-11-02 ENCOUNTER — LAB VISIT (OUTPATIENT)
Dept: LAB | Facility: HOSPITAL | Age: 55
End: 2020-11-02
Attending: INTERNAL MEDICINE
Payer: MEDICARE

## 2020-11-02 ENCOUNTER — OFFICE VISIT (OUTPATIENT)
Dept: OPTOMETRY | Facility: CLINIC | Age: 55
End: 2020-11-02
Payer: COMMERCIAL

## 2020-11-02 DIAGNOSIS — Z79.620 ENCOUNTER FOR MONITORING OF ADALIMUMAB THERAPY: ICD-10-CM

## 2020-11-02 DIAGNOSIS — H25.13 NUCLEAR SCLEROSIS OF BOTH EYES: ICD-10-CM

## 2020-11-02 DIAGNOSIS — H26.9 CORTICAL CATARACT OF BOTH EYES: ICD-10-CM

## 2020-11-02 DIAGNOSIS — H52.12 MYOPIA OF LEFT EYE: ICD-10-CM

## 2020-11-02 DIAGNOSIS — E11.9 DIABETIC EYE EXAM: Primary | ICD-10-CM

## 2020-11-02 DIAGNOSIS — E11.9 TYPE 2 DIABETES MELLITUS WITHOUT OPHTHALMIC MANIFESTATIONS: ICD-10-CM

## 2020-11-02 DIAGNOSIS — H52.201 MYOPIA OF RIGHT EYE WITH ASTIGMATISM: ICD-10-CM

## 2020-11-02 DIAGNOSIS — Z01.00 DIABETIC EYE EXAM: Primary | ICD-10-CM

## 2020-11-02 DIAGNOSIS — H52.4 PRESBYOPIA OF BOTH EYES: ICD-10-CM

## 2020-11-02 DIAGNOSIS — H20.021 RECURRENT IRITIS OF RIGHT EYE: ICD-10-CM

## 2020-11-02 DIAGNOSIS — Z51.81 ENCOUNTER FOR MONITORING OF ADALIMUMAB THERAPY: ICD-10-CM

## 2020-11-02 DIAGNOSIS — H52.11 MYOPIA OF RIGHT EYE WITH ASTIGMATISM: ICD-10-CM

## 2020-11-02 LAB
ALBUMIN SERPL BCP-MCNC: 4.1 G/DL (ref 3.5–5.2)
ALP SERPL-CCNC: 87 U/L (ref 55–135)
ALT SERPL W/O P-5'-P-CCNC: 16 U/L (ref 10–44)
ANION GAP SERPL CALC-SCNC: 9 MMOL/L (ref 8–16)
AST SERPL-CCNC: 16 U/L (ref 10–40)
BASOPHILS # BLD AUTO: 0.07 K/UL (ref 0–0.2)
BASOPHILS NFR BLD: 1.1 % (ref 0–1.9)
BILIRUB SERPL-MCNC: 0.3 MG/DL (ref 0.1–1)
BUN SERPL-MCNC: 12 MG/DL (ref 6–20)
CALCIUM SERPL-MCNC: 9.4 MG/DL (ref 8.7–10.5)
CHLORIDE SERPL-SCNC: 107 MMOL/L (ref 95–110)
CO2 SERPL-SCNC: 25 MMOL/L (ref 23–29)
CREAT SERPL-MCNC: 0.7 MG/DL (ref 0.5–1.4)
CRP SERPL-MCNC: 12.1 MG/L (ref 0–8.2)
DIFFERENTIAL METHOD: ABNORMAL
EOSINOPHIL # BLD AUTO: 0.1 K/UL (ref 0–0.5)
EOSINOPHIL NFR BLD: 1.8 % (ref 0–8)
ERYTHROCYTE [DISTWIDTH] IN BLOOD BY AUTOMATED COUNT: 12.8 % (ref 11.5–14.5)
ERYTHROCYTE [SEDIMENTATION RATE] IN BLOOD BY WESTERGREN METHOD: 32 MM/HR (ref 0–36)
EST. GFR  (AFRICAN AMERICAN): >60 ML/MIN/1.73 M^2
EST. GFR  (NON AFRICAN AMERICAN): >60 ML/MIN/1.73 M^2
GLUCOSE SERPL-MCNC: 133 MG/DL (ref 70–110)
HCT VFR BLD AUTO: 44.5 % (ref 37–48.5)
HGB BLD-MCNC: 14.4 G/DL (ref 12–16)
IMM GRANULOCYTES # BLD AUTO: 0.1 K/UL (ref 0–0.04)
IMM GRANULOCYTES NFR BLD AUTO: 1.6 % (ref 0–0.5)
LYMPHOCYTES # BLD AUTO: 2.7 K/UL (ref 1–4.8)
LYMPHOCYTES NFR BLD: 43 % (ref 18–48)
MCH RBC QN AUTO: 29.7 PG (ref 27–31)
MCHC RBC AUTO-ENTMCNC: 32.4 G/DL (ref 32–36)
MCV RBC AUTO: 92 FL (ref 82–98)
MONOCYTES # BLD AUTO: 0.5 K/UL (ref 0.3–1)
MONOCYTES NFR BLD: 8.6 % (ref 4–15)
NEUTROPHILS # BLD AUTO: 2.7 K/UL (ref 1.8–7.7)
NEUTROPHILS NFR BLD: 43.9 % (ref 38–73)
NRBC BLD-RTO: 0 /100 WBC
PLATELET # BLD AUTO: 292 K/UL (ref 150–350)
PMV BLD AUTO: 11.6 FL (ref 9.2–12.9)
POTASSIUM SERPL-SCNC: 4 MMOL/L (ref 3.5–5.1)
PROT SERPL-MCNC: 7.9 G/DL (ref 6–8.4)
RBC # BLD AUTO: 4.85 M/UL (ref 4–5.4)
SODIUM SERPL-SCNC: 141 MMOL/L (ref 136–145)
WBC # BLD AUTO: 6.19 K/UL (ref 3.9–12.7)

## 2020-11-02 PROCEDURE — 87516 HEPATITIS B DNA AMP PROBE: CPT

## 2020-11-02 PROCEDURE — 80053 COMPREHEN METABOLIC PANEL: CPT

## 2020-11-02 PROCEDURE — 85652 RBC SED RATE AUTOMATED: CPT

## 2020-11-02 PROCEDURE — 92015 DETERMINE REFRACTIVE STATE: CPT | Mod: S$GLB,,, | Performed by: OPTOMETRIST

## 2020-11-02 PROCEDURE — 86140 C-REACTIVE PROTEIN: CPT

## 2020-11-02 PROCEDURE — 92015 PR REFRACTION: ICD-10-PCS | Mod: S$GLB,,, | Performed by: OPTOMETRIST

## 2020-11-02 PROCEDURE — 92014 COMPRE OPH EXAM EST PT 1/>: CPT | Mod: S$GLB,,, | Performed by: OPTOMETRIST

## 2020-11-02 PROCEDURE — 85025 COMPLETE CBC W/AUTO DIFF WBC: CPT

## 2020-11-02 PROCEDURE — 92014 PR EYE EXAM, EST PATIENT,COMPREHESV: ICD-10-PCS | Mod: S$GLB,,, | Performed by: OPTOMETRIST

## 2020-11-02 PROCEDURE — 99999 PR PBB SHADOW E&M-EST. PATIENT-LVL V: ICD-10-PCS | Mod: PBBFAC,,, | Performed by: OPTOMETRIST

## 2020-11-02 PROCEDURE — 99999 PR PBB SHADOW E&M-EST. PATIENT-LVL V: CPT | Mod: PBBFAC,,, | Performed by: OPTOMETRIST

## 2020-11-02 RX ORDER — REPAGLINIDE 1 MG/1
TABLET ORAL
COMMUNITY
Start: 2020-09-10 | End: 2021-01-27

## 2020-11-02 RX ORDER — PREDNISOLONE ACETATE 10 MG/ML
SUSPENSION/ DROPS OPHTHALMIC
Qty: 5 ML | Refills: 0 | Status: SHIPPED | OUTPATIENT
Start: 2020-11-02 | End: 2020-12-28 | Stop reason: SDUPTHER

## 2020-11-02 NOTE — PATIENT INSTRUCTIONS
History of type 2 diabetes, without evidence of diabetic retinal changes.     Early nuclear sclerosis of lens of both eyes, and early peripheral cortical cataract in both eyes.  No need for cataract surgery in either eye.  Monitor only.       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   No evidence of active inflammation today.     Myopia (nearsightedness) with astigmatism in the right eye.  Myopia in the left eye.  Satisfactory best-corrected VA in each eye, but better in the left eye than in the right eye  Presbyopia.  Spectacle lens Rx issued for use as desired.      Recheck in 12 months, or prior if any problems in the interim.
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
## 2020-11-02 NOTE — TELEPHONE ENCOUNTER
"Nissa (staff member at [facility]) called regarding a Rx for Homatropine eye drops for Mrs. Vazquez. Per Nissa, Homatropine is "not available to order". Staff member would like to know if you would like medication changed to another medication or to have Rx sent to another pharmacy instead. Staff member reports they close in 20 min at 6:00 pm. Their contact number is: [phone]; Secure Chat message sent to ordering physician, Dr. Rishi Tamayo      Reason for Disposition   [1] Pharmacy calling with prescription questions AND [2] triager unable to answer question    Additional Information   Negative: Drug overdose and triager unable to answer question   Negative: Caller requesting information unrelated to medicine   Negative: Caller requesting a prescription for Strep throat and has a positive culture result   Negative: Rash while taking a medication or within 3 days of stopping it   Negative: Immunization reaction suspected   Negative: [1] Asthma and [2] having symptoms of asthma (cough, wheezing, etc.)   Negative: [1] Influenza symptoms AND [2] anti-viral med prescription request, such as Tamiflu   Negative: [1] Symptom of illness (e.g., headache, abdominal pain, earache, vomiting) AND [2] more than mild   Negative: MORE THAN A DOUBLE DOSE of a prescription or over-the-counter (OTC) drug   Negative: [1] DOUBLE DOSE (an extra dose or lesser amount) of over-the-counter (OTC) drug AND [2] any symptoms (e.g., dizziness, nausea, pain, sleepiness)   Negative: [1] DOUBLE DOSE (an extra dose or lesser amount) of prescription drug AND [2] any symptoms (e.g., dizziness, nausea, pain, sleepiness)   Negative: Took another person's prescription drug   Negative: [1] DOUBLE DOSE (an extra dose or lesser amount) of prescription drug AND [2] NO symptoms (Exception: a double dose of antibiotics)   Negative: Diabetes drug error or overdose (e.g., took wrong type of insulin or took extra dose)   " "Negative: [1] Request for URGENT new prescription or refill of "essential" medication (i.e., likelihood of harm to patient if not taken) AND [2] triager unable to fill per unit policy   Negative: [1] Prescription not at pharmacy AND [2] was prescribed by PCP recently    Protocols used: MEDICATION QUESTION CALL-A-AH      "

## 2020-11-03 ENCOUNTER — SPECIALTY PHARMACY (OUTPATIENT)
Dept: PHARMACY | Facility: CLINIC | Age: 55
End: 2020-11-03

## 2020-11-04 ENCOUNTER — PATIENT MESSAGE (OUTPATIENT)
Dept: RHEUMATOLOGY | Facility: CLINIC | Age: 55
End: 2020-11-04

## 2020-11-04 ENCOUNTER — TELEPHONE (OUTPATIENT)
Dept: PHARMACY | Facility: CLINIC | Age: 55
End: 2020-11-04

## 2020-11-04 LAB — HBV DNA SERPL QL NAA+PROBE: NOT DETECTED

## 2020-11-09 ENCOUNTER — TELEPHONE (OUTPATIENT)
Dept: SPINE | Facility: CLINIC | Age: 55
End: 2020-11-09

## 2020-11-09 NOTE — PROGRESS NOTES
Subjective:      Patient ID: Althea Vazquez is a 55 y.o. female.    Chief Complaint: Follow-up    Ms Vazquez is a 56 yo female with spondyloarthritis here for follow up of neck and arm pain.  The pain started in the right side in feb 2020 and then the left side started a week ago.  She was last seen by me on 8/11/20 and she was going to try PT and tizanidine.  She feels like PT was helpful.  She is still her HEP.  She feels like the exercise helps.  The left arm is still not as strong.  The pain is worse on the left neck.  The massage at therapy was helpful.  She sometimes feels the pain down the arm but not as often.  The pain in the arm is with increased housework and more activity.  She does feel like she gets aggravated and things will tighten up.  The pain is a sharp pain.  The pain is 3/10 now, worst 7/10 after cleaning house and increased activity, best 2/10 in the morning.  She feels like she can tolerate the pain.  She has been taking tizanidine during the day and flexeril at night.  The right side is good    X-ray cervical  Five views: Odontoid prevertebral soft tissues and posterior elements are intact.  There is mild DJD and DISH.  The neural foramina are grossly patent.  No instability seen.  No trauma seen.     Impression:     Chronic change as above.    MRI cervical, thoracic, and lumbar 9/11/2016  There is modest straightening of the normal cervical lordosis.  No spondylolisthesis.  Vertebral body heights are well-maintained without evidence for fracture.  Visualized osseous structures demonstrate intact pars signal intensity without findings to suggest an infiltrative process.     Cervical spinal cord demonstrates normal contour and signal intensity.  Cerebellar tonsils are in their expected location.  Cervicomedullary junction is unremarkable.  Postcontrast images demonstrate no abnormal enhancement.     Prevertebral soft tissues are normal.  Vertebral artery flow voids are present.   T2 hyperintense nodules are noted within the bilateral thyroid lobes.  The spinal musculature and trace normal bulk and signal intensity.  supraspinous ligament is unremarkable without findings to suggest enthesitis.     C2-C3: No spinal canal stenosis or neural foraminal narrowing.     C3-C4: There is mild bilateral facet arthropathy and uncovertebral joint spurring and mild bilateral neural foraminal narrowing.  No spinal canal stenosis.     C4-C5: No spinal canal stenosis or neural foraminal narrowing.     C5-C6: There is a moderate broad-based posterior disc ossified complex that partially effaces the anterior thecal sac.  There is mild left-sided uncovertebral joint spurring.  Findings contribute to mild spinal canal stenosis and mild left-sided neural foraminal narrowing.     C6-C7: No spinal canal stenosis or neural foraminal narrowing.     T7-T1: No spinal canal stenosis or neural foraminal narrowing.     THORACIC SPINE:     Thoracic spine alignment is normal.  No spondylolisthesis.  Vertebral body heights are well-maintained without evidence for fracture.  There is enhancing inflammation of the left side zygapophyseal/facet joint at T5-T6.  Remaining visualized osseous structures demonstrate intact menisci and in density without findings to suggest an infiltrative process.  Note is made of the anterior vertebral body at this demonstrate intact signal intensity without findings to suggest spondylitis.     Visualized spinal cord demonstrates normal contour and signal intensity.  Postcontrast images demonstrate normal enhancement.     Visualized thoracic and upper abdominal organs are normal.     There is a low signal described focal area of signal abnormality within the posterior subcutaneous soft tissues and appears to demonstrate subtle enhancement and is favored to be benign though nonspecific.     No significant intervertebral disc abnormalities.  No spinal canal stenosis or neural foramina  narrowing.     LUMBAR SPINE:     Lumbar spine alignment is normal.  No spondylolysis or spondylolisthesis.  Vertebral body heights are well-maintained without evidence for fracture.  Visualized osseous structures demonstrate intact pars signal intensity without findings to suggest an infiltrative process.     Visualized distal spinal cord demonstrates normal contour and signal intensity.  Cauda equina is normal mild without findings to suggest arachnoiditis.  Postcontrast images demonstrate normal enhancement.     There is a subcentimeter T2 hyperintense lesion within the right renal cortex, too small to accurately guided thighs.  Paraspinal musculature demonstrates normal bulk and signal intensity.  Subcutaneous soft tissues are within normal limits.     L1-L2: No significant intervertebral disc abnormalities.  Facet joints are well maintained.  No spinal canal stenosis or neural foraminal narrowing.     L2-L3: There is mild intervertebral disc desiccation.  Facet joints are well maintained.  No spinal canal stenosis or neural foraminal narrowing.     L3-L4: There is mild intervertebral disc desiccation.  Facet joints are well maintained.  No spinal canal stenosis or neural foraminal narrowing.     L4-L5: There is mild intervertebral disc space narrowing and desiccation with small circumferential bulge.  There is mild bilateral facet arthropathy.  No spinal canal stenosis or neural foraminal narrowing.     L5-S1: There is mild intervertebral disc space narrowing desiccation with a small circumferential bulge and a small posterior annular fissure.  Findings contribute to mild left-sided neural foraminal narrowing. There is mild bilateral facet arthropathy.  No spinal canal stenosis.  IMPRESSION:         Enhancing inflammatory changes involving the left zygapophyseal/facet joint at T5-T6 that is nonspecific but can be seen with inflammatory arthropathies. No MR imaging finding to suggest enthesitis, spondylitis or  spondylodiskitis.     Mild cervical and lumbar degenerative changes without significant spinal canal stenosis.     Cystic subcutaneous soft tissues within the posterior back, favored to be benign, possibly a complex sebaceous cyst.  Consider follow-up examination in 12 months.    MRI si joint  There are enhancing inflammatory changes about the anterior superior aspect of the left SI joint in the expected location of the medial lumbar or lumbosacral ligament, findings that are highly suggestive of enthesitis.  There is apparent trace enhancement on the posterior superior aspect of the right SI joint leads is overall nonspecific.     SI joints are symmetric.  No synovitis.  No joint effusions.  No osseous erosive changes.     Visualized musculature demonstrate normal bulk and signal intensity.  Piriformis muscles are symmetric.  Ischiofemoral spaces are unremarkable.     Adductor and abductor tendons are unremarkable.  Hamstring tendons are normal.     Subcutaneous soft tissues are normal.     Limited evaluation of the lower abdominal and pelvic organs is unremarkable.  Uterus is surgically absent.  IMPRESSION:         Inflammatory enthesitis affecting the anterior superior aspect of the left sacral ala with mild associated reactive marrow edema, findings that are favored to represent sequela of patient's reported history of spondyloarthropathy.  Note is made of possible trace inflammatory changes adjacent to the posterior aspect of the right SI joints which may relate to a prominent vessel or mild additional enthesitis.     Unremarkable evaluation of the SI joints specifically without imaging findings to suggest sacroiliitis. No osseous erosive changes or joint effusions. No synovitis.    Past Medical History:  No date: Acid reflux  10/18/2012: Anxiety  No date: Arthritis  No date: Depression  10/21/2014: Diabetic peripheral neuropathy - mild  No date: Difficult intubation  No date: Dry eyes  No date: Dry mouth  No  date: Fever blister  10/3/2016: History of hepatitis B      Comment:  Hep B core total Ab (+), no active/chronic infection  No date: Hyperlipidemia  No date: Hypertension  No date: Insomnia  5/13/2014: Iritis  9/27/2012: Long-term current use of steroids  2/4/2015: Nausea & vomiting  No date: VAISHNAVI (obstructive sleep apnea)  10/1/2012: Type II diabetes mellitus    Past Surgical History:  1/9/2018: COLONOSCOPY; N/A      Comment:  Procedure: COLONOSCOPY;  Surgeon: Juwan Lopez MD;                 Location: [facility] ([address] [place] [address]);  Service: Endoscopy;                 Laterality: N/A;  per anesthesia, pt. needs to be on 2nd                floor due to past Anesthesia problems  12/3/2014: HYSTERECTOMY  5-14-14: KNEE ARTHROSCOPY      Comment:  right  No date: TUBAL LIGATION    Review of patient's family history indicates:  Problem: Diabetes      Relation: Mother          Age of Onset: (Not Specified)  Problem: Cataracts      Relation: Mother          Age of Onset: (Not Specified)  Problem: Stroke      Relation: Mother          Age of Onset: (Not Specified)  Problem: Heart attack      Relation: Mother          Age of Onset: (Not Specified)  Problem: Depression      Relation: Mother          Age of Onset: (Not Specified)  Problem: Stroke      Relation: Father          Age of Onset: (Not Specified)  Problem: Hypertension      Relation: Father          Age of Onset: (Not Specified)  Problem: Hyperlipidemia      Relation: Father          Age of Onset: (Not Specified)  Problem: Diabetes      Relation: Maternal Aunt          Age of Onset: (Not Specified)  Problem: Heart attack      Relation: Paternal Grandmother          Age of Onset: (Not Specified)  Problem: ADD / ADHD      Relation: Son          Age of Onset: (Not Specified)  Problem: No Known Problems      Relation: Sister          Age of Onset: (Not Specified)  Problem: No Known Problems      Relation: Brother          Age of Onset: (Not Specified)  Problem: No Known  Problems      Relation: Maternal Uncle          Age of Onset: (Not Specified)  Problem: No Known Problems      Relation: Paternal Aunt          Age of Onset: (Not Specified)  Problem: No Known Problems      Relation: Paternal Uncle          Age of Onset: (Not Specified)  Problem: No Known Problems      Relation: Maternal Grandmother          Age of Onset: (Not Specified)  Problem: No Known Problems      Relation: Maternal Grandfather          Age of Onset: (Not Specified)  Problem: Cancer      Relation: Paternal Grandfather          Age of Onset: (Not Specified)          Comment: Lung CA  Problem: Melanoma      Relation: Neg Hx          Age of Onset: (Not Specified)  Problem: Eczema      Relation: Neg Hx          Age of Onset: (Not Specified)  Problem: Lupus      Relation: Neg Hx          Age of Onset: (Not Specified)  Problem: Psoriasis      Relation: Neg Hx          Age of Onset: (Not Specified)  Problem: Amblyopia      Relation: Neg Hx          Age of Onset: (Not Specified)  Problem: Blindness      Relation: Neg Hx          Age of Onset: (Not Specified)  Problem: Glaucoma      Relation: Neg Hx          Age of Onset: (Not Specified)  Problem: Macular degeneration      Relation: Neg Hx          Age of Onset: (Not Specified)  Problem: Retinal detachment      Relation: Neg Hx          Age of Onset: (Not Specified)  Problem: Strabismus      Relation: Neg Hx          Age of Onset: (Not Specified)  Problem: Thyroid disease      Relation: Neg Hx          Age of Onset: (Not Specified)  Problem: Suicide      Relation: Neg Hx          Age of Onset: (Not Specified)  Problem: Acne      Relation: Neg Hx          Age of Onset: (Not Specified)  Problem: Cirrhosis      Relation: Neg Hx          Age of Onset: (Not Specified)  Problem: Asthma      Relation: Neg Hx          Age of Onset: (Not Specified)  Problem: Emphysema      Relation: Neg Hx          Age of Onset: (Not Specified)      Social History    Socioeconomic History       Marital status:       Spouse name: Edward      Number of children: 2      Years of education: Not on file      Highest education level: Not on file    Occupational History      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      Drug use: No      Sexual activity: Yes        Partners: Male    Lifestyle      Physical activity        Days per week: 3 days        Minutes per session: 30 min      Stress: Very much    Relationships      Social connections        Talks on phone: Twice a week        Gets together: Once a week        Attends Gnosticist service: Not on file        Active member of club or organization: No        Attends meetings of clubs or organizations: Patient refused        Relationship status:     Other Topics      Concerns:        Are you pregnant or think you may be?: No        Breast-feeding: No    Social History Narrative      Not on file      Current Outpatient Medications:  amLODIPine (NORVASC) 10 MG tablet, Take 1 tablet (10 mg total) by mouth once daily., Disp: 90 tablet, Rfl: 1  aspirin (ECOTRIN) 81 MG EC tablet, Take 1 tablet (81 mg total) by mouth once daily., Disp: 30 tablet, Rfl: 0  atenoloL (TENORMIN) 100 MG tablet, Take 1 tablet (100 mg total) by mouth once daily., Disp: 90 tablet, Rfl: 1  atorvastatin (LIPITOR) 40 MG tablet, Take 1 tablet (40 mg total) by mouth once daily., Disp: 90 tablet, Rfl: 1  blood sugar diagnostic Strp, 1 strip by Misc.(Non-Drug; Combo Route) route 2 (two) times daily., Disp: 150 strip, Rfl: 11  blood-glucose meter kit,  Use twice daily., Disp: 1 each, Rfl: 0  boric acid (BORIC ACID) vaginal suppository, Place 1 each (650 mg total) vaginally every evening. (Patient not taking: Reported on 7/30/2020), Disp: 14 suppository, Rfl: 6  canagliflozin (INVOKANA) 300 mg Tab tablet, Take 1 tablet (300 mg total) by mouth before breakfast., Disp: 30 tablet, Rfl: 11  clonazePAM (KLONOPIN) 0.5 MG tablet, Take 1 tablet (0.5 mg total) by mouth daily as needed for Anxiety., Disp: 90 tablet, Rfl: 0  clotrimazole-betamethasone 1-0.05% (LOTRISONE) cream, Apply topically 2 (two) times daily as needed. , Disp: , Rfl:   cyclobenzaprine (FLEXERIL) 10 MG tablet, TAKE ONE TABLET BY MOUTH AT BEDTIME AS NEEDED, Disp: 90 tablet, Rfl: 1  docusate sodium (COLACE) 50 MG capsule, Take 1 capsule (50 mg total) by mouth 2 (two) times daily as needed for Constipation. (Patient taking differently: Take 50 mg by mouth 2 (two) times daily. ), Disp: 30 capsule, Rfl: 0  entecavir (BARACLUDE) 0.5 MG Tab, Take 1 tablet (0.5 mg total) by mouth once daily., Disp: 90 tablet, Rfl: 3  ergocalciferol (ERGOCALCIFEROL) 50,000 unit Cap, Take 1 capsule (50,000 Units total) by mouth every 7 days., Disp: 12 capsule, Rfl: 3  etanercept (ENBREL SURECLICK) 50 mg/mL (1 mL), Inject 1 mL (50 mg total) into the skin once a week., Disp: 4 mL, Rfl: 2  FLUoxetine 20 MG capsule, TAKE ONE CAPSULE BY MOUTH TWICE DAILY, Disp: 180 capsule, Rfl: 1  homatropine (ISOPTO HOMATROPINE) 5 % ophthalmic solution, Place 1 drop into the right eye 2 (two) times daily., Disp: 5 mL, Rfl: 1  hydroCHLOROthiazide (HYDRODIURIL) 25 MG tablet, Take 1 tablet (25 mg total) by mouth once daily., Disp: 90 tablet, Rfl: 1  hydrocortisone 2.5 % cream, Apply topically 2 (two) times daily., Disp: 28 g, Rfl: 1  hydroquinone 4 % Crea, Apply to dark areas qhs.  Not more than 6 months straight in same location.Use sunscreen in am (Patient taking differently: continuous prn. Apply to dark areas qhs.  Not more than 6 months straight in  same location.Use sunscreen in am), Disp: 46 g, Rfl: 1  lancets (ACCU-CHEK SOFTCLIX LANCETS) Misc, 1 Device by Misc.(Non-Drug; Combo Route) route 2 (two) times daily., Disp: 200 each, Rfl: 11  lancets (FREESTYLE LANCETS) 28 gauge Misc, Check blood sugars BID, FreeStyle Lancets 28 gauge, Disp: 200 each, Rfl: 11  levocetirizine (XYZAL) 5 MG tablet, Take 1 tablet (5 mg total) by mouth every evening., Disp: 30 tablet, Rfl: 11  losartan (COZAAR) 100 MG tablet, Take 1 tablet (100 mg total) by mouth once daily., Disp: 90 tablet, Rfl: 1  metFORMIN (GLUCOPHAGE-XR) 500 MG XR 24hr tablet, TAKE TWO TABLETS BY MOUTH AT BREAKFAST (Patient not taking: Reported on 7/30/2020), Disp: 180 tablet, Rfl: 1  methylnaltrexone (RELISTOR) 150 mg Tab, Take 450 mg by mouth., Disp: , Rfl:    naproxen (NAPROSYN) 500 MG tablet, Take 1 tablet (500 mg total) by mouth 2 (two) times daily as needed., Disp: 180 tablet, Rfl: 0  nystatin (MYCOSTATIN) powder, Apply to affected area 3 times daily, Disp: 1 Bottle, Rfl: 3  ondansetron (ZOFRAN-ODT) 4 MG TbDL, Take 1 tablet (4 mg total) by mouth every 8 (eight) hours as needed (nausea and vomiting)., Disp: 20 tablet, Rfl: 0  pantoprazole (PROTONIX) 40 MG tablet, TAKE ONE TABLET BY MOUTH EVERY DAY, Disp: 90 tablet, Rfl: 3  phenazopyridine (PYRIDIUM) 200 MG tablet, Take 1 tablet (200 mg total) by mouth 3 (three) times daily as needed for Pain., Disp: 20 tablet, Rfl: 0  phentermine (ADIPEX-P) 37.5 mg tablet, 1/2 tab po q am (Patient not taking: Reported on 7/30/2020), Disp: 30 tablet, Rfl: 1  SITagliptin (JANUVIA) 100 MG Tab, Take 1 tablet (100 mg total) by mouth once daily., Disp: 90 tablet, Rfl: 3  sulfaSALAzine (AZULFIDINE) 500 MG EC tablet, Take 3 tablets (1,500 mg total) by mouth 2 (two) times daily., Disp: 540 tablet, Rfl: 0  terconazole (TERAZOL 7) 0.4 % Crea, Place 1 applicator vaginally every evening., Disp: 45 g, Rfl: 2  topiramate (TOPAMAX) 100 MG tablet, Take 100 mg by mouth 2 (two) times daily.,  Disp: , Rfl:   triamcinolone acetonide 0.1% (KENALOG) 0.1 % cream, Apply topically 2 (two) times daily., Disp: 45 g, Rfl: 0  valACYclovir (VALTREX) 1000 MG tablet, valacyclovir 1 gram tablet Take 0.5 tablets every 12 hours by oral route., Disp: , Rfl:   zolpidem (AMBIEN) 5 MG Tab, Take 1 tablet (5 mg total) by mouth nightly as needed., Disp: 90 tablet, Rfl: 0    No current facility-administered medications for this visit.       Review of patient's allergies indicates:   -- Bactrim (sulfamethoxazole-trimethoprim) -- Itching   -- Trulicity (dulaglutide) -- Diarrhea and Other (See Comments)    --  Vomiting, abdominal pain   -- Diflucan (fluconazole) -- Swelling and Other (See Comments)    --  Sore on mouth        Review of Systems   Constitution: Negative for weight gain and weight loss.   Cardiovascular: Positive for dyspnea on exertion. Negative for chest pain.   Respiratory: Negative for shortness of breath.    Musculoskeletal: Positive for joint pain and neck pain (left greater than right shoulder and arm). Negative for back pain and joint swelling.   Gastrointestinal: Negative for abdominal pain, bowel incontinence, nausea and vomiting.   Genitourinary: Negative for bladder incontinence.   Neurological: Negative for numbness and paresthesias.         Objective:        General: Althea is well-developed, well-nourished, appears stated age, in no acute distress, alert and oriented to time, place and person.     General    Vitals reviewed.  Constitutional: She is oriented to person, place, and time. She appears well-developed and well-nourished.   HENT:   Head: Normocephalic and atraumatic.   Pulmonary/Chest: Effort normal.   Neurological: She is alert and oriented to person, place, and time.   Psychiatric: She has a normal mood and affect. Her behavior is normal. Judgment and thought content normal.     General Musculoskeletal Exam   Gait: normal     Right Ankle/Foot Exam     Tests   Heel Walk: able to  perform  Tiptoe Walk: able to perform    Left Ankle/Foot Exam     Tests   Heel Walk: able to perform  Tiptoe Walk: able to perform  Back (L-Spine & T-Spine) / Neck (C-Spine) Exam     Tenderness   The patient is tender to palpation of the left trapezial and left scapular. Left paramedian tenderness of the Upper T-Spine and Lower C-Spine.     Neck (C-Spine) Range of Motion   Flexion:     40  Extension: 40Right Lateral Bend: 20 Left Lateral Bend: 20 Right Rotation: 40 Left Rotation: 40     Spinal Sensation   Right Side Sensation  C-Spine Level: normal   L-Spine Level: normal  S-Spine Level: normal  Left Side Sensation  C-Spine Level: normal  L-Spine Level: normal  S-Spine Level: normal    Back (L-Spine & T-Spine) Tests   Right Side Tests  Straight leg raise:      Sitting SLR: > 70 degrees      Left Side Tests  Straight leg raise:     Sitting SLR: > 70 degrees          Other She has no scoliosis .  Spinal Kyphosis:  Absent    Comments:  Diffuse tenderness on the left  Right Shoulder Exam     Range of Motion   Active abduction: 170   Forward Flexion: 170     Left Shoulder Exam     Range of Motion   Active abduction: 170   Forward Flexion: 170     Tests & Signs   Rotator Cuff Painful Arc/Range: moderate      Muscle Strength   Right Upper Extremity   Biceps: 5/5   Deltoid:  5/5  Triceps:  5/5  Wrist extension: 5/5   Finger Flexors:  5/5  Left Upper Extremity  Biceps: 5/5   Deltoid:  5/5  Triceps:  5/5  Wrist extension: 5/5   Finger Flexors:  5/5  Right Lower Extremity   Hip Flexion: 5/5   Quadriceps:  5/5   Anterior tibial:  5/5   EHL:  5/5  Left Lower Extremity   Hip Flexion: 5/5   Quadriceps:  5/5   Anterior tibial:  5/5   EHL:  5/5    Reflexes     Left Side  Biceps:  2+  Triceps:  2+  Brachioradialis:  2+  Achilles:  2+  Left Neville's Sign:  Absent  Babinski Sign:  absent  Quadriceps:  2+    Right Side   Biceps:  2+  Triceps:  2+  Brachioradialis:  2+  Achilles:  2+  Right Neville's Sign:  absent  Babinski Sign:   absent  Quadriceps:  2+    Vascular Exam     Right Pulses        Carotid:                  2+    Left Pulses        Carotid:                  2+              Assessment:       1. Osteoarthritis of spine with radiculopathy, cervical region    2. Neck pain           Plan:       Orders Placed This Encounter    MRI Cervical Spine Without Contrast     1.  We discussed his neck posture and trying to sit with a small curve in lower back.  We discussed being more aware of posture and setting an alarm to help work on posture.  She still has a lot of muscle tenderness    2.  Continue Therapy HEP, she is better but still having left sided pain.  The pain on the right that she started with in February is still better  3.  Tizanidine 2-4mg po TID, she is taking one in morning, we discussed can take one midday.  Flexeril makes her sleepy, she takes it at night.    4.  MRI cervical spine.  We discussed an injection in neck.  She is interested in considering it  5.     RTC 3 months        Follow-up: No follow-ups on file. If there are any questions prior to this, the patient was instructed to contact the office.

## 2020-11-09 NOTE — TELEPHONE ENCOUNTER
Confirmed appointment date, time and location with patient.  No questions or concerns from patient.

## 2020-11-10 ENCOUNTER — OFFICE VISIT (OUTPATIENT)
Dept: SPINE | Facility: CLINIC | Age: 55
End: 2020-11-10
Attending: PHYSICAL MEDICINE & REHABILITATION
Payer: MEDICARE

## 2020-11-10 VITALS
HEART RATE: 64 BPM | DIASTOLIC BLOOD PRESSURE: 88 MMHG | WEIGHT: 241.19 LBS | HEIGHT: 66 IN | SYSTOLIC BLOOD PRESSURE: 141 MMHG | TEMPERATURE: 98 F | BODY MASS INDEX: 38.76 KG/M2

## 2020-11-10 DIAGNOSIS — M54.2 NECK PAIN: ICD-10-CM

## 2020-11-10 DIAGNOSIS — M47.22 OSTEOARTHRITIS OF SPINE WITH RADICULOPATHY, CERVICAL REGION: Primary | ICD-10-CM

## 2020-11-10 PROCEDURE — 3008F BODY MASS INDEX DOCD: CPT | Mod: S$GLB,,, | Performed by: PHYSICAL MEDICINE & REHABILITATION

## 2020-11-10 PROCEDURE — 99999 PR PBB SHADOW E&M-EST. PATIENT-LVL V: CPT | Mod: PBBFAC,,, | Performed by: PHYSICAL MEDICINE & REHABILITATION

## 2020-11-10 PROCEDURE — 3008F PR BODY MASS INDEX (BMI) DOCUMENTED: ICD-10-PCS | Mod: S$GLB,,, | Performed by: PHYSICAL MEDICINE & REHABILITATION

## 2020-11-10 PROCEDURE — 99999 PR PBB SHADOW E&M-EST. PATIENT-LVL V: ICD-10-PCS | Mod: PBBFAC,,, | Performed by: PHYSICAL MEDICINE & REHABILITATION

## 2020-11-10 PROCEDURE — 99214 OFFICE O/P EST MOD 30 MIN: CPT | Mod: S$GLB,,, | Performed by: PHYSICAL MEDICINE & REHABILITATION

## 2020-11-10 PROCEDURE — 3077F PR MOST RECENT SYSTOLIC BLOOD PRESSURE >= 140 MM HG: ICD-10-PCS | Mod: S$GLB,,, | Performed by: PHYSICAL MEDICINE & REHABILITATION

## 2020-11-10 PROCEDURE — 3079F DIAST BP 80-89 MM HG: CPT | Mod: S$GLB,,, | Performed by: PHYSICAL MEDICINE & REHABILITATION

## 2020-11-10 PROCEDURE — 99214 PR OFFICE/OUTPT VISIT, EST, LEVL IV, 30-39 MIN: ICD-10-PCS | Mod: S$GLB,,, | Performed by: PHYSICAL MEDICINE & REHABILITATION

## 2020-11-10 PROCEDURE — 3077F SYST BP >= 140 MM HG: CPT | Mod: S$GLB,,, | Performed by: PHYSICAL MEDICINE & REHABILITATION

## 2020-11-10 PROCEDURE — 3079F PR MOST RECENT DIASTOLIC BLOOD PRESSURE 80-89 MM HG: ICD-10-PCS | Mod: S$GLB,,, | Performed by: PHYSICAL MEDICINE & REHABILITATION

## 2020-11-11 ENCOUNTER — OFFICE VISIT (OUTPATIENT)
Dept: PSYCHIATRY | Facility: CLINIC | Age: 55
End: 2020-11-11
Payer: MEDICARE

## 2020-11-11 ENCOUNTER — OFFICE VISIT (OUTPATIENT)
Dept: INTERNAL MEDICINE | Facility: CLINIC | Age: 55
End: 2020-11-11
Payer: MEDICARE

## 2020-11-11 VITALS
SYSTOLIC BLOOD PRESSURE: 128 MMHG | RESPIRATION RATE: 15 BRPM | DIASTOLIC BLOOD PRESSURE: 80 MMHG | HEIGHT: 66 IN | WEIGHT: 236.31 LBS | TEMPERATURE: 98 F | HEART RATE: 67 BPM | BODY MASS INDEX: 37.98 KG/M2 | OXYGEN SATURATION: 99 %

## 2020-11-11 DIAGNOSIS — F41.9 ANXIETY: Primary | ICD-10-CM

## 2020-11-11 DIAGNOSIS — L72.9 SCALP CYST: Primary | ICD-10-CM

## 2020-11-11 PROCEDURE — 1126F AMNT PAIN NOTED NONE PRSNT: CPT | Mod: S$GLB,,, | Performed by: INTERNAL MEDICINE

## 2020-11-11 PROCEDURE — 3074F SYST BP LT 130 MM HG: CPT | Mod: S$GLB,,, | Performed by: INTERNAL MEDICINE

## 2020-11-11 PROCEDURE — 1126F PR PAIN SEVERITY QUANTIFIED, NO PAIN PRESENT: ICD-10-PCS | Mod: S$GLB,,, | Performed by: INTERNAL MEDICINE

## 2020-11-11 PROCEDURE — 3008F BODY MASS INDEX DOCD: CPT | Mod: S$GLB,,, | Performed by: INTERNAL MEDICINE

## 2020-11-11 PROCEDURE — 99213 PR OFFICE/OUTPT VISIT, EST, LEVL III, 20-29 MIN: ICD-10-PCS | Mod: S$GLB,,, | Performed by: INTERNAL MEDICINE

## 2020-11-11 PROCEDURE — 3072F PR LOW RISK FOR RETINOPATHY: ICD-10-PCS | Mod: S$GLB,,, | Performed by: INTERNAL MEDICINE

## 2020-11-11 PROCEDURE — 99213 OFFICE O/P EST LOW 20 MIN: CPT | Mod: 95,,, | Performed by: PSYCHIATRY & NEUROLOGY

## 2020-11-11 PROCEDURE — 3074F PR MOST RECENT SYSTOLIC BLOOD PRESSURE < 130 MM HG: ICD-10-PCS | Mod: S$GLB,,, | Performed by: INTERNAL MEDICINE

## 2020-11-11 PROCEDURE — 3072F LOW RISK FOR RETINOPATHY: CPT | Mod: S$GLB,,, | Performed by: INTERNAL MEDICINE

## 2020-11-11 PROCEDURE — 3079F PR MOST RECENT DIASTOLIC BLOOD PRESSURE 80-89 MM HG: ICD-10-PCS | Mod: S$GLB,,, | Performed by: INTERNAL MEDICINE

## 2020-11-11 PROCEDURE — 3008F PR BODY MASS INDEX (BMI) DOCUMENTED: ICD-10-PCS | Mod: S$GLB,,, | Performed by: INTERNAL MEDICINE

## 2020-11-11 PROCEDURE — 99999 PR PBB SHADOW E&M-EST. PATIENT-LVL V: ICD-10-PCS | Mod: PBBFAC,,, | Performed by: INTERNAL MEDICINE

## 2020-11-11 PROCEDURE — 99213 OFFICE O/P EST LOW 20 MIN: CPT | Mod: S$GLB,,, | Performed by: INTERNAL MEDICINE

## 2020-11-11 PROCEDURE — 99213 PR OFFICE/OUTPT VISIT, EST, LEVL III, 20-29 MIN: ICD-10-PCS | Mod: 95,,, | Performed by: PSYCHIATRY & NEUROLOGY

## 2020-11-11 PROCEDURE — 3079F DIAST BP 80-89 MM HG: CPT | Mod: S$GLB,,, | Performed by: INTERNAL MEDICINE

## 2020-11-11 PROCEDURE — 99999 PR PBB SHADOW E&M-EST. PATIENT-LVL V: CPT | Mod: PBBFAC,,, | Performed by: INTERNAL MEDICINE

## 2020-11-11 RX ORDER — TRIAMCINOLONE ACETONIDE 1 MG/G
CREAM TOPICAL 2 TIMES DAILY
Qty: 45 G | Refills: 1 | Status: ON HOLD | OUTPATIENT
Start: 2020-11-11 | End: 2024-02-07

## 2020-11-11 NOTE — PROGRESS NOTES
The patient location is: [facility]  The chief complaint leading to consultation is: anxiety  Visit type: audiovisual  Total time spent with patient: 10 min  Each patient to whom he or she provides medical services by telemedicine is:  (1) informed of the relationship between the physician and patient and the respective role of any other health care provider with respect to management of the patient; and (2) notified that he or she may decline to receive medical services by telemedicine and may withdraw from such care at any time.    Notes:     ESTABLISHED OUTPATIENT VISIT   E/M LEVEL 3: [id number]    ENCOUNTER DATE: 11/11/2020  SITE: [facility], [address]    HISTORY    CHIEF COMPLAINT   Althea Vazquez is a 55 y.o. female who presents for follow up of anxiety.    HPI     Overall appears to be doing reasonably well psychiatrically.    Taking Klonopin infrequently.    Psychiatric Review Of Systems - Is patient experiencing or having changes in:  sleep: interrupted  appetite: no  weight: working on losing weight  energy/anergy: no  interest/pleasure/anhedonia: no  somatic symptoms: no  libido: no  anxiety/panic: no  guilty/hopelessness: no  concentration: no  S.I.B.s/risky behavior: no  Irritability: no  Racing thoughts: no  Impulsive behaviors: no  Paranoia:no  AVH:no    Recent alcohol: rare small amount  Recent drug: no    Medical ROS   Denies any physical complaint during today's visit.     PAST MEDICAL, FAMILY AND SOCIAL HISTORY: The patient's past medical, family and social history have been reviewed and updated as appropriate within the electronic medical record - see encounter notes.    PSYCHOTROPIC MEDICATIONS   Prozac 20 mg qam and 20 mg qpm, Clonazepam 0.5 mg prn for anxiety[taking occasionally], Ambien 5 mg at bedtime prn recently has been taking 3-4 times per week], Topiramate 100 mg qam    Scheduled and PRN Medications     Current Outpatient Medications:     amLODIPine (NORVASC) 10  MG tablet, Take 1 tablet (10 mg total) by mouth once daily., Disp: 90 tablet, Rfl: 1    aspirin (ECOTRIN) 81 MG EC tablet, Take 1 tablet (81 mg total) by mouth once daily., Disp: 30 tablet, Rfl: 0    atenoloL (TENORMIN) 100 MG tablet, Take 1 tablet (100 mg total) by mouth once daily., Disp: 90 tablet, Rfl: 1    atorvastatin (LIPITOR) 40 MG tablet, Take 1 tablet (40 mg total) by mouth once daily., Disp: 90 tablet, Rfl: 1    blood sugar diagnostic Strp, 1 strip by Misc.(Non-Drug; Combo Route) route 2 (two) times daily., Disp: 150 strip, Rfl: 11    blood-glucose meter kit, Use twice daily., Disp: 1 each, Rfl: 0    boric acid (BORIC ACID) vaginal suppository, Place 1 each (650 mg total) vaginally every evening., Disp: 14 suppository, Rfl: 6    clonazePAM (KLONOPIN) 0.5 MG tablet, Take 1 tablet (0.5 mg total) by mouth daily as needed for Anxiety., Disp: 90 tablet, Rfl: 0    clotrimazole-betamethasone 1-0.05% (LOTRISONE) cream, Apply topically 2 (two) times daily as needed. , Disp: , Rfl:     cyclobenzaprine (FLEXERIL) 10 MG tablet, TAKE ONE TABLET BY MOUTH AT BEDTIME AS NEEDED, Disp: 90 tablet, Rfl: 1    docusate sodium (COLACE) 50 MG capsule, Take 1 capsule (50 mg total) by mouth 2 (two) times daily as needed for Constipation. (Patient taking differently: Take 50 mg by mouth 2 (two) times daily. ), Disp: 30 capsule, Rfl: 0    entecavir (BARACLUDE) 0.5 MG Tab, Take 1 tablet (0.5 mg total) by mouth once daily., Disp: 90 tablet, Rfl: 3    ergocalciferol (ERGOCALCIFEROL) 50,000 unit Cap, Take 1 capsule (50,000 Units total) by mouth every 7 days., Disp: 12 capsule, Rfl: 3    etanercept (ENBREL SURECLICK) 50 mg/mL (1 mL), Inject 1 mL (50 mg total) into the skin once a week., Disp: 4 mL, Rfl: 2    FLUoxetine 20 MG capsule, TAKE ONE CAPSULE BY MOUTH TWICE DAILY, Disp: 180 capsule, Rfl: 1    homatropine (ISOPTO HOMATROPINE) 5 % ophthalmic solution, Place 1 drop into the right eye 2 (two) times daily., Disp: 5 mL,  Rfl: 1    hydroCHLOROthiazide (HYDRODIURIL) 25 MG tablet, Take 1 tablet (25 mg total) by mouth once daily., Disp: 90 tablet, Rfl: 1    hydrocortisone 2.5 % cream, Apply topically 2 (two) times daily., Disp: 28 g, Rfl: 1    hydroquinone 4 % Crea, Apply to dark areas qhs.  Not more than 6 months straight in same location.Use sunscreen in am (Patient taking differently: continuous prn. Apply to dark areas qhs.  Not more than 6 months straight in same location.Use sunscreen in am), Disp: 46 g, Rfl: 1    INVOKANA 300 mg Tab tablet, TAKE ONE TABLET BY MOUTH BEFORE BREAKFAST, Disp: 30 tablet, Rfl: 11    lancets (ACCU-CHEK SOFTCLIX LANCETS) Misc, 1 Device by Misc.(Non-Drug; Combo Route) route 2 (two) times daily., Disp: 200 each, Rfl: 11    lancets (FREESTYLE LANCETS) 28 gauge Misc, Check blood sugars BID, FreeStyle Lancets 28 gauge, Disp: 200 each, Rfl: 11    levocetirizine (XYZAL) 5 MG tablet, TAKE ONE TABLET BY MOUTH EVERY EVENING, Disp: 30 tablet, Rfl: 11    losartan (COZAAR) 100 MG tablet, Take 1 tablet (100 mg total) by mouth once daily., Disp: 90 tablet, Rfl: 1    methylnaltrexone (RELISTOR) 150 mg Tab, Take 450 mg by mouth., Disp: , Rfl:     naproxen (NAPROSYN) 500 MG tablet, Take 1 tablet (500 mg total) by mouth 2 (two) times daily as needed., Disp: 180 tablet, Rfl: 0    nystatin (MYCOSTATIN) powder, Apply to affected area 3 times daily, Disp: 1 Bottle, Rfl: 3    ondansetron (ZOFRAN-ODT) 4 MG TbDL, Take 1 tablet (4 mg total) by mouth every 8 (eight) hours as needed (nausea and vomiting)., Disp: 20 tablet, Rfl: 0    pantoprazole (PROTONIX) 40 MG tablet, TAKE ONE TABLET BY MOUTH EVERY DAY, Disp: 90 tablet, Rfl: 3    prednisoLONE acetate (PRED FORTE) 1 % DrpS, Instill one drop into the affected eye every two hours while awake, Disp: 5 mL, Rfl: 0    repaglinide (PRANDIN) 1 MG tablet, TAKE ONE TABLET BY MOUTH THREE TIMES DAILY BEFORE MEALS (TAKING PLACE [facility]), Disp: , Rfl:     SITagliptin  (JANUVIA) 100 MG Tab, Take 1 tablet (100 mg total) by mouth once daily., Disp: 90 tablet, Rfl: 3    sulfaSALAzine (AZULFIDINE) 500 MG EC tablet, Take 3 tablets (1,500 mg total) by mouth 2 (two) times daily., Disp: 540 tablet, Rfl: 0    terconazole (TERAZOL 7) 0.4 % Crea, Place 1 applicator vaginally every evening., Disp: 45 g, Rfl: 2    tiZANidine (ZANAFLEX) 2 MG tablet, Take 1-2 tablets (2-4 mg total) by mouth every 8 (eight) hours as needed., Disp: 90 tablet, Rfl: 2    topiramate (TOPAMAX) 100 MG tablet, Take 100 mg by mouth 2 (two) times daily., Disp: , Rfl:     triamcinolone acetonide 0.1% (KENALOG) 0.1 % cream, Apply topically 2 (two) times daily., Disp: 45 g, Rfl: 0    valACYclovir (VALTREX) 1000 MG tablet, valacyclovir 1 gram tablet  Take 0.5 tablets every 12 hours by oral route., Disp: , Rfl:     zolpidem (AMBIEN) 5 MG Tab, TAKE ONE TABLET BY MOUTH DAILY AT BEDTIME AS NEEDED, Disp: 90 tablet, Rfl: 0    EXAMINATION    There were no vitals filed for this visit.      CONSTITUTIONAL  General Appearance: well nourished    MUSCULOSKELETAL  Muscle Strength and Tone: normal strength and tone  Abnormal Involuntary Movements: no abnormal movement noted  Gait and Station: normal gait    PSYCHIATRIC   Level of Consciousness: alert  Orientation: oriented to person, place and time  Grooming: well groomed  Psychomotor Behavior: no restlessness/agitation  Speech: normal in rate, rhythm and volume  Language: normal vocabulary  Mood: anxiety at times  Affect: full range and appropriate  Thought Process: logical and goal directed  Associations: intact associations  Thought Content: no SI/HI  Memory: grossly intact  Attention: intact to content of interview  Fund of Knowledge: appears adequate  Insight: good  Judgement: good    MEDICAL DECISION MAKING    DIAGNOSES  Anxiety d/o N.O.S.    PROBLEM LIST AND MANAGEMENT PLANS    - anxiety: continue Prozac, Klonopin and Ambien as above  - rtc 3 months    Time with patient: 10  min    LABORATORY DATA  Lab Visit on 11/02/2020   Component Date Value Ref Range Status    Sed Rate 11/02/2020 32  0 - 36 mm/Hr Final    CRP 11/02/2020 12.1* 0.0 - 8.2 mg/L Final    WBC 11/02/2020 6.19  3.90 - 12.70 K/uL Final    RBC 11/02/2020 4.85  4.00 - 5.40 M/uL Final    Hemoglobin 11/02/2020 14.4  12.0 - 16.0 g/dL Final    Hematocrit 11/02/2020 44.5  37.0 - 48.5 % Final    MCV 11/02/2020 92  82 - 98 fL Final    MCH 11/02/2020 29.7  27.0 - 31.0 pg Final    MCHC 11/02/2020 32.4  32.0 - 36.0 g/dL Final    RDW 11/02/2020 12.8  11.5 - 14.5 % Final    Platelets 11/02/2020 292  150 - 350 K/uL Final    MPV 11/02/2020 11.6  9.2 - 12.9 fL Final    Immature Granulocytes 11/02/2020 1.6* 0.0 - 0.5 % Final    Gran # (ANC) 11/02/2020 2.7  1.8 - 7.7 K/uL Final    Immature Grans (Abs) 11/02/2020 0.10* 0.00 - 0.04 K/uL Final    Comment: Mild elevation in immature granulocytes is non specific and   can be seen in a variety of conditions including stress response,   acute inflammation, trauma and pregnancy. Correlation with other   laboratory and clinical findings is essential.      Lymph # 11/02/2020 2.7  1.0 - 4.8 K/uL Final    Mono # 11/02/2020 0.5  0.3 - 1.0 K/uL Final    Eos # 11/02/2020 0.1  0.0 - 0.5 K/uL Final    Baso # 11/02/2020 0.07  0.00 - 0.20 K/uL Final    nRBC 11/02/2020 0  0 /100 WBC Final    Gran % 11/02/2020 43.9  38.0 - 73.0 % Final    Lymph % 11/02/2020 43.0  18.0 - 48.0 % Final    Mono % 11/02/2020 8.6  4.0 - 15.0 % Final    Eosinophil % 11/02/2020 1.8  0.0 - 8.0 % Final    Basophil % 11/02/2020 1.1  0.0 - 1.9 % Final    Differential Method 11/02/2020 Automated   Final    Sodium 11/02/2020 141  136 - 145 mmol/L Final    Potassium 11/02/2020 4.0  3.5 - 5.1 mmol/L Final    Chloride 11/02/2020 107  95 - 110 mmol/L Final    CO2 11/02/2020 25  23 - 29 mmol/L Final    Glucose 11/02/2020 133* 70 - 110 mg/dL Final    BUN 11/02/2020 12  6 - 20 mg/dL Final    Creatinine 11/02/2020 0.7   0.5 - 1.4 mg/dL Final    Calcium 11/02/2020 9.4  8.7 - 10.5 mg/dL Final    Total Protein 11/02/2020 7.9  6.0 - 8.4 g/dL Final    Albumin 11/02/2020 4.1  3.5 - 5.2 g/dL Final    Total Bilirubin 11/02/2020 0.3  0.1 - 1.0 mg/dL Final    Comment: For infants and newborns, interpretation of results should be based  on gestational age, weight and in agreement with clinical  observations.  Premature Infant recommended reference ranges:  Up to 24 hours.............<8.0 mg/dL  Up to 48 hours............<12.0 mg/dL  3-5 days..................<15.0 mg/dL  6-29 days.................<15.0 mg/dL      Alkaline Phosphatase 11/02/2020 87  55 - 135 U/L Final    AST 11/02/2020 16  10 - 40 U/L Final    ALT 11/02/2020 16  10 - 44 U/L Final    Anion Gap 11/02/2020 9  8 - 16 mmol/L Final    eGFR if African American 11/02/2020 >60.0  >60 mL/min/1.73 m^2 Final    eGFR if non African American 11/02/2020 >60.0  >60 mL/min/1.73 m^2 Final    Comment: Calculation used to obtain the estimated glomerular filtration  rate (eGFR) is the CKD-EPI equation.       HBV DNA, Qualitative PCR 11/02/2020 Not detected  Not detected Final    Comment: This procedure utilizes a real-time polymerase chain  reaction test from [employer].  The amplification   target is a conserved region in the terminal third of  the hepatitis B surface antigen gene.  The qualitative   limit of detection is 10 IU/mL (1.00 Log IU/mL).  A Not detected result does not rule out infection.  Test performed at [facility],  [address] , [place]  [address]     [phone]  Milton Castro MD  - Medical Director     Lab Visit on 08/27/2020   Component Date Value Ref Range Status    WBC 08/27/2020 7.93  3.90 - 12.70 K/uL Final    RBC 08/27/2020 4.79  4.00 - 5.40 M/uL Final    Hemoglobin 08/27/2020 14.6  12.0 - 16.0 g/dL Final    Hematocrit 08/27/2020 45.4  37.0 - 48.5 % Final    MCV 08/27/2020 95  82 - 98 fL Final    MCH 08/27/2020 30.5  27.0 -  31.0 pg Final    MCHC 08/27/2020 32.2  32.0 - 36.0 g/dL Final    RDW 08/27/2020 13.6  11.5 - 14.5 % Final    Platelets 08/27/2020 250  150 - 350 K/uL Final    MPV 08/27/2020 11.2  9.2 - 12.9 fL Final    Immature Granulocytes 08/27/2020 1.1* 0.0 - 0.5 % Final    Gran # (ANC) 08/27/2020 2.9  1.8 - 7.7 K/uL Final    Immature Grans (Abs) 08/27/2020 0.09* 0.00 - 0.04 K/uL Final    Comment: Mild elevation in immature granulocytes is non specific and   can be seen in a variety of conditions including stress response,   acute inflammation, trauma and pregnancy. Correlation with other   laboratory and clinical findings is essential.      Lymph # 08/27/2020 4.0  1.0 - 4.8 K/uL Final    Mono # 08/27/2020 0.8  0.3 - 1.0 K/uL Final    Eos # 08/27/2020 0.1  0.0 - 0.5 K/uL Final    Baso # 08/27/2020 0.05  0.00 - 0.20 K/uL Final    nRBC 08/27/2020 0  0 /100 WBC Final    Gran % 08/27/2020 36.2* 38.0 - 73.0 % Final    Lymph % 08/27/2020 50.6* 18.0 - 48.0 % Final    Mono % 08/27/2020 10.2  4.0 - 15.0 % Final    Eosinophil % 08/27/2020 1.3  0.0 - 8.0 % Final    Basophil % 08/27/2020 0.6  0.0 - 1.9 % Final    Differential Method 08/27/2020 Automated   Final    Sodium 08/27/2020 142  136 - 145 mmol/L Final    Potassium 08/27/2020 4.1  3.5 - 5.1 mmol/L Final    Chloride 08/27/2020 105  95 - 110 mmol/L Final    CO2 08/27/2020 29  23 - 29 mmol/L Final    Glucose 08/27/2020 202* 70 - 110 mg/dL Final    BUN 08/27/2020 15  7 - 17 mg/dL Final    Creatinine 08/27/2020 0.78  0.50 - 1.40 mg/dL Final    Calcium 08/27/2020 9.2  8.7 - 10.5 mg/dL Final    Total Protein 08/27/2020 7.6  6.0 - 8.4 g/dL Final    Albumin 08/27/2020 4.5  3.5 - 5.2 g/dL Final    Total Bilirubin 08/27/2020 0.4  0.1 - 1.0 mg/dL Final    Comment: For infants and newborns, interpretation of results should be based  on gestational age, weight and in agreement with clinical  observations.  Premature Infant recommended reference ranges:  Up to 24  hours.............<8.0 mg/dL  Up to 48 hours............<12.0 mg/dL  3-5 days..................<15.0 mg/dL  6-29 days.................<15.0 mg/dL      Alkaline Phosphatase 08/27/2020 72  38 - 126 U/L Final    AST 08/27/2020 21  15 - 46 U/L Final    ALT 08/27/2020 16  10 - 44 U/L Final    Anion Gap 08/27/2020 8  8 - 16 mmol/L Final    eGFR if African American 08/27/2020 >60.0  >60 mL/min/1.73 m^2 Final    eGFR if non African American 08/27/2020 >60.0  >60 mL/min/1.73 m^2 Final    Comment: Calculation used to obtain the estimated glomerular filtration  rate (eGFR) is the CKD-EPI equation.       Hemoglobin A1C 08/27/2020 6.4* 4.0 - 5.6 % Final    Comment: ADA Screening Guidelines:  5.7-6.4%  Consistent with prediabetes  >or=6.5%  Consistent with diabetes  High levels of fetal hemoglobin interfere with the HbA1C  assay. Heterozygous hemoglobin variants (HbS, HgC, etc)do  not significantly interfere with this assay.   However, presence of multiple variants may affect accuracy.      Estimated Avg Glucose 08/27/2020 137* 68 - 131 mg/dL Final           Tip Oliveira

## 2020-11-11 NOTE — PROGRESS NOTES
Subjective:       Patient ID: Althea Vazquez is a 55 y.o. female.    Chief Complaint: Cyst (in head) and Rash (left side of neck )    HPI   Pt here for evaluation of a slightly painful cyst if her scalp area which has been getting larger over the last few months. At times there is bloody discharge from the area.   Review of Systems   Constitutional: Negative for activity change, appetite change, chills, diaphoresis, fatigue, fever and unexpected weight change.   HENT: Negative for hearing loss, postnasal drip, rhinorrhea, sinus pressure/congestion, sneezing, sore throat, trouble swallowing and voice change.    Eyes: Negative for discharge and visual disturbance.   Respiratory: Negative for cough, chest tightness, shortness of breath and wheezing.    Cardiovascular: Negative for chest pain, palpitations and leg swelling.   Gastrointestinal: Negative for abdominal pain, blood in stool, constipation, diarrhea, nausea and vomiting.   Endocrine: Negative for polydipsia and polyuria.   Genitourinary: Negative for difficulty urinating, dysuria, hematuria and menstrual problem.   Musculoskeletal: Negative for arthralgias, joint swelling, myalgias and neck pain.   Integumentary:  Positive for rash. Negative for wound.   Allergic/Immunologic: Negative for environmental allergies and food allergies.   Neurological: Negative for weakness and headaches.   Hematological: Negative for adenopathy. Does not bruise/bleed easily.   Psychiatric/Behavioral: Negative for confusion and dysphoric mood.         Objective:      Physical Exam  Constitutional:       General: She is not in acute distress.     Appearance: She is well-developed. She is not diaphoretic.   HENT:      Head: Normocephalic and atraumatic.        Right Ear: External ear normal.      Left Ear: External ear normal.      Nose: Nose normal.      Mouth/Throat:      Pharynx: No oropharyngeal exudate.   Eyes:      General: No scleral icterus.        Right eye: No  discharge.         Left eye: No discharge.      Conjunctiva/sclera: Conjunctivae normal.      Pupils: Pupils are equal, round, and reactive to light.   Neck:      Musculoskeletal: Neck supple.      Vascular: No JVD.   Cardiovascular:      Rate and Rhythm: Normal rate and regular rhythm.      Heart sounds: Normal heart sounds.   Pulmonary:      Effort: Pulmonary effort is normal. No respiratory distress.      Breath sounds: Normal breath sounds. No wheezing or rales.   Lymphadenopathy:      Cervical: No cervical adenopathy.   Skin:     General: Skin is warm and dry.      Coloration: Skin is not pale.      Findings: No rash.   Neurological:      Mental Status: She is alert and oriented to person, place, and time.         Assessment:       1. Scalp cyst        Plan:    1. Referral to Derm, no signs of infection currently

## 2020-11-16 ENCOUNTER — HOSPITAL ENCOUNTER (OUTPATIENT)
Dept: RADIOLOGY | Facility: HOSPITAL | Age: 55
Discharge: HOME | End: 2020-11-16
Attending: PHYSICAL MEDICINE & REHABILITATION
Payer: MEDICARE

## 2020-11-16 DIAGNOSIS — M47.22 OSTEOARTHRITIS OF SPINE WITH RADICULOPATHY, CERVICAL REGION: ICD-10-CM

## 2020-11-16 DIAGNOSIS — M54.2 NECK PAIN: ICD-10-CM

## 2020-11-16 PROCEDURE — 72141 MRI NECK SPINE W/O DYE: CPT | Mod: TC,PO

## 2020-11-17 ENCOUNTER — SPECIALTY PHARMACY (OUTPATIENT)
Dept: PHARMACY | Facility: CLINIC | Age: 55
End: 2020-11-17

## 2020-11-18 ENCOUNTER — PATIENT MESSAGE (OUTPATIENT)
Dept: SPINE | Facility: CLINIC | Age: 55
End: 2020-11-18

## 2020-11-18 NOTE — TELEPHONE ENCOUNTER
Specialty Pharmacy - Refill Coordination    Specialty Medication Orders Linked to Encounter      Most Recent Value   Medication #1  etanercept (ENBREL SURECLICK) 50 mg/mL (1 mL) (Order#[id number], Rx#[id number])          Refill Questions - Documented Responses      Most Recent Value   Relationship to patient of person spoken to?  Self   HIPAA/medical authority confirmed?  Yes   Any changes in contact preferences or allowed representatives?  No   Has the patient had any insurance changes?  No   Has the patient had any changes to specialty medication, dose, or instructions?  No   Has the patient started taking any new medications, herbals, or supplements?  No   Has the patient been diagnosed with any new medical conditions?  No   Does the patient have any new allergies to medications or foods?  No   Does the patient have any concerns about side effects?  No   Can the patient store medication/sharps container properly (at the correct temperature, away from children/pets, etc.)?  Yes   Can the patient call emergency services (911) in the event of an emergency?  Yes   Does the patient have any concerns or questions about taking or administering this medication as prescribed?  No   How many doses did the patient miss in the past 4 weeks or since the last fill?  0   How many doses does the patient have on hand?  0   How many days does the patient report on hand quantity will last?  0   Does the number of doses/days supply remaining match pharmacy expected amounts?  Yes   Does the patient feel that this medication is effective?  Yes   During the past 4 weeks, has patient missed any activities due to condition or medication?  No   During the past 4 weeks, did patient have any of the following urgent care visits?  None   How will the patient receive the medication?  Mail   When does the patient need to receive the medication?  11/24/20   Shipping Address  Home   Address in [facility] confirmed and updated if  neccessary?  Yes   Expected Copay ($)  0   Is the patient able to afford the medication copay?  Yes   Payment Method  zero copay   Days supply of Refill  28   Would patient like to speak to a pharmacist?  No   Do you want to trigger an intervention?  No   Do you want to trigger an additional referral task?  No   Refill activity completed?  Yes   Refill activity plan  Refill scheduled   Shipment/Pickup Date:  11/18/20          Current Outpatient Medications   Medication Sig    amLODIPine (NORVASC) 10 MG tablet Take 1 tablet (10 mg total) by mouth once daily.    aspirin (ECOTRIN) 81 MG EC tablet Take 1 tablet (81 mg total) by mouth once daily.    atenoloL (TENORMIN) 100 MG tablet Take 1 tablet (100 mg total) by mouth once daily.    atorvastatin (LIPITOR) 40 MG tablet Take 1 tablet (40 mg total) by mouth once daily.    blood sugar diagnostic Strp 1 strip by Misc.(Non-Drug; Combo Route) route 2 (two) times daily.    blood-glucose meter kit Use twice daily.    boric acid (BORIC ACID) vaginal suppository Place 1 each (650 mg total) vaginally every evening.    clonazePAM (KLONOPIN) 0.5 MG tablet Take 1 tablet (0.5 mg total) by mouth daily as needed for Anxiety.    clotrimazole-betamethasone 1-0.05% (LOTRISONE) cream Apply topically 2 (two) times daily as needed.     cyclobenzaprine (FLEXERIL) 10 MG tablet TAKE ONE TABLET BY MOUTH AT BEDTIME AS NEEDED    docusate sodium (COLACE) 50 MG capsule Take 1 capsule (50 mg total) by mouth 2 (two) times daily as needed for Constipation. (Patient taking differently: Take 50 mg by mouth 2 (two) times daily. )    entecavir (BARACLUDE) 0.5 MG Tab Take 1 tablet (0.5 mg total) by mouth once daily.    ergocalciferol (ERGOCALCIFEROL) 50,000 unit Cap Take 1 capsule (50,000 Units total) by mouth every 7 days.    etanercept (ENBREL SURECLICK) 50 mg/mL (1 mL) Inject 1 mL (50 mg total) into the skin once a week.    FLUoxetine 20 MG capsule TAKE ONE CAPSULE BY MOUTH TWICE DAILY     homatropine (ISOPTO HOMATROPINE) 5 % ophthalmic solution Place 1 drop into the right eye 2 (two) times daily.    hydroCHLOROthiazide (HYDRODIURIL) 25 MG tablet Take 1 tablet (25 mg total) by mouth once daily.    hydrocortisone 2.5 % cream Apply topically 2 (two) times daily.    hydroquinone 4 % Crea Apply to dark areas qhs.  Not more than 6 months straight in same location.Use sunscreen in am (Patient taking differently: continuous prn. Apply to dark areas qhs.  Not more than 6 months straight in same location.Use sunscreen in am)    INVOKANA 300 mg Tab tablet TAKE ONE TABLET BY MOUTH BEFORE BREAKFAST    lancets (ACCU-CHEK SOFTCLIX LANCETS) Misc 1 Device by Misc.(Non-Drug; Combo Route) route 2 (two) times daily.    lancets (FREESTYLE LANCETS) 28 gauge Misc Check blood sugars BID, FreeStyle Lancets 28 gauge    levocetirizine (XYZAL) 5 MG tablet TAKE ONE TABLET BY MOUTH EVERY EVENING    losartan (COZAAR) 100 MG tablet Take 1 tablet (100 mg total) by mouth once daily.    methylnaltrexone (RELISTOR) 150 mg Tab Take 450 mg by mouth.    naproxen (NAPROSYN) 500 MG tablet Take 1 tablet (500 mg total) by mouth 2 (two) times daily as needed.    nystatin (MYCOSTATIN) powder Apply to affected area 3 times daily    ondansetron (ZOFRAN-ODT) 4 MG TbDL Take 1 tablet (4 mg total) by mouth every 8 (eight) hours as needed (nausea and vomiting).    pantoprazole (PROTONIX) 40 MG tablet TAKE ONE TABLET BY MOUTH EVERY DAY    prednisoLONE acetate (PRED FORTE) 1 % DrpS Instill one drop into the affected eye every two hours while awake    repaglinide (PRANDIN) 1 MG tablet TAKE ONE TABLET BY MOUTH THREE TIMES DAILY BEFORE MEALS (TAKING PLACE OF TRAJENTA)    SITagliptin (JANUVIA) 100 MG Tab Take 1 tablet (100 mg total) by mouth once daily.    sulfaSALAzine (AZULFIDINE) 500 MG EC tablet Take 3 tablets (1,500 mg total) by mouth 2 (two) times daily.    terconazole (TERAZOL 7) 0.4 % Crea Place 1 applicator vaginally every  evening.    tiZANidine (ZANAFLEX) 2 MG tablet Take 1-2 tablets (2-4 mg total) by mouth every 8 (eight) hours as needed.    topiramate (TOPAMAX) 100 MG tablet Take 100 mg by mouth 2 (two) times daily.    triamcinolone acetonide 0.1% (KENALOG) 0.1 % cream Apply topically 2 (two) times daily.    valACYclovir (VALTREX) 1000 MG tablet valacyclovir 1 gram tablet   Take 0.5 tablets every 12 hours by oral route.    zolpidem (AMBIEN) 5 MG Tab TAKE ONE TABLET BY MOUTH DAILY AT BEDTIME AS NEEDED   Last reviewed on 11/11/2020  1:49 PM by Ramy Santiago MA    Review of patient's allergies indicates:   Allergen Reactions    Bactrim [sulfamethoxazole-trimethoprim] Itching    Trulicity [dulaglutide] Diarrhea and Other (See Comments)     Vomiting, abdominal pain    Diflucan [fluconazole] Swelling and Other (See Comments)     Sore on mouth    Last reviewed on  11/11/2020 1:48 PM by Ramy Santiago      Tasks added this encounter   No tasks added.   Tasks due within next 3 months   11/17/2020 - Refill Call  12/31/2020 - Clinical - Follow Up Assesement (90 day)     Lito Mary  [facility]  [address] [place] [id number]  Phone: [phone]  Fax: [phone]

## 2020-11-20 ENCOUNTER — PES CALL (OUTPATIENT)
Dept: ADMINISTRATIVE | Facility: CLINIC | Age: 55
End: 2020-11-20

## 2020-11-23 ENCOUNTER — OFFICE VISIT (OUTPATIENT)
Dept: DERMATOLOGY | Facility: CLINIC | Age: 55
End: 2020-11-23
Payer: MEDICARE

## 2020-11-23 DIAGNOSIS — L30.9 ECZEMA, UNSPECIFIED TYPE: Primary | ICD-10-CM

## 2020-11-23 DIAGNOSIS — L72.9 SCALP CYST: ICD-10-CM

## 2020-11-23 PROCEDURE — 1126F AMNT PAIN NOTED NONE PRSNT: CPT | Mod: S$GLB,,, | Performed by: DERMATOLOGY

## 2020-11-23 PROCEDURE — 99202 OFFICE O/P NEW SF 15 MIN: CPT | Mod: S$GLB,,, | Performed by: DERMATOLOGY

## 2020-11-23 PROCEDURE — 99999 PR PBB SHADOW E&M-EST. PATIENT-LVL IV: CPT | Mod: PBBFAC,,, | Performed by: DERMATOLOGY

## 2020-11-23 PROCEDURE — 3072F LOW RISK FOR RETINOPATHY: CPT | Mod: S$GLB,,, | Performed by: DERMATOLOGY

## 2020-11-23 PROCEDURE — 3072F PR LOW RISK FOR RETINOPATHY: ICD-10-PCS | Mod: S$GLB,,, | Performed by: DERMATOLOGY

## 2020-11-23 PROCEDURE — 99202 PR OFFICE/OUTPT VISIT, NEW, LEVL II, 15-29 MIN: ICD-10-PCS | Mod: S$GLB,,, | Performed by: DERMATOLOGY

## 2020-11-23 PROCEDURE — 99999 PR PBB SHADOW E&M-EST. PATIENT-LVL IV: ICD-10-PCS | Mod: PBBFAC,,, | Performed by: DERMATOLOGY

## 2020-11-23 PROCEDURE — 1126F PR PAIN SEVERITY QUANTIFIED, NO PAIN PRESENT: ICD-10-PCS | Mod: S$GLB,,, | Performed by: DERMATOLOGY

## 2020-11-23 RX ORDER — FLUOCINONIDE 0.5 MG/G
CREAM TOPICAL 2 TIMES DAILY
Qty: 30 G | Refills: 0 | Status: SHIPPED | OUTPATIENT
Start: 2020-11-23 | End: 2023-11-09

## 2020-11-23 NOTE — PATIENT INSTRUCTIONS
XEROSIS (DRY SKIN)        1. Definition    Xerosis is the term for dry skin.  We all have a natural oil coating over our skin produced by the skin oil glands.  If this oil is removed, the skin becomes dry which can lead to cracking, which can lead to inflammation.  Xerosis is usually a long-term problem that recurs often, especially in the winter.    2. Cause     Long hot baths or showers can remove our natural oil and lead to xerosis.  One should never take more than one bath or shower a day and for no longer than ten minutes.   Use of harsh soaps such as Zest, Dial, and Ivory can worsen and cause xerosis.   Cold winter weather worsens xerosis because the amount of moisture contained in cold air is much less than the amount of moisture in warm air.    3. Treatment     Treatment is intended to restore the natural oil to your skin.  Keep the skin lubricated.     Do not take more than one bath or shower a day.  Use lukewarm water, not hot.  Hot water dries out the skin.     Use a gentle moisturizing soap such as Cetaphil soap, Oil of Olay, Dove, Basis, Ivory moisture care, Restoraderm cleanser.     When toweling dry, dont rub.  Blot the skin so there is still some water left on the skin.  You should apply a moisturizing cream to all of the skin such as Cerave cream, Cetaphil cream, Restoraderm or Eucerin Original Formula cream.   Alpha hydroxyacid lotions, i.e., AmLactin, also work very well for preventing dry skin, but may burn when used on inflamed or reddened skin.     If you like to swim during the winter months, you should not use soap when getting out of the pool.  When you have finished swimming, rinse off the chlorine with cool to warm water.  If this will be the only shower of the day, then you may use Cetaphil or another mild soap to cleanse your skin.  After the shower, apply a moisturizing cream to all of the skin as above.        [address] [place] [address]/ [phone]  [phone] FAX/ [email or web address]

## 2020-11-23 NOTE — LETTER
November 23, 2020      Weston Begum, DO  [address] [facility] [place] [address]           [facility] - Dermatology 11th Fl  [address]  [facility] [address]  Phone: [phone]  Fax: [phone]          Patient: Althea Vazquez   MR Number: [id number]   YOB: 1965   Date of Visit: 11/23/2020       Dear Dr. Weston Begum:    Thank you for referring Althea Vazquez to me for evaluation. Attached you will find relevant portions of my assessment and plan of care.    If you have questions, please do not hesitate to call me. I look forward to following Althea Vazquez along with you.    Sincerely,    Cristi Rose MD    Enclosure  CC:  No Recipients    If you would like to receive this communication electronically, please contact [email or web address] or [phone] to request more information on [employer] Link access.    For providers and/or their staff who would like to refer a patient to [facility], please contact us through our one-stop-shop provider referral line, [facility], at [phone].    If you feel you have received this communication in error or would no longer like to receive these types of communications, please e-mail [email or web address]

## 2020-11-23 NOTE — PROGRESS NOTES
Subjective:       Patient ID:  Althea Vazquez is a 55 y.o. female who presents for   Chief Complaint   Patient presents with    Cyst     Pt presents today for scalp, x 3 years, growing, drainage, Tx. none    Rash     Pt c/o neck, itching, redness,  x 1 month, Tx. TAC     Also has rash on neck for a week or more after perfume landed there.      Review of Systems   Constitutional: Negative for fever, chills, weight loss, weight gain, fatigue, night sweats and malaise.   Respiratory: Negative for cough and shortness of breath.    Gastrointestinal: Negative for nausea and vomiting.   Musculoskeletal: Negative for myalgias, joint swelling, arthralgias and muscle weakness.   Skin: Positive for itching and rash. Negative for daily sunscreen use, activity-related sunscreen use, recent sunburn and wears hat.   Hematologic/Lymphatic: Does not bruise/bleed easily.        Objective:    Physical Exam   Constitutional: She appears well-developed and well-nourished.   Eyes: No conjunctival no injection.   Neurological: She is alert and oriented to person, place, and time.   Psychiatric: She has a normal mood and affect.   Skin:   Areas Examined (abnormalities noted in diagram):   Scalp / Hair Palpated and Inspected  Neck Inspection Performed              Diagram Legend     Erythematous scaling macule/papule c/w actinic keratosis       Vascular papule c/w angioma      Pigmented verrucoid papule/plaque c/w seborrheic keratosis      Yellow umbilicated papule c/w sebaceous hyperplasia      Irregularly shaped tan macule c/w lentigo     1-2 mm smooth white papules consistent with Milia      Movable subcutaneous cyst with punctum c/w epidermal inclusion cyst      Subcutaneous movable cyst c/w pilar cyst      Firm pink to brown papule c/w dermatofibroma      Pedunculated fleshy papule(s) c/w skin tag(s)      Evenly pigmented macule c/w junctional nevus     Mildly variegated pigmented, slightly irregular-bordered macule c/w  mildly atypical nevus      Flesh colored to evenly pigmented papule c/w intradermal nevus       Pink pearly papule/plaque c/w basal cell carcinoma      Erythematous hyperkeratotic cursted plaque c/w SCC      Surgical scar with no sign of skin cancer recurrence      Open and closed comedones      Inflammatory papules and pustules      Verrucoid papule consistent consistent with wart     Erythematous eczematous patches and plaques     Dystrophic onycholytic nail with subungual debris c/w onychomycosis     Umbilicated papule    Erythematous-base heme-crusted tan verrucoid plaque consistent with inflamed seborrheic keratosis     Erythematous Silvery Scaling Plaque c/w Psoriasis     See annotation      Assessment / Plan:        Eczema, unspecified type  -     fluocinonide 0.05% (LIDEX) 0.05 % cream; Apply topically 2 (two) times daily. To allergic rash  Dispense: 30 g; Refill: 0  Suspect contact.  Discussed with patient the etiology and pathogenesis of the disease or skin lesion(s) and possible treatments and aggravators.    Reviewed with patient different treatment options and associated risks.  Proper application of medications and or care for affected area(s) and condition(s) reviewed.  Patient to watch for recurrence or flares or worsening and to call the clinic for a follow up appointment for such.  Good skin care regimen discussed including limiting to one bath or shower per day, using lukewarm water with mild soap and moisturization to skin once to twice daily.  Consider glycerin bar soap or Dove.  Consider organic coconut oil.    Scalp cyst  -     Ambulatory referral/consult to Dermatology  Discussed with patient the likelihood that this lesion is an epidermal cyst caused by an involuted lining of skin with dead skin trapped inside.  Cysts do not have a malignant potential but can become infected and turn into abscesses with possible cellulitis.  Discussed the option of warm compresses if infected with oral  antibiotics.  Discussed the option of surgical excision with scar, possible recurrence, hematoma, and infection.  Patient to consider these options.    Hyperpigmentation  Discussed with the patient the risk of color scars or hyperpigmentation that could take months to resolve.  Do not treat with steroids!           Follow up if symptoms worsen or fail to improve, for Procedure.

## 2020-12-02 ENCOUNTER — PROCEDURE VISIT (OUTPATIENT)
Dept: DERMATOLOGY | Facility: CLINIC | Age: 55
End: 2020-12-02
Payer: MEDICARE

## 2020-12-02 ENCOUNTER — OFFICE VISIT (OUTPATIENT)
Dept: DERMATOLOGY | Facility: CLINIC | Age: 55
End: 2020-12-02
Payer: MEDICARE

## 2020-12-02 ENCOUNTER — LAB VISIT (OUTPATIENT)
Dept: LAB | Facility: HOSPITAL | Age: 55
End: 2020-12-02
Attending: DERMATOLOGY
Payer: MEDICARE

## 2020-12-02 DIAGNOSIS — L29.9 ITCH: Primary | ICD-10-CM

## 2020-12-02 DIAGNOSIS — E55.9 VITAMIN D DEFICIENCY, UNSPECIFIED: ICD-10-CM

## 2020-12-02 DIAGNOSIS — R20.2 PARESTHESIA: ICD-10-CM

## 2020-12-02 DIAGNOSIS — L29.9 ITCH: ICD-10-CM

## 2020-12-02 DIAGNOSIS — L72.9 SCALP CYST: ICD-10-CM

## 2020-12-02 DIAGNOSIS — L81.9 HYPERPIGMENTATION: ICD-10-CM

## 2020-12-02 PROCEDURE — 11422 EXC H-F-NK-SP B9+MARG 1.1-2: CPT | Mod: S$GLB,,, | Performed by: DERMATOLOGY

## 2020-12-02 PROCEDURE — 83540 ASSAY OF IRON: CPT

## 2020-12-02 PROCEDURE — 99212 PR OFFICE/OUTPT VISIT, EST, LEVL II, 10-19 MIN: ICD-10-PCS | Mod: 25,S$GLB,, | Performed by: DERMATOLOGY

## 2020-12-02 PROCEDURE — 82652 VIT D 1 25-DIHYDROXY: CPT

## 2020-12-02 PROCEDURE — 99212 OFFICE O/P EST SF 10 MIN: CPT | Mod: 25,S$GLB,, | Performed by: DERMATOLOGY

## 2020-12-02 PROCEDURE — 99999 PR PBB SHADOW E&M-EST. PATIENT-LVL I: CPT | Mod: PBBFAC,,, | Performed by: DERMATOLOGY

## 2020-12-02 PROCEDURE — 88304 PR  SURG PATH,LEVEL III: ICD-10-PCS | Mod: 26,,, | Performed by: DERMATOLOGY

## 2020-12-02 PROCEDURE — 11422 PR EXC SKIN BENIG 1.1-2 CM REMAINDR BODY: ICD-10-PCS | Mod: S$GLB,,, | Performed by: DERMATOLOGY

## 2020-12-02 PROCEDURE — 88304 TISSUE EXAM BY PATHOLOGIST: CPT | Performed by: DERMATOLOGY

## 2020-12-02 PROCEDURE — 12031 PR LAYR CLOS WND TRUNK,ARM,LEG <2.5 CM: ICD-10-PCS | Mod: 51,S$GLB,, | Performed by: DERMATOLOGY

## 2020-12-02 PROCEDURE — 36415 COLL VENOUS BLD VENIPUNCTURE: CPT | Mod: PN

## 2020-12-02 PROCEDURE — 99999 PR PBB SHADOW E&M-EST. PATIENT-LVL I: ICD-10-PCS | Mod: PBBFAC,,, | Performed by: DERMATOLOGY

## 2020-12-02 PROCEDURE — 84630 ASSAY OF ZINC: CPT

## 2020-12-02 PROCEDURE — 3072F LOW RISK FOR RETINOPATHY: CPT | Mod: S$GLB,,, | Performed by: DERMATOLOGY

## 2020-12-02 PROCEDURE — 88304 TISSUE EXAM BY PATHOLOGIST: CPT | Mod: 26,,, | Performed by: DERMATOLOGY

## 2020-12-02 PROCEDURE — 12031 INTMD RPR S/A/T/EXT 2.5 CM/<: CPT | Mod: 51,S$GLB,, | Performed by: DERMATOLOGY

## 2020-12-02 PROCEDURE — 82728 ASSAY OF FERRITIN: CPT

## 2020-12-02 PROCEDURE — 82607 VITAMIN B-12: CPT

## 2020-12-02 PROCEDURE — 3072F PR LOW RISK FOR RETINOPATHY: ICD-10-PCS | Mod: S$GLB,,, | Performed by: DERMATOLOGY

## 2020-12-02 NOTE — PROGRESS NOTES
PROCEDURE: Elliptical excision with intermediate layered repair in order to close large gap.    ANESTHETIC: 3 cc 1% Xylocaine with Epinephrine 1:100,000, buffered    SURGEON: Cristi Rose M.D.    ASSISTANTS: Alicia Longo MA    PREOPERATIVE DIAGNOSIS:  cyst    POSTOPERATIVE DIAGNOSIS:  Same as preoperative diagnosis    PATHOLOGIC DIAGNOSIS: Pending    LOCATION: scalp    INITIAL LESION SIZE: 1.5 cm    EXCISED DIAMETER: 1.5 cm    PREPARATION: The diagnosis, procedure, alternatives, benefits and risks, including but not limited to: infection, bleeding/bruising, drug reactions, pain, scar or cosmetic defect, local sensation disturbances, wound dehiscence (separation of wound edges after sutures removed) and/or recurrence of present condition were explained to the patient. The patient elected to proceed.  Patient's identity was verified using 2 patient identifiers and the side and site was verified.  Time out period with surgeon, assistant and patient in surgical suite was taken.    PROCEDURE: The location noted above was prepped, draped, and anesthetized in the usual sterile fashion per Alicia Longo MA. Lesional tissue was carefully marked with at least 0 mm margins of clinically normal skin in all directions. A fusiform elliptical excision was done with #15 blade carried down completely through the dermis into the deep subcutaneous tissues to the level of the non-muscle fascia, and dissection was carried out in that plane.  Electrocoagulation was used to obtain hemostasis. Blood loss was minimal. The wound was then approximated in a layered fashion with subcutaneous and intradermal sutures of 4.0 Monocryl, approximately 1 in number, and the wound was then superficially closed with simple interrupted sutures of 3.0 Prolene.    The patient tolerated the procedure well.    The area was cleaned and dressed appropriately and the patient was given wound care instructions, as well as an appointment for follow-up  evaluation.    LENGTH OF REPAIR: 1 cm

## 2020-12-02 NOTE — PATIENT INSTRUCTIONS

## 2020-12-02 NOTE — PROGRESS NOTES
Subjective:       Patient ID:  Althea Vazquez is a 55 y.o. female who presents for   No chief complaint on file.    Also has rash on neck for more than a week or more after perfume landed there.    No change with lidex cream.  Barely does anything.      Review of Systems   Constitutional: Negative for fever and chills.   HENT: Negative for sore throat.    Respiratory: Negative for cough and shortness of breath.    Gastrointestinal: Negative for nausea and vomiting.   Musculoskeletal: Negative for myalgias, joint swelling, arthralgias and muscle weakness.   Skin: Positive for itching and rash. Negative for daily sunscreen use, activity-related sunscreen use, recent sunburn and wears hat.   Hematologic/Lymphatic: Does not bruise/bleed easily.        Objective:    Physical Exam   Constitutional: She appears well-developed and well-nourished.   Eyes: No conjunctival no injection.   Neurological: She is alert and oriented to person, place, and time.   Psychiatric: She has a normal mood and affect.   Skin:   Areas Examined (abnormalities noted in diagram):   Neck Inspection Performed              Diagram Legend     Erythematous scaling macule/papule c/w actinic keratosis       Vascular papule c/w angioma      Pigmented verrucoid papule/plaque c/w seborrheic keratosis      Yellow umbilicated papule c/w sebaceous hyperplasia      Irregularly shaped tan macule c/w lentigo     1-2 mm smooth white papules consistent with Milia      Movable subcutaneous cyst with punctum c/w epidermal inclusion cyst      Subcutaneous movable cyst c/w pilar cyst      Firm pink to brown papule c/w dermatofibroma      Pedunculated fleshy papule(s) c/w skin tag(s)      Evenly pigmented macule c/w junctional nevus     Mildly variegated pigmented, slightly irregular-bordered macule c/w mildly atypical nevus      Flesh colored to evenly pigmented papule c/w intradermal nevus       Pink pearly papule/plaque c/w basal cell carcinoma       Erythematous hyperkeratotic cursted plaque c/w SCC      Surgical scar with no sign of skin cancer recurrence      Open and closed comedones      Inflammatory papules and pustules      Verrucoid papule consistent consistent with wart     Erythematous eczematous patches and plaques     Dystrophic onycholytic nail with subungual debris c/w onychomycosis     Umbilicated papule    Erythematous-base heme-crusted tan verrucoid plaque consistent with inflamed seborrheic keratosis     Erythematous Silvery Scaling Plaque c/w Psoriasis     See annotation      Assessment / Plan:        Hyperpigmentation  Discussed with the patient the risk of color scars or hyperpigmentation that could take months to resolve.      Itch  Suspect paresthesia.  Stop lidex cr.  Get labs done.  Discussed with patient the need for laboratory work up for further evaluation.  Discussed that such investigation may not be helpful.  See lab orders for today's date.  Discussed with patient the etiology and pathogenesis of the disease or skin lesion(s) and possible treatments and aggravators.    Reviewed with patient different treatment options and associated risks.           Follow up if symptoms worsen or fail to improve.

## 2020-12-03 LAB
FERRITIN SERPL-MCNC: 144 NG/ML (ref 20–300)
IRON SERPL-MCNC: 60 UG/DL (ref 30–160)
SATURATED IRON: 17 % (ref 20–50)
TOTAL IRON BINDING CAPACITY: 355 UG/DL (ref 250–450)
TRANSFERRIN SERPL-MCNC: 240 MG/DL (ref 200–375)
VIT B12 SERPL-MCNC: 783 PG/ML (ref 210–950)

## 2020-12-07 LAB
1,25(OH)2D3 SERPL-MCNC: 41 PG/ML (ref 20–79)
ZINC SERPL-MCNC: 84 UG/DL (ref 60–130)

## 2020-12-08 ENCOUNTER — TELEPHONE (OUTPATIENT)
Dept: DERMATOLOGY | Facility: CLINIC | Age: 55
End: 2020-12-08

## 2020-12-08 NOTE — TELEPHONE ENCOUNTER
lvm informing the pt of JHK instructions. Received no answer.    TB    ----- Message from Cristi Rose MD sent at 12/8/2020  8:21 AM CST -----  Inform iron is low.  Should take 15-25 mg qd.  Also take extra vit d 2000 iu qd.  Do fu in 4 weeks.

## 2020-12-08 NOTE — TELEPHONE ENCOUNTER
Pt stated that she's coming in on the 16th and would schedule another appointment afterwards.    TB    ----- Message from Xuan De La Cruz sent at 12/8/2020  4:25 PM CST -----  Regarding: returning nurse call  Contact: patient  [phone]  please call above patient returning call to the nurse waiting on a call back thanks.

## 2020-12-10 LAB
FINAL PATHOLOGIC DIAGNOSIS: NORMAL
GROSS: NORMAL
MICROSCOPIC EXAM: NORMAL

## 2020-12-11 DIAGNOSIS — E78.5 HYPERLIPIDEMIA: ICD-10-CM

## 2020-12-11 NOTE — TELEPHONE ENCOUNTER
Care Due:                  Date            Visit Type   Department     Provider  --------------------------------------------------------------------------------                                MYCHART                              FOLLOWUP/OF  [facility] INTERNAL  Last Visit: 11-      FICE VISIT   MEDICINE       Weston Begum  Next Visit: None Scheduled  None         None Found                                                            Last  Test          Frequency    Reason                     Performed    Due Date  --------------------------------------------------------------------------------    HBA1C.......  6 months...  LEONARD SITagliptin....  08- 02-    Powered by [employer]. Reference number: [id number]. 12/11/2020 3:53:42 PM   CST

## 2020-12-14 RX ORDER — HYDROCHLOROTHIAZIDE 25 MG/1
TABLET ORAL
Qty: 90 TABLET | Refills: 3 | Status: SHIPPED | OUTPATIENT
Start: 2020-12-14 | End: 2021-12-29

## 2020-12-14 RX ORDER — LOSARTAN POTASSIUM 100 MG/1
TABLET ORAL
Qty: 90 TABLET | Refills: 3 | Status: SHIPPED | OUTPATIENT
Start: 2020-12-14 | End: 2021-12-29

## 2020-12-14 RX ORDER — AMLODIPINE BESYLATE 10 MG/1
TABLET ORAL
Qty: 90 TABLET | Refills: 3 | Status: SHIPPED | OUTPATIENT
Start: 2020-12-14 | End: 2021-12-29

## 2020-12-14 RX ORDER — ATENOLOL 100 MG/1
TABLET ORAL
Qty: 90 TABLET | Refills: 3 | Status: SHIPPED | OUTPATIENT
Start: 2020-12-14 | End: 2021-12-29

## 2020-12-14 RX ORDER — ATORVASTATIN CALCIUM 40 MG/1
TABLET, FILM COATED ORAL
Qty: 90 TABLET | Refills: 1 | Status: SHIPPED | OUTPATIENT
Start: 2020-12-14 | End: 2021-06-07

## 2020-12-14 NOTE — PROGRESS NOTES
Refill Authorization Note   Althea Vazquez  is requesting a refill authorization.  Brief Assessment and Rationale for Refill:  Approve    -Medication-Related Problems Identified: Requires labs  Medication Therapy Plan:  CDMR; Labs (A1c)    Medication Reconciliation Completed: No   Comments:   Orders Placed This Encounter    amLODIPine (NORVASC) 10 MG tablet    atorvastatin (LIPITOR) 40 MG tablet    hydroCHLOROthiazide (HYDRODIURIL) 25 MG tablet    atenoloL (TENORMIN) 100 MG tablet    losartan (COZAAR) 100 MG tablet      Requested Prescriptions   Signed Prescriptions Disp Refills    amLODIPine (NORVASC) 10 MG tablet 90 tablet 3     Sig: TAKE ONE TABLET BY MOUTH DAILY       Cardiovascular:  Calcium Channel Blockers Passed - 12/11/2020  3:54 PM        Passed - Patient is at least 18 years old        Passed - Last BP in normal range within 360 days.     BP Readings from Last 3 Encounters:   11/11/20 128/80   11/10/20 (!) 141/88   08/11/20 134/75              Passed - Office visit in past 12 months or future 90 days     Recent Outpatient Visits            1 week ago Itch    [facility] - Dermatology Cristi Rose MD    3 weeks ago Eczema, unspecified type    [facility] - Dermatology 11[address] Cristi Rose MD    1 month ago Scalp cyst    [facility] - Internal Med Weston Begum, DO    1 month ago Anxiety    [facility] - [facility] 4th Fl Tip Oliveira MD    1 month ago Osteoarthritis of spine with radiculopathy, cervical region    [facility] [address] Odilia Novak MD          Future Appointments              In 2 days JK, NURSE DERM [facility] - Dermatology, [facility]    In 4 weeks MD [facility] [id number], [facility]    In 1 month Odilia Novak MD [facility] [address], [facility]                  atorvastatin (LIPITOR) 40 MG tablet 90 tablet 1     Sig: TAKE ONE TABLET BY MOUTH DAILY       Cardiovascular:  Antilipid -  Statins Passed - 12/11/2020  3:54 PM        Passed - Patient is at least 18 years old        Passed - Office visit in past 12 months or future 90 days     Recent Outpatient Visits            1 week ago Itch    [facility] - Dermatology Cristi Rose MD    3 weeks ago Eczema, unspecified type    [facility] - Dermatology 11[address] Cristi Rose MD    1 month ago Scalp cyst    [facility] - Internal Med Weston Begum,     1 month ago Anxiety    [facility] 4th Fl Tip Oliveira MD    1 month ago Osteoarthritis of spine with radiculopathy, cervical region    [facility] [address] Odilia Novak MD          Future Appointments              In 2 days JK, NURSE DERM [facility] - Dermatology, [facility]    In 4 weeks Gabriel Arvizu MD [id number], [facility]    In 1 month Odilia Novak MD [facility] [address], [facility]                Passed - ALT is 94 or below and within 360 days     ALT   Date Value Ref Range Status   11/02/2020 16 10 - 44 U/L Final   08/27/2020 16 10 - 44 U/L Final   07/23/2020 20 10 - 44 U/L Final              Passed - AST is 54 or below and within 360 days     AST ([place])   Date Value Ref Range Status   08/11/2015 22 15 - 46 IU/L Final   04/02/2015 20 15 - 46 IU/L Final     AST   Date Value Ref Range Status   11/02/2020 16 10 - 40 U/L Final   08/27/2020 21 15 - 46 U/L Final   07/23/2020 27 15 - 46 U/L Final              Passed - Total Cholesterol within 360 days     Cholesterol   Date Value Ref Range Status   04/09/2020 205 (H) 120 - 199 mg/dL Final     Comment:     The National Cholesterol Education Program (NCEP) has set the  following guidelines (reference ranges) for Cholesterol:  Optimal.....................<200 mg/dL  Borderline High.............200-239 mg/dL  High........................> or = 240 mg/dL     09/04/2019 209 (H) 120 - 199 mg/dL Final     Comment:     The National  Cholesterol Education Program (NCEP) has set the  following guidelines (reference ranges) for Cholesterol:  Optimal.....................<200 mg/dL  Borderline High.............200-239 mg/dL  High........................> or = 240 mg/dL     01/17/2019 177 120 - 199 mg/dL Final     Comment:     The National Cholesterol Education Program (NCEP) has set the  following guidelines (reference ranges) for Cholesterol:  Optimal.....................<200 mg/dL  Borderline High.............200-239 mg/dL  High........................> or = 240 mg/dL                Passed - LDL within 360 days     LDL Cholesterol   Date Value Ref Range Status   04/09/2020 126.4 63.0 - 159.0 mg/dL Final     Comment:     The National Cholesterol Education Program (NCEP) has set the  following guidelines (reference values) for LDL Cholesterol:  Optimal.......................<130 mg/dL  Borderline High...............130-159 mg/dL  High..........................160-189 mg/dL  Very High.....................>190 mg/dL              Passed - HDL within 360 days     HDL   Date Value Ref Range Status   04/09/2020 60 40 - 75 mg/dL Final     Comment:     The National Cholesterol Education Program (NCEP) has set the  following guidelines (reference values) for HDL Cholesterol:  Low...............<40 mg/dL  Optimal...........>60 mg/dL              Passed - Triglycerides within 360 days     Triglycerides   Date Value Ref Range Status   04/09/2020 93 30 - 150 mg/dL Final     Comment:     The National Cholesterol Education Program (NCEP) has set the  following guidelines (reference values) for triglycerides:  Normal......................<150 mg/dL  Borderline High.............150-199 mg/dL  High........................200-499 mg/dL     09/04/2019 76 30 - 150 mg/dL Final     Comment:     The National Cholesterol Education Program (NCEP) has set the  following guidelines (reference values) for triglycerides:  Normal......................<150 mg/dL  Borderline  High.............150-199 mg/dL  High........................200-499 mg/dL     01/17/2019 47 30 - 150 mg/dL Final     Comment:     The National Cholesterol Education Program (NCEP) has set the  following guidelines (reference values) for triglycerides:  Normal......................<150 mg/dL  Borderline High.............150-199 mg/dL  High........................200-499 mg/dL                  hydroCHLOROthiazide (HYDRODIURIL) 25 MG tablet 90 tablet 3     Sig: TAKE ONE TABLET BY MOUTH DAILY       Cardiovascular: Diuretics - Thiazide Passed - 12/11/2020  3:54 PM        Passed - Patient is at least 18 years old        Passed - Last BP in normal range within 360 days.     BP Readings from Last 3 Encounters:   11/11/20 128/80   11/10/20 (!) 141/88   08/11/20 134/75              Passed - Office visit in past 12 months or future 90 days     Recent Outpatient Visits            1 week ago Itch    [facility] - Dermatology Cristi Rose MD    3 weeks ago Eczema, unspecified type    [facility] - [facility]ermatology 11[address] Cristi Rose MD    1 month ago Scalp cyst    [facility] - Internal Med Weston Begum DO    1 month ago Anxiety    [facility] 4th Fl Tip Oliveira MD    1 month ago Osteoarthritis of spine with radiculopathy, cervical region    [facility] [address] Odilia Novak MD          Future Appointments              In 2 days JK, NURSE DERM [facility] - Dermatology, [facility]    In 4 weeks MD [facility] [id number], [facility]    In 1 month Odilia Novak MD [facility] [address], [facility]                Passed - Cr is 1.4 or below and within 360 days     Creatinine   Date Value Ref Range Status   11/02/2020 0.7 0.5 - 1.4 mg/dL Final   08/27/2020 0.78 0.50 - 1.40 mg/dL Final   07/23/2020 0.76 0.50 - 1.40 mg/dL Final              Passed - K in normal range and within 360 days     Potassium   Date Value Ref Range Status    11/02/2020 4.0 3.5 - 5.1 mmol/L Final   08/27/2020 4.1 3.5 - 5.1 mmol/L Final   07/23/2020 4.0 3.5 - 5.1 mmol/L Final              Passed - Na is between 130 and 148 and within 360 days     Sodium   Date Value Ref Range Status   11/02/2020 141 136 - 145 mmol/L Final   08/27/2020 142 136 - 145 mmol/L Final   07/23/2020 141 136 - 145 mmol/L Final              Passed - eGFR within 360 days     eGFR if non    Date Value Ref Range Status   11/02/2020 >60.0 >60 mL/min/1.73 m^2 Final     Comment:     Calculation used to obtain the estimated glomerular filtration  rate (eGFR) is the CKD-EPI equation.      08/27/2020 >60.0 >60 mL/min/1.73 m^2 Final     Comment:     Calculation used to obtain the estimated glomerular filtration  rate (eGFR) is the CKD-EPI equation.      07/23/2020 >60.0 >60 mL/min/1.73 m^2 Final     Comment:     Calculation used to obtain the estimated glomerular filtration  rate (eGFR) is the CKD-EPI equation.        eGFR if    Date Value Ref Range Status   11/02/2020 >60.0 >60 mL/min/1.73 m^2 Final   08/27/2020 >60.0 >60 mL/min/1.73 m^2 Final   07/23/2020 >60.0 >60 mL/min/1.73 m^2 Final                atenoloL (TENORMIN) 100 MG tablet 90 tablet 3     Sig: TAKE ONE TABLET BY MOUTH DAILY       Cardiovascular:  Beta Blockers Passed - 12/11/2020  3:54 PM        Passed - Patient is at least 18 years old        Passed - Last BP in normal range within 360 days.     BP Readings from Last 3 Encounters:   11/11/20 128/80   11/10/20 (!) 141/88   08/11/20 134/75              Passed - Last Heart Rate in normal range within 360 days.     Pulse Readings from Last 3 Encounters:   11/11/20 67   11/10/20 64   08/11/20 69             Passed - Office visit in past 12 months or future 90 days     Recent Outpatient Visits            1 week ago Itch    [facility] - Dermatology Cristi Rose MD    3 weeks ago Eczema, unspecified type    [facility] - Dermatology 11th Fl Cristi Rose MD    1 month  ago Scalp cyst    [facility] - Internal Med Weston Begum, DO    1 month ago Anxiety    [address] [facility] 4th Fl Tip Oliveira MD    1 month ago Osteoarthritis of spine with radiculopathy, cervical region    [facility] [address] Odilia Novak MD          Future Appointments              In 2 days JK, NURSE ROMMEL [facility] - Dermatology, [facility]    In 4 weeks MD Giancarlo Dee [id number], [facility]    In 1 month Odilia Novak MD [facility] [address], [facility]                  losartan (COZAAR) 100 MG tablet 90 tablet 3     Sig: TAKE ONE TABLET BY MOUTH ONCE DAILY       Cardiovascular:  Angiotensin Receptor Blockers Passed - 12/11/2020  3:54 PM        Passed - Patient is at least 18 years old        Passed - Last BP in normal range within 360 days.     BP Readings from Last 3 Encounters:   11/11/20 128/80   11/10/20 (!) 141/88   08/11/20 134/75              Passed - Office visit in past 12 months or future 90 days     Recent Outpatient Visits            1 week ago Itch    [facility] - Dermatology Cristi Rose MD    3 weeks ago Eczema, unspecified type    [facility] - Dermatology [address] Cristi Rose MD    1 month ago Scalp cyst    [facility] - Internal [facility] Weston Begum, DO    1 month ago Anxiety    [address] - Psych [facility] 4th Fl Tip Oliveira MD    1 month ago Osteoarthritis of spine with radiculopathy, cervical region    [facility] [address] Odilia Novak MD          Future Appointments              In 2 days JK, NURSE ROMMEL [facility] - Dermatology, [facility]    In 4 weeks MD Giancarlo Dee [id number], [facility]    In 1 month Odilia Novak MD [facility] [address], [facility]                Passed - Cr is 1.4 or below and within 360 days     Creatinine   Date Value Ref Range Status   11/02/2020 0.7 0.5 - 1.4 mg/dL  Final   08/27/2020 0.78 0.50 - 1.40 mg/dL Final   07/23/2020 0.76 0.50 - 1.40 mg/dL Final              Passed - K in normal range and within 360 days     Potassium   Date Value Ref Range Status   11/02/2020 4.0 3.5 - 5.1 mmol/L Final   08/27/2020 4.1 3.5 - 5.1 mmol/L Final   07/23/2020 4.0 3.5 - 5.1 mmol/L Final              Passed - eGFR within 360 days     eGFR if non    Date Value Ref Range Status   11/02/2020 >60.0 >60 mL/min/1.73 m^2 Final     Comment:     Calculation used to obtain the estimated glomerular filtration  rate (eGFR) is the CKD-EPI equation.      08/27/2020 >60.0 >60 mL/min/1.73 m^2 Final     Comment:     Calculation used to obtain the estimated glomerular filtration  rate (eGFR) is the CKD-EPI equation.      07/23/2020 >60.0 >60 mL/min/1.73 m^2 Final     Comment:     Calculation used to obtain the estimated glomerular filtration  rate (eGFR) is the CKD-EPI equation.        eGFR if    Date Value Ref Range Status   11/02/2020 >60.0 >60 mL/min/1.73 m^2 Final   08/27/2020 >60.0 >60 mL/min/1.73 m^2 Final   07/23/2020 >60.0 >60 mL/min/1.73 m^2 Final                  Appointments  past 12m or future 3m with PCP    Date Provider   Last Visit   11/11/2020 Weston Begum DO   Next Visit   Visit date not found Weston Begum DO   ED visits in past 90 days: 0     Note composed:10:16 AM 12/14/2020

## 2020-12-14 NOTE — TELEPHONE ENCOUNTER
Provider Staff:     Action is required for this patient.     Please schedule patient for Labs (A1c)    Thanks!  [facility]     Appointments  past 12m or future 3m with PCP    Date Provider   Last Visit   11/11/2020 Weston Begum DO   Next Visit   Visit date not found Weston Begum DO     Note composed:10:16 AM 12/14/2020

## 2020-12-16 ENCOUNTER — CLINICAL SUPPORT (OUTPATIENT)
Dept: DERMATOLOGY | Facility: CLINIC | Age: 55
End: 2020-12-16
Payer: MEDICARE

## 2020-12-16 PROCEDURE — 99024 PR POST-OP FOLLOW-UP VISIT: ICD-10-PCS | Mod: S$GLB,,, | Performed by: DERMATOLOGY

## 2020-12-16 PROCEDURE — 99024 POSTOP FOLLOW-UP VISIT: CPT | Mod: S$GLB,,, | Performed by: DERMATOLOGY

## 2020-12-16 NOTE — PROGRESS NOTES
Suture Removal note:  CC: 55 y.o. female patient is here for suture removal.         HPI: Patient is s/p excision of granulomatous inflammation from scalp on 12/16/2020    Skin, scalp, shave biopsy:  -GRANULOMATOUS INFLAMMATION, see comment  COMMENT: The histological findings are non-specific but suggestive of a previously ruptured cyst or hair  follicle. Clinical correlation is recommended., see comment  COMMENT: The histological findings are non-specific but suggestive of a previously ruptured cyst or hair  follicle. Clinical correlation is recommended.  WOUND PE:  Sutures intact.  Wound healing well.  Good approximation of skin edges.  No signs or symptoms of infection.    Diagnosed by Dr. Crocker  Electronically Signed By Dr. Crocker 12/10/2020 11:25  IMPRESSION: s/p excision of granulomatous inflammation from scalp on 12/16/2020      PLAN:  Sutures removed today.  Continue wound care.    RTC:PRN

## 2020-12-18 ENCOUNTER — SPECIALTY PHARMACY (OUTPATIENT)
Dept: PHARMACY | Facility: CLINIC | Age: 55
End: 2020-12-18

## 2020-12-18 NOTE — TELEPHONE ENCOUNTER
Specialty Pharmacy - Refill Coordination    Specialty Medication Orders Linked to Encounter      Most Recent Value   Medication #1  etanercept (ENBREL SURECLICK) 50 mg/mL (1 mL) (Order#[id number], Rx#[id number])   Medication #2  entecavir (BARACLUDE) 0.5 MG Tab (Order#[id number], Rx#[id number])          Refill Questions - Documented Responses      Most Recent Value   Relationship to patient of person spoken to?  Self   HIPAA/medical authority confirmed?  Yes   Any changes in contact preferences or allowed representatives?  No   Has the patient had any insurance changes?  No   Has the patient had any changes to specialty medication, dose, or instructions?  No   Has the patient started taking any new medications, herbals, or supplements?  No   Has the patient been diagnosed with any new medical conditions?  No   Does the patient have any new allergies to medications or foods?  No   Does the patient have any concerns about side effects?  No   Can the patient store medication/sharps container properly (at the correct temperature, away from children/pets, etc.)?  Yes   Can the patient call emergency services (911) in the event of an emergency?  Yes   Does the patient have any concerns or questions about taking or administering this medication as prescribed?  No   How many doses did the patient miss in the past 4 weeks or since the last fill?  0   How many doses does the patient have on hand?  0   How many days does the patient report on hand quantity will last?  4   Does the number of doses/days supply remaining match pharmacy expected amounts?  Yes   Does the patient feel that this medication is effective?  Yes   During the past 4 weeks, has patient missed any activities due to condition or medication?  No   During the past 4 weeks, did patient have any of the following urgent care visits?  None   How will the patient receive the medication?  Mail   When does the patient need to receive the medication?  12/22/20    Shipping Address  Prescription   Address in [facility] confirmed and updated if neccessary?  Yes   Expected Copay ($)  0   Is the patient able to afford the medication copay?  Yes   Payment Method  zero copay   Days supply of Refill  28   Would patient like to speak to a pharmacist?  No   Do you want to trigger an intervention?  No   Do you want to trigger an additional referral task?  No   Refill activity completed?  Yes   Refill activity plan  Refill scheduled   Shipment/Pickup Date:  12/21/20          Current Outpatient Medications   Medication Sig    amLODIPine (NORVASC) 10 MG tablet TAKE ONE TABLET BY MOUTH DAILY    aspirin (ECOTRIN) 81 MG EC tablet Take 1 tablet (81 mg total) by mouth once daily.    atenoloL (TENORMIN) 100 MG tablet TAKE ONE TABLET BY MOUTH DAILY    atorvastatin (LIPITOR) 40 MG tablet TAKE ONE TABLET BY MOUTH DAILY    blood sugar diagnostic Strp 1 strip by Misc.(Non-Drug; Combo Route) route 2 (two) times daily.    blood-glucose meter kit Use twice daily.    boric acid (BORIC ACID) vaginal suppository Place 1 each (650 mg total) vaginally every evening.    clonazePAM (KLONOPIN) 0.5 MG tablet Take 1 tablet (0.5 mg total) by mouth daily as needed for Anxiety.    clotrimazole-betamethasone 1-0.05% (LOTRISONE) cream Apply topically 2 (two) times daily as needed.     cyclobenzaprine (FLEXERIL) 10 MG tablet TAKE ONE TABLET BY MOUTH AT BEDTIME AS NEEDED    docusate sodium (COLACE) 50 MG capsule Take 1 capsule (50 mg total) by mouth 2 (two) times daily as needed for Constipation. (Patient taking differently: Take 50 mg by mouth 2 (two) times daily. )    entecavir (BARACLUDE) 0.5 MG Tab Take 1 tablet (0.5 mg total) by mouth once daily.    ergocalciferol (ERGOCALCIFEROL) 50,000 unit Cap Take 1 capsule (50,000 Units total) by mouth every 7 days.    etanercept (ENBREL SURECLICK) 50 mg/mL (1 mL) Inject 1 mL (50 mg total) into the skin once a week.    fluocinonide 0.05% (LIDEX) 0.05 %  cream Apply topically 2 (two) times daily. To allergic rash    FLUoxetine 20 MG capsule TAKE ONE CAPSULE BY MOUTH TWICE DAILY    homatropine (ISOPTO HOMATROPINE) 5 % ophthalmic solution Place 1 drop into the right eye 2 (two) times daily.    hydroCHLOROthiazide (HYDRODIURIL) 25 MG tablet TAKE ONE TABLET BY MOUTH DAILY    hydrocortisone 2.5 % cream Apply topically 2 (two) times daily.    hydroquinone 4 % Crea Apply to dark areas qhs.  Not more than 6 months straight in same location.Use sunscreen in am (Patient taking differently: continuous prn. Apply to dark areas qhs.  Not more than 6 months straight in same location.Use sunscreen in am)    INVOKANA 300 mg Tab tablet TAKE ONE TABLET BY MOUTH BEFORE BREAKFAST    lancets (ACCU-CHEK SOFTCLIX LANCETS) Misc 1 Device by Misc.(Non-Drug; Combo Route) route 2 (two) times daily.    lancets (FREESTYLE LANCETS) 28 gauge Misc Check blood sugars BID, FreeStyle Lancets 28 gauge    levocetirizine (XYZAL) 5 MG tablet TAKE ONE TABLET BY MOUTH EVERY EVENING    losartan (COZAAR) 100 MG tablet TAKE ONE TABLET BY MOUTH ONCE DAILY    methylnaltrexone (RELISTOR) 150 mg Tab Take 450 mg by mouth.    naproxen (NAPROSYN) 500 MG tablet Take 1 tablet (500 mg total) by mouth 2 (two) times daily as needed.    nystatin (MYCOSTATIN) powder Apply to affected area 3 times daily    ondansetron (ZOFRAN-ODT) 4 MG TbDL Take 1 tablet (4 mg total) by mouth every 8 (eight) hours as needed (nausea and vomiting).    pantoprazole (PROTONIX) 40 MG tablet TAKE ONE TABLET BY MOUTH EVERY DAY    prednisoLONE acetate (PRED FORTE) 1 % DrpS Instill one drop into the affected eye every two hours while awake    repaglinide (PRANDIN) 1 MG tablet TAKE ONE TABLET BY MOUTH THREE TIMES DAILY BEFORE MEALS (TAKING PLACE OF TRAJENTA)    SITagliptin (JANUVIA) 100 MG Tab Take 1 tablet (100 mg total) by mouth once daily.    sulfaSALAzine (AZULFIDINE) 500 MG EC tablet Take 3 tablets (1,500 mg total) by mouth 2  (two) times daily.    terconazole (TERAZOL 7) 0.4 % Crea Place 1 applicator vaginally every evening.    tiZANidine (ZANAFLEX) 2 MG tablet Take 1-2 tablets (2-4 mg total) by mouth every 8 (eight) hours as needed.    topiramate (TOPAMAX) 100 MG tablet Take 100 mg by mouth 2 (two) times daily.    triamcinolone acetonide 0.1% (KENALOG) 0.1 % cream Apply topically 2 (two) times daily.    valACYclovir (VALTREX) 1000 MG tablet valacyclovir 1 gram tablet   Take 0.5 tablets every 12 hours by oral route.    zolpidem (AMBIEN) 5 MG Tab TAKE ONE TABLET BY MOUTH DAILY AT BEDTIME AS NEEDED   Last reviewed on 12/18/2020 12:39 PM by Tessa Hernandez    Review of patient's allergies indicates:   Allergen Reactions    Bactrim [sulfamethoxazole-trimethoprim] Itching    Trulicity [dulaglutide] Diarrhea and Other (See Comments)     Vomiting, abdominal pain    Diflucan [fluconazole] Swelling and Other (See Comments)     Sore on mouth    Last reviewed on  12/18/2020 12:42 PM by Tessa Hernandez      Tasks added this encounter   No tasks added.   Tasks due within next 3 months   12/31/2020 - Clinical - Follow Up Assesement (90 day)  12/15/2020 - Refill Call (Auto Added)     Patient has not filled Entecavir since 9/2020. Patient swears she has not missed any doses due to a family member having extra. She denied to speak with an [facility] and does not want a follow up either.     TESSA HERNANDEZ  [facility]  [address] [place] [id number]  Phone: [phone]  Fax: [phone]

## 2020-12-26 ENCOUNTER — PATIENT MESSAGE (OUTPATIENT)
Dept: OPTOMETRY | Facility: CLINIC | Age: 55
End: 2020-12-26

## 2020-12-28 ENCOUNTER — OFFICE VISIT (OUTPATIENT)
Dept: OPTOMETRY | Facility: CLINIC | Age: 55
End: 2020-12-28
Payer: MEDICARE

## 2020-12-28 DIAGNOSIS — H25.13 NUCLEAR SCLEROSIS OF BOTH EYES: ICD-10-CM

## 2020-12-28 DIAGNOSIS — H20.022 RECURRENT IRITIS OF LEFT EYE: Primary | ICD-10-CM

## 2020-12-28 DIAGNOSIS — H20.021 RECURRENT IRITIS OF RIGHT EYE: ICD-10-CM

## 2020-12-28 DIAGNOSIS — H26.9 CORTICAL CATARACT OF BOTH EYES: ICD-10-CM

## 2020-12-28 DIAGNOSIS — H57.12 EYE PAIN, LEFT: ICD-10-CM

## 2020-12-28 PROCEDURE — 1125F PR PAIN SEVERITY QUANTIFIED, PAIN PRESENT: ICD-10-PCS | Mod: S$GLB,,, | Performed by: OPTOMETRIST

## 2020-12-28 PROCEDURE — 99999 PR PBB SHADOW E&M-EST. PATIENT-LVL IV: ICD-10-PCS | Mod: PBBFAC,,, | Performed by: OPTOMETRIST

## 2020-12-28 PROCEDURE — 92012 PR EYE EXAM, EST PATIENT,INTERMED: ICD-10-PCS | Mod: S$GLB,,, | Performed by: OPTOMETRIST

## 2020-12-28 PROCEDURE — 1125F AMNT PAIN NOTED PAIN PRSNT: CPT | Mod: S$GLB,,, | Performed by: OPTOMETRIST

## 2020-12-28 PROCEDURE — 99999 PR PBB SHADOW E&M-EST. PATIENT-LVL IV: CPT | Mod: PBBFAC,,, | Performed by: OPTOMETRIST

## 2020-12-28 PROCEDURE — 92012 INTRM OPH EXAM EST PATIENT: CPT | Mod: S$GLB,,, | Performed by: OPTOMETRIST

## 2020-12-28 RX ORDER — PREDNISOLONE ACETATE 10 MG/ML
SUSPENSION/ DROPS OPHTHALMIC
Qty: 5 ML | Refills: 0 | Status: SHIPPED | OUTPATIENT
Start: 2020-12-28 | End: 2021-01-19 | Stop reason: SDUPTHER

## 2020-12-31 ENCOUNTER — OFFICE VISIT (OUTPATIENT)
Dept: OPTOMETRY | Facility: CLINIC | Age: 55
End: 2020-12-31
Payer: MEDICARE

## 2020-12-31 DIAGNOSIS — H57.12 EYE PAIN, LEFT: ICD-10-CM

## 2020-12-31 DIAGNOSIS — H26.9 CORTICAL CATARACT OF BOTH EYES: ICD-10-CM

## 2020-12-31 DIAGNOSIS — H20.022 RECURRENT IRITIS OF LEFT EYE: Primary | ICD-10-CM

## 2020-12-31 DIAGNOSIS — H25.13 NUCLEAR SCLEROSIS OF BOTH EYES: ICD-10-CM

## 2020-12-31 PROCEDURE — 1126F AMNT PAIN NOTED NONE PRSNT: CPT | Mod: S$GLB,,, | Performed by: OPTOMETRIST

## 2020-12-31 PROCEDURE — 99499 NO LOS: ICD-10-PCS | Mod: S$GLB,,, | Performed by: OPTOMETRIST

## 2020-12-31 PROCEDURE — 99999 PR PBB SHADOW E&M-EST. PATIENT-LVL V: ICD-10-PCS | Mod: PBBFAC,,, | Performed by: OPTOMETRIST

## 2020-12-31 PROCEDURE — 1126F PR PAIN SEVERITY QUANTIFIED, NO PAIN PRESENT: ICD-10-PCS | Mod: S$GLB,,, | Performed by: OPTOMETRIST

## 2020-12-31 PROCEDURE — 99999 PR PBB SHADOW E&M-EST. PATIENT-LVL V: CPT | Mod: PBBFAC,,, | Performed by: OPTOMETRIST

## 2020-12-31 PROCEDURE — 99499 UNLISTED E&M SERVICE: CPT | Mod: S$GLB,,, | Performed by: OPTOMETRIST

## 2020-12-31 RX ORDER — CYCLOPENTOLATE HYDROCHLORIDE 10 MG/ML
1 SOLUTION/ DROPS OPHTHALMIC 2 TIMES DAILY
Qty: 5 ML | Refills: 0 | Status: SHIPPED | OUTPATIENT
Start: 2020-12-31 | End: 2021-01-07

## 2021-01-05 ENCOUNTER — OFFICE VISIT (OUTPATIENT)
Dept: OPTOMETRY | Facility: CLINIC | Age: 56
End: 2021-01-05
Payer: MEDICARE

## 2021-01-05 DIAGNOSIS — S00.252A: ICD-10-CM

## 2021-01-05 DIAGNOSIS — H25.13 NUCLEAR SCLEROSIS OF BOTH EYES: ICD-10-CM

## 2021-01-05 DIAGNOSIS — H20.022 RECURRENT IRITIS OF LEFT EYE: Primary | ICD-10-CM

## 2021-01-05 DIAGNOSIS — H26.9 CORTICAL CATARACT OF BOTH EYES: ICD-10-CM

## 2021-01-05 PROCEDURE — 99999 PR PBB SHADOW E&M-EST. PATIENT-LVL IV: ICD-10-PCS | Mod: PBBFAC,,, | Performed by: OPTOMETRIST

## 2021-01-05 PROCEDURE — 92012 PR EYE EXAM, EST PATIENT,INTERMED: ICD-10-PCS | Mod: S$GLB,,, | Performed by: OPTOMETRIST

## 2021-01-05 PROCEDURE — 1126F AMNT PAIN NOTED NONE PRSNT: CPT | Mod: S$GLB,,, | Performed by: OPTOMETRIST

## 2021-01-05 PROCEDURE — 92012 INTRM OPH EXAM EST PATIENT: CPT | Mod: S$GLB,,, | Performed by: OPTOMETRIST

## 2021-01-05 PROCEDURE — 1126F PR PAIN SEVERITY QUANTIFIED, NO PAIN PRESENT: ICD-10-PCS | Mod: S$GLB,,, | Performed by: OPTOMETRIST

## 2021-01-05 PROCEDURE — 99999 PR PBB SHADOW E&M-EST. PATIENT-LVL IV: CPT | Mod: PBBFAC,,, | Performed by: OPTOMETRIST

## 2021-01-11 ENCOUNTER — PATIENT OUTREACH (OUTPATIENT)
Dept: ADMINISTRATIVE | Facility: OTHER | Age: 56
End: 2021-01-11

## 2021-01-11 ENCOUNTER — OFFICE VISIT (OUTPATIENT)
Dept: RHEUMATOLOGY | Facility: CLINIC | Age: 56
End: 2021-01-11
Payer: MEDICARE

## 2021-01-11 ENCOUNTER — LAB VISIT (OUTPATIENT)
Dept: LAB | Facility: HOSPITAL | Age: 56
End: 2021-01-11
Attending: INTERNAL MEDICINE
Payer: MEDICARE

## 2021-01-11 ENCOUNTER — TELEPHONE (OUTPATIENT)
Dept: RHEUMATOLOGY | Facility: CLINIC | Age: 56
End: 2021-01-11

## 2021-01-11 VITALS
SYSTOLIC BLOOD PRESSURE: 149 MMHG | HEART RATE: 79 BPM | WEIGHT: 249 LBS | HEIGHT: 65 IN | BODY MASS INDEX: 41.48 KG/M2 | DIASTOLIC BLOOD PRESSURE: 87 MMHG

## 2021-01-11 DIAGNOSIS — Z12.31 ENCOUNTER FOR SCREENING MAMMOGRAM FOR MALIGNANT NEOPLASM OF BREAST: Primary | ICD-10-CM

## 2021-01-11 DIAGNOSIS — M89.9 DISORDER OF BONE, UNSPECIFIED: ICD-10-CM

## 2021-01-11 DIAGNOSIS — M46.90 AXIAL SPONDYLOARTHRITIS: ICD-10-CM

## 2021-01-11 DIAGNOSIS — Z51.81 ENCOUNTER FOR MONITORING OF ADALIMUMAB THERAPY: ICD-10-CM

## 2021-01-11 DIAGNOSIS — H20.00 AAU (ACUTE ANTERIOR UVEITIS): Primary | ICD-10-CM

## 2021-01-11 DIAGNOSIS — M45.9 ANKYLOSING SPONDYLITIS, UNSPECIFIED SITE OF SPINE: ICD-10-CM

## 2021-01-11 DIAGNOSIS — E66.01 MORBID OBESITY WITH BMI OF 40.0-44.9, ADULT: ICD-10-CM

## 2021-01-11 DIAGNOSIS — Z79.620 ENCOUNTER FOR MONITORING OF ADALIMUMAB THERAPY: ICD-10-CM

## 2021-01-11 LAB
ALBUMIN SERPL BCP-MCNC: 4.1 G/DL (ref 3.5–5.2)
ALP SERPL-CCNC: 90 U/L (ref 55–135)
ALT SERPL W/O P-5'-P-CCNC: 16 U/L (ref 10–44)
ANION GAP SERPL CALC-SCNC: 7 MMOL/L (ref 8–16)
AST SERPL-CCNC: 15 U/L (ref 10–40)
BASOPHILS # BLD AUTO: 0.06 K/UL (ref 0–0.2)
BASOPHILS NFR BLD: 0.7 % (ref 0–1.9)
BILIRUB SERPL-MCNC: 0.3 MG/DL (ref 0.1–1)
BUN SERPL-MCNC: 12 MG/DL (ref 6–20)
CALCIUM SERPL-MCNC: 8.8 MG/DL (ref 8.7–10.5)
CHLORIDE SERPL-SCNC: 104 MMOL/L (ref 95–110)
CO2 SERPL-SCNC: 27 MMOL/L (ref 23–29)
CREAT SERPL-MCNC: 0.7 MG/DL (ref 0.5–1.4)
CRP SERPL-MCNC: 6 MG/L (ref 0–8.2)
DIFFERENTIAL METHOD: ABNORMAL
EOSINOPHIL # BLD AUTO: 0.1 K/UL (ref 0–0.5)
EOSINOPHIL NFR BLD: 1.6 % (ref 0–8)
ERYTHROCYTE [DISTWIDTH] IN BLOOD BY AUTOMATED COUNT: 13.5 % (ref 11.5–14.5)
ERYTHROCYTE [SEDIMENTATION RATE] IN BLOOD BY WESTERGREN METHOD: 15 MM/HR (ref 0–36)
EST. GFR  (AFRICAN AMERICAN): >60 ML/MIN/1.73 M^2
EST. GFR  (NON AFRICAN AMERICAN): >60 ML/MIN/1.73 M^2
GLUCOSE SERPL-MCNC: 117 MG/DL (ref 70–110)
HCT VFR BLD AUTO: 45 % (ref 37–48.5)
HGB BLD-MCNC: 14.2 G/DL (ref 12–16)
IMM GRANULOCYTES # BLD AUTO: 0.1 K/UL (ref 0–0.04)
IMM GRANULOCYTES NFR BLD AUTO: 1.2 % (ref 0–0.5)
LYMPHOCYTES # BLD AUTO: 3.8 K/UL (ref 1–4.8)
LYMPHOCYTES NFR BLD: 47.2 % (ref 18–48)
MCH RBC QN AUTO: 30 PG (ref 27–31)
MCHC RBC AUTO-ENTMCNC: 31.6 G/DL (ref 32–36)
MCV RBC AUTO: 95 FL (ref 82–98)
MONOCYTES # BLD AUTO: 0.9 K/UL (ref 0.3–1)
MONOCYTES NFR BLD: 10.6 % (ref 4–15)
NEUTROPHILS # BLD AUTO: 3.1 K/UL (ref 1.8–7.7)
NEUTROPHILS NFR BLD: 38.7 % (ref 38–73)
NRBC BLD-RTO: 0 /100 WBC
PLATELET # BLD AUTO: 247 K/UL (ref 150–350)
PMV BLD AUTO: 10.8 FL (ref 9.2–12.9)
POTASSIUM SERPL-SCNC: 4.2 MMOL/L (ref 3.5–5.1)
PROT SERPL-MCNC: 7.5 G/DL (ref 6–8.4)
RBC # BLD AUTO: 4.74 M/UL (ref 4–5.4)
SODIUM SERPL-SCNC: 138 MMOL/L (ref 136–145)
WBC # BLD AUTO: 8.12 K/UL (ref 3.9–12.7)

## 2021-01-11 PROCEDURE — 3077F SYST BP >= 140 MM HG: CPT | Mod: S$GLB,,, | Performed by: INTERNAL MEDICINE

## 2021-01-11 PROCEDURE — 85025 COMPLETE CBC W/AUTO DIFF WBC: CPT

## 2021-01-11 PROCEDURE — 99999 PR PBB SHADOW E&M-EST. PATIENT-LVL V: CPT | Mod: PBBFAC,,, | Performed by: INTERNAL MEDICINE

## 2021-01-11 PROCEDURE — 3077F PR MOST RECENT SYSTOLIC BLOOD PRESSURE >= 140 MM HG: ICD-10-PCS | Mod: S$GLB,,, | Performed by: INTERNAL MEDICINE

## 2021-01-11 PROCEDURE — 99999 PR PBB SHADOW E&M-EST. PATIENT-LVL V: ICD-10-PCS | Mod: PBBFAC,,, | Performed by: INTERNAL MEDICINE

## 2021-01-11 PROCEDURE — 99214 OFFICE O/P EST MOD 30 MIN: CPT | Mod: S$GLB,,, | Performed by: INTERNAL MEDICINE

## 2021-01-11 PROCEDURE — 3072F LOW RISK FOR RETINOPATHY: CPT | Mod: S$GLB,,, | Performed by: INTERNAL MEDICINE

## 2021-01-11 PROCEDURE — 99214 PR OFFICE/OUTPT VISIT, EST, LEVL IV, 30-39 MIN: ICD-10-PCS | Mod: S$GLB,,, | Performed by: INTERNAL MEDICINE

## 2021-01-11 PROCEDURE — 85652 RBC SED RATE AUTOMATED: CPT

## 2021-01-11 PROCEDURE — 3008F BODY MASS INDEX DOCD: CPT | Mod: S$GLB,,, | Performed by: INTERNAL MEDICINE

## 2021-01-11 PROCEDURE — 86140 C-REACTIVE PROTEIN: CPT

## 2021-01-11 PROCEDURE — 3008F PR BODY MASS INDEX (BMI) DOCUMENTED: ICD-10-PCS | Mod: S$GLB,,, | Performed by: INTERNAL MEDICINE

## 2021-01-11 PROCEDURE — 3079F PR MOST RECENT DIASTOLIC BLOOD PRESSURE 80-89 MM HG: ICD-10-PCS | Mod: S$GLB,,, | Performed by: INTERNAL MEDICINE

## 2021-01-11 PROCEDURE — 80053 COMPREHEN METABOLIC PANEL: CPT

## 2021-01-11 PROCEDURE — 3079F DIAST BP 80-89 MM HG: CPT | Mod: S$GLB,,, | Performed by: INTERNAL MEDICINE

## 2021-01-11 PROCEDURE — 87516 HEPATITIS B DNA AMP PROBE: CPT

## 2021-01-11 PROCEDURE — 3072F PR LOW RISK FOR RETINOPATHY: ICD-10-PCS | Mod: S$GLB,,, | Performed by: INTERNAL MEDICINE

## 2021-01-11 RX ORDER — CERTOLIZUMAB PEGOL 200 MG/ML
400 INJECTION, SOLUTION SUBCUTANEOUS
Qty: 1 BOX | Refills: 0 | OUTPATIENT
Start: 2021-01-11 | End: 2021-01-11

## 2021-01-11 RX ORDER — CERTOLIZUMAB PEGOL 200 MG/ML
200 INJECTION, SOLUTION SUBCUTANEOUS
Qty: 1 BOX | Refills: 2 | OUTPATIENT
Start: 2021-01-11 | End: 2021-01-11

## 2021-01-11 RX ORDER — CERTOLIZUMAB PEGOL 200 MG/ML
200 INJECTION, SOLUTION SUBCUTANEOUS
Qty: 1 BOX | Refills: 2 | Status: SHIPPED | OUTPATIENT
Start: 2021-01-11 | End: 2021-05-07 | Stop reason: SDUPTHER

## 2021-01-11 RX ORDER — CERTOLIZUMAB PEGOL 200 MG/ML
400 INJECTION, SOLUTION SUBCUTANEOUS
Qty: 1 BOX | Refills: 0 | Status: SHIPPED | OUTPATIENT
Start: 2021-01-11 | End: 2021-03-22 | Stop reason: SDUPTHER

## 2021-01-11 RX ORDER — CERTOLIZUMAB PEGOL 200 MG/ML
200 INJECTION, SOLUTION SUBCUTANEOUS
Qty: 1 BOX | Refills: 1 | OUTPATIENT
Start: 2021-01-11 | End: 2021-01-11

## 2021-01-11 RX ORDER — FERROUS SULFATE, DRIED 160(50) MG
1 TABLET, EXTENDED RELEASE ORAL
Status: DISCONTINUED | OUTPATIENT
Start: 2021-01-11 | End: 2021-01-11

## 2021-01-11 ASSESSMENT — ANKYLOSING SPONDYLITIS DISEASE ACTIVITY SCORE (ASDAS-CRP)
CRP_MG_PER_LITER: 12.1
PAIN_SWELLING: 5
GLOBAL_ACTIVITY: 6
MORNING_STIFFNESS: 5
TOTAL_SCORE: 3.52
NBH_PAIN: 6

## 2021-01-13 LAB — HBV DNA SERPL QL NAA+PROBE: NOT DETECTED

## 2021-01-14 ENCOUNTER — SPECIALTY PHARMACY (OUTPATIENT)
Dept: PHARMACY | Facility: CLINIC | Age: 56
End: 2021-01-14

## 2021-01-19 ENCOUNTER — OFFICE VISIT (OUTPATIENT)
Dept: OPTOMETRY | Facility: CLINIC | Age: 56
End: 2021-01-19
Payer: MEDICARE

## 2021-01-19 ENCOUNTER — PATIENT MESSAGE (OUTPATIENT)
Dept: OPTOMETRY | Facility: CLINIC | Age: 56
End: 2021-01-19

## 2021-01-19 DIAGNOSIS — H26.9 CORTICAL CATARACT OF BOTH EYES: ICD-10-CM

## 2021-01-19 DIAGNOSIS — H25.13 NUCLEAR SCLEROSIS OF BOTH EYES: ICD-10-CM

## 2021-01-19 DIAGNOSIS — H57.12 EYE PAIN, LEFT: ICD-10-CM

## 2021-01-19 DIAGNOSIS — H20.022 RECURRENT IRITIS OF LEFT EYE: Primary | ICD-10-CM

## 2021-01-19 PROCEDURE — 1125F AMNT PAIN NOTED PAIN PRSNT: CPT | Mod: S$GLB,,, | Performed by: OPTOMETRIST

## 2021-01-19 PROCEDURE — 99999 PR PBB SHADOW E&M-EST. PATIENT-LVL IV: CPT | Mod: PBBFAC,,, | Performed by: OPTOMETRIST

## 2021-01-19 PROCEDURE — 92014 PR EYE EXAM, EST PATIENT,COMPREHESV: ICD-10-PCS | Mod: S$GLB,,, | Performed by: OPTOMETRIST

## 2021-01-19 PROCEDURE — 92014 COMPRE OPH EXAM EST PT 1/>: CPT | Mod: S$GLB,,, | Performed by: OPTOMETRIST

## 2021-01-19 PROCEDURE — 99999 PR PBB SHADOW E&M-EST. PATIENT-LVL IV: ICD-10-PCS | Mod: PBBFAC,,, | Performed by: OPTOMETRIST

## 2021-01-19 PROCEDURE — 1125F PR PAIN SEVERITY QUANTIFIED, PAIN PRESENT: ICD-10-PCS | Mod: S$GLB,,, | Performed by: OPTOMETRIST

## 2021-01-19 RX ORDER — PREDNISOLONE ACETATE 10 MG/ML
SUSPENSION/ DROPS OPHTHALMIC
Qty: 5 ML | Refills: 0 | Status: SHIPPED | OUTPATIENT
Start: 2021-01-19 | End: 2022-12-01 | Stop reason: SDUPTHER

## 2021-01-21 ENCOUNTER — OFFICE VISIT (OUTPATIENT)
Dept: OPTOMETRY | Facility: CLINIC | Age: 56
End: 2021-01-21
Payer: MEDICARE

## 2021-01-21 DIAGNOSIS — H57.12 EYE PAIN, LEFT: ICD-10-CM

## 2021-01-21 DIAGNOSIS — H20.022 RECURRENT IRITIS OF LEFT EYE: Primary | ICD-10-CM

## 2021-01-21 DIAGNOSIS — H25.13 NUCLEAR SCLEROSIS OF BOTH EYES: ICD-10-CM

## 2021-01-21 DIAGNOSIS — H26.9 CORTICAL CATARACT OF BOTH EYES: ICD-10-CM

## 2021-01-21 PROCEDURE — 1126F PR PAIN SEVERITY QUANTIFIED, NO PAIN PRESENT: ICD-10-PCS | Mod: S$GLB,,, | Performed by: OPTOMETRIST

## 2021-01-21 PROCEDURE — 92012 INTRM OPH EXAM EST PATIENT: CPT | Mod: S$GLB,,, | Performed by: OPTOMETRIST

## 2021-01-21 PROCEDURE — 99999 PR PBB SHADOW E&M-EST. PATIENT-LVL IV: CPT | Mod: PBBFAC,,, | Performed by: OPTOMETRIST

## 2021-01-21 PROCEDURE — 99999 PR PBB SHADOW E&M-EST. PATIENT-LVL IV: ICD-10-PCS | Mod: PBBFAC,,, | Performed by: OPTOMETRIST

## 2021-01-21 PROCEDURE — 92012 PR EYE EXAM, EST PATIENT,INTERMED: ICD-10-PCS | Mod: S$GLB,,, | Performed by: OPTOMETRIST

## 2021-01-21 PROCEDURE — 1126F AMNT PAIN NOTED NONE PRSNT: CPT | Mod: S$GLB,,, | Performed by: OPTOMETRIST

## 2021-01-27 ENCOUNTER — SPECIALTY PHARMACY (OUTPATIENT)
Dept: PHARMACY | Facility: CLINIC | Age: 56
End: 2021-01-27

## 2021-01-28 ENCOUNTER — OFFICE VISIT (OUTPATIENT)
Dept: OPTOMETRY | Facility: CLINIC | Age: 56
End: 2021-01-28
Payer: MEDICARE

## 2021-01-28 ENCOUNTER — OFFICE VISIT (OUTPATIENT)
Dept: BARIATRICS | Facility: CLINIC | Age: 56
End: 2021-01-28
Payer: MEDICARE

## 2021-01-28 VITALS
SYSTOLIC BLOOD PRESSURE: 136 MMHG | HEART RATE: 62 BPM | DIASTOLIC BLOOD PRESSURE: 84 MMHG | BODY MASS INDEX: 37.96 KG/M2 | OXYGEN SATURATION: 98 % | HEIGHT: 67 IN | WEIGHT: 241.88 LBS

## 2021-01-28 DIAGNOSIS — E55.9 VITAMIN D DEFICIENCY: ICD-10-CM

## 2021-01-28 DIAGNOSIS — E11.69 HYPERLIPIDEMIA ASSOCIATED WITH TYPE 2 DIABETES MELLITUS: ICD-10-CM

## 2021-01-28 DIAGNOSIS — E11.59 HYPERTENSION ASSOCIATED WITH DIABETES: ICD-10-CM

## 2021-01-28 DIAGNOSIS — H26.9 CORTICAL CATARACT OF BOTH EYES: ICD-10-CM

## 2021-01-28 DIAGNOSIS — K21.9 GASTROESOPHAGEAL REFLUX DISEASE, UNSPECIFIED WHETHER ESOPHAGITIS PRESENT: ICD-10-CM

## 2021-01-28 DIAGNOSIS — H20.022 RECURRENT IRITIS OF LEFT EYE: Primary | ICD-10-CM

## 2021-01-28 DIAGNOSIS — E11.42 TYPE 2 DIABETES MELLITUS WITH DIABETIC POLYNEUROPATHY, UNSPECIFIED WHETHER LONG TERM INSULIN USE: Chronic | ICD-10-CM

## 2021-01-28 DIAGNOSIS — H25.13 NUCLEAR SCLEROSIS OF BOTH EYES: ICD-10-CM

## 2021-01-28 DIAGNOSIS — H21.542 POSTERIOR SYNECHIAE (IRIS), LEFT EYE: ICD-10-CM

## 2021-01-28 DIAGNOSIS — M15.9 OSTEOARTHRITIS INVOLVING MULTIPLE JOINTS ON BOTH SIDES OF BODY: ICD-10-CM

## 2021-01-28 DIAGNOSIS — E78.5 HYPERLIPIDEMIA ASSOCIATED WITH TYPE 2 DIABETES MELLITUS: ICD-10-CM

## 2021-01-28 DIAGNOSIS — E66.01 SEVERE OBESITY (BMI 35.0-39.9) WITH COMORBIDITY: Primary | ICD-10-CM

## 2021-01-28 DIAGNOSIS — I15.2 HYPERTENSION ASSOCIATED WITH DIABETES: ICD-10-CM

## 2021-01-28 PROCEDURE — 3008F BODY MASS INDEX DOCD: CPT | Mod: S$GLB,,, | Performed by: STUDENT IN AN ORGANIZED HEALTH CARE EDUCATION/TRAINING PROGRAM

## 2021-01-28 PROCEDURE — 99499 UNLISTED E&M SERVICE: CPT | Mod: S$GLB,,, | Performed by: OPTOMETRIST

## 2021-01-28 PROCEDURE — 99999 PR PBB SHADOW E&M-EST. PATIENT-LVL V: ICD-10-PCS | Mod: PBBFAC,,, | Performed by: STUDENT IN AN ORGANIZED HEALTH CARE EDUCATION/TRAINING PROGRAM

## 2021-01-28 PROCEDURE — 99999 PR PBB SHADOW E&M-EST. PATIENT-LVL V: CPT | Mod: PBBFAC,,, | Performed by: OPTOMETRIST

## 2021-01-28 PROCEDURE — 3044F HG A1C LEVEL LT 7.0%: CPT | Mod: S$GLB,,, | Performed by: STUDENT IN AN ORGANIZED HEALTH CARE EDUCATION/TRAINING PROGRAM

## 2021-01-28 PROCEDURE — 1126F PR PAIN SEVERITY QUANTIFIED, NO PAIN PRESENT: ICD-10-PCS | Mod: S$GLB,,, | Performed by: STUDENT IN AN ORGANIZED HEALTH CARE EDUCATION/TRAINING PROGRAM

## 2021-01-28 PROCEDURE — 3072F PR LOW RISK FOR RETINOPATHY: ICD-10-PCS | Mod: S$GLB,,, | Performed by: STUDENT IN AN ORGANIZED HEALTH CARE EDUCATION/TRAINING PROGRAM

## 2021-01-28 PROCEDURE — 1126F AMNT PAIN NOTED NONE PRSNT: CPT | Mod: S$GLB,,, | Performed by: OPTOMETRIST

## 2021-01-28 PROCEDURE — 1126F AMNT PAIN NOTED NONE PRSNT: CPT | Mod: S$GLB,,, | Performed by: STUDENT IN AN ORGANIZED HEALTH CARE EDUCATION/TRAINING PROGRAM

## 2021-01-28 PROCEDURE — 3075F PR MOST RECENT SYSTOLIC BLOOD PRESS GE 130-139MM HG: ICD-10-PCS | Mod: S$GLB,,, | Performed by: STUDENT IN AN ORGANIZED HEALTH CARE EDUCATION/TRAINING PROGRAM

## 2021-01-28 PROCEDURE — 3075F SYST BP GE 130 - 139MM HG: CPT | Mod: S$GLB,,, | Performed by: STUDENT IN AN ORGANIZED HEALTH CARE EDUCATION/TRAINING PROGRAM

## 2021-01-28 PROCEDURE — 3008F PR BODY MASS INDEX (BMI) DOCUMENTED: ICD-10-PCS | Mod: S$GLB,,, | Performed by: STUDENT IN AN ORGANIZED HEALTH CARE EDUCATION/TRAINING PROGRAM

## 2021-01-28 PROCEDURE — 99214 OFFICE O/P EST MOD 30 MIN: CPT | Mod: S$GLB,,, | Performed by: STUDENT IN AN ORGANIZED HEALTH CARE EDUCATION/TRAINING PROGRAM

## 2021-01-28 PROCEDURE — 99499 NO LOS: ICD-10-PCS | Mod: S$GLB,,, | Performed by: OPTOMETRIST

## 2021-01-28 PROCEDURE — 99999 PR PBB SHADOW E&M-EST. PATIENT-LVL V: ICD-10-PCS | Mod: PBBFAC,,, | Performed by: OPTOMETRIST

## 2021-01-28 PROCEDURE — 3079F DIAST BP 80-89 MM HG: CPT | Mod: S$GLB,,, | Performed by: STUDENT IN AN ORGANIZED HEALTH CARE EDUCATION/TRAINING PROGRAM

## 2021-01-28 PROCEDURE — 3079F PR MOST RECENT DIASTOLIC BLOOD PRESSURE 80-89 MM HG: ICD-10-PCS | Mod: S$GLB,,, | Performed by: STUDENT IN AN ORGANIZED HEALTH CARE EDUCATION/TRAINING PROGRAM

## 2021-01-28 PROCEDURE — 99214 PR OFFICE/OUTPT VISIT, EST, LEVL IV, 30-39 MIN: ICD-10-PCS | Mod: S$GLB,,, | Performed by: STUDENT IN AN ORGANIZED HEALTH CARE EDUCATION/TRAINING PROGRAM

## 2021-01-28 PROCEDURE — 99999 PR PBB SHADOW E&M-EST. PATIENT-LVL V: CPT | Mod: PBBFAC,,, | Performed by: STUDENT IN AN ORGANIZED HEALTH CARE EDUCATION/TRAINING PROGRAM

## 2021-01-28 PROCEDURE — 3072F LOW RISK FOR RETINOPATHY: CPT | Mod: S$GLB,,, | Performed by: STUDENT IN AN ORGANIZED HEALTH CARE EDUCATION/TRAINING PROGRAM

## 2021-01-28 PROCEDURE — 1126F PR PAIN SEVERITY QUANTIFIED, NO PAIN PRESENT: ICD-10-PCS | Mod: S$GLB,,, | Performed by: OPTOMETRIST

## 2021-01-28 PROCEDURE — 3044F PR MOST RECENT HEMOGLOBIN A1C LEVEL <7.0%: ICD-10-PCS | Mod: S$GLB,,, | Performed by: STUDENT IN AN ORGANIZED HEALTH CARE EDUCATION/TRAINING PROGRAM

## 2021-01-28 RX ORDER — METFORMIN HYDROCHLORIDE 500 MG/1
TABLET, EXTENDED RELEASE ORAL
Status: ON HOLD | COMMUNITY
End: 2021-04-21

## 2021-02-02 ENCOUNTER — HOSPITAL ENCOUNTER (OUTPATIENT)
Dept: RADIOLOGY | Facility: HOSPITAL | Age: 56
Discharge: HOME | End: 2021-02-02
Attending: INTERNAL MEDICINE
Payer: MEDICARE

## 2021-02-02 DIAGNOSIS — Z12.31 ENCOUNTER FOR SCREENING MAMMOGRAM FOR MALIGNANT NEOPLASM OF BREAST: ICD-10-CM

## 2021-02-02 PROCEDURE — 77067 SCR MAMMO BI INCL CAD: CPT | Mod: TC,PO

## 2021-02-11 ENCOUNTER — OFFICE VISIT (OUTPATIENT)
Dept: SPINE | Facility: CLINIC | Age: 56
End: 2021-02-11
Attending: PHYSICAL MEDICINE & REHABILITATION
Payer: MEDICARE

## 2021-02-11 VITALS
BODY MASS INDEX: 37.96 KG/M2 | HEART RATE: 76 BPM | DIASTOLIC BLOOD PRESSURE: 77 MMHG | WEIGHT: 241.88 LBS | HEIGHT: 67 IN | SYSTOLIC BLOOD PRESSURE: 146 MMHG

## 2021-02-11 DIAGNOSIS — M62.838 MUSCLE SPASM: ICD-10-CM

## 2021-02-11 DIAGNOSIS — M47.22 OSTEOARTHRITIS OF SPINE WITH RADICULOPATHY, CERVICAL REGION: Primary | ICD-10-CM

## 2021-02-11 DIAGNOSIS — E66.01 SEVERE OBESITY (BMI 35.0-39.9) WITH COMORBIDITY: ICD-10-CM

## 2021-02-11 DIAGNOSIS — M46.90 AXIAL SPONDYLOARTHRITIS: Chronic | ICD-10-CM

## 2021-02-11 DIAGNOSIS — M54.2 NECK PAIN: ICD-10-CM

## 2021-02-11 DIAGNOSIS — M47.812 CERVICAL SPONDYLOSIS: ICD-10-CM

## 2021-02-11 PROCEDURE — 3008F BODY MASS INDEX DOCD: CPT | Mod: S$GLB,,, | Performed by: PHYSICAL MEDICINE & REHABILITATION

## 2021-02-11 PROCEDURE — 99999 PR PBB SHADOW E&M-EST. PATIENT-LVL IV: ICD-10-PCS | Mod: PBBFAC,,, | Performed by: PHYSICAL MEDICINE & REHABILITATION

## 2021-02-11 PROCEDURE — 3078F PR MOST RECENT DIASTOLIC BLOOD PRESSURE < 80 MM HG: ICD-10-PCS | Mod: S$GLB,,, | Performed by: PHYSICAL MEDICINE & REHABILITATION

## 2021-02-11 PROCEDURE — 99999 PR PBB SHADOW E&M-EST. PATIENT-LVL IV: CPT | Mod: PBBFAC,,, | Performed by: PHYSICAL MEDICINE & REHABILITATION

## 2021-02-11 PROCEDURE — 3078F DIAST BP <80 MM HG: CPT | Mod: S$GLB,,, | Performed by: PHYSICAL MEDICINE & REHABILITATION

## 2021-02-11 PROCEDURE — 3008F PR BODY MASS INDEX (BMI) DOCUMENTED: ICD-10-PCS | Mod: S$GLB,,, | Performed by: PHYSICAL MEDICINE & REHABILITATION

## 2021-02-11 PROCEDURE — 3072F PR LOW RISK FOR RETINOPATHY: ICD-10-PCS | Mod: S$GLB,,, | Performed by: PHYSICAL MEDICINE & REHABILITATION

## 2021-02-11 PROCEDURE — 99214 PR OFFICE/OUTPT VISIT, EST, LEVL IV, 30-39 MIN: ICD-10-PCS | Mod: S$GLB,,, | Performed by: PHYSICAL MEDICINE & REHABILITATION

## 2021-02-11 PROCEDURE — 99214 OFFICE O/P EST MOD 30 MIN: CPT | Mod: S$GLB,,, | Performed by: PHYSICAL MEDICINE & REHABILITATION

## 2021-02-11 PROCEDURE — 3072F LOW RISK FOR RETINOPATHY: CPT | Mod: S$GLB,,, | Performed by: PHYSICAL MEDICINE & REHABILITATION

## 2021-02-11 PROCEDURE — 1125F PR PAIN SEVERITY QUANTIFIED, PAIN PRESENT: ICD-10-PCS | Mod: S$GLB,,, | Performed by: PHYSICAL MEDICINE & REHABILITATION

## 2021-02-11 PROCEDURE — 3077F PR MOST RECENT SYSTOLIC BLOOD PRESSURE >= 140 MM HG: ICD-10-PCS | Mod: S$GLB,,, | Performed by: PHYSICAL MEDICINE & REHABILITATION

## 2021-02-11 PROCEDURE — 1125F AMNT PAIN NOTED PAIN PRSNT: CPT | Mod: S$GLB,,, | Performed by: PHYSICAL MEDICINE & REHABILITATION

## 2021-02-11 PROCEDURE — 3077F SYST BP >= 140 MM HG: CPT | Mod: S$GLB,,, | Performed by: PHYSICAL MEDICINE & REHABILITATION

## 2021-02-12 ENCOUNTER — TELEPHONE (OUTPATIENT)
Dept: PAIN MEDICINE | Facility: CLINIC | Age: 56
End: 2021-02-12

## 2021-02-12 ENCOUNTER — PATIENT MESSAGE (OUTPATIENT)
Dept: PAIN MEDICINE | Facility: CLINIC | Age: 56
End: 2021-02-12

## 2021-02-12 DIAGNOSIS — M54.12 CERVICAL RADICULOPATHY: Primary | ICD-10-CM

## 2021-02-14 ENCOUNTER — PATIENT MESSAGE (OUTPATIENT)
Dept: PSYCHIATRY | Facility: CLINIC | Age: 56
End: 2021-02-14

## 2021-02-17 ENCOUNTER — HOSPITAL ENCOUNTER (OUTPATIENT)
Facility: HOSPITAL | Age: 56
Discharge: HOME | End: 2021-02-17
Attending: PHYSICAL MEDICINE & REHABILITATION | Admitting: PHYSICAL MEDICINE & REHABILITATION
Payer: MEDICARE

## 2021-02-17 VITALS
SYSTOLIC BLOOD PRESSURE: 119 MMHG | OXYGEN SATURATION: 100 % | DIASTOLIC BLOOD PRESSURE: 73 MMHG | BODY MASS INDEX: 38.45 KG/M2 | HEART RATE: 72 BPM | WEIGHT: 245 LBS | RESPIRATION RATE: 17 BRPM | HEIGHT: 67 IN | TEMPERATURE: 97 F

## 2021-02-17 DIAGNOSIS — R52 PAIN: ICD-10-CM

## 2021-02-17 DIAGNOSIS — M54.12 CERVICAL RADICULOPATHY: Primary | ICD-10-CM

## 2021-02-17 LAB — POCT GLUCOSE: 132 MG/DL (ref 70–110)

## 2021-02-17 PROCEDURE — 25000003 PHARM REV CODE 250: Performed by: PHYSICAL MEDICINE & REHABILITATION

## 2021-02-17 PROCEDURE — 62321 NJX INTERLAMINAR CRV/THRC: CPT | Performed by: PHYSICAL MEDICINE & REHABILITATION

## 2021-02-17 PROCEDURE — 62321 PR INJ CERV/THORAC, W/GUIDANCE: ICD-10-PCS | Mod: ,,, | Performed by: PHYSICAL MEDICINE & REHABILITATION

## 2021-02-17 PROCEDURE — 62321 NJX INTERLAMINAR CRV/THRC: CPT | Mod: ,,, | Performed by: PHYSICAL MEDICINE & REHABILITATION

## 2021-02-17 PROCEDURE — 25500020 PHARM REV CODE 255: Performed by: PHYSICAL MEDICINE & REHABILITATION

## 2021-02-17 PROCEDURE — 63600175 PHARM REV CODE 636 W HCPCS: Performed by: PHYSICAL MEDICINE & REHABILITATION

## 2021-02-17 PROCEDURE — 99152 MOD SED SAME PHYS/QHP 5/>YRS: CPT | Performed by: PHYSICAL MEDICINE & REHABILITATION

## 2021-02-17 RX ORDER — SODIUM BICARBONATE 1 MEQ/ML
SYRINGE (ML) INTRAVENOUS
Status: DISCONTINUED | OUTPATIENT
Start: 2021-02-17 | End: 2021-02-17 | Stop reason: HOSPADM

## 2021-02-17 RX ORDER — LIDOCAINE HYDROCHLORIDE 10 MG/ML
INJECTION, SOLUTION EPIDURAL; INFILTRATION; INTRACAUDAL; PERINEURAL
Status: DISCONTINUED | OUTPATIENT
Start: 2021-02-17 | End: 2021-02-17 | Stop reason: HOSPADM

## 2021-02-17 RX ORDER — SODIUM CHLORIDE 9 MG/ML
INJECTION, SOLUTION INTRAVENOUS CONTINUOUS
Status: ACTIVE | OUTPATIENT
Start: 2021-02-17

## 2021-02-17 RX ORDER — FENTANYL CITRATE 50 UG/ML
INJECTION, SOLUTION INTRAMUSCULAR; INTRAVENOUS
Status: DISCONTINUED | OUTPATIENT
Start: 2021-02-17 | End: 2021-02-17 | Stop reason: HOSPADM

## 2021-02-17 RX ORDER — MIDAZOLAM HYDROCHLORIDE 1 MG/ML
INJECTION, SOLUTION INTRAMUSCULAR; INTRAVENOUS
Status: DISCONTINUED | OUTPATIENT
Start: 2021-02-17 | End: 2021-02-17 | Stop reason: HOSPADM

## 2021-02-17 RX ORDER — LIDOCAINE HYDROCHLORIDE 10 MG/ML
INJECTION INFILTRATION; PERINEURAL
Status: DISCONTINUED | OUTPATIENT
Start: 2021-02-17 | End: 2021-02-17 | Stop reason: HOSPADM

## 2021-02-17 RX ORDER — DEXAMETHASONE SODIUM PHOSPHATE 10 MG/ML
INJECTION INTRAMUSCULAR; INTRAVENOUS
Status: DISCONTINUED | OUTPATIENT
Start: 2021-02-17 | End: 2021-02-17 | Stop reason: HOSPADM

## 2021-02-22 ENCOUNTER — SPECIALTY PHARMACY (OUTPATIENT)
Dept: PHARMACY | Facility: CLINIC | Age: 56
End: 2021-02-22

## 2021-02-23 ENCOUNTER — PATIENT MESSAGE (OUTPATIENT)
Dept: RHEUMATOLOGY | Facility: CLINIC | Age: 56
End: 2021-02-23

## 2021-03-02 ENCOUNTER — PATIENT MESSAGE (OUTPATIENT)
Dept: PAIN MEDICINE | Facility: CLINIC | Age: 56
End: 2021-03-02

## 2021-03-03 ENCOUNTER — PATIENT MESSAGE (OUTPATIENT)
Dept: PAIN MEDICINE | Facility: CLINIC | Age: 56
End: 2021-03-03

## 2021-03-04 ENCOUNTER — OFFICE VISIT (OUTPATIENT)
Dept: PAIN MEDICINE | Facility: CLINIC | Age: 56
End: 2021-03-04
Payer: MEDICARE

## 2021-03-04 VITALS — SYSTOLIC BLOOD PRESSURE: 142 MMHG | HEART RATE: 63 BPM | DIASTOLIC BLOOD PRESSURE: 87 MMHG

## 2021-03-04 DIAGNOSIS — M54.12 CERVICAL RADICULOPATHY: ICD-10-CM

## 2021-03-04 DIAGNOSIS — M54.2 CHRONIC NECK PAIN: ICD-10-CM

## 2021-03-04 DIAGNOSIS — M70.62 GREATER TROCHANTERIC BURSITIS OF LEFT HIP: ICD-10-CM

## 2021-03-04 DIAGNOSIS — M47.812 CERVICAL SPONDYLOSIS: ICD-10-CM

## 2021-03-04 DIAGNOSIS — M47.816 LUMBAR SPONDYLOSIS: ICD-10-CM

## 2021-03-04 DIAGNOSIS — M54.50 CHRONIC BILATERAL LOW BACK PAIN WITHOUT SCIATICA: ICD-10-CM

## 2021-03-04 DIAGNOSIS — M46.1 SACROILIITIS: Primary | ICD-10-CM

## 2021-03-04 DIAGNOSIS — G89.29 CHRONIC BILATERAL LOW BACK PAIN WITHOUT SCIATICA: ICD-10-CM

## 2021-03-04 DIAGNOSIS — G89.29 CHRONIC NECK PAIN: ICD-10-CM

## 2021-03-04 DIAGNOSIS — M50.30 DDD (DEGENERATIVE DISC DISEASE), CERVICAL: ICD-10-CM

## 2021-03-04 DIAGNOSIS — M51.36 DDD (DEGENERATIVE DISC DISEASE), LUMBAR: ICD-10-CM

## 2021-03-04 PROCEDURE — 3077F PR MOST RECENT SYSTOLIC BLOOD PRESSURE >= 140 MM HG: ICD-10-PCS | Mod: S$GLB,,, | Performed by: PHYSICAL MEDICINE & REHABILITATION

## 2021-03-04 PROCEDURE — 99215 OFFICE O/P EST HI 40 MIN: CPT | Mod: S$GLB,,, | Performed by: PHYSICAL MEDICINE & REHABILITATION

## 2021-03-04 PROCEDURE — 1125F AMNT PAIN NOTED PAIN PRSNT: CPT | Mod: S$GLB,,, | Performed by: PHYSICAL MEDICINE & REHABILITATION

## 2021-03-04 PROCEDURE — 99999 PR PBB SHADOW E&M-EST. PATIENT-LVL IV: ICD-10-PCS | Mod: PBBFAC,,, | Performed by: PHYSICAL MEDICINE & REHABILITATION

## 2021-03-04 PROCEDURE — 3079F DIAST BP 80-89 MM HG: CPT | Mod: S$GLB,,, | Performed by: PHYSICAL MEDICINE & REHABILITATION

## 2021-03-04 PROCEDURE — 99999 PR PBB SHADOW E&M-EST. PATIENT-LVL IV: CPT | Mod: PBBFAC,,, | Performed by: PHYSICAL MEDICINE & REHABILITATION

## 2021-03-04 PROCEDURE — 3077F SYST BP >= 140 MM HG: CPT | Mod: S$GLB,,, | Performed by: PHYSICAL MEDICINE & REHABILITATION

## 2021-03-04 PROCEDURE — 3072F PR LOW RISK FOR RETINOPATHY: ICD-10-PCS | Mod: S$GLB,,, | Performed by: PHYSICAL MEDICINE & REHABILITATION

## 2021-03-04 PROCEDURE — 3072F LOW RISK FOR RETINOPATHY: CPT | Mod: S$GLB,,, | Performed by: PHYSICAL MEDICINE & REHABILITATION

## 2021-03-04 PROCEDURE — 99215 PR OFFICE/OUTPT VISIT, EST, LEVL V, 40-54 MIN: ICD-10-PCS | Mod: S$GLB,,, | Performed by: PHYSICAL MEDICINE & REHABILITATION

## 2021-03-04 PROCEDURE — 3079F PR MOST RECENT DIASTOLIC BLOOD PRESSURE 80-89 MM HG: ICD-10-PCS | Mod: S$GLB,,, | Performed by: PHYSICAL MEDICINE & REHABILITATION

## 2021-03-04 PROCEDURE — 1125F PR PAIN SEVERITY QUANTIFIED, PAIN PRESENT: ICD-10-PCS | Mod: S$GLB,,, | Performed by: PHYSICAL MEDICINE & REHABILITATION

## 2021-03-09 DIAGNOSIS — E11.9 TYPE 2 DIABETES MELLITUS WITHOUT COMPLICATION, WITH LONG-TERM CURRENT USE OF INSULIN: ICD-10-CM

## 2021-03-09 DIAGNOSIS — Z79.4 TYPE 2 DIABETES MELLITUS WITHOUT COMPLICATION, WITH LONG-TERM CURRENT USE OF INSULIN: ICD-10-CM

## 2021-03-11 RX ORDER — REPAGLINIDE 1 MG/1
TABLET ORAL
Qty: 270 TABLET | Refills: 0 | Status: SHIPPED | OUTPATIENT
Start: 2021-03-11 | End: 2021-07-28

## 2021-03-17 ENCOUNTER — PATIENT MESSAGE (OUTPATIENT)
Dept: INTERNAL MEDICINE | Facility: CLINIC | Age: 56
End: 2021-03-17

## 2021-03-18 ENCOUNTER — OFFICE VISIT (OUTPATIENT)
Dept: INTERNAL MEDICINE | Facility: CLINIC | Age: 56
End: 2021-03-18
Payer: MEDICARE

## 2021-03-18 ENCOUNTER — TELEPHONE (OUTPATIENT)
Dept: INTERNAL MEDICINE | Facility: CLINIC | Age: 56
End: 2021-03-18

## 2021-03-18 VITALS
BODY MASS INDEX: 38.06 KG/M2 | HEART RATE: 84 BPM | OXYGEN SATURATION: 99 % | SYSTOLIC BLOOD PRESSURE: 134 MMHG | TEMPERATURE: 98 F | HEIGHT: 67 IN | WEIGHT: 242.5 LBS | DIASTOLIC BLOOD PRESSURE: 82 MMHG | RESPIRATION RATE: 18 BRPM

## 2021-03-18 DIAGNOSIS — E11.9 TYPE 2 DIABETES MELLITUS WITHOUT COMPLICATION, WITHOUT LONG-TERM CURRENT USE OF INSULIN: ICD-10-CM

## 2021-03-18 DIAGNOSIS — E11.9 TYPE 2 DIABETES MELLITUS WITHOUT COMPLICATION, WITH LONG-TERM CURRENT USE OF INSULIN: ICD-10-CM

## 2021-03-18 DIAGNOSIS — E11.42 TYPE 2 DIABETES MELLITUS WITH DIABETIC POLYNEUROPATHY, UNSPECIFIED WHETHER LONG TERM INSULIN USE: ICD-10-CM

## 2021-03-18 DIAGNOSIS — Z79.4 TYPE 2 DIABETES MELLITUS WITHOUT COMPLICATION, WITH LONG-TERM CURRENT USE OF INSULIN: ICD-10-CM

## 2021-03-18 DIAGNOSIS — E78.5 HYPERLIPIDEMIA ASSOCIATED WITH TYPE 2 DIABETES MELLITUS: ICD-10-CM

## 2021-03-18 DIAGNOSIS — E11.69 HYPERLIPIDEMIA ASSOCIATED WITH TYPE 2 DIABETES MELLITUS: ICD-10-CM

## 2021-03-18 DIAGNOSIS — E11.59 HYPERTENSION ASSOCIATED WITH DIABETES: Primary | ICD-10-CM

## 2021-03-18 DIAGNOSIS — I15.2 HYPERTENSION ASSOCIATED WITH DIABETES: Primary | ICD-10-CM

## 2021-03-18 DIAGNOSIS — E55.9 VITAMIN D DEFICIENCY DISEASE: ICD-10-CM

## 2021-03-18 DIAGNOSIS — H60.501 ACUTE OTITIS EXTERNA OF RIGHT EAR, UNSPECIFIED TYPE: Primary | ICD-10-CM

## 2021-03-18 PROCEDURE — 3075F PR MOST RECENT SYSTOLIC BLOOD PRESS GE 130-139MM HG: ICD-10-PCS | Mod: S$GLB,,, | Performed by: INTERNAL MEDICINE

## 2021-03-18 PROCEDURE — 99999 PR PBB SHADOW E&M-EST. PATIENT-LVL V: CPT | Mod: PBBFAC,,, | Performed by: INTERNAL MEDICINE

## 2021-03-18 PROCEDURE — 99214 PR OFFICE/OUTPT VISIT, EST, LEVL IV, 30-39 MIN: ICD-10-PCS | Mod: S$GLB,,, | Performed by: INTERNAL MEDICINE

## 2021-03-18 PROCEDURE — 3072F LOW RISK FOR RETINOPATHY: CPT | Mod: S$GLB,,, | Performed by: INTERNAL MEDICINE

## 2021-03-18 PROCEDURE — 1125F PR PAIN SEVERITY QUANTIFIED, PAIN PRESENT: ICD-10-PCS | Mod: S$GLB,,, | Performed by: INTERNAL MEDICINE

## 2021-03-18 PROCEDURE — 3008F BODY MASS INDEX DOCD: CPT | Mod: S$GLB,,, | Performed by: INTERNAL MEDICINE

## 2021-03-18 PROCEDURE — 1125F AMNT PAIN NOTED PAIN PRSNT: CPT | Mod: S$GLB,,, | Performed by: INTERNAL MEDICINE

## 2021-03-18 PROCEDURE — 3075F SYST BP GE 130 - 139MM HG: CPT | Mod: S$GLB,,, | Performed by: INTERNAL MEDICINE

## 2021-03-18 PROCEDURE — 99214 OFFICE O/P EST MOD 30 MIN: CPT | Mod: S$GLB,,, | Performed by: INTERNAL MEDICINE

## 2021-03-18 PROCEDURE — 3079F DIAST BP 80-89 MM HG: CPT | Mod: S$GLB,,, | Performed by: INTERNAL MEDICINE

## 2021-03-18 PROCEDURE — 99999 PR PBB SHADOW E&M-EST. PATIENT-LVL V: ICD-10-PCS | Mod: PBBFAC,,, | Performed by: INTERNAL MEDICINE

## 2021-03-18 PROCEDURE — 3079F PR MOST RECENT DIASTOLIC BLOOD PRESSURE 80-89 MM HG: ICD-10-PCS | Mod: S$GLB,,, | Performed by: INTERNAL MEDICINE

## 2021-03-18 PROCEDURE — 3072F PR LOW RISK FOR RETINOPATHY: ICD-10-PCS | Mod: S$GLB,,, | Performed by: INTERNAL MEDICINE

## 2021-03-18 PROCEDURE — 3008F PR BODY MASS INDEX (BMI) DOCUMENTED: ICD-10-PCS | Mod: S$GLB,,, | Performed by: INTERNAL MEDICINE

## 2021-03-18 RX ORDER — NEOMYCIN SULFATE, POLYMYXIN B SULFATE AND HYDROCORTISONE 10; 3.5; 1 MG/ML; MG/ML; [USP'U]/ML
3 SUSPENSION/ DROPS AURICULAR (OTIC) 3 TIMES DAILY
Qty: 10 ML | Refills: 0 | Status: SHIPPED | OUTPATIENT
Start: 2021-03-18 | End: 2021-11-16

## 2021-03-18 RX ORDER — AMOXICILLIN AND CLAVULANATE POTASSIUM 875; 125 MG/1; MG/1
1 TABLET, FILM COATED ORAL 2 TIMES DAILY
Qty: 20 TABLET | Refills: 0 | Status: SHIPPED | OUTPATIENT
Start: 2021-03-18 | End: 2021-03-28

## 2021-03-22 ENCOUNTER — SPECIALTY PHARMACY (OUTPATIENT)
Dept: PHARMACY | Facility: CLINIC | Age: 56
End: 2021-03-22

## 2021-03-26 ENCOUNTER — OFFICE VISIT (OUTPATIENT)
Dept: PSYCHIATRY | Facility: CLINIC | Age: 56
End: 2021-03-26
Payer: MEDICARE

## 2021-03-26 DIAGNOSIS — F41.0 PANIC DISORDER WITHOUT AGORAPHOBIA: ICD-10-CM

## 2021-03-26 DIAGNOSIS — F41.1 GENERALIZED ANXIETY DISORDER: ICD-10-CM

## 2021-03-26 PROCEDURE — 99214 OFFICE O/P EST MOD 30 MIN: CPT | Mod: 95,,, | Performed by: PSYCHIATRY & NEUROLOGY

## 2021-03-26 PROCEDURE — 3072F PR LOW RISK FOR RETINOPATHY: ICD-10-PCS | Mod: ,,, | Performed by: PSYCHIATRY & NEUROLOGY

## 2021-03-26 PROCEDURE — 3072F LOW RISK FOR RETINOPATHY: CPT | Mod: ,,, | Performed by: PSYCHIATRY & NEUROLOGY

## 2021-03-26 PROCEDURE — 99214 PR OFFICE/OUTPT VISIT, EST, LEVL IV, 30-39 MIN: ICD-10-PCS | Mod: 95,,, | Performed by: PSYCHIATRY & NEUROLOGY

## 2021-03-26 RX ORDER — FLUOXETINE HYDROCHLORIDE 20 MG/1
20 CAPSULE ORAL 2 TIMES DAILY
Qty: 180 CAPSULE | Refills: 1 | Status: SHIPPED | OUTPATIENT
Start: 2021-03-26 | End: 2021-06-04 | Stop reason: SDUPTHER

## 2021-03-26 RX ORDER — CLONAZEPAM 0.5 MG/1
0.5 TABLET ORAL DAILY PRN
Qty: 90 TABLET | Refills: 0 | Status: SHIPPED | OUTPATIENT
Start: 2021-03-26 | End: 2021-07-27

## 2021-03-26 RX ORDER — ZOLPIDEM TARTRATE 5 MG/1
TABLET ORAL
Qty: 90 TABLET | Refills: 0 | Status: SHIPPED | OUTPATIENT
Start: 2021-03-26 | End: 2021-06-04 | Stop reason: SDUPTHER

## 2021-04-05 ENCOUNTER — PATIENT MESSAGE (OUTPATIENT)
Dept: ADMINISTRATIVE | Facility: HOSPITAL | Age: 56
End: 2021-04-05

## 2021-04-09 ENCOUNTER — TELEPHONE (OUTPATIENT)
Dept: PAIN MEDICINE | Facility: CLINIC | Age: 56
End: 2021-04-09

## 2021-04-09 ENCOUNTER — PATIENT OUTREACH (OUTPATIENT)
Dept: ADMINISTRATIVE | Facility: OTHER | Age: 56
End: 2021-04-09

## 2021-04-09 DIAGNOSIS — M46.1 SACROILIITIS: Primary | ICD-10-CM

## 2021-04-09 DIAGNOSIS — M70.62 GREATER TROCHANTERIC BURSITIS OF LEFT HIP: ICD-10-CM

## 2021-04-20 ENCOUNTER — PATIENT MESSAGE (OUTPATIENT)
Dept: OPTOMETRY | Facility: CLINIC | Age: 56
End: 2021-04-20

## 2021-04-21 ENCOUNTER — HOSPITAL ENCOUNTER (OUTPATIENT)
Facility: HOSPITAL | Age: 56
Discharge: HOME | End: 2021-04-21
Attending: PHYSICAL MEDICINE & REHABILITATION | Admitting: PHYSICAL MEDICINE & REHABILITATION
Payer: MEDICARE

## 2021-04-21 ENCOUNTER — TELEPHONE (OUTPATIENT)
Dept: OPHTHALMOLOGY | Facility: CLINIC | Age: 56
End: 2021-04-21

## 2021-04-21 VITALS
WEIGHT: 245 LBS | HEIGHT: 67 IN | OXYGEN SATURATION: 99 % | HEART RATE: 59 BPM | SYSTOLIC BLOOD PRESSURE: 135 MMHG | DIASTOLIC BLOOD PRESSURE: 82 MMHG | TEMPERATURE: 97 F | BODY MASS INDEX: 38.45 KG/M2 | RESPIRATION RATE: 16 BRPM

## 2021-04-21 DIAGNOSIS — M46.1 SACROILIITIS: Primary | ICD-10-CM

## 2021-04-21 DIAGNOSIS — M70.62 GREATER TROCHANTERIC BURSITIS OF LEFT HIP: ICD-10-CM

## 2021-04-21 DIAGNOSIS — R52 PAIN: ICD-10-CM

## 2021-04-21 LAB — POCT GLUCOSE: 153 MG/DL (ref 70–110)

## 2021-04-21 PROCEDURE — 20610 DRAIN/INJ JOINT/BURSA W/O US: CPT | Mod: 59,LT,, | Performed by: PHYSICAL MEDICINE & REHABILITATION

## 2021-04-21 PROCEDURE — 27096 INJECT SACROILIAC JOINT: CPT | Performed by: PHYSICAL MEDICINE & REHABILITATION

## 2021-04-21 PROCEDURE — 20610 DRAIN/INJ JOINT/BURSA W/O US: CPT | Performed by: PHYSICAL MEDICINE & REHABILITATION

## 2021-04-21 PROCEDURE — 25000003 PHARM REV CODE 250: Performed by: PHYSICAL MEDICINE & REHABILITATION

## 2021-04-21 PROCEDURE — 27096 PR INJECTION,SACROILIAC JOINT: ICD-10-PCS | Mod: LT,,, | Performed by: PHYSICAL MEDICINE & REHABILITATION

## 2021-04-21 PROCEDURE — 20610 PR DRAIN/INJECT LARGE JOINT/BURSA: ICD-10-PCS | Mod: 59,LT,, | Performed by: PHYSICAL MEDICINE & REHABILITATION

## 2021-04-21 PROCEDURE — 25500020 PHARM REV CODE 255: Performed by: PHYSICAL MEDICINE & REHABILITATION

## 2021-04-21 PROCEDURE — 27096 INJECT SACROILIAC JOINT: CPT | Mod: LT,,, | Performed by: PHYSICAL MEDICINE & REHABILITATION

## 2021-04-21 PROCEDURE — 63600175 PHARM REV CODE 636 W HCPCS: Performed by: PHYSICAL MEDICINE & REHABILITATION

## 2021-04-21 RX ORDER — BUPIVACAINE HYDROCHLORIDE 2.5 MG/ML
INJECTION, SOLUTION EPIDURAL; INFILTRATION; INTRACAUDAL
Status: DISCONTINUED | OUTPATIENT
Start: 2021-04-21 | End: 2021-04-21 | Stop reason: HOSPADM

## 2021-04-21 RX ORDER — METHYLPREDNISOLONE ACETATE 40 MG/ML
INJECTION, SUSPENSION INTRA-ARTICULAR; INTRALESIONAL; INTRAMUSCULAR; SOFT TISSUE
Status: DISCONTINUED | OUTPATIENT
Start: 2021-04-21 | End: 2021-04-21 | Stop reason: HOSPADM

## 2021-04-21 RX ORDER — SODIUM BICARBONATE 1 MEQ/ML
SYRINGE (ML) INTRAVENOUS
Status: DISCONTINUED | OUTPATIENT
Start: 2021-04-21 | End: 2021-04-21 | Stop reason: HOSPADM

## 2021-04-21 RX ORDER — LIDOCAINE HYDROCHLORIDE 10 MG/ML
INJECTION INFILTRATION; PERINEURAL
Status: DISCONTINUED | OUTPATIENT
Start: 2021-04-21 | End: 2021-04-21 | Stop reason: HOSPADM

## 2021-04-26 DIAGNOSIS — M02.30 REACTIVE ARTHRITIS: ICD-10-CM

## 2021-04-27 RX ORDER — CYCLOBENZAPRINE HCL 10 MG
TABLET ORAL
Qty: 90 TABLET | Refills: 1 | Status: SHIPPED | OUTPATIENT
Start: 2021-04-27 | End: 2021-10-27

## 2021-04-29 ENCOUNTER — SPECIALTY PHARMACY (OUTPATIENT)
Dept: PHARMACY | Facility: CLINIC | Age: 56
End: 2021-04-29

## 2021-05-03 ENCOUNTER — LAB VISIT (OUTPATIENT)
Dept: LAB | Facility: HOSPITAL | Age: 56
End: 2021-05-03
Attending: INTERNAL MEDICINE
Payer: MEDICARE

## 2021-05-03 ENCOUNTER — PATIENT MESSAGE (OUTPATIENT)
Dept: RHEUMATOLOGY | Facility: CLINIC | Age: 56
End: 2021-05-03

## 2021-05-03 DIAGNOSIS — E11.9 TYPE 2 DIABETES MELLITUS WITHOUT COMPLICATION, WITH LONG-TERM CURRENT USE OF INSULIN: ICD-10-CM

## 2021-05-03 DIAGNOSIS — Z79.4 TYPE 2 DIABETES MELLITUS WITHOUT COMPLICATION, WITH LONG-TERM CURRENT USE OF INSULIN: ICD-10-CM

## 2021-05-03 DIAGNOSIS — Z51.81 ENCOUNTER FOR MONITORING OF ADALIMUMAB THERAPY: ICD-10-CM

## 2021-05-03 DIAGNOSIS — Z79.620 ENCOUNTER FOR MONITORING OF ADALIMUMAB THERAPY: ICD-10-CM

## 2021-05-03 LAB
ALBUMIN SERPL BCP-MCNC: 4.4 G/DL (ref 3.5–5.2)
ALP SERPL-CCNC: 94 U/L (ref 38–126)
ALT SERPL W/O P-5'-P-CCNC: 17 U/L (ref 10–44)
ANION GAP SERPL CALC-SCNC: 10 MMOL/L (ref 8–16)
AST SERPL-CCNC: 20 U/L (ref 15–46)
BASOPHILS # BLD AUTO: 0.04 K/UL (ref 0–0.2)
BASOPHILS NFR BLD: 0.5 % (ref 0–1.9)
BILIRUB SERPL-MCNC: 0.6 MG/DL (ref 0.1–1)
CALCIUM SERPL-MCNC: 9 MG/DL (ref 8.7–10.5)
CHLORIDE SERPL-SCNC: 106 MMOL/L (ref 95–110)
CO2 SERPL-SCNC: 26 MMOL/L (ref 23–29)
CREAT SERPL-MCNC: 0.76 MG/DL (ref 0.5–1.4)
CRP SERPL-MCNC: 2.7 MG/DL (ref 0–1)
DIFFERENTIAL METHOD: ABNORMAL
EOSINOPHIL # BLD AUTO: 0.1 K/UL (ref 0–0.5)
EOSINOPHIL NFR BLD: 1.2 % (ref 0–8)
ERYTHROCYTE [DISTWIDTH] IN BLOOD BY AUTOMATED COUNT: 13.3 % (ref 11.5–14.5)
ERYTHROCYTE [SEDIMENTATION RATE] IN BLOOD BY WESTERGREN METHOD: 15 MM/HR (ref 0–20)
EST. GFR  (AFRICAN AMERICAN): >60 ML/MIN/1.73 M^2
EST. GFR  (NON AFRICAN AMERICAN): >60 ML/MIN/1.73 M^2
ESTIMATED AVG GLUCOSE: 169 MG/DL (ref 68–131)
GLUCOSE SERPL-MCNC: 145 MG/DL (ref 70–110)
HBA1C MFR BLD: 7.5 % (ref 4–5.6)
HCT VFR BLD AUTO: 44.7 % (ref 37–48.5)
HGB BLD-MCNC: 14.7 G/DL (ref 12–16)
IMM GRANULOCYTES # BLD AUTO: 0.05 K/UL (ref 0–0.04)
IMM GRANULOCYTES NFR BLD AUTO: 0.6 % (ref 0–0.5)
LYMPHOCYTES # BLD AUTO: 3 K/UL (ref 1–4.8)
LYMPHOCYTES NFR BLD: 35.3 % (ref 18–48)
MCH RBC QN AUTO: 30.3 PG (ref 27–31)
MCHC RBC AUTO-ENTMCNC: 32.9 G/DL (ref 32–36)
MCV RBC AUTO: 92 FL (ref 82–98)
MONOCYTES # BLD AUTO: 0.8 K/UL (ref 0.3–1)
MONOCYTES NFR BLD: 10 % (ref 4–15)
NEUTROPHILS # BLD AUTO: 4.4 K/UL (ref 1.8–7.7)
NEUTROPHILS NFR BLD: 52.4 % (ref 38–73)
NRBC BLD-RTO: 0 /100 WBC
PLATELET # BLD AUTO: 234 K/UL (ref 150–450)
PMV BLD AUTO: 11.7 FL (ref 9.2–12.9)
POTASSIUM SERPL-SCNC: 4.2 MMOL/L (ref 3.5–5.1)
PROT SERPL-MCNC: 7.5 G/DL (ref 6–8.4)
RBC # BLD AUTO: 4.85 M/UL (ref 4–5.4)
SODIUM SERPL-SCNC: 142 MMOL/L (ref 136–145)
UUN UR-MCNC: 11 MG/DL (ref 7–17)
WBC # BLD AUTO: 8.41 K/UL (ref 3.9–12.7)

## 2021-05-03 PROCEDURE — 85025 COMPLETE CBC W/AUTO DIFF WBC: CPT | Mod: PO | Performed by: INTERNAL MEDICINE

## 2021-05-03 PROCEDURE — 36415 COLL VENOUS BLD VENIPUNCTURE: CPT | Mod: PO | Performed by: INTERNAL MEDICINE

## 2021-05-03 PROCEDURE — 85652 RBC SED RATE AUTOMATED: CPT | Performed by: INTERNAL MEDICINE

## 2021-05-03 PROCEDURE — 80053 COMPREHEN METABOLIC PANEL: CPT | Mod: PO | Performed by: INTERNAL MEDICINE

## 2021-05-03 PROCEDURE — 87516 HEPATITIS B DNA AMP PROBE: CPT | Mod: PO | Performed by: INTERNAL MEDICINE

## 2021-05-03 PROCEDURE — 86140 C-REACTIVE PROTEIN: CPT | Mod: PO | Performed by: INTERNAL MEDICINE

## 2021-05-03 PROCEDURE — 83036 HEMOGLOBIN GLYCOSYLATED A1C: CPT | Performed by: INTERNAL MEDICINE

## 2021-05-05 ENCOUNTER — OFFICE VISIT (OUTPATIENT)
Dept: PAIN MEDICINE | Facility: CLINIC | Age: 56
End: 2021-05-05
Payer: MEDICARE

## 2021-05-05 DIAGNOSIS — G89.29 CHRONIC BILATERAL LOW BACK PAIN WITHOUT SCIATICA: ICD-10-CM

## 2021-05-05 DIAGNOSIS — M70.62 GREATER TROCHANTERIC BURSITIS OF LEFT HIP: ICD-10-CM

## 2021-05-05 DIAGNOSIS — M54.12 CERVICAL RADICULOPATHY: ICD-10-CM

## 2021-05-05 DIAGNOSIS — M50.30 DDD (DEGENERATIVE DISC DISEASE), CERVICAL: ICD-10-CM

## 2021-05-05 DIAGNOSIS — M47.812 CERVICAL SPONDYLOSIS: ICD-10-CM

## 2021-05-05 DIAGNOSIS — M54.2 CHRONIC NECK PAIN: ICD-10-CM

## 2021-05-05 DIAGNOSIS — G89.29 CHRONIC NECK PAIN: ICD-10-CM

## 2021-05-05 DIAGNOSIS — M51.36 DDD (DEGENERATIVE DISC DISEASE), LUMBAR: ICD-10-CM

## 2021-05-05 DIAGNOSIS — M54.50 CHRONIC BILATERAL LOW BACK PAIN WITHOUT SCIATICA: ICD-10-CM

## 2021-05-05 DIAGNOSIS — M47.816 LUMBAR SPONDYLOSIS: ICD-10-CM

## 2021-05-05 DIAGNOSIS — M46.1 SACROILIITIS: Primary | ICD-10-CM

## 2021-05-05 PROCEDURE — 99213 OFFICE O/P EST LOW 20 MIN: CPT | Mod: 95,,, | Performed by: NURSE PRACTITIONER

## 2021-05-05 PROCEDURE — 99213 PR OFFICE/OUTPT VISIT, EST, LEVL III, 20-29 MIN: ICD-10-PCS | Mod: 95,,, | Performed by: NURSE PRACTITIONER

## 2021-05-05 PROCEDURE — 3072F LOW RISK FOR RETINOPATHY: CPT | Mod: ,,, | Performed by: NURSE PRACTITIONER

## 2021-05-05 PROCEDURE — 3072F PR LOW RISK FOR RETINOPATHY: ICD-10-PCS | Mod: ,,, | Performed by: NURSE PRACTITIONER

## 2021-05-07 ENCOUNTER — OFFICE VISIT (OUTPATIENT)
Dept: INFECTIOUS DISEASES | Facility: CLINIC | Age: 56
End: 2021-05-07
Payer: MEDICARE

## 2021-05-07 ENCOUNTER — OFFICE VISIT (OUTPATIENT)
Dept: RHEUMATOLOGY | Facility: CLINIC | Age: 56
End: 2021-05-07
Payer: MEDICARE

## 2021-05-07 VITALS
HEIGHT: 67 IN | DIASTOLIC BLOOD PRESSURE: 86 MMHG | WEIGHT: 238.56 LBS | TEMPERATURE: 98 F | HEART RATE: 64 BPM | SYSTOLIC BLOOD PRESSURE: 136 MMHG | BODY MASS INDEX: 37.44 KG/M2

## 2021-05-07 VITALS
DIASTOLIC BLOOD PRESSURE: 90 MMHG | HEART RATE: 69 BPM | WEIGHT: 238 LBS | BODY MASS INDEX: 37.35 KG/M2 | SYSTOLIC BLOOD PRESSURE: 164 MMHG | HEIGHT: 67 IN

## 2021-05-07 DIAGNOSIS — M47.819 SPONDYLOARTHRITIS: ICD-10-CM

## 2021-05-07 DIAGNOSIS — L02.419 AXILLARY ABSCESS: Primary | ICD-10-CM

## 2021-05-07 DIAGNOSIS — Z86.19 HISTORY OF HEPATITIS B: ICD-10-CM

## 2021-05-07 DIAGNOSIS — M02.30 REACTIVE ARTHRITIS: ICD-10-CM

## 2021-05-07 DIAGNOSIS — M19.90 INFLAMMATORY ARTHRITIS: ICD-10-CM

## 2021-05-07 DIAGNOSIS — M46.90 AXIAL SPONDYLOARTHRITIS: Chronic | ICD-10-CM

## 2021-05-07 DIAGNOSIS — H20.00 AAU (ACUTE ANTERIOR UVEITIS): ICD-10-CM

## 2021-05-07 DIAGNOSIS — E55.9 VITAMIN D DEFICIENCY: ICD-10-CM

## 2021-05-07 DIAGNOSIS — Z79.899 HIGH RISK MEDICATION USE: ICD-10-CM

## 2021-05-07 DIAGNOSIS — E11.69 HYPERLIPIDEMIA ASSOCIATED WITH TYPE 2 DIABETES MELLITUS: ICD-10-CM

## 2021-05-07 DIAGNOSIS — E78.5 HYPERLIPIDEMIA ASSOCIATED WITH TYPE 2 DIABETES MELLITUS: ICD-10-CM

## 2021-05-07 LAB — HBV DNA SERPL QL NAA+PROBE: NOT DETECTED

## 2021-05-07 PROCEDURE — 3051F HG A1C>EQUAL 7.0%<8.0%: CPT | Mod: S$GLB,,, | Performed by: INTERNAL MEDICINE

## 2021-05-07 PROCEDURE — 99204 PR OFFICE/OUTPT VISIT, NEW, LEVL IV, 45-59 MIN: ICD-10-PCS | Mod: S$GLB,,, | Performed by: INTERNAL MEDICINE

## 2021-05-07 PROCEDURE — 99999 PR PBB SHADOW E&M-EST. PATIENT-LVL V: ICD-10-PCS | Mod: PBBFAC,,, | Performed by: INTERNAL MEDICINE

## 2021-05-07 PROCEDURE — 87077 CULTURE AEROBIC IDENTIFY: CPT | Performed by: INTERNAL MEDICINE

## 2021-05-07 PROCEDURE — 99999 PR PBB SHADOW E&M-EST. PATIENT-LVL IV: ICD-10-PCS | Mod: PBBFAC,,, | Performed by: INTERNAL MEDICINE

## 2021-05-07 PROCEDURE — 99999 PR PBB SHADOW E&M-EST. PATIENT-LVL V: CPT | Mod: PBBFAC,,, | Performed by: INTERNAL MEDICINE

## 2021-05-07 PROCEDURE — 99204 OFFICE O/P NEW MOD 45 MIN: CPT | Mod: S$GLB,,, | Performed by: INTERNAL MEDICINE

## 2021-05-07 PROCEDURE — 3072F LOW RISK FOR RETINOPATHY: CPT | Mod: S$GLB,,, | Performed by: INTERNAL MEDICINE

## 2021-05-07 PROCEDURE — 87186 SC STD MICRODIL/AGAR DIL: CPT | Performed by: INTERNAL MEDICINE

## 2021-05-07 PROCEDURE — 87070 CULTURE OTHR SPECIMN AEROBIC: CPT | Performed by: INTERNAL MEDICINE

## 2021-05-07 PROCEDURE — 3051F PR MOST RECENT HEMOGLOBIN A1C LEVEL 7.0 - < 8.0%: ICD-10-PCS | Mod: S$GLB,,, | Performed by: INTERNAL MEDICINE

## 2021-05-07 PROCEDURE — 3072F PR LOW RISK FOR RETINOPATHY: ICD-10-PCS | Mod: S$GLB,,, | Performed by: INTERNAL MEDICINE

## 2021-05-07 PROCEDURE — 3008F PR BODY MASS INDEX (BMI) DOCUMENTED: ICD-10-PCS | Mod: S$GLB,,, | Performed by: INTERNAL MEDICINE

## 2021-05-07 PROCEDURE — 1125F AMNT PAIN NOTED PAIN PRSNT: CPT | Mod: S$GLB,,, | Performed by: INTERNAL MEDICINE

## 2021-05-07 PROCEDURE — 3008F BODY MASS INDEX DOCD: CPT | Mod: S$GLB,,, | Performed by: INTERNAL MEDICINE

## 2021-05-07 PROCEDURE — 1126F AMNT PAIN NOTED NONE PRSNT: CPT | Mod: S$GLB,,, | Performed by: INTERNAL MEDICINE

## 2021-05-07 PROCEDURE — 99214 PR OFFICE/OUTPT VISIT, EST, LEVL IV, 30-39 MIN: ICD-10-PCS | Mod: S$GLB,,, | Performed by: INTERNAL MEDICINE

## 2021-05-07 PROCEDURE — 1126F PR PAIN SEVERITY QUANTIFIED, NO PAIN PRESENT: ICD-10-PCS | Mod: S$GLB,,, | Performed by: INTERNAL MEDICINE

## 2021-05-07 PROCEDURE — 1125F PR PAIN SEVERITY QUANTIFIED, PAIN PRESENT: ICD-10-PCS | Mod: S$GLB,,, | Performed by: INTERNAL MEDICINE

## 2021-05-07 PROCEDURE — 87075 CULTR BACTERIA EXCEPT BLOOD: CPT | Performed by: INTERNAL MEDICINE

## 2021-05-07 PROCEDURE — 99999 PR PBB SHADOW E&M-EST. PATIENT-LVL IV: CPT | Mod: PBBFAC,,, | Performed by: INTERNAL MEDICINE

## 2021-05-07 PROCEDURE — 99214 OFFICE O/P EST MOD 30 MIN: CPT | Mod: S$GLB,,, | Performed by: INTERNAL MEDICINE

## 2021-05-07 RX ORDER — CERTOLIZUMAB PEGOL 200 MG/ML
200 INJECTION, SOLUTION SUBCUTANEOUS
Qty: 1 BOX | Refills: 2 | Status: SHIPPED | OUTPATIENT
Start: 2021-05-07 | End: 2021-08-19 | Stop reason: SDUPTHER

## 2021-05-07 RX ORDER — SULFASALAZINE 500 MG/1
1500 TABLET, DELAYED RELEASE ORAL 2 TIMES DAILY
Qty: 540 TABLET | Refills: 0 | Status: SHIPPED | OUTPATIENT
Start: 2021-05-07 | End: 2021-08-19 | Stop reason: SDUPTHER

## 2021-05-07 RX ORDER — ENTECAVIR 0.5 MG/1
0.5 TABLET, FILM COATED ORAL DAILY
Qty: 90 TABLET | Refills: 3 | Status: SHIPPED | OUTPATIENT
Start: 2021-05-07 | End: 2021-12-28 | Stop reason: SDUPTHER

## 2021-05-07 RX ORDER — DOXYCYCLINE 100 MG/1
100 CAPSULE ORAL EVERY 12 HOURS
Qty: 14 CAPSULE | Refills: 0 | Status: SHIPPED | OUTPATIENT
Start: 2021-05-07 | End: 2021-05-14

## 2021-05-07 RX ORDER — ERGOCALCIFEROL 1.25 MG/1
50000 CAPSULE ORAL
Qty: 12 CAPSULE | Refills: 3 | Status: SHIPPED | OUTPATIENT
Start: 2021-05-07 | End: 2022-07-07 | Stop reason: SDUPTHER

## 2021-05-07 ASSESSMENT — ANKYLOSING SPONDYLITIS DISEASE ACTIVITY SCORE (ASDAS-CRP)
NBH_PAIN: 4
PAIN_SWELLING: 4
GLOBAL_ACTIVITY: 5
TOTAL_SCORE: 3.3
CRP_MG_PER_LITER: 27
MORNING_STIFFNESS: 1

## 2021-05-10 ENCOUNTER — TELEPHONE (OUTPATIENT)
Dept: INFECTIOUS DISEASES | Facility: CLINIC | Age: 56
End: 2021-05-10

## 2021-05-10 ENCOUNTER — PATIENT MESSAGE (OUTPATIENT)
Dept: RHEUMATOLOGY | Facility: CLINIC | Age: 56
End: 2021-05-10

## 2021-05-10 LAB — BACTERIA SPEC AEROBE CULT: ABNORMAL

## 2021-05-11 ENCOUNTER — OFFICE VISIT (OUTPATIENT)
Dept: SPINE | Facility: CLINIC | Age: 56
End: 2021-05-11
Attending: PHYSICAL MEDICINE & REHABILITATION
Payer: MEDICARE

## 2021-05-11 VITALS
HEART RATE: 80 BPM | DIASTOLIC BLOOD PRESSURE: 84 MMHG | BODY MASS INDEX: 37.44 KG/M2 | HEIGHT: 67 IN | WEIGHT: 238.56 LBS | SYSTOLIC BLOOD PRESSURE: 126 MMHG

## 2021-05-11 DIAGNOSIS — M47.22 OSTEOARTHRITIS OF SPINE WITH RADICULOPATHY, CERVICAL REGION: ICD-10-CM

## 2021-05-11 DIAGNOSIS — M46.90 AXIAL SPONDYLOARTHRITIS: Primary | ICD-10-CM

## 2021-05-11 DIAGNOSIS — M54.2 NECK PAIN: ICD-10-CM

## 2021-05-11 DIAGNOSIS — E66.01 SEVERE OBESITY (BMI 35.0-39.9) WITH COMORBIDITY: ICD-10-CM

## 2021-05-11 DIAGNOSIS — M47.812 CERVICAL SPONDYLOSIS: ICD-10-CM

## 2021-05-11 PROCEDURE — 3072F LOW RISK FOR RETINOPATHY: CPT | Mod: S$GLB,,, | Performed by: PHYSICAL MEDICINE & REHABILITATION

## 2021-05-11 PROCEDURE — 99214 PR OFFICE/OUTPT VISIT, EST, LEVL IV, 30-39 MIN: ICD-10-PCS | Mod: S$GLB,,, | Performed by: PHYSICAL MEDICINE & REHABILITATION

## 2021-05-11 PROCEDURE — 99999 PR PBB SHADOW E&M-EST. PATIENT-LVL V: CPT | Mod: PBBFAC,,, | Performed by: PHYSICAL MEDICINE & REHABILITATION

## 2021-05-11 PROCEDURE — 99999 PR PBB SHADOW E&M-EST. PATIENT-LVL V: ICD-10-PCS | Mod: PBBFAC,,, | Performed by: PHYSICAL MEDICINE & REHABILITATION

## 2021-05-11 PROCEDURE — 3072F PR LOW RISK FOR RETINOPATHY: ICD-10-PCS | Mod: S$GLB,,, | Performed by: PHYSICAL MEDICINE & REHABILITATION

## 2021-05-11 PROCEDURE — 3008F BODY MASS INDEX DOCD: CPT | Mod: S$GLB,,, | Performed by: PHYSICAL MEDICINE & REHABILITATION

## 2021-05-11 PROCEDURE — 1126F AMNT PAIN NOTED NONE PRSNT: CPT | Mod: S$GLB,,, | Performed by: PHYSICAL MEDICINE & REHABILITATION

## 2021-05-11 PROCEDURE — 3008F PR BODY MASS INDEX (BMI) DOCUMENTED: ICD-10-PCS | Mod: S$GLB,,, | Performed by: PHYSICAL MEDICINE & REHABILITATION

## 2021-05-11 PROCEDURE — 1126F PR PAIN SEVERITY QUANTIFIED, NO PAIN PRESENT: ICD-10-PCS | Mod: S$GLB,,, | Performed by: PHYSICAL MEDICINE & REHABILITATION

## 2021-05-11 PROCEDURE — 99214 OFFICE O/P EST MOD 30 MIN: CPT | Mod: S$GLB,,, | Performed by: PHYSICAL MEDICINE & REHABILITATION

## 2021-05-12 LAB — BACTERIA SPEC ANAEROBE CULT: NORMAL

## 2021-05-14 ENCOUNTER — LAB VISIT (OUTPATIENT)
Dept: LAB | Facility: HOSPITAL | Age: 56
End: 2021-05-14
Attending: INTERNAL MEDICINE
Payer: MEDICARE

## 2021-05-14 DIAGNOSIS — E11.9 TYPE 2 DIABETES MELLITUS WITHOUT COMPLICATION, WITHOUT LONG-TERM CURRENT USE OF INSULIN: ICD-10-CM

## 2021-05-14 DIAGNOSIS — E11.59 HYPERTENSION ASSOCIATED WITH DIABETES: ICD-10-CM

## 2021-05-14 DIAGNOSIS — E11.42 TYPE 2 DIABETES MELLITUS WITH DIABETIC POLYNEUROPATHY, UNSPECIFIED WHETHER LONG TERM INSULIN USE: ICD-10-CM

## 2021-05-14 DIAGNOSIS — E11.69 HYPERLIPIDEMIA ASSOCIATED WITH TYPE 2 DIABETES MELLITUS: ICD-10-CM

## 2021-05-14 DIAGNOSIS — Z79.4 TYPE 2 DIABETES MELLITUS WITHOUT COMPLICATION, WITH LONG-TERM CURRENT USE OF INSULIN: ICD-10-CM

## 2021-05-14 DIAGNOSIS — E11.9 TYPE 2 DIABETES MELLITUS WITHOUT COMPLICATION, WITH LONG-TERM CURRENT USE OF INSULIN: ICD-10-CM

## 2021-05-14 DIAGNOSIS — E55.9 VITAMIN D DEFICIENCY DISEASE: ICD-10-CM

## 2021-05-14 DIAGNOSIS — E78.5 HYPERLIPIDEMIA ASSOCIATED WITH TYPE 2 DIABETES MELLITUS: ICD-10-CM

## 2021-05-14 DIAGNOSIS — I15.2 HYPERTENSION ASSOCIATED WITH DIABETES: ICD-10-CM

## 2021-05-14 LAB
25(OH)D3+25(OH)D2 SERPL-MCNC: 23 NG/ML (ref 30–96)
ALBUMIN SERPL BCP-MCNC: 4.6 G/DL (ref 3.5–5.2)
ALP SERPL-CCNC: 96 U/L (ref 38–126)
ALT SERPL W/O P-5'-P-CCNC: 19 U/L (ref 10–44)
ANION GAP SERPL CALC-SCNC: 11 MMOL/L (ref 8–16)
AST SERPL-CCNC: 24 U/L (ref 15–46)
BASOPHILS # BLD AUTO: 0.03 K/UL (ref 0–0.2)
BASOPHILS NFR BLD: 0.6 % (ref 0–1.9)
BILIRUB SERPL-MCNC: 0.5 MG/DL (ref 0.1–1)
CALCIUM SERPL-MCNC: 9.1 MG/DL (ref 8.7–10.5)
CHLORIDE SERPL-SCNC: 105 MMOL/L (ref 95–110)
CHOLEST SERPL-MCNC: 177 MG/DL (ref 120–199)
CHOLEST/HDLC SERPL: 2.9 {RATIO} (ref 2–5)
CO2 SERPL-SCNC: 25 MMOL/L (ref 23–29)
CREAT SERPL-MCNC: 0.61 MG/DL (ref 0.5–1.4)
DIFFERENTIAL METHOD: ABNORMAL
EOSINOPHIL # BLD AUTO: 0.1 K/UL (ref 0–0.5)
EOSINOPHIL NFR BLD: 2.3 % (ref 0–8)
ERYTHROCYTE [DISTWIDTH] IN BLOOD BY AUTOMATED COUNT: 12.7 % (ref 11.5–14.5)
EST. GFR  (AFRICAN AMERICAN): >60 ML/MIN/1.73 M^2
EST. GFR  (NON AFRICAN AMERICAN): >60 ML/MIN/1.73 M^2
ESTIMATED AVG GLUCOSE: 174 MG/DL (ref 68–131)
GLUCOSE SERPL-MCNC: 170 MG/DL (ref 70–110)
HBA1C MFR BLD: 7.7 % (ref 4–5.6)
HCT VFR BLD AUTO: 45.4 % (ref 37–48.5)
HDLC SERPL-MCNC: 62 MG/DL (ref 40–75)
HDLC SERPL: 35 % (ref 20–50)
HGB BLD-MCNC: 15.1 G/DL (ref 12–16)
IMM GRANULOCYTES # BLD AUTO: 0.03 K/UL (ref 0–0.04)
IMM GRANULOCYTES NFR BLD AUTO: 0.6 % (ref 0–0.5)
LDLC SERPL CALC-MCNC: 104.2 MG/DL (ref 63–159)
LYMPHOCYTES # BLD AUTO: 2.8 K/UL (ref 1–4.8)
LYMPHOCYTES NFR BLD: 52.4 % (ref 18–48)
MCH RBC QN AUTO: 30.4 PG (ref 27–31)
MCHC RBC AUTO-ENTMCNC: 33.3 G/DL (ref 32–36)
MCV RBC AUTO: 91 FL (ref 82–98)
MONOCYTES # BLD AUTO: 0.5 K/UL (ref 0.3–1)
MONOCYTES NFR BLD: 8.8 % (ref 4–15)
NEUTROPHILS # BLD AUTO: 1.9 K/UL (ref 1.8–7.7)
NEUTROPHILS NFR BLD: 35.3 % (ref 38–73)
NONHDLC SERPL-MCNC: 115 MG/DL
NRBC BLD-RTO: 0 /100 WBC
PLATELET # BLD AUTO: 243 K/UL (ref 150–450)
PMV BLD AUTO: 11.5 FL (ref 9.2–12.9)
POTASSIUM SERPL-SCNC: 3.8 MMOL/L (ref 3.5–5.1)
PROT SERPL-MCNC: 7.9 G/DL (ref 6–8.4)
RBC # BLD AUTO: 4.97 M/UL (ref 4–5.4)
SODIUM SERPL-SCNC: 141 MMOL/L (ref 136–145)
TRIGL SERPL-MCNC: 54 MG/DL (ref 30–150)
TSH SERPL DL<=0.005 MIU/L-ACNC: 1.7 UIU/ML (ref 0.4–4)
UUN UR-MCNC: 13 MG/DL (ref 7–17)
WBC # BLD AUTO: 5.25 K/UL (ref 3.9–12.7)

## 2021-05-14 PROCEDURE — 82306 VITAMIN D 25 HYDROXY: CPT | Mod: PO | Performed by: INTERNAL MEDICINE

## 2021-05-14 PROCEDURE — 83036 HEMOGLOBIN GLYCOSYLATED A1C: CPT | Performed by: INTERNAL MEDICINE

## 2021-05-14 PROCEDURE — 80061 LIPID PANEL: CPT | Performed by: INTERNAL MEDICINE

## 2021-05-14 PROCEDURE — 80053 COMPREHEN METABOLIC PANEL: CPT | Mod: PO | Performed by: INTERNAL MEDICINE

## 2021-05-14 PROCEDURE — 85025 COMPLETE CBC W/AUTO DIFF WBC: CPT | Mod: PO | Performed by: INTERNAL MEDICINE

## 2021-05-14 PROCEDURE — 84443 ASSAY THYROID STIM HORMONE: CPT | Mod: PO | Performed by: INTERNAL MEDICINE

## 2021-05-14 PROCEDURE — 36415 COLL VENOUS BLD VENIPUNCTURE: CPT | Mod: PO | Performed by: INTERNAL MEDICINE

## 2021-05-20 ENCOUNTER — OFFICE VISIT (OUTPATIENT)
Dept: INTERNAL MEDICINE | Facility: CLINIC | Age: 56
End: 2021-05-20
Payer: MEDICARE

## 2021-05-20 VITALS
WEIGHT: 235.25 LBS | HEIGHT: 67 IN | RESPIRATION RATE: 16 BRPM | OXYGEN SATURATION: 98 % | TEMPERATURE: 98 F | BODY MASS INDEX: 36.92 KG/M2 | HEART RATE: 76 BPM | DIASTOLIC BLOOD PRESSURE: 70 MMHG | SYSTOLIC BLOOD PRESSURE: 120 MMHG

## 2021-05-20 DIAGNOSIS — E78.5 HYPERLIPIDEMIA ASSOCIATED WITH TYPE 2 DIABETES MELLITUS: ICD-10-CM

## 2021-05-20 DIAGNOSIS — Z00.00 ANNUAL PHYSICAL EXAM: Primary | ICD-10-CM

## 2021-05-20 DIAGNOSIS — E11.59 HYPERTENSION ASSOCIATED WITH DIABETES: ICD-10-CM

## 2021-05-20 DIAGNOSIS — M46.90 AXIAL SPONDYLOARTHRITIS: Chronic | ICD-10-CM

## 2021-05-20 DIAGNOSIS — E11.69 HYPERLIPIDEMIA ASSOCIATED WITH TYPE 2 DIABETES MELLITUS: ICD-10-CM

## 2021-05-20 DIAGNOSIS — E66.01 SEVERE OBESITY (BMI 35.0-39.9) WITH COMORBIDITY: ICD-10-CM

## 2021-05-20 DIAGNOSIS — G47.00 INSOMNIA, UNSPECIFIED TYPE: Chronic | ICD-10-CM

## 2021-05-20 DIAGNOSIS — F41.9 ANXIETY: ICD-10-CM

## 2021-05-20 DIAGNOSIS — Z86.19 HISTORY OF HEPATITIS B: ICD-10-CM

## 2021-05-20 DIAGNOSIS — E11.42 TYPE 2 DIABETES MELLITUS WITH DIABETIC POLYNEUROPATHY, UNSPECIFIED WHETHER LONG TERM INSULIN USE: Chronic | ICD-10-CM

## 2021-05-20 DIAGNOSIS — I15.2 HYPERTENSION ASSOCIATED WITH DIABETES: ICD-10-CM

## 2021-05-20 PROCEDURE — 99999 PR PBB SHADOW E&M-EST. PATIENT-LVL V: ICD-10-PCS | Mod: PBBFAC,,, | Performed by: INTERNAL MEDICINE

## 2021-05-20 PROCEDURE — 3008F BODY MASS INDEX DOCD: CPT | Mod: S$GLB,,, | Performed by: INTERNAL MEDICINE

## 2021-05-20 PROCEDURE — 99214 OFFICE O/P EST MOD 30 MIN: CPT | Mod: S$GLB,,, | Performed by: INTERNAL MEDICINE

## 2021-05-20 PROCEDURE — 99999 PR PBB SHADOW E&M-EST. PATIENT-LVL V: CPT | Mod: PBBFAC,,, | Performed by: INTERNAL MEDICINE

## 2021-05-20 PROCEDURE — 1125F AMNT PAIN NOTED PAIN PRSNT: CPT | Mod: S$GLB,,, | Performed by: INTERNAL MEDICINE

## 2021-05-20 PROCEDURE — 3072F LOW RISK FOR RETINOPATHY: CPT | Mod: S$GLB,,, | Performed by: INTERNAL MEDICINE

## 2021-05-20 PROCEDURE — 3051F HG A1C>EQUAL 7.0%<8.0%: CPT | Mod: S$GLB,,, | Performed by: INTERNAL MEDICINE

## 2021-05-20 PROCEDURE — 99214 PR OFFICE/OUTPT VISIT, EST, LEVL IV, 30-39 MIN: ICD-10-PCS | Mod: S$GLB,,, | Performed by: INTERNAL MEDICINE

## 2021-05-20 PROCEDURE — 1125F PR PAIN SEVERITY QUANTIFIED, PAIN PRESENT: ICD-10-PCS | Mod: S$GLB,,, | Performed by: INTERNAL MEDICINE

## 2021-05-20 PROCEDURE — 3072F PR LOW RISK FOR RETINOPATHY: ICD-10-PCS | Mod: S$GLB,,, | Performed by: INTERNAL MEDICINE

## 2021-05-20 PROCEDURE — 3051F PR MOST RECENT HEMOGLOBIN A1C LEVEL 7.0 - < 8.0%: ICD-10-PCS | Mod: S$GLB,,, | Performed by: INTERNAL MEDICINE

## 2021-05-20 PROCEDURE — 3008F PR BODY MASS INDEX (BMI) DOCUMENTED: ICD-10-PCS | Mod: S$GLB,,, | Performed by: INTERNAL MEDICINE

## 2021-05-22 ENCOUNTER — SPECIALTY PHARMACY (OUTPATIENT)
Dept: PHARMACY | Facility: CLINIC | Age: 56
End: 2021-05-22

## 2021-05-26 DIAGNOSIS — E11.9 TYPE 2 DIABETES MELLITUS WITHOUT COMPLICATION, WITHOUT LONG-TERM CURRENT USE OF INSULIN: ICD-10-CM

## 2021-05-31 RX ORDER — BLOOD-GLUCOSE METER
KIT MISCELLANEOUS
Qty: 200 EACH | Refills: 3 | Status: SHIPPED | OUTPATIENT
Start: 2021-05-31 | End: 2023-11-09

## 2021-05-31 RX ORDER — LANCETS 28 GAUGE
EACH MISCELLANEOUS
Qty: 200 EACH | Refills: 3 | Status: SHIPPED | OUTPATIENT
Start: 2021-05-31

## 2021-06-04 ENCOUNTER — OFFICE VISIT (OUTPATIENT)
Dept: PSYCHIATRY | Facility: CLINIC | Age: 56
End: 2021-06-04
Payer: MEDICARE

## 2021-06-04 DIAGNOSIS — E78.5 HYPERLIPIDEMIA: ICD-10-CM

## 2021-06-04 DIAGNOSIS — F41.9 ANXIETY: Primary | ICD-10-CM

## 2021-06-04 PROCEDURE — 3072F PR LOW RISK FOR RETINOPATHY: ICD-10-PCS | Mod: ,,, | Performed by: PSYCHIATRY & NEUROLOGY

## 2021-06-04 PROCEDURE — 99214 PR OFFICE/OUTPT VISIT, EST, LEVL IV, 30-39 MIN: ICD-10-PCS | Mod: 95,,, | Performed by: PSYCHIATRY & NEUROLOGY

## 2021-06-04 PROCEDURE — 3072F LOW RISK FOR RETINOPATHY: CPT | Mod: ,,, | Performed by: PSYCHIATRY & NEUROLOGY

## 2021-06-04 PROCEDURE — 99214 OFFICE O/P EST MOD 30 MIN: CPT | Mod: 95,,, | Performed by: PSYCHIATRY & NEUROLOGY

## 2021-06-04 RX ORDER — ZOLPIDEM TARTRATE 5 MG/1
TABLET ORAL
Qty: 90 TABLET | Refills: 0 | Status: SHIPPED | OUTPATIENT
Start: 2021-06-04 | End: 2021-09-22 | Stop reason: SDUPTHER

## 2021-06-04 RX ORDER — FLUOXETINE HYDROCHLORIDE 20 MG/1
20 CAPSULE ORAL 2 TIMES DAILY
Qty: 180 CAPSULE | Refills: 1 | Status: SHIPPED | OUTPATIENT
Start: 2021-06-04 | End: 2021-09-22 | Stop reason: SDUPTHER

## 2021-06-07 RX ORDER — ATORVASTATIN CALCIUM 40 MG/1
TABLET, FILM COATED ORAL
Qty: 90 TABLET | Refills: 3 | Status: SHIPPED | OUTPATIENT
Start: 2021-06-07 | End: 2023-02-16 | Stop reason: SDUPTHER

## 2021-06-15 ENCOUNTER — SPECIALTY PHARMACY (OUTPATIENT)
Dept: PHARMACY | Facility: CLINIC | Age: 56
End: 2021-06-15

## 2021-07-07 DIAGNOSIS — E11.9 TYPE 2 DIABETES MELLITUS WITHOUT COMPLICATION, WITH LONG-TERM CURRENT USE OF INSULIN: ICD-10-CM

## 2021-07-07 DIAGNOSIS — Z79.4 TYPE 2 DIABETES MELLITUS WITHOUT COMPLICATION, WITH LONG-TERM CURRENT USE OF INSULIN: ICD-10-CM

## 2021-07-13 ENCOUNTER — PATIENT MESSAGE (OUTPATIENT)
Dept: PHARMACY | Facility: CLINIC | Age: 56
End: 2021-07-13

## 2021-07-19 ENCOUNTER — PATIENT MESSAGE (OUTPATIENT)
Dept: PHARMACY | Facility: CLINIC | Age: 56
End: 2021-07-19

## 2021-07-23 ENCOUNTER — PATIENT MESSAGE (OUTPATIENT)
Dept: PHARMACY | Facility: CLINIC | Age: 56
End: 2021-07-23

## 2021-07-23 ENCOUNTER — SPECIALTY PHARMACY (OUTPATIENT)
Dept: PHARMACY | Facility: CLINIC | Age: 56
End: 2021-07-23

## 2021-07-26 DIAGNOSIS — E11.9 TYPE 2 DIABETES MELLITUS WITHOUT COMPLICATION, WITH LONG-TERM CURRENT USE OF INSULIN: ICD-10-CM

## 2021-07-26 DIAGNOSIS — Z79.4 TYPE 2 DIABETES MELLITUS WITHOUT COMPLICATION, WITH LONG-TERM CURRENT USE OF INSULIN: ICD-10-CM

## 2021-07-28 ENCOUNTER — PATIENT MESSAGE (OUTPATIENT)
Dept: PHARMACY | Facility: CLINIC | Age: 56
End: 2021-07-28

## 2021-07-28 RX ORDER — REPAGLINIDE 1 MG/1
TABLET ORAL
Qty: 270 TABLET | Refills: 1 | Status: SHIPPED | OUTPATIENT
Start: 2021-07-28 | End: 2021-12-29

## 2021-07-29 ENCOUNTER — PATIENT MESSAGE (OUTPATIENT)
Dept: INTERNAL MEDICINE | Facility: CLINIC | Age: 56
End: 2021-07-29

## 2021-07-29 RX ORDER — AZITHROMYCIN 250 MG/1
TABLET, FILM COATED ORAL
Qty: 6 TABLET | Refills: 0 | Status: SHIPPED | OUTPATIENT
Start: 2021-07-29 | End: 2021-08-03

## 2021-08-16 ENCOUNTER — LAB VISIT (OUTPATIENT)
Dept: LAB | Facility: HOSPITAL | Age: 56
End: 2021-08-16
Attending: INTERNAL MEDICINE
Payer: MEDICARE

## 2021-08-16 DIAGNOSIS — Z51.81 ENCOUNTER FOR MONITORING OF ADALIMUMAB THERAPY: ICD-10-CM

## 2021-08-16 DIAGNOSIS — Z79.620 ENCOUNTER FOR MONITORING OF ADALIMUMAB THERAPY: ICD-10-CM

## 2021-08-16 DIAGNOSIS — E11.42 TYPE 2 DIABETES MELLITUS WITH DIABETIC POLYNEUROPATHY, UNSPECIFIED WHETHER LONG TERM INSULIN USE: Chronic | ICD-10-CM

## 2021-08-16 LAB
ALBUMIN SERPL BCP-MCNC: 4.4 G/DL (ref 3.5–5.2)
ALP SERPL-CCNC: 86 U/L (ref 38–126)
ALT SERPL W/O P-5'-P-CCNC: 16 U/L (ref 10–44)
ANION GAP SERPL CALC-SCNC: 7 MMOL/L (ref 8–16)
AST SERPL-CCNC: 24 U/L (ref 15–46)
BASOPHILS # BLD AUTO: 0.03 K/UL (ref 0–0.2)
BASOPHILS NFR BLD: 0.5 % (ref 0–1.9)
BILIRUB SERPL-MCNC: 0.6 MG/DL (ref 0.1–1)
CALCIUM SERPL-MCNC: 9.3 MG/DL (ref 8.7–10.5)
CHLORIDE SERPL-SCNC: 107 MMOL/L (ref 95–110)
CO2 SERPL-SCNC: 26 MMOL/L (ref 23–29)
CREAT SERPL-MCNC: 0.69 MG/DL (ref 0.5–1.4)
CRP SERPL-MCNC: 0.49 MG/DL (ref 0–1)
DIFFERENTIAL METHOD: ABNORMAL
EOSINOPHIL # BLD AUTO: 0.1 K/UL (ref 0–0.5)
EOSINOPHIL NFR BLD: 1.2 % (ref 0–8)
ERYTHROCYTE [DISTWIDTH] IN BLOOD BY AUTOMATED COUNT: 13.2 % (ref 11.5–14.5)
ERYTHROCYTE [SEDIMENTATION RATE] IN BLOOD BY WESTERGREN METHOD: 2 MM/HR (ref 0–20)
EST. GFR  (AFRICAN AMERICAN): >60 ML/MIN/1.73 M^2
EST. GFR  (NON AFRICAN AMERICAN): >60 ML/MIN/1.73 M^2
ESTIMATED AVG GLUCOSE: 160 MG/DL (ref 68–131)
GLUCOSE SERPL-MCNC: 139 MG/DL (ref 70–110)
HBA1C MFR BLD: 7.2 % (ref 4–5.6)
HCT VFR BLD AUTO: 44.4 % (ref 37–48.5)
HGB BLD-MCNC: 14.7 G/DL (ref 12–16)
IMM GRANULOCYTES # BLD AUTO: 0.05 K/UL (ref 0–0.04)
IMM GRANULOCYTES NFR BLD AUTO: 0.9 % (ref 0–0.5)
LYMPHOCYTES # BLD AUTO: 3.1 K/UL (ref 1–4.8)
LYMPHOCYTES NFR BLD: 54.8 % (ref 18–48)
MCH RBC QN AUTO: 30.4 PG (ref 27–31)
MCHC RBC AUTO-ENTMCNC: 33.1 G/DL (ref 32–36)
MCV RBC AUTO: 92 FL (ref 82–98)
MONOCYTES # BLD AUTO: 0.5 K/UL (ref 0.3–1)
MONOCYTES NFR BLD: 8.3 % (ref 4–15)
NEUTROPHILS # BLD AUTO: 2 K/UL (ref 1.8–7.7)
NEUTROPHILS NFR BLD: 34.3 % (ref 38–73)
NRBC BLD-RTO: 0 /100 WBC
PLATELET # BLD AUTO: 250 K/UL (ref 150–450)
PMV BLD AUTO: 10.9 FL (ref 9.2–12.9)
POTASSIUM SERPL-SCNC: 3.7 MMOL/L (ref 3.5–5.1)
PROT SERPL-MCNC: 7.3 G/DL (ref 6–8.4)
RBC # BLD AUTO: 4.83 M/UL (ref 4–5.4)
SODIUM SERPL-SCNC: 140 MMOL/L (ref 136–145)
UUN UR-MCNC: 15 MG/DL (ref 7–17)
WBC # BLD AUTO: 5.69 K/UL (ref 3.9–12.7)

## 2021-08-16 PROCEDURE — 87516 HEPATITIS B DNA AMP PROBE: CPT | Mod: PO | Performed by: INTERNAL MEDICINE

## 2021-08-16 PROCEDURE — 85025 COMPLETE CBC W/AUTO DIFF WBC: CPT | Mod: PO | Performed by: INTERNAL MEDICINE

## 2021-08-16 PROCEDURE — 80053 COMPREHEN METABOLIC PANEL: CPT | Mod: PO | Performed by: INTERNAL MEDICINE

## 2021-08-16 PROCEDURE — 36415 COLL VENOUS BLD VENIPUNCTURE: CPT | Mod: PO | Performed by: INTERNAL MEDICINE

## 2021-08-16 PROCEDURE — 83036 HEMOGLOBIN GLYCOSYLATED A1C: CPT | Performed by: INTERNAL MEDICINE

## 2021-08-16 PROCEDURE — 86140 C-REACTIVE PROTEIN: CPT | Mod: PO | Performed by: INTERNAL MEDICINE

## 2021-08-19 ENCOUNTER — OFFICE VISIT (OUTPATIENT)
Dept: RHEUMATOLOGY | Facility: CLINIC | Age: 56
End: 2021-08-19
Payer: MEDICARE

## 2021-08-19 DIAGNOSIS — M02.30 REACTIVE ARTHRITIS: ICD-10-CM

## 2021-08-19 DIAGNOSIS — M25.569 KNEE PAIN, UNSPECIFIED CHRONICITY, UNSPECIFIED LATERALITY: Primary | ICD-10-CM

## 2021-08-19 DIAGNOSIS — M46.90 AXIAL SPONDYLOARTHRITIS: Chronic | ICD-10-CM

## 2021-08-19 DIAGNOSIS — Z79.899 HIGH RISK MEDICATION USE: ICD-10-CM

## 2021-08-19 DIAGNOSIS — M47.819 SPONDYLOARTHRITIS: ICD-10-CM

## 2021-08-19 DIAGNOSIS — M79.671 FOOT PAIN, RIGHT: ICD-10-CM

## 2021-08-19 DIAGNOSIS — H20.00 AAU (ACUTE ANTERIOR UVEITIS): ICD-10-CM

## 2021-08-19 DIAGNOSIS — M19.90 INFLAMMATORY ARTHRITIS: ICD-10-CM

## 2021-08-19 DIAGNOSIS — Z78.0 MENOPAUSE: ICD-10-CM

## 2021-08-19 LAB — HBV DNA SERPL QL NAA+PROBE: NOT DETECTED

## 2021-08-19 PROCEDURE — 99213 PR OFFICE/OUTPT VISIT, EST, LEVL III, 20-29 MIN: ICD-10-PCS | Mod: 95,,, | Performed by: INTERNAL MEDICINE

## 2021-08-19 PROCEDURE — 1125F PR PAIN SEVERITY QUANTIFIED, PAIN PRESENT: ICD-10-PCS | Mod: ,,, | Performed by: INTERNAL MEDICINE

## 2021-08-19 PROCEDURE — 3051F HG A1C>EQUAL 7.0%<8.0%: CPT | Mod: ,,, | Performed by: INTERNAL MEDICINE

## 2021-08-19 PROCEDURE — 3051F PR MOST RECENT HEMOGLOBIN A1C LEVEL 7.0 - < 8.0%: ICD-10-PCS | Mod: ,,, | Performed by: INTERNAL MEDICINE

## 2021-08-19 PROCEDURE — 99213 OFFICE O/P EST LOW 20 MIN: CPT | Mod: 95,,, | Performed by: INTERNAL MEDICINE

## 2021-08-19 PROCEDURE — 3072F LOW RISK FOR RETINOPATHY: CPT | Mod: ,,, | Performed by: INTERNAL MEDICINE

## 2021-08-19 PROCEDURE — 1159F MED LIST DOCD IN RCRD: CPT | Mod: ,,, | Performed by: INTERNAL MEDICINE

## 2021-08-19 PROCEDURE — 1159F PR MEDICATION LIST DOCUMENTED IN MEDICAL RECORD: ICD-10-PCS | Mod: ,,, | Performed by: INTERNAL MEDICINE

## 2021-08-19 PROCEDURE — 3072F PR LOW RISK FOR RETINOPATHY: ICD-10-PCS | Mod: ,,, | Performed by: INTERNAL MEDICINE

## 2021-08-19 PROCEDURE — 1125F AMNT PAIN NOTED PAIN PRSNT: CPT | Mod: ,,, | Performed by: INTERNAL MEDICINE

## 2021-08-19 RX ORDER — CERTOLIZUMAB PEGOL 200 MG/ML
200 INJECTION, SOLUTION SUBCUTANEOUS
Qty: 1 BOX | Refills: 2 | Status: SHIPPED | OUTPATIENT
Start: 2021-08-19 | End: 2021-08-19 | Stop reason: SDUPTHER

## 2021-08-19 RX ORDER — SULFASALAZINE 500 MG/1
1500 TABLET, DELAYED RELEASE ORAL 2 TIMES DAILY
Qty: 540 TABLET | Refills: 0 | Status: SHIPPED | OUTPATIENT
Start: 2021-08-19 | End: 2021-12-28 | Stop reason: SDUPTHER

## 2021-08-19 RX ORDER — CERTOLIZUMAB PEGOL 200 MG/ML
200 INJECTION, SOLUTION SUBCUTANEOUS
Qty: 1 EACH | Refills: 2 | Status: SHIPPED | OUTPATIENT
Start: 2021-08-19 | End: 2021-11-23 | Stop reason: SDUPTHER

## 2021-08-19 RX ORDER — DICLOFENAC SODIUM 10 MG/G
4 GEL TOPICAL 4 TIMES DAILY
Qty: 3 TUBE | Refills: 2 | Status: SHIPPED | OUTPATIENT
Start: 2021-08-19 | End: 2023-04-24 | Stop reason: SDUPTHER

## 2021-08-19 RX ORDER — CHOLECALCIFEROL (VITAMIN D3) 25 MCG
1000 TABLET ORAL DAILY
COMMUNITY

## 2021-08-19 ASSESSMENT — ANKYLOSING SPONDYLITIS DISEASE ACTIVITY SCORE (ASDAS-CRP)
NBH_PAIN: 5
TOTAL_SCORE: 3.31
GLOBAL_ACTIVITY: 7
MORNING_STIFFNESS: 7
PAIN_SWELLING: 7
CRP_MG_PER_LITER: 4.9

## 2021-08-20 ENCOUNTER — SPECIALTY PHARMACY (OUTPATIENT)
Dept: PHARMACY | Facility: CLINIC | Age: 56
End: 2021-08-20

## 2021-08-23 ENCOUNTER — PATIENT MESSAGE (OUTPATIENT)
Dept: RHEUMATOLOGY | Facility: CLINIC | Age: 56
End: 2021-08-23

## 2021-09-17 ENCOUNTER — IMMUNIZATION (OUTPATIENT)
Dept: PHARMACY | Facility: CLINIC | Age: 56
End: 2021-09-17
Payer: MEDICARE

## 2021-09-17 DIAGNOSIS — Z23 NEED FOR VACCINATION: Primary | ICD-10-CM

## 2021-09-18 ENCOUNTER — SPECIALTY PHARMACY (OUTPATIENT)
Dept: PHARMACY | Facility: CLINIC | Age: 56
End: 2021-09-18

## 2021-09-20 ENCOUNTER — HOSPITAL ENCOUNTER (OUTPATIENT)
Dept: RADIOLOGY | Facility: HOSPITAL | Age: 56
Discharge: HOME | End: 2021-09-20
Attending: STUDENT IN AN ORGANIZED HEALTH CARE EDUCATION/TRAINING PROGRAM
Payer: MEDICARE

## 2021-09-20 DIAGNOSIS — Z78.0 MENOPAUSE: ICD-10-CM

## 2021-09-20 DIAGNOSIS — M79.671 FOOT PAIN, RIGHT: ICD-10-CM

## 2021-09-20 DIAGNOSIS — M25.569 KNEE PAIN, UNSPECIFIED CHRONICITY, UNSPECIFIED LATERALITY: ICD-10-CM

## 2021-09-20 PROCEDURE — 73564 X-RAY EXAM KNEE 4 OR MORE: CPT | Mod: TC,50,FY,PO

## 2021-09-20 PROCEDURE — 73630 X-RAY EXAM OF FOOT: CPT | Mod: TC,FY,PO,RT

## 2021-09-20 PROCEDURE — 77080 DXA BONE DENSITY AXIAL: CPT | Mod: TC,PO

## 2021-09-22 ENCOUNTER — OFFICE VISIT (OUTPATIENT)
Dept: PSYCHIATRY | Facility: CLINIC | Age: 56
End: 2021-09-22
Payer: MEDICARE

## 2021-09-22 DIAGNOSIS — F41.9 ANXIETY: Primary | ICD-10-CM

## 2021-09-22 PROCEDURE — 99214 OFFICE O/P EST MOD 30 MIN: CPT | Mod: 95,,, | Performed by: PSYCHIATRY & NEUROLOGY

## 2021-09-22 PROCEDURE — 3061F PR NEG MICROALBUMINURIA RESULT DOCUMENTED/REVIEW: ICD-10-PCS | Mod: 95,,, | Performed by: PSYCHIATRY & NEUROLOGY

## 2021-09-22 PROCEDURE — 3051F HG A1C>EQUAL 7.0%<8.0%: CPT | Mod: 95,,, | Performed by: PSYCHIATRY & NEUROLOGY

## 2021-09-22 PROCEDURE — 4010F ACE/ARB THERAPY RXD/TAKEN: CPT | Mod: 95,,, | Performed by: PSYCHIATRY & NEUROLOGY

## 2021-09-22 PROCEDURE — 3051F PR MOST RECENT HEMOGLOBIN A1C LEVEL 7.0 - < 8.0%: ICD-10-PCS | Mod: 95,,, | Performed by: PSYCHIATRY & NEUROLOGY

## 2021-09-22 PROCEDURE — 3061F NEG MICROALBUMINURIA REV: CPT | Mod: 95,,, | Performed by: PSYCHIATRY & NEUROLOGY

## 2021-09-22 PROCEDURE — 3066F NEPHROPATHY DOC TX: CPT | Mod: 95,,, | Performed by: PSYCHIATRY & NEUROLOGY

## 2021-09-22 PROCEDURE — 3072F PR LOW RISK FOR RETINOPATHY: ICD-10-PCS | Mod: 95,,, | Performed by: PSYCHIATRY & NEUROLOGY

## 2021-09-22 PROCEDURE — 99214 PR OFFICE/OUTPT VISIT, EST, LEVL IV, 30-39 MIN: ICD-10-PCS | Mod: 95,,, | Performed by: PSYCHIATRY & NEUROLOGY

## 2021-09-22 PROCEDURE — 3066F PR DOCUMENTATION OF TREATMENT FOR NEPHROPATHY: ICD-10-PCS | Mod: 95,,, | Performed by: PSYCHIATRY & NEUROLOGY

## 2021-09-22 PROCEDURE — 4010F PR ACE/ARB THEARPY RXD/TAKEN: ICD-10-PCS | Mod: 95,,, | Performed by: PSYCHIATRY & NEUROLOGY

## 2021-09-22 PROCEDURE — 3072F LOW RISK FOR RETINOPATHY: CPT | Mod: 95,,, | Performed by: PSYCHIATRY & NEUROLOGY

## 2021-09-22 RX ORDER — FLUOXETINE HYDROCHLORIDE 20 MG/1
20 CAPSULE ORAL 2 TIMES DAILY
Qty: 180 CAPSULE | Refills: 1 | Status: SHIPPED | OUTPATIENT
Start: 2021-09-22 | End: 2022-01-05 | Stop reason: SDUPTHER

## 2021-09-22 RX ORDER — ZOLPIDEM TARTRATE 5 MG/1
TABLET ORAL
Qty: 90 TABLET | Refills: 0 | Status: SHIPPED | OUTPATIENT
Start: 2021-09-22 | End: 2022-02-15

## 2021-09-28 ENCOUNTER — OFFICE VISIT (OUTPATIENT)
Dept: PODIATRY | Facility: CLINIC | Age: 56
End: 2021-09-28
Payer: MEDICARE

## 2021-09-28 VITALS
WEIGHT: 235.25 LBS | DIASTOLIC BLOOD PRESSURE: 86 MMHG | HEIGHT: 67 IN | SYSTOLIC BLOOD PRESSURE: 142 MMHG | HEART RATE: 68 BPM | BODY MASS INDEX: 36.92 KG/M2

## 2021-09-28 DIAGNOSIS — M89.8X7 EXOSTOSIS OF RIGHT FOOT: ICD-10-CM

## 2021-09-28 DIAGNOSIS — M79.671 FOOT PAIN, RIGHT: ICD-10-CM

## 2021-09-28 DIAGNOSIS — M19.079 ARTHRITIS OF MIDFOOT: Primary | ICD-10-CM

## 2021-09-28 PROCEDURE — 3008F BODY MASS INDEX DOCD: CPT | Mod: S$GLB,,, | Performed by: PODIATRIST

## 2021-09-28 PROCEDURE — 3066F NEPHROPATHY DOC TX: CPT | Mod: S$GLB,,, | Performed by: PODIATRIST

## 2021-09-28 PROCEDURE — 3008F PR BODY MASS INDEX (BMI) DOCUMENTED: ICD-10-PCS | Mod: S$GLB,,, | Performed by: PODIATRIST

## 2021-09-28 PROCEDURE — 99203 PR OFFICE/OUTPT VISIT, NEW, LEVL III, 30-44 MIN: ICD-10-PCS | Mod: S$GLB,,, | Performed by: PODIATRIST

## 2021-09-28 PROCEDURE — 3061F PR NEG MICROALBUMINURIA RESULT DOCUMENTED/REVIEW: ICD-10-PCS | Mod: S$GLB,,, | Performed by: PODIATRIST

## 2021-09-28 PROCEDURE — 3051F PR MOST RECENT HEMOGLOBIN A1C LEVEL 7.0 - < 8.0%: ICD-10-PCS | Mod: S$GLB,,, | Performed by: PODIATRIST

## 2021-09-28 PROCEDURE — 3072F LOW RISK FOR RETINOPATHY: CPT | Mod: S$GLB,,, | Performed by: PODIATRIST

## 2021-09-28 PROCEDURE — 1160F PR REVIEW ALL MEDS BY PRESCRIBER/CLIN PHARMACIST DOCUMENTED: ICD-10-PCS | Mod: S$GLB,,, | Performed by: PODIATRIST

## 2021-09-28 PROCEDURE — 1160F RVW MEDS BY RX/DR IN RCRD: CPT | Mod: S$GLB,,, | Performed by: PODIATRIST

## 2021-09-28 PROCEDURE — 99999 PR PBB SHADOW E&M-EST. PATIENT-LVL V: CPT | Mod: PBBFAC,,, | Performed by: PODIATRIST

## 2021-09-28 PROCEDURE — 3077F SYST BP >= 140 MM HG: CPT | Mod: S$GLB,,, | Performed by: PODIATRIST

## 2021-09-28 PROCEDURE — 3077F PR MOST RECENT SYSTOLIC BLOOD PRESSURE >= 140 MM HG: ICD-10-PCS | Mod: S$GLB,,, | Performed by: PODIATRIST

## 2021-09-28 PROCEDURE — 1159F MED LIST DOCD IN RCRD: CPT | Mod: S$GLB,,, | Performed by: PODIATRIST

## 2021-09-28 PROCEDURE — 3061F NEG MICROALBUMINURIA REV: CPT | Mod: S$GLB,,, | Performed by: PODIATRIST

## 2021-09-28 PROCEDURE — 3079F PR MOST RECENT DIASTOLIC BLOOD PRESSURE 80-89 MM HG: ICD-10-PCS | Mod: S$GLB,,, | Performed by: PODIATRIST

## 2021-09-28 PROCEDURE — 3079F DIAST BP 80-89 MM HG: CPT | Mod: S$GLB,,, | Performed by: PODIATRIST

## 2021-09-28 PROCEDURE — 3066F PR DOCUMENTATION OF TREATMENT FOR NEPHROPATHY: ICD-10-PCS | Mod: S$GLB,,, | Performed by: PODIATRIST

## 2021-09-28 PROCEDURE — 4010F PR ACE/ARB THEARPY RXD/TAKEN: ICD-10-PCS | Mod: S$GLB,,, | Performed by: PODIATRIST

## 2021-09-28 PROCEDURE — 3051F HG A1C>EQUAL 7.0%<8.0%: CPT | Mod: S$GLB,,, | Performed by: PODIATRIST

## 2021-09-28 PROCEDURE — 99203 OFFICE O/P NEW LOW 30 MIN: CPT | Mod: S$GLB,,, | Performed by: PODIATRIST

## 2021-09-28 PROCEDURE — 4010F ACE/ARB THERAPY RXD/TAKEN: CPT | Mod: S$GLB,,, | Performed by: PODIATRIST

## 2021-09-28 PROCEDURE — 99999 PR PBB SHADOW E&M-EST. PATIENT-LVL V: ICD-10-PCS | Mod: PBBFAC,,, | Performed by: PODIATRIST

## 2021-09-28 PROCEDURE — 3072F PR LOW RISK FOR RETINOPATHY: ICD-10-PCS | Mod: S$GLB,,, | Performed by: PODIATRIST

## 2021-09-28 PROCEDURE — 1159F PR MEDICATION LIST DOCUMENTED IN MEDICAL RECORD: ICD-10-PCS | Mod: S$GLB,,, | Performed by: PODIATRIST

## 2021-10-19 ENCOUNTER — PATIENT MESSAGE (OUTPATIENT)
Dept: PHARMACY | Facility: CLINIC | Age: 56
End: 2021-10-19
Payer: MEDICARE

## 2021-10-21 ENCOUNTER — PATIENT MESSAGE (OUTPATIENT)
Dept: OPTOMETRY | Facility: CLINIC | Age: 56
End: 2021-10-21
Payer: MEDICARE

## 2021-10-25 ENCOUNTER — SPECIALTY PHARMACY (OUTPATIENT)
Dept: PHARMACY | Facility: CLINIC | Age: 56
End: 2021-10-25
Payer: MEDICARE

## 2021-10-26 DIAGNOSIS — M02.30 REACTIVE ARTHRITIS: ICD-10-CM

## 2021-10-26 DIAGNOSIS — Z79.4 TYPE 2 DIABETES MELLITUS WITHOUT COMPLICATION, WITH LONG-TERM CURRENT USE OF INSULIN: ICD-10-CM

## 2021-10-26 DIAGNOSIS — E11.9 TYPE 2 DIABETES MELLITUS WITHOUT COMPLICATION, WITH LONG-TERM CURRENT USE OF INSULIN: ICD-10-CM

## 2021-10-26 DIAGNOSIS — J20.9 ACUTE BRONCHITIS, UNSPECIFIED ORGANISM: ICD-10-CM

## 2021-10-26 DIAGNOSIS — R51.9 SINUS HEADACHE: ICD-10-CM

## 2021-10-27 RX ORDER — LEVOCETIRIZINE DIHYDROCHLORIDE 5 MG/1
TABLET, FILM COATED ORAL
Qty: 90 TABLET | Refills: 2 | Status: SHIPPED | OUTPATIENT
Start: 2021-10-27 | End: 2022-07-07

## 2021-10-27 RX ORDER — SITAGLIPTIN 100 MG/1
TABLET, FILM COATED ORAL
Qty: 90 TABLET | Refills: 0 | Status: SHIPPED | OUTPATIENT
Start: 2021-10-27 | End: 2022-02-15

## 2021-10-27 RX ORDER — CYCLOBENZAPRINE HCL 10 MG
TABLET ORAL
Qty: 90 TABLET | Refills: 0 | Status: SHIPPED | OUTPATIENT
Start: 2021-10-27 | End: 2022-04-12

## 2021-10-27 RX ORDER — PANTOPRAZOLE SODIUM 40 MG/1
TABLET, DELAYED RELEASE ORAL
Qty: 90 TABLET | Refills: 2 | Status: SHIPPED | OUTPATIENT
Start: 2021-10-27 | End: 2022-07-07

## 2021-11-03 ENCOUNTER — OFFICE VISIT (OUTPATIENT)
Dept: OPTOMETRY | Facility: CLINIC | Age: 56
End: 2021-11-03
Payer: MEDICARE

## 2021-11-03 DIAGNOSIS — H52.201 MYOPIA OF RIGHT EYE WITH ASTIGMATISM: ICD-10-CM

## 2021-11-03 DIAGNOSIS — E11.9 TYPE 2 DIABETES MELLITUS WITHOUT OPHTHALMIC MANIFESTATIONS: ICD-10-CM

## 2021-11-03 DIAGNOSIS — H52.11 MYOPIA OF RIGHT EYE WITH ASTIGMATISM: ICD-10-CM

## 2021-11-03 DIAGNOSIS — H25.13 NUCLEAR SCLEROSIS OF BOTH EYES: Primary | ICD-10-CM

## 2021-11-03 DIAGNOSIS — H52.4 PRESBYOPIA OF BOTH EYES: ICD-10-CM

## 2021-11-03 DIAGNOSIS — H21.542 POSTERIOR SYNECHIAE (IRIS), LEFT EYE: ICD-10-CM

## 2021-11-03 PROCEDURE — 3051F HG A1C>EQUAL 7.0%<8.0%: CPT | Mod: S$GLB,,, | Performed by: OPTOMETRIST

## 2021-11-03 PROCEDURE — 1159F PR MEDICATION LIST DOCUMENTED IN MEDICAL RECORD: ICD-10-PCS | Mod: S$GLB,,, | Performed by: OPTOMETRIST

## 2021-11-03 PROCEDURE — 2023F DILAT RTA XM W/O RTNOPTHY: CPT | Mod: S$GLB,,, | Performed by: OPTOMETRIST

## 2021-11-03 PROCEDURE — 92015 DETERMINE REFRACTIVE STATE: CPT | Mod: S$GLB,,, | Performed by: OPTOMETRIST

## 2021-11-03 PROCEDURE — 4010F PR ACE/ARB THEARPY RXD/TAKEN: ICD-10-PCS | Mod: S$GLB,,, | Performed by: OPTOMETRIST

## 2021-11-03 PROCEDURE — 1159F MED LIST DOCD IN RCRD: CPT | Mod: S$GLB,,, | Performed by: OPTOMETRIST

## 2021-11-03 PROCEDURE — 2023F PR DILATED RETINAL EXAM W/O EVID OF RETINOPATHY: ICD-10-PCS | Mod: S$GLB,,, | Performed by: OPTOMETRIST

## 2021-11-03 PROCEDURE — 92014 PR EYE EXAM, EST PATIENT,COMPREHESV: ICD-10-PCS | Mod: S$GLB,,, | Performed by: OPTOMETRIST

## 2021-11-03 PROCEDURE — 3061F PR NEG MICROALBUMINURIA RESULT DOCUMENTED/REVIEW: ICD-10-PCS | Mod: S$GLB,,, | Performed by: OPTOMETRIST

## 2021-11-03 PROCEDURE — 99999 PR PBB SHADOW E&M-EST. PATIENT-LVL V: CPT | Mod: PBBFAC,,, | Performed by: OPTOMETRIST

## 2021-11-03 PROCEDURE — 3066F PR DOCUMENTATION OF TREATMENT FOR NEPHROPATHY: ICD-10-PCS | Mod: S$GLB,,, | Performed by: OPTOMETRIST

## 2021-11-03 PROCEDURE — 3051F PR MOST RECENT HEMOGLOBIN A1C LEVEL 7.0 - < 8.0%: ICD-10-PCS | Mod: S$GLB,,, | Performed by: OPTOMETRIST

## 2021-11-03 PROCEDURE — 3061F NEG MICROALBUMINURIA REV: CPT | Mod: S$GLB,,, | Performed by: OPTOMETRIST

## 2021-11-03 PROCEDURE — 3066F NEPHROPATHY DOC TX: CPT | Mod: S$GLB,,, | Performed by: OPTOMETRIST

## 2021-11-03 PROCEDURE — 4010F ACE/ARB THERAPY RXD/TAKEN: CPT | Mod: S$GLB,,, | Performed by: OPTOMETRIST

## 2021-11-03 PROCEDURE — 99999 PR PBB SHADOW E&M-EST. PATIENT-LVL V: ICD-10-PCS | Mod: PBBFAC,,, | Performed by: OPTOMETRIST

## 2021-11-03 PROCEDURE — 92014 COMPRE OPH EXAM EST PT 1/>: CPT | Mod: S$GLB,,, | Performed by: OPTOMETRIST

## 2021-11-03 PROCEDURE — 92015 PR REFRACTION: ICD-10-PCS | Mod: S$GLB,,, | Performed by: OPTOMETRIST

## 2021-11-04 ENCOUNTER — PATIENT MESSAGE (OUTPATIENT)
Dept: RHEUMATOLOGY | Facility: CLINIC | Age: 56
End: 2021-11-04
Payer: MEDICARE

## 2021-11-12 ENCOUNTER — PATIENT MESSAGE (OUTPATIENT)
Dept: INTERNAL MEDICINE | Facility: CLINIC | Age: 56
End: 2021-11-12
Payer: MEDICARE

## 2021-11-12 RX ORDER — HYDROCORTISONE 25 MG/G
CREAM TOPICAL 2 TIMES DAILY
Qty: 28 G | Refills: 1 | Status: SHIPPED | OUTPATIENT
Start: 2021-11-12 | End: 2022-11-15

## 2021-11-16 ENCOUNTER — OFFICE VISIT (OUTPATIENT)
Dept: INTERNAL MEDICINE | Facility: CLINIC | Age: 56
End: 2021-11-16
Payer: MEDICARE

## 2021-11-16 VITALS
WEIGHT: 240.75 LBS | BODY MASS INDEX: 37.79 KG/M2 | HEART RATE: 70 BPM | DIASTOLIC BLOOD PRESSURE: 78 MMHG | TEMPERATURE: 97 F | RESPIRATION RATE: 19 BRPM | OXYGEN SATURATION: 99 % | HEIGHT: 67 IN | SYSTOLIC BLOOD PRESSURE: 138 MMHG

## 2021-11-16 DIAGNOSIS — E11.69 HYPERLIPIDEMIA ASSOCIATED WITH TYPE 2 DIABETES MELLITUS: ICD-10-CM

## 2021-11-16 DIAGNOSIS — E66.01 SEVERE OBESITY (BMI 35.0-39.9) WITH COMORBIDITY: ICD-10-CM

## 2021-11-16 DIAGNOSIS — I15.2 HYPERTENSION ASSOCIATED WITH DIABETES: ICD-10-CM

## 2021-11-16 DIAGNOSIS — E11.42 TYPE 2 DIABETES MELLITUS WITH DIABETIC POLYNEUROPATHY, UNSPECIFIED WHETHER LONG TERM INSULIN USE: ICD-10-CM

## 2021-11-16 DIAGNOSIS — E11.59 HYPERTENSION ASSOCIATED WITH DIABETES: ICD-10-CM

## 2021-11-16 DIAGNOSIS — M46.90 AXIAL SPONDYLOARTHRITIS: Chronic | ICD-10-CM

## 2021-11-16 DIAGNOSIS — H66.92 LEFT OTITIS MEDIA, UNSPECIFIED OTITIS MEDIA TYPE: Primary | ICD-10-CM

## 2021-11-16 DIAGNOSIS — G47.00 INSOMNIA, UNSPECIFIED TYPE: Chronic | ICD-10-CM

## 2021-11-16 DIAGNOSIS — M15.9 OSTEOARTHRITIS INVOLVING MULTIPLE JOINTS ON BOTH SIDES OF BODY: ICD-10-CM

## 2021-11-16 DIAGNOSIS — Z00.00 ANNUAL PHYSICAL EXAM: Primary | ICD-10-CM

## 2021-11-16 DIAGNOSIS — E11.42 TYPE 2 DIABETES MELLITUS WITH DIABETIC POLYNEUROPATHY, UNSPECIFIED WHETHER LONG TERM INSULIN USE: Chronic | ICD-10-CM

## 2021-11-16 DIAGNOSIS — E78.5 HYPERLIPIDEMIA ASSOCIATED WITH TYPE 2 DIABETES MELLITUS: ICD-10-CM

## 2021-11-16 DIAGNOSIS — F41.9 ANXIETY: ICD-10-CM

## 2021-11-16 PROCEDURE — 3078F PR MOST RECENT DIASTOLIC BLOOD PRESSURE < 80 MM HG: ICD-10-PCS | Mod: S$GLB,,, | Performed by: INTERNAL MEDICINE

## 2021-11-16 PROCEDURE — 3051F HG A1C>EQUAL 7.0%<8.0%: CPT | Mod: S$GLB,,, | Performed by: INTERNAL MEDICINE

## 2021-11-16 PROCEDURE — 4010F PR ACE/ARB THEARPY RXD/TAKEN: ICD-10-PCS | Mod: S$GLB,,, | Performed by: INTERNAL MEDICINE

## 2021-11-16 PROCEDURE — 90686 IIV4 VACC NO PRSV 0.5 ML IM: CPT | Mod: S$GLB,,, | Performed by: INTERNAL MEDICINE

## 2021-11-16 PROCEDURE — 99214 PR OFFICE/OUTPT VISIT, EST, LEVL IV, 30-39 MIN: ICD-10-PCS | Mod: 25,S$GLB,, | Performed by: INTERNAL MEDICINE

## 2021-11-16 PROCEDURE — 1159F PR MEDICATION LIST DOCUMENTED IN MEDICAL RECORD: ICD-10-PCS | Mod: S$GLB,,, | Performed by: INTERNAL MEDICINE

## 2021-11-16 PROCEDURE — 90686 FLU VACCINE (QUAD) GREATER THAN OR EQUAL TO 3YO PRESERVATIVE FREE IM: ICD-10-PCS | Mod: S$GLB,,, | Performed by: INTERNAL MEDICINE

## 2021-11-16 PROCEDURE — 4010F ACE/ARB THERAPY RXD/TAKEN: CPT | Mod: S$GLB,,, | Performed by: INTERNAL MEDICINE

## 2021-11-16 PROCEDURE — 3061F PR NEG MICROALBUMINURIA RESULT DOCUMENTED/REVIEW: ICD-10-PCS | Mod: S$GLB,,, | Performed by: INTERNAL MEDICINE

## 2021-11-16 PROCEDURE — 3008F BODY MASS INDEX DOCD: CPT | Mod: S$GLB,,, | Performed by: INTERNAL MEDICINE

## 2021-11-16 PROCEDURE — G0008 ADMIN INFLUENZA VIRUS VAC: HCPCS | Mod: S$GLB,,, | Performed by: INTERNAL MEDICINE

## 2021-11-16 PROCEDURE — 3078F DIAST BP <80 MM HG: CPT | Mod: S$GLB,,, | Performed by: INTERNAL MEDICINE

## 2021-11-16 PROCEDURE — 99214 OFFICE O/P EST MOD 30 MIN: CPT | Mod: 25,S$GLB,, | Performed by: INTERNAL MEDICINE

## 2021-11-16 PROCEDURE — 3072F PR LOW RISK FOR RETINOPATHY: ICD-10-PCS | Mod: S$GLB,,, | Performed by: INTERNAL MEDICINE

## 2021-11-16 PROCEDURE — 3008F PR BODY MASS INDEX (BMI) DOCUMENTED: ICD-10-PCS | Mod: S$GLB,,, | Performed by: INTERNAL MEDICINE

## 2021-11-16 PROCEDURE — 99999 PR PBB SHADOW E&M-EST. PATIENT-LVL V: CPT | Mod: PBBFAC,,, | Performed by: INTERNAL MEDICINE

## 2021-11-16 PROCEDURE — G0008 FLU VACCINE (QUAD) GREATER THAN OR EQUAL TO 3YO PRESERVATIVE FREE IM: ICD-10-PCS | Mod: S$GLB,,, | Performed by: INTERNAL MEDICINE

## 2021-11-16 PROCEDURE — 3075F SYST BP GE 130 - 139MM HG: CPT | Mod: S$GLB,,, | Performed by: INTERNAL MEDICINE

## 2021-11-16 PROCEDURE — 1160F RVW MEDS BY RX/DR IN RCRD: CPT | Mod: S$GLB,,, | Performed by: INTERNAL MEDICINE

## 2021-11-16 PROCEDURE — 99999 PR PBB SHADOW E&M-EST. PATIENT-LVL V: ICD-10-PCS | Mod: PBBFAC,,, | Performed by: INTERNAL MEDICINE

## 2021-11-16 PROCEDURE — 3051F PR MOST RECENT HEMOGLOBIN A1C LEVEL 7.0 - < 8.0%: ICD-10-PCS | Mod: S$GLB,,, | Performed by: INTERNAL MEDICINE

## 2021-11-16 PROCEDURE — 3075F PR MOST RECENT SYSTOLIC BLOOD PRESS GE 130-139MM HG: ICD-10-PCS | Mod: S$GLB,,, | Performed by: INTERNAL MEDICINE

## 2021-11-16 PROCEDURE — 3061F NEG MICROALBUMINURIA REV: CPT | Mod: S$GLB,,, | Performed by: INTERNAL MEDICINE

## 2021-11-16 PROCEDURE — 3072F LOW RISK FOR RETINOPATHY: CPT | Mod: S$GLB,,, | Performed by: INTERNAL MEDICINE

## 2021-11-16 PROCEDURE — 1160F PR REVIEW ALL MEDS BY PRESCRIBER/CLIN PHARMACIST DOCUMENTED: ICD-10-PCS | Mod: S$GLB,,, | Performed by: INTERNAL MEDICINE

## 2021-11-16 PROCEDURE — 3066F PR DOCUMENTATION OF TREATMENT FOR NEPHROPATHY: ICD-10-PCS | Mod: S$GLB,,, | Performed by: INTERNAL MEDICINE

## 2021-11-16 PROCEDURE — 1159F MED LIST DOCD IN RCRD: CPT | Mod: S$GLB,,, | Performed by: INTERNAL MEDICINE

## 2021-11-16 PROCEDURE — 3066F NEPHROPATHY DOC TX: CPT | Mod: S$GLB,,, | Performed by: INTERNAL MEDICINE

## 2021-11-16 RX ORDER — NEOMYCIN SULFATE, POLYMYXIN B SULFATE AND HYDROCORTISONE 10; 3.5; 1 MG/ML; MG/ML; [USP'U]/ML
3 SUSPENSION/ DROPS AURICULAR (OTIC) 3 TIMES DAILY
Qty: 10 ML | Refills: 0 | Status: SHIPPED | OUTPATIENT
Start: 2021-11-16 | End: 2023-03-27 | Stop reason: SDUPTHER

## 2021-11-16 RX ORDER — AMOXICILLIN AND CLAVULANATE POTASSIUM 875; 125 MG/1; MG/1
1 TABLET, FILM COATED ORAL 2 TIMES DAILY
Qty: 20 TABLET | Refills: 0 | Status: SHIPPED | OUTPATIENT
Start: 2021-11-16 | End: 2021-11-26

## 2021-11-23 DIAGNOSIS — H20.00 AAU (ACUTE ANTERIOR UVEITIS): ICD-10-CM

## 2021-11-23 DIAGNOSIS — M46.90 AXIAL SPONDYLOARTHRITIS: Chronic | ICD-10-CM

## 2021-11-24 ENCOUNTER — TELEPHONE (OUTPATIENT)
Dept: RHEUMATOLOGY | Facility: CLINIC | Age: 56
End: 2021-11-24
Payer: MEDICARE

## 2021-11-24 DIAGNOSIS — M47.819 SPONDYLOARTHRITIS: Primary | ICD-10-CM

## 2021-11-24 RX ORDER — CERTOLIZUMAB PEGOL 200 MG/ML
200 INJECTION, SOLUTION SUBCUTANEOUS
Qty: 1 EACH | Refills: 2 | Status: SHIPPED | OUTPATIENT
Start: 2021-11-24 | End: 2021-12-28 | Stop reason: SDUPTHER

## 2021-11-27 ENCOUNTER — SPECIALTY PHARMACY (OUTPATIENT)
Dept: PHARMACY | Facility: CLINIC | Age: 56
End: 2021-11-27
Payer: MEDICARE

## 2021-12-20 ENCOUNTER — LAB VISIT (OUTPATIENT)
Dept: LAB | Facility: HOSPITAL | Age: 56
End: 2021-12-20
Attending: INTERNAL MEDICINE
Payer: MEDICARE

## 2021-12-20 DIAGNOSIS — E11.9 TYPE 2 DIABETES MELLITUS WITHOUT COMPLICATION, WITH LONG-TERM CURRENT USE OF INSULIN: ICD-10-CM

## 2021-12-20 DIAGNOSIS — Z79.620 ENCOUNTER FOR MONITORING OF ADALIMUMAB THERAPY: ICD-10-CM

## 2021-12-20 DIAGNOSIS — Z51.81 ENCOUNTER FOR MONITORING OF ADALIMUMAB THERAPY: ICD-10-CM

## 2021-12-20 DIAGNOSIS — Z79.4 TYPE 2 DIABETES MELLITUS WITHOUT COMPLICATION, WITH LONG-TERM CURRENT USE OF INSULIN: ICD-10-CM

## 2021-12-20 LAB
ALBUMIN SERPL BCP-MCNC: 4.6 G/DL (ref 3.5–5.2)
ALP SERPL-CCNC: 96 U/L (ref 38–126)
ALT SERPL W/O P-5'-P-CCNC: 19 U/L (ref 10–44)
ANION GAP SERPL CALC-SCNC: 10 MMOL/L (ref 8–16)
AST SERPL-CCNC: 24 U/L (ref 15–46)
BASOPHILS # BLD AUTO: 0.05 K/UL (ref 0–0.2)
BASOPHILS NFR BLD: 0.8 % (ref 0–1.9)
BILIRUB SERPL-MCNC: 0.6 MG/DL (ref 0.1–1)
CALCIUM SERPL-MCNC: 9.1 MG/DL (ref 8.7–10.5)
CHLORIDE SERPL-SCNC: 103 MMOL/L (ref 95–110)
CO2 SERPL-SCNC: 29 MMOL/L (ref 23–29)
CREAT SERPL-MCNC: 0.61 MG/DL (ref 0.5–1.4)
CRP SERPL-MCNC: 0.68 MG/DL (ref 0–1)
DIFFERENTIAL METHOD: ABNORMAL
EOSINOPHIL # BLD AUTO: 0.1 K/UL (ref 0–0.5)
EOSINOPHIL NFR BLD: 1.5 % (ref 0–8)
ERYTHROCYTE [DISTWIDTH] IN BLOOD BY AUTOMATED COUNT: 12.8 % (ref 11.5–14.5)
ERYTHROCYTE [SEDIMENTATION RATE] IN BLOOD BY WESTERGREN METHOD: 8 MM/HR (ref 0–20)
EST. GFR  (AFRICAN AMERICAN): >60 ML/MIN/1.73 M^2
EST. GFR  (NON AFRICAN AMERICAN): >60 ML/MIN/1.73 M^2
GLUCOSE SERPL-MCNC: 166 MG/DL (ref 70–110)
HCT VFR BLD AUTO: 44.3 % (ref 37–48.5)
HGB BLD-MCNC: 14.8 G/DL (ref 12–16)
IMM GRANULOCYTES # BLD AUTO: 0.06 K/UL (ref 0–0.04)
IMM GRANULOCYTES NFR BLD AUTO: 0.9 % (ref 0–0.5)
LYMPHOCYTES # BLD AUTO: 3.1 K/UL (ref 1–4.8)
LYMPHOCYTES NFR BLD: 47.3 % (ref 18–48)
MCH RBC QN AUTO: 30.5 PG (ref 27–31)
MCHC RBC AUTO-ENTMCNC: 33.4 G/DL (ref 32–36)
MCV RBC AUTO: 91 FL (ref 82–98)
MONOCYTES # BLD AUTO: 0.5 K/UL (ref 0.3–1)
MONOCYTES NFR BLD: 7.6 % (ref 4–15)
NEUTROPHILS # BLD AUTO: 2.8 K/UL (ref 1.8–7.7)
NEUTROPHILS NFR BLD: 41.9 % (ref 38–73)
NRBC BLD-RTO: 0 /100 WBC
PLATELET # BLD AUTO: 243 K/UL (ref 150–450)
PMV BLD AUTO: 10.8 FL (ref 9.2–12.9)
POTASSIUM SERPL-SCNC: 4 MMOL/L (ref 3.5–5.1)
PROT SERPL-MCNC: 7.9 G/DL (ref 6–8.4)
RBC # BLD AUTO: 4.86 M/UL (ref 4–5.4)
SODIUM SERPL-SCNC: 142 MMOL/L (ref 136–145)
UUN UR-MCNC: 9 MG/DL (ref 7–17)
WBC # BLD AUTO: 6.58 K/UL (ref 3.9–12.7)

## 2021-12-20 PROCEDURE — 86140 C-REACTIVE PROTEIN: CPT | Mod: PO | Performed by: INTERNAL MEDICINE

## 2021-12-20 PROCEDURE — 85652 RBC SED RATE AUTOMATED: CPT | Performed by: INTERNAL MEDICINE

## 2021-12-20 PROCEDURE — 80053 COMPREHEN METABOLIC PANEL: CPT | Mod: PO | Performed by: INTERNAL MEDICINE

## 2021-12-20 PROCEDURE — 36415 COLL VENOUS BLD VENIPUNCTURE: CPT | Mod: PO | Performed by: INTERNAL MEDICINE

## 2021-12-20 PROCEDURE — 87516 HEPATITIS B DNA AMP PROBE: CPT | Mod: PO | Performed by: INTERNAL MEDICINE

## 2021-12-20 PROCEDURE — 85025 COMPLETE CBC W/AUTO DIFF WBC: CPT | Mod: PO | Performed by: INTERNAL MEDICINE

## 2021-12-22 ENCOUNTER — SPECIALTY PHARMACY (OUTPATIENT)
Dept: PHARMACY | Facility: CLINIC | Age: 56
End: 2021-12-22
Payer: MEDICARE

## 2021-12-23 LAB — HBV DNA SERPL QL NAA+PROBE: NOT DETECTED

## 2021-12-27 ENCOUNTER — PATIENT MESSAGE (OUTPATIENT)
Dept: RHEUMATOLOGY | Facility: CLINIC | Age: 56
End: 2021-12-27
Payer: MEDICARE

## 2021-12-28 ENCOUNTER — OFFICE VISIT (OUTPATIENT)
Dept: RHEUMATOLOGY | Facility: CLINIC | Age: 56
End: 2021-12-28
Payer: MEDICARE

## 2021-12-28 DIAGNOSIS — Z20.822 ENCOUNTER FOR LABORATORY TESTING FOR COVID-19 VIRUS: ICD-10-CM

## 2021-12-28 DIAGNOSIS — M46.90 AXIAL SPONDYLOARTHRITIS: Chronic | ICD-10-CM

## 2021-12-28 DIAGNOSIS — M47.819 SPONDYLOARTHRITIS: ICD-10-CM

## 2021-12-28 DIAGNOSIS — M02.30 REACTIVE ARTHRITIS: ICD-10-CM

## 2021-12-28 DIAGNOSIS — Z86.19 HISTORY OF HEPATITIS B: ICD-10-CM

## 2021-12-28 DIAGNOSIS — H20.00 AAU (ACUTE ANTERIOR UVEITIS): ICD-10-CM

## 2021-12-28 DIAGNOSIS — Z79.899 HIGH RISK MEDICATION USE: ICD-10-CM

## 2021-12-28 DIAGNOSIS — M19.90 INFLAMMATORY ARTHRITIS: ICD-10-CM

## 2021-12-28 PROCEDURE — 3072F LOW RISK FOR RETINOPATHY: CPT | Mod: CPTII,95,, | Performed by: INTERNAL MEDICINE

## 2021-12-28 PROCEDURE — 3066F PR DOCUMENTATION OF TREATMENT FOR NEPHROPATHY: ICD-10-PCS | Mod: CPTII,95,, | Performed by: INTERNAL MEDICINE

## 2021-12-28 PROCEDURE — 3051F HG A1C>EQUAL 7.0%<8.0%: CPT | Mod: CPTII,95,, | Performed by: INTERNAL MEDICINE

## 2021-12-28 PROCEDURE — 4010F ACE/ARB THERAPY RXD/TAKEN: CPT | Mod: CPTII,95,, | Performed by: INTERNAL MEDICINE

## 2021-12-28 PROCEDURE — 99213 OFFICE O/P EST LOW 20 MIN: CPT | Mod: 95,,, | Performed by: INTERNAL MEDICINE

## 2021-12-28 PROCEDURE — 99213 PR OFFICE/OUTPT VISIT, EST, LEVL III, 20-29 MIN: ICD-10-PCS | Mod: 95,,, | Performed by: INTERNAL MEDICINE

## 2021-12-28 PROCEDURE — 3061F PR NEG MICROALBUMINURIA RESULT DOCUMENTED/REVIEW: ICD-10-PCS | Mod: CPTII,95,, | Performed by: INTERNAL MEDICINE

## 2021-12-28 PROCEDURE — 1159F MED LIST DOCD IN RCRD: CPT | Mod: CPTII,95,, | Performed by: INTERNAL MEDICINE

## 2021-12-28 PROCEDURE — 3066F NEPHROPATHY DOC TX: CPT | Mod: CPTII,95,, | Performed by: INTERNAL MEDICINE

## 2021-12-28 PROCEDURE — 3072F PR LOW RISK FOR RETINOPATHY: ICD-10-PCS | Mod: CPTII,95,, | Performed by: INTERNAL MEDICINE

## 2021-12-28 PROCEDURE — 3051F PR MOST RECENT HEMOGLOBIN A1C LEVEL 7.0 - < 8.0%: ICD-10-PCS | Mod: CPTII,95,, | Performed by: INTERNAL MEDICINE

## 2021-12-28 PROCEDURE — 1159F PR MEDICATION LIST DOCUMENTED IN MEDICAL RECORD: ICD-10-PCS | Mod: CPTII,95,, | Performed by: INTERNAL MEDICINE

## 2021-12-28 PROCEDURE — 3061F NEG MICROALBUMINURIA REV: CPT | Mod: CPTII,95,, | Performed by: INTERNAL MEDICINE

## 2021-12-28 PROCEDURE — 4010F PR ACE/ARB THEARPY RXD/TAKEN: ICD-10-PCS | Mod: CPTII,95,, | Performed by: INTERNAL MEDICINE

## 2021-12-28 RX ORDER — ENTECAVIR 0.5 MG/1
0.5 TABLET, FILM COATED ORAL DAILY
Qty: 90 TABLET | Refills: 3 | Status: SHIPPED | OUTPATIENT
Start: 2021-12-28 | End: 2022-10-31 | Stop reason: SDUPTHER

## 2021-12-28 RX ORDER — SULFASALAZINE 500 MG/1
1500 TABLET, DELAYED RELEASE ORAL 2 TIMES DAILY
Qty: 540 TABLET | Refills: 0 | Status: SHIPPED | OUTPATIENT
Start: 2021-12-28 | End: 2022-04-04 | Stop reason: SDUPTHER

## 2021-12-28 RX ORDER — CERTOLIZUMAB PEGOL 200 MG/ML
200 INJECTION, SOLUTION SUBCUTANEOUS
Qty: 4 EACH | Refills: 1 | Status: SHIPPED | OUTPATIENT
Start: 2021-12-28 | End: 2022-02-10 | Stop reason: SDUPTHER

## 2021-12-28 ASSESSMENT — ANKYLOSING SPONDYLITIS DISEASE ACTIVITY SCORE (ASDAS-CRP)
PAIN_SWELLING: 0
CRP_MG_PER_LITER: 6.8
MORNING_STIFFNESS: 5
GLOBAL_ACTIVITY: 3
NBH_PAIN: 5
TOTAL_SCORE: 2.42

## 2021-12-29 RX ORDER — AMLODIPINE BESYLATE 10 MG/1
TABLET ORAL
Qty: 90 TABLET | Refills: 3 | Status: SHIPPED | OUTPATIENT
Start: 2021-12-29 | End: 2023-02-16 | Stop reason: SDUPTHER

## 2021-12-29 RX ORDER — REPAGLINIDE 1 MG/1
TABLET ORAL
Qty: 270 TABLET | Refills: 0 | Status: SHIPPED | OUTPATIENT
Start: 2021-12-29 | End: 2023-11-09

## 2021-12-29 RX ORDER — LOSARTAN POTASSIUM 100 MG/1
TABLET ORAL
Qty: 90 TABLET | Refills: 3 | Status: SHIPPED | OUTPATIENT
Start: 2021-12-29 | End: 2023-02-16 | Stop reason: SDUPTHER

## 2021-12-29 RX ORDER — ATENOLOL 100 MG/1
TABLET ORAL
Qty: 90 TABLET | Refills: 3 | Status: SHIPPED | OUTPATIENT
Start: 2021-12-29 | End: 2023-02-16 | Stop reason: SDUPTHER

## 2021-12-29 RX ORDER — HYDROCHLOROTHIAZIDE 25 MG/1
TABLET ORAL
Qty: 90 TABLET | Refills: 3 | Status: SHIPPED | OUTPATIENT
Start: 2021-12-29 | End: 2023-02-16 | Stop reason: SDUPTHER

## 2022-01-02 ENCOUNTER — PATIENT MESSAGE (OUTPATIENT)
Dept: RHEUMATOLOGY | Facility: CLINIC | Age: 57
End: 2022-01-02
Payer: MEDICARE

## 2022-01-03 ENCOUNTER — TELEPHONE (OUTPATIENT)
Dept: RHEUMATOLOGY | Facility: CLINIC | Age: 57
End: 2022-01-03
Payer: MEDICARE

## 2022-01-03 DIAGNOSIS — U07.1 COVID-19 IN IMMUNOCOMPROMISED PATIENT: Primary | ICD-10-CM

## 2022-01-03 DIAGNOSIS — D84.9 COVID-19 IN IMMUNOCOMPROMISED PATIENT: Primary | ICD-10-CM

## 2022-01-03 NOTE — TELEPHONE ENCOUNTER
Carli, Please tell pt to stop/hold Cimzia and sulfasalazine until she has been symptom free for at least 10 days from Covid. Please schedule or have pt call [phone] to schedule EUA antibody infusion today or tomorrow at latest. Thank you. CLEMENT

## 2022-01-05 ENCOUNTER — OFFICE VISIT (OUTPATIENT)
Dept: PSYCHIATRY | Facility: CLINIC | Age: 57
End: 2022-01-05
Payer: MEDICARE

## 2022-01-05 DIAGNOSIS — F41.9 ANXIETY: Primary | ICD-10-CM

## 2022-01-05 PROCEDURE — 99214 OFFICE O/P EST MOD 30 MIN: CPT | Mod: 95,,, | Performed by: PSYCHIATRY & NEUROLOGY

## 2022-01-05 PROCEDURE — 3072F LOW RISK FOR RETINOPATHY: CPT | Mod: CPTII,95,, | Performed by: PSYCHIATRY & NEUROLOGY

## 2022-01-05 PROCEDURE — 3072F PR LOW RISK FOR RETINOPATHY: ICD-10-PCS | Mod: CPTII,95,, | Performed by: PSYCHIATRY & NEUROLOGY

## 2022-01-05 PROCEDURE — 99214 PR OFFICE/OUTPT VISIT, EST, LEVL IV, 30-39 MIN: ICD-10-PCS | Mod: 95,,, | Performed by: PSYCHIATRY & NEUROLOGY

## 2022-01-05 RX ORDER — FLUOXETINE HYDROCHLORIDE 20 MG/1
20 CAPSULE ORAL 2 TIMES DAILY
Qty: 180 CAPSULE | Refills: 1 | Status: SHIPPED | OUTPATIENT
Start: 2022-01-05 | End: 2022-04-20 | Stop reason: SDUPTHER

## 2022-01-05 NOTE — PROGRESS NOTES
The patient location is: [place], [place]  The chief complaint leading to consultation is: anxiety  Visit type: audiovisual  Total time spent with patient: 15 min  Each patient to whom he or she provides medical services by telemedicine is:  (1) informed of the relationship between the physician and patient and the respective role of any other health care provider with respect to management of the patient; and (2) notified that he or she may decline to receive medical services by telemedicine and may withdraw from such care at any time.    Notes:     ESTABLISHED OUTPATIENT VISIT   E/M LEVEL 4: [id number]    ENCOUNTER DATE: 1/5/2022  SITE: [facility], [address]    HISTORY    CHIEF COMPLAINT   Althea Vazquez is a 56 y.o. female who presents for follow up of anxiety.    HPI     Recently had COVID.     Appears to be doing well psychiatrically. Appears euthymic during today's visit.    Satisfied with current psychotropic medication regimen.    Spiritual beliefs are supportive.    Has been standing up for herself, this is working well for her.    Psychiatric Review Of Systems - Is patient experiencing or having changes in:  sleep: interrupted  appetite: no  weight: working on losing weight  energy/anergy: no  interest/pleasure/anhedonia: no  somatic symptoms: no  libido: no  anxiety/panic: no  guilty/hopelessness: no  concentration: no  S.I.B.s/risky behavior: no  Irritability: no  Racing thoughts: no  Impulsive behaviors: no  Paranoia:no  AVH:no    Recent alcohol: rare small amount  Recent drug: no    Medical ROS   Denies any physical complaint during today's visit.     PAST MEDICAL, FAMILY AND SOCIAL HISTORY: The patient's past medical, family and social history have been reviewed and updated as appropriate within the electronic medical record - see encounter notes.    PSYCHOTROPIC MEDICATIONS   Prozac 20 mg qam and 20 mg qpm, Clonazepam 0.5 mg prn for anxiety, Ambien 5 mg at bedtime prn recently has  been taking about 3 times per week], Topiramate 100 mg qam    Scheduled and PRN Medications     Current Outpatient Medications:     amLODIPine (NORVASC) 10 MG tablet, TAKE ONE TABLET BY MOUTH DAILY, Disp: 90 tablet, Rfl: 3    aspirin (ECOTRIN) 81 MG EC tablet, Take 1 tablet (81 mg total) by mouth once daily., Disp: 30 tablet, Rfl: 0    atenoloL (TENORMIN) 100 MG tablet, TAKE ONE TABLET BY MOUTH DAILY, Disp: 90 tablet, Rfl: 3    atorvastatin (LIPITOR) 40 MG tablet, TAKE ONE TABLET BY MOUTH DAILY, Disp: 90 tablet, Rfl: 3    blood-glucose meter kit, Use twice daily., Disp: 1 each, Rfl: 0    boric acid (BORIC ACID) vaginal suppository, Place 1 each (650 mg total) vaginally every evening., Disp: 14 suppository, Rfl: 6    canagliflozin (INVOKANA) 300 mg Tab tablet, Take 1 tablet (300 mg total) by mouth before breakfast., Disp: 90 tablet, Rfl: 1    certolizumab pegol (CIMZIA) 400 mg/2 mL (200 mg/mL x 2) SyKt, Inject 1 mL (200 mg total) into the skin every 14 (fourteen) days., Disp: 4 each, Rfl: 1    clonazePAM (KLONOPIN) 0.5 MG tablet, TAKE ONE TABLET BY MOUTH DAILY AS NEEDED FOR ANXIETY, Disp: 90 tablet, Rfl: 0    clotrimazole-betamethasone 1-0.05% (LOTRISONE) cream, Apply topically 2 (two) times daily as needed. , Disp: , Rfl:     cyclobenzaprine (FLEXERIL) 10 MG tablet, TAKE ONE TABLET BY MOUTH AT BEDTIME AS NEEDED, Disp: 90 tablet, Rfl: 0    diclofenac sodium (VOLTAREN) 1 % Gel, Apply 4 g topically 4 (four) times daily. Apply light film topically to knee up to four times daily, Disp: 3 Tube, Rfl: 2    docusate sodium (COLACE) 50 MG capsule, Take 1 capsule (50 mg total) by mouth 2 (two) times daily as needed for Constipation. (Patient taking differently: Take 50 mg by mouth 2 (two) times daily.), Disp: 30 capsule, Rfl: 0    entecavir (BARACLUDE) 0.5 MG Tab, Take 1 tablet (0.5 mg total) by mouth once daily., Disp: 90 tablet, Rfl: 3    ergocalciferol (ERGOCALCIFEROL) 50,000 unit Cap, Take 1 capsule (50,000  Units total) by mouth every 7 days., Disp: 12 capsule, Rfl: 3    fluocinonide 0.05% (LIDEX) 0.05 % cream, Apply topically 2 (two) times daily. To allergic rash, Disp: 30 g, Rfl: 0    FLUoxetine 20 MG capsule, Take 1 capsule (20 mg total) by mouth 2 (two) times daily., Disp: 180 capsule, Rfl: 1    FREESTYLE LITE STRIPS Strp, test TWICE DAILY, Disp: 200 each, Rfl: 3    hydroCHLOROthiazide (HYDRODIURIL) 25 MG tablet, TAKE ONE TABLET BY MOUTH DAILY, Disp: 90 tablet, Rfl: 3    hydrocortisone 2.5 % cream, Apply topically 2 (two) times daily. for 10 days, Disp: 28 g, Rfl: 1    hydroquinone 4 % Crea, Apply to dark areas qhs.  Not more than 6 months straight in same location.Use sunscreen in am (Patient taking differently: continuous prn. Apply to dark areas qhs.  Not more than 6 months straight in same location.Use sunscreen in am), Disp: 46 g, Rfl: 1    JANUVIA 100 mg Tab, TAKE ONE TABLET BY MOUTH DAILY, Disp: 90 tablet, Rfl: 0    lancets (FREESTYLE LANCETS) 28 gauge Misc, test TWICE DAILY, Disp: 200 each, Rfl: 3    levocetirizine (XYZAL) 5 MG tablet, TAKE ONE TABLET BY MOUTH EVERY EVENING, Disp: 90 tablet, Rfl: 2    losartan (COZAAR) 100 MG tablet, TAKE ONE TABLET BY MOUTH ONCE DAILY, Disp: 90 tablet, Rfl: 3    naproxen (NAPROSYN) 500 MG tablet, Take 1 tablet (500 mg total) by mouth 2 (two) times daily as needed., Disp: 180 tablet, Rfl: 0    neomycin-polymyxin-hydrocortisone (CORTISPORIN) 3.5-10,000-1 mg/mL-unit/mL-% otic suspension, Place 3 drops into the left ear 3 (three) times daily., Disp: 10 mL, Rfl: 0    nystatin (MYCOSTATIN) powder, Apply to affected area 3 times daily, Disp: 1 Bottle, Rfl: 3    ondansetron (ZOFRAN-ODT) 4 MG TbDL, Take 1 tablet (4 mg total) by mouth every 8 (eight) hours as needed (nausea and vomiting)., Disp: 20 tablet, Rfl: 0    pantoprazole (PROTONIX) 40 MG tablet, TAKE ONE TABLET BY MOUTH EVERY DAY, Disp: 90 tablet, Rfl: 2    prednisoLONE acetate (PRED FORTE) 1 % DrpS, Instill  one drop into the affected eye every two hours while awake, Disp: 5 mL, Rfl: 0    repaglinide (PRANDIN) 1 MG tablet, TAKE ONE TABLET BY MOUTH THREE TIMES DAILY BEFORE MEALS (TAKING PLACE OF TRAJENTA), Disp: 270 tablet, Rfl: 0    sulfaSALAzine (AZULFIDINE) 500 MG EC tablet, Take 3 tablets (1,500 mg total) by mouth 2 (two) times daily., Disp: 540 tablet, Rfl: 0    terconazole (TERAZOL 7) 0.4 % Crea, Place 1 applicator vaginally every evening., Disp: 45 g, Rfl: 2    tiZANidine (ZANAFLEX) 2 MG tablet, Take 1-2 tablets (2-4 mg total) by mouth every 8 (eight) hours as needed., Disp: 90 tablet, Rfl: 2    topiramate (TOPAMAX) 100 MG tablet, Take 100 mg by mouth 2 (two) times daily., Disp: , Rfl:     triamcinolone acetonide 0.1% (KENALOG) 0.1 % cream, Apply topically 2 (two) times daily., Disp: 45 g, Rfl: 1    valACYclovir (VALTREX) 1000 MG tablet, valacyclovir 1 gram tablet  Take 0.5 tablets every 12 hours by oral route., Disp: , Rfl:     vitamin D (VITAMIN D3) 1000 units Tab, Take 1,000 Units by mouth once daily., Disp: , Rfl:     zolpidem (AMBIEN) 5 MG Tab, TAKE ONE TABLET BY MOUTH DAILY AT BEDTIME AS NEEDED, Disp: 90 tablet, Rfl: 0  No current facility-administered medications for this visit.    Facility-Administered Medications Ordered in Other Visits:     0.9%  NaCl infusion, , Intravenous, Continuous, Graciela Jerome MD    EXAMINATION    There were no vitals filed for this visit.      CONSTITUTIONAL  General Appearance: well nourished    MUSCULOSKELETAL  Muscle Strength and Tone: normal strength and tone  Abnormal Involuntary Movements: no abnormal movement noted  Gait and Station: normal gait    PSYCHIATRIC   Level of Consciousness: alert  Orientation: oriented to person, place and time  Grooming: well groomed  Psychomotor Behavior: no restlessness/agitation  Speech: normal in rate, rhythm and volume  Language: normal vocabulary  Mood: anxiety at times  Affect: full range and appropriate  Thought Process:  logical and goal directed  Associations: intact associations  Thought Content: no SI/HI  Memory: grossly intact  Attention: intact to content of interview  Fund of Knowledge: appears adequate  Insight: good  Judgement: good    MEDICAL DECISION MAKING    DIAGNOSES  Anxiety d/o N.O.S.    PROBLEM LIST AND MANAGEMENT PLANS    - anxiety: continue Prozac, Klonopin and Ambien as above  - rtc 3 months    Time with patient: 15 min    LABORATORY DATA  Lab Visit on 12/20/2021   Component Date Value Ref Range Status    Sed Rate 12/20/2021 8  0 - 20 mm/Hr Final    CRP 12/20/2021 0.68  0.00 - 1.00 mg/dL Final    WBC 12/20/2021 6.58  3.90 - 12.70 K/uL Final    RBC 12/20/2021 4.86  4.00 - 5.40 M/uL Final    Hemoglobin 12/20/2021 14.8  12.0 - 16.0 g/dL Final    Hematocrit 12/20/2021 44.3  37.0 - 48.5 % Final    MCV 12/20/2021 91  82 - 98 fL Final    MCH 12/20/2021 30.5  27.0 - 31.0 pg Final    MCHC 12/20/2021 33.4  32.0 - 36.0 g/dL Final    RDW 12/20/2021 12.8  11.5 - 14.5 % Final    Platelets 12/20/2021 243  150 - 450 K/uL Final    MPV 12/20/2021 10.8  9.2 - 12.9 fL Final    Immature Granulocytes 12/20/2021 0.9* 0.0 - 0.5 % Final    Gran # (ANC) 12/20/2021 2.8  1.8 - 7.7 K/uL Final    Immature Grans (Abs) 12/20/2021 0.06* 0.00 - 0.04 K/uL Final    Comment: Mild elevation in immature granulocytes is non specific and   can be seen in a variety of conditions including stress response,   acute inflammation, trauma and pregnancy. Correlation with other   laboratory and clinical findings is essential.      Lymph # 12/20/2021 3.1  1.0 - 4.8 K/uL Final    Mono # 12/20/2021 0.5  0.3 - 1.0 K/uL Final    Eos # 12/20/2021 0.1  0.0 - 0.5 K/uL Final    Baso # 12/20/2021 0.05  0.00 - 0.20 K/uL Final    nRBC 12/20/2021 0  0 /100 WBC Final    Gran % 12/20/2021 41.9  38.0 - 73.0 % Final    Lymph % 12/20/2021 47.3  18.0 - 48.0 % Final    Mono % 12/20/2021 7.6  4.0 - 15.0 % Final    Eosinophil % 12/20/2021 1.5  0.0 - 8.0 %  Final    Basophil % 12/20/2021 0.8  0.0 - 1.9 % Final    Differential Method 12/20/2021 Automated   Final    Sodium 12/20/2021 142  136 - 145 mmol/L Final    Potassium 12/20/2021 4.0  3.5 - 5.1 mmol/L Final    Chloride 12/20/2021 103  95 - 110 mmol/L Final    CO2 12/20/2021 29  23 - 29 mmol/L Final    Glucose 12/20/2021 166* 70 - 110 mg/dL Final    BUN 12/20/2021 9  7 - 17 mg/dL Final    Creatinine 12/20/2021 0.61  0.50 - 1.40 mg/dL Final    Calcium 12/20/2021 9.1  8.7 - 10.5 mg/dL Final    Total Protein 12/20/2021 7.9  6.0 - 8.4 g/dL Final    Albumin 12/20/2021 4.6  3.5 - 5.2 g/dL Final    Total Bilirubin 12/20/2021 0.6  0.1 - 1.0 mg/dL Final    Comment: For infants and newborns, interpretation of results should be based  on gestational age, weight and in agreement with clinical  observations.    Premature Infant recommended reference ranges:  Up to 24 hours.............<8.0 mg/dL  Up to 48 hours............<12.0 mg/dL  3-5 days..................<15.0 mg/dL  6-29 days.................<15.0 mg/dL      Alkaline Phosphatase 12/20/2021 96  38 - 126 U/L Final    AST 12/20/2021 24  15 - 46 U/L Final    ALT 12/20/2021 19  10 - 44 U/L Final    Anion Gap 12/20/2021 10  8 - 16 mmol/L Final    eGFR if African American 12/20/2021 >60.0  >60 mL/min/1.73 m^2 Final    eGFR if non African American 12/20/2021 >60.0  >60 mL/min/1.73 m^2 Final    Comment: Calculation used to obtain the estimated glomerular filtration  rate (eGFR) is the CKD-EPI equation.       HBV DNA, Qualitative PCR 12/20/2021 Not detected  Not detected Final    Comment: This procedure utilizes a real-time polymerase chain  reaction test from [employer].  The amplification   target is a conserved region in the terminal third of  the hepatitis B surface antigen gene.  The qualitative   limit of detection is 10 IU/mL (1.00 Log IU/mL).  A Not detected result does not rule out infection.    Test performed at [facility],  [address], [place]  [address]     [phone]  Milton Castro MD  - Medical Director             Tip Oliveira

## 2022-01-22 ENCOUNTER — SPECIALTY PHARMACY (OUTPATIENT)
Dept: PHARMACY | Facility: CLINIC | Age: 57
End: 2022-01-22
Payer: MEDICARE

## 2022-02-02 NOTE — TELEPHONE ENCOUNTER
Called patient to assess readiness to restart Cimzia and refill Baraclude. No answer; left message. Will continue to follow up.

## 2022-02-07 DIAGNOSIS — R51.9 SINUS HEADACHE: ICD-10-CM

## 2022-02-07 DIAGNOSIS — J20.9 ACUTE BRONCHITIS, UNSPECIFIED ORGANISM: ICD-10-CM

## 2022-02-07 RX ORDER — FLUTICASONE PROPIONATE 50 MCG
2 SPRAY, SUSPENSION (ML) NASAL DAILY
Qty: 16 G | Refills: 12 | Status: SHIPPED | OUTPATIENT
Start: 2022-02-07 | End: 2022-03-09

## 2022-02-09 ENCOUNTER — PATIENT MESSAGE (OUTPATIENT)
Dept: PHARMACY | Facility: CLINIC | Age: 57
End: 2022-02-09
Payer: MEDICARE

## 2022-02-10 DIAGNOSIS — M46.90 AXIAL SPONDYLOARTHRITIS: Chronic | ICD-10-CM

## 2022-02-10 DIAGNOSIS — H20.00 AAU (ACUTE ANTERIOR UVEITIS): ICD-10-CM

## 2022-02-10 RX ORDER — CERTOLIZUMAB PEGOL 200 MG/ML
200 INJECTION, SOLUTION SUBCUTANEOUS
Qty: 2 EACH | Refills: 2 | OUTPATIENT
Start: 2022-02-10 | End: 2022-02-15 | Stop reason: SDUPTHER

## 2022-02-15 DIAGNOSIS — Z79.4 TYPE 2 DIABETES MELLITUS WITHOUT COMPLICATION, WITH LONG-TERM CURRENT USE OF INSULIN: ICD-10-CM

## 2022-02-15 DIAGNOSIS — E11.9 TYPE 2 DIABETES MELLITUS WITHOUT COMPLICATION, WITH LONG-TERM CURRENT USE OF INSULIN: ICD-10-CM

## 2022-02-15 RX ORDER — SITAGLIPTIN 100 MG/1
TABLET, FILM COATED ORAL
Qty: 90 TABLET | Refills: 3 | Status: SHIPPED | OUTPATIENT
Start: 2022-02-15 | End: 2022-03-16

## 2022-02-15 NOTE — TELEPHONE ENCOUNTER
Care Due:                  Date            Visit Type   Department     Provider  --------------------------------------------------------------------------------                                EP -                              PRIMARY      MET INTERNAL  Last Visit: 11-      CARE ([facility])   MEDICINE       Weston Begum                              EP -                              PRIMARY      MET INTERNAL  Next Visit: 03-      CARE ([facility])   [facility]       Weston Begum                                                            Last  Test          Frequency    Reason                     Performed    Due Date  --------------------------------------------------------------------------------    Lipid Panel.  12 months..  atorvastatin.............  05- 05-    Powered by [employer]. Reference number: [id number].   2/15/2022 10:56:33 AM CST

## 2022-02-21 ENCOUNTER — SPECIALTY PHARMACY (OUTPATIENT)
Dept: PHARMACY | Facility: CLINIC | Age: 57
End: 2022-02-21
Payer: MEDICARE

## 2022-02-21 NOTE — TELEPHONE ENCOUNTER
Specialty Pharmacy - Medication/Referral Authorization  Specialty Pharmacy - Refill Coordination    Specialty Medication Orders Linked to Encounter    Flowsheet Row Most Recent Value   Medication #1 entecavir (BARACLUDE) 0.5 MG Tab (Order#[id number], Rx#[id number])   Medication #2 certolizumab pegol (CIMZIA) 400 mg/2 mL (200 mg/mL x 2) SyKt (Order#[id number], Rx#[id number])          Refill Questions - Documented Responses    Flowsheet Row Most Recent Value   Refill Screening Questions    Changes to allergies? No   Changes to medications? No   New conditions since last clinic visit? Yes  [previously positive for COVID19]   Unplanned office visit, urgent care, ED, or hospital admission in the last 4 weeks? No   How does patient/caregiver feel medication is working? Very good   Financial problems or insurance changes? No   How many doses of your specialty medications were missed in the last 4 weeks? 1   Why were doses missed? Felt ill or sick   Would patient like to speak to a pharmacist? Yes  [Patient was COVID19 positive. See I-vent]   When does the patient need to receive the medication? 02/28/22   Refill Delivery Questions    How will the patient receive the medication? Delivery Roseann   When does the patient need to receive the medication? 02/28/22   Shipping Address Home   Address in [facility] confirmed and updated if neccessary? Yes   Expected Copay ($) 9.85   Is the patient able to afford the medication copay? Yes   Payment Method CC on file   Days supply of Refill 28   Supplies needed? No supplies needed   Refill activity completed? Yes   Refill activity plan Refill scheduled   Shipment/Pickup Date: 02/23/22          Current Outpatient Medications   Medication Sig    amLODIPine (NORVASC) 10 MG tablet TAKE ONE TABLET BY MOUTH DAILY    aspirin (ECOTRIN) 81 MG EC tablet Take 1 tablet (81 mg total) by mouth once daily.    atenoloL (TENORMIN) 100 MG tablet TAKE ONE TABLET BY MOUTH DAILY    atorvastatin  (LIPITOR) 40 MG tablet TAKE ONE TABLET BY MOUTH DAILY    blood-glucose meter kit Use twice daily.    boric acid (BORIC ACID) vaginal suppository Place 1 each (650 mg total) vaginally every evening.    canagliflozin (INVOKANA) 300 mg Tab tablet Take 1 tablet (300 mg total) by mouth before breakfast.    certolizumab pegol (CIMZIA) 400 mg/2 mL (200 mg/mL x 2) SyKt Inject 1 mL (200 mg total) into the skin every 14 (fourteen) days.    clonazePAM (KLONOPIN) 0.5 MG tablet TAKE ONE TABLET BY MOUTH DAILY AS NEEDED FOR ANXIETY    clotrimazole-betamethasone 1-0.05% (LOTRISONE) cream Apply topically 2 (two) times daily as needed.     cyclobenzaprine (FLEXERIL) 10 MG tablet TAKE ONE TABLET BY MOUTH AT BEDTIME AS NEEDED    diclofenac sodium (VOLTAREN) 1 % Gel Apply 4 g topically 4 (four) times daily. Apply light film topically to knee up to four times daily    docusate sodium (COLACE) 50 MG capsule Take 1 capsule (50 mg total) by mouth 2 (two) times daily as needed for Constipation. (Patient taking differently: Take 50 mg by mouth 2 (two) times daily.)    entecavir (BARACLUDE) 0.5 MG Tab Take 1 tablet (0.5 mg total) by mouth once daily.    ergocalciferol (ERGOCALCIFEROL) 50,000 unit Cap Take 1 capsule (50,000 Units total) by mouth every 7 days.    fluocinonide 0.05% (LIDEX) 0.05 % cream Apply topically 2 (two) times daily. To allergic rash    FLUoxetine 20 MG capsule Take 1 capsule (20 mg total) by mouth 2 (two) times daily.    fluticasone propionate (FLONASE) 50 mcg/actuation nasal spray 2 sprays (100 mcg total) by Each Nostril route once daily.    fluticasone propionate (FLONASE) 50 mcg/actuation nasal spray 2 sprays (100 mcg total) by Each Nostril route once daily.    FREESTYLE LITE STRIPS Strp test TWICE DAILY    hydroCHLOROthiazide (HYDRODIURIL) 25 MG tablet TAKE ONE TABLET BY MOUTH DAILY    hydrocortisone 2.5 % cream Apply topically 2 (two) times daily. for 10 days    hydroquinone 4 % Crea Apply to dark  areas qhs.  Not more than 6 months straight in same location.Use sunscreen in am (Patient taking differently: continuous prn. Apply to dark areas qhs.  Not more than 6 months straight in same location.Use sunscreen in am)    JANUVIA 100 mg Tab TAKE ONE TABLET BY MOUTH DAILY    lancets (FREESTYLE LANCETS) 28 gauge Misc test TWICE DAILY    levocetirizine (XYZAL) 5 MG tablet TAKE ONE TABLET BY MOUTH EVERY EVENING    losartan (COZAAR) 100 MG tablet TAKE ONE TABLET BY MOUTH ONCE DAILY    naproxen (NAPROSYN) 500 MG tablet Take 1 tablet (500 mg total) by mouth 2 (two) times daily as needed.    neomycin-polymyxin-hydrocortisone (CORTISPORIN) 3.5-10,000-1 mg/mL-unit/mL-% otic suspension Place 3 drops into the left ear 3 (three) times daily.    nystatin (MYCOSTATIN) powder Apply to affected area 3 times daily    ondansetron (ZOFRAN-ODT) 4 MG TbDL Take 1 tablet (4 mg total) by mouth every 8 (eight) hours as needed (nausea and vomiting).    pantoprazole (PROTONIX) 40 MG tablet TAKE ONE TABLET BY MOUTH EVERY DAY    prednisoLONE acetate (PRED FORTE) 1 % DrpS Instill one drop into the affected eye every two hours while awake    repaglinide (PRANDIN) 1 MG tablet TAKE ONE TABLET BY MOUTH THREE TIMES DAILY BEFORE MEALS (TAKING PLACE OF TRAJENTA)    sulfaSALAzine (AZULFIDINE) 500 MG EC tablet Take 3 tablets (1,500 mg total) by mouth 2 (two) times daily.    terconazole (TERAZOL 7) 0.4 % Crea Place 1 applicator vaginally every evening.    tiZANidine (ZANAFLEX) 2 MG tablet Take 1-2 tablets (2-4 mg total) by mouth every 8 (eight) hours as needed.    topiramate (TOPAMAX) 100 MG tablet Take 100 mg by mouth 2 (two) times daily.    triamcinolone acetonide 0.1% (KENALOG) 0.1 % cream Apply topically 2 (two) times daily.    valACYclovir (VALTREX) 1000 MG tablet valacyclovir 1 gram tablet   Take 0.5 tablets every 12 hours by oral route.    vitamin D (VITAMIN D3) 1000 units Tab Take 1,000 Units by mouth once daily.    zolpidem  (AMBIEN) 5 MG Tab TAKE ONE TABLET BY MOUTH AT BEDTIME AS NEEDED   Last reviewed on 12/28/2021  9:55 AM by Giselle Nelson MA    Review of patient's allergies indicates:   Allergen Reactions    Bactrim [sulfamethoxazole-trimethoprim] Itching    Trulicity [dulaglutide] Diarrhea and Other (See Comments)     Vomiting, abdominal pain    Diflucan [fluconazole] Swelling and Other (See Comments)     Sore on mouth    Last reviewed on  2/10/2022 5:56 PM by Gabriel Arvizu      Tasks added this encounter   3/21/2022 - Refill Call (Auto Added)   Tasks due within next 3 months   No tasks due.     Eliceo Arias, PharmD  [facility]  [address] [facility] [address]  Phone: [phone]  Fax: [phone]

## 2022-02-21 NOTE — TELEPHONE ENCOUNTER
Cimzia prior auth submitted via [facility]. Key: [id number]. Cimzia approved from 1/22/22 - 2/21/23. Case Id [id number]. Medicare Pt D with Level 1 LIS. 2022 BI complete.

## 2022-02-21 NOTE — TELEPHONE ENCOUNTER
Specialty Pharmacy - Refill Coordination    Specialty Medication Orders Linked to Encounter    Flowsheet Row Most Recent Value   Medication #1 entecavir (BARACLUDE) 0.5 MG Tab (Order#[id number], Rx#[id number])          Refill Questions - Documented Responses    Flowsheet Row Most Recent Value   Patient Availability and HIPAA Verification    Does patient want to proceed with activity? Yes   HIPAA/medical authority confirmed? Yes   Relationship to patient of person spoken to? Self   Refill Screening Questions    Changes to allergies? No   Changes to medications? No   New conditions since last clinic visit? No   Unplanned office visit, urgent care, ED, or hospital admission in the last 4 weeks? No   How does patient/caregiver feel medication is working? Very good   Financial problems or insurance changes? No   How many doses of your specialty medications were missed in the last 4 weeks? 0   Would patient like to speak to a pharmacist? No   When does the patient need to receive the medication? 02/25/22   Refill Delivery Questions    How will the patient receive the medication? Delivery Roseann   When does the patient need to receive the medication? 02/25/22   Shipping Address Home   Address in [facility] confirmed and updated if neccessary? Yes   Expected Copay ($) 3.95   Is the patient able to afford the medication copay? Yes   Payment Method CC on file   Days supply of Refill 90   Supplies needed? No supplies needed   Refill activity completed? Yes   Refill activity plan Refill scheduled   Shipment/Pickup Date: 02/23/22          Current Outpatient Medications   Medication Sig    amLODIPine (NORVASC) 10 MG tablet TAKE ONE TABLET BY MOUTH DAILY    aspirin (ECOTRIN) 81 MG EC tablet Take 1 tablet (81 mg total) by mouth once daily.    atenoloL (TENORMIN) 100 MG tablet TAKE ONE TABLET BY MOUTH DAILY    atorvastatin (LIPITOR) 40 MG tablet TAKE ONE TABLET BY MOUTH DAILY    blood-glucose meter kit Use twice daily.     boric acid (BORIC ACID) vaginal suppository Place 1 each (650 mg total) vaginally every evening.    canagliflozin (INVOKANA) 300 mg Tab tablet Take 1 tablet (300 mg total) by mouth before breakfast.    certolizumab pegol (CIMZIA) 400 mg/2 mL (200 mg/mL x 2) SyKt Inject 1 mL (200 mg total) into the skin every 14 (fourteen) days.    clonazePAM (KLONOPIN) 0.5 MG tablet TAKE ONE TABLET BY MOUTH DAILY AS NEEDED FOR ANXIETY    clotrimazole-betamethasone 1-0.05% (LOTRISONE) cream Apply topically 2 (two) times daily as needed.     cyclobenzaprine (FLEXERIL) 10 MG tablet TAKE ONE TABLET BY MOUTH AT BEDTIME AS NEEDED    diclofenac sodium (VOLTAREN) 1 % Gel Apply 4 g topically 4 (four) times daily. Apply light film topically to knee up to four times daily    docusate sodium (COLACE) 50 MG capsule Take 1 capsule (50 mg total) by mouth 2 (two) times daily as needed for Constipation. (Patient taking differently: Take 50 mg by mouth 2 (two) times daily.)    entecavir (BARACLUDE) 0.5 MG Tab Take 1 tablet (0.5 mg total) by mouth once daily.    ergocalciferol (ERGOCALCIFEROL) 50,000 unit Cap Take 1 capsule (50,000 Units total) by mouth every 7 days.    fluocinonide 0.05% (LIDEX) 0.05 % cream Apply topically 2 (two) times daily. To allergic rash    FLUoxetine 20 MG capsule Take 1 capsule (20 mg total) by mouth 2 (two) times daily.    fluticasone propionate (FLONASE) 50 mcg/actuation nasal spray 2 sprays (100 mcg total) by Each Nostril route once daily.    fluticasone propionate (FLONASE) 50 mcg/actuation nasal spray 2 sprays (100 mcg total) by Each Nostril route once daily.    FREESTYLE LITE STRIPS Strp test TWICE DAILY    hydroCHLOROthiazide (HYDRODIURIL) 25 MG tablet TAKE ONE TABLET BY MOUTH DAILY    hydrocortisone 2.5 % cream Apply topically 2 (two) times daily. for 10 days    hydroquinone 4 % Crea Apply to dark areas qhs.  Not more than 6 months straight in same location.Use sunscreen in am (Patient taking  differently: continuous prn. Apply to dark areas qhs.  Not more than 6 months straight in same location.Use sunscreen in am)    JANUVIA 100 mg Tab TAKE ONE TABLET BY MOUTH DAILY    lancets (FREESTYLE LANCETS) 28 gauge Misc test TWICE DAILY    levocetirizine (XYZAL) 5 MG tablet TAKE ONE TABLET BY MOUTH EVERY EVENING    losartan (COZAAR) 100 MG tablet TAKE ONE TABLET BY MOUTH ONCE DAILY    naproxen (NAPROSYN) 500 MG tablet Take 1 tablet (500 mg total) by mouth 2 (two) times daily as needed.    neomycin-polymyxin-hydrocortisone (CORTISPORIN) 3.5-10,000-1 mg/mL-unit/mL-% otic suspension Place 3 drops into the left ear 3 (three) times daily.    nystatin (MYCOSTATIN) powder Apply to affected area 3 times daily    ondansetron (ZOFRAN-ODT) 4 MG TbDL Take 1 tablet (4 mg total) by mouth every 8 (eight) hours as needed (nausea and vomiting).    pantoprazole (PROTONIX) 40 MG tablet TAKE ONE TABLET BY MOUTH EVERY DAY    prednisoLONE acetate (PRED FORTE) 1 % DrpS Instill one drop into the affected eye every two hours while awake    repaglinide (PRANDIN) 1 MG tablet TAKE ONE TABLET BY MOUTH THREE TIMES DAILY BEFORE MEALS (TAKING PLACE OF TRAJENTA)    sulfaSALAzine (AZULFIDINE) 500 MG EC tablet Take 3 tablets (1,500 mg total) by mouth 2 (two) times daily.    terconazole (TERAZOL 7) 0.4 % Crea Place 1 applicator vaginally every evening.    tiZANidine (ZANAFLEX) 2 MG tablet Take 1-2 tablets (2-4 mg total) by mouth every 8 (eight) hours as needed.    topiramate (TOPAMAX) 100 MG tablet Take 100 mg by mouth 2 (two) times daily.    triamcinolone acetonide 0.1% (KENALOG) 0.1 % cream Apply topically 2 (two) times daily.    valACYclovir (VALTREX) 1000 MG tablet valacyclovir 1 gram tablet   Take 0.5 tablets every 12 hours by oral route.    vitamin D (VITAMIN D3) 1000 units Tab Take 1,000 Units by mouth once daily.    zolpidem (AMBIEN) 5 MG Tab TAKE ONE TABLET BY MOUTH AT BEDTIME AS NEEDED   Last reviewed on 12/28/2021   9:55 AM by Giselle Nelson MA    Review of patient's allergies indicates:   Allergen Reactions    Bactrim [sulfamethoxazole-trimethoprim] Itching    Trulicity [dulaglutide] Diarrhea and Other (See Comments)     Vomiting, abdominal pain    Diflucan [fluconazole] Swelling and Other (See Comments)     Sore on mouth    Last reviewed on  2/10/2022 5:56 PM by Gabriel Arvizu      Tasks added this encounter   5/15/2022 - Refill Call (Auto Added)   Tasks due within next 3 months   No tasks due.     Eliceo Arias, PharmD  [facility]  [address] [facility] [address]  Phone: [phone]  Fax: [phone]

## 2022-03-09 ENCOUNTER — LAB VISIT (OUTPATIENT)
Dept: LAB | Facility: HOSPITAL | Age: 57
End: 2022-03-09
Attending: INTERNAL MEDICINE
Payer: MEDICARE

## 2022-03-09 DIAGNOSIS — Z00.00 ANNUAL PHYSICAL EXAM: ICD-10-CM

## 2022-03-09 DIAGNOSIS — E78.5 HYPERLIPIDEMIA ASSOCIATED WITH TYPE 2 DIABETES MELLITUS: ICD-10-CM

## 2022-03-09 DIAGNOSIS — E11.59 HYPERTENSION ASSOCIATED WITH DIABETES: ICD-10-CM

## 2022-03-09 DIAGNOSIS — E11.69 HYPERLIPIDEMIA ASSOCIATED WITH TYPE 2 DIABETES MELLITUS: ICD-10-CM

## 2022-03-09 DIAGNOSIS — I15.2 HYPERTENSION ASSOCIATED WITH DIABETES: ICD-10-CM

## 2022-03-09 DIAGNOSIS — E11.42 TYPE 2 DIABETES MELLITUS WITH DIABETIC POLYNEUROPATHY, UNSPECIFIED WHETHER LONG TERM INSULIN USE: ICD-10-CM

## 2022-03-09 LAB
ALBUMIN SERPL BCP-MCNC: 4.7 G/DL (ref 3.5–5.2)
ALP SERPL-CCNC: 93 U/L (ref 38–126)
ALT SERPL W/O P-5'-P-CCNC: 16 U/L (ref 10–44)
ANION GAP SERPL CALC-SCNC: 10 MMOL/L (ref 8–16)
AST SERPL-CCNC: 22 U/L (ref 15–46)
BASOPHILS # BLD AUTO: 0.05 K/UL (ref 0–0.2)
BASOPHILS NFR BLD: 0.6 % (ref 0–1.9)
BILIRUB SERPL-MCNC: 0.6 MG/DL (ref 0.1–1)
CALCIUM SERPL-MCNC: 9.6 MG/DL (ref 8.7–10.5)
CHLORIDE SERPL-SCNC: 104 MMOL/L (ref 95–110)
CHOLEST SERPL-MCNC: 193 MG/DL (ref 120–199)
CHOLEST/HDLC SERPL: 3.5 {RATIO} (ref 2–5)
CO2 SERPL-SCNC: 25 MMOL/L (ref 23–29)
CREAT SERPL-MCNC: 0.75 MG/DL (ref 0.5–1.4)
DIFFERENTIAL METHOD: ABNORMAL
EOSINOPHIL # BLD AUTO: 0.1 K/UL (ref 0–0.5)
EOSINOPHIL NFR BLD: 1.3 % (ref 0–8)
ERYTHROCYTE [DISTWIDTH] IN BLOOD BY AUTOMATED COUNT: 13.2 % (ref 11.5–14.5)
EST. GFR  (AFRICAN AMERICAN): >60 ML/MIN/1.73 M^2
EST. GFR  (NON AFRICAN AMERICAN): >60 ML/MIN/1.73 M^2
ESTIMATED AVG GLUCOSE: 192 MG/DL (ref 68–131)
GLUCOSE SERPL-MCNC: 175 MG/DL (ref 70–110)
HBA1C MFR BLD: 8.3 % (ref 4–5.6)
HCT VFR BLD AUTO: 46.1 % (ref 37–48.5)
HDLC SERPL-MCNC: 55 MG/DL (ref 40–75)
HDLC SERPL: 28.5 % (ref 20–50)
HGB BLD-MCNC: 15.1 G/DL (ref 12–16)
IMM GRANULOCYTES # BLD AUTO: 0.07 K/UL (ref 0–0.04)
IMM GRANULOCYTES NFR BLD AUTO: 0.9 % (ref 0–0.5)
LDLC SERPL CALC-MCNC: 119.6 MG/DL (ref 63–159)
LYMPHOCYTES # BLD AUTO: 3.7 K/UL (ref 1–4.8)
LYMPHOCYTES NFR BLD: 46.7 % (ref 18–48)
MCH RBC QN AUTO: 30 PG (ref 27–31)
MCHC RBC AUTO-ENTMCNC: 32.8 G/DL (ref 32–36)
MCV RBC AUTO: 92 FL (ref 82–98)
MONOCYTES # BLD AUTO: 0.7 K/UL (ref 0.3–1)
MONOCYTES NFR BLD: 9.2 % (ref 4–15)
NEUTROPHILS # BLD AUTO: 3.3 K/UL (ref 1.8–7.7)
NEUTROPHILS NFR BLD: 41.3 % (ref 38–73)
NONHDLC SERPL-MCNC: 138 MG/DL
NRBC BLD-RTO: 0 /100 WBC
PLATELET # BLD AUTO: 288 K/UL (ref 150–450)
PMV BLD AUTO: 11.3 FL (ref 9.2–12.9)
POTASSIUM SERPL-SCNC: 4.4 MMOL/L (ref 3.5–5.1)
PROT SERPL-MCNC: 8.2 G/DL (ref 6–8.4)
RBC # BLD AUTO: 5.03 M/UL (ref 4–5.4)
SODIUM SERPL-SCNC: 139 MMOL/L (ref 136–145)
TRIGL SERPL-MCNC: 92 MG/DL (ref 30–150)
TSH SERPL DL<=0.005 MIU/L-ACNC: 2.35 UIU/ML (ref 0.4–4)
UUN UR-MCNC: 11 MG/DL (ref 7–17)
WBC # BLD AUTO: 7.97 K/UL (ref 3.9–12.7)

## 2022-03-09 PROCEDURE — 80053 COMPREHEN METABOLIC PANEL: CPT | Mod: PO | Performed by: INTERNAL MEDICINE

## 2022-03-09 PROCEDURE — 80061 LIPID PANEL: CPT | Performed by: INTERNAL MEDICINE

## 2022-03-09 PROCEDURE — 84443 ASSAY THYROID STIM HORMONE: CPT | Mod: PO | Performed by: INTERNAL MEDICINE

## 2022-03-09 PROCEDURE — 36415 COLL VENOUS BLD VENIPUNCTURE: CPT | Mod: PO | Performed by: INTERNAL MEDICINE

## 2022-03-09 PROCEDURE — 83036 HEMOGLOBIN GLYCOSYLATED A1C: CPT | Performed by: INTERNAL MEDICINE

## 2022-03-09 PROCEDURE — 85025 COMPLETE CBC W/AUTO DIFF WBC: CPT | Mod: PO | Performed by: INTERNAL MEDICINE

## 2022-03-16 ENCOUNTER — OFFICE VISIT (OUTPATIENT)
Dept: INTERNAL MEDICINE | Facility: CLINIC | Age: 57
End: 2022-03-16
Payer: MEDICARE

## 2022-03-16 VITALS
TEMPERATURE: 98 F | WEIGHT: 242.94 LBS | HEART RATE: 64 BPM | HEIGHT: 67 IN | SYSTOLIC BLOOD PRESSURE: 130 MMHG | RESPIRATION RATE: 18 BRPM | BODY MASS INDEX: 38.13 KG/M2 | DIASTOLIC BLOOD PRESSURE: 82 MMHG

## 2022-03-16 DIAGNOSIS — E11.42 TYPE 2 DIABETES MELLITUS WITH DIABETIC POLYNEUROPATHY, UNSPECIFIED WHETHER LONG TERM INSULIN USE: Chronic | ICD-10-CM

## 2022-03-16 DIAGNOSIS — E78.5 HYPERLIPIDEMIA ASSOCIATED WITH TYPE 2 DIABETES MELLITUS: Primary | ICD-10-CM

## 2022-03-16 DIAGNOSIS — E11.69 HYPERLIPIDEMIA ASSOCIATED WITH TYPE 2 DIABETES MELLITUS: Primary | ICD-10-CM

## 2022-03-16 DIAGNOSIS — E66.01 SEVERE OBESITY (BMI 35.0-39.9) WITH COMORBIDITY: ICD-10-CM

## 2022-03-16 DIAGNOSIS — Z12.31 ENCOUNTER FOR SCREENING MAMMOGRAM FOR BREAST CANCER: ICD-10-CM

## 2022-03-16 DIAGNOSIS — E11.59 HYPERTENSION ASSOCIATED WITH DIABETES: Primary | ICD-10-CM

## 2022-03-16 DIAGNOSIS — I15.2 HYPERTENSION ASSOCIATED WITH DIABETES: ICD-10-CM

## 2022-03-16 DIAGNOSIS — E11.59 HYPERTENSION ASSOCIATED WITH DIABETES: ICD-10-CM

## 2022-03-16 DIAGNOSIS — E11.69 HYPERLIPIDEMIA ASSOCIATED WITH TYPE 2 DIABETES MELLITUS: ICD-10-CM

## 2022-03-16 DIAGNOSIS — M02.30 REACTIVE ARTHRITIS, UNSPECIFIED SITE: ICD-10-CM

## 2022-03-16 DIAGNOSIS — I15.2 HYPERTENSION ASSOCIATED WITH DIABETES: Primary | ICD-10-CM

## 2022-03-16 DIAGNOSIS — E78.5 HYPERLIPIDEMIA ASSOCIATED WITH TYPE 2 DIABETES MELLITUS: ICD-10-CM

## 2022-03-16 PROCEDURE — 3052F PR MOST RECENT HEMOGLOBIN A1C LEVEL 8.0 - < 9.0%: ICD-10-PCS | Mod: CPTII,S$GLB,, | Performed by: INTERNAL MEDICINE

## 2022-03-16 PROCEDURE — 3008F PR BODY MASS INDEX (BMI) DOCUMENTED: ICD-10-PCS | Mod: CPTII,S$GLB,, | Performed by: INTERNAL MEDICINE

## 2022-03-16 PROCEDURE — 99999 PR PBB SHADOW E&M-EST. PATIENT-LVL V: ICD-10-PCS | Mod: PBBFAC,,, | Performed by: INTERNAL MEDICINE

## 2022-03-16 PROCEDURE — 1159F PR MEDICATION LIST DOCUMENTED IN MEDICAL RECORD: ICD-10-PCS | Mod: CPTII,S$GLB,, | Performed by: INTERNAL MEDICINE

## 2022-03-16 PROCEDURE — 3072F PR LOW RISK FOR RETINOPATHY: ICD-10-PCS | Mod: CPTII,S$GLB,, | Performed by: INTERNAL MEDICINE

## 2022-03-16 PROCEDURE — 3075F SYST BP GE 130 - 139MM HG: CPT | Mod: CPTII,S$GLB,, | Performed by: INTERNAL MEDICINE

## 2022-03-16 PROCEDURE — 1159F MED LIST DOCD IN RCRD: CPT | Mod: CPTII,S$GLB,, | Performed by: INTERNAL MEDICINE

## 2022-03-16 PROCEDURE — 3008F BODY MASS INDEX DOCD: CPT | Mod: CPTII,S$GLB,, | Performed by: INTERNAL MEDICINE

## 2022-03-16 PROCEDURE — 3066F NEPHROPATHY DOC TX: CPT | Mod: CPTII,S$GLB,, | Performed by: INTERNAL MEDICINE

## 2022-03-16 PROCEDURE — 3079F DIAST BP 80-89 MM HG: CPT | Mod: CPTII,S$GLB,, | Performed by: INTERNAL MEDICINE

## 2022-03-16 PROCEDURE — 99215 OFFICE O/P EST HI 40 MIN: CPT | Mod: S$GLB,,, | Performed by: INTERNAL MEDICINE

## 2022-03-16 PROCEDURE — 3072F LOW RISK FOR RETINOPATHY: CPT | Mod: CPTII,S$GLB,, | Performed by: INTERNAL MEDICINE

## 2022-03-16 PROCEDURE — 3079F PR MOST RECENT DIASTOLIC BLOOD PRESSURE 80-89 MM HG: ICD-10-PCS | Mod: CPTII,S$GLB,, | Performed by: INTERNAL MEDICINE

## 2022-03-16 PROCEDURE — 3061F NEG MICROALBUMINURIA REV: CPT | Mod: CPTII,S$GLB,, | Performed by: INTERNAL MEDICINE

## 2022-03-16 PROCEDURE — 3052F HG A1C>EQUAL 8.0%<EQUAL 9.0%: CPT | Mod: CPTII,S$GLB,, | Performed by: INTERNAL MEDICINE

## 2022-03-16 PROCEDURE — 3061F PR NEG MICROALBUMINURIA RESULT DOCUMENTED/REVIEW: ICD-10-PCS | Mod: CPTII,S$GLB,, | Performed by: INTERNAL MEDICINE

## 2022-03-16 PROCEDURE — 3075F PR MOST RECENT SYSTOLIC BLOOD PRESS GE 130-139MM HG: ICD-10-PCS | Mod: CPTII,S$GLB,, | Performed by: INTERNAL MEDICINE

## 2022-03-16 PROCEDURE — 99215 PR OFFICE/OUTPT VISIT, EST, LEVL V, 40-54 MIN: ICD-10-PCS | Mod: S$GLB,,, | Performed by: INTERNAL MEDICINE

## 2022-03-16 PROCEDURE — 3066F PR DOCUMENTATION OF TREATMENT FOR NEPHROPATHY: ICD-10-PCS | Mod: CPTII,S$GLB,, | Performed by: INTERNAL MEDICINE

## 2022-03-16 PROCEDURE — 1160F PR REVIEW ALL MEDS BY PRESCRIBER/CLIN PHARMACIST DOCUMENTED: ICD-10-PCS | Mod: CPTII,S$GLB,, | Performed by: INTERNAL MEDICINE

## 2022-03-16 PROCEDURE — 1160F RVW MEDS BY RX/DR IN RCRD: CPT | Mod: CPTII,S$GLB,, | Performed by: INTERNAL MEDICINE

## 2022-03-16 PROCEDURE — 99999 PR PBB SHADOW E&M-EST. PATIENT-LVL V: CPT | Mod: PBBFAC,,, | Performed by: INTERNAL MEDICINE

## 2022-03-16 RX ORDER — SEMAGLUTIDE 1.34 MG/ML
1 INJECTION, SOLUTION SUBCUTANEOUS
Qty: 1 PEN | Refills: 11 | Status: SHIPPED | OUTPATIENT
Start: 2022-03-16 | End: 2022-04-14 | Stop reason: SDUPTHER

## 2022-03-16 NOTE — PROGRESS NOTES
Subjective:       Patient ID: Althea Vazquez is a 56 y.o. female.    Chief Complaint: Annual Exam (Labs 3/9/22)    HPI   56 y.o. Female here for annual exam.      Vaccines: Influenza (2019); Tetanus (2014); PNA (done)  Sexual Screening: declined  Eye exam: 1/21  Mammogram: 2/21  Gyn exam: 9/18  Colonoscopy: 1/18     Exercise: no  Diet: regular     Past Medical History:    Acid reflux                                                   Anxiety                                         10/18/2012    Arthritis                                                     Depression                                          Diabetic peripheral neuropathy - mild           10/21/2014    Difficult intubation                                          Dry eyes                                                      Dry mouth                                                     Fever blister                                                 Hyperlipidemia                                                Hypertension                                                  Insomnia                                                      Iritis                                          5/13/2014     Long-term current use of steroids               9/27/2012     Nausea & vomiting                               2/4/2015      VAISHNAVI (obstructive sleep apnea)                                 Type II diabetes mellitus                       10/1/2012   Past Surgical History:    TUBAL LIGATION                                                 KNEE ARTHROSCOPY                                 5-14-14         Comment:right    HYSTERECTOMY                                     12/3/2014   Social History    Marital status:              Spouse name: Edward                 Years of education:                 Number of children: 2              Occupational History  Occupation          Employer            Comment               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[employer]                        DISABLED                 Social History Main Topics    Smoking status: Never Smoker                                                                 Smokeless status: Never Used                        Alcohol use: No              Drug use: No              Sexual activity: Yes               Partners with: Male     Other Topics            Concern  Are you pregnant or th* No  Breast-feeding          No      -- Diflucan [Fluconazole] -- Other (See Comments)    --  Sore on mouth  Review of Systems   Constitutional: Negative for activity change, appetite change, chills, diaphoresis, fatigue, fever and unexpected weight change.   HENT: Negative for nasal congestion, mouth sores, postnasal drip, rhinorrhea, sinus pressure/congestion, sneezing, sore throat, trouble swallowing and voice change.    Eyes: Negative for pain, discharge and visual disturbance.   Respiratory: Negative for cough, shortness of breath and wheezing.    Cardiovascular: Negative for chest pain, palpitations and leg swelling.   Gastrointestinal: Negative for abdominal pain, blood in stool, constipation, diarrhea, nausea and vomiting.   Endocrine: Negative for cold intolerance and heat intolerance.   Genitourinary: Negative for difficulty urinating, dysuria, frequency, hematuria and urgency.   Musculoskeletal: Negative for arthralgias and myalgias.   Integumentary:  Negative for rash and wound.   Allergic/Immunologic: Negative for environmental allergies and food allergies.   Neurological: Negative for dizziness, tremors, seizures, syncope, weakness, light-headedness and headaches.   Hematological: Negative for adenopathy. Does not bruise/bleed easily.   Psychiatric/Behavioral: Negative for confusion and sleep disturbance. The patient is not nervous/anxious.          Objective:      Physical Exam  Vitals and nursing note reviewed.   Constitutional:       General: She is not in acute distress.     Appearance: She is well-developed. She  is not diaphoretic.   HENT:      Head: Normocephalic and atraumatic.      Right Ear: External ear normal.      Left Ear: External ear normal.      Nose: Nose normal.      Mouth/Throat:      Pharynx: No oropharyngeal exudate.   Eyes:      General: No scleral icterus.        Right eye: No discharge.         Left eye: No discharge.      Conjunctiva/sclera: Conjunctivae normal.      Pupils: Pupils are equal, round, and reactive to light.   Neck:      Thyroid: No thyromegaly.      Vascular: No JVD.   Cardiovascular:      Rate and Rhythm: Normal rate and regular rhythm.      Heart sounds: Normal heart sounds. No murmur heard.  Pulmonary:      Effort: Pulmonary effort is normal. No respiratory distress.      Breath sounds: Normal breath sounds. No wheezing or rales.   Chest:      Chest wall: No tenderness.   Abdominal:      General: Bowel sounds are normal. There is no distension.      Palpations: Abdomen is soft.      Tenderness: There is no abdominal tenderness. There is no guarding.   Musculoskeletal:      Cervical back: Neck supple.   Lymphadenopathy:      Cervical: No cervical adenopathy.   Skin:     General: Skin is warm and dry.      Coloration: Skin is not pale.      Findings: No rash.   Neurological:      Mental Status: She is alert and oriented to person, place, and time.   Psychiatric:         Judgment: Judgment normal.         Assessment:       Problem List Items Addressed This Visit        Cardiac/Vascular    Hypertension associated with diabetes    Relevant Medications    semaglutide (OZEMPIC) 0.25 mg or 0.5 mg(2 mg/1.5 mL) pen injector    semaglutide (OZEMPIC) 1 mg/dose (4 mg/3 mL)    Hyperlipidemia associated with type 2 diabetes mellitus - Primary    Relevant Medications    semaglutide (OZEMPIC) 0.25 mg or 0.5 mg(2 mg/1.5 mL) pen injector    semaglutide (OZEMPIC) 1 mg/dose (4 mg/3 mL)       Endocrine    Type 2 diabetes mellitus with diabetic polyneuropathy (Chronic)    Relevant Medications    semaglutide  (OZEMPIC) 0.25 mg or 0.5 mg(2 mg/1.5 mL) pen injector    semaglutide (OZEMPIC) 1 mg/dose (4 mg/3 mL)    Other Relevant Orders    Hemoglobin A1C    Severe obesity (BMI 35.0-39.9) with comorbidity      Other Visit Diagnoses     Reactive arthritis, unspecified site        Encounter for screening mammogram for breast cancer        Relevant Orders    Mammo Digital Screening Bilat w/ [facility]          Plan:   T2DM with neuropathy- stable with last HA1C of 7.7(5/21)<--8.3(4/20)<--6.7(9/19)<--5.9(4/18)<--5.8(2/17)<--6.4(4/16)       Continue Invokana, d/c Januvia 100 mg daily, will add Ozempic       Pt stopped the Metformin 2/2 GI SE's     HTN- continue Losartan 100 mg, Atenolol 100 mg, Norvasc 10 mg, HCTZ 25 mg daily     HLD- stable, on Lipitor 40 mg qHS      Spondyloarthritis- stable on Sulfasalazine/Naproxen        Followed by Rheumatology      Anxiety- stable on Prozac/Klonopin, managed by [facility]      Severe obesity- pt advised on proper diet/exercise for weight loss      Insomnia- stable on Ambien 5 mg qHS PRN      Hx of Hep B- followed by Hepatology       F/u in 6 months    Over 1/2 of 40 minute visit spent reviewing pt's medical records, education/discussion of pt's medical conditions and medical management

## 2022-03-18 NOTE — TELEPHONE ENCOUNTER
No new care gaps identified.  Powered by [employer]. Reference number: [id number].   3/18/2022 1:12:55 PM CDT

## 2022-03-23 RX ORDER — CANAGLIFLOZIN 300 MG/1
300 TABLET, FILM COATED ORAL
Qty: 90 TABLET | Refills: 1 | Status: SHIPPED | OUTPATIENT
Start: 2022-03-23 | End: 2023-05-17 | Stop reason: SDUPTHER

## 2022-03-24 ENCOUNTER — PATIENT MESSAGE (OUTPATIENT)
Dept: PHARMACY | Facility: CLINIC | Age: 57
End: 2022-03-24
Payer: MEDICARE

## 2022-03-24 NOTE — TELEPHONE ENCOUNTER
Refill Authorization Note   Althea Vazquez  is requesting a refill authorization.  Brief Assessment and Rationale for Refill:  Approve     Medication Therapy Plan:       Medication Reconciliation Completed: No   Comments:   --->Care Gap information included below if applicable.   Orders Placed This Encounter    INVOKANA 300 mg Tab tablet      Requested Prescriptions   Signed Prescriptions Disp Refills    INVOKANA 300 mg Tab tablet 90 tablet 1     Sig: TAKE ONE TABLET BY MOUTH BEFORE BREAKFAST       Endocrinology: Diabetes - SGLT2 Inhibitors - canagliflozin Passed - 3/23/2022  6:23 PM        Passed - Patient is at least 18 years old        Passed - BP > 90/50 mmH     BP Readings from Last 3 Encounters:   03/16/22 130/82   11/16/21 138/78   09/28/21 (!) 142/86               Passed - Valid encounter within last 15 months     Recent Visits  Date Type Provider Dept   03/16/22 Office Visit Weston Begum DO [facility]   11/16/21 Office Visit Weston Begum DO [facility]   05/20/21 Office Visit Weston Begum DO [facility]   03/18/21 Office Visit Weston Begum DO [facility]   11/11/20 Office Visit Weston Begum DO [facility]   06/04/20 Office Visit Weston Begum DO [facility]   04/15/20 Office Visit Weston Begum DO [facility]   Showing recent visits within past 720 days and meeting all other requirements  Future Appointments  No visits were found meeting these conditions.  Showing future appointments within next 150 days and meeting all other requirements      Future Appointments              In 1 week LAB, [facility] - Lab, [facility]    In 1 week Gabriel Arvizu MD [id number], [facility]    In 1 month [facility]1 [facility] - Imaging, [facility]    In 1 month Cristi Rose MD [facility] - Dermatology, [facility]    In 2 months  Jodi Echeverria MD [facility] [place] - [place], [place]    In 2 months LAB, [facility] - Lab, [facility]    In 5 months [facility] [place]                Passed - HBA1C within 180 days     Lab Results   Component Value Date    HGBA1C 8.3 (H) 03/09/2022    HGBA1C 7.2 (H) 08/16/2021    HGBA1C 7.7 (H) 05/14/2021              Passed - Cr is 1.39 or below and within 360 days     Lab Results   Component Value Date    CREATININE 0.75 03/09/2022    CREATININE 0.61 12/20/2021    CREATININE 0.69 08/16/2021              Passed - eGFR is 60 or above and within 360 days     Lab Results   Component Value Date    EGFRNONAA >60.0 03/09/2022    EGFRNONAA >60.0 12/20/2021    EGFRNONAA >60.0 08/16/2021                    Appointments  past 12m or future 3m with PCP    Date Provider   Last Visit   3/16/2022 Weston Begum DO   Next Visit   Visit date not found Weston Begum DO   ED visits in past 90 days: 0     Note composed:7:11 PM 03/23/2022

## 2022-03-30 ENCOUNTER — LAB VISIT (OUTPATIENT)
Dept: LAB | Facility: HOSPITAL | Age: 57
End: 2022-03-30
Attending: INTERNAL MEDICINE
Payer: MEDICARE

## 2022-03-30 ENCOUNTER — PATIENT MESSAGE (OUTPATIENT)
Dept: INTERNAL MEDICINE | Facility: CLINIC | Age: 57
End: 2022-03-30
Payer: MEDICARE

## 2022-03-30 DIAGNOSIS — Z79.620 ENCOUNTER FOR MONITORING OF ADALIMUMAB THERAPY: ICD-10-CM

## 2022-03-30 DIAGNOSIS — Z51.81 ENCOUNTER FOR MONITORING OF ADALIMUMAB THERAPY: ICD-10-CM

## 2022-03-30 LAB
ALBUMIN SERPL BCP-MCNC: 4.9 G/DL (ref 3.5–5.2)
ALP SERPL-CCNC: 91 U/L (ref 38–126)
ALT SERPL W/O P-5'-P-CCNC: 17 U/L (ref 10–44)
ANION GAP SERPL CALC-SCNC: 13 MMOL/L (ref 8–16)
AST SERPL-CCNC: 24 U/L (ref 15–46)
BASOPHILS # BLD AUTO: 0.03 K/UL (ref 0–0.2)
BASOPHILS NFR BLD: 0.5 % (ref 0–1.9)
BILIRUB SERPL-MCNC: 0.6 MG/DL (ref 0.1–1)
CALCIUM SERPL-MCNC: 9.6 MG/DL (ref 8.7–10.5)
CHLORIDE SERPL-SCNC: 104 MMOL/L (ref 95–110)
CO2 SERPL-SCNC: 24 MMOL/L (ref 23–29)
CREAT SERPL-MCNC: 0.71 MG/DL (ref 0.5–1.4)
CRP SERPL-MCNC: 0.31 MG/DL (ref 0–1)
DIFFERENTIAL METHOD: ABNORMAL
EOSINOPHIL # BLD AUTO: 0.1 K/UL (ref 0–0.5)
EOSINOPHIL NFR BLD: 1.4 % (ref 0–8)
ERYTHROCYTE [DISTWIDTH] IN BLOOD BY AUTOMATED COUNT: 12.5 % (ref 11.5–14.5)
ERYTHROCYTE [SEDIMENTATION RATE] IN BLOOD BY WESTERGREN METHOD: 10 MM/HR (ref 0–20)
EST. GFR  (AFRICAN AMERICAN): >60 ML/MIN/1.73 M^2
EST. GFR  (NON AFRICAN AMERICAN): >60 ML/MIN/1.73 M^2
GLUCOSE SERPL-MCNC: 115 MG/DL (ref 70–110)
HCT VFR BLD AUTO: 46 % (ref 37–48.5)
HGB BLD-MCNC: 15.3 G/DL (ref 12–16)
IMM GRANULOCYTES # BLD AUTO: 0.03 K/UL (ref 0–0.04)
IMM GRANULOCYTES NFR BLD AUTO: 0.5 % (ref 0–0.5)
LYMPHOCYTES # BLD AUTO: 3.7 K/UL (ref 1–4.8)
LYMPHOCYTES NFR BLD: 58.4 % (ref 18–48)
MCH RBC QN AUTO: 29.7 PG (ref 27–31)
MCHC RBC AUTO-ENTMCNC: 33.3 G/DL (ref 32–36)
MCV RBC AUTO: 89 FL (ref 82–98)
MONOCYTES # BLD AUTO: 0.6 K/UL (ref 0.3–1)
MONOCYTES NFR BLD: 9.5 % (ref 4–15)
NEUTROPHILS # BLD AUTO: 1.9 K/UL (ref 1.8–7.7)
NEUTROPHILS NFR BLD: 29.7 % (ref 38–73)
NRBC BLD-RTO: 0 /100 WBC
PLATELET # BLD AUTO: 228 K/UL (ref 150–450)
PMV BLD AUTO: 10.8 FL (ref 9.2–12.9)
POTASSIUM SERPL-SCNC: 4.1 MMOL/L (ref 3.5–5.1)
PROT SERPL-MCNC: 8.2 G/DL (ref 6–8.4)
RBC # BLD AUTO: 5.15 M/UL (ref 4–5.4)
SODIUM SERPL-SCNC: 141 MMOL/L (ref 136–145)
UUN UR-MCNC: 13 MG/DL (ref 7–17)
WBC # BLD AUTO: 6.39 K/UL (ref 3.9–12.7)

## 2022-03-30 PROCEDURE — 80053 COMPREHEN METABOLIC PANEL: CPT | Mod: PO | Performed by: INTERNAL MEDICINE

## 2022-03-30 PROCEDURE — 86140 C-REACTIVE PROTEIN: CPT | Mod: PO | Performed by: INTERNAL MEDICINE

## 2022-03-30 PROCEDURE — 87516 HEPATITIS B DNA AMP PROBE: CPT | Mod: PO | Performed by: INTERNAL MEDICINE

## 2022-03-30 PROCEDURE — 85652 RBC SED RATE AUTOMATED: CPT | Performed by: INTERNAL MEDICINE

## 2022-03-30 PROCEDURE — 36415 COLL VENOUS BLD VENIPUNCTURE: CPT | Mod: PO | Performed by: INTERNAL MEDICINE

## 2022-03-30 PROCEDURE — 85025 COMPLETE CBC W/AUTO DIFF WBC: CPT | Mod: PO | Performed by: INTERNAL MEDICINE

## 2022-04-01 ENCOUNTER — PATIENT OUTREACH (OUTPATIENT)
Dept: ADMINISTRATIVE | Facility: OTHER | Age: 57
End: 2022-04-01
Payer: MEDICARE

## 2022-04-01 ENCOUNTER — TELEPHONE (OUTPATIENT)
Dept: RHEUMATOLOGY | Facility: CLINIC | Age: 57
End: 2022-04-01
Payer: MEDICARE

## 2022-04-01 NOTE — PROGRESS NOTES
Health Maintenance Due   Topic Date Due    Foot Exam  03/17/2021    COVID-19 Vaccine (4 - Booster for Moderna series) 02/17/2022     Updates were requested from care everywhere.  Chart was reviewed for overdue Proactive [facility] Encounters (JOSEFINA) topics (CRS, Breast Cancer Screening, Eye exam)  Health Maintenance has been updated.  LINKS immunization registry triggered.  Immunizations were reconciled.

## 2022-04-04 ENCOUNTER — OFFICE VISIT (OUTPATIENT)
Dept: RHEUMATOLOGY | Facility: CLINIC | Age: 57
End: 2022-04-04
Payer: MEDICARE

## 2022-04-04 VITALS
HEART RATE: 83 BPM | SYSTOLIC BLOOD PRESSURE: 145 MMHG | HEIGHT: 67 IN | BODY MASS INDEX: 37.35 KG/M2 | WEIGHT: 238 LBS | DIASTOLIC BLOOD PRESSURE: 91 MMHG

## 2022-04-04 DIAGNOSIS — M02.30 REACTIVE ARTHRITIS: ICD-10-CM

## 2022-04-04 DIAGNOSIS — M31.6 TEMPORAL ARTERITIS: ICD-10-CM

## 2022-04-04 DIAGNOSIS — Z79.899 HIGH RISK MEDICATION USE: ICD-10-CM

## 2022-04-04 DIAGNOSIS — M46.90 AXIAL SPONDYLOARTHRITIS: Primary | ICD-10-CM

## 2022-04-04 DIAGNOSIS — M47.819 SPONDYLOARTHRITIS: ICD-10-CM

## 2022-04-04 DIAGNOSIS — M19.90 INFLAMMATORY ARTHRITIS: ICD-10-CM

## 2022-04-04 DIAGNOSIS — M45.9 ANKYLOSING SPONDYLITIS, UNSPECIFIED SITE OF SPINE: ICD-10-CM

## 2022-04-04 LAB — HBV DNA SERPL QL NAA+PROBE: NOT DETECTED

## 2022-04-04 PROCEDURE — 3072F LOW RISK FOR RETINOPATHY: CPT | Mod: CPTII,S$GLB,, | Performed by: INTERNAL MEDICINE

## 2022-04-04 PROCEDURE — 3066F PR DOCUMENTATION OF TREATMENT FOR NEPHROPATHY: ICD-10-PCS | Mod: CPTII,S$GLB,, | Performed by: INTERNAL MEDICINE

## 2022-04-04 PROCEDURE — 3066F NEPHROPATHY DOC TX: CPT | Mod: CPTII,S$GLB,, | Performed by: INTERNAL MEDICINE

## 2022-04-04 PROCEDURE — 3052F HG A1C>EQUAL 8.0%<EQUAL 9.0%: CPT | Mod: CPTII,S$GLB,, | Performed by: INTERNAL MEDICINE

## 2022-04-04 PROCEDURE — 1159F MED LIST DOCD IN RCRD: CPT | Mod: CPTII,S$GLB,, | Performed by: INTERNAL MEDICINE

## 2022-04-04 PROCEDURE — 99214 PR OFFICE/OUTPT VISIT, EST, LEVL IV, 30-39 MIN: ICD-10-PCS | Mod: S$GLB,,, | Performed by: INTERNAL MEDICINE

## 2022-04-04 PROCEDURE — 3008F BODY MASS INDEX DOCD: CPT | Mod: CPTII,S$GLB,, | Performed by: INTERNAL MEDICINE

## 2022-04-04 PROCEDURE — 99999 PR PBB SHADOW E&M-EST. PATIENT-LVL V: ICD-10-PCS | Mod: PBBFAC,,, | Performed by: INTERNAL MEDICINE

## 2022-04-04 PROCEDURE — 3008F PR BODY MASS INDEX (BMI) DOCUMENTED: ICD-10-PCS | Mod: CPTII,S$GLB,, | Performed by: INTERNAL MEDICINE

## 2022-04-04 PROCEDURE — 3061F PR NEG MICROALBUMINURIA RESULT DOCUMENTED/REVIEW: ICD-10-PCS | Mod: CPTII,S$GLB,, | Performed by: INTERNAL MEDICINE

## 2022-04-04 PROCEDURE — 3061F NEG MICROALBUMINURIA REV: CPT | Mod: CPTII,S$GLB,, | Performed by: INTERNAL MEDICINE

## 2022-04-04 PROCEDURE — 3077F PR MOST RECENT SYSTOLIC BLOOD PRESSURE >= 140 MM HG: ICD-10-PCS | Mod: CPTII,S$GLB,, | Performed by: INTERNAL MEDICINE

## 2022-04-04 PROCEDURE — 3072F PR LOW RISK FOR RETINOPATHY: ICD-10-PCS | Mod: CPTII,S$GLB,, | Performed by: INTERNAL MEDICINE

## 2022-04-04 PROCEDURE — 1159F PR MEDICATION LIST DOCUMENTED IN MEDICAL RECORD: ICD-10-PCS | Mod: CPTII,S$GLB,, | Performed by: INTERNAL MEDICINE

## 2022-04-04 PROCEDURE — 3080F PR MOST RECENT DIASTOLIC BLOOD PRESSURE >= 90 MM HG: ICD-10-PCS | Mod: CPTII,S$GLB,, | Performed by: INTERNAL MEDICINE

## 2022-04-04 PROCEDURE — 99999 PR PBB SHADOW E&M-EST. PATIENT-LVL V: CPT | Mod: PBBFAC,,, | Performed by: INTERNAL MEDICINE

## 2022-04-04 PROCEDURE — 3052F PR MOST RECENT HEMOGLOBIN A1C LEVEL 8.0 - < 9.0%: ICD-10-PCS | Mod: CPTII,S$GLB,, | Performed by: INTERNAL MEDICINE

## 2022-04-04 PROCEDURE — 3077F SYST BP >= 140 MM HG: CPT | Mod: CPTII,S$GLB,, | Performed by: INTERNAL MEDICINE

## 2022-04-04 PROCEDURE — 4010F PR ACE/ARB THEARPY RXD/TAKEN: ICD-10-PCS | Mod: CPTII,S$GLB,, | Performed by: INTERNAL MEDICINE

## 2022-04-04 PROCEDURE — 99214 OFFICE O/P EST MOD 30 MIN: CPT | Mod: S$GLB,,, | Performed by: INTERNAL MEDICINE

## 2022-04-04 PROCEDURE — 4010F ACE/ARB THERAPY RXD/TAKEN: CPT | Mod: CPTII,S$GLB,, | Performed by: INTERNAL MEDICINE

## 2022-04-04 PROCEDURE — 3080F DIAST BP >= 90 MM HG: CPT | Mod: CPTII,S$GLB,, | Performed by: INTERNAL MEDICINE

## 2022-04-04 RX ORDER — SULFASALAZINE 500 MG/1
1500 TABLET, DELAYED RELEASE ORAL 2 TIMES DAILY
Qty: 540 TABLET | Refills: 0 | Status: SHIPPED | OUTPATIENT
Start: 2022-04-04 | End: 2022-07-07 | Stop reason: SDUPTHER

## 2022-04-04 RX ORDER — GOLIMUMAB 50 MG/.5ML
50 INJECTION, SOLUTION SUBCUTANEOUS
Qty: 1.5 ML | Refills: 1 | Status: SHIPPED | OUTPATIENT
Start: 2022-04-04 | End: 2022-07-07 | Stop reason: SDUPTHER

## 2022-04-04 ASSESSMENT — ANKYLOSING SPONDYLITIS DISEASE ACTIVITY SCORE (ASDAS-CRP)
CRP_MG_PER_LITER: 3.1
MORNING_STIFFNESS: 5
PAIN_SWELLING: 5
NBH_PAIN: 4
GLOBAL_ACTIVITY: 2
TOTAL_SCORE: 2.17

## 2022-04-04 NOTE — PROGRESS NOTES
Pre chart    Answers for HPI/ROS submitted by the patient on 4/3/2022  fever: No  eye redness: No  mouth sores: No  headaches: No  shortness of breath: No  chest pain: No  trouble swallowing: No  diarrhea: No  constipation: No  unexpected weight change: No  genital sore: No  dysuria: No  During the last 3 days, have you had a skin rash?: No  Bruises or bleeds easily: No  cough: No

## 2022-04-04 NOTE — PROGRESS NOTES
Subjective:       Patient ID: Althea Vazquez is a 56 y.o. female.    Chief Complaint: axSpA/ pSpA ; lumbar spondylosis  HPI no more AAU, but prior frequent recurrences She's not sure Cimzia helping as much. Intermittent pain and swelling right knee  Review of Systems   Constitutional: Negative for fever and unexpected weight change.   HENT: Negative for mouth sores and trouble swallowing.    Eyes: Negative for redness.   Respiratory: Negative for cough and shortness of breath.    Cardiovascular: Negative for chest pain.   Gastrointestinal: Negative for constipation and diarrhea.   Genitourinary: Negative for dysuria and genital sores.   Skin: Negative for rash.   Neurological: Negative for headaches.   Hematological: Does not bruise/bleed easily.         Objective:   LMP 11/25/2014      Physical Exam       Schober's 10-17cm stable  Fabere neg but limited ROM right hip>left  Chest expansion 3 cm worse  Neck rom nl       Latest Reference Range & Units 03/30/22 09:48   WBC 3.90 - 12.70 K/uL 6.39   RBC 4.00 - 5.40 M/uL 5.15   Hemoglobin 12.0 - 16.0 g/dL 15.3   Hematocrit 37.0 - 48.5 % 46.0   MCV 82 - 98 fL 89   MCH 27.0 - 31.0 pg 29.7   MCHC 32.0 - 36.0 g/dL 33.3   RDW 11.5 - 14.5 % 12.5   Platelets 150 - 450 K/uL 228   MPV 9.2 - 12.9 fL 10.8   Gran % 38.0 - 73.0 % 29.7 (L)   Lymph % 18.0 - 48.0 % 58.4 (H)   Mono % 4.0 - 15.0 % 9.5   Eosinophil % 0.0 - 8.0 % 1.4   Basophil % 0.0 - 1.9 % 0.5   Immature Granulocytes 0.0 - 0.5 % 0.5   Gran # (ANC) 1.8 - 7.7 K/uL 1.9   Lymph # 1.0 - 4.8 K/uL 3.7   Mono # 0.3 - 1.0 K/uL 0.6   Eos # 0.0 - 0.5 K/uL 0.1   Baso # 0.00 - 0.20 K/uL 0.03   Immature Grans (Abs) 0.00 - 0.04 K/uL 0.03 [1]   NRBC 0 /100 WBC 0   Differential Method  Automated   Sed Rate 0 - 20 mm/Hr 10   Sodium 136 - 145 mmol/L 141   Potassium 3.5 - 5.1 mmol/L 4.1   Chloride 95 - 110 mmol/L 104   CO2 23 - 29 mmol/L 24   Anion Gap 8 - 16 mmol/L 13   BUN 7 - 17 mg/dL 13   Creatinine 0.50 - 1.40 mg/dL 0.71    EGFR if non African American >60 mL/min/1.73 m^2 >60.0 [2]   EGFR if African American >60 mL/min/1.73 m^2 >60.0   Glucose 70 - 110 mg/dL 115 (H)   Calcium 8.7 - 10.5 mg/dL 9.6   Alkaline Phosphatase 38 - 126 U/L 91   PROTEIN TOTAL 6.0 - 8.4 g/dL 8.2   Albumin 3.5 - 5.2 g/dL 4.9   BILIRUBIN TOTAL 0.1 - 1.0 mg/dL 0.6 [3]   AST 15 - 46 U/L 24   ALT 10 - 44 U/L 17   CRP 0.00 - 1.00 mg/dL 0.31     Assessment:     Humira with Secondary inefficacy, tried to change to Simponi 50mg sc q 4 wks but insurance wouldn't cover, then on Enbrel Sureclick 50mg s q 7 days but has developed recurrent AAU OS  Then started certolizumab after 1/11/21 visit  had AAU with recurrence 1/28/21 just after starting certolizumab but had not completed loading dose  and had had secondary failure adalimumab.      AxSpA:      ASDAS-CRP: 2.17(HDA)  BASDAI 5.4(HDA)  BASFI 5.25(severe functional limitation)         Recurrent AAU  Chronic hepatitis B    Plan:    * 4th dose Moderna Covid vaccine 4/15/22and  hold sulfasalazine 1 wk after vaccination then resume  Finish Cimzia 200mg sc q 2 wks until current supply gone  Then change to Simponi 50mg sc q 4wks. Risks and benefits similar to Cimzia, discussed. Rx sent to [facility]  Sulfasalazine.1500mg twice daily  entecvir 0.5mg daily  Vitamin D2 50,000 units q 7days   RTC 3 months with standing labs

## 2022-04-04 NOTE — PATIENT INSTRUCTIONS
4th dose(2nd booster) Moderna Covid vaccine, 4/15/22 and hold sulfasalazine one week post vaccination then resume  Finish Cimzia  Then switch  to Simponi 50mg sc q 4 wks

## 2022-04-05 ENCOUNTER — SPECIALTY PHARMACY (OUTPATIENT)
Dept: PHARMACY | Facility: CLINIC | Age: 57
End: 2022-04-05
Payer: MEDICARE

## 2022-04-05 NOTE — TELEPHONE ENCOUNTER
[place] approved from 3/6/22 - 7/4/22. Case ID: [id number]. Medicare Part d insurance with LIS level 1. [facility] in network with a $9.85 copay. Queuing initial consult.

## 2022-04-08 ENCOUNTER — PATIENT OUTREACH (OUTPATIENT)
Dept: ADMINISTRATIVE | Facility: HOSPITAL | Age: 57
End: 2022-04-08
Payer: MEDICARE

## 2022-04-11 ENCOUNTER — SPECIALTY PHARMACY (OUTPATIENT)
Dept: PHARMACY | Facility: CLINIC | Age: 57
End: 2022-04-11
Payer: MEDICARE

## 2022-04-11 DIAGNOSIS — M45.A0 NON-RADIOGRAPHIC AXIAL SPONDYLOARTHRITIS: Primary | ICD-10-CM

## 2022-04-11 DIAGNOSIS — M02.30 REACTIVE ARTHRITIS: ICD-10-CM

## 2022-04-11 NOTE — TELEPHONE ENCOUNTER
No new care gaps identified.  Powered by [employer]. Reference number: [id number].   4/11/2022 5:35:34 PM CDT

## 2022-04-12 RX ORDER — CYCLOBENZAPRINE HCL 10 MG
TABLET ORAL
Qty: 90 TABLET | Refills: 2 | Status: SHIPPED | OUTPATIENT
Start: 2022-04-12 | End: 2023-02-16 | Stop reason: SDUPTHER

## 2022-04-13 ENCOUNTER — PATIENT MESSAGE (OUTPATIENT)
Dept: INTERNAL MEDICINE | Facility: CLINIC | Age: 57
End: 2022-04-13
Payer: MEDICARE

## 2022-04-13 DIAGNOSIS — E11.42 TYPE 2 DIABETES MELLITUS WITH DIABETIC POLYNEUROPATHY, UNSPECIFIED WHETHER LONG TERM INSULIN USE: Chronic | ICD-10-CM

## 2022-04-14 RX ORDER — SEMAGLUTIDE 1.34 MG/ML
1 INJECTION, SOLUTION SUBCUTANEOUS
Qty: 1 PEN | Refills: 11 | Status: SHIPPED | OUTPATIENT
Start: 2022-04-14 | End: 2022-12-22

## 2022-04-14 NOTE — TELEPHONE ENCOUNTER
No new care gaps identified.  Powered by [employer]. Reference number: [id number].   4/14/2022 8:33:18 AM CDT

## 2022-04-18 ENCOUNTER — PATIENT MESSAGE (OUTPATIENT)
Dept: ADMINISTRATIVE | Facility: OTHER | Age: 57
End: 2022-04-18
Payer: MEDICARE

## 2022-04-20 ENCOUNTER — OFFICE VISIT (OUTPATIENT)
Dept: PSYCHIATRY | Facility: CLINIC | Age: 57
End: 2022-04-20
Payer: MEDICARE

## 2022-04-20 DIAGNOSIS — F41.1 GENERALIZED ANXIETY DISORDER: ICD-10-CM

## 2022-04-20 DIAGNOSIS — F41.0 PANIC DISORDER WITHOUT AGORAPHOBIA: ICD-10-CM

## 2022-04-20 PROCEDURE — 99214 PR OFFICE/OUTPT VISIT, EST, LEVL IV, 30-39 MIN: ICD-10-PCS | Mod: 95,,, | Performed by: PSYCHIATRY & NEUROLOGY

## 2022-04-20 PROCEDURE — 99214 OFFICE O/P EST MOD 30 MIN: CPT | Mod: 95,,, | Performed by: PSYCHIATRY & NEUROLOGY

## 2022-04-20 PROCEDURE — 3066F NEPHROPATHY DOC TX: CPT | Mod: CPTII,95,, | Performed by: PSYCHIATRY & NEUROLOGY

## 2022-04-20 PROCEDURE — 3052F HG A1C>EQUAL 8.0%<EQUAL 9.0%: CPT | Mod: CPTII,95,, | Performed by: PSYCHIATRY & NEUROLOGY

## 2022-04-20 PROCEDURE — 3066F PR DOCUMENTATION OF TREATMENT FOR NEPHROPATHY: ICD-10-PCS | Mod: CPTII,95,, | Performed by: PSYCHIATRY & NEUROLOGY

## 2022-04-20 PROCEDURE — 3072F LOW RISK FOR RETINOPATHY: CPT | Mod: CPTII,95,, | Performed by: PSYCHIATRY & NEUROLOGY

## 2022-04-20 PROCEDURE — 3052F PR MOST RECENT HEMOGLOBIN A1C LEVEL 8.0 - < 9.0%: ICD-10-PCS | Mod: CPTII,95,, | Performed by: PSYCHIATRY & NEUROLOGY

## 2022-04-20 PROCEDURE — 3072F PR LOW RISK FOR RETINOPATHY: ICD-10-PCS | Mod: CPTII,95,, | Performed by: PSYCHIATRY & NEUROLOGY

## 2022-04-20 PROCEDURE — 4010F ACE/ARB THERAPY RXD/TAKEN: CPT | Mod: CPTII,95,, | Performed by: PSYCHIATRY & NEUROLOGY

## 2022-04-20 PROCEDURE — 3061F PR NEG MICROALBUMINURIA RESULT DOCUMENTED/REVIEW: ICD-10-PCS | Mod: CPTII,95,, | Performed by: PSYCHIATRY & NEUROLOGY

## 2022-04-20 PROCEDURE — 3061F NEG MICROALBUMINURIA REV: CPT | Mod: CPTII,95,, | Performed by: PSYCHIATRY & NEUROLOGY

## 2022-04-20 PROCEDURE — 4010F PR ACE/ARB THEARPY RXD/TAKEN: ICD-10-PCS | Mod: CPTII,95,, | Performed by: PSYCHIATRY & NEUROLOGY

## 2022-04-20 RX ORDER — CLONAZEPAM 0.5 MG/1
0.5 TABLET ORAL DAILY PRN
Qty: 90 TABLET | Refills: 0 | Status: SHIPPED | OUTPATIENT
Start: 2022-04-20 | End: 2022-08-09

## 2022-04-20 RX ORDER — FLUOXETINE HYDROCHLORIDE 20 MG/1
20 CAPSULE ORAL 2 TIMES DAILY
Qty: 180 CAPSULE | Refills: 0 | Status: SHIPPED | OUTPATIENT
Start: 2022-04-20 | End: 2022-07-07

## 2022-04-20 RX ORDER — ZOLPIDEM TARTRATE 5 MG/1
TABLET ORAL
Qty: 90 TABLET | Refills: 0 | Status: SHIPPED | OUTPATIENT
Start: 2022-04-20 | End: 2022-08-09

## 2022-04-20 NOTE — PROGRESS NOTES
The patient location is: [place], [place]  The chief complaint leading to consultation is: anxiety  Visit type: audiovisual  Total time spent with patient: 15 min  Each patient to whom he or she provides medical services by telemedicine is:  (1) informed of the relationship between the physician and patient and the respective role of any other health care provider with respect to management of the patient; and (2) notified that he or she may decline to receive medical services by telemedicine and may withdraw from such care at any time.    Notes:     ESTABLISHED OUTPATIENT VISIT   E/M LEVEL 4: [id number]    ENCOUNTER DATE: 4/20/2022  SITE: [facility], [address]    HISTORY    CHIEF COMPLAINT   Althea Vazquez is a 56 y.o. female who presents for follow up of anxiety.    HPI     Appears to be doing well psychiatrically. Appears euthymic during today's visit.    Satisfied with current psychotropic medication regimen.    Spiritual beliefs are supportive.    Has continued to stand up for herself, this is working well for her.    Psychiatric Review Of Systems - Is patient experiencing or having changes in:  sleep: interrupted  appetite: no  weight: working on losing weight  energy/anergy: no  interest/pleasure/anhedonia: no  somatic symptoms: no  libido: no  anxiety/panic: no  guilty/hopelessness: no  concentration: no  S.I.B.s/risky behavior: no  Irritability: no  Racing thoughts: no  Impulsive behaviors: no  Paranoia:no  AVH:no    Recent alcohol: rare small amount  Recent drug: no    Medical ROS   Denies any physical complaint during today's visit.     PAST MEDICAL, FAMILY AND SOCIAL HISTORY: The patient's past medical, family and social history have been reviewed and updated as appropriate within the electronic medical record - see encounter notes.    PSYCHOTROPIC MEDICATIONS   Prozac 20 mg qam and 20 mg qpm, Clonazepam 0.5 mg prn for anxiety[has been using about 3 days per week], Ambien 5 mg at  bedtime prn recently has been taking about 3 times per week], Topiramate 100 mg qam    Scheduled and PRN Medications     Current Outpatient Medications:     amLODIPine (NORVASC) 10 MG tablet, TAKE ONE TABLET BY MOUTH DAILY, Disp: 90 tablet, Rfl: 3    aspirin (ECOTRIN) 81 MG EC tablet, Take 1 tablet (81 mg total) by mouth once daily., Disp: 30 tablet, Rfl: 0    atenoloL (TENORMIN) 100 MG tablet, TAKE ONE TABLET BY MOUTH DAILY, Disp: 90 tablet, Rfl: 3    atorvastatin (LIPITOR) 40 MG tablet, TAKE ONE TABLET BY MOUTH DAILY, Disp: 90 tablet, Rfl: 3    blood-glucose meter kit, Use twice daily., Disp: 1 each, Rfl: 0    boric acid (BORIC ACID) vaginal suppository, Place 1 each (650 mg total) vaginally every evening., Disp: 14 suppository, Rfl: 6    clonazePAM (KLONOPIN) 0.5 MG tablet, TAKE ONE TABLET BY MOUTH DAILY AS NEEDED FOR ANXIETY, Disp: 90 tablet, Rfl: 0    clotrimazole-betamethasone 1-0.05% (LOTRISONE) cream, Apply topically 2 (two) times daily as needed. , Disp: , Rfl:     cyclobenzaprine (FLEXERIL) 10 MG tablet, TAKE ONE TABLET BY MOUTH AT BEDTIME AS NEEDED, Disp: 90 tablet, Rfl: 2    diclofenac sodium (VOLTAREN) 1 % Gel, Apply 4 g topically 4 (four) times daily. Apply light film topically to knee up to four times daily, Disp: 3 Tube, Rfl: 2    docusate sodium (COLACE) 50 MG capsule, Take 1 capsule (50 mg total) by mouth 2 (two) times daily as needed for Constipation. (Patient taking differently: Take 50 mg by mouth 2 (two) times daily.), Disp: 30 capsule, Rfl: 0    entecavir (BARACLUDE) 0.5 MG Tab, Take 1 tablet (0.5 mg total) by mouth once daily., Disp: 90 tablet, Rfl: 3    ergocalciferol (ERGOCALCIFEROL) 50,000 unit Cap, Take 1 capsule (50,000 Units total) by mouth every 7 days., Disp: 12 capsule, Rfl: 3    fluocinonide 0.05% (LIDEX) 0.05 % cream, Apply topically 2 (two) times daily. To allergic rash, Disp: 30 g, Rfl: 0    FLUoxetine 20 MG capsule, Take 1 capsule (20 mg total) by mouth 2 (two)  times daily., Disp: 180 capsule, Rfl: 1    FREESTYLE LITE STRIPS Strp, test TWICE DAILY, Disp: 200 each, Rfl: 3    golimumab (SIMPONI) 50 mg/0.5 mL PnIj, Inject 50 mg into the skin every 30 days., Disp: 1.5 mL, Rfl: 1    hydroCHLOROthiazide (HYDRODIURIL) 25 MG tablet, TAKE ONE TABLET BY MOUTH DAILY, Disp: 90 tablet, Rfl: 3    hydrocortisone 2.5 % cream, Apply topically 2 (two) times daily. for 10 days, Disp: 28 g, Rfl: 1    hydroquinone 4 % Crea, Apply to dark areas qhs.  Not more than 6 months straight in same location.Use sunscreen in am (Patient taking differently: continuous prn. Apply to dark areas qhs.  Not more than 6 months straight in same location.Use sunscreen in am), Disp: 46 g, Rfl: 1    INVOKANA 300 mg Tab tablet, TAKE ONE TABLET BY MOUTH BEFORE BREAKFAST, Disp: 90 tablet, Rfl: 1    lancets (FREESTYLE LANCETS) 28 gauge Misc, test TWICE DAILY, Disp: 200 each, Rfl: 3    levocetirizine (XYZAL) 5 MG tablet, TAKE ONE TABLET BY MOUTH EVERY EVENING, Disp: 90 tablet, Rfl: 2    losartan (COZAAR) 100 MG tablet, TAKE ONE TABLET BY MOUTH ONCE DAILY, Disp: 90 tablet, Rfl: 3    naproxen (NAPROSYN) 500 MG tablet, Take 1 tablet (500 mg total) by mouth 2 (two) times daily as needed., Disp: 180 tablet, Rfl: 0    neomycin-polymyxin-hydrocortisone (CORTISPORIN) 3.5-10,000-1 mg/mL-unit/mL-% otic suspension, Place 3 drops into the left ear 3 (three) times daily., Disp: 10 mL, Rfl: 0    nystatin (MYCOSTATIN) powder, Apply to affected area 3 times daily, Disp: 1 Bottle, Rfl: 3    ondansetron (ZOFRAN-ODT) 4 MG TbDL, Take 1 tablet (4 mg total) by mouth every 8 (eight) hours as needed (nausea and vomiting). (Patient not taking: Reported on 4/4/2022), Disp: 20 tablet, Rfl: 0    pantoprazole (PROTONIX) 40 MG tablet, TAKE ONE TABLET BY MOUTH EVERY DAY, Disp: 90 tablet, Rfl: 2    prednisoLONE acetate (PRED FORTE) 1 % DrpS, Instill one drop into the affected eye every two hours while awake, Disp: 5 mL, Rfl: 0     repaglinide (PRANDIN) 1 MG tablet, TAKE ONE TABLET BY MOUTH THREE TIMES DAILY BEFORE MEALS (TAKING PLACE OF TRAJENTA), Disp: 270 tablet, Rfl: 0    semaglutide (OZEMPIC) 0.25 mg or 0.5 mg(2 mg/1.5 mL) pen injector, Inject 0.5 mg into the skin every 7 days., Disp: 1 pen, Rfl: 0    semaglutide (OZEMPIC) 1 mg/dose (4 mg/3 mL), Inject 1 mg into the skin every 7 days., Disp: 1 pen, Rfl: 11    sulfaSALAzine (AZULFIDINE) 500 MG EC tablet, Take 3 tablets (1,500 mg total) by mouth 2 (two) times daily., Disp: 540 tablet, Rfl: 0    terconazole (TERAZOL 7) 0.4 % Crea, Place 1 applicator vaginally every evening., Disp: 45 g, Rfl: 2    tiZANidine (ZANAFLEX) 2 MG tablet, Take 1-2 tablets (2-4 mg total) by mouth every 8 (eight) hours as needed., Disp: 90 tablet, Rfl: 2    topiramate (TOPAMAX) 100 MG tablet, Take 100 mg by mouth 2 (two) times daily., Disp: , Rfl:     triamcinolone acetonide 0.1% (KENALOG) 0.1 % cream, Apply topically 2 (two) times daily., Disp: 45 g, Rfl: 1    valACYclovir (VALTREX) 1000 MG tablet, valacyclovir 1 gram tablet  Take 0.5 tablets every 12 hours by oral route., Disp: , Rfl:     vitamin D (VITAMIN D3) 1000 units Tab, Take 1,000 Units by mouth once daily., Disp: , Rfl:     zolpidem (AMBIEN) 5 MG Tab, TAKE ONE TABLET BY MOUTH AT BEDTIME AS NEEDED, Disp: 90 tablet, Rfl: 0  No current facility-administered medications for this visit.    Facility-Administered Medications Ordered in Other Visits:     0.9%  NaCl infusion, , Intravenous, Continuous, Graciela Jerome MD    EXAMINATION    There were no vitals filed for this visit.      CONSTITUTIONAL  General Appearance: well nourished    MUSCULOSKELETAL  Muscle Strength and Tone: normal strength and tone  Abnormal Involuntary Movements: no abnormal movement noted  Gait and Station: normal gait    PSYCHIATRIC   Level of Consciousness: alert  Orientation: oriented to person, place and time  Grooming: well groomed  Psychomotor Behavior: no  restlessness/agitation  Speech: normal in rate, rhythm and volume  Language: normal vocabulary  Mood: anxiety at times  Affect: full range and appropriate  Thought Process: logical and goal directed  Associations: intact associations  Thought Content: no SI/HI  Memory: grossly intact  Attention: intact to content of interview  Fund of Knowledge: appears adequate  Insight: good  Judgement: good    MEDICAL DECISION MAKING    DIAGNOSES  Anxiety d/o N.O.S.    PROBLEM LIST AND MANAGEMENT PLANS    - anxiety: continue Prozac, Klonopin and Ambien as above  - rtc 3 months    Time with patient: 15 min    LABORATORY DATA  Lab Visit on 03/30/2022   Component Date Value Ref Range Status    Sed Rate 03/30/2022 10  0 - 20 mm/Hr Final    CRP 03/30/2022 0.31  0.00 - 1.00 mg/dL Final    WBC 03/30/2022 6.39  3.90 - 12.70 K/uL Final    RBC 03/30/2022 5.15  4.00 - 5.40 M/uL Final    Hemoglobin 03/30/2022 15.3  12.0 - 16.0 g/dL Final    Hematocrit 03/30/2022 46.0  37.0 - 48.5 % Final    MCV 03/30/2022 89  82 - 98 fL Final    MCH 03/30/2022 29.7  27.0 - 31.0 pg Final    MCHC 03/30/2022 33.3  32.0 - 36.0 g/dL Final    RDW 03/30/2022 12.5  11.5 - 14.5 % Final    Platelets 03/30/2022 228  150 - 450 K/uL Final    MPV 03/30/2022 10.8  9.2 - 12.9 fL Final    Immature Granulocytes 03/30/2022 0.5  0.0 - 0.5 % Final    Gran # (ANC) 03/30/2022 1.9  1.8 - 7.7 K/uL Final    Immature Grans (Abs) 03/30/2022 0.03  0.00 - 0.04 K/uL Final    Comment: Mild elevation in immature granulocytes is non specific and   can be seen in a variety of conditions including stress response,   acute inflammation, trauma and pregnancy. Correlation with other   laboratory and clinical findings is essential.      Lymph # 03/30/2022 3.7  1.0 - 4.8 K/uL Final    Mono # 03/30/2022 0.6  0.3 - 1.0 K/uL Final    Eos # 03/30/2022 0.1  0.0 - 0.5 K/uL Final    Baso # 03/30/2022 0.03  0.00 - 0.20 K/uL Final    nRBC 03/30/2022 0  0 /100 WBC Final    Gran %  03/30/2022 29.7 (A) 38.0 - 73.0 % Final    Lymph % 03/30/2022 58.4 (A) 18.0 - 48.0 % Final    Mono % 03/30/2022 9.5  4.0 - 15.0 % Final    Eosinophil % 03/30/2022 1.4  0.0 - 8.0 % Final    Basophil % 03/30/2022 0.5  0.0 - 1.9 % Final    Differential Method 03/30/2022 Automated   Final    Sodium 03/30/2022 141  136 - 145 mmol/L Final    Potassium 03/30/2022 4.1  3.5 - 5.1 mmol/L Final    Chloride 03/30/2022 104  95 - 110 mmol/L Final    CO2 03/30/2022 24  23 - 29 mmol/L Final    Glucose 03/30/2022 115 (A) 70 - 110 mg/dL Final    BUN 03/30/2022 13  7 - 17 mg/dL Final    Creatinine 03/30/2022 0.71  0.50 - 1.40 mg/dL Final    Calcium 03/30/2022 9.6  8.7 - 10.5 mg/dL Final    Total Protein 03/30/2022 8.2  6.0 - 8.4 g/dL Final    Albumin 03/30/2022 4.9  3.5 - 5.2 g/dL Final    Total Bilirubin 03/30/2022 0.6  0.1 - 1.0 mg/dL Final    Comment: For infants and newborns, interpretation of results should be based  on gestational age, weight and in agreement with clinical  observations.    Premature Infant recommended reference ranges:  Up to 24 hours.............<8.0 mg/dL  Up to 48 hours............<12.0 mg/dL  3-5 days..................<15.0 mg/dL  6-29 days.................<15.0 mg/dL      Alkaline Phosphatase 03/30/2022 91  38 - 126 U/L Final    AST 03/30/2022 24  15 - 46 U/L Final    ALT 03/30/2022 17  10 - 44 U/L Final    Anion Gap 03/30/2022 13  8 - 16 mmol/L Final    eGFR if African American 03/30/2022 >60.0  >60 mL/min/1.73 m^2 Final    eGFR if non African American 03/30/2022 >60.0  >60 mL/min/1.73 m^2 Final    Comment: Calculation used to obtain the estimated glomerular filtration  rate (eGFR) is the CKD-EPI equation.       HBV DNA, Qualitative PCR 03/30/2022 Not detected  Not detected Final    Comment: This procedure utilizes a real-time polymerase chain  reaction test from [employer].  The amplification   target is a conserved region in the terminal third of  the hepatitis B surface  antigen gene.  The qualitative   limit of detection is 10 IU/mL (1.00 Log IU/mL).  A Not detected result does not rule out infection.    Test performed at [facility],  [address], [place]  [address]     [phone]  Milton Castro MD  - Medical Director     Lab Visit on 03/09/2022   Component Date Value Ref Range Status    Microalbumin, Urine 03/09/2022 <5.0  ug/mL Final    Creatinine, Urine 03/09/2022 48.0  15.0 - 325.0 mg/dL Final    Microalb/Creat Ratio 03/09/2022 Unable to calculate  0.0 - 30.0 ug/mg Final    Specimen UA 03/09/2022 Urine, Clean Catch   Final    Color, UA 03/09/2022 Yellow  Yellow, Straw, Norma Final    Appearance, UA 03/09/2022 Clear  Clear Final    pH, UA 03/09/2022 7.0  5.0 - 8.0 Final    Specific Gravity, UA 03/09/2022 1.010  1.005 - 1.030 Final    Protein, UA 03/09/2022 Negative  Negative Final    Comment: Recommend a 24 hour urine protein or a urine   protein/creatinine ratio if globulin induced proteinuria is  clinically suspected.      Glucose, UA 03/09/2022 4+ (A) Negative Final    Ketones, UA 03/09/2022 Negative  Negative Final    Bilirubin (UA) 03/09/2022 Negative  Negative Final    Occult Blood UA 03/09/2022 Negative  Negative Final    Nitrite, UA 03/09/2022 Negative  Negative Final    Urobilinogen, UA 03/09/2022 Negative  <2.0 EU/dL Final    Leukocytes, UA 03/09/2022 1+ (A) Negative Final    WBC, UA 03/09/2022 5  0 - 5 /hpf Final    Bacteria 03/09/2022 Rare  None-Occ /hpf Final    Yeast, UA 03/09/2022 None  None Final    Microscopic Comment 03/09/2022 SEE COMMENT   Final    Comment: Other formed elements not mentioned in the report are not   present in the microscopic examination.      Lab Visit on 03/09/2022   Component Date Value Ref Range Status    WBC 03/09/2022 7.97  3.90 - 12.70 K/uL Final    RBC 03/09/2022 5.03  4.00 - 5.40 M/uL Final    Hemoglobin 03/09/2022 15.1  12.0 - 16.0 g/dL Final    Hematocrit 03/09/2022 46.1  37.0 -  48.5 % Final    MCV 03/09/2022 92  82 - 98 fL Final    MCH 03/09/2022 30.0  27.0 - 31.0 pg Final    MCHC 03/09/2022 32.8  32.0 - 36.0 g/dL Final    RDW 03/09/2022 13.2  11.5 - 14.5 % Final    Platelets 03/09/2022 288  150 - 450 K/uL Final    MPV 03/09/2022 11.3  9.2 - 12.9 fL Final    Immature Granulocytes 03/09/2022 0.9 (A) 0.0 - 0.5 % Final    Gran # (ANC) 03/09/2022 3.3  1.8 - 7.7 K/uL Final    Immature Grans (Abs) 03/09/2022 0.07 (A) 0.00 - 0.04 K/uL Final    Comment: Mild elevation in immature granulocytes is non specific and   can be seen in a variety of conditions including stress response,   acute inflammation, trauma and pregnancy. Correlation with other   laboratory and clinical findings is essential.      Lymph # 03/09/2022 3.7  1.0 - 4.8 K/uL Final    Mono # 03/09/2022 0.7  0.3 - 1.0 K/uL Final    Eos # 03/09/2022 0.1  0.0 - 0.5 K/uL Final    Baso # 03/09/2022 0.05  0.00 - 0.20 K/uL Final    nRBC 03/09/2022 0  0 /100 WBC Final    Gran % 03/09/2022 41.3  38.0 - 73.0 % Final    Lymph % 03/09/2022 46.7  18.0 - 48.0 % Final    Mono % 03/09/2022 9.2  4.0 - 15.0 % Final    Eosinophil % 03/09/2022 1.3  0.0 - 8.0 % Final    Basophil % 03/09/2022 0.6  0.0 - 1.9 % Final    Differential Method 03/09/2022 Automated   Final    Sodium 03/09/2022 139  136 - 145 mmol/L Final    Potassium 03/09/2022 4.4  3.5 - 5.1 mmol/L Final    Chloride 03/09/2022 104  95 - 110 mmol/L Final    CO2 03/09/2022 25  23 - 29 mmol/L Final    Glucose 03/09/2022 175 (A) 70 - 110 mg/dL Final    BUN 03/09/2022 11  7 - 17 mg/dL Final    Creatinine 03/09/2022 0.75  0.50 - 1.40 mg/dL Final    Calcium 03/09/2022 9.6  8.7 - 10.5 mg/dL Final    Total Protein 03/09/2022 8.2  6.0 - 8.4 g/dL Final    Albumin 03/09/2022 4.7  3.5 - 5.2 g/dL Final    Total Bilirubin 03/09/2022 0.6  0.1 - 1.0 mg/dL Final    Comment: For infants and newborns, interpretation of results should be based  on gestational age, weight and in agreement  with clinical  observations.    Premature Infant recommended reference ranges:  Up to 24 hours.............<8.0 mg/dL  Up to 48 hours............<12.0 mg/dL  3-5 days..................<15.0 mg/dL  6-29 days.................<15.0 mg/dL      Alkaline Phosphatase 03/09/2022 93  38 - 126 U/L Final    AST 03/09/2022 22  15 - 46 U/L Final    ALT 03/09/2022 16  10 - 44 U/L Final    Anion Gap 03/09/2022 10  8 - 16 mmol/L Final    eGFR if African American 03/09/2022 >60.0  >60 mL/min/1.73 m^2 Final    eGFR if non African American 03/09/2022 >60.0  >60 mL/min/1.73 m^2 Final    Comment: Calculation used to obtain the estimated glomerular filtration  rate (eGFR) is the CKD-EPI equation.       Hemoglobin A1C 03/09/2022 8.3 (A) 4.0 - 5.6 % Final    Comment: ADA Screening Guidelines:  5.7-6.4%  Consistent with prediabetes  >or=6.5%  Consistent with diabetes    High levels of fetal hemoglobin interfere with the HbA1C  assay. Heterozygous hemoglobin variants (HbS, HgC, etc)do  not significantly interfere with this assay.   However, presence of multiple variants may affect accuracy.      Estimated Avg Glucose 03/09/2022 192 (A) 68 - 131 mg/dL Final    Cholesterol 03/09/2022 193  120 - 199 mg/dL Final    Comment: The National Cholesterol Education Program (NCEP) has set the  following guidelines (reference ranges) for Cholesterol:  Optimal.....................<200 mg/dL  Borderline High.............200-239 mg/dL  High........................> or = 240 mg/dL      Triglycerides 03/09/2022 92  30 - 150 mg/dL Final    Comment: The National Cholesterol Education Program (NCEP) has set the  following guidelines (reference values) for triglycerides:  Normal......................<150 mg/dL  Borderline High.............150-199 mg/dL  High........................200-499 mg/dL      HDL 03/09/2022 55  40 - 75 mg/dL Final    Comment: The National Cholesterol Education Program (NCEP) has set the  following guidelines (reference values)  for HDL Cholesterol:  Low...............<40 mg/dL  Optimal...........>60 mg/dL      LDL Cholesterol 03/09/2022 119.6  63.0 - 159.0 mg/dL Final    Comment: The National Cholesterol Education Program (NCEP) has set the  following guidelines (reference values) for LDL Cholesterol:  Optimal.......................<130 mg/dL  Borderline High...............130-159 mg/dL  High..........................160-189 mg/dL  Very High.....................>190 mg/dL      HDL/Cholesterol Ratio 03/09/2022 28.5  20.0 - 50.0 % Final    Total Cholesterol/HDL Ratio 03/09/2022 3.5  2.0 - 5.0 Final    Non-HDL Cholesterol 03/09/2022 138  mg/dL Final    Comment: Risk category and Non-HDL cholesterol goals:  Coronary heart disease (CHD)or equivalent (10-year risk of CHD >20%):  Non-HDL cholesterol goal     <130 mg/dL  Two or more CHD risk factors and 10-year risk of CHD <= 20%:  Non-HDL cholesterol goal     <160 mg/dL  0 to 1 CHD risk factor:  Non-HDL cholesterol goal     <190 mg/dL      TSH 03/09/2022 2.350  0.400 - 4.000 uIU/mL Final    Comment: Warning:  Heterophilic antibodies in serum or plasma of   certain individuals are known to cause interference with   immunoassays. These antibodies may be present in blood samples   from individuals regularly exposed to animal or who have been   treated with animal products.     Patients taking high doses of supplemental biotin may have  negatively biased results.              Tip Oliveira

## 2022-04-20 NOTE — TELEPHONE ENCOUNTER
Provider told patient to finish her remaining Cimzia. As a result she is not due to start Simponi until 5/6/22. Pending initial consult appropriately.

## 2022-04-29 ENCOUNTER — HOSPITAL ENCOUNTER (OUTPATIENT)
Dept: RADIOLOGY | Facility: HOSPITAL | Age: 57
Discharge: HOME | End: 2022-04-29
Attending: INTERNAL MEDICINE
Payer: MEDICARE

## 2022-04-29 DIAGNOSIS — Z12.31 ENCOUNTER FOR SCREENING MAMMOGRAM FOR BREAST CANCER: ICD-10-CM

## 2022-04-29 PROCEDURE — 77063 BREAST TOMOSYNTHESIS BI: CPT | Mod: TC,PO

## 2022-05-06 NOTE — TELEPHONE ENCOUNTER
Specialty Pharmacy - Initial Clinical Assessment    Specialty Medication Orders Linked to Encounter    Flowsheet Row Most Recent Value   Medication #1 golimumab (SIMPONI) 50 mg/0.5 mL PnIj (Order#[id number], Rx#[id number])        Patient Diagnosis   M45.A0 - Non-radiographic axial spondyloarthritis    Subjective    Althea Vazquez is a 56 y.o. female, who is followed by the specialty pharmacy service for management and education.    Recent Encounters     Date Type Provider Description    04/11/2022 Specialty Pharmacy Eliceo Arias PharmD Initial Clinical Assessment    04/05/2022 Specialty Pharmacy Eliceo Arias PharmD Referral Authorization    02/21/2022 Specialty Pharmacy Eliceo Arias PharmD Refill Coordination    02/21/2022 Specialty Pharmacy Eliceo Arias PharmD Referral Authorization; Refill Coordination    01/22/2022 Specialty Pharmacy Ashley Fonseca Refill Coordination        Clinical call attempts since last clinical assessment   4/11/2022 10:32 PM - Specialty Pharmacy - Clinical Assessment by Eliceo Arias PharmD     Current Outpatient Medications   Medication Sig    amLODIPine (NORVASC) 10 MG tablet TAKE ONE TABLET BY MOUTH DAILY    aspirin (ECOTRIN) 81 MG EC tablet Take 1 tablet (81 mg total) by mouth once daily.    atenoloL (TENORMIN) 100 MG tablet TAKE ONE TABLET BY MOUTH DAILY    atorvastatin (LIPITOR) 40 MG tablet TAKE ONE TABLET BY MOUTH DAILY    blood-glucose meter kit Use twice daily.    boric acid (BORIC ACID) vaginal suppository Place 1 each (650 mg total) vaginally every evening.    clonazePAM (KLONOPIN) 0.5 MG tablet Take 1 tablet (0.5 mg total) by mouth daily as needed for Anxiety.    clotrimazole-betamethasone 1-0.05% (LOTRISONE) cream Apply topically 2 (two) times daily as needed.     cyclobenzaprine (FLEXERIL) 10 MG tablet TAKE ONE TABLET BY MOUTH AT BEDTIME AS NEEDED    diclofenac sodium (VOLTAREN) 1 % Gel Apply 4 g topically 4 (four) times daily.  Apply light film topically to knee up to four times daily    docusate sodium (COLACE) 50 MG capsule Take 1 capsule (50 mg total) by mouth 2 (two) times daily as needed for Constipation. (Patient taking differently: Take 50 mg by mouth 2 (two) times daily.)    entecavir (BARACLUDE) 0.5 MG Tab Take 1 tablet (0.5 mg total) by mouth once daily.    ergocalciferol (ERGOCALCIFEROL) 50,000 unit Cap Take 1 capsule (50,000 Units total) by mouth every 7 days.    fluocinonide 0.05% (LIDEX) 0.05 % cream Apply topically 2 (two) times daily. To allergic rash    FLUoxetine 20 MG capsule Take 1 capsule (20 mg total) by mouth 2 (two) times daily.    FREESTYLE LITE STRIPS Strp test TWICE DAILY    golimumab (SIMPONI) 50 mg/0.5 mL PnIj Inject 50 mg into the skin every 30 days.    hydroCHLOROthiazide (HYDRODIURIL) 25 MG tablet TAKE ONE TABLET BY MOUTH DAILY    hydrocortisone 2.5 % cream Apply topically 2 (two) times daily. for 10 days    hydroquinone 4 % Crea Apply to dark areas qhs.  Not more than 6 months straight in same location.Use sunscreen in am (Patient taking differently: continuous prn. Apply to dark areas qhs.  Not more than 6 months straight in same location.Use sunscreen in am)    INVOKANA 300 mg Tab tablet TAKE ONE TABLET BY MOUTH BEFORE BREAKFAST    lancets (FREESTYLE LANCETS) 28 gauge Misc test TWICE DAILY    levocetirizine (XYZAL) 5 MG tablet TAKE ONE TABLET BY MOUTH EVERY EVENING    losartan (COZAAR) 100 MG tablet TAKE ONE TABLET BY MOUTH ONCE DAILY    naproxen (NAPROSYN) 500 MG tablet Take 1 tablet (500 mg total) by mouth 2 (two) times daily as needed.    neomycin-polymyxin-hydrocortisone (CORTISPORIN) 3.5-10,000-1 mg/mL-unit/mL-% otic suspension Place 3 drops into the left ear 3 (three) times daily.    nystatin (MYCOSTATIN) powder Apply to affected area 3 times daily    pantoprazole (PROTONIX) 40 MG tablet TAKE ONE TABLET BY MOUTH EVERY DAY    prednisoLONE acetate (PRED FORTE) 1 % DrpS Instill one  drop into the affected eye every two hours while awake    repaglinide (PRANDIN) 1 MG tablet TAKE ONE TABLET BY MOUTH THREE TIMES DAILY BEFORE MEALS (TAKING PLACE OF TRAJENTA)    semaglutide (OZEMPIC) 0.25 mg or 0.5 mg(2 mg/1.5 mL) pen injector Inject 0.5 mg into the skin every 7 days.    semaglutide (OZEMPIC) 1 mg/dose (4 mg/3 mL) Inject 1 mg into the skin every 7 days.    sulfaSALAzine (AZULFIDINE) 500 MG EC tablet Take 3 tablets (1,500 mg total) by mouth 2 (two) times daily.    terconazole (TERAZOL 7) 0.4 % Crea Place 1 applicator vaginally every evening.    tiZANidine (ZANAFLEX) 2 MG tablet Take 1-2 tablets (2-4 mg total) by mouth every 8 (eight) hours as needed.    topiramate (TOPAMAX) 100 MG tablet Take 100 mg by mouth 2 (two) times daily.    triamcinolone acetonide 0.1% (KENALOG) 0.1 % cream Apply topically 2 (two) times daily.    valACYclovir (VALTREX) 1000 MG tablet valacyclovir 1 gram tablet   Take 0.5 tablets every 12 hours by oral route.    vitamin D (VITAMIN D3) 1000 units Tab Take 1,000 Units by mouth once daily.    zolpidem (AMBIEN) 5 MG Tab TAKE ONE TABLET BY MOUTH AT BEDTIME AS NEEDED   Last reviewed on 4/4/2022 10:28 AM by Giselle Nelson MA    Review of patient's allergies indicates:   Allergen Reactions    Bactrim [sulfamethoxazole-trimethoprim] Itching    Trulicity [dulaglutide] Diarrhea and Other (See Comments)     Vomiting, abdominal pain    Diflucan [fluconazole] Swelling and Other (See Comments)     Sore on mouth   Last reviewed on  5/6/2022 11:10 AM by Eliceo Arias    Drug Interactions    Drug interactions evaluated: yes  Clinically relevant drug interactions identified: yes   Interactions list: Aspirin + Naproxen + Fluoxetine - increased risk for bleeding   Drug management plan: Discussed signs and symptoms of serious bleeds and what to report to MD   Provided the patient with educational material regarding drug interactions: not applicable           Assessment  Questions - Documented Responses    Flowsheet Row Most Recent Value   Assessment    Medication Reconciliation completed for patient Yes   During the past 4 weeks, has patient missed any activities due to condition or medication? No   During the past 4 weeks, did patient have any of the following urgent care visits? None   Goals of Therapy Status Discussed (new start)   Status of the patients ability to self-administer: Is Able   All education points have been covered with patient? Yes, supplemental printed education provided   Welcome packet contents reviewed and discussed with patient? Yes   Assesment completed? Yes   Plan Therapy being initiated   Do you need to open a clinical intervention (i-vent)? No   Do you want to schedule first shipment? Yes   Medication #1 Assessment Info    Patient status New medication, Exisiting to [facility]   Is this medication appropriate for the patient? Yes   Is this medication effective? Not yet started        Refill Questions - Documented Responses    Flowsheet Row Most Recent Value   Patient Availability and HIPAA Verification    Does patient want to proceed with activity? Yes   HIPAA/medical authority confirmed? Yes   Relationship to patient of person spoken to? Self   Refill Screening Questions    When does the patient need to receive the medication? 05/11/22   Refill Delivery Questions    How will the patient receive the medication? Delivery Roseann   When does the patient need to receive the medication? 05/11/22   Shipping Address Home   Address in [facility] confirmed and updated if neccessary? Yes   Expected Copay ($) 0   Is the patient able to afford the medication copay? Yes   Payment Method zero copay   Days supply of Refill 30   Supplies needed? No supplies needed   Refill activity completed? Yes   Refill activity plan Refill scheduled   Shipment/Pickup Date: 05/10/22          Objective    She has a past medical history of Acid reflux, Anxiety (10/18/2012), Arthritis,  "Depression, Diabetic peripheral neuropathy - mild (10/21/2014), Difficult intubation, Dry eyes, Dry mouth, Fever blister, History of hepatitis B (10/3/2016), Hyperlipidemia, Hypertension, Insomnia, Iritis (5/13/2014), Long-term current use of steroids (9/27/2012), Nausea & vomiting (2/4/2015), VAISHNAVI (obstructive sleep apnea), and Type II diabetes mellitus (10/1/2012).    Tried/failed medications: Humira, Enbrel and Cimzia    BP Readings from Last 4 Encounters:   04/04/22 (!) 145/91   03/16/22 130/82   11/16/21 138/78   09/28/21 (!) 142/86     Ht Readings from Last 4 Encounters:   04/04/22 5' 7.2" (1.707 m)   03/16/22 5' 7" (1.702 m)   11/16/21 5' 7" (1.702 m)   09/28/21 5' 7" (1.702 m)     Wt Readings from Last 4 Encounters:   04/04/22 108 kg (238 lb)   03/16/22 110.2 kg (242 lb 15.2 oz)   11/16/21 109.2 kg (240 lb 11.9 oz)   09/28/21 106.7 kg (235 lb 3.7 oz)     Recent Labs   Lab Result Units 03/30/22  0948 03/09/22  0749   Creatinine mg/dL 0.71 0.75   ALT U/L 17 16   AST U/L 24 22     The goals of prescribed drug therapy management include:  · Supporting patient to meet the prescriber's medical treatment objectives  · Improving or maintaining quality of life  · Maintaining optimal therapy adherence  · Minimizing and managing side effects      Goals of Therapy Status: Discussed (new start)    Assessment/Plan  Patient plans to start therapy on 05/11/22      Indication, dosage, appropriateness, effectiveness, safety and convenience of her specialty medication(s) were reviewed today.     Patient Education   Patient received education on the following:    Expectations and possible outcomes of therapy   Proper use, timely administration, and missed dose management   Duration of therapy   Side effects, including prevention, minimization, and management   Contraindications and safety precautions   New or changed medications, including prescribe and over the counter medications and supplements   Reviews recommended " vaccinations, as appropriate   Storage, safe handling, and disposal    Tasks added this encounter   6/3/2022 - Refill Call (Auto Added)  2/6/2023 - Clinical - Follow Up Assesement (Annual)   Tasks due within next 3 months   No tasks due.     Eliceo Arias, PharmD  [facility]  [address] [facility] [address]  [facility] [address]  Phone: [phone]  Fax: [phone]

## 2022-05-11 NOTE — TELEPHONE ENCOUNTER
Patient called in to report her package had not been delivered even though she got a notification that it had been delivered. After speaking with FF team, the issue was resolved as it was at her neighbor's house. Ice packs were still cold and informed the patient that simponi is good for 30 days a room temperature.

## 2022-05-16 ENCOUNTER — SPECIALTY PHARMACY (OUTPATIENT)
Dept: PHARMACY | Facility: CLINIC | Age: 57
End: 2022-05-16
Payer: MEDICARE

## 2022-05-16 NOTE — TELEPHONE ENCOUNTER
Specialty Pharmacy - Refill Coordination    Specialty Medication Orders Linked to Encounter    Flowsheet Row Most Recent Value   Medication #1 entecavir (BARACLUDE) 0.5 MG Tab (Order#[id number], Rx#[id number])          Refill Questions - Documented Responses    Flowsheet Row Most Recent Value   Patient Availability and HIPAA Verification    Does patient want to proceed with activity? Yes   HIPAA/medical authority confirmed? Yes   Relationship to patient of person spoken to? Self   Refill Screening Questions    Changes to allergies? No   Changes to medications? No   New conditions since last clinic visit? No   Unplanned office visit, urgent care, ED, or hospital admission in the last 4 weeks? No   How does patient/caregiver feel medication is working? Good   Financial problems or insurance changes? No   How many doses of your specialty medications were missed in the last 4 weeks? 0   Would patient like to speak to a pharmacist? No   When does the patient need to receive the medication? 05/18/22   Refill Delivery Questions    How will the patient receive the medication? Delivery Roseann   When does the patient need to receive the medication? 05/18/22   Shipping Address Prescription   Address in [facility] confirmed and updated if neccessary? Yes   Expected Copay ($) 0   Is the patient able to afford the medication copay? Yes   Payment Method zero copay   Days supply of Refill 90   Supplies needed? No supplies needed   Refill activity completed? Yes   Refill activity plan Refill scheduled   Shipment/Pickup Date: 05/18/22          Current Outpatient Medications   Medication Sig    amLODIPine (NORVASC) 10 MG tablet TAKE ONE TABLET BY MOUTH DAILY    aspirin (ECOTRIN) 81 MG EC tablet Take 1 tablet (81 mg total) by mouth once daily.    atenoloL (TENORMIN) 100 MG tablet TAKE ONE TABLET BY MOUTH DAILY    atorvastatin (LIPITOR) 40 MG tablet TAKE ONE TABLET BY MOUTH DAILY    blood-glucose meter kit Use twice daily.     boric acid (BORIC ACID) vaginal suppository Place 1 each (650 mg total) vaginally every evening.    clonazePAM (KLONOPIN) 0.5 MG tablet Take 1 tablet (0.5 mg total) by mouth daily as needed for Anxiety.    clotrimazole-betamethasone 1-0.05% (LOTRISONE) cream Apply topically 2 (two) times daily as needed.     cyclobenzaprine (FLEXERIL) 10 MG tablet TAKE ONE TABLET BY MOUTH AT BEDTIME AS NEEDED    diclofenac sodium (VOLTAREN) 1 % Gel Apply 4 g topically 4 (four) times daily. Apply light film topically to knee up to four times daily    docusate sodium (COLACE) 50 MG capsule Take 1 capsule (50 mg total) by mouth 2 (two) times daily as needed for Constipation. (Patient taking differently: Take 50 mg by mouth 2 (two) times daily.)    entecavir (BARACLUDE) 0.5 MG Tab Take 1 tablet (0.5 mg total) by mouth once daily.    ergocalciferol (ERGOCALCIFEROL) 50,000 unit Cap Take 1 capsule (50,000 Units total) by mouth every 7 days.    fluocinonide 0.05% (LIDEX) 0.05 % cream Apply topically 2 (two) times daily. To allergic rash    FLUoxetine 20 MG capsule Take 1 capsule (20 mg total) by mouth 2 (two) times daily.    FREESTYLE LITE STRIPS Strp test TWICE DAILY    golimumab (SIMPONI) 50 mg/0.5 mL PnIj Inject 50 mg into the skin every 30 days.    hydroCHLOROthiazide (HYDRODIURIL) 25 MG tablet TAKE ONE TABLET BY MOUTH DAILY    hydrocortisone 2.5 % cream Apply topically 2 (two) times daily. for 10 days    hydroquinone 4 % Crea Apply to dark areas qhs.  Not more than 6 months straight in same location.Use sunscreen in am (Patient taking differently: continuous prn. Apply to dark areas qhs.  Not more than 6 months straight in same location.Use sunscreen in am)    INVOKANA 300 mg Tab tablet TAKE ONE TABLET BY MOUTH BEFORE BREAKFAST    lancets (FREESTYLE LANCETS) 28 gauge Misc test TWICE DAILY    levocetirizine (XYZAL) 5 MG tablet TAKE ONE TABLET BY MOUTH EVERY EVENING    losartan (COZAAR) 100 MG tablet TAKE ONE TABLET BY  MOUTH ONCE DAILY    naproxen (NAPROSYN) 500 MG tablet Take 1 tablet (500 mg total) by mouth 2 (two) times daily as needed.    neomycin-polymyxin-hydrocortisone (CORTISPORIN) 3.5-10,000-1 mg/mL-unit/mL-% otic suspension Place 3 drops into the left ear 3 (three) times daily.    nystatin (MYCOSTATIN) powder Apply to affected area 3 times daily    pantoprazole (PROTONIX) 40 MG tablet TAKE ONE TABLET BY MOUTH EVERY DAY    prednisoLONE acetate (PRED FORTE) 1 % DrpS Instill one drop into the affected eye every two hours while awake    repaglinide (PRANDIN) 1 MG tablet TAKE ONE TABLET BY MOUTH THREE TIMES DAILY BEFORE MEALS (TAKING PLACE OF TRAJENTA)    semaglutide (OZEMPIC) 0.25 mg or 0.5 mg(2 mg/1.5 mL) pen injector Inject 0.5 mg into the skin every 7 days.    semaglutide (OZEMPIC) 1 mg/dose (4 mg/3 mL) Inject 1 mg into the skin every 7 days.    sulfaSALAzine (AZULFIDINE) 500 MG EC tablet Take 3 tablets (1,500 mg total) by mouth 2 (two) times daily.    terconazole (TERAZOL 7) 0.4 % Crea Place 1 applicator vaginally every evening.    tiZANidine (ZANAFLEX) 2 MG tablet Take 1-2 tablets (2-4 mg total) by mouth every 8 (eight) hours as needed.    topiramate (TOPAMAX) 100 MG tablet Take 100 mg by mouth 2 (two) times daily.    triamcinolone acetonide 0.1% (KENALOG) 0.1 % cream Apply topically 2 (two) times daily.    valACYclovir (VALTREX) 1000 MG tablet valacyclovir 1 gram tablet   Take 0.5 tablets every 12 hours by oral route.    vitamin D (VITAMIN D3) 1000 units Tab Take 1,000 Units by mouth once daily.    zolpidem (AMBIEN) 5 MG Tab TAKE ONE TABLET BY MOUTH AT BEDTIME AS NEEDED   Last reviewed on 4/4/2022 10:28 AM by Giselle Nelson MA    Review of patient's allergies indicates:   Allergen Reactions    Bactrim [sulfamethoxazole-trimethoprim] Itching    Trulicity [dulaglutide] Diarrhea and Other (See Comments)     Vomiting, abdominal pain    Diflucan [fluconazole] Swelling and Other (See Comments)     Sore  on mouth    Last reviewed on  5/6/2022 11:10 AM by Eliceo Arias      Tasks added this encounter   8/7/2022 - Refill Call (Auto Added)   Tasks due within next 3 months   No tasks due.     [facility]  [address] [place] [id number]  Phone: [phone]  Fax: [phone]

## 2022-05-24 ENCOUNTER — OFFICE VISIT (OUTPATIENT)
Dept: OBSTETRICS AND GYNECOLOGY | Facility: CLINIC | Age: 57
End: 2022-05-24
Payer: MEDICARE

## 2022-05-24 ENCOUNTER — IMMUNIZATION (OUTPATIENT)
Dept: PHARMACY | Facility: CLINIC | Age: 57
End: 2022-05-24
Payer: MEDICARE

## 2022-05-24 VITALS
SYSTOLIC BLOOD PRESSURE: 138 MMHG | DIASTOLIC BLOOD PRESSURE: 82 MMHG | WEIGHT: 218.69 LBS | HEIGHT: 67 IN | BODY MASS INDEX: 34.33 KG/M2

## 2022-05-24 DIAGNOSIS — Z01.419 ENCOUNTER FOR GYNECOLOGICAL EXAMINATION WITHOUT ABNORMAL FINDING: Primary | ICD-10-CM

## 2022-05-24 DIAGNOSIS — Z23 NEED FOR VACCINATION: Primary | ICD-10-CM

## 2022-05-24 DIAGNOSIS — Z90.710 S/P HYSTERECTOMY: ICD-10-CM

## 2022-05-24 DIAGNOSIS — N89.8 VAGINAL DISCHARGE: ICD-10-CM

## 2022-05-24 PROCEDURE — 1159F MED LIST DOCD IN RCRD: CPT | Mod: CPTII,S$GLB,, | Performed by: OBSTETRICS & GYNECOLOGY

## 2022-05-24 PROCEDURE — 1159F PR MEDICATION LIST DOCUMENTED IN MEDICAL RECORD: ICD-10-PCS | Mod: CPTII,S$GLB,, | Performed by: OBSTETRICS & GYNECOLOGY

## 2022-05-24 PROCEDURE — 1160F RVW MEDS BY RX/DR IN RCRD: CPT | Mod: CPTII,S$GLB,, | Performed by: OBSTETRICS & GYNECOLOGY

## 2022-05-24 PROCEDURE — 3052F PR MOST RECENT HEMOGLOBIN A1C LEVEL 8.0 - < 9.0%: ICD-10-PCS | Mod: CPTII,S$GLB,, | Performed by: OBSTETRICS & GYNECOLOGY

## 2022-05-24 PROCEDURE — 87801 DETECT AGNT MULT DNA AMPLI: CPT | Performed by: OBSTETRICS & GYNECOLOGY

## 2022-05-24 PROCEDURE — 99999 PR PBB SHADOW E&M-EST. PATIENT-LVL V: CPT | Mod: PBBFAC,,, | Performed by: OBSTETRICS & GYNECOLOGY

## 2022-05-24 PROCEDURE — 1160F PR REVIEW ALL MEDS BY PRESCRIBER/CLIN PHARMACIST DOCUMENTED: ICD-10-PCS | Mod: CPTII,S$GLB,, | Performed by: OBSTETRICS & GYNECOLOGY

## 2022-05-24 PROCEDURE — 3072F LOW RISK FOR RETINOPATHY: CPT | Mod: CPTII,S$GLB,, | Performed by: OBSTETRICS & GYNECOLOGY

## 2022-05-24 PROCEDURE — 99999 PR PBB SHADOW E&M-EST. PATIENT-LVL V: ICD-10-PCS | Mod: PBBFAC,,, | Performed by: OBSTETRICS & GYNECOLOGY

## 2022-05-24 PROCEDURE — G0101 CA SCREEN;PELVIC/BREAST EXAM: HCPCS | Mod: GZ,S$GLB,, | Performed by: OBSTETRICS & GYNECOLOGY

## 2022-05-24 PROCEDURE — 4010F PR ACE/ARB THEARPY RXD/TAKEN: ICD-10-PCS | Mod: CPTII,S$GLB,, | Performed by: OBSTETRICS & GYNECOLOGY

## 2022-05-24 PROCEDURE — 87481 CANDIDA DNA AMP PROBE: CPT | Mod: 59 | Performed by: OBSTETRICS & GYNECOLOGY

## 2022-05-24 PROCEDURE — 3061F NEG MICROALBUMINURIA REV: CPT | Mod: CPTII,S$GLB,, | Performed by: OBSTETRICS & GYNECOLOGY

## 2022-05-24 PROCEDURE — G0101 PR CA SCREEN;PELVIC/BREAST EXAM: ICD-10-PCS | Mod: GZ,S$GLB,, | Performed by: OBSTETRICS & GYNECOLOGY

## 2022-05-24 PROCEDURE — 3008F BODY MASS INDEX DOCD: CPT | Mod: CPTII,S$GLB,, | Performed by: OBSTETRICS & GYNECOLOGY

## 2022-05-24 PROCEDURE — 3075F SYST BP GE 130 - 139MM HG: CPT | Mod: CPTII,S$GLB,, | Performed by: OBSTETRICS & GYNECOLOGY

## 2022-05-24 PROCEDURE — 3079F PR MOST RECENT DIASTOLIC BLOOD PRESSURE 80-89 MM HG: ICD-10-PCS | Mod: CPTII,S$GLB,, | Performed by: OBSTETRICS & GYNECOLOGY

## 2022-05-24 PROCEDURE — 3079F DIAST BP 80-89 MM HG: CPT | Mod: CPTII,S$GLB,, | Performed by: OBSTETRICS & GYNECOLOGY

## 2022-05-24 PROCEDURE — 3008F PR BODY MASS INDEX (BMI) DOCUMENTED: ICD-10-PCS | Mod: CPTII,S$GLB,, | Performed by: OBSTETRICS & GYNECOLOGY

## 2022-05-24 PROCEDURE — 3066F NEPHROPATHY DOC TX: CPT | Mod: CPTII,S$GLB,, | Performed by: OBSTETRICS & GYNECOLOGY

## 2022-05-24 PROCEDURE — 4010F ACE/ARB THERAPY RXD/TAKEN: CPT | Mod: CPTII,S$GLB,, | Performed by: OBSTETRICS & GYNECOLOGY

## 2022-05-24 PROCEDURE — 3075F PR MOST RECENT SYSTOLIC BLOOD PRESS GE 130-139MM HG: ICD-10-PCS | Mod: CPTII,S$GLB,, | Performed by: OBSTETRICS & GYNECOLOGY

## 2022-05-24 PROCEDURE — 3052F HG A1C>EQUAL 8.0%<EQUAL 9.0%: CPT | Mod: CPTII,S$GLB,, | Performed by: OBSTETRICS & GYNECOLOGY

## 2022-05-24 PROCEDURE — 3072F PR LOW RISK FOR RETINOPATHY: ICD-10-PCS | Mod: CPTII,S$GLB,, | Performed by: OBSTETRICS & GYNECOLOGY

## 2022-05-24 PROCEDURE — 3066F PR DOCUMENTATION OF TREATMENT FOR NEPHROPATHY: ICD-10-PCS | Mod: CPTII,S$GLB,, | Performed by: OBSTETRICS & GYNECOLOGY

## 2022-05-24 PROCEDURE — 3061F PR NEG MICROALBUMINURIA RESULT DOCUMENTED/REVIEW: ICD-10-PCS | Mod: CPTII,S$GLB,, | Performed by: OBSTETRICS & GYNECOLOGY

## 2022-05-24 RX ORDER — FLUTICASONE PROPIONATE 50 MCG
SPRAY, SUSPENSION (ML) NASAL
COMMUNITY
Start: 2022-04-06 | End: 2023-02-06 | Stop reason: SDUPTHER

## 2022-05-24 NOTE — PROGRESS NOTES
CC: Well woman exam    Althea Vazquez is a 56 y.o. female  presents for a well woman exam.  SHe has some vaginal discharge off and on   She denies any dyspareunia or menopausal sx      Past Medical History:   Diagnosis Date    Acid reflux     Anxiety 10/18/2012    Arthritis     Depression     Diabetic peripheral neuropathy - mild 10/21/2014    Difficult intubation     Dry eyes     Dry mouth     Fever blister     History of hepatitis B 10/3/2016    Hep B core total Ab (+), no active/chronic infection    Hyperlipidemia     Hypertension     Insomnia     Iritis 2014    Long-term current use of steroids 2012    Nausea & vomiting 2015    VAISHNAVI (obstructive sleep apnea)     Type II diabetes mellitus 10/1/2012     Past Surgical History:   Procedure Laterality Date    COLONOSCOPY N/A 2018    Procedure: COLONOSCOPY;  Surgeon: Juwan Lopez MD;  Location: [facility] ([address] [place] [address]);  Service: Endoscopy;  Laterality: N/A;  per anesthesia, pt. needs to be on 2nd floor due to past Anesthesia problems    EPIDURAL STEROID INJECTION INTO CERVICAL SPINE N/A 2021    Procedure: Injection-steroid-epidural-cervical--C7-T1 IL SHARONA;  Surgeon: Graciela Jerome MD;  [place] [facility]  Service: Pain Management;  Laterality: N/A;    HYSTERECTOMY  12/3/2014    INJECTION OF ANESTHETIC AGENT INTO SACROILIAC JOINT Left 2021    Procedure: LEFT SACROILIAC JOINT STEROID INJECTION;  Surgeon: Graciela Jerome MD;  [place] [facility]  Service: Pain Management;  Laterality: Left;    INJECTION OF JOINT Left 2021    Procedure: Injection, Joint--LEFT GTB;  Surgeon: Graciela Jerome MD;  [place] [facility]  Service: Pain Management;  Laterality: Left;    KNEE ARTHROSCOPY  14    right    TUBAL LIGATION       Family History   Problem Relation Age of Onset    Diabetes Mother     Cataracts Mother     Stroke Mother     Heart attack Mother     Depression Mother     Stroke  "Father     Hypertension Father     Hyperlipidemia Father     Colon cancer Father     Diabetes Maternal Aunt     Heart attack Paternal Grandmother     ADD / ADHD Son     No Known Problems Sister     No Known Problems Brother     No Known Problems Maternal Uncle     No Known Problems Paternal Aunt     No Known Problems Paternal Uncle     No Known Problems Maternal Grandmother     No Known Problems Maternal Grandfather     Cancer Paternal Grandfather         Lung CA    Melanoma Neg Hx     Eczema Neg Hx     Lupus Neg Hx     Psoriasis Neg Hx     Amblyopia Neg Hx     Blindness Neg Hx     Glaucoma Neg Hx     Macular degeneration Neg Hx     Retinal detachment Neg Hx     Strabismus Neg Hx     Thyroid disease Neg Hx     Suicide Neg Hx     Acne Neg Hx     Cirrhosis Neg Hx     Asthma Neg Hx     Emphysema Neg Hx     Breast cancer Neg Hx     Ovarian cancer Neg Hx      Social History     Tobacco Use    Smoking status: Never Smoker    Smokeless tobacco: Never Used   Substance Use Topics    Alcohol use: Not Currently     Comment: occas.    Drug use: No     OB History        4    Para   2    Term   0            AB   2    Living   2       SAB   2    IAB        Ectopic        Multiple        Live Births   2                 /82   Ht 5' 7.2" (1.707 m)   Wt 99.2 kg (218 lb 11.1 oz)   LMP 2014   BMI 34.05 kg/m²     ROS:  GENERAL: Denies weight gain or weight loss. Feeling well overall.   SKIN: Denies rash or lesions.   HEAD: Denies head injury or headache.   NODES: Denies enlarged lymph nodes.   CHEST: Denies chest pain or shortness of breath.   CARDIOVASCULAR: Denies palpitations or left sided chest pain.   ABDOMEN: No abdominal pain, constipation, diarrhea, nausea, vomiting or rectal bleeding.   URINARY: No frequency, dysuria, hematuria, or burning on urination.  REPRODUCTIVE: See HPI.   BREASTS: The patient performs breast self-examination and denies pain, lumps, or " nipple discharge.   HEMATOLOGIC: No easy bruisability or excessive bleeding.   MUSCULOSKELETAL: Denies joint pain or swelling.   NEUROLOGIC: Denies syncope or weakness.   PSYCHIATRIC: Denies depression, anxiety or mood swings.    PE:   APPEARANCE: Well nourished, well developed, in no acute distress.  AFFECT: WNL, alert and oriented x 3.  SKIN: No acne or hirsutism.  NECK: Neck symmetric without masses or thyromegaly.  NODES: No inguinal, cervical, axillary or femoral lymph node enlargement.  CHEST: Good respiratory effort.   ABDOMEN: Soft. No tenderness or masses. No hepatosplenomegaly. No hernias.  BREASTS: Symmetrical, no skin changes or visible lesions. No palpable masses, nipple discharge bilaterally.  PELVIC: Normal external female genitalia without lesions. Normal hair distribution. Adequate perineal body, normal urethral meatus. Vagina atrophic without lesions or discharge. No significant cystocele or rectocele. Bimanual exam shows uterus and cervix to be surgically absent. Adnexa without masses or tenderness.  RECTAL: Rectovaginal exam confirms above with normal sphincter tone, no masses.  EXTREMITIES: No edema.    Diagnosis      ICD-10-CM ICD-9-CM    1. Encounter for gynecological examination without abnormal finding  Z01.419 V72.31    2. S/P hysterectomy  Z90.710 V88.01    3. Vaginal discharge  N89.8 623.5 Vaginosis Screen by DNA Probe       Vaginitis prevention including :   a. avoiding feminine products such as deoderant soaps, body wash, bubble bath, douches, scented toilet paper, deoderant tampons or pads, baby or feminine wipes, chronic pad use, etc. and   b. avoiding other vulvovaginal irritants such as long hot baths, humidity, tight, synthetic clothing, chlorine and sitting around in wet bathing suits and   c. wearing cotton underwear, avoiding thong underwear and no underwear to bed and   d. taking showers instead of baths and use a hair dryer on cool setting afterwards to dry and   e.wearing  cotton to exercise and shower immediately after exercise and change clothes and   f. using polyurethane condoms without spermicide if sexually active and symptoms are triggered by intercourse;   Diflucan and Mycolog cream use and potential side effects;   -pelvic rest until symptoms resolve.             Patient was counseled today on A.C.S. Pap guidelines and recommendations for yearly pelvic exams, mammograms and monthly self breast exams; to see her PCP for other health maintenance.     No follow-ups on file.

## 2022-05-25 LAB
BACTERIAL VAGINOSIS DNA: POSITIVE
CANDIDA GLABRATA DNA: NEGATIVE
CANDIDA KRUSEI DNA: NEGATIVE
CANDIDA RRNA VAG QL PROBE: POSITIVE
T VAGINALIS RRNA GENITAL QL PROBE: NEGATIVE

## 2022-05-29 ENCOUNTER — PATIENT MESSAGE (OUTPATIENT)
Dept: OBSTETRICS AND GYNECOLOGY | Facility: CLINIC | Age: 57
End: 2022-05-29
Payer: MEDICARE

## 2022-05-30 ENCOUNTER — TELEPHONE (OUTPATIENT)
Dept: OBSTETRICS AND GYNECOLOGY | Facility: CLINIC | Age: 57
End: 2022-05-30
Payer: MEDICARE

## 2022-05-30 DIAGNOSIS — B37.9 CANDIDA INFECTION: Primary | ICD-10-CM

## 2022-05-30 DIAGNOSIS — N76.0 BACTERIAL VAGINOSIS: ICD-10-CM

## 2022-05-30 DIAGNOSIS — B96.89 BACTERIAL VAGINOSIS: ICD-10-CM

## 2022-05-30 RX ORDER — METRONIDAZOLE 500 MG/1
500 TABLET ORAL 2 TIMES DAILY
Qty: 14 TABLET | Refills: 0 | Status: SHIPPED | OUTPATIENT
Start: 2022-05-30 | End: 2022-06-13

## 2022-05-30 RX ORDER — TERCONAZOLE 4 MG/G
1 CREAM VAGINAL NIGHTLY
Qty: 45 G | Refills: 2 | Status: SHIPPED | OUTPATIENT
Start: 2022-05-30 | End: 2023-11-09

## 2022-05-30 NOTE — TELEPHONE ENCOUNTER
Bacterial vaginosis present on vaginal cultures.  Flagyl Rx sent to your pharmacy.  terazol Rx sent for yeast . She is allergic to diflucan.please make sure she is ok with the terazol   Thanks  Brea ADAMS

## 2022-05-31 RX ORDER — TERCONAZOLE 4 MG/G
1 CREAM VAGINAL NIGHTLY
Qty: 7 G | Refills: 0 | Status: SHIPPED | OUTPATIENT
Start: 2022-05-31 | End: 2023-11-09

## 2022-06-07 ENCOUNTER — SPECIALTY PHARMACY (OUTPATIENT)
Dept: PHARMACY | Facility: CLINIC | Age: 57
End: 2022-06-07
Payer: MEDICARE

## 2022-06-07 NOTE — TELEPHONE ENCOUNTER
Specialty Pharmacy - Refill Coordination    Specialty Medication Orders Linked to Encounter    Flowsheet Row Most Recent Value   Medication #1 golimumab (SIMPONI) 50 mg/0.5 mL PnIj (Order#[id number], Rx#[id number])          Refill Questions - Documented Responses    Flowsheet Row Most Recent Value   Patient Availability and HIPAA Verification    Does patient want to proceed with activity? Yes   HIPAA/medical authority confirmed? Yes   Relationship to patient of person spoken to? Self   Refill Screening Questions    Changes to allergies? No   Changes to medications? No   New conditions since last clinic visit? No   Unplanned office visit, urgent care, ED, or hospital admission in the last 4 weeks? No   How does patient/caregiver feel medication is working? Good   Financial problems or insurance changes? No   How many doses of your specialty medications were missed in the last 4 weeks? 0   Would patient like to speak to a pharmacist? No   When does the patient need to receive the medication? 06/13/22   Refill Delivery Questions    How will the patient receive the medication? Delivery Roseann   When does the patient need to receive the medication? 06/13/22   Shipping Address Home   Address in [facility] confirmed and updated if neccessary? Yes   Expected Copay ($) 0   Is the patient able to afford the medication copay? Yes   Payment Method zero copay   Days supply of Refill 30   Supplies needed? No supplies needed   Refill activity completed? Yes   Refill activity plan Refill scheduled   Shipment/Pickup Date: 06/10/22          Current Outpatient Medications   Medication Sig    amLODIPine (NORVASC) 10 MG tablet TAKE ONE TABLET BY MOUTH DAILY    aspirin (ECOTRIN) 81 MG EC tablet Take 1 tablet (81 mg total) by mouth once daily.    atenoloL (TENORMIN) 100 MG tablet TAKE ONE TABLET BY MOUTH DAILY    atorvastatin (LIPITOR) 40 MG tablet TAKE ONE TABLET BY MOUTH DAILY    blood-glucose meter kit Use twice daily.     boric acid (BORIC ACID) vaginal suppository Place 1 each (650 mg total) vaginally every evening. (Patient not taking: Reported on 5/24/2022)    clonazePAM (KLONOPIN) 0.5 MG tablet Take 1 tablet (0.5 mg total) by mouth daily as needed for Anxiety.    clotrimazole-betamethasone 1-0.05% (LOTRISONE) cream Apply topically 2 (two) times daily as needed.     cyclobenzaprine (FLEXERIL) 10 MG tablet TAKE ONE TABLET BY MOUTH AT BEDTIME AS NEEDED    diclofenac sodium (VOLTAREN) 1 % Gel Apply 4 g topically 4 (four) times daily. Apply light film topically to knee up to four times daily    docusate sodium (COLACE) 50 MG capsule Take 1 capsule (50 mg total) by mouth 2 (two) times daily as needed for Constipation. (Patient taking differently: Take 50 mg by mouth 2 (two) times daily.)    entecavir (BARACLUDE) 0.5 MG Tab Take 1 tablet (0.5 mg total) by mouth once daily.    ergocalciferol (ERGOCALCIFEROL) 50,000 unit Cap Take 1 capsule (50,000 Units total) by mouth every 7 days.    fluocinonide 0.05% (LIDEX) 0.05 % cream Apply topically 2 (two) times daily. To allergic rash    FLUoxetine 20 MG capsule Take 1 capsule (20 mg total) by mouth 2 (two) times daily.    fluticasone propionate (FLONASE) 50 mcg/actuation nasal spray SPRAY 2 SPRAYS INTO EACH NOSTRIL ONCE A DAY    FREESTYLE LITE STRIPS Strp test TWICE DAILY    golimumab (SIMPONI) 50 mg/0.5 mL PnIj Inject 50 mg into the skin every 30 days.    hydroCHLOROthiazide (HYDRODIURIL) 25 MG tablet TAKE ONE TABLET BY MOUTH DAILY    hydrocortisone 2.5 % cream Apply topically 2 (two) times daily. for 10 days    hydroquinone 4 % Crea Apply to dark areas qhs.  Not more than 6 months straight in same location.Use sunscreen in am (Patient taking differently: continuous prn. Apply to dark areas qhs.  Not more than 6 months straight in same location.Use sunscreen in am)    INVOKANA 300 mg Tab tablet TAKE ONE TABLET BY MOUTH BEFORE BREAKFAST    lancets (FREESTYLE LANCETS) 28 gauge  Misc test TWICE DAILY    levocetirizine (XYZAL) 5 MG tablet TAKE ONE TABLET BY MOUTH EVERY EVENING    losartan (COZAAR) 100 MG tablet TAKE ONE TABLET BY MOUTH ONCE DAILY    metroNIDAZOLE (FLAGYL) 500 MG tablet Take 1 tablet (500 mg total) by mouth 2 (two) times daily. for 14 days    naproxen (NAPROSYN) 500 MG tablet Take 1 tablet (500 mg total) by mouth 2 (two) times daily as needed.    neomycin-polymyxin-hydrocortisone (CORTISPORIN) 3.5-10,000-1 mg/mL-unit/mL-% otic suspension Place 3 drops into the left ear 3 (three) times daily.    nystatin (MYCOSTATIN) powder Apply to affected area 3 times daily    pantoprazole (PROTONIX) 40 MG tablet TAKE ONE TABLET BY MOUTH EVERY DAY    prednisoLONE acetate (PRED FORTE) 1 % DrpS Instill one drop into the affected eye every two hours while awake    repaglinide (PRANDIN) 1 MG tablet TAKE ONE TABLET BY MOUTH THREE TIMES DAILY BEFORE MEALS (TAKING PLACE OF TRAJENTA)    semaglutide (OZEMPIC) 0.25 mg or 0.5 mg(2 mg/1.5 mL) pen injector Inject 0.5 mg into the skin every 7 days. (Patient not taking: Reported on 5/24/2022)    semaglutide (OZEMPIC) 1 mg/dose (4 mg/3 mL) Inject 1 mg into the skin every 7 days.    sulfaSALAzine (AZULFIDINE) 500 MG EC tablet Take 3 tablets (1,500 mg total) by mouth 2 (two) times daily.    terconazole (TERAZOL 7) 0.4 % Crea Place 1 applicator vaginally every evening.    terconazole (TERAZOL 7) 0.4 % Crea Place 1 applicator vaginally every evening.    terconazole (TERAZOL 7) 0.4 % Crea Place 1 applicator vaginally every evening.    tiZANidine (ZANAFLEX) 2 MG tablet Take 1-2 tablets (2-4 mg total) by mouth every 8 (eight) hours as needed.    topiramate (TOPAMAX) 100 MG tablet Take 100 mg by mouth 2 (two) times daily.    triamcinolone acetonide 0.1% (KENALOG) 0.1 % cream Apply topically 2 (two) times daily.    valACYclovir (VALTREX) 1000 MG tablet valacyclovir 1 gram tablet   Take 0.5 tablets every 12 hours by oral route.    vitamin D  (VITAMIN D3) 1000 units Tab Take 1,000 Units by mouth once daily.    zolpidem (AMBIEN) 5 MG Tab TAKE ONE TABLET BY MOUTH AT BEDTIME AS NEEDED   Last reviewed on 5/30/2022  2:46 PM by Jodi Echeverria MD    Review of patient's allergies indicates:   Allergen Reactions    Bactrim [sulfamethoxazole-trimethoprim] Itching    Trulicity [dulaglutide] Diarrhea and Other (See Comments)     Vomiting, abdominal pain    Diflucan [fluconazole] Swelling and Other (See Comments)     Sore on mouth    Last reviewed on  5/30/2022 2:45 PM by Jodi Echeverria      Tasks added this encounter   7/6/2022 - Refill Call (Auto Added)   Tasks due within next 3 months   No tasks due.     Di Banegas, Patient Care Assistant  [facility]  [address] [place], [place] [address]  [facility] [address]  Phone: [phone]  Fax: [phone]

## 2022-06-17 ENCOUNTER — LAB VISIT (OUTPATIENT)
Dept: LAB | Facility: HOSPITAL | Age: 57
End: 2022-06-17
Attending: INTERNAL MEDICINE
Payer: MEDICARE

## 2022-06-17 DIAGNOSIS — I15.2 HYPERTENSION ASSOCIATED WITH DIABETES: ICD-10-CM

## 2022-06-17 DIAGNOSIS — Z79.620 ENCOUNTER FOR MONITORING OF ADALIMUMAB THERAPY: ICD-10-CM

## 2022-06-17 DIAGNOSIS — E78.5 HYPERLIPIDEMIA ASSOCIATED WITH TYPE 2 DIABETES MELLITUS: ICD-10-CM

## 2022-06-17 DIAGNOSIS — E11.42 TYPE 2 DIABETES MELLITUS WITH DIABETIC POLYNEUROPATHY, UNSPECIFIED WHETHER LONG TERM INSULIN USE: Chronic | ICD-10-CM

## 2022-06-17 DIAGNOSIS — Z51.81 ENCOUNTER FOR MONITORING OF ADALIMUMAB THERAPY: ICD-10-CM

## 2022-06-17 DIAGNOSIS — E11.59 HYPERTENSION ASSOCIATED WITH DIABETES: ICD-10-CM

## 2022-06-17 DIAGNOSIS — E11.69 HYPERLIPIDEMIA ASSOCIATED WITH TYPE 2 DIABETES MELLITUS: ICD-10-CM

## 2022-06-17 LAB
ALBUMIN SERPL BCP-MCNC: 4.4 G/DL (ref 3.5–5.2)
ALBUMIN SERPL BCP-MCNC: 4.4 G/DL (ref 3.5–5.2)
ALP SERPL-CCNC: 84 U/L (ref 38–126)
ALP SERPL-CCNC: 84 U/L (ref 38–126)
ALT SERPL W/O P-5'-P-CCNC: 17 U/L (ref 10–44)
ALT SERPL W/O P-5'-P-CCNC: 17 U/L (ref 10–44)
ANION GAP SERPL CALC-SCNC: 9 MMOL/L (ref 8–16)
ANION GAP SERPL CALC-SCNC: 9 MMOL/L (ref 8–16)
AST SERPL-CCNC: 22 U/L (ref 15–46)
AST SERPL-CCNC: 22 U/L (ref 15–46)
BASOPHILS # BLD AUTO: 0.05 K/UL (ref 0–0.2)
BASOPHILS # BLD AUTO: 0.05 K/UL (ref 0–0.2)
BASOPHILS NFR BLD: 0.7 % (ref 0–1.9)
BASOPHILS NFR BLD: 0.7 % (ref 0–1.9)
BILIRUB SERPL-MCNC: 0.5 MG/DL (ref 0.1–1)
BILIRUB SERPL-MCNC: 0.5 MG/DL (ref 0.1–1)
CALCIUM SERPL-MCNC: 8.9 MG/DL (ref 8.7–10.5)
CALCIUM SERPL-MCNC: 8.9 MG/DL (ref 8.7–10.5)
CHLORIDE SERPL-SCNC: 106 MMOL/L (ref 95–110)
CHLORIDE SERPL-SCNC: 106 MMOL/L (ref 95–110)
CO2 SERPL-SCNC: 23 MMOL/L (ref 23–29)
CO2 SERPL-SCNC: 23 MMOL/L (ref 23–29)
CREAT SERPL-MCNC: 0.62 MG/DL (ref 0.5–1.4)
CREAT SERPL-MCNC: 0.62 MG/DL (ref 0.5–1.4)
CRP SERPL-MCNC: 0.11 MG/DL (ref 0–1)
DIFFERENTIAL METHOD: ABNORMAL
DIFFERENTIAL METHOD: ABNORMAL
EOSINOPHIL # BLD AUTO: 0.2 K/UL (ref 0–0.5)
EOSINOPHIL # BLD AUTO: 0.2 K/UL (ref 0–0.5)
EOSINOPHIL NFR BLD: 3.3 % (ref 0–8)
EOSINOPHIL NFR BLD: 3.3 % (ref 0–8)
ERYTHROCYTE [DISTWIDTH] IN BLOOD BY AUTOMATED COUNT: 13.2 % (ref 11.5–14.5)
ERYTHROCYTE [DISTWIDTH] IN BLOOD BY AUTOMATED COUNT: 13.2 % (ref 11.5–14.5)
ERYTHROCYTE [SEDIMENTATION RATE] IN BLOOD BY WESTERGREN METHOD: 15 MM/HR (ref 0–20)
EST. GFR  (AFRICAN AMERICAN): >60 ML/MIN/1.73 M^2
EST. GFR  (AFRICAN AMERICAN): >60 ML/MIN/1.73 M^2
EST. GFR  (NON AFRICAN AMERICAN): >60 ML/MIN/1.73 M^2
EST. GFR  (NON AFRICAN AMERICAN): >60 ML/MIN/1.73 M^2
ESTIMATED AVG GLUCOSE: 146 MG/DL (ref 68–131)
GLUCOSE SERPL-MCNC: 107 MG/DL (ref 70–110)
GLUCOSE SERPL-MCNC: 107 MG/DL (ref 70–110)
HBA1C MFR BLD: 6.7 % (ref 4–5.6)
HCT VFR BLD AUTO: 42.1 % (ref 37–48.5)
HCT VFR BLD AUTO: 42.1 % (ref 37–48.5)
HGB BLD-MCNC: 14.2 G/DL (ref 12–16)
HGB BLD-MCNC: 14.2 G/DL (ref 12–16)
IMM GRANULOCYTES # BLD AUTO: 0.02 K/UL (ref 0–0.04)
IMM GRANULOCYTES # BLD AUTO: 0.02 K/UL (ref 0–0.04)
IMM GRANULOCYTES NFR BLD AUTO: 0.3 % (ref 0–0.5)
IMM GRANULOCYTES NFR BLD AUTO: 0.3 % (ref 0–0.5)
LYMPHOCYTES # BLD AUTO: 3.4 K/UL (ref 1–4.8)
LYMPHOCYTES # BLD AUTO: 3.4 K/UL (ref 1–4.8)
LYMPHOCYTES NFR BLD: 49.8 % (ref 18–48)
LYMPHOCYTES NFR BLD: 49.8 % (ref 18–48)
MCH RBC QN AUTO: 30.3 PG (ref 27–31)
MCH RBC QN AUTO: 30.3 PG (ref 27–31)
MCHC RBC AUTO-ENTMCNC: 33.7 G/DL (ref 32–36)
MCHC RBC AUTO-ENTMCNC: 33.7 G/DL (ref 32–36)
MCV RBC AUTO: 90 FL (ref 82–98)
MCV RBC AUTO: 90 FL (ref 82–98)
MONOCYTES # BLD AUTO: 0.7 K/UL (ref 0.3–1)
MONOCYTES # BLD AUTO: 0.7 K/UL (ref 0.3–1)
MONOCYTES NFR BLD: 10.7 % (ref 4–15)
MONOCYTES NFR BLD: 10.7 % (ref 4–15)
NEUTROPHILS # BLD AUTO: 2.4 K/UL (ref 1.8–7.7)
NEUTROPHILS # BLD AUTO: 2.4 K/UL (ref 1.8–7.7)
NEUTROPHILS NFR BLD: 35.2 % (ref 38–73)
NEUTROPHILS NFR BLD: 35.2 % (ref 38–73)
NRBC BLD-RTO: 0 /100 WBC
NRBC BLD-RTO: 0 /100 WBC
PLATELET # BLD AUTO: 259 K/UL (ref 150–450)
PLATELET # BLD AUTO: 259 K/UL (ref 150–450)
PMV BLD AUTO: 11.4 FL (ref 9.2–12.9)
PMV BLD AUTO: 11.4 FL (ref 9.2–12.9)
POTASSIUM SERPL-SCNC: 3.9 MMOL/L (ref 3.5–5.1)
POTASSIUM SERPL-SCNC: 3.9 MMOL/L (ref 3.5–5.1)
PROT SERPL-MCNC: 7.4 G/DL (ref 6–8.4)
PROT SERPL-MCNC: 7.4 G/DL (ref 6–8.4)
RBC # BLD AUTO: 4.68 M/UL (ref 4–5.4)
RBC # BLD AUTO: 4.68 M/UL (ref 4–5.4)
SODIUM SERPL-SCNC: 138 MMOL/L (ref 136–145)
SODIUM SERPL-SCNC: 138 MMOL/L (ref 136–145)
UUN UR-MCNC: 8 MG/DL (ref 7–17)
UUN UR-MCNC: 8 MG/DL (ref 7–17)
WBC # BLD AUTO: 6.73 K/UL (ref 3.9–12.7)
WBC # BLD AUTO: 6.73 K/UL (ref 3.9–12.7)

## 2022-06-17 PROCEDURE — 85652 RBC SED RATE AUTOMATED: CPT | Performed by: INTERNAL MEDICINE

## 2022-06-17 PROCEDURE — 36415 COLL VENOUS BLD VENIPUNCTURE: CPT | Mod: PO | Performed by: INTERNAL MEDICINE

## 2022-06-17 PROCEDURE — 87516 HEPATITIS B DNA AMP PROBE: CPT | Mod: PO | Performed by: INTERNAL MEDICINE

## 2022-06-17 PROCEDURE — 80053 COMPREHEN METABOLIC PANEL: CPT | Mod: PO | Performed by: INTERNAL MEDICINE

## 2022-06-17 PROCEDURE — 86140 C-REACTIVE PROTEIN: CPT | Mod: PO | Performed by: INTERNAL MEDICINE

## 2022-06-17 PROCEDURE — 83036 HEMOGLOBIN GLYCOSYLATED A1C: CPT | Performed by: INTERNAL MEDICINE

## 2022-06-17 PROCEDURE — 85025 COMPLETE CBC W/AUTO DIFF WBC: CPT | Mod: PO | Performed by: INTERNAL MEDICINE

## 2022-06-20 LAB — HBV DNA SERPL QL NAA+PROBE: NOT DETECTED

## 2022-07-06 ENCOUNTER — SPECIALTY PHARMACY (OUTPATIENT)
Dept: PHARMACY | Facility: CLINIC | Age: 57
End: 2022-07-06
Payer: MEDICARE

## 2022-07-06 NOTE — TELEPHONE ENCOUNTER
Spoke with patient about refills on both medications. She said did her last injection yesterday with the Simponi . I informed her that a PA was sent and we will follow up to schedule a delivery once approved. She said she was good on both. Will follow up. Routing to [facility]

## 2022-07-07 ENCOUNTER — OFFICE VISIT (OUTPATIENT)
Dept: RHEUMATOLOGY | Facility: CLINIC | Age: 57
End: 2022-07-07
Payer: MEDICARE

## 2022-07-07 VITALS
DIASTOLIC BLOOD PRESSURE: 82 MMHG | WEIGHT: 220 LBS | BODY MASS INDEX: 34.53 KG/M2 | SYSTOLIC BLOOD PRESSURE: 118 MMHG | HEART RATE: 73 BPM | HEIGHT: 67 IN

## 2022-07-07 DIAGNOSIS — M46.90 AXIAL SPONDYLOARTHRITIS: ICD-10-CM

## 2022-07-07 DIAGNOSIS — M47.819 SPONDYLOARTHRITIS: ICD-10-CM

## 2022-07-07 DIAGNOSIS — Z79.899 HIGH RISK MEDICATION USE: ICD-10-CM

## 2022-07-07 DIAGNOSIS — M19.90 INFLAMMATORY ARTHRITIS: ICD-10-CM

## 2022-07-07 DIAGNOSIS — R51.9 SINUS HEADACHE: ICD-10-CM

## 2022-07-07 DIAGNOSIS — M02.30 REACTIVE ARTHRITIS: ICD-10-CM

## 2022-07-07 DIAGNOSIS — J20.9 ACUTE BRONCHITIS, UNSPECIFIED ORGANISM: ICD-10-CM

## 2022-07-07 DIAGNOSIS — E55.9 VITAMIN D DEFICIENCY: ICD-10-CM

## 2022-07-07 PROCEDURE — 3066F PR DOCUMENTATION OF TREATMENT FOR NEPHROPATHY: ICD-10-PCS | Mod: CPTII,S$GLB,, | Performed by: INTERNAL MEDICINE

## 2022-07-07 PROCEDURE — 3061F NEG MICROALBUMINURIA REV: CPT | Mod: CPTII,S$GLB,, | Performed by: INTERNAL MEDICINE

## 2022-07-07 PROCEDURE — 4010F ACE/ARB THERAPY RXD/TAKEN: CPT | Mod: CPTII,S$GLB,, | Performed by: INTERNAL MEDICINE

## 2022-07-07 PROCEDURE — 4010F PR ACE/ARB THEARPY RXD/TAKEN: ICD-10-PCS | Mod: CPTII,S$GLB,, | Performed by: INTERNAL MEDICINE

## 2022-07-07 PROCEDURE — 99999 PR PBB SHADOW E&M-EST. PATIENT-LVL V: CPT | Mod: PBBFAC,,, | Performed by: INTERNAL MEDICINE

## 2022-07-07 PROCEDURE — 3066F NEPHROPATHY DOC TX: CPT | Mod: CPTII,S$GLB,, | Performed by: INTERNAL MEDICINE

## 2022-07-07 PROCEDURE — 99214 PR OFFICE/OUTPT VISIT, EST, LEVL IV, 30-39 MIN: ICD-10-PCS | Mod: S$GLB,,, | Performed by: INTERNAL MEDICINE

## 2022-07-07 PROCEDURE — 99999 PR PBB SHADOW E&M-EST. PATIENT-LVL V: ICD-10-PCS | Mod: PBBFAC,,, | Performed by: INTERNAL MEDICINE

## 2022-07-07 PROCEDURE — 3044F HG A1C LEVEL LT 7.0%: CPT | Mod: CPTII,S$GLB,, | Performed by: INTERNAL MEDICINE

## 2022-07-07 PROCEDURE — 3008F PR BODY MASS INDEX (BMI) DOCUMENTED: ICD-10-PCS | Mod: CPTII,S$GLB,, | Performed by: INTERNAL MEDICINE

## 2022-07-07 PROCEDURE — 3074F PR MOST RECENT SYSTOLIC BLOOD PRESSURE < 130 MM HG: ICD-10-PCS | Mod: CPTII,S$GLB,, | Performed by: INTERNAL MEDICINE

## 2022-07-07 PROCEDURE — 3044F PR MOST RECENT HEMOGLOBIN A1C LEVEL <7.0%: ICD-10-PCS | Mod: CPTII,S$GLB,, | Performed by: INTERNAL MEDICINE

## 2022-07-07 PROCEDURE — 3008F BODY MASS INDEX DOCD: CPT | Mod: CPTII,S$GLB,, | Performed by: INTERNAL MEDICINE

## 2022-07-07 PROCEDURE — 1159F MED LIST DOCD IN RCRD: CPT | Mod: CPTII,S$GLB,, | Performed by: INTERNAL MEDICINE

## 2022-07-07 PROCEDURE — 3074F SYST BP LT 130 MM HG: CPT | Mod: CPTII,S$GLB,, | Performed by: INTERNAL MEDICINE

## 2022-07-07 PROCEDURE — 3072F LOW RISK FOR RETINOPATHY: CPT | Mod: CPTII,S$GLB,, | Performed by: INTERNAL MEDICINE

## 2022-07-07 PROCEDURE — 3079F PR MOST RECENT DIASTOLIC BLOOD PRESSURE 80-89 MM HG: ICD-10-PCS | Mod: CPTII,S$GLB,, | Performed by: INTERNAL MEDICINE

## 2022-07-07 PROCEDURE — 3061F PR NEG MICROALBUMINURIA RESULT DOCUMENTED/REVIEW: ICD-10-PCS | Mod: CPTII,S$GLB,, | Performed by: INTERNAL MEDICINE

## 2022-07-07 PROCEDURE — 3072F PR LOW RISK FOR RETINOPATHY: ICD-10-PCS | Mod: CPTII,S$GLB,, | Performed by: INTERNAL MEDICINE

## 2022-07-07 PROCEDURE — 3079F DIAST BP 80-89 MM HG: CPT | Mod: CPTII,S$GLB,, | Performed by: INTERNAL MEDICINE

## 2022-07-07 PROCEDURE — 1159F PR MEDICATION LIST DOCUMENTED IN MEDICAL RECORD: ICD-10-PCS | Mod: CPTII,S$GLB,, | Performed by: INTERNAL MEDICINE

## 2022-07-07 PROCEDURE — 99214 OFFICE O/P EST MOD 30 MIN: CPT | Mod: S$GLB,,, | Performed by: INTERNAL MEDICINE

## 2022-07-07 RX ORDER — ERGOCALCIFEROL 1.25 MG/1
50000 CAPSULE ORAL
Qty: 12 CAPSULE | Refills: 3 | Status: SHIPPED | OUTPATIENT
Start: 2022-07-07 | End: 2023-02-15 | Stop reason: SDUPTHER

## 2022-07-07 RX ORDER — GOLIMUMAB 50 MG/.5ML
50 INJECTION, SOLUTION SUBCUTANEOUS
Qty: 1.5 ML | Refills: 1 | Status: SHIPPED | OUTPATIENT
Start: 2022-07-07 | End: 2022-10-31 | Stop reason: SDUPTHER

## 2022-07-07 RX ORDER — LEVOCETIRIZINE DIHYDROCHLORIDE 5 MG/1
5 TABLET, FILM COATED ORAL DAILY
Qty: 90 TABLET | Refills: 2 | Status: SHIPPED | OUTPATIENT
Start: 2022-07-07 | End: 2023-05-04 | Stop reason: SDUPTHER

## 2022-07-07 RX ORDER — SULFASALAZINE 500 MG/1
1500 TABLET, DELAYED RELEASE ORAL 2 TIMES DAILY
Qty: 540 TABLET | Refills: 0 | Status: SHIPPED | OUTPATIENT
Start: 2022-07-07 | End: 2022-10-31 | Stop reason: SDUPTHER

## 2022-07-07 RX ORDER — PANTOPRAZOLE SODIUM 40 MG/1
40 TABLET, DELAYED RELEASE ORAL DAILY
Qty: 90 TABLET | Refills: 2 | Status: SHIPPED | OUTPATIENT
Start: 2022-07-07 | End: 2023-02-15 | Stop reason: SDUPTHER

## 2022-07-07 ASSESSMENT — ANKYLOSING SPONDYLITIS DISEASE ACTIVITY SCORE (ASDAS-CRP)
CRP_MG_PER_LITER: 1.1
NBH_PAIN: 3
GLOBAL_ACTIVITY: 4
MORNING_STIFFNESS: 5
PAIN_SWELLING: 4
TOTAL_SCORE: 1.81

## 2022-07-07 NOTE — PROGRESS NOTES
Pre chart    Answers for HPI/ROS submitted by the patient on 6/30/2022  fever: No  eye redness: No  mouth sores: No  headaches: No  shortness of breath: No  chest pain: No  trouble swallowing: No  diarrhea: No  constipation: No  unexpected weight change: No  genital sore: No  dysuria: No  During the last 3 days, have you had a skin rash?: No  Bruises or bleeds easily: No  cough: No

## 2022-07-07 NOTE — TELEPHONE ENCOUNTER
No new care gaps identified.  [facility] Embedded Care Gaps. Reference number: [id number]. 7/07/2022   11:56:59 AM TOBIAST

## 2022-07-07 NOTE — PROGRESS NOTES
"Subjective:       Patient ID: Althea Vazquez is a 56 y.o. female.    Chief Complaint: AsSpA/pSpA; lumbar spondylosis   HPI has had 2 doses thus far of golimumab 50mg sc q 28 days, tolerating well. Feels it is helping more than certolizumab. Back less stiff and painful. Exercising at home. Has lost 20# with semaglutide, now increased dose. HbA1C improved as well. Got 2nd Covid booster  Review of Systems   Constitutional: Negative for appetite change, fatigue, fever and unexpected weight change.   HENT: Negative for mouth sores and trouble swallowing.    Eyes: Negative for redness and visual disturbance.   Respiratory: Negative for cough, shortness of breath and wheezing.    Cardiovascular: Negative for chest pain and palpitations.   Gastrointestinal: Negative for abdominal pain, anal bleeding, blood in stool, constipation, diarrhea, nausea and vomiting.   Genitourinary: Negative for dysuria, frequency, genital sores and urgency.   Musculoskeletal: Negative for arthralgias, back pain, gait problem, joint swelling, myalgias, neck pain and neck stiffness.   Skin: Negative for rash.   Neurological: Negative for weakness, numbness and headaches.   Hematological: Negative for adenopathy. Does not bruise/bleed easily.   Psychiatric/Behavioral: Negative for sleep disturbance. The patient is not nervous/anxious.          Objective:   /82   Pulse 73   Ht 5' 7.2" (1.707 m)   Wt 99.8 kg (220 lb)   LMP 11/25/2014   BMI 34.25 kg/m²      Physical Exam   Pulmonary/Chest: Effort normal and breath sounds normal. No stridor. No respiratory distress. She has no wheezes. She has no rhonchi. She has no rales.           Schober's 10-13.25 cm  Fabere neg bilateral   Chest expansion 5 cm   Neck rom nl            Latest Reference Range & Units 06/17/22 08:39   WBC 3.90 - 12.70 K/uL  3.90 - 12.70 K/uL 6.73  6.73   RBC 4.00 - 5.40 M/uL  4.00 - 5.40 M/uL 4.68  4.68   Hemoglobin 12.0 - 16.0 g/dL  12.0 - 16.0 g/dL 14.2  14.2 "   Hematocrit 37.0 - 48.5 %  37.0 - 48.5 % 42.1  42.1   MCV 82 - 98 fL  82 - 98 fL 90  90   MCH 27.0 - 31.0 pg  27.0 - 31.0 pg 30.3  30.3   MCHC 32.0 - 36.0 g/dL  32.0 - 36.0 g/dL 33.7  33.7   RDW 11.5 - 14.5 %  11.5 - 14.5 % 13.2  13.2   Platelets 150 - 450 K/uL  150 - 450 K/uL 259  259   MPV 9.2 - 12.9 fL  9.2 - 12.9 fL 11.4  11.4   Gran % 38.0 - 73.0 %  38.0 - 73.0 % 35.2 (L)  35.2 (L)   Lymph % 18.0 - 48.0 %  18.0 - 48.0 % 49.8 (H)  49.8 (H)   Mono % 4.0 - 15.0 %  4.0 - 15.0 % 10.7  10.7   Eosinophil % 0.0 - 8.0 %  0.0 - 8.0 % 3.3  3.3   Basophil % 0.0 - 1.9 %  0.0 - 1.9 % 0.7  0.7   Immature Granulocytes 0.0 - 0.5 %  0.0 - 0.5 % 0.3  0.3   Gran # (ANC) 1.8 - 7.7 K/uL  1.8 - 7.7 K/uL 2.4  2.4   Lymph # 1.0 - 4.8 K/uL  1.0 - 4.8 K/uL 3.4  3.4   Mono # 0.3 - 1.0 K/uL  0.3 - 1.0 K/uL 0.7  0.7   Eos # 0.0 - 0.5 K/uL  0.0 - 0.5 K/uL 0.2  0.2   Baso # 0.00 - 0.20 K/uL  0.00 - 0.20 K/uL 0.05  0.05   Immature Grans (Abs) 0.00 - 0.04 K/uL  0.00 - 0.04 K/uL 0.02  0.02   nRBC 0 /100 WBC  0 /100 WBC 0  0   Differential Method  Automated  Automated   Sed Rate 0 - 20 mm/Hr 15   Sodium 136 - 145 mmol/L  136 - 145 mmol/L 138  138   Potassium 3.5 - 5.1 mmol/L  3.5 - 5.1 mmol/L 3.9  3.9   Chloride 95 - 110 mmol/L  95 - 110 mmol/L 106  106   CO2 23 - 29 mmol/L  23 - 29 mmol/L 23  23   Anion Gap 8 - 16 mmol/L  8 - 16 mmol/L 9  9   BUN 7 - 17 mg/dL  7 - 17 mg/dL 8  8   Creatinine 0.50 - 1.40 mg/dL  0.50 - 1.40 mg/dL 0.62  0.62   eGFR if non African American >60 mL/min/1.73 m^2  >60 mL/min/1.73 m^2 >60.0  >60.0   eGFR if African American >60 mL/min/1.73 m^2  >60 mL/min/1.73 m^2 >60.0  >60.0   Glucose 70 - 110 mg/dL  70 - 110 mg/dL 107  107   Calcium 8.7 - 10.5 mg/dL  8.7 - 10.5 mg/dL 8.9  8.9   Alkaline Phosphatase 38 - 126 U/L  38 - 126 U/L 84  84   PROTEIN TOTAL 6.0 - 8.4 g/dL  6.0 - 8.4 g/dL 7.4  7.4   Albumin 3.5 - 5.2 g/dL  3.5 - 5.2 g/dL 4.4  4.4   BILIRUBIN TOTAL 0.1 - 1.0 mg/dL  0.1 - 1.0 mg/dL 0.5  0.5   AST 15 - 46 U/L  15 -  46 U/L 22  22   ALT 10 - 44 U/L  10 - 44 U/L 17  17   CRP 0.00 - 1.00 mg/dL 0.11   (L): Data is abnormally low  (H): Data is abnormally high     Assessment:       adalimumab with Secondary inefficacy, tried to change to Simponi 50mg sc q 4 wks but insurance wouldn't cover, then on Enbrel Sureclick 50mg s q 7 days but has developed recurrent AAU OS  Then started certolizumab after 1/11/21 visit  had AAU with recurrence 1/28/21 just after starting certolizumab but had not completed loading dose   certolizumab ineffective        AxSpA:        ASDAS-CRP: 1.81 LDA down from 2.17(HDA)  BASDAI 3.2(MDA) down from  5.4(HDA)  BASFI 2.7(moderate function limitation down from 5.25(severe functional limitation)       Recurrent AAU  Chronic hepatitis B HBV DNA neg          Plan:    golimumab  50mg sc q 4wks   Sulfasalazine.1500mg twice daily  entecvir 0.5mg daily  Vitamin D2 50,000 units q 7days   Continue exercise program  Cont weight reduction, Mediterranean diet as feasible, decrease rice portions as she is doing  RTC 3 months with standing labs

## 2022-07-07 NOTE — TELEPHONE ENCOUNTER
Refill Authorization Note   Althea Mondragonchester  is requesting a refill authorization.  Brief Assessment and Rationale for Refill:  Approve     Medication Therapy Plan:       Medication Reconciliation Completed: No   Comments:     No Care Gaps recommended.     Note composed:4:09 PM 07/07/2022

## 2022-07-08 NOTE — TELEPHONE ENCOUNTER
Specialty Pharmacy - Refill Coordination    Specialty Medication Orders Linked to Encounter    Flowsheet Row Most Recent Value       Medication #1 golimumab (SIMPONI) 50 mg/0.5 mL PnIj (Order#[id number], Rx#[id number])        Patient misunderstood. She injected Ozempic yesterday (not Simponi). Patient is still on track with her Simponi injections and is due for another one on 7/13/22. Proceeding with refill.     Refill Questions - Documented Responses    Flowsheet Row Most Recent Value   Patient Availability and HIPAA Verification    Does patient want to proceed with activity? Yes   HIPAA/medical authority confirmed? Yes   Relationship to patient of person spoken to? Self   Refill Screening Questions    Changes to allergies? No   Changes to medications? No   New conditions since last clinic visit? No   Unplanned office visit, urgent care, ED, or hospital admission in the last 4 weeks? No   How does patient/caregiver feel medication is working? Very good   Financial problems or insurance changes? No   How many doses of your specialty medications were missed in the last 4 weeks? 0   Would patient like to speak to a pharmacist? No   When does the patient need to receive the medication? 07/13/22   Refill Delivery Questions    How will the patient receive the medication? Delivery Roseann   When does the patient need to receive the medication? 07/13/22   Shipping Address Prescription   Address in [facility] confirmed and updated if neccessary? Yes   Expected Copay ($) 0   Is the patient able to afford the medication copay? Yes   Payment Method zero copay   Days supply of Refill 30   Supplies needed? No supplies needed   Refill activity completed? Yes   Refill activity plan Refill scheduled   Shipment/Pickup Date: 07/11/22          Current Outpatient Medications   Medication Sig    amLODIPine (NORVASC) 10 MG tablet TAKE ONE TABLET BY MOUTH DAILY    aspirin (ECOTRIN) 81 MG EC tablet Take 1 tablet (81 mg total) by  mouth once daily.    atenoloL (TENORMIN) 100 MG tablet TAKE ONE TABLET BY MOUTH DAILY    atorvastatin (LIPITOR) 40 MG tablet TAKE ONE TABLET BY MOUTH DAILY    blood-glucose meter kit Use twice daily.    boric acid (BORIC ACID) vaginal suppository Place 1 each (650 mg total) vaginally every evening.    clonazePAM (KLONOPIN) 0.5 MG tablet Take 1 tablet (0.5 mg total) by mouth daily as needed for Anxiety.    clotrimazole-betamethasone 1-0.05% (LOTRISONE) cream Apply topically 2 (two) times daily as needed.     cyclobenzaprine (FLEXERIL) 10 MG tablet TAKE ONE TABLET BY MOUTH AT BEDTIME AS NEEDED    diclofenac sodium (VOLTAREN) 1 % Gel Apply 4 g topically 4 (four) times daily. Apply light film topically to knee up to four times daily    docusate sodium (COLACE) 50 MG capsule Take 1 capsule (50 mg total) by mouth 2 (two) times daily as needed for Constipation. (Patient taking differently: Take 50 mg by mouth 2 (two) times daily.)    entecavir (BARACLUDE) 0.5 MG Tab Take 1 tablet (0.5 mg total) by mouth once daily.    ergocalciferol (ERGOCALCIFEROL) 50,000 unit Cap Take 1 capsule (50,000 Units total) by mouth every 7 days.    fluocinonide 0.05% (LIDEX) 0.05 % cream Apply topically 2 (two) times daily. To allergic rash    FLUoxetine 20 MG capsule TAKE ONE CAPSULE BY MOUTH TWICE DAILY    fluticasone propionate (FLONASE) 50 mcg/actuation nasal spray SPRAY 2 SPRAYS INTO EACH NOSTRIL ONCE A DAY    FREESTYLE LITE STRIPS Strp test TWICE DAILY    golimumab (SIMPONI) 50 mg/0.5 mL PnIj Inject 50 mg into the skin every 30 days.    hydroCHLOROthiazide (HYDRODIURIL) 25 MG tablet TAKE ONE TABLET BY MOUTH DAILY    hydrocortisone 2.5 % cream Apply topically 2 (two) times daily. for 10 days    hydroquinone 4 % Crea Apply to dark areas qhs.  Not more than 6 months straight in same location.Use sunscreen in am (Patient taking differently: continuous prn. Apply to dark areas qhs.  Not more than 6 months straight in same  location.Use sunscreen in am)    INVOKANA 300 mg Tab tablet TAKE ONE TABLET BY MOUTH BEFORE BREAKFAST    lancets (FREESTYLE LANCETS) 28 gauge Misc test TWICE DAILY    levocetirizine (XYZAL) 5 MG tablet TAKE ONE TABLET BY MOUTH EVERY EVENING    losartan (COZAAR) 100 MG tablet TAKE ONE TABLET BY MOUTH ONCE DAILY    naproxen (NAPROSYN) 500 MG tablet Take 1 tablet (500 mg total) by mouth 2 (two) times daily as needed.    neomycin-polymyxin-hydrocortisone (CORTISPORIN) 3.5-10,000-1 mg/mL-unit/mL-% otic suspension Place 3 drops into the left ear 3 (three) times daily.    nystatin (MYCOSTATIN) powder Apply to affected area 3 times daily    pantoprazole (PROTONIX) 40 MG tablet TAKE ONE TABLET BY MOUTH EVERY DAY    prednisoLONE acetate (PRED FORTE) 1 % DrpS Instill one drop into the affected eye every two hours while awake    repaglinide (PRANDIN) 1 MG tablet TAKE ONE TABLET BY MOUTH THREE TIMES DAILY BEFORE MEALS (TAKING PLACE OF TRAJENTA)    semaglutide (OZEMPIC) 1 mg/dose (4 mg/3 mL) Inject 1 mg into the skin every 7 days.    sulfaSALAzine (AZULFIDINE) 500 MG EC tablet Take 3 tablets (1,500 mg total) by mouth 2 (two) times daily.    terconazole (TERAZOL 7) 0.4 % Crea Place 1 applicator vaginally every evening.    terconazole (TERAZOL 7) 0.4 % Crea Place 1 applicator vaginally every evening.    terconazole (TERAZOL 7) 0.4 % Crea Place 1 applicator vaginally every evening.    tiZANidine (ZANAFLEX) 2 MG tablet Take 1-2 tablets (2-4 mg total) by mouth every 8 (eight) hours as needed.    topiramate (TOPAMAX) 100 MG tablet Take 100 mg by mouth 2 (two) times daily.    triamcinolone acetonide 0.1% (KENALOG) 0.1 % cream Apply topically 2 (two) times daily.    valACYclovir (VALTREX) 1000 MG tablet valacyclovir 1 gram tablet   Take 0.5 tablets every 12 hours by oral route.    vitamin D (VITAMIN D3) 1000 units Tab Take 1,000 Units by mouth once daily.    zolpidem (AMBIEN) 5 MG Tab TAKE ONE TABLET BY MOUTH AT  BEDTIME AS NEEDED   Last reviewed on 7/7/2022 10:44 AM by Giselle Nelson MA    Review of patient's allergies indicates:   Allergen Reactions    Bactrim [sulfamethoxazole-trimethoprim] Itching    Trulicity [dulaglutide] Diarrhea and Other (See Comments)     Vomiting, abdominal pain    Diflucan [fluconazole] Swelling and Other (See Comments)     Sore on mouth    Last reviewed on  7/7/2022 11:02 AM by Gabriel Arvizu      Tasks added this encounter   8/4/2022 - Refill Call (Auto Added)   Tasks due within next 3 months   8/7/2022 - Refill Call (Auto Added)  7/6/2022 - Refill Call (Auto Added)     Eliceo Arias, PharmD  [facility]  [address] [place] [id number]  Phone: [phone]  Fax: [phone]

## 2022-07-08 NOTE — TELEPHONE ENCOUNTER
[place] prior auth submitted via [facility] Key: [id number]. Simponi approved from 6/8/22 - 7/8/23. Case ID: [id number].    Calling patient back to investigate the gap in therapy.

## 2022-08-03 ENCOUNTER — PATIENT MESSAGE (OUTPATIENT)
Dept: BARIATRICS | Facility: CLINIC | Age: 57
End: 2022-08-03
Payer: MEDICARE

## 2022-08-05 ENCOUNTER — SPECIALTY PHARMACY (OUTPATIENT)
Dept: PHARMACY | Facility: CLINIC | Age: 57
End: 2022-08-05
Payer: MEDICARE

## 2022-08-05 NOTE — TELEPHONE ENCOUNTER
Specialty Pharmacy - Refill Coordination    Specialty Medication Orders Linked to Encounter    Flowsheet Row Most Recent Value   Medication #1 entecavir (BARACLUDE) 0.5 MG Tab (Order#[id number], Rx#[id number])   Medication #2 golimumab (SIMPONI) 50 mg/0.5 mL PnIj (Order#[id number], Rx#[id number])          Refill Questions - Documented Responses    Flowsheet Row Most Recent Value   Patient Availability and HIPAA Verification    Does patient want to proceed with activity? Yes   HIPAA/medical authority confirmed? Yes   Relationship to patient of person spoken to? Self   Refill Screening Questions    Changes to allergies? No   Changes to medications? No   New conditions since last clinic visit? No   Unplanned office visit, urgent care, ED, or hospital admission in the last 4 weeks? No   How does patient/caregiver feel medication is working? Excellent   Financial problems or insurance changes? No   How many doses of your specialty medications were missed in the last 4 weeks? 0   Would patient like to speak to a pharmacist? No   When does the patient need to receive the medication? 08/10/22   Refill Delivery Questions    How will the patient receive the medication? Delivery Roseann   When does the patient need to receive the medication? 08/10/22   Shipping Address Home   Address in [facility] confirmed and updated if neccessary? Yes   Expected Copay ($) 0   Is the patient able to afford the medication copay? Yes   Payment Method zero copay   Days supply of Refill 30   Supplies needed? Alcohol Swabs   Refill activity completed? Yes   Refill activity plan Refill scheduled   Shipment/Pickup Date: 08/05/22          Current Outpatient Medications   Medication Sig    amLODIPine (NORVASC) 10 MG tablet TAKE ONE TABLET BY MOUTH DAILY    aspirin (ECOTRIN) 81 MG EC tablet Take 1 tablet (81 mg total) by mouth once daily.    atenoloL (TENORMIN) 100 MG tablet TAKE ONE TABLET BY MOUTH DAILY    atorvastatin (LIPITOR) 40 MG tablet  TAKE ONE TABLET BY MOUTH DAILY    blood-glucose meter kit Use twice daily.    boric acid (BORIC ACID) vaginal suppository Place 1 each (650 mg total) vaginally every evening.    clonazePAM (KLONOPIN) 0.5 MG tablet Take 1 tablet (0.5 mg total) by mouth daily as needed for Anxiety.    clotrimazole-betamethasone 1-0.05% (LOTRISONE) cream Apply topically 2 (two) times daily as needed.     cyclobenzaprine (FLEXERIL) 10 MG tablet TAKE ONE TABLET BY MOUTH AT BEDTIME AS NEEDED    diclofenac sodium (VOLTAREN) 1 % Gel Apply 4 g topically 4 (four) times daily. Apply light film topically to knee up to four times daily    docusate sodium (COLACE) 50 MG capsule Take 1 capsule (50 mg total) by mouth 2 (two) times daily as needed for Constipation. (Patient taking differently: Take 50 mg by mouth 2 (two) times daily.)    entecavir (BARACLUDE) 0.5 MG Tab Take 1 tablet (0.5 mg total) by mouth once daily.    ergocalciferol (ERGOCALCIFEROL) 50,000 unit Cap Take 1 capsule (50,000 Units total) by mouth every 7 days.    fluocinonide 0.05% (LIDEX) 0.05 % cream Apply topically 2 (two) times daily. To allergic rash    FLUoxetine 20 MG capsule TAKE ONE CAPSULE BY MOUTH TWICE DAILY    fluticasone propionate (FLONASE) 50 mcg/actuation nasal spray SPRAY 2 SPRAYS INTO EACH NOSTRIL ONCE A DAY    FREESTYLE LITE STRIPS Strp test TWICE DAILY    golimumab (SIMPONI) 50 mg/0.5 mL PnIj Inject 50 mg into the skin every 30 days.    hydroCHLOROthiazide (HYDRODIURIL) 25 MG tablet TAKE ONE TABLET BY MOUTH DAILY    hydrocortisone 2.5 % cream Apply topically 2 (two) times daily. for 10 days    hydroquinone 4 % Crea Apply to dark areas qhs.  Not more than 6 months straight in same location.Use sunscreen in am (Patient taking differently: continuous prn. Apply to dark areas qhs.  Not more than 6 months straight in same location.Use sunscreen in am)    INVOKANA 300 mg Tab tablet TAKE ONE TABLET BY MOUTH BEFORE BREAKFAST    lancets (FREESTYLE  LANCETS) 28 gauge Misc test TWICE DAILY    levocetirizine (XYZAL) 5 MG tablet TAKE ONE TABLET BY MOUTH EVERY EVENING    losartan (COZAAR) 100 MG tablet TAKE ONE TABLET BY MOUTH ONCE DAILY    naproxen (NAPROSYN) 500 MG tablet Take 1 tablet (500 mg total) by mouth 2 (two) times daily as needed.    neomycin-polymyxin-hydrocortisone (CORTISPORIN) 3.5-10,000-1 mg/mL-unit/mL-% otic suspension Place 3 drops into the left ear 3 (three) times daily.    nystatin (MYCOSTATIN) powder Apply to affected area 3 times daily    pantoprazole (PROTONIX) 40 MG tablet TAKE ONE TABLET BY MOUTH EVERY DAY    prednisoLONE acetate (PRED FORTE) 1 % DrpS Instill one drop into the affected eye every two hours while awake    repaglinide (PRANDIN) 1 MG tablet TAKE ONE TABLET BY MOUTH THREE TIMES DAILY BEFORE MEALS (TAKING PLACE OF TRAJENTA)    semaglutide (OZEMPIC) 1 mg/dose (4 mg/3 mL) Inject 1 mg into the skin every 7 days.    sulfaSALAzine (AZULFIDINE) 500 MG EC tablet Take 3 tablets (1,500 mg total) by mouth 2 (two) times daily.    terconazole (TERAZOL 7) 0.4 % Crea Place 1 applicator vaginally every evening.    terconazole (TERAZOL 7) 0.4 % Crea Place 1 applicator vaginally every evening.    terconazole (TERAZOL 7) 0.4 % Crea Place 1 applicator vaginally every evening.    tiZANidine (ZANAFLEX) 2 MG tablet Take 1-2 tablets (2-4 mg total) by mouth every 8 (eight) hours as needed.    topiramate (TOPAMAX) 100 MG tablet Take 100 mg by mouth 2 (two) times daily.    triamcinolone acetonide 0.1% (KENALOG) 0.1 % cream Apply topically 2 (two) times daily.    valACYclovir (VALTREX) 1000 MG tablet valacyclovir 1 gram tablet   Take 0.5 tablets every 12 hours by oral route.    vitamin D (VITAMIN D3) 1000 units Tab Take 1,000 Units by mouth once daily.    zolpidem (AMBIEN) 5 MG Tab TAKE ONE TABLET BY MOUTH AT BEDTIME AS NEEDED   Last reviewed on 7/7/2022 10:44 AM by Giselle Nelson MA    Review of patient's allergies indicates:    Allergen Reactions    Bactrim [sulfamethoxazole-trimethoprim] Itching    Trulicity [dulaglutide] Diarrhea and Other (See Comments)     Vomiting, abdominal pain    Diflucan [fluconazole] Swelling and Other (See Comments)     Sore on mouth    Last reviewed on  7/7/2022 11:02 AM by Gabriel Arvizu      Tasks added this encounter   9/2/2022 - Refill Call (Auto Added)  9/2/2022 - Refill Call (Auto Added)   Tasks due within next 3 months   No tasks due.     Naun Huber, PharmD  [facility]  [address] [place] [address]  Phone: [phone]  Fax: [phone]

## 2022-08-29 ENCOUNTER — PATIENT OUTREACH (OUTPATIENT)
Dept: ADMINISTRATIVE | Facility: HOSPITAL | Age: 57
End: 2022-08-29
Payer: MEDICARE

## 2022-09-01 ENCOUNTER — OFFICE VISIT (OUTPATIENT)
Dept: OTOLARYNGOLOGY | Facility: CLINIC | Age: 57
End: 2022-09-01
Payer: MEDICARE

## 2022-09-01 VITALS — TEMPERATURE: 98 F | BODY MASS INDEX: 32.54 KG/M2 | WEIGHT: 209 LBS

## 2022-09-01 DIAGNOSIS — L29.9 ITCHING OF EAR: Primary | ICD-10-CM

## 2022-09-01 DIAGNOSIS — H69.92 ETD (EUSTACHIAN TUBE DYSFUNCTION), LEFT: ICD-10-CM

## 2022-09-01 PROCEDURE — 99999 PR PBB SHADOW E&M-EST. PATIENT-LVL IV: CPT | Mod: PBBFAC,,, | Performed by: PHYSICIAN ASSISTANT

## 2022-09-01 PROCEDURE — 3072F LOW RISK FOR RETINOPATHY: CPT | Mod: CPTII,S$GLB,, | Performed by: PHYSICIAN ASSISTANT

## 2022-09-01 PROCEDURE — 99999 PR PBB SHADOW E&M-EST. PATIENT-LVL IV: ICD-10-PCS | Mod: PBBFAC,,, | Performed by: PHYSICIAN ASSISTANT

## 2022-09-01 PROCEDURE — 1159F PR MEDICATION LIST DOCUMENTED IN MEDICAL RECORD: ICD-10-PCS | Mod: CPTII,S$GLB,, | Performed by: PHYSICIAN ASSISTANT

## 2022-09-01 PROCEDURE — 3066F NEPHROPATHY DOC TX: CPT | Mod: CPTII,S$GLB,, | Performed by: PHYSICIAN ASSISTANT

## 2022-09-01 PROCEDURE — 99203 OFFICE O/P NEW LOW 30 MIN: CPT | Mod: S$GLB,,, | Performed by: PHYSICIAN ASSISTANT

## 2022-09-01 PROCEDURE — 3044F PR MOST RECENT HEMOGLOBIN A1C LEVEL <7.0%: ICD-10-PCS | Mod: CPTII,S$GLB,, | Performed by: PHYSICIAN ASSISTANT

## 2022-09-01 PROCEDURE — 3008F PR BODY MASS INDEX (BMI) DOCUMENTED: ICD-10-PCS | Mod: CPTII,S$GLB,, | Performed by: PHYSICIAN ASSISTANT

## 2022-09-01 PROCEDURE — 3061F PR NEG MICROALBUMINURIA RESULT DOCUMENTED/REVIEW: ICD-10-PCS | Mod: CPTII,S$GLB,, | Performed by: PHYSICIAN ASSISTANT

## 2022-09-01 PROCEDURE — 3061F NEG MICROALBUMINURIA REV: CPT | Mod: CPTII,S$GLB,, | Performed by: PHYSICIAN ASSISTANT

## 2022-09-01 PROCEDURE — 3044F HG A1C LEVEL LT 7.0%: CPT | Mod: CPTII,S$GLB,, | Performed by: PHYSICIAN ASSISTANT

## 2022-09-01 PROCEDURE — 99203 PR OFFICE/OUTPT VISIT, NEW, LEVL III, 30-44 MIN: ICD-10-PCS | Mod: S$GLB,,, | Performed by: PHYSICIAN ASSISTANT

## 2022-09-01 PROCEDURE — 3008F BODY MASS INDEX DOCD: CPT | Mod: CPTII,S$GLB,, | Performed by: PHYSICIAN ASSISTANT

## 2022-09-01 PROCEDURE — 4010F ACE/ARB THERAPY RXD/TAKEN: CPT | Mod: CPTII,S$GLB,, | Performed by: PHYSICIAN ASSISTANT

## 2022-09-01 PROCEDURE — 4010F PR ACE/ARB THEARPY RXD/TAKEN: ICD-10-PCS | Mod: CPTII,S$GLB,, | Performed by: PHYSICIAN ASSISTANT

## 2022-09-01 PROCEDURE — 3072F PR LOW RISK FOR RETINOPATHY: ICD-10-PCS | Mod: CPTII,S$GLB,, | Performed by: PHYSICIAN ASSISTANT

## 2022-09-01 PROCEDURE — 1159F MED LIST DOCD IN RCRD: CPT | Mod: CPTII,S$GLB,, | Performed by: PHYSICIAN ASSISTANT

## 2022-09-01 PROCEDURE — 3066F PR DOCUMENTATION OF TREATMENT FOR NEPHROPATHY: ICD-10-PCS | Mod: CPTII,S$GLB,, | Performed by: PHYSICIAN ASSISTANT

## 2022-09-01 RX ORDER — FLUOCINOLONE ACETONIDE 0.11 MG/ML
3 OIL AURICULAR (OTIC) 2 TIMES DAILY
Qty: 20 ML | Refills: 3 | Status: SHIPPED | OUTPATIENT
Start: 2022-09-01 | End: 2022-09-11

## 2022-09-01 RX ORDER — FLUTICASONE PROPIONATE 50 MCG
2 SPRAY, SUSPENSION (ML) NASAL DAILY
Qty: 16 G | Refills: 6 | Status: SHIPPED | OUTPATIENT
Start: 2022-09-01 | End: 2023-08-31 | Stop reason: SDUPTHER

## 2022-09-01 NOTE — PROGRESS NOTES
Subjective:   Patient ID: Althea Vazquez is a 56 y.o. female.    Chief Complaint: Ear Fullness (Feels like there is water in ears, and her ears have been itching. Jaw locks on left side sometimes) and Otalgia    HPI  Review of patient's allergies indicates:   Allergen Reactions    Bactrim [sulfamethoxazole-trimethoprim] Itching    Trulicity [dulaglutide] Diarrhea and Other (See Comments)     Vomiting, abdominal pain    Diflucan [fluconazole] Swelling and Other (See Comments)     Sore on mouth           Review of Systems   Constitutional: Negative.    HENT:  Positive for ear pain.    Eyes:  Positive for photophobia and itching.   Respiratory: Negative.     Cardiovascular: Negative.    Gastrointestinal: Negative.    Genitourinary: Negative.    Musculoskeletal:  Positive for back pain and neck pain.   Skin: Negative.    Allergic/Immunologic: Negative.    Neurological:  Positive for headaches.   Hematological: Negative.    Psychiatric/Behavioral: Negative.         Objective:   Temp 97.5 °F (36.4 °C) (Temporal)   Wt 94.8 kg (208 lb 15.9 oz)   LMP 11/25/2014   BMI 32.54 kg/m²     Physical Exam     Imaging : ***    Assessment:     No diagnosis found.    Plan:     There are no diagnoses linked to this encounter.      Answers submitted by the patient for this visit:  Review of Symptoms Questionnaire  (Submitted on 9/1/2022)  Ear infection(s)?: Yes  Muscle aches / pain?: Yes

## 2022-09-01 NOTE — PROGRESS NOTES
"Subjective:   Patient ID: Althea Vazquez is a 56 y.o. female.    Chief Complaint: Ear Fullness (Feels like there is water in ears, and her ears have been itching. Jaw locks on left side sometimes) and Otalgia    Ms. Vazquez is a very pleasant 56 female here to see me today with c/o Bilateral ear itching and ear fullness. She states she feels like "something is crawling" in her ears and occasional fullness. She has been using Vicks Vaporub on qtip in ear to get some relief. She has a h/o seasonal allergies manages with xyzal and flonase daily.      Review of patient's allergies indicates:   Allergen Reactions    Bactrim [sulfamethoxazole-trimethoprim] Itching    Trulicity [dulaglutide] Diarrhea and Other (See Comments)     Vomiting, abdominal pain    Diflucan [fluconazole] Swelling and Other (See Comments)     Sore on mouth           Review of Systems   Constitutional: Negative.    HENT:  Positive for ear pain.    Eyes:  Positive for photophobia and itching.   Respiratory: Negative.     Cardiovascular: Negative.    Gastrointestinal: Negative.    Genitourinary: Negative.    Musculoskeletal:  Positive for back pain and neck pain.   Skin: Negative.    Allergic/Immunologic: Negative.    Neurological:  Positive for headaches.   Hematological: Negative.    Psychiatric/Behavioral: Negative.         Objective:   Temp 97.5 °F (36.4 °C) (Temporal)   Wt 94.8 kg (208 lb 15.9 oz)   LMP 11/25/2014   BMI 32.54 kg/m²     Physical Exam  Constitutional:       General: She is not in acute distress.     Appearance: She is well-developed.   HENT:      Head: Normocephalic and atraumatic.      Right Ear: Tympanic membrane, ear canal and external ear normal.      Left Ear: Ear canal and external ear normal. Tympanic membrane is retracted.      Nose: Nose normal. No nasal deformity, septal deviation, mucosal edema or rhinorrhea.      Right Sinus: No maxillary sinus tenderness or frontal sinus tenderness.      Left Sinus: No " maxillary sinus tenderness or frontal sinus tenderness.      Mouth/Throat:      Mouth: Mucous membranes are not pale and not dry.      Dentition: No dental caries.      Pharynx: Uvula midline. No oropharyngeal exudate or posterior oropharyngeal erythema.   Eyes:      General: Lids are normal. No scleral icterus.     Extraocular Movements:      Right eye: Normal extraocular motion and no nystagmus.      Left eye: Normal extraocular motion and no nystagmus.      Conjunctiva/sclera: Conjunctivae normal.      Right eye: Right conjunctiva is not injected. No chemosis.     Left eye: Left conjunctiva is not injected. No chemosis.     Pupils: Pupils are equal, round, and reactive to light.   Neck:      Thyroid: No thyroid mass or thyromegaly.      Trachea: Trachea and phonation normal. No tracheal tenderness or tracheal deviation.   Pulmonary:      Effort: Pulmonary effort is normal. No respiratory distress.      Breath sounds: No stridor.   Abdominal:      General: There is no distension.   Lymphadenopathy:      Head:      Right side of head: No submental, submandibular, preauricular, posterior auricular or occipital adenopathy.      Left side of head: No submental, submandibular, preauricular, posterior auricular or occipital adenopathy.      Cervical: No cervical adenopathy.   Skin:     General: Skin is warm and dry.      Findings: No erythema or rash.   Neurological:      Mental Status: She is alert and oriented to person, place, and time.      Cranial Nerves: No cranial nerve deficit.   Psychiatric:         Behavior: Behavior normal.          Assessment:     1. Itching of ear    2. ETD (Eustachian tube dysfunction), left        Plan:     Itching of ear    ETD (Eustachian tube dysfunction), left    Other orders  -     fluticasone propionate (FLONASE) 50 mcg/actuation nasal spray; 2 sprays (100 mcg total) by Each Nostril route once daily.  Dispense: 18.2 mL; Refill: 6  -     fluocinolone acetonide oiL (DERMOTIC OIL) 0.01  % Drop; Place 3 drops in ear(s) 2 (two) times daily. for 10 days  Dispense: 20 mL; Refill: 3    I have prescribed her dermotic for ear itching. Sh emay f/u with ENT as needed.    We had a long discussion regarding the anatomy and function of the eustachian tube.  We discussed that the eustachian tube acts as a pump to keep the appropriate amount of pressure behind the ear drum.  I gave the patient a prescription for a nasal steroid spray to be used on a daily basis, and we discussed that it will take 2-3 weeks of daily use to achieve maximal effectiveness.

## 2022-09-02 ENCOUNTER — PATIENT MESSAGE (OUTPATIENT)
Dept: PHARMACY | Facility: CLINIC | Age: 57
End: 2022-09-02
Payer: MEDICARE

## 2022-09-07 ENCOUNTER — SPECIALTY PHARMACY (OUTPATIENT)
Dept: PHARMACY | Facility: CLINIC | Age: 57
End: 2022-09-07
Payer: MEDICARE

## 2022-09-07 NOTE — TELEPHONE ENCOUNTER
Specialty Pharmacy - Refill Coordination    Specialty Medication Orders Linked to Encounter      Flowsheet Row Most Recent Value   Medication #1 golimumab (SIMPONI) 50 mg/0.5 mL PnIj (Order#[id number], Rx#[id number])            Refill Questions - Documented Responses      Flowsheet Row Most Recent Value   Patient Availability and HIPAA Verification    Does patient want to proceed with activity? Yes   HIPAA/medical authority confirmed? Yes   Relationship to patient of person spoken to? Self   Refill Screening Questions    Changes to allergies? No   Changes to medications? No   New conditions since last clinic visit? No   Unplanned office visit, urgent care, ED, or hospital admission in the last 4 weeks? No   How does patient/caregiver feel medication is working? Good   Financial problems or insurance changes? No   How many doses of your specialty medications were missed in the last 4 weeks? 0   Would patient like to speak to a pharmacist? No   When does the patient need to receive the medication? 09/14/22   Refill Delivery Questions    How will the patient receive the medication? Delivery Roseann   When does the patient need to receive the medication? 09/14/22   Shipping Address Home   Address in [facility] confirmed and updated if neccessary? Yes   Expected Copay ($) 0   Is the patient able to afford the medication copay? Yes   Payment Method zero copay   Days supply of Refill 30   Supplies needed? No supplies needed   Refill activity completed? Yes   Refill activity plan Refill scheduled   Shipment/Pickup Date: 09/08/22            Current Outpatient Medications   Medication Sig    amLODIPine (NORVASC) 10 MG tablet TAKE ONE TABLET BY MOUTH DAILY    aspirin (ECOTRIN) 81 MG EC tablet Take 1 tablet (81 mg total) by mouth once daily.    atenoloL (TENORMIN) 100 MG tablet TAKE ONE TABLET BY MOUTH DAILY    atorvastatin (LIPITOR) 40 MG tablet TAKE ONE TABLET BY MOUTH DAILY    blood-glucose meter kit Use twice daily.     boric acid (BORIC ACID) vaginal suppository Place 1 each (650 mg total) vaginally every evening.    clonazePAM (KLONOPIN) 0.5 MG tablet TAKE 1 TABLET BY MOUTH ONCE DAILY AS NEEDED FOR ANXIETY    clotrimazole-betamethasone 1-0.05% (LOTRISONE) cream Apply topically 2 (two) times daily as needed.     cyclobenzaprine (FLEXERIL) 10 MG tablet TAKE ONE TABLET BY MOUTH AT BEDTIME AS NEEDED    diclofenac sodium (VOLTAREN) 1 % Gel Apply 4 g topically 4 (four) times daily. Apply light film topically to knee up to four times daily    docusate sodium (COLACE) 50 MG capsule Take 1 capsule (50 mg total) by mouth 2 (two) times daily as needed for Constipation. (Patient taking differently: Take 50 mg by mouth 2 (two) times daily.)    entecavir (BARACLUDE) 0.5 MG Tab Take 1 tablet (0.5 mg total) by mouth once daily.    ergocalciferol (ERGOCALCIFEROL) 50,000 unit Cap Take 1 capsule (50,000 Units total) by mouth every 7 days.    fluocinolone acetonide oiL (DERMOTIC OIL) 0.01 % Drop Place 3 drops in ear(s) 2 (two) times daily. for 10 days    fluocinonide 0.05% (LIDEX) 0.05 % cream Apply topically 2 (two) times daily. To allergic rash    FLUoxetine 20 MG capsule TAKE ONE CAPSULE BY MOUTH TWICE DAILY    fluticasone propionate (FLONASE) 50 mcg/actuation nasal spray SPRAY 2 SPRAYS INTO EACH NOSTRIL ONCE A DAY    fluticasone propionate (FLONASE) 50 mcg/actuation nasal spray 2 sprays (100 mcg total) by Each Nostril route once daily.    FREESTYLE LITE STRIPS Strp test TWICE DAILY    golimumab (SIMPONI) 50 mg/0.5 mL PnIj Inject 50 mg into the skin every 30 days.    hydroCHLOROthiazide (HYDRODIURIL) 25 MG tablet TAKE ONE TABLET BY MOUTH DAILY    hydrocortisone 2.5 % cream Apply topically 2 (two) times daily. for 10 days    hydroquinone 4 % Crea Apply to dark areas qhs.  Not more than 6 months straight in same location.Use sunscreen in am (Patient taking differently: continuous prn. Apply to dark areas qhs.  Not more than 6 months straight in  same location.Use sunscreen in am)    INVOKANA 300 mg Tab tablet TAKE ONE TABLET BY MOUTH BEFORE BREAKFAST    lancets (FREESTYLE LANCETS) 28 gauge Misc test TWICE DAILY    levocetirizine (XYZAL) 5 MG tablet TAKE ONE TABLET BY MOUTH EVERY EVENING    losartan (COZAAR) 100 MG tablet TAKE ONE TABLET BY MOUTH ONCE DAILY    naproxen (NAPROSYN) 500 MG tablet Take 1 tablet (500 mg total) by mouth 2 (two) times daily as needed.    neomycin-polymyxin-hydrocortisone (CORTISPORIN) 3.5-10,000-1 mg/mL-unit/mL-% otic suspension Place 3 drops into the left ear 3 (three) times daily.    nystatin (MYCOSTATIN) powder Apply to affected area 3 times daily    pantoprazole (PROTONIX) 40 MG tablet TAKE ONE TABLET BY MOUTH EVERY DAY    prednisoLONE acetate (PRED FORTE) 1 % DrpS Instill one drop into the affected eye every two hours while awake    repaglinide (PRANDIN) 1 MG tablet TAKE ONE TABLET BY MOUTH THREE TIMES DAILY BEFORE MEALS (TAKING PLACE OF TRAJ[place])    semaglutide (OZEMPIC) 1 mg/dose (4 mg/3 mL) Inject 1 mg into the skin every 7 days.    sulfaSALAzine (AZULFIDINE) 500 MG EC tablet Take 3 tablets (1,500 mg total) by mouth 2 (two) times daily.    terconazole (TERAZOL 7) 0.4 % Crea Place 1 applicator vaginally every evening.    terconazole (TERAZOL 7) 0.4 % Crea Place 1 applicator vaginally every evening.    terconazole (TERAZOL 7) 0.4 % Crea Place 1 applicator vaginally every evening.    tiZANidine (ZANAFLEX) 2 MG tablet Take 1-2 tablets (2-4 mg total) by mouth every 8 (eight) hours as needed.    topiramate (TOPAMAX) 100 MG tablet Take 100 mg by mouth 2 (two) times daily.    triamcinolone acetonide 0.1% (KENALOG) 0.1 % cream Apply topically 2 (two) times daily.    valACYclovir (VALTREX) 1000 MG tablet valacyclovir 1 gram tablet   Take 0.5 tablets every 12 hours by oral route.    vitamin D (VITAMIN D3) 1000 units Tab Take 1,000 Units by mouth once daily.    zolpidem (AMBIEN) 5 MG Tab TAKE ONE TABLET BY MOUTH AT BEDTIME AS NEEDED    Last reviewed on 9/1/2022  9:51 AM by Letha Beatty MA    Review of patient's allergies indicates:   Allergen Reactions    Bactrim [sulfamethoxazole-trimethoprim] Itching    Trulicity [dulaglutide] Diarrhea and Other (See Comments)     Vomiting, abdominal pain    Diflucan [fluconazole] Swelling and Other (See Comments)     Sore on mouth    Last reviewed on  9/1/2022 9:51 AM by Letha Beatty      Tasks added this encounter   No tasks added.   Tasks due within next 3 months   No tasks due.     Anayeli Peters [facility]  [address] [facility] [address]  Phone: [phone]  Fax: [phone]

## 2022-09-14 ENCOUNTER — OFFICE VISIT (OUTPATIENT)
Dept: PSYCHIATRY | Facility: CLINIC | Age: 57
End: 2022-09-14
Payer: MEDICARE

## 2022-09-14 DIAGNOSIS — F41.9 ANXIETY: Primary | ICD-10-CM

## 2022-09-14 PROCEDURE — 4010F PR ACE/ARB THEARPY RXD/TAKEN: ICD-10-PCS | Mod: CPTII,95,, | Performed by: PSYCHIATRY & NEUROLOGY

## 2022-09-14 PROCEDURE — 99214 OFFICE O/P EST MOD 30 MIN: CPT | Mod: 95,,, | Performed by: PSYCHIATRY & NEUROLOGY

## 2022-09-14 PROCEDURE — 3044F HG A1C LEVEL LT 7.0%: CPT | Mod: CPTII,95,, | Performed by: PSYCHIATRY & NEUROLOGY

## 2022-09-14 PROCEDURE — 3061F PR NEG MICROALBUMINURIA RESULT DOCUMENTED/REVIEW: ICD-10-PCS | Mod: CPTII,95,, | Performed by: PSYCHIATRY & NEUROLOGY

## 2022-09-14 PROCEDURE — 3066F NEPHROPATHY DOC TX: CPT | Mod: CPTII,95,, | Performed by: PSYCHIATRY & NEUROLOGY

## 2022-09-14 PROCEDURE — 3072F LOW RISK FOR RETINOPATHY: CPT | Mod: CPTII,95,, | Performed by: PSYCHIATRY & NEUROLOGY

## 2022-09-14 PROCEDURE — 3066F PR DOCUMENTATION OF TREATMENT FOR NEPHROPATHY: ICD-10-PCS | Mod: CPTII,95,, | Performed by: PSYCHIATRY & NEUROLOGY

## 2022-09-14 PROCEDURE — 99214 PR OFFICE/OUTPT VISIT, EST, LEVL IV, 30-39 MIN: ICD-10-PCS | Mod: 95,,, | Performed by: PSYCHIATRY & NEUROLOGY

## 2022-09-14 PROCEDURE — 3044F PR MOST RECENT HEMOGLOBIN A1C LEVEL <7.0%: ICD-10-PCS | Mod: CPTII,95,, | Performed by: PSYCHIATRY & NEUROLOGY

## 2022-09-14 PROCEDURE — 3072F PR LOW RISK FOR RETINOPATHY: ICD-10-PCS | Mod: CPTII,95,, | Performed by: PSYCHIATRY & NEUROLOGY

## 2022-09-14 PROCEDURE — 3061F NEG MICROALBUMINURIA REV: CPT | Mod: CPTII,95,, | Performed by: PSYCHIATRY & NEUROLOGY

## 2022-09-14 PROCEDURE — 4010F ACE/ARB THERAPY RXD/TAKEN: CPT | Mod: CPTII,95,, | Performed by: PSYCHIATRY & NEUROLOGY

## 2022-09-14 RX ORDER — FLUOXETINE HYDROCHLORIDE 20 MG/1
20 CAPSULE ORAL 2 TIMES DAILY
Qty: 180 CAPSULE | Refills: 1 | Status: SHIPPED | OUTPATIENT
Start: 2022-09-14 | End: 2023-01-06 | Stop reason: SDUPTHER

## 2022-09-14 NOTE — PROGRESS NOTES
The patient location is: [place], [place]  The chief complaint leading to consultation is: anxiety  Visit type: audiovisual  Total time spent with patient: 15 min  Each patient to whom he or she provides medical services by telemedicine is:  (1) informed of the relationship between the physician and patient and the respective role of any other health care provider with respect to management of the patient; and (2) notified that he or she may decline to receive medical services by telemedicine and may withdraw from such care at any time.    Notes:     ESTABLISHED OUTPATIENT VISIT   E/M LEVEL 4: [id number]    ENCOUNTER DATE: 9/14/2022  SITE: [facility], [address]    HISTORY    CHIEF COMPLAINT   Althea Vazquez is a 57 y.o. female who presents for follow up of anxiety.    HPI     Now living with father, who has been having medical problems.    Appears to be doing well psychiatrically. Appears euthymic during today's visit.    Satisfied with current psychotropic medication regimen.    Spiritual beliefs are supportive.    Has continued to stand up for herself, this is working well for her.    Psychiatric Review Of Systems - Is patient experiencing or having changes in:  sleep: interrupted  appetite: no  weight: working on losing weight  energy/anergy: no  interest/pleasure/anhedonia: no  somatic symptoms: no  libido: no  anxiety/panic: no  guilty/hopelessness: no  concentration: no  S.I.B.s/risky behavior: no  Irritability: no  Racing thoughts: no  Impulsive behaviors: no  Paranoia:no  AVH:no    Recent alcohol: rare small amount  Recent drug: no    Medical ROS   Denies any physical complaint during today's visit.     PAST MEDICAL, FAMILY AND SOCIAL HISTORY: The patient's past medical, family and social history have been reviewed and updated as appropriate within the electronic medical record - see encounter notes.    PSYCHOTROPIC MEDICATIONS   Prozac 20 mg qam and 20 mg qpm, Clonazepam 0.5 mg prn for  anxiety[has been using about 2 days per week], Ambien 5 mg at bedtime prn[recently has been taking about 3 times per week], Topiramate 100 mg qam[recently has not been taking]    Scheduled and PRN Medications     Current Outpatient Medications:     amLODIPine (NORVASC) 10 MG tablet, TAKE ONE TABLET BY MOUTH DAILY, Disp: 90 tablet, Rfl: 3    aspirin (ECOTRIN) 81 MG EC tablet, Take 1 tablet (81 mg total) by mouth once daily., Disp: 30 tablet, Rfl: 0    atenoloL (TENORMIN) 100 MG tablet, TAKE ONE TABLET BY MOUTH DAILY, Disp: 90 tablet, Rfl: 3    atorvastatin (LIPITOR) 40 MG tablet, TAKE ONE TABLET BY MOUTH DAILY, Disp: 90 tablet, Rfl: 3    blood-glucose meter kit, Use twice daily., Disp: 1 each, Rfl: 0    boric acid (BORIC ACID) vaginal suppository, Place 1 each (650 mg total) vaginally every evening., Disp: 14 suppository, Rfl: 6    clonazePAM (KLONOPIN) 0.5 MG tablet, TAKE 1 TABLET BY MOUTH ONCE DAILY AS NEEDED FOR ANXIETY, Disp: 90 tablet, Rfl: 0    clotrimazole-betamethasone 1-0.05% (LOTRISONE) cream, Apply topically 2 (two) times daily as needed. , Disp: , Rfl:     cyclobenzaprine (FLEXERIL) 10 MG tablet, TAKE ONE TABLET BY MOUTH AT BEDTIME AS NEEDED, Disp: 90 tablet, Rfl: 2    diclofenac sodium (VOLTAREN) 1 % Gel, Apply 4 g topically 4 (four) times daily. Apply light film topically to knee up to four times daily, Disp: 3 Tube, Rfl: 2    docusate sodium (COLACE) 50 MG capsule, Take 1 capsule (50 mg total) by mouth 2 (two) times daily as needed for Constipation. (Patient taking differently: Take 50 mg by mouth 2 (two) times daily.), Disp: 30 capsule, Rfl: 0    entecavir (BARACLUDE) 0.5 MG Tab, Take 1 tablet (0.5 mg total) by mouth once daily., Disp: 90 tablet, Rfl: 3    ergocalciferol (ERGOCALCIFEROL) 50,000 unit Cap, Take 1 capsule (50,000 Units total) by mouth every 7 days., Disp: 12 capsule, Rfl: 3    fluocinonide 0.05% (LIDEX) 0.05 % cream, Apply topically 2 (two) times daily. To allergic rash, Disp: 30 g, Rfl:  0    FLUoxetine 20 MG capsule, TAKE ONE CAPSULE BY MOUTH TWICE DAILY, Disp: 180 capsule, Rfl: 0    fluticasone propionate (FLONASE) 50 mcg/actuation nasal spray, SPRAY 2 SPRAYS INTO EACH NOSTRIL ONCE A DAY, Disp: , Rfl:     fluticasone propionate (FLONASE) 50 mcg/actuation nasal spray, 2 sprays (100 mcg total) by Each Nostril route once daily., Disp: 18.2 mL, Rfl: 6    FREESTYLE LITE STRIPS Strp, test TWICE DAILY, Disp: 200 each, Rfl: 3    golimumab (SIMPONI) 50 mg/0.5 mL PnIj, Inject 50 mg into the skin every 30 days., Disp: 1.5 mL, Rfl: 1    hydroCHLOROthiazide (HYDRODIURIL) 25 MG tablet, TAKE ONE TABLET BY MOUTH DAILY, Disp: 90 tablet, Rfl: 3    hydrocortisone 2.5 % cream, Apply topically 2 (two) times daily. for 10 days, Disp: 28 g, Rfl: 1    hydroquinone 4 % Crea, Apply to dark areas qhs.  Not more than 6 months straight in same location.Use sunscreen in am (Patient taking differently: continuous prn. Apply to dark areas qhs.  Not more than 6 months straight in same location.Use sunscreen in am), Disp: 46 g, Rfl: 1    INVOKANA 300 mg Tab tablet, TAKE ONE TABLET BY MOUTH BEFORE BREAKFAST, Disp: 90 tablet, Rfl: 1    lancets (FREESTYLE LANCETS) 28 gauge Misc, test TWICE DAILY, Disp: 200 each, Rfl: 3    levocetirizine (XYZAL) 5 MG tablet, TAKE ONE TABLET BY MOUTH EVERY EVENING, Disp: 90 tablet, Rfl: 2    losartan (COZAAR) 100 MG tablet, TAKE ONE TABLET BY MOUTH ONCE DAILY, Disp: 90 tablet, Rfl: 3    naproxen (NAPROSYN) 500 MG tablet, Take 1 tablet (500 mg total) by mouth 2 (two) times daily as needed., Disp: 180 tablet, Rfl: 0    neomycin-polymyxin-hydrocortisone (CORTISPORIN) 3.5-10,000-1 mg/mL-unit/mL-% otic suspension, Place 3 drops into the left ear 3 (three) times daily., Disp: 10 mL, Rfl: 0    nystatin (MYCOSTATIN) powder, Apply to affected area 3 times daily, Disp: 1 Bottle, Rfl: 3    pantoprazole (PROTONIX) 40 MG tablet, TAKE ONE TABLET BY MOUTH EVERY DAY, Disp: 90 tablet, Rfl: 2    prednisoLONE acetate  (PRED FORTE) 1 % DrpS, Instill one drop into the affected eye every two hours while awake, Disp: 5 mL, Rfl: 0    repaglinide (PRANDIN) 1 MG tablet, TAKE ONE TABLET BY MOUTH THREE TIMES DAILY BEFORE MEALS (TAKING PLACE OF TRA[place]), Disp: 270 tablet, Rfl: 0    semaglutide (OZEMPIC) 1 mg/dose (4 mg/3 mL), Inject 1 mg into the skin every 7 days., Disp: 1 pen, Rfl: 11    sulfaSALAzine (AZULFIDINE) 500 MG EC tablet, Take 3 tablets (1,500 mg total) by mouth 2 (two) times daily., Disp: 540 tablet, Rfl: 0    terconazole (TERAZOL 7) 0.4 % Crea, Place 1 applicator vaginally every evening., Disp: 45 g, Rfl: 2    terconazole (TERAZOL 7) 0.4 % Crea, Place 1 applicator vaginally every evening., Disp: 7 g, Rfl: 2    terconazole (TERAZOL 7) 0.4 % Crea, Place 1 applicator vaginally every evening., Disp: 7 g, Rfl: 0    tiZANidine (ZANAFLEX) 2 MG tablet, Take 1-2 tablets (2-4 mg total) by mouth every 8 (eight) hours as needed., Disp: 90 tablet, Rfl: 2    topiramate (TOPAMAX) 100 MG tablet, Take 100 mg by mouth 2 (two) times daily., Disp: , Rfl:     triamcinolone acetonide 0.1% (KENALOG) 0.1 % cream, Apply topically 2 (two) times daily., Disp: 45 g, Rfl: 1    valACYclovir (VALTREX) 1000 MG tablet, valacyclovir 1 gram tablet  Take 0.5 tablets every 12 hours by oral route., Disp: , Rfl:     vitamin D (VITAMIN D3) 1000 units Tab, Take 1,000 Units by mouth once daily., Disp: , Rfl:     zolpidem (AMBIEN) 5 MG Tab, TAKE ONE TABLET BY MOUTH AT BEDTIME AS NEEDED, Disp: 90 tablet, Rfl: 0  No current facility-administered medications for this visit.    Facility-Administered Medications Ordered in Other Visits:     0.9%  NaCl infusion, , Intravenous, Continuous, Graciela Jerome MD    EXAMINATION    There were no vitals filed for this visit.      CONSTITUTIONAL  General Appearance: well nourished    MUSCULOSKELETAL  Muscle Strength and Tone: normal strength and tone  Abnormal Involuntary Movements: no abnormal movement noted  Gait and Station:  normal gait    PSYCHIATRIC   Level of Consciousness: alert  Orientation: oriented to person, place and time  Grooming: well groomed  Psychomotor Behavior: no restlessness/agitation  Speech: normal in rate, rhythm and volume  Language: normal vocabulary  Mood: anxiety at times  Affect: full range and appropriate  Thought Process: logical and goal directed  Associations: intact associations  Thought Content: no SI/HI  Memory: grossly intact  Attention: intact to content of interview  Fund of Knowledge: appears adequate  Insight: good  Judgement: good    MEDICAL DECISION MAKING    DIAGNOSES  Anxiety d/o N.O.S.    PROBLEM LIST AND MANAGEMENT PLANS    - anxiety: continue Prozac, Klonopin and Ambien as above  - rtc 3 months    Time with patient: 15 min    LABORATORY DATA  Lab Visit on 06/17/2022   Component Date Value Ref Range Status    Sed Rate 06/17/2022 15  0 - 20 mm/Hr Final    CRP 06/17/2022 0.11  0.00 - 1.00 mg/dL Final    WBC 06/17/2022 6.73  3.90 - 12.70 K/uL Final    RBC 06/17/2022 4.68  4.00 - 5.40 M/uL Final    Hemoglobin 06/17/2022 14.2  12.0 - 16.0 g/dL Final    Hematocrit 06/17/2022 42.1  37.0 - 48.5 % Final    MCV 06/17/2022 90  82 - 98 fL Final    MCH 06/17/2022 30.3  27.0 - 31.0 pg Final    MCHC 06/17/2022 33.7  32.0 - 36.0 g/dL Final    RDW 06/17/2022 13.2  11.5 - 14.5 % Final    Platelets 06/17/2022 259  150 - 450 K/uL Final    MPV 06/17/2022 11.4  9.2 - 12.9 fL Final    Immature Granulocytes 06/17/2022 0.3  0.0 - 0.5 % Final    Gran # (ANC) 06/17/2022 2.4  1.8 - 7.7 K/uL Final    Immature Grans (Abs) 06/17/2022 0.02  0.00 - 0.04 K/uL Final    Comment: Mild elevation in immature granulocytes is non specific and   can be seen in a variety of conditions including stress response,   acute inflammation, trauma and pregnancy. Correlation with other   laboratory and clinical findings is essential.      Lymph # 06/17/2022 3.4  1.0 - 4.8 K/uL Final    Mono # 06/17/2022 0.7  0.3 - 1.0 K/uL Final    Eos #  06/17/2022 0.2  0.0 - 0.5 K/uL Final    Baso # 06/17/2022 0.05  0.00 - 0.20 K/uL Final    nRBC 06/17/2022 0  0 /100 WBC Final    Gran % 06/17/2022 35.2 (L)  38.0 - 73.0 % Final    Lymph % 06/17/2022 49.8 (H)  18.0 - 48.0 % Final    Mono % 06/17/2022 10.7  4.0 - 15.0 % Final    Eosinophil % 06/17/2022 3.3  0.0 - 8.0 % Final    Basophil % 06/17/2022 0.7  0.0 - 1.9 % Final    Differential Method 06/17/2022 Automated   Final    Sodium 06/17/2022 138  136 - 145 mmol/L Final    Potassium 06/17/2022 3.9  3.5 - 5.1 mmol/L Final    Chloride 06/17/2022 106  95 - 110 mmol/L Final    CO2 06/17/2022 23  23 - 29 mmol/L Final    Glucose 06/17/2022 107  70 - 110 mg/dL Final    BUN 06/17/2022 8  7 - 17 mg/dL Final    Creatinine 06/17/2022 0.62  0.50 - 1.40 mg/dL Final    Calcium 06/17/2022 8.9  8.7 - 10.5 mg/dL Final    Total Protein 06/17/2022 7.4  6.0 - 8.4 g/dL Final    Albumin 06/17/2022 4.4  3.5 - 5.2 g/dL Final    Total Bilirubin 06/17/2022 0.5  0.1 - 1.0 mg/dL Final    Comment: For infants and newborns, interpretation of results should be based  on gestational age, weight and in agreement with clinical  observations.    Premature Infant recommended reference ranges:  Up to 24 hours.............<8.0 mg/dL  Up to 48 hours............<12.0 mg/dL  3-5 days..................<15.0 mg/dL  6-29 days.................<15.0 mg/dL      Alkaline Phosphatase 06/17/2022 84  38 - 126 U/L Final    AST 06/17/2022 22  15 - 46 U/L Final    ALT 06/17/2022 17  10 - 44 U/L Final    Anion Gap 06/17/2022 9  8 - 16 mmol/L Final    eGFR if African American 06/17/2022 >60.0  >60 mL/min/1.73 m^2 Final    eGFR if non African American 06/17/2022 >60.0  >60 mL/min/1.73 m^2 Final    Comment: Calculation used to obtain the estimated glomerular filtration  rate (eGFR) is the CKD-EPI equation.       HBV DNA, Qualitative PCR 06/17/2022 Not detected  Not detected Final    Comment: This procedure utilizes a real-time polymerase chain  reaction test from [employer].  The amplification   target is a conserved region in the terminal third of  the hepatitis B surface antigen gene.  The qualitative   limit of detection is 10 IU/mL (1.00 Log IU/mL).  A Not detected result does not rule out infection.    Test performed at [facility],  [address] , [place]  [address]     [phone]  Milton Castro MD  - Medical Director      Hemoglobin A1C 06/17/2022 6.7 (H)  4.0 - 5.6 % Final    Comment: ADA Screening Guidelines:  5.7-6.4%  Consistent with prediabetes  >or=6.5%  Consistent with diabetes    High levels of fetal hemoglobin interfere with the HbA1C  assay. Heterozygous hemoglobin variants (HbS, HgC, etc)do  not significantly interfere with this assay.   However, presence of multiple variants may affect accuracy.      Estimated Avg Glucose 06/17/2022 146 (H)  68 - 131 mg/dL Final    WBC 06/17/2022 6.73  3.90 - 12.70 K/uL Final    RBC 06/17/2022 4.68  4.00 - 5.40 M/uL Final    Hemoglobin 06/17/2022 14.2  12.0 - 16.0 g/dL Final    Hematocrit 06/17/2022 42.1  37.0 - 48.5 % Final    MCV 06/17/2022 90  82 - 98 fL Final    MCH 06/17/2022 30.3  27.0 - 31.0 pg Final    MCHC 06/17/2022 33.7  32.0 - 36.0 g/dL Final    RDW 06/17/2022 13.2  11.5 - 14.5 % Final    Platelets 06/17/2022 259  150 - 450 K/uL Final    MPV 06/17/2022 11.4  9.2 - 12.9 fL Final    Immature Granulocytes 06/17/2022 0.3  0.0 - 0.5 % Final    Gran # (ANC) 06/17/2022 2.4  1.8 - 7.7 K/uL Final    Immature Grans (Abs) 06/17/2022 0.02  0.00 - 0.04 K/uL Final    Comment: Mild elevation in immature granulocytes is non specific and   can be seen in a variety of conditions including stress response,   acute inflammation, trauma and pregnancy. Correlation with other   laboratory and clinical findings is essential.      Lymph # 06/17/2022 3.4  1.0 - 4.8 K/uL Final    Mono # 06/17/2022 0.7  0.3 - 1.0 K/uL Final    Eos # 06/17/2022 0.2  0.0 - 0.5 K/uL Final    Baso # 06/17/2022 0.05  0.00 - 0.20 K/uL  Final    nRBC 06/17/2022 0  0 /100 WBC Final    Gran % 06/17/2022 35.2 (L)  38.0 - 73.0 % Final    Lymph % 06/17/2022 49.8 (H)  18.0 - 48.0 % Final    Mono % 06/17/2022 10.7  4.0 - 15.0 % Final    Eosinophil % 06/17/2022 3.3  0.0 - 8.0 % Final    Basophil % 06/17/2022 0.7  0.0 - 1.9 % Final    Differential Method 06/17/2022 Automated   Final    Sodium 06/17/2022 138  136 - 145 mmol/L Final    Potassium 06/17/2022 3.9  3.5 - 5.1 mmol/L Final    Chloride 06/17/2022 106  95 - 110 mmol/L Final    CO2 06/17/2022 23  23 - 29 mmol/L Final    Glucose 06/17/2022 107  70 - 110 mg/dL Final    BUN 06/17/2022 8  7 - 17 mg/dL Final    Creatinine 06/17/2022 0.62  0.50 - 1.40 mg/dL Final    Calcium 06/17/2022 8.9  8.7 - 10.5 mg/dL Final    Total Protein 06/17/2022 7.4  6.0 - 8.4 g/dL Final    Albumin 06/17/2022 4.4  3.5 - 5.2 g/dL Final    Total Bilirubin 06/17/2022 0.5  0.1 - 1.0 mg/dL Final    Comment: For infants and newborns, interpretation of results should be based  on gestational age, weight and in agreement with clinical  observations.    Premature Infant recommended reference ranges:  Up to 24 hours.............<8.0 mg/dL  Up to 48 hours............<12.0 mg/dL  3-5 days..................<15.0 mg/dL  6-29 days.................<15.0 mg/dL      Alkaline Phosphatase 06/17/2022 84  38 - 126 U/L Final    AST 06/17/2022 22  15 - 46 U/L Final    ALT 06/17/2022 17  10 - 44 U/L Final    Anion Gap 06/17/2022 9  8 - 16 mmol/L Final    eGFR if African American 06/17/2022 >60.0  >60 mL/min/1.73 m^2 Final    eGFR if non African American 06/17/2022 >60.0  >60 mL/min/1.73 m^2 Final    Comment: Calculation used to obtain the estimated glomerular filtration  rate (eGFR) is the CKD-EPI equation.              Tip Oliveira

## 2022-09-16 ENCOUNTER — TELEPHONE (OUTPATIENT)
Dept: INTERNAL MEDICINE | Facility: CLINIC | Age: 57
End: 2022-09-16
Payer: MEDICARE

## 2022-09-16 NOTE — TELEPHONE ENCOUNTER
----- Message from Giselle Hunt sent at 9/16/2022  7:03 AM CDT -----  Contact: [phone]  Patient would like her appointment changed to a virtual appointment for today for 9 am, please call and advise.

## 2022-10-06 ENCOUNTER — PATIENT MESSAGE (OUTPATIENT)
Dept: BARIATRICS | Facility: CLINIC | Age: 57
End: 2022-10-06
Payer: MEDICARE

## 2022-10-07 ENCOUNTER — SPECIALTY PHARMACY (OUTPATIENT)
Dept: PHARMACY | Facility: CLINIC | Age: 57
End: 2022-10-07
Payer: MEDICARE

## 2022-10-07 NOTE — TELEPHONE ENCOUNTER
10/7 [facility]  refill attempt, called pt to verify amount of entecvir. Entecvir was last filled on 8/5 for 90 days RTS by 26 days. Will pend to next call date.

## 2022-10-28 ENCOUNTER — LAB VISIT (OUTPATIENT)
Dept: LAB | Facility: HOSPITAL | Age: 57
End: 2022-10-28
Attending: INTERNAL MEDICINE
Payer: MEDICARE

## 2022-10-28 DIAGNOSIS — Z79.620 ENCOUNTER FOR MONITORING OF ADALIMUMAB THERAPY: ICD-10-CM

## 2022-10-28 DIAGNOSIS — Z51.81 ENCOUNTER FOR MONITORING OF ADALIMUMAB THERAPY: ICD-10-CM

## 2022-10-28 LAB
ALBUMIN SERPL BCP-MCNC: 4.6 G/DL (ref 3.5–5.2)
ALP SERPL-CCNC: 90 U/L (ref 38–126)
ALT SERPL W/O P-5'-P-CCNC: 24 U/L (ref 10–44)
ANION GAP SERPL CALC-SCNC: 9 MMOL/L (ref 8–16)
AST SERPL-CCNC: 23 U/L (ref 15–46)
BASOPHILS # BLD AUTO: 0.05 K/UL (ref 0–0.2)
BASOPHILS NFR BLD: 0.8 % (ref 0–1.9)
BILIRUB SERPL-MCNC: 0.4 MG/DL (ref 0.1–1)
CALCIUM SERPL-MCNC: 9 MG/DL (ref 8.7–10.5)
CHLORIDE SERPL-SCNC: 106 MMOL/L (ref 95–110)
CO2 SERPL-SCNC: 28 MMOL/L (ref 23–29)
CREAT SERPL-MCNC: 0.58 MG/DL (ref 0.5–1.4)
CRP SERPL-MCNC: 0.23 MG/DL (ref 0–1)
DIFFERENTIAL METHOD: ABNORMAL
EOSINOPHIL # BLD AUTO: 0.2 K/UL (ref 0–0.5)
EOSINOPHIL NFR BLD: 2.4 % (ref 0–8)
ERYTHROCYTE [DISTWIDTH] IN BLOOD BY AUTOMATED COUNT: 13.2 % (ref 11.5–14.5)
ERYTHROCYTE [SEDIMENTATION RATE] IN BLOOD BY PHOTOMETRIC METHOD: 11 MM/HR (ref 0–36)
EST. GFR  (NO RACE VARIABLE): >60 ML/MIN/1.73 M^2
GLUCOSE SERPL-MCNC: 106 MG/DL (ref 70–110)
HCT VFR BLD AUTO: 44.4 % (ref 37–48.5)
HGB BLD-MCNC: 14.9 G/DL (ref 12–16)
IMM GRANULOCYTES # BLD AUTO: 0.03 K/UL (ref 0–0.04)
IMM GRANULOCYTES NFR BLD AUTO: 0.5 % (ref 0–0.5)
LYMPHOCYTES # BLD AUTO: 3.3 K/UL (ref 1–4.8)
LYMPHOCYTES NFR BLD: 49.8 % (ref 18–48)
MCH RBC QN AUTO: 30.5 PG (ref 27–31)
MCHC RBC AUTO-ENTMCNC: 33.6 G/DL (ref 32–36)
MCV RBC AUTO: 91 FL (ref 82–98)
MONOCYTES # BLD AUTO: 0.6 K/UL (ref 0.3–1)
MONOCYTES NFR BLD: 9.1 % (ref 4–15)
NEUTROPHILS # BLD AUTO: 2.5 K/UL (ref 1.8–7.7)
NEUTROPHILS NFR BLD: 37.4 % (ref 38–73)
NRBC BLD-RTO: 0 /100 WBC
PLATELET # BLD AUTO: 275 K/UL (ref 150–450)
PMV BLD AUTO: 10.7 FL (ref 9.2–12.9)
POTASSIUM SERPL-SCNC: 3.7 MMOL/L (ref 3.5–5.1)
PROT SERPL-MCNC: 7.5 G/DL (ref 6–8.4)
RBC # BLD AUTO: 4.89 M/UL (ref 4–5.4)
SODIUM SERPL-SCNC: 143 MMOL/L (ref 136–145)
UUN UR-MCNC: 13 MG/DL (ref 7–17)
WBC # BLD AUTO: 6.59 K/UL (ref 3.9–12.7)

## 2022-10-28 PROCEDURE — 85025 COMPLETE CBC W/AUTO DIFF WBC: CPT | Mod: PO | Performed by: INTERNAL MEDICINE

## 2022-10-28 PROCEDURE — 85652 RBC SED RATE AUTOMATED: CPT | Mod: PO | Performed by: INTERNAL MEDICINE

## 2022-10-28 PROCEDURE — 36415 COLL VENOUS BLD VENIPUNCTURE: CPT | Mod: PO | Performed by: INTERNAL MEDICINE

## 2022-10-28 PROCEDURE — 80053 COMPREHEN METABOLIC PANEL: CPT | Mod: PO | Performed by: INTERNAL MEDICINE

## 2022-10-28 PROCEDURE — 86140 C-REACTIVE PROTEIN: CPT | Mod: PO | Performed by: INTERNAL MEDICINE

## 2022-10-28 NOTE — TELEPHONE ENCOUNTER
Specialty Pharmacy - Refill Coordination    Specialty Medication Orders Linked to Encounter      Flowsheet Row Most Recent Value   Medication #1 entecavir (BARACLUDE) 0.5 MG Tab (Order#[id number], Rx#[id number])            Refill Questions - Documented Responses      Flowsheet Row Most Recent Value   Patient Availability and HIPAA Verification    Does patient want to proceed with activity? Yes   HIPAA/medical authority confirmed? Yes   Relationship to patient of person spoken to? Self   Refill Screening Questions    Changes to allergies? No   Changes to medications? No   New conditions since last clinic visit? No   Unplanned office visit, urgent care, ED, or hospital admission in the last 4 weeks? No   How does patient/caregiver feel medication is working? Good   Financial problems or insurance changes? No   How many doses of your specialty medications were missed in the last 4 weeks? 0   Would patient like to speak to a pharmacist? No   When does the patient need to receive the medication? 11/02/22   Refill Delivery Questions    How will the patient receive the medication? [facility]   When does the patient need to receive the medication? 11/02/22   Shipping Address Prescription   Address in [facility] confirmed and updated if neccessary? Yes   Expected Copay ($) 0   Is the patient able to afford the medication copay? Yes   Payment Method zero copay   Days supply of Refill 90   Supplies needed? No supplies needed   Refill activity completed? Yes   Refill activity plan Refill scheduled   Shipment/Pickup Date: 10/31/22            Current Outpatient Medications   Medication Sig    amLODIPine (NORVASC) 10 MG tablet TAKE ONE TABLET BY MOUTH DAILY    aspirin (ECOTRIN) 81 MG EC tablet Take 1 tablet (81 mg total) by mouth once daily.    atenoloL (TENORMIN) 100 MG tablet TAKE ONE TABLET BY MOUTH DAILY    atorvastatin (LIPITOR) 40 MG tablet TAKE ONE TABLET BY MOUTH DAILY    blood-glucose meter kit Use twice daily.    boric  acid (BORIC ACID) vaginal suppository Place 1 each (650 mg total) vaginally every evening.    clonazePAM (KLONOPIN) 0.5 MG tablet TAKE 1 TABLET BY MOUTH ONCE DAILY AS NEEDED FOR ANXIETY    clotrimazole-betamethasone 1-0.05% (LOTRISONE) cream Apply topically 2 (two) times daily as needed.     cyclobenzaprine (FLEXERIL) 10 MG tablet TAKE ONE TABLET BY MOUTH AT BEDTIME AS NEEDED    diclofenac sodium (VOLTAREN) 1 % Gel Apply 4 g topically 4 (four) times daily. Apply light film topically to knee up to four times daily    docusate sodium (COLACE) 50 MG capsule Take 1 capsule (50 mg total) by mouth 2 (two) times daily as needed for Constipation. (Patient taking differently: Take 50 mg by mouth 2 (two) times daily.)    entecavir (BARACLUDE) 0.5 MG Tab Take 1 tablet (0.5 mg total) by mouth once daily.    ergocalciferol (ERGOCALCIFEROL) 50,000 unit Cap Take 1 capsule (50,000 Units total) by mouth every 7 days.    fluocinonide 0.05% (LIDEX) 0.05 % cream Apply topically 2 (two) times daily. To allergic rash    FLUoxetine 20 MG capsule Take 1 capsule (20 mg total) by mouth 2 (two) times daily.    fluticasone propionate (FLONASE) 50 mcg/actuation nasal spray SPRAY 2 SPRAYS INTO EACH NOSTRIL ONCE A DAY    fluticasone propionate (FLONASE) 50 mcg/actuation nasal spray 2 sprays (100 mcg total) by Each Nostril route once daily.    FREESTYLE LITE STRIPS Strp test TWICE DAILY    golimumab (SIMPONI) 50 mg/0.5 mL PnIj Inject 50 mg into the skin every 30 days.    hydroCHLOROthiazide (HYDRODIURIL) 25 MG tablet TAKE ONE TABLET BY MOUTH DAILY    hydrocortisone 2.5 % cream Apply topically 2 (two) times daily. for 10 days    hydroquinone 4 % Crea Apply to dark areas qhs.  Not more than 6 months straight in same location.Use sunscreen in am (Patient taking differently: continuous prn. Apply to dark areas qhs.  Not more than 6 months straight in same location.Use sunscreen in am)    INVOKANA 300 mg Tab tablet TAKE ONE TABLET BY MOUTH BEFORE  BREAKFAST    lancets (FREESTYLE LANCETS) 28 gauge Misc test TWICE DAILY    levocetirizine (XYZAL) 5 MG tablet TAKE ONE TABLET BY MOUTH EVERY EVENING    losartan (COZAAR) 100 MG tablet TAKE ONE TABLET BY MOUTH ONCE DAILY    naproxen (NAPROSYN) 500 MG tablet Take 1 tablet (500 mg total) by mouth 2 (two) times daily as needed.    neomycin-polymyxin-hydrocortisone (CORTISPORIN) 3.5-10,000-1 mg/mL-unit/mL-% otic suspension Place 3 drops into the left ear 3 (three) times daily.    nystatin (MYCOSTATIN) powder Apply to affected area 3 times daily    pantoprazole (PROTONIX) 40 MG tablet TAKE ONE TABLET BY MOUTH EVERY DAY    prednisoLONE acetate (PRED FORTE) 1 % DrpS Instill one drop into the affected eye every two hours while awake    repaglinide (PRANDIN) 1 MG tablet TAKE ONE TABLET BY MOUTH THREE TIMES DAILY BEFORE MEALS (TAKING PLACE OF TRAJENTA)    semaglutide (OZEMPIC) 1 mg/dose (4 mg/3 mL) Inject 1 mg into the skin every 7 days.    sulfaSALAzine (AZULFIDINE) 500 MG EC tablet Take 3 tablets (1,500 mg total) by mouth 2 (two) times daily.    terconazole (TERAZOL 7) 0.4 % Crea Place 1 applicator vaginally every evening.    terconazole (TERAZOL 7) 0.4 % Crea Place 1 applicator vaginally every evening.    terconazole (TERAZOL 7) 0.4 % Crea Place 1 applicator vaginally every evening.    tiZANidine (ZANAFLEX) 2 MG tablet Take 1-2 tablets (2-4 mg total) by mouth every 8 (eight) hours as needed.    topiramate (TOPAMAX) 100 MG tablet Take 100 mg by mouth 2 (two) times daily.    triamcinolone acetonide 0.1% (KENALOG) 0.1 % cream Apply topically 2 (two) times daily.    valACYclovir (VALTREX) 1000 MG tablet valacyclovir 1 gram tablet   Take 0.5 tablets every 12 hours by oral route.    vitamin D (VITAMIN D3) 1000 units Tab Take 1,000 Units by mouth once daily.    zolpidem (AMBIEN) 5 MG Tab TAKE ONE TABLET BY MOUTH AT BEDTIME AS NEEDED   Last reviewed on 9/1/2022  9:51 AM by Letha Beatty MA    Review of patient's allergies  indicates:   Allergen Reactions    Bactrim [sulfamethoxazole-trimethoprim] Itching    Trulicity [dulaglutide] Diarrhea and Other (See Comments)     Vomiting, abdominal pain    Diflucan [fluconazole] Swelling and Other (See Comments)     Sore on mouth    Last reviewed on  9/14/2022 9:12 AM by Tip Oliveira      Tasks added this encounter   1/19/2023 - Refill Call (Auto Added)   Tasks due within next 3 months   No tasks due.     [facility]  [address] [facility] [address]  Phone: [phone]  Fax: [phone]

## 2022-10-31 ENCOUNTER — OFFICE VISIT (OUTPATIENT)
Dept: RHEUMATOLOGY | Facility: CLINIC | Age: 57
End: 2022-10-31
Payer: MEDICARE

## 2022-10-31 ENCOUNTER — LAB VISIT (OUTPATIENT)
Dept: LAB | Facility: HOSPITAL | Age: 57
End: 2022-10-31
Attending: INTERNAL MEDICINE
Payer: MEDICARE

## 2022-10-31 VITALS
WEIGHT: 211.19 LBS | BODY MASS INDEX: 33.15 KG/M2 | HEIGHT: 67 IN | DIASTOLIC BLOOD PRESSURE: 83 MMHG | SYSTOLIC BLOOD PRESSURE: 117 MMHG | HEART RATE: 81 BPM

## 2022-10-31 DIAGNOSIS — M47.819 SPONDYLOARTHRITIS: ICD-10-CM

## 2022-10-31 DIAGNOSIS — M02.30 REACTIVE ARTHRITIS: ICD-10-CM

## 2022-10-31 DIAGNOSIS — Z79.899 HIGH RISK MEDICATION USE: ICD-10-CM

## 2022-10-31 DIAGNOSIS — Z86.19 HISTORY OF HEPATITIS B: ICD-10-CM

## 2022-10-31 DIAGNOSIS — M46.90 AXIAL SPONDYLOARTHRITIS: Primary | Chronic | ICD-10-CM

## 2022-10-31 DIAGNOSIS — M19.90 INFLAMMATORY ARTHRITIS: ICD-10-CM

## 2022-10-31 DIAGNOSIS — M51.36 DDD (DEGENERATIVE DISC DISEASE), LUMBAR: ICD-10-CM

## 2022-10-31 DIAGNOSIS — M15.9 OSTEOARTHRITIS INVOLVING MULTIPLE JOINTS ON BOTH SIDES OF BODY: ICD-10-CM

## 2022-10-31 DIAGNOSIS — M46.90 AXIAL SPONDYLOARTHRITIS: Chronic | ICD-10-CM

## 2022-10-31 PROCEDURE — 3079F DIAST BP 80-89 MM HG: CPT | Mod: CPTII,S$GLB,, | Performed by: INTERNAL MEDICINE

## 2022-10-31 PROCEDURE — 3044F HG A1C LEVEL LT 7.0%: CPT | Mod: CPTII,S$GLB,, | Performed by: INTERNAL MEDICINE

## 2022-10-31 PROCEDURE — 3061F NEG MICROALBUMINURIA REV: CPT | Mod: CPTII,S$GLB,, | Performed by: INTERNAL MEDICINE

## 2022-10-31 PROCEDURE — 86480 TB TEST CELL IMMUN MEASURE: CPT | Mod: PO | Performed by: INTERNAL MEDICINE

## 2022-10-31 PROCEDURE — 3066F PR DOCUMENTATION OF TREATMENT FOR NEPHROPATHY: ICD-10-PCS | Mod: CPTII,S$GLB,, | Performed by: INTERNAL MEDICINE

## 2022-10-31 PROCEDURE — 3044F PR MOST RECENT HEMOGLOBIN A1C LEVEL <7.0%: ICD-10-PCS | Mod: CPTII,S$GLB,, | Performed by: INTERNAL MEDICINE

## 2022-10-31 PROCEDURE — 99214 OFFICE O/P EST MOD 30 MIN: CPT | Mod: S$GLB,,, | Performed by: INTERNAL MEDICINE

## 2022-10-31 PROCEDURE — 3079F PR MOST RECENT DIASTOLIC BLOOD PRESSURE 80-89 MM HG: ICD-10-PCS | Mod: CPTII,S$GLB,, | Performed by: INTERNAL MEDICINE

## 2022-10-31 PROCEDURE — 3072F PR LOW RISK FOR RETINOPATHY: ICD-10-PCS | Mod: CPTII,S$GLB,, | Performed by: INTERNAL MEDICINE

## 2022-10-31 PROCEDURE — 4010F PR ACE/ARB THEARPY RXD/TAKEN: ICD-10-PCS | Mod: CPTII,S$GLB,, | Performed by: INTERNAL MEDICINE

## 2022-10-31 PROCEDURE — 3074F PR MOST RECENT SYSTOLIC BLOOD PRESSURE < 130 MM HG: ICD-10-PCS | Mod: CPTII,S$GLB,, | Performed by: INTERNAL MEDICINE

## 2022-10-31 PROCEDURE — 99999 PR PBB SHADOW E&M-EST. PATIENT-LVL V: CPT | Mod: PBBFAC,,, | Performed by: INTERNAL MEDICINE

## 2022-10-31 PROCEDURE — 3066F NEPHROPATHY DOC TX: CPT | Mod: CPTII,S$GLB,, | Performed by: INTERNAL MEDICINE

## 2022-10-31 PROCEDURE — 3061F PR NEG MICROALBUMINURIA RESULT DOCUMENTED/REVIEW: ICD-10-PCS | Mod: CPTII,S$GLB,, | Performed by: INTERNAL MEDICINE

## 2022-10-31 PROCEDURE — 4010F ACE/ARB THERAPY RXD/TAKEN: CPT | Mod: CPTII,S$GLB,, | Performed by: INTERNAL MEDICINE

## 2022-10-31 PROCEDURE — 1159F PR MEDICATION LIST DOCUMENTED IN MEDICAL RECORD: ICD-10-PCS | Mod: CPTII,S$GLB,, | Performed by: INTERNAL MEDICINE

## 2022-10-31 PROCEDURE — 3072F LOW RISK FOR RETINOPATHY: CPT | Mod: CPTII,S$GLB,, | Performed by: INTERNAL MEDICINE

## 2022-10-31 PROCEDURE — 99999 PR PBB SHADOW E&M-EST. PATIENT-LVL V: ICD-10-PCS | Mod: PBBFAC,,, | Performed by: INTERNAL MEDICINE

## 2022-10-31 PROCEDURE — 3074F SYST BP LT 130 MM HG: CPT | Mod: CPTII,S$GLB,, | Performed by: INTERNAL MEDICINE

## 2022-10-31 PROCEDURE — 99214 PR OFFICE/OUTPT VISIT, EST, LEVL IV, 30-39 MIN: ICD-10-PCS | Mod: S$GLB,,, | Performed by: INTERNAL MEDICINE

## 2022-10-31 PROCEDURE — 1159F MED LIST DOCD IN RCRD: CPT | Mod: CPTII,S$GLB,, | Performed by: INTERNAL MEDICINE

## 2022-10-31 RX ORDER — SULFASALAZINE 500 MG/1
1500 TABLET, DELAYED RELEASE ORAL 2 TIMES DAILY
Qty: 540 TABLET | Refills: 0 | Status: SHIPPED | OUTPATIENT
Start: 2022-04-04 | End: 2023-02-15 | Stop reason: SDUPTHER

## 2022-10-31 RX ORDER — ENTECAVIR 0.5 MG/1
0.5 TABLET, FILM COATED ORAL DAILY
Qty: 90 TABLET | Refills: 3 | Status: SHIPPED | OUTPATIENT
Start: 2022-10-31 | End: 2023-05-29 | Stop reason: SDUPTHER

## 2022-10-31 RX ORDER — GOLIMUMAB 50 MG/.5ML
50 INJECTION, SOLUTION SUBCUTANEOUS
Qty: 1.5 ML | Refills: 1 | Status: SHIPPED | OUTPATIENT
Start: 2022-10-31 | End: 2023-01-23 | Stop reason: SDUPTHER

## 2022-10-31 ASSESSMENT — ANKYLOSING SPONDYLITIS DISEASE ACTIVITY SCORE (ASDAS-CRP)
NBH_PAIN: 5
GLOBAL_ACTIVITY: 5
CRP_MG_PER_LITER: 2.3
MORNING_STIFFNESS: 5
PAIN_SWELLING: 4
TOTAL_SCORE: 2.42

## 2022-10-31 NOTE — PROGRESS NOTES
"Subjective:       Patient ID: Althea Vazquez is a 57 y.o. female.    Chief Complaint: Disease Management    Pt is a 56yo female with AsSpA/pSpA; lumbar spondylosis who presents for f/u. LCV was 7/7/22. At that time pt seemed to be improving on golimumab compared to the certolizumab. Overall back pain and stiffness were improved. Pt also had lost 20lbs at that time.     Interval: Pt reports that she is still doing better since she switched medications. Does report an episode where she maybe had some mild swelling in her fingers but this only lasted a couple days. Pt reports minimal back pain at this time. Reports some morning stiffness that lasts ~60 minutes which improves with activity in the morning. Otherwise denies any recent changes to her health and state that she feels well. Denies any joint swellings or pains at this time. Reports some mouth dryness that causes her to wake up at night. She states this only happened once and has not been consistent.    Denies hair loss, dry eyes, vision changes, oral/nasal ulcers, trouble swallowing, new rashes, joint swelling, or GI disturbances.     Exercise: reports walking 1mile 5x/weekly    Review of Systems   Constitutional:  Negative for fever and unexpected weight change.   HENT:  Negative for mouth sores and trouble swallowing.    Eyes:  Negative for redness.   Respiratory:  Negative for cough and shortness of breath.    Cardiovascular:  Negative for chest pain.   Gastrointestinal:  Negative for constipation and diarrhea.   Genitourinary:  Negative for dysuria and genital sores.   Musculoskeletal:  Positive for back pain. Negative for arthralgias, joint swelling and myalgias.   Skin:  Negative for rash.   Neurological:  Negative for headaches.   Hematological:  Does not bruise/bleed easily.       Objective:   /83   Pulse 81   Ht 5' 7" (1.702 m)   Wt 95.8 kg (211 lb 3.2 oz)   LMP 11/25/2014   BMI 33.08 kg/m²      Physical Exam   Constitutional: " She is oriented to person, place, and time. normal appearance. No distress.   HENT:   Head: Normocephalic and atraumatic.   Mouth/Throat: Mucous membranes are moist. Oropharynx is clear.   Eyes: Pupils are equal, round, and reactive to light.   Cardiovascular: Normal rate, regular rhythm, normal heart sounds and normal pulses. Exam reveals no gallop and no friction rub.   No murmur heard.  Pulmonary/Chest: Effort normal and breath sounds normal. No respiratory distress.   Abdominal: Soft. She exhibits no distension. There is no abdominal tenderness.   Musculoskeletal:      Right shoulder: Normal.      Left shoulder: Normal.      Right elbow: Normal.      Left elbow: Normal.      Right wrist: Normal.      Left wrist: Normal.      Right knee: Normal.      Left knee: Normal.   Neurological: She is alert and oriented to person, place, and time.   Reflex Scores:       Patellar reflexes are 2+ on the right side and 2+ on the left side.  Skin: Skin is warm and dry.   Psychiatric: Her behavior is normal. Mood normal.       Right Side Rheumatological Exam     Examination finds the shoulder, elbow, wrist, knee, 1st PIP, 1st MCP, 2nd PIP, 2nd MCP, 3rd PIP, 3rd MCP, 4th PIP, 4th MCP, 5th PIP and 5th MCP normal.    Shoulder Exam   Sensation: normal    Knee Exam   Sensation: normal    Hip Exam   Sensation: normal    Elbow/Wrist Exam   Sensation: normal    Muscle Strength (0-5 scale):  Deltoid:  5  Biceps: 5/5   Triceps:  5  : 5/5   Iliopsoas: 5  Quadriceps:  5   Distal Lower Extremity: 5    Left Side Rheumatological Exam     Examination finds the shoulder, elbow, wrist, knee, 1st PIP, 1st MCP, 2nd PIP, 2nd MCP, 3rd PIP, 3rd MCP, 4th PIP, 4th MCP, 5th PIP and 5th MCP normal.    Shoulder Exam   Sensation: normal    Knee Exam   Sensation: normal    Hip Exam   Sensation: normal    Elbow/Wrist Exam   Sensation: normal    Muscle Strength (0-5 scale):  Deltoid:  5  Biceps: 5/5   Triceps:  5  :  5/5   Iliopsoas: 5  Quadriceps:  5    Distal Lower Extremity: 5          10/31/2022   Tender (CABALLERO-28) 0 / 28    Swollen (CABALLERO-28) 0 / 28    Provider Global --   Patient Global 45 mm   ESR 11 mm/hr   CRP 2.3 mg/L   CABALLERO-28 (ESR) 2.31 (Remission)   CABALLERO-28 (CRP) 2.02 (Remission)   CDAI Score --        Schober's: 10-13cm  Neck ROM: wnl  Chest expansion: 7cm    Assessment:       1. Axial spondyloarthritis    2. Osteoarthritis involving multiple joints on both sides of body    3. DDD (degenerative disc disease), lumbar    4. History of hepatitis B    5. Reactive arthritis    6. Inflammatory arthritis    7. High risk medication use    8. Spondyloarthritis            Plan:       Problem List Items Addressed This Visit          Neuro    DDD (degenerative disc disease), lumbar       Orthopedic    Axial spondyloarthritis - Primary (Chronic)    Relevant Medications    golimumab (SIMPONI) 50 mg/0.5 mL PnIj    sulfaSALAzine (AZULFIDINE) 500 MG EC tablet    Other Relevant Orders    Sedimentation rate    C-Reactive Protein    CBC Auto Differential    Comprehensive Metabolic Panel    Quantiferon Gold TB    Osteoarthritis involving multiple joints on both sides of body       Palliative Care    High risk medication use-sulfasalazine 2 gm    Relevant Medications    sulfaSALAzine (AZULFIDINE) 500 MG EC tablet    Other Relevant Orders    HEPATITIS B VIRAL DNA, QUANTITATIVE    Quantiferon Gold TB     Other Visit Diagnoses       History of hepatitis B        Relevant Medications    entecavir (BARACLUDE) 0.5 MG Tab    Other Relevant Orders    HEPATITIS B VIRAL DNA, QUANTITATIVE    Reactive arthritis        Relevant Medications    sulfaSALAzine (AZULFIDINE) 500 MG EC tablet    Inflammatory arthritis        Relevant Medications    sulfaSALAzine (AZULFIDINE) 500 MG EC tablet    Spondyloarthritis        Relevant Medications    sulfaSALAzine (AZULFIDINE) 500 MG EC tablet    Other Relevant Orders    HEPATITIS B VIRAL DNA, QUANTITATIVE          Continue golimumab 50mg sc q 4wks    Continue Sulfasalazine.1500mg twice daily  Continue entecvir 0.5mg daily  Continue Vitamin D2 50,000 units q 7days   Continue exercise program  Cont weight reduction, Mediterranean diet as feasible  RTC 3 months with standing labs    Surya Landa MD  PGY-1  [employer] [facility]

## 2022-10-31 NOTE — PROGRESS NOTES
Rapid3 Question Responses and Scores 10/31/2022   MDHAQ Score 0.7   Psychologic Score 3.3   Pain Score 5.5   When you awakened in the morning OVER THE LAST WEEK, did you feel stiff? Yes   If Yes, please indicate the number of hours until you are as limber as you will be for the day 1   Fatigue Score 5   Global Health Score 4.5   RAPID3 Score 4.11    Answers submitted by the patient for this visit:  Rheumatology Questionnaire (Submitted on 10/31/2022)  fever: No  eye redness: No  mouth sores: No  headaches: No  shortness of breath: No  chest pain: No  trouble swallowing: No  diarrhea: No  constipation: No  unexpected weight change: No  genital sore: No  dysuria: No  During the last 3 days, have you had a skin rash?: No  Bruises or bleeds easily: No  cough: No

## 2022-10-31 NOTE — PROGRESS NOTES
I have personally taken the history and examined the patient and agree with the resident,s note as stated above        adalimumab with Secondary inefficacy, tried to change to Simponi 50mg sc q 4 wks but insurance wouldn't cover, then on Enbrel Sureclick 50mg s q 7 days but has developed recurrent AAU OS  Then started certolizumab after 1/11/21 visit  had AAU with recurrence 1/28/21 just after starting certolizumab but had not completed loading dose   certolizumab ineffective        AxSpA:      Schober's 10-13cm  Nl lumbar extension  and lateral flexion  Fabere neg bilateral   Chest expansion 7 cm improved  Neck rom nl           ASDAS-CRP: 2.42(HDA)  BASDAI 3.9(MDA)  BASFI 5.9(severe functional limitation)       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

## 2022-11-01 LAB
GAMMA INTERFERON BACKGROUND BLD IA-ACNC: 0.09 IU/ML
M TB IFN-G CD4+ BCKGRND COR BLD-ACNC: -0.01 IU/ML
MITOGEN IGNF BCKGRD COR BLD-ACNC: 9.91 IU/ML
TB GOLD PLUS: NEGATIVE
TB2 - NIL: 0 IU/ML

## 2022-11-07 ENCOUNTER — PATIENT MESSAGE (OUTPATIENT)
Dept: INTERNAL MEDICINE | Facility: CLINIC | Age: 57
End: 2022-11-07
Payer: MEDICARE

## 2022-11-09 ENCOUNTER — TELEPHONE (OUTPATIENT)
Dept: INTERNAL MEDICINE | Facility: CLINIC | Age: 57
End: 2022-11-09
Payer: MEDICARE

## 2022-11-09 ENCOUNTER — PATIENT MESSAGE (OUTPATIENT)
Dept: INTERNAL MEDICINE | Facility: CLINIC | Age: 57
End: 2022-11-09
Payer: MEDICARE

## 2022-11-09 DIAGNOSIS — E66.9 OBESITY, UNSPECIFIED CLASSIFICATION, UNSPECIFIED OBESITY TYPE, UNSPECIFIED WHETHER SERIOUS COMORBIDITY PRESENT: ICD-10-CM

## 2022-11-09 DIAGNOSIS — E11.9 TYPE 2 DIABETES MELLITUS WITHOUT COMPLICATION, WITHOUT LONG-TERM CURRENT USE OF INSULIN: Primary | ICD-10-CM

## 2022-11-09 RX ORDER — SEMAGLUTIDE 1 MG/.5ML
1 INJECTION, SOLUTION SUBCUTANEOUS
Qty: 0.5 ML | Refills: 3 | Status: SHIPPED | OUTPATIENT
Start: 2022-11-09 | End: 2022-12-22

## 2022-11-09 NOTE — TELEPHONE ENCOUNTER
----- Message from Tessy Guillen sent at 11/9/2022 11:53 AM CST -----  Contact: Pharmacy [phone]  Pharmacy stated that Rx semaglutide, weight loss, (WEGOVY) 1 mg/0.5 mL PnIj is not available with them nor is it covered by the patient's insurance.    Thank You

## 2022-11-14 RX ORDER — TIRZEPATIDE 5 MG/.5ML
5 INJECTION, SOLUTION SUBCUTANEOUS
Qty: 2 PEN | Refills: 5 | Status: SHIPPED | OUTPATIENT
Start: 2022-11-14 | End: 2022-12-22

## 2022-11-15 ENCOUNTER — OFFICE VISIT (OUTPATIENT)
Dept: PAIN MEDICINE | Facility: CLINIC | Age: 57
End: 2022-11-15
Payer: MEDICARE

## 2022-11-15 VITALS
WEIGHT: 212.63 LBS | BODY MASS INDEX: 33.37 KG/M2 | DIASTOLIC BLOOD PRESSURE: 88 MMHG | RESPIRATION RATE: 16 BRPM | HEIGHT: 67 IN | SYSTOLIC BLOOD PRESSURE: 146 MMHG

## 2022-11-15 DIAGNOSIS — G89.29 CHRONIC LEFT SHOULDER PAIN: ICD-10-CM

## 2022-11-15 DIAGNOSIS — M54.2 CHRONIC NECK PAIN: Primary | ICD-10-CM

## 2022-11-15 DIAGNOSIS — G89.29 CHRONIC NECK PAIN: Primary | ICD-10-CM

## 2022-11-15 DIAGNOSIS — M25.512 CHRONIC LEFT SHOULDER PAIN: ICD-10-CM

## 2022-11-15 DIAGNOSIS — M54.12 CERVICAL RADICULOPATHY: ICD-10-CM

## 2022-11-15 PROCEDURE — 1160F RVW MEDS BY RX/DR IN RCRD: CPT | Mod: CPTII,S$GLB,, | Performed by: PHYSICAL MEDICINE & REHABILITATION

## 2022-11-15 PROCEDURE — 3066F PR DOCUMENTATION OF TREATMENT FOR NEPHROPATHY: ICD-10-PCS | Mod: CPTII,S$GLB,, | Performed by: PHYSICAL MEDICINE & REHABILITATION

## 2022-11-15 PROCEDURE — 3044F PR MOST RECENT HEMOGLOBIN A1C LEVEL <7.0%: ICD-10-PCS | Mod: CPTII,S$GLB,, | Performed by: PHYSICAL MEDICINE & REHABILITATION

## 2022-11-15 PROCEDURE — 1160F PR REVIEW ALL MEDS BY PRESCRIBER/CLIN PHARMACIST DOCUMENTED: ICD-10-PCS | Mod: CPTII,S$GLB,, | Performed by: PHYSICAL MEDICINE & REHABILITATION

## 2022-11-15 PROCEDURE — 99214 PR OFFICE/OUTPT VISIT, EST, LEVL IV, 30-39 MIN: ICD-10-PCS | Mod: S$GLB,,, | Performed by: PHYSICAL MEDICINE & REHABILITATION

## 2022-11-15 PROCEDURE — 3008F PR BODY MASS INDEX (BMI) DOCUMENTED: ICD-10-PCS | Mod: CPTII,S$GLB,, | Performed by: PHYSICAL MEDICINE & REHABILITATION

## 2022-11-15 PROCEDURE — 3079F DIAST BP 80-89 MM HG: CPT | Mod: CPTII,S$GLB,, | Performed by: PHYSICAL MEDICINE & REHABILITATION

## 2022-11-15 PROCEDURE — 3061F PR NEG MICROALBUMINURIA RESULT DOCUMENTED/REVIEW: ICD-10-PCS | Mod: CPTII,S$GLB,, | Performed by: PHYSICAL MEDICINE & REHABILITATION

## 2022-11-15 PROCEDURE — 3072F LOW RISK FOR RETINOPATHY: CPT | Mod: CPTII,S$GLB,, | Performed by: PHYSICAL MEDICINE & REHABILITATION

## 2022-11-15 PROCEDURE — 99999 PR PBB SHADOW E&M-EST. PATIENT-LVL V: ICD-10-PCS | Mod: PBBFAC,,, | Performed by: PHYSICAL MEDICINE & REHABILITATION

## 2022-11-15 PROCEDURE — 3066F NEPHROPATHY DOC TX: CPT | Mod: CPTII,S$GLB,, | Performed by: PHYSICAL MEDICINE & REHABILITATION

## 2022-11-15 PROCEDURE — 1159F PR MEDICATION LIST DOCUMENTED IN MEDICAL RECORD: ICD-10-PCS | Mod: CPTII,S$GLB,, | Performed by: PHYSICAL MEDICINE & REHABILITATION

## 2022-11-15 PROCEDURE — 99999 PR PBB SHADOW E&M-EST. PATIENT-LVL V: CPT | Mod: PBBFAC,,, | Performed by: PHYSICAL MEDICINE & REHABILITATION

## 2022-11-15 PROCEDURE — 99214 OFFICE O/P EST MOD 30 MIN: CPT | Mod: S$GLB,,, | Performed by: PHYSICAL MEDICINE & REHABILITATION

## 2022-11-15 PROCEDURE — 3044F HG A1C LEVEL LT 7.0%: CPT | Mod: CPTII,S$GLB,, | Performed by: PHYSICAL MEDICINE & REHABILITATION

## 2022-11-15 PROCEDURE — 1159F MED LIST DOCD IN RCRD: CPT | Mod: CPTII,S$GLB,, | Performed by: PHYSICAL MEDICINE & REHABILITATION

## 2022-11-15 PROCEDURE — 3079F PR MOST RECENT DIASTOLIC BLOOD PRESSURE 80-89 MM HG: ICD-10-PCS | Mod: CPTII,S$GLB,, | Performed by: PHYSICAL MEDICINE & REHABILITATION

## 2022-11-15 PROCEDURE — 4010F PR ACE/ARB THEARPY RXD/TAKEN: ICD-10-PCS | Mod: CPTII,S$GLB,, | Performed by: PHYSICAL MEDICINE & REHABILITATION

## 2022-11-15 PROCEDURE — 4010F ACE/ARB THERAPY RXD/TAKEN: CPT | Mod: CPTII,S$GLB,, | Performed by: PHYSICAL MEDICINE & REHABILITATION

## 2022-11-15 PROCEDURE — 3072F PR LOW RISK FOR RETINOPATHY: ICD-10-PCS | Mod: CPTII,S$GLB,, | Performed by: PHYSICAL MEDICINE & REHABILITATION

## 2022-11-15 PROCEDURE — 3077F PR MOST RECENT SYSTOLIC BLOOD PRESSURE >= 140 MM HG: ICD-10-PCS | Mod: CPTII,S$GLB,, | Performed by: PHYSICAL MEDICINE & REHABILITATION

## 2022-11-15 PROCEDURE — 3077F SYST BP >= 140 MM HG: CPT | Mod: CPTII,S$GLB,, | Performed by: PHYSICAL MEDICINE & REHABILITATION

## 2022-11-15 PROCEDURE — 3008F BODY MASS INDEX DOCD: CPT | Mod: CPTII,S$GLB,, | Performed by: PHYSICAL MEDICINE & REHABILITATION

## 2022-11-15 PROCEDURE — 3061F NEG MICROALBUMINURIA REV: CPT | Mod: CPTII,S$GLB,, | Performed by: PHYSICAL MEDICINE & REHABILITATION

## 2022-11-15 NOTE — PROGRESS NOTES
"[facility]    Chief Complaint:   No chief complaint on file.      History of Present Illness: Althea Vazquez is a 57 y.o. female here for cervical radiculopathy.      Onset: several years, no specific inciting event  Location: neck, in the midline and towards the left side  Radiation: left hand/arm  Timing: intermittent  Quality: Aching and Dull  Exacerbating Factors: lifting  Alleviating Factors: hot shower  Associated Symptoms: She feels weakness and numbness in the left arm/hand. Denies night fever/night sweats, urinary incontinence, bowel incontinence and significant weight loss    Severity: Currently: 5/10   Typical Range: 0-8/10     Exacerbation: 8/10     She also notes low back pain. Low back Pain has been present off and on for a few years, but worsened last week. Pain is localized to the bilateral low back (worse on the left) with occasional radiation down the left leg, but none currently. Pain is described as a "hurting pain". The pain is aggravated by spinning/turning. The pain is alleviated by warm bath, massage. She feels weakness in both legs. She denies numbness in her legs or feet. Denies changes in bowel or bladder function. Denies saddle anesthesia. Denies recent fevers or infections. Denies significant weight loss    Interval History (11/15/2022):  Althea Vazquez returns today for follow up.      Currently, the neck and left shoulder pain is worse.  Pain radiates into the left hand into the whole hand and fingers. She feels weak in the left hand. She gets numbness in the left hand. This restarted about 1-2 weeks ago. She just woke up with the pain. She denies any significant changes in b/b function, but does note some constipation.       Current Pain Scales:  Current: 8/10              Typical Range: 5-9/10     Pain Disability Index  Family/Home Responsibilities:: 6  Recreation:: 7  Social Activity:: 6  Occupation:: 6  Sexual Behavior:: 6  Self Care:: " 5  Life-Support Activities:: 3  Pain Disability Index (PDI): 39    Previous Interventions:  -04/21/2021 Left  sacroiliac joint injection and  Left  greater trochanteric bursa injection -average relief with 70%  - 2/17/21: C7-T1 IL SHARONA w/ 80% relief    Previous Therapies:  PT/OT: yes for neck and back, helps, and she continues to do her HEP as much as possible.   Relevant Surgery: Right knee arthroscopy  Previous Medications:   - NSAIDS: Naproxen.  Ibuprofen did not help.  Sulindac.  - Muscle Relaxants: Flexeril. Tizanidine   - TCAs:   - SNRIs:   - Topicals:   - Anticonvulsants:    - Opioids: Norco. Oxycodone. (wants to avoid addicting medications)    Current Pain Medications:  Flexeril 10mg  Naproxen 500mg  Tizanidine 2mg     Blood Thinners: Aspirin 81mg    Full Medication List:    Current Outpatient Medications:     amLODIPine (NORVASC) 10 MG tablet, TAKE ONE TABLET BY MOUTH DAILY, Disp: 90 tablet, Rfl: 3    aspirin (ECOTRIN) 81 MG EC tablet, Take 1 tablet (81 mg total) by mouth once daily., Disp: 30 tablet, Rfl: 0    atenoloL (TENORMIN) 100 MG tablet, TAKE ONE TABLET BY MOUTH DAILY, Disp: 90 tablet, Rfl: 3    atorvastatin (LIPITOR) 40 MG tablet, TAKE ONE TABLET BY MOUTH DAILY, Disp: 90 tablet, Rfl: 3    clonazePAM (KLONOPIN) 0.5 MG tablet, TAKE 1 TABLET BY MOUTH ONCE DAILY AS NEEDED FOR ANXIETY, Disp: 90 tablet, Rfl: 0    clotrimazole-betamethasone 1-0.05% (LOTRISONE) cream, Apply topically 2 (two) times daily as needed. , Disp: , Rfl:     cyclobenzaprine (FLEXERIL) 10 MG tablet, TAKE ONE TABLET BY MOUTH AT BEDTIME AS NEEDED, Disp: 90 tablet, Rfl: 2    diclofenac sodium (VOLTAREN) 1 % Gel, Apply 4 g topically 4 (four) times daily. Apply light film topically to knee up to four times daily, Disp: 3 Tube, Rfl: 2    docusate sodium (COLACE) 50 MG capsule, Take 1 capsule (50 mg total) by mouth 2 (two) times daily as needed for Constipation. (Patient taking differently: Take 50 mg by mouth 2 (two) times daily.), Disp:  30 capsule, Rfl: 0    entecavir (BARACLUDE) 0.5 MG Tab, Take 1 tablet (0.5 mg total) by mouth once daily., Disp: 90 tablet, Rfl: 3    ergocalciferol (ERGOCALCIFEROL) 50,000 unit Cap, Take 1 capsule (50,000 Units total) by mouth every 7 days., Disp: 12 capsule, Rfl: 3    fluocinonide 0.05% (LIDEX) 0.05 % cream, Apply topically 2 (two) times daily. To allergic rash, Disp: 30 g, Rfl: 0    FLUoxetine 20 MG capsule, Take 1 capsule (20 mg total) by mouth 2 (two) times daily., Disp: 180 capsule, Rfl: 1    fluticasone propionate (FLONASE) 50 mcg/actuation nasal spray, SPRAY 2 SPRAYS INTO EACH NOSTRIL ONCE A DAY, Disp: , Rfl:     FREESTYLE LITE STRIPS Strp, test TWICE DAILY, Disp: 200 each, Rfl: 3    golimumab (SIMPONI) 50 mg/0.5 mL PnIj, Inject 50 mg into the skin every 30 days., Disp: 1.5 mL, Rfl: 1    hydroCHLOROthiazide (HYDRODIURIL) 25 MG tablet, TAKE ONE TABLET BY MOUTH DAILY, Disp: 90 tablet, Rfl: 3    hydrocortisone 2.5 % cream, Apply topically 2 (two) times daily. for 10 days, Disp: 28 g, Rfl: 1    INVOKANA 300 mg Tab tablet, TAKE ONE TABLET BY MOUTH BEFORE BREAKFAST, Disp: 90 tablet, Rfl: 1    levocetirizine (XYZAL) 5 MG tablet, TAKE ONE TABLET BY MOUTH EVERY EVENING, Disp: 90 tablet, Rfl: 2    losartan (COZAAR) 100 MG tablet, TAKE ONE TABLET BY MOUTH ONCE DAILY, Disp: 90 tablet, Rfl: 3    naproxen (NAPROSYN) 500 MG tablet, Take 1 tablet (500 mg total) by mouth 2 (two) times daily as needed., Disp: 180 tablet, Rfl: 0    neomycin-polymyxin-hydrocortisone (CORTISPORIN) 3.5-10,000-1 mg/mL-unit/mL-% otic suspension, Place 3 drops into the left ear 3 (three) times daily., Disp: 10 mL, Rfl: 0    nystatin (MYCOSTATIN) powder, Apply to affected area 3 times daily, Disp: 1 Bottle, Rfl: 3    pantoprazole (PROTONIX) 40 MG tablet, TAKE ONE TABLET BY MOUTH EVERY DAY, Disp: 90 tablet, Rfl: 2    prednisoLONE acetate (PRED FORTE) 1 % DrpS, Instill one drop into the affected eye every two hours while awake, Disp: 5 mL, Rfl: 0     semaglutide (OZEMPIC) 1 mg/dose (4 mg/3 mL), Inject 1 mg into the skin every 7 days., Disp: 1 pen, Rfl: 11    sulfaSALAzine (AZULFIDINE) 500 MG EC tablet, Take 3 tablets (1,500 mg total) by mouth 2 (two) times daily., Disp: 540 tablet, Rfl: 0    terconazole (TERAZOL 7) 0.4 % Crea, Place 1 applicator vaginally every evening., Disp: 45 g, Rfl: 2    tirzepatide (MOUNJARO) 5 mg/0.5 mL PnIj, Inject 5 mg into the skin every 7 days., Disp: 2 pen, Rfl: 5    tiZANidine (ZANAFLEX) 2 MG tablet, Take 1-2 tablets (2-4 mg total) by mouth every 8 (eight) hours as needed., Disp: 90 tablet, Rfl: 2    topiramate (TOPAMAX) 100 MG tablet, Take 100 mg by mouth 2 (two) times daily., Disp: , Rfl:     triamcinolone acetonide 0.1% (KENALOG) 0.1 % cream, Apply topically 2 (two) times daily., Disp: 45 g, Rfl: 1    valACYclovir (VALTREX) 1000 MG tablet, valacyclovir 1 gram tablet  Take 0.5 tablets every 12 hours by oral route., Disp: , Rfl:     vitamin D (VITAMIN D3) 1000 units Tab, Take 1,000 Units by mouth once daily., Disp: , Rfl:     zolpidem (AMBIEN) 5 MG Tab, TAKE ONE TABLET BY MOUTH AT BEDTIME AS NEEDED, Disp: 90 tablet, Rfl: 0    blood-glucose meter kit, Use twice daily., Disp: 1 each, Rfl: 0    boric acid (BORIC ACID) vaginal suppository, Place 1 each (650 mg total) vaginally every evening. (Patient not taking: Reported on 10/31/2022), Disp: 14 suppository, Rfl: 6    fluticasone propionate (FLONASE) 50 mcg/actuation nasal spray, 2 sprays (100 mcg total) by Each Nostril route once daily., Disp: 18.2 mL, Rfl: 6    hydroquinone 4 % Crea, Apply to dark areas qhs.  Not more than 6 months straight in same location.Use sunscreen in am (Patient not taking: Reported on 11/15/2022), Disp: 46 g, Rfl: 1    lancets (FREESTYLE LANCETS) 28 gauge Misc, test TWICE DAILY, Disp: 200 each, Rfl: 3    repaglinide (PRANDIN) 1 MG tablet, TAKE ONE TABLET BY MOUTH THREE TIMES DAILY BEFORE MEALS (TAKING PLACE OF [facility]) (Patient not taking: Reported on  11/15/2022), Disp: 270 tablet, Rfl: 0    semaglutide, weight loss, (WEGOVY) 1 mg/0.5 mL PnIj, Inject 1 mg into the skin every 7 days. (Patient not taking: Reported on 11/15/2022), Disp: 0.5 mL, Rfl: 3    terconazole (TERAZOL 7) 0.4 % Crea, Place 1 applicator vaginally every evening., Disp: 7 g, Rfl: 2    terconazole (TERAZOL 7) 0.4 % Crea, Place 1 applicator vaginally every evening., Disp: 7 g, Rfl: 0  No current facility-administered medications for this visit.    Facility-Administered Medications Ordered in Other Visits:     0.9%  NaCl infusion, , Intravenous, Continuous, Graciela Jerome MD     Review of Systems:  Review of Systems   Constitutional:  Negative for fever and weight loss.   HENT:  Negative for ear pain and tinnitus.    Eyes:  Positive for redness. Negative for pain.   Respiratory:  Negative for cough and shortness of breath.    Cardiovascular:  Negative for chest pain and palpitations.   Gastrointestinal:  Negative for constipation and heartburn.   Genitourinary: Negative.         Denies urinary incontinence. Denies urine retention.    Musculoskeletal:  Positive for back pain, myalgias and neck pain.   Skin:  Negative for itching and rash.   Neurological:  Positive for tingling and weakness. Negative for seizures.   Endo/Heme/Allergies:  Negative for environmental allergies. Does not bruise/bleed easily.   Psychiatric/Behavioral:  Negative for depression. The patient has insomnia. The patient is not nervous/anxious.      Allergies:  Bactrim [sulfamethoxazole-trimethoprim], Trulicity [dulaglutide], and Diflucan [fluconazole]     Medical History:   has a past medical history of Acid reflux, Anxiety (10/18/2012), Arthritis, Depression, Diabetic peripheral neuropathy - mild (10/21/2014), Difficult intubation, Dry eyes, Dry mouth, Fever blister, History of hepatitis B (10/3/2016), Hyperlipidemia, Hypertension, Insomnia, Iritis (5/13/2014), Long-term current use of steroids (9/27/2012), Nausea & vomiting  "(2/4/2015), VAISHNAVI (obstructive sleep apnea), and Type II diabetes mellitus (10/1/2012).    Surgical History:   has a past surgical history that includes Tubal ligation; Knee arthroscopy (5-14-14); Hysterectomy (12/3/2014); Colonoscopy (N/A, 1/9/2018); Epidural steroid injection into cervical spine (N/A, 2/17/2021); Injection of anesthetic agent into sacroiliac joint (Left, 4/21/2021); and Injection of joint (Left, 4/21/2021).    Family History:  family history includes ADD / ADHD in her son; Cancer in her paternal grandfather; Cataracts in her mother; Colon cancer in her father; Depression in her mother; Diabetes in her maternal aunt and mother; Heart attack in her mother and paternal grandmother; Hyperlipidemia in her father; Hypertension in her father; No Known Problems in her brother, maternal grandfather, maternal grandmother, maternal uncle, paternal aunt, paternal uncle, and sister; Stroke in her father and mother.    Social History:   reports that she has never smoked. She has never used smokeless tobacco. She reports that she does not currently use alcohol. She reports that she does not use drugs.    Physical Exam:  BP (!) 146/88   Resp 16   Ht 5' 7" (1.702 m)   Wt 96.5 kg (212 lb 10.1 oz)   LMP 11/25/2014   BMI 33.30 kg/m²   GEN: No acute distress. Calm, comfortable  HENT: Normocephalic, atraumatic, moist mucous membranes  EYE: Anicteric sclera, non-injected.   CV: Non-diaphoretic. Regular Rate. Radial Pulses 2+.  RESP: Breathing comfortably. Chest expansion symmetric.  EXT: No clubbing, cyanosis.   SKIN: Warm, & dry to palpation. No visible rashes or lesions of exposed skin.   PSYCH: Pleasant mood and appropriate affect. Recent and remote memory intact.   GAIT: Independent, normal ambulation  Neck Exam:       Inspection: No erythema, bruising.  Poor posture with anteriorly rolled shoulders.      Palpation: (+) TTP of bilateral trapezius, levator, cervical paraspinals.      ROM:  Slight Limitation in " flexion, extension, lateral bending and rotation.      Provocative Maneuvers:  (+) Spurling's on the left  Shoulder Exam:       Inspection: No erythema, bruising.       Palpation:  No TTP of bilateral shoulders.       ROM:  Not Limited in abduction, internal rotation bilaterally, but slight pain on left with abduction   (+) Mello on the left  Neurologic Exam:     Alert. Speech is fluent and appropriate.     Strength: 5/5 throughout bilateral upper & lower extremities     Sensation: Grossly intact to light touch in bilateral upper & lower extremities     Reflexes: 1+ in left patella, bilateral biceps, right brachioradialis. Could not elicit in bilateral achilles, triceps, left BR      Tone: No abnormality appreciated in bilateral upper or lower extremities     (-) Neville bilaterally     No Clonus     Downgoing toes on plantar stimulation      Imaging:  -MRI Cervical Spine Without Contrast 11/16/2020  FINDINGS:  Vertebral body height is normal and alignment is maintained. Marrow signal is within normal limits. The craniocervical junction is unremarkable. No abnormal cord signal or cord deformity.  T2 hyperintense, likely cystic focus within the left aspect of the thyroid gland is unchanged.  Intervertebral disc levels are as follows:  C2-C3 disc: No significant disc pathology. Normal facet joints. No spinal canal or neural foraminal stenosis.  C3-C4 disc: Posterior disc bulge with a posterior annular fissure that is new compared to prior.  Normal uncovertebral joints and facet joints.  The thecal sac measures 9 mm AP.  Previously the thecal sac measured 10 mm AP.  C4-C5 disc: Disc space height loss with a posterior disc bulge.  Anterior osteophytes.  Normal vertebral joints and facet joints.  No significant stenosis.  The thecal sac measures 12 mm AP.  These findings are similar to prior.  C5-C6 disc: Posterior disc bulge.  Normal uncovertebral joints and facet joints.  The thecal sac measures 11 mm AP.  No  significant foraminal stenosis.  Previously the thecal sac measured 11 mm AP at this level as well.  C6-C7 disc: Posterior disc bulge with a posterior annular fissure.  This is new compared to the prior study.  Normal uncovertebral joints and facet joints.  The thecal sac measures 11 mm AP.  No significant foraminal stenosis.  Previously the thecal sac measured 13 mm.  C7-T1 disc: Posterior disc bulge.  Normal facet joints.  The thecal sac measures 12 mm AP.  No significant foraminal stenosis.  These findings are similar to prior.  Impression:  1. There are new posterior disc bulges with annular fissures at C3-C4 and C6-C7.  There is now mild spinal stenosis at C3-C4.  2. No significant foraminal stenosis.    - MRI SIJ w/ & w/o contrast 9/11/16:  There are enhancing inflammatory changes about the anterior superior aspect of the left SI joint in the expected location of the medial lumbar or lumbosacral ligament, findings that are highly suggestive of enthesitis.  There is apparent trace enhancement on the posterior superior aspect of the right SI joint leads is overall nonspecific.  SI joints are symmetric.  No synovitis.  No joint effusions.  No osseous erosive changes.  Visualized musculature demonstrate normal bulk and signal intensity.  Piriformis muscles are symmetric.  Ischiofemoral spaces are unremarkable.  Adductor and abductor tendons are unremarkable.  Hamstring tendons are normal.  Subcutaneous soft tissues are normal.  Limited evaluation of the lower abdominal and pelvic organs is unremarkable.  Uterus is surgically absent    - MRI Spine (Lumbar, Thoracic, Cervical) w/ & w/o contrast 9/11/16:  CERVICAL SPINE:  There is modest straightening of the normal cervical lordosis.  No spondylolisthesis.  Vertebral body heights are well-maintained without evidence for fracture.  Visualized osseous structures demonstrate intact pars signal intensity without findings to suggest an infiltrative process.  Cervical  spinal cord demonstrates normal contour and signal intensity.  Cerebellar tonsils are in their expected location.  Cervicomedullary junction is unremarkable.  Postcontrast images demonstrate no abnormal enhancement.  Prevertebral soft tissues are normal.  Vertebral artery flow voids are present.  T2 hyperintense nodules are noted within the bilateral thyroid lobes.  The spinal musculature and trace normal bulk and signal intensity.  supraspinous ligament is unremarkable without findings to suggest enthesitis.  C2-C3: No spinal canal stenosis or neural foraminal narrowing.  C3-C4: There is mild bilateral facet arthropathy and uncovertebral joint spurring and mild bilateral neural foraminal narrowing.  No spinal canal stenosis.  C4-C5: No spinal canal stenosis or neural foraminal narrowing.  C5-C6: There is a moderate broad-based posterior disc ossified complex that partially effaces the anterior thecal sac.  There is mild left-sided uncovertebral joint spurring.  Findings contribute to mild spinal canal stenosis and mild left-sided neural foraminal narrowing.  C6-C7: No spinal canal stenosis or neural foraminal narrowing.  C7-T1: No spinal canal stenosis or neural foraminal narrowing.    THORACIC SPINE:  Thoracic spine alignment is normal.  No spondylolisthesis.  Vertebral body heights are well-maintained without evidence for fracture.  There is enhancing inflammation of the left side zygapophyseal/facet joint at T5-T6.  Remaining visualized osseous structures demonstrate intact menisci and in density without findings to suggest an infiltrative process.  Note is made of the anterior vertebral body at this demonstrate intact signal intensity without findings to suggest spondylitis.  Visualized spinal cord demonstrates normal contour and signal intensity.  Postcontrast images demonstrate normal enhancement.  Visualized thoracic and upper abdominal organs are normal.  There is a low signal described focal area of signal  abnormality within the posterior subcutaneous soft tissues and appears to demonstrate subtle enhancement and is favored to be benign though nonspecific.  No significant intervertebral disc abnormalities.  No spinal canal stenosis or neural foramina narrowing.    LUMBAR SPINE:  Lumbar spine alignment is normal.  No spondylolysis or spondylolisthesis.  Vertebral body heights are well-maintained without evidence for fracture.  Visualized osseous structures demonstrate intact pars signal intensity without findings to suggest an infiltrative process.  Visualized distal spinal cord demonstrates normal contour and signal intensity.  Cauda equina is normal mild without findings to suggest arachnoiditis.  Postcontrast images demonstrate normal enhancement.  There is a subcentimeter T2 hyperintense lesion within the right renal cortex, too small to accurately guided thighs.  Paraspinal musculature demonstrates normal bulk and signal intensity.  Subcutaneous soft tissues are within normal limits.  L1-L2: No significant intervertebral disc abnormalities.  Facet joints are well maintained.  No spinal canal stenosis or neural foraminal narrowing.  L2-L3: There is mild intervertebral disc desiccation.  Facet joints are well maintained.  No spinal canal stenosis or neural foraminal narrowing.  L3-L4: There is mild intervertebral disc desiccation.  Facet joints are well maintained.  No spinal canal stenosis or neural foraminal narrowing.  L4-L5: There is mild intervertebral disc space narrowing and desiccation with small circumferential bulge.  There is mild bilateral facet arthropathy.  No spinal canal stenosis or neural foraminal narrowing.  L5-S1: There is mild intervertebral disc space narrowing desiccation with a small circumferential bulge and a small posterior annular fissure.  Findings contribute to mild left-sided neural foraminal narrowing. There is mild bilateral facet arthropathy.  No spinal canal  stenosis.    IMPRESSION:   Enhancing inflammatory changes involving the left zygapophyseal/facet joint at T5-T6 that is nonspecific but can be seen with inflammatory arthropathies. No MR imaging finding to suggest enthesitis, spondylitis or spondylodiskitis.  Mild cervical and lumbar degenerative changes without significant spinal canal stenosis.  Cystic subcutaneous soft tissues within the posterior back, favored to be benign, possibly a complex sebaceous cyst.  Consider follow-up examination in 12 months      Labs:  BMP  Lab Results   Component Value Date     10/28/2022    K 3.7 10/28/2022     10/28/2022    CO2 28 10/28/2022    BUN 13 10/28/2022    CREATININE 0.58 10/28/2022    CALCIUM 9.0 10/28/2022    ANIONGAP 9 10/28/2022    ESTGFRAFRICA >60.0 06/17/2022    ESTGFRAFRICA >60.0 06/17/2022    EGFRNONAA >60.0 06/17/2022    EGFRNONAA >60.0 06/17/2022     Lab Results   Component Value Date    ALT 24 10/28/2022    AST 23 10/28/2022     (H) 08/18/2012    ALKPHOS 90 10/28/2022    BILITOT 0.4 10/28/2022     Lab Results   Component Value Date     10/28/2022       Assessment:  Althea Vazquez is a 57 y.o. female with the following diagnoses based on history, exam, and imaging:    Problem List Items Addressed This Visit          Neuro    Cervical radiculopathy    Relevant Orders    Ambulatory referral/consult to Physical/Occupational Therapy     Other Visit Diagnoses       Chronic neck pain    -  Primary    Relevant Orders    Ambulatory referral/consult to Physical/Occupational Therapy    Chronic left shoulder pain        Relevant Orders    Ambulatory referral/consult to Physical/Occupational Therapy            This is a pleasant 57 y.o. lady presenting with:     - Chronic neck pain with left cervical radiculopathy:  Posterior disc bulge with annular fissure at C6-7 which may be the cause.  She also has myofascial pain on exam.  - Chronic left shoulder pain with impingement signs on  exam  - Chronic bilateral low back pain (worse on the left): She follows with rheumatology, Dr. Arvizu, and has been diagnosed with axial spondyloarthritis.  Her back pain appears multifactorial.  She localizes most of her pain over her left sacroiliac joint with evidence of inflammatory changes about the left SIJ on prior MRI, but she also has positive facet provocative maneuvers and on her previous lumbar MRI there is evidence of facet arthropathy as well, which does appears slightly worse to me on the left at L5-S1.  - left trochanteric bursitis  - Comorbidities:  Axial spondyloarthritis.  Anxiety and depression.  Type 2 diabetes mellitus.  BMI greater than 30.  Insomnia.  Obstructive sleep apnea.  History of long-term steroid use.      Treatment Plan:   - PT/OT/HEP:  Refer to PT.   - Procedures:  If no relief with PT, plan for C7-T1 IL SHARONA  - Consider MBB vs FJI if no relief with SIJ injection  - Medications: No changes recommended at this time.  - Imaging: Reviewed. If no relief with PT, plan for updating cervical MRI  - Labs: Reviewed.  Medications are appropriately dosed for current hepatorenal function.    Follow Up: RTC in 6-8 weeks for VV or sooner p.r.n.    Graciela Jerome MD  Interventional Pain Medicine        Disclaimer: This note was partly generated using dictation software which may occasionally result in transcription errors.

## 2022-11-16 ENCOUNTER — SPECIALTY PHARMACY (OUTPATIENT)
Dept: PHARMACY | Facility: CLINIC | Age: 57
End: 2022-11-16
Payer: MEDICARE

## 2022-11-16 NOTE — TELEPHONE ENCOUNTER
Specialty Pharmacy - Refill Coordination    Specialty Medication Orders Linked to Encounter      Flowsheet Row Most Recent Value   Medication #1 golimumab (SIMPONI) 50 mg/0.5 mL PnIj (Order#[id number], Rx#[id number])            Refill Questions - Documented Responses      Flowsheet Row Most Recent Value   Patient Availability and HIPAA Verification    Does patient want to proceed with activity? Yes   HIPAA/medical authority confirmed? Yes   Relationship to patient of person spoken to? Self   Refill Screening Questions    Changes to allergies? No   Changes to medications? No   New conditions since last clinic visit? No   Unplanned office visit, urgent care, ED, or hospital admission in the last 4 weeks? No   How does patient/caregiver feel medication is working? Excellent   Financial problems or insurance changes? No   How many doses of your specialty medications were missed in the last 4 weeks? 0   Would patient like to speak to a pharmacist? No   When does the patient need to receive the medication? 11/14/22   Refill Delivery Questions    How will the patient receive the medication? MEDRx   When does the patient need to receive the medication? 11/14/22   Shipping Address Home   Address in [facility] confirmed and updated if neccessary? Yes   Expected Copay ($) 0   Is the patient able to afford the medication copay? Yes   Payment Method zero copay   Days supply of Refill 30   Supplies needed? No supplies needed   Refill activity completed? Yes   Refill activity plan Refill scheduled   Shipment/Pickup Date: 11/16/22            Current Outpatient Medications   Medication Sig    amLODIPine (NORVASC) 10 MG tablet TAKE ONE TABLET BY MOUTH DAILY    aspirin (ECOTRIN) 81 MG EC tablet Take 1 tablet (81 mg total) by mouth once daily.    atenoloL (TENORMIN) 100 MG tablet TAKE ONE TABLET BY MOUTH DAILY    atorvastatin (LIPITOR) 40 MG tablet TAKE ONE TABLET BY MOUTH DAILY    blood-glucose meter kit Use twice daily.     clonazePAM (KLONOPIN) 0.5 MG tablet TAKE 1 TABLET BY MOUTH ONCE DAILY AS NEEDED FOR ANXIETY    clotrimazole-betamethasone 1-0.05% (LOTRISONE) cream Apply topically 2 (two) times daily as needed.     cyclobenzaprine (FLEXERIL) 10 MG tablet TAKE ONE TABLET BY MOUTH AT BEDTIME AS NEEDED    diclofenac sodium (VOLTAREN) 1 % Gel Apply 4 g topically 4 (four) times daily. Apply light film topically to knee up to four times daily    docusate sodium (COLACE) 50 MG capsule Take 1 capsule (50 mg total) by mouth 2 (two) times daily as needed for Constipation. (Patient taking differently: Take 50 mg by mouth 2 (two) times daily.)    entecavir (BARACLUDE) 0.5 MG Tab Take 1 tablet (0.5 mg total) by mouth once daily.    ergocalciferol (ERGOCALCIFEROL) 50,000 unit Cap Take 1 capsule (50,000 Units total) by mouth every 7 days.    fluocinonide 0.05% (LIDEX) 0.05 % cream Apply topically 2 (two) times daily. To allergic rash    FLUoxetine 20 MG capsule Take 1 capsule (20 mg total) by mouth 2 (two) times daily.    fluticasone propionate (FLONASE) 50 mcg/actuation nasal spray SPRAY 2 SPRAYS INTO EACH NOSTRIL ONCE A DAY    fluticasone propionate (FLONASE) 50 mcg/actuation nasal spray 2 sprays (100 mcg total) by Each Nostril route once daily.    FREESTYLE LITE STRIPS Strp test TWICE DAILY    golimumab (SIMPONI) 50 mg/0.5 mL PnIj Inject 50 mg into the skin every 30 days.    hydroCHLOROthiazide (HYDRODIURIL) 25 MG tablet TAKE ONE TABLET BY MOUTH DAILY    hydrocortisone 2.5 % cream Apply topically 2 (two) times daily. for 10 days    hydroquinone 4 % Crea Apply to dark areas qhs.  Not more than 6 months straight in same location.Use sunscreen in am (Patient not taking: Reported on 11/15/2022)    INVOKANA 300 mg Tab tablet TAKE ONE TABLET BY MOUTH BEFORE BREAKFAST    lancets (FREESTYLE LANCETS) 28 gauge Misc test TWICE DAILY    levocetirizine (XYZAL) 5 MG tablet TAKE ONE TABLET BY MOUTH EVERY EVENING    losartan (COZAAR) 100 MG tablet TAKE ONE  TABLET BY MOUTH ONCE DAILY    naproxen (NAPROSYN) 500 MG tablet Take 1 tablet (500 mg total) by mouth 2 (two) times daily as needed.    neomycin-polymyxin-hydrocortisone (CORTISPORIN) 3.5-10,000-1 mg/mL-unit/mL-% otic suspension Place 3 drops into the left ear 3 (three) times daily.    nystatin (MYCOSTATIN) powder Apply to affected area 3 times daily    pantoprazole (PROTONIX) 40 MG tablet TAKE ONE TABLET BY MOUTH EVERY DAY    prednisoLONE acetate (PRED FORTE) 1 % DrpS Instill one drop into the affected eye every two hours while awake    repaglinide (PRANDIN) 1 MG tablet TAKE ONE TABLET BY MOUTH THREE TIMES DAILY BEFORE MEALS (TAKING PLACE OF [facility]) (Patient not taking: Reported on 11/15/2022)    semaglutide (OZEMPIC) 1 mg/dose (4 mg/3 mL) Inject 1 mg into the skin every 7 days.    semaglutide, weight loss, (WEGOVY) 1 mg/0.5 mL PnIj Inject 1 mg into the skin every 7 days. (Patient not taking: Reported on 11/15/2022)    sulfaSALAzine (AZULFIDINE) 500 MG EC tablet Take 3 tablets (1,500 mg total) by mouth 2 (two) times daily.    terconazole (TERAZOL 7) 0.4 % Crea Place 1 applicator vaginally every evening.    terconazole (TERAZOL 7) 0.4 % Crea Place 1 applicator vaginally every evening.    terconazole (TERAZOL 7) 0.4 % Crea Place 1 applicator vaginally every evening.    tirzepatide (MOUNJARO) 5 mg/0.5 mL PnIj Inject 5 mg into the skin every 7 days.    tiZANidine (ZANAFLEX) 2 MG tablet Take 1-2 tablets (2-4 mg total) by mouth every 8 (eight) hours as needed.    topiramate (TOPAMAX) 100 MG tablet Take 100 mg by mouth 2 (two) times daily.    triamcinolone acetonide 0.1% (KENALOG) 0.1 % cream Apply topically 2 (two) times daily.    valACYclovir (VALTREX) 1000 MG tablet valacyclovir 1 gram tablet   Take 0.5 tablets every 12 hours by oral route.    vitamin D (VITAMIN D3) 1000 units Tab Take 1,000 Units by mouth once daily.    zolpidem (AMBIEN) 5 MG Tab TAKE ONE TABLET BY MOUTH AT BEDTIME AS NEEDED   Last reviewed on  11/15/2022 12:58 PM by Graciela Jerome MD    Review of patient's allergies indicates:   Allergen Reactions    Bactrim [sulfamethoxazole-trimethoprim] Itching    Trulicity [dulaglutide] Diarrhea and Other (See Comments)     Vomiting, abdominal pain    Diflucan [fluconazole] Swelling and Other (See Comments)     Sore on mouth    Last reviewed on  11/15/2022 12:58 PM by Graciela Jerome      Tasks added this encounter   1/19/2023 - Refill Call (Auto Added)   Tasks due within next 3 months   2/6/2023 - Clinical - Follow Up Assesement (Annual)     Bala, PharmD  [facility]  [address]  [facility] [address]  Phone: [phone]  Fax: [phone]

## 2022-12-01 ENCOUNTER — OFFICE VISIT (OUTPATIENT)
Dept: OPTOMETRY | Facility: CLINIC | Age: 57
End: 2022-12-01
Payer: COMMERCIAL

## 2022-12-01 DIAGNOSIS — H20.023 RECURRENT IRITIS OF BOTH EYES: ICD-10-CM

## 2022-12-01 DIAGNOSIS — H52.4 PRESBYOPIA OF BOTH EYES: Primary | ICD-10-CM

## 2022-12-01 DIAGNOSIS — H21.542 POSTERIOR SYNECHIAE (IRIS), LEFT EYE: ICD-10-CM

## 2022-12-01 DIAGNOSIS — H25.813 COMBINED FORMS OF AGE-RELATED CATARACT OF BOTH EYES: ICD-10-CM

## 2022-12-01 PROCEDURE — 99999 PR PBB SHADOW E&M-EST. PATIENT-LVL III: CPT | Mod: PBBFAC,,, | Performed by: OPTOMETRIST

## 2022-12-01 PROCEDURE — 92015 PR REFRACTION: ICD-10-PCS | Mod: S$GLB,,, | Performed by: OPTOMETRIST

## 2022-12-01 PROCEDURE — 92014 PR EYE EXAM, EST PATIENT,COMPREHESV: ICD-10-PCS | Mod: S$GLB,,, | Performed by: OPTOMETRIST

## 2022-12-01 PROCEDURE — 92015 DETERMINE REFRACTIVE STATE: CPT | Mod: S$GLB,,, | Performed by: OPTOMETRIST

## 2022-12-01 PROCEDURE — 99999 PR PBB SHADOW E&M-EST. PATIENT-LVL III: ICD-10-PCS | Mod: PBBFAC,,, | Performed by: OPTOMETRIST

## 2022-12-01 PROCEDURE — 92014 COMPRE OPH EXAM EST PT 1/>: CPT | Mod: S$GLB,,, | Performed by: OPTOMETRIST

## 2022-12-01 RX ORDER — PREDNISOLONE ACETATE 10 MG/ML
1 SUSPENSION/ DROPS OPHTHALMIC DAILY PRN
Qty: 5 ML | Refills: 1 | Status: SHIPPED | OUTPATIENT
Start: 2022-12-01 | End: 2023-05-16 | Stop reason: SDUPTHER

## 2022-12-01 NOTE — PROGRESS NOTES
HPI    PEREZ:11/03/2021 Roni  (-) Changes in vision   (-) Pain  (-) Irritation   (-) Itching   (-) Flashes  (+) Floaters  (+) Glasses wearer  (-) CL wearer  (+) Uses eye gtts--only when needed.  Does patient want a refraction today? Yes  (-) Eye injury--hx of iritis   (-) Eye surgery   (+)POHx--hx of iritis  (-)FOHx  (+)DM  Hemoglobin A1C       Date                     Value               Ref Range             Status                06/17/2022               6.7 (H)             4.0 - 5.6 %           Final            Last edited by Karolyn Cunningham, OD on 12/1/2022 11:42 AM.            Assessment /Plan     For exam results, see Encounter Report.    Presbyopia of both eyes    Combined forms of age-related cataract of both eyes    Recurrent iritis of both eyes  -     prednisoLONE acetate (PRED FORTE) 1 % DrpS; Place 1 drop into both eyes daily as needed (For flare-ups until able to be seen by the Optometrist).  Dispense: 5 mL; Refill: 1    Posterior synechiae (iris), left eye      1.   Eyeglass Final Rx       Eyeglass Final Rx         Sphere Cylinder Axis Dist VA Add    Right [place] +1.00 180 20/20 +2.00    Left +0.25 Sphere  20/20 +2.00      Type: PAL    Expiration Date: 12/1/2023                  2. Educated pt on findings. Not visually significant. No need for removal at this time. Monitor yearly.    3-4. Rheumatoid arthritis pt. Pt states she threw out her white cap gtt Rxed by Dr. Tamayo because the box she got from the pharmacy said it was for otic use. Pt did have refills on file but renewed Rx for her with strict instructions to call clinic as soon as symptoms begin/pred is used to be seen in office. Pt voiced understanding. Pt also has Atropine to use for flare-ups.    RTC in 1 year for annual eye exam unless changes noted sooner.

## 2022-12-02 DIAGNOSIS — F41.0 PANIC DISORDER WITHOUT AGORAPHOBIA: ICD-10-CM

## 2022-12-02 DIAGNOSIS — F41.1 GENERALIZED ANXIETY DISORDER: ICD-10-CM

## 2022-12-02 RX ORDER — CLONAZEPAM 0.5 MG/1
TABLET ORAL
Qty: 90 TABLET | Refills: 0 | Status: SHIPPED | OUTPATIENT
Start: 2022-12-02 | End: 2023-03-31 | Stop reason: SDUPTHER

## 2022-12-02 RX ORDER — ZOLPIDEM TARTRATE 5 MG/1
5 TABLET ORAL NIGHTLY PRN
Qty: 90 TABLET | Refills: 0 | Status: SHIPPED | OUTPATIENT
Start: 2022-12-02 | End: 2023-03-31 | Stop reason: SDUPTHER

## 2022-12-14 ENCOUNTER — SPECIALTY PHARMACY (OUTPATIENT)
Dept: PHARMACY | Facility: CLINIC | Age: 57
End: 2022-12-14
Payer: MEDICARE

## 2022-12-14 NOTE — TELEPHONE ENCOUNTER
Specialty Pharmacy - Refill Coordination    Specialty Medication Orders Linked to Encounter      Flowsheet Row Most Recent Value   Medication #1 golimumab (SIMPONI) 50 mg/0.5 mL PnIj (Order#[id number], Rx#[id number])            Refill Questions - Documented Responses      Flowsheet Row Most Recent Value   Patient Availability and HIPAA Verification    Does patient want to proceed with activity? Yes   HIPAA/medical authority confirmed? Yes   Relationship to patient of person spoken to? Self   Refill Screening Questions    Changes to allergies? No   Changes to medications? No   New conditions since last clinic visit? No   Unplanned office visit, urgent care, ED, or hospital admission in the last 4 weeks? No   How does patient/caregiver feel medication is working? Good   Financial problems or insurance changes? No   How many doses of your specialty medications were missed in the last 4 weeks? 0   Would patient like to speak to a pharmacist? No   When does the patient need to receive the medication? 12/15/22   Refill Delivery Questions    How will the patient receive the medication? [facility]Rx   When does the patient need to receive the medication? 12/15/22   Shipping Address Home   Address in [facility] confirmed and updated if neccessary? Yes   Expected Copay ($) 0   Is the patient able to afford the medication copay? Yes   Payment Method zero copay   Days supply of Refill 30   Supplies needed? No supplies needed   Refill activity completed? Yes   Refill activity plan Refill scheduled   Shipment/Pickup Date: 12/14/22            Current Outpatient Medications   Medication Sig    amLODIPine (NORVASC) 10 MG tablet TAKE ONE TABLET BY MOUTH DAILY    aspirin (ECOTRIN) 81 MG EC tablet Take 1 tablet (81 mg total) by mouth once daily.    atenoloL (TENORMIN) 100 MG tablet TAKE ONE TABLET BY MOUTH DAILY    atorvastatin (LIPITOR) 40 MG tablet TAKE ONE TABLET BY MOUTH DAILY    blood-glucose meter kit Use twice daily.     clonazePAM (KLONOPIN) 0.5 MG tablet TAKE 1 TABLET BY MOUTH ONCE DAILY AS NEEDED FOR ANXIETY    clotrimazole-betamethasone 1-0.05% (LOTRISONE) cream Apply topically 2 (two) times daily as needed.     cyclobenzaprine (FLEXERIL) 10 MG tablet TAKE ONE TABLET BY MOUTH AT BEDTIME AS NEEDED    diclofenac sodium (VOLTAREN) 1 % Gel Apply 4 g topically 4 (four) times daily. Apply light film topically to knee up to four times daily    docusate sodium (COLACE) 50 MG capsule Take 1 capsule (50 mg total) by mouth 2 (two) times daily as needed for Constipation. (Patient taking differently: Take 50 mg by mouth 2 (two) times daily.)    entecavir (BARACLUDE) 0.5 MG Tab Take 1 tablet (0.5 mg total) by mouth once daily.    ergocalciferol (ERGOCALCIFEROL) 50,000 unit Cap Take 1 capsule (50,000 Units total) by mouth every 7 days.    fluocinonide 0.05% (LIDEX) 0.05 % cream Apply topically 2 (two) times daily. To allergic rash    FLUoxetine 20 MG capsule Take 1 capsule (20 mg total) by mouth 2 (two) times daily.    fluticasone propionate (FLONASE) 50 mcg/actuation nasal spray SPRAY 2 SPRAYS INTO EACH NOSTRIL ONCE A DAY    fluticasone propionate (FLONASE) 50 mcg/actuation nasal spray 2 sprays (100 mcg total) by Each Nostril route once daily.    FREESTYLE LITE STRIPS Strp test TWICE DAILY    golimumab (SIMPONI) 50 mg/0.5 mL PnIj Inject 50 mg into the skin every 30 days.    hydroCHLOROthiazide (HYDRODIURIL) 25 MG tablet TAKE ONE TABLET BY MOUTH DAILY    hydrocortisone 2.5 % cream Apply topically 2 (two) times daily. for 10 days    hydroquinone 4 % Crea Apply to dark areas qhs.  Not more than 6 months straight in same location.Use sunscreen in am    INVOKANA 300 mg Tab tablet TAKE ONE TABLET BY MOUTH BEFORE BREAKFAST    lancets (FREESTYLE LANCETS) 28 gauge Misc test TWICE DAILY    levocetirizine (XYZAL) 5 MG tablet TAKE ONE TABLET BY MOUTH EVERY EVENING    losartan (COZAAR) 100 MG tablet TAKE ONE TABLET BY MOUTH ONCE DAILY    naproxen  (NAPROSYN) 500 MG tablet Take 1 tablet (500 mg total) by mouth 2 (two) times daily as needed.    neomycin-polymyxin-hydrocortisone (CORTISPORIN) 3.5-10,000-1 mg/mL-unit/mL-% otic suspension Place 3 drops into the left ear 3 (three) times daily.    nystatin (MYCOSTATIN) powder Apply to affected area 3 times daily    pantoprazole (PROTONIX) 40 MG tablet TAKE ONE TABLET BY MOUTH EVERY DAY    prednisoLONE acetate (PRED FORTE) 1 % DrpS Place 1 drop into both eyes daily as needed (For flare-ups until able to be seen by the Optometrist).    repaglinide (PRANDIN) 1 MG tablet TAKE ONE TABLET BY MOUTH THREE TIMES DAILY BEFORE MEALS (TAKING PLACE OF [place])    semaglutide (OZEMPIC) 1 mg/dose (4 mg/3 mL) Inject 1 mg into the skin every 7 days.    semaglutide, weight loss, (WEGOVY) 1 mg/0.5 mL PnIj Inject 1 mg into the skin every 7 days.    sulfaSALAzine (AZULFIDINE) 500 MG EC tablet Take 3 tablets (1,500 mg total) by mouth 2 (two) times daily.    terconazole (TERAZOL 7) 0.4 % Crea Place 1 applicator vaginally every evening.    terconazole (TERAZOL 7) 0.4 % Crea Place 1 applicator vaginally every evening.    terconazole (TERAZOL 7) 0.4 % Crea Place 1 applicator vaginally every evening.    tirzepatide (MOUNJARO) 5 mg/0.5 mL PnIj Inject 5 mg into the skin every 7 days.    tiZANidine (ZANAFLEX) 2 MG tablet Take 1-2 tablets (2-4 mg total) by mouth every 8 (eight) hours as needed.    topiramate (TOPAMAX) 100 MG tablet Take 100 mg by mouth 2 (two) times daily.    triamcinolone acetonide 0.1% (KENALOG) 0.1 % cream Apply topically 2 (two) times daily.    valACYclovir (VALTREX) 1000 MG tablet valacyclovir 1 gram tablet   Take 0.5 tablets every 12 hours by oral route.    vitamin D (VITAMIN D3) 1000 units Tab Take 1,000 Units by mouth once daily.    zolpidem (AMBIEN) 5 MG Tab Take 1 tablet (5 mg total) by mouth nightly as needed.   Last reviewed on 12/1/2022 11:18 AM by Rubina Yost MA    Review of patient's allergies indicates:    Allergen Reactions    Bactrim [sulfamethoxazole-trimethoprim] Itching    Trulicity [dulaglutide] Diarrhea and Other (See Comments)     Vomiting, abdominal pain    Diflucan [fluconazole] Swelling and Other (See Comments)     Sore on mouth    Last reviewed on  12/2/2022 1:10 PM by Tip Oliveira      Tasks added this encounter   1/7/2023 - Refill Call (Auto Added)   Tasks due within next 3 months   2/6/2023 - Clinical - Follow Up Assesement (Annual)     [facility]  [facility] [address] [facility]  [facility] [address]  Phone: [phone]  Fax: [phone]         LEFT/knee LEFT/knee/hip

## 2022-12-21 ENCOUNTER — PATIENT MESSAGE (OUTPATIENT)
Dept: INTERNAL MEDICINE | Facility: CLINIC | Age: 57
End: 2022-12-21
Payer: MEDICARE

## 2022-12-21 DIAGNOSIS — E11.42 TYPE 2 DIABETES MELLITUS WITH DIABETIC POLYNEUROPATHY, UNSPECIFIED WHETHER LONG TERM INSULIN USE: Chronic | ICD-10-CM

## 2022-12-22 RX ORDER — SEMAGLUTIDE 1.34 MG/ML
1 INJECTION, SOLUTION SUBCUTANEOUS
Qty: 1 PEN | Refills: 11 | Status: SHIPPED | OUTPATIENT
Start: 2022-12-22 | End: 2023-01-09 | Stop reason: SDUPTHER

## 2023-01-04 DIAGNOSIS — E11.9 TYPE 2 DIABETES MELLITUS WITHOUT COMPLICATION: ICD-10-CM

## 2023-01-06 ENCOUNTER — OFFICE VISIT (OUTPATIENT)
Dept: PSYCHIATRY | Facility: CLINIC | Age: 58
End: 2023-01-06
Payer: COMMERCIAL

## 2023-01-06 DIAGNOSIS — F41.9 ANXIETY: Primary | ICD-10-CM

## 2023-01-06 PROCEDURE — 3072F PR LOW RISK FOR RETINOPATHY: ICD-10-PCS | Mod: CPTII,95,, | Performed by: PSYCHIATRY & NEUROLOGY

## 2023-01-06 PROCEDURE — 99214 PR OFFICE/OUTPT VISIT, EST, LEVL IV, 30-39 MIN: ICD-10-PCS | Mod: 95,,, | Performed by: PSYCHIATRY & NEUROLOGY

## 2023-01-06 PROCEDURE — 99214 OFFICE O/P EST MOD 30 MIN: CPT | Mod: 95,,, | Performed by: PSYCHIATRY & NEUROLOGY

## 2023-01-06 PROCEDURE — 3072F LOW RISK FOR RETINOPATHY: CPT | Mod: CPTII,95,, | Performed by: PSYCHIATRY & NEUROLOGY

## 2023-01-06 RX ORDER — FLUOXETINE HYDROCHLORIDE 20 MG/1
20 CAPSULE ORAL 2 TIMES DAILY
Qty: 180 CAPSULE | Refills: 1 | Status: SHIPPED | OUTPATIENT
Start: 2023-01-06 | End: 2023-03-31 | Stop reason: SDUPTHER

## 2023-01-06 NOTE — PROGRESS NOTES
The patient location is: [place], [place]  The chief complaint leading to consultation is: anxiety  Visit type: audiovisual  Total time spent with patient: 15 min  Each patient to whom he or she provides medical services by telemedicine is:  (1) informed of the relationship between the physician and patient and the respective role of any other health care provider with respect to management of the patient; and (2) notified that he or she may decline to receive medical services by telemedicine and may withdraw from such care at any time.    ESTABLISHED OUTPATIENT VISIT   E/M LEVEL 4: [id number]    ENCOUNTER DATE: 1/6/2023  SITE: [facility], [address]    HISTORY    CHIEF COMPLAINT   Althea Vazquez is a 57 y.o. female who presents for follow up of anxiety.    HPI     Continues to live with father, who has been having medical problems.    Appears to be doing well psychiatrically. Appears euthymic during today's visit.    Satisfied with current psychotropic medication regimen.    Spiritual beliefs are supportive.    Has continued to stand up for herself, this is working well for her.    Psychiatric Review Of Systems - Is patient experiencing or having changes in:  sleep: interrupted  appetite: no  weight: working on losing weight  energy/anergy: no  interest/pleasure/anhedonia: no  somatic symptoms: no  libido: no  anxiety/panic: no  guilty/hopelessness: no  concentration: no  S.I.B.s/risky behavior: no  Irritability: no  Racing thoughts: no  Impulsive behaviors: no  Paranoia:no  AVH:no    Recent alcohol: rare small amount  Recent drug: no    Medical ROS   Denies any physical complaint during today's visit.     PAST MEDICAL, FAMILY AND SOCIAL HISTORY: The patient's past medical, family and social history have been reviewed and updated as appropriate within the electronic medical record - see encounter notes.    PSYCHOTROPIC MEDICATIONS   Prozac 20 mg qam and 20 mg qpm, Clonazepam 0.5 mg prn for  anxiety[has been using up to 2 days per week], Ambien 5 mg at bedtime prn[recently has been taking up 3 times per week]    Scheduled and PRN Medications     Current Outpatient Medications:     amLODIPine (NORVASC) 10 MG tablet, TAKE ONE TABLET BY MOUTH DAILY, Disp: 90 tablet, Rfl: 3    aspirin (ECOTRIN) 81 MG EC tablet, Take 1 tablet (81 mg total) by mouth once daily., Disp: 30 tablet, Rfl: 0    atenoloL (TENORMIN) 100 MG tablet, TAKE ONE TABLET BY MOUTH DAILY, Disp: 90 tablet, Rfl: 3    atorvastatin (LIPITOR) 40 MG tablet, TAKE ONE TABLET BY MOUTH DAILY, Disp: 90 tablet, Rfl: 3    blood-glucose meter kit, Use twice daily., Disp: 1 each, Rfl: 0    clonazePAM (KLONOPIN) 0.5 MG tablet, TAKE 1 TABLET BY MOUTH ONCE DAILY AS NEEDED FOR ANXIETY, Disp: 90 tablet, Rfl: 0    clotrimazole-betamethasone 1-0.05% (LOTRISONE) cream, Apply topically 2 (two) times daily as needed. , Disp: , Rfl:     cyclobenzaprine (FLEXERIL) 10 MG tablet, TAKE ONE TABLET BY MOUTH AT BEDTIME AS NEEDED, Disp: 90 tablet, Rfl: 2    diclofenac sodium (VOLTAREN) 1 % Gel, Apply 4 g topically 4 (four) times daily. Apply light film topically to knee up to four times daily, Disp: 3 Tube, Rfl: 2    docusate sodium (COLACE) 50 MG capsule, Take 1 capsule (50 mg total) by mouth 2 (two) times daily as needed for Constipation. (Patient taking differently: Take 50 mg by mouth 2 (two) times daily.), Disp: 30 capsule, Rfl: 0    entecavir (BARACLUDE) 0.5 MG Tab, Take 1 tablet (0.5 mg total) by mouth once daily., Disp: 90 tablet, Rfl: 3    ergocalciferol (ERGOCALCIFEROL) 50,000 unit Cap, Take 1 capsule (50,000 Units total) by mouth every 7 days., Disp: 12 capsule, Rfl: 3    fluocinonide 0.05% (LIDEX) 0.05 % cream, Apply topically 2 (two) times daily. To allergic rash, Disp: 30 g, Rfl: 0    FLUoxetine 20 MG capsule, Take 1 capsule (20 mg total) by mouth 2 (two) times daily., Disp: 180 capsule, Rfl: 1    fluticasone propionate (FLONASE) 50 mcg/actuation nasal spray,  SPRAY 2 SPRAYS INTO EACH NOSTRIL ONCE A DAY, Disp: , Rfl:     fluticasone propionate (FLONASE) 50 mcg/actuation nasal spray, 2 sprays (100 mcg total) by Each Nostril route once daily., Disp: 18.2 mL, Rfl: 6    FREESTYLE LITE STRIPS Strp, test TWICE DAILY, Disp: 200 each, Rfl: 3    golimumab (SIMPONI) 50 mg/0.5 mL PnIj, Inject 50 mg into the skin every 30 days., Disp: 1.5 mL, Rfl: 1    hydroCHLOROthiazide (HYDRODIURIL) 25 MG tablet, TAKE ONE TABLET BY MOUTH DAILY, Disp: 90 tablet, Rfl: 3    hydrocortisone 2.5 % cream, Apply topically 2 (two) times daily. for 10 days, Disp: 28 g, Rfl: 1    hydroquinone 4 % Crea, Apply to dark areas qhs.  Not more than 6 months straight in same location.Use sunscreen in am, Disp: 46 g, Rfl: 1    INVOKANA 300 mg Tab tablet, TAKE ONE TABLET BY MOUTH BEFORE BREAKFAST, Disp: 90 tablet, Rfl: 1    lancets (FREESTYLE LANCETS) 28 gauge Misc, test TWICE DAILY, Disp: 200 each, Rfl: 3    levocetirizine (XYZAL) 5 MG tablet, TAKE ONE TABLET BY MOUTH EVERY EVENING, Disp: 90 tablet, Rfl: 2    losartan (COZAAR) 100 MG tablet, TAKE ONE TABLET BY MOUTH ONCE DAILY, Disp: 90 tablet, Rfl: 3    naproxen (NAPROSYN) 500 MG tablet, Take 1 tablet (500 mg total) by mouth 2 (two) times daily as needed., Disp: 180 tablet, Rfl: 0    neomycin-polymyxin-hydrocortisone (CORTISPORIN) 3.5-10,000-1 mg/mL-unit/mL-% otic suspension, Place 3 drops into the left ear 3 (three) times daily., Disp: 10 mL, Rfl: 0    nystatin (MYCOSTATIN) powder, Apply to affected area 3 times daily, Disp: 1 Bottle, Rfl: 3    pantoprazole (PROTONIX) 40 MG tablet, TAKE ONE TABLET BY MOUTH EVERY DAY, Disp: 90 tablet, Rfl: 2    prednisoLONE acetate (PRED FORTE) 1 % DrpS, Place 1 drop into both eyes daily as needed (For flare-ups until able to be seen by the Optometrist)., Disp: 5 mL, Rfl: 1    repaglinide (PRANDIN) 1 MG tablet, TAKE ONE TABLET BY MOUTH THREE TIMES DAILY BEFORE MEALS (TAKING PLACE OF TRAJENT[place]), Disp: 270 tablet, Rfl: 0    semaglutide  (OZEMPIC) 1 mg/dose (4 mg/3 mL), Inject 1 mg into the skin every 7 days., Disp: 1 pen, Rfl: 11    sulfaSALAzine (AZULFIDINE) 500 MG EC tablet, Take 3 tablets (1,500 mg total) by mouth 2 (two) times daily., Disp: 540 tablet, Rfl: 0    terconazole (TERAZOL 7) 0.4 % Crea, Place 1 applicator vaginally every evening., Disp: 45 g, Rfl: 2    terconazole (TERAZOL 7) 0.4 % Crea, Place 1 applicator vaginally every evening., Disp: 7 g, Rfl: 2    terconazole (TERAZOL 7) 0.4 % Crea, Place 1 applicator vaginally every evening., Disp: 7 g, Rfl: 0    tiZANidine (ZANAFLEX) 2 MG tablet, Take 1-2 tablets (2-4 mg total) by mouth every 8 (eight) hours as needed., Disp: 90 tablet, Rfl: 2    topiramate (TOPAMAX) 100 MG tablet, Take 100 mg by mouth 2 (two) times daily., Disp: , Rfl:     triamcinolone acetonide 0.1% (KENALOG) 0.1 % cream, Apply topically 2 (two) times daily., Disp: 45 g, Rfl: 1    valACYclovir (VALTREX) 1000 MG tablet, valacyclovir 1 gram tablet  Take 0.5 tablets every 12 hours by oral route., Disp: , Rfl:     vitamin D (VITAMIN D3) 1000 units Tab, Take 1,000 Units by mouth once daily., Disp: , Rfl:     zolpidem (AMBIEN) 5 MG Tab, Take 1 tablet (5 mg total) by mouth nightly as needed., Disp: 90 tablet, Rfl: 0  No current facility-administered medications for this visit.    Facility-Administered Medications Ordered in Other Visits:     0.9%  NaCl infusion, , Intravenous, Continuous, Graciela Jerome MD    EXAMINATION    There were no vitals filed for this visit.      CONSTITUTIONAL  General Appearance: well nourished    MUSCULOSKELETAL  Muscle Strength and Tone: normal strength and tone  Abnormal Involuntary Movements: no abnormal movement noted  Gait and Station: normal gait    PSYCHIATRIC   Level of Consciousness: alert  Orientation: oriented to person, place and time  Grooming: well groomed  Psychomotor Behavior: no restlessness/agitation  Speech: normal in rate, rhythm and volume  Language: normal vocabulary  Mood:  steady  Affect: full range and appropriate  Thought Process: logical and goal directed  Associations: intact associations  Thought Content: no SI/HI  Memory: grossly intact  Attention: intact to content of interview  Fund of Knowledge: appears adequate  Insight: good  Judgement: good    MEDICAL DECISION MAKING    DIAGNOSES  Anxiety d/o, unspecified    PROBLEM LIST AND MANAGEMENT PLANS    - anxiety: continue Prozac, prn Klonopin and prn Ambien as above  - rtc 3 months    Time with patient: 15 min    LABORATORY DATA  Lab Visit on 10/31/2022   Component Date Value Ref Range Status    Hep B Viral DNA IU/ML 10/31/2022 <10  <10 IU/mL Final    Log HBV IU/mL 10/31/2022 <1.00  <1.00 Log (10) IU/mL Final    Comment: This procedure utilizes a real-time polymerase chain  reaction test from [employer].  The amplification   target is a conserved region in the terminal third of  the hepatitis B surface antigen gene. The lower limit of   quantitation is 10 IU/mL (1.00 Log IU/mL) and the uppper  limit of quantitation is 1 billion IU/mL (9.00 Log IU/mL).  The qualitative limit of detection is 10 IU/mL   (1.00 Log IU/mL). A  Not detected  result does not rule   out infection.    Test performed at [facility],  [address] [place] [id number] [phone]  Chloe Garcia MD, PhD - Medical Director      Hepatitis B Virus DNA 10/31/2022 Not detected  Not detected Final   Lab Visit on 10/31/2022   Component Date Value Ref Range Status    NIL 10/31/2022 0.19925  IU/mL Final    Comment: The Nil tube value is used to determine if the patient   has a preexisting immune response which could cause a   false-positive reading on the test.   For a test to be valid, the NIL tube must have a value   of less than or equal to 8.0 IU/mL.  The mitogen control tube is used to assure the patient   has a healthy immune status and also serves as a control   for correct blood handling and incubation. It is used to   detect  false negative readings.   The TB antigen tubes are coated with the M. tuberculosis   specific antigens. For a test to be considered positive,   at least one of the TB antigen tube values minus the Nil   tube value must be greater than or equal to 0.35 IU/mL   and be greater than or equal to 25% of the Nil tube value.  Diagnosing or excluding tuberculosis disease, and assessing   the probability of LTNI, requires a combination of   epidemiological, historical, medical, and diagnostic   findings that should be considered when interpreting   the test results.       TB1 - Nil 10/31/2022 -0.007  IU/mL Final    TB2 - Nil 10/31/2022 0.004  IU/mL Final    Mitogen - Nil 10/31/2022 9.910  IU/mL Final    TB Gold Plus 10/31/2022 Negative  Negative Final    M. tuberculosis infection NOT likely.   Lab Visit on 10/28/2022   Component Date Value Ref Range Status    Sed Rate 10/28/2022 11  0 - 36 mm/Hr Final    CRP 10/28/2022 0.23  0.00 - 1.00 mg/dL Final    WBC 10/28/2022 6.59  3.90 - 12.70 K/uL Final    RBC 10/28/2022 4.89  4.00 - 5.40 M/uL Final    Hemoglobin 10/28/2022 14.9  12.0 - 16.0 g/dL Final    Hematocrit 10/28/2022 44.4  37.0 - 48.5 % Final    MCV 10/28/2022 91  82 - 98 fL Final    MCH 10/28/2022 30.5  27.0 - 31.0 pg Final    MCHC 10/28/2022 33.6  32.0 - 36.0 g/dL Final    RDW 10/28/2022 13.2  11.5 - 14.5 % Final    Platelets 10/28/2022 275  150 - 450 K/uL Final    MPV 10/28/2022 10.7  9.2 - 12.9 fL Final    Immature Granulocytes 10/28/2022 0.5  0.0 - 0.5 % Final    Gran # (ANC) 10/28/2022 2.5  1.8 - 7.7 K/uL Final    Immature Grans (Abs) 10/28/2022 0.03  0.00 - 0.04 K/uL Final    Comment: Mild elevation in immature granulocytes is non specific and   can be seen in a variety of conditions including stress response,   acute inflammation, trauma and pregnancy. Correlation with other   laboratory and clinical findings is essential.      Lymph # 10/28/2022 3.3  1.0 - 4.8 K/uL Final    Mono # 10/28/2022 0.6  0.3 - 1.0 K/uL  Final    Eos # 10/28/2022 0.2  0.0 - 0.5 K/uL Final    Baso # 10/28/2022 0.05  0.00 - 0.20 K/uL Final    nRBC 10/28/2022 0  0 /100 WBC Final    Gran % 10/28/2022 37.4 (L)  38.0 - 73.0 % Final    Lymph % 10/28/2022 49.8 (H)  18.0 - 48.0 % Final    Mono % 10/28/2022 9.1  4.0 - 15.0 % Final    Eosinophil % 10/28/2022 2.4  0.0 - 8.0 % Final    Basophil % 10/28/2022 0.8  0.0 - 1.9 % Final    Differential Method 10/28/2022 Automated   Final    Sodium 10/28/2022 143  136 - 145 mmol/L Final    Potassium 10/28/2022 3.7  3.5 - 5.1 mmol/L Final    Chloride 10/28/2022 106  95 - 110 mmol/L Final    CO2 10/28/2022 28  23 - 29 mmol/L Final    Glucose 10/28/2022 106  70 - 110 mg/dL Final    BUN 10/28/2022 13  7 - 17 mg/dL Final    Creatinine 10/28/2022 0.58  0.50 - 1.40 mg/dL Final    Calcium 10/28/2022 9.0  8.7 - 10.5 mg/dL Final    Total Protein 10/28/2022 7.5  6.0 - 8.4 g/dL Final    Albumin 10/28/2022 4.6  3.5 - 5.2 g/dL Final    Total Bilirubin 10/28/2022 0.4  0.1 - 1.0 mg/dL Final    Comment: For infants and newborns, interpretation of results should be based  on gestational age, weight and in agreement with clinical  observations.    Premature Infant recommended reference ranges:  Up to 24 hours.............<8.0 mg/dL  Up to 48 hours............<12.0 mg/dL  3-5 days..................<15.0 mg/dL  6-29 days.................<15.0 mg/dL      Alkaline Phosphatase 10/28/2022 90  38 - 126 U/L Final    AST 10/28/2022 23  15 - 46 U/L Final    ALT 10/28/2022 24  10 - 44 U/L Final    Anion Gap 10/28/2022 9  8 - 16 mmol/L Final    eGFR 10/28/2022 >60.0  >60 mL/min/1.73 m^2 Final           Tip Oliveira

## 2023-01-09 ENCOUNTER — SPECIALTY PHARMACY (OUTPATIENT)
Dept: PHARMACY | Facility: CLINIC | Age: 58
End: 2023-01-09
Payer: COMMERCIAL

## 2023-01-09 DIAGNOSIS — E11.42 TYPE 2 DIABETES MELLITUS WITH DIABETIC POLYNEUROPATHY, UNSPECIFIED WHETHER LONG TERM INSULIN USE: Chronic | ICD-10-CM

## 2023-01-09 RX ORDER — SEMAGLUTIDE 1.34 MG/ML
1 INJECTION, SOLUTION SUBCUTANEOUS
Qty: 1 PEN | Refills: 11 | Status: SHIPPED | OUTPATIENT
Start: 2023-01-09 | End: 2023-08-31 | Stop reason: SDUPTHER

## 2023-01-09 NOTE — TELEPHONE ENCOUNTER
Refill Routing Note   Medication(s) are not appropriate for processing by [facility] for the following reason(s):      - Required laboratory values are outdated    [facility] action(s):  Defer Medication-related problems identified: Requires labs     Medication Therapy Plan: FOVS  Medication reconciliation completed: No     Appointments  past 12m or future 3m with PCP    Date Provider   Last Visit   3/16/2022 Weston Begum, DO   Next Visit   4/5/2023 Weston Begum, DO   ED visits in past 90 days: 0        Note composed:11:23 AM 01/09/2023

## 2023-01-09 NOTE — TELEPHONE ENCOUNTER
Outgoing call regarding simponi refill; informed pt that insurance is rejecting saying patient residence value not supported; informed her that I would get it over to [facility] in charge, and once resolved [facility] will follow up to schedule delivery; sanju HERNANDEZ looking into it

## 2023-01-09 NOTE — TELEPHONE ENCOUNTER
Specialty Pharmacy - Refill Coordination    Specialty Medication Orders Linked to Encounter      Flowsheet Row Most Recent Value   Medication #1 golimumab (SIMPONI) 50 mg/0.5 mL PnIj (Order#[id number], Rx#[id number])            Refill Questions - Documented Responses      Flowsheet Row Most Recent Value   Patient Availability and HIPAA Verification    Does patient want to proceed with activity? Yes   HIPAA/medical authority confirmed? Yes   Relationship to patient of person spoken to? Self   Refill Screening Questions    Changes to allergies? No   Changes to medications? No   New conditions since last clinic visit? No   Unplanned office visit, urgent care, ED, or hospital admission in the last 4 weeks? No   How does patient/caregiver feel medication is working? Good   Financial problems or insurance changes? No   How many doses of your specialty medications were missed in the last 4 weeks? 0   Would patient like to speak to a pharmacist? No   When does the patient need to receive the medication? 01/14/23   Refill Delivery Questions    How will the patient receive the medication? [facility]   When does the patient need to receive the medication? 01/14/23   Shipping Address Prescription   Address in [facility] confirmed and updated if neccessary? Yes   Expected Copay ($) 0   Is the patient able to afford the medication copay? Yes   Payment Method zero copay   Days supply of Refill 30   Supplies needed? No supplies needed   Refill activity completed? Yes   Refill activity plan Refill scheduled   Shipment/Pickup Date: 01/11/23            Current Outpatient Medications   Medication Sig    amLODIPine (NORVASC) 10 MG tablet TAKE ONE TABLET BY MOUTH DAILY    aspirin (ECOTRIN) 81 MG EC tablet Take 1 tablet (81 mg total) by mouth once daily.    atenoloL (TENORMIN) 100 MG tablet TAKE ONE TABLET BY MOUTH DAILY    atorvastatin (LIPITOR) 40 MG tablet TAKE ONE TABLET BY MOUTH DAILY    blood-glucose meter kit Use twice daily.     clonazePAM (KLONOPIN) 0.5 MG tablet TAKE 1 TABLET BY MOUTH ONCE DAILY AS NEEDED FOR ANXIETY    clotrimazole-betamethasone 1-0.05% (LOTRISONE) cream Apply topically 2 (two) times daily as needed.     cyclobenzaprine (FLEXERIL) 10 MG tablet TAKE ONE TABLET BY MOUTH AT BEDTIME AS NEEDED    diclofenac sodium (VOLTAREN) 1 % Gel Apply 4 g topically 4 (four) times daily. Apply light film topically to knee up to four times daily    docusate sodium (COLACE) 50 MG capsule Take 1 capsule (50 mg total) by mouth 2 (two) times daily as needed for Constipation. (Patient taking differently: Take 50 mg by mouth 2 (two) times daily.)    entecavir (BARACLUDE) 0.5 MG Tab Take 1 tablet (0.5 mg total) by mouth once daily.    ergocalciferol (ERGOCALCIFEROL) 50,000 unit Cap Take 1 capsule (50,000 Units total) by mouth every 7 days.    fluocinonide 0.05% (LIDEX) 0.05 % cream Apply topically 2 (two) times daily. To allergic rash    FLUoxetine 20 MG capsule Take 1 capsule (20 mg total) by mouth 2 (two) times daily.    fluticasone propionate (FLONASE) 50 mcg/actuation nasal spray SPRAY 2 SPRAYS INTO EACH NOSTRIL ONCE A DAY    fluticasone propionate (FLONASE) 50 mcg/actuation nasal spray 2 sprays (100 mcg total) by Each Nostril route once daily.    FREESTYLE LITE STRIPS Strp test TWICE DAILY    golimumab (SIMPONI) 50 mg/0.5 mL PnIj Inject 50 mg into the skin every 30 days.    hydroCHLOROthiazide (HYDRODIURIL) 25 MG tablet TAKE ONE TABLET BY MOUTH DAILY    hydrocortisone 2.5 % cream Apply topically 2 (two) times daily. for 10 days    hydroquinone 4 % Crea Apply to dark areas qhs.  Not more than 6 months straight in same location.Use sunscreen in am    INVOKANA 300 mg Tab tablet TAKE ONE TABLET BY MOUTH BEFORE BREAKFAST    lancets (FREESTYLE LANCETS) 28 gauge Misc test TWICE DAILY    levocetirizine (XYZAL) 5 MG tablet TAKE ONE TABLET BY MOUTH EVERY EVENING    losartan (COZAAR) 100 MG tablet TAKE ONE TABLET BY MOUTH ONCE DAILY    naproxen  (NAPROSYN) 500 MG tablet Take 1 tablet (500 mg total) by mouth 2 (two) times daily as needed.    neomycin-polymyxin-hydrocortisone (CORTISPORIN) 3.5-10,000-1 mg/mL-unit/mL-% otic suspension Place 3 drops into the left ear 3 (three) times daily.    nystatin (MYCOSTATIN) powder Apply to affected area 3 times daily    pantoprazole (PROTONIX) 40 MG tablet TAKE ONE TABLET BY MOUTH EVERY DAY    prednisoLONE acetate (PRED FORTE) 1 % DrpS Place 1 drop into both eyes daily as needed (For flare-ups until able to be seen by the Optometrist).    repaglinide (PRANDIN) 1 MG tablet TAKE ONE TABLET BY MOUTH THREE TIMES DAILY BEFORE MEALS (TAKING PLACE OF TRAJENTA)    semaglutide (OZEMPIC) 1 mg/dose (4 mg/3 mL) Inject 1 mg into the skin every 7 days.    sulfaSALAzine (AZULFIDINE) 500 MG EC tablet Take 3 tablets (1,500 mg total) by mouth 2 (two) times daily.    terconazole (TERAZOL 7) 0.4 % Crea Place 1 applicator vaginally every evening.    terconazole (TERAZOL 7) 0.4 % Crea Place 1 applicator vaginally every evening.    terconazole (TERAZOL 7) 0.4 % Crea Place 1 applicator vaginally every evening.    tiZANidine (ZANAFLEX) 2 MG tablet Take 1-2 tablets (2-4 mg total) by mouth every 8 (eight) hours as needed.    topiramate (TOPAMAX) 100 MG tablet Take 100 mg by mouth 2 (two) times daily.    triamcinolone acetonide 0.1% (KENALOG) 0.1 % cream Apply topically 2 (two) times daily.    valACYclovir (VALTREX) 1000 MG tablet valacyclovir 1 gram tablet   Take 0.5 tablets every 12 hours by oral route.    vitamin D (VITAMIN D3) 1000 units Tab Take 1,000 Units by mouth once daily.    zolpidem (AMBIEN) 5 MG Tab Take 1 tablet (5 mg total) by mouth nightly as needed.   Last reviewed on 12/1/2022 11:18 AM by Rubina Yost MA    Review of patient's allergies indicates:   Allergen Reactions    Bactrim [sulfamethoxazole-trimethoprim] Itching    Trulicity [dulaglutide] Diarrhea and Other (See Comments)     Vomiting, abdominal pain    Diflucan  [fluconazole] Swelling and Other (See Comments)     Sore on mouth    Last reviewed on  1/6/2023 9:15 AM by Tip Oliveira      Tasks added this encounter   2/6/2023 - Refill Call (Auto Added)   Tasks due within next 3 months   2/6/2023 - Clinical - Follow Up Assesement (Annual)     [facility]  [address]  [facility] [address]  Phone: [phone]  Fax: [phone]

## 2023-01-09 NOTE — TELEPHONE ENCOUNTER
Care Due:                  Date            Visit Type   Department     Provider  --------------------------------------------------------------------------------                                EP -                              PRIMARY      [facility] INTERNAL  Last Visit: 03-      CARE ([facility])   MEDICINE       Weston Begum                              MYCHART                              ANNUAL                              CHECKUP/PHY  [facility] INTERNAL  Next Visit: 04-      S            MEDICINE       Weston Begum                                                            Last  Test          Frequency    Reason                     Performed    Due Date  --------------------------------------------------------------------------------    HBA1C.......  6 months...  INVOKANA, repaglinide,     06- 12-                             semaglutide..............    Lipid Panel.  12 months..  atorvastatin.............  03- 03-    [facility] Embedded Care Gaps. Reference number: [id number]. 1/09/2023   10:08:50 AM CST

## 2023-01-10 ENCOUNTER — PATIENT MESSAGE (OUTPATIENT)
Dept: PAIN MEDICINE | Facility: CLINIC | Age: 58
End: 2023-01-10

## 2023-01-10 ENCOUNTER — OFFICE VISIT (OUTPATIENT)
Dept: PAIN MEDICINE | Facility: CLINIC | Age: 58
End: 2023-01-10
Payer: COMMERCIAL

## 2023-01-10 DIAGNOSIS — M47.812 CERVICAL SPONDYLOSIS: ICD-10-CM

## 2023-01-10 DIAGNOSIS — M50.30 DDD (DEGENERATIVE DISC DISEASE), CERVICAL: ICD-10-CM

## 2023-01-10 DIAGNOSIS — M25.512 CHRONIC LEFT SHOULDER PAIN: ICD-10-CM

## 2023-01-10 DIAGNOSIS — M54.12 CERVICAL RADICULOPATHY: Primary | ICD-10-CM

## 2023-01-10 DIAGNOSIS — M54.2 CHRONIC NECK PAIN: ICD-10-CM

## 2023-01-10 DIAGNOSIS — G89.29 CHRONIC NECK PAIN: ICD-10-CM

## 2023-01-10 DIAGNOSIS — G89.29 CHRONIC LEFT SHOULDER PAIN: ICD-10-CM

## 2023-01-10 PROCEDURE — 1159F PR MEDICATION LIST DOCUMENTED IN MEDICAL RECORD: ICD-10-PCS | Mod: CPTII,95,, | Performed by: NURSE PRACTITIONER

## 2023-01-10 PROCEDURE — 3072F PR LOW RISK FOR RETINOPATHY: ICD-10-PCS | Mod: CPTII,95,, | Performed by: NURSE PRACTITIONER

## 2023-01-10 PROCEDURE — 99214 PR OFFICE/OUTPT VISIT, EST, LEVL IV, 30-39 MIN: ICD-10-PCS | Mod: 95,,, | Performed by: NURSE PRACTITIONER

## 2023-01-10 PROCEDURE — 1159F MED LIST DOCD IN RCRD: CPT | Mod: CPTII,95,, | Performed by: NURSE PRACTITIONER

## 2023-01-10 PROCEDURE — 3072F LOW RISK FOR RETINOPATHY: CPT | Mod: CPTII,95,, | Performed by: NURSE PRACTITIONER

## 2023-01-10 PROCEDURE — 1160F PR REVIEW ALL MEDS BY PRESCRIBER/CLIN PHARMACIST DOCUMENTED: ICD-10-PCS | Mod: CPTII,95,, | Performed by: NURSE PRACTITIONER

## 2023-01-10 PROCEDURE — 99214 OFFICE O/P EST MOD 30 MIN: CPT | Mod: 95,,, | Performed by: NURSE PRACTITIONER

## 2023-01-10 PROCEDURE — 1160F RVW MEDS BY RX/DR IN RCRD: CPT | Mod: CPTII,95,, | Performed by: NURSE PRACTITIONER

## 2023-01-10 NOTE — PROGRESS NOTES
"[facility] Established  Clinic  telemedicine Encounter    Telemedicine Bundle:  This visit was completed during the Coronavirus Crisis to enhance patient safety.  The patient location is: patient's home  The chief complaint leading to consultation is: Neck Pain and Arm Pain (Left )  Visit type: Virtual visit with synchronous audio and video  Total time spent with patient:   Each patient to whom he or she provides medical services by telemedicine is:    (1) informed of the relationship between the physician and patient and the respective role of any other health care provider with respect to management of the patient  (2) notified that he or she may decline to receive medical services by telemedicine and may withdraw from such care at any time.      Chief Complaint:   Chief Complaint   Patient presents with    Neck Pain    Arm Pain     Left          History of Present Illness: Althea Vazquez is a 57 y.o. female here for cervical radiculopathy.      Onset: several years, no specific inciting event  Location: neck, in the midline and towards the left side  Radiation: left hand/arm  Timing: intermittent  Quality: Aching and Dull  Exacerbating Factors: lifting  Alleviating Factors: hot shower  Associated Symptoms: She feels weakness and numbness in the left arm/hand. Denies night fever/night sweats, urinary incontinence, bowel incontinence and significant weight loss    Severity: Currently: 5/10   Typical Range: 0-8/10     Exacerbation: 8/10     She also notes low back pain. Low back Pain has been present off and on for a few years, but worsened last week. Pain is localized to the bilateral low back (worse on the left) with occasional radiation down the left leg, but none currently. Pain is described as a "hurting pain". The pain is aggravated by spinning/turning. The pain is alleviated by warm bath, massage. She feels weakness in both legs. She denies numbness in her legs or feet. Denies changes in " bowel or bladder function. Denies saddle anesthesia. Denies recent fevers or infections. Denies significant weight loss    Interval History  1/10/2023- 58 y/o female that presents virtually she is complaining neck and left arm pain that radiates into her hands and fingers.  She feels weakness in her left hand she does get numbness in her left hand as well.  She is a former patient of Dr. Jerome she has previously been provided a cervical SHARONA targeting C7-T1 on 02/17/2021 which provide her 80% relief she is requesting a repeat injection.  She denies any profound weakness, denies any bowel bladder dysfunction, denies the inability to write with a pen.       Previous Interventions:  -04/21/2021 Left  sacroiliac joint injection and  Left  greater trochanteric bursa injection -average relief with 70%  - 2/17/21: C7-T1 IL SHARONA w/ 80% relief    Previous Therapies:  PT/OT: yes for neck and back, helps, and she continues to do her HEP as much as possible.   Relevant Surgery: Right knee arthroscopy  Previous Medications:   - NSAIDS: Naproxen.  Ibuprofen did not help.  Sulindac.  - Muscle Relaxants: Flexeril. Tizanidine   - TCAs:   - SNRIs:   - Topicals:   - Anticonvulsants:    - Opioids: Norco. Oxycodone. (wants to avoid addicting medications)    Current Pain Medications:  Flexeril 10mg  Naproxen 500mg  Tizanidine 2mg     Blood Thinners: Aspirin 81mg    Full Medication List:    Current Outpatient Medications:     amLODIPine (NORVASC) 10 MG tablet, TAKE ONE TABLET BY MOUTH DAILY, Disp: 90 tablet, Rfl: 3    aspirin (ECOTRIN) 81 MG EC tablet, Take 1 tablet (81 mg total) by mouth once daily., Disp: 30 tablet, Rfl: 0    atenoloL (TENORMIN) 100 MG tablet, TAKE ONE TABLET BY MOUTH DAILY, Disp: 90 tablet, Rfl: 3    atorvastatin (LIPITOR) 40 MG tablet, TAKE ONE TABLET BY MOUTH DAILY, Disp: 90 tablet, Rfl: 3    blood-glucose meter kit, Use twice daily., Disp: 1 each, Rfl: 0    clonazePAM (KLONOPIN) 0.5 MG tablet, TAKE 1 TABLET BY MOUTH  ONCE DAILY AS NEEDED FOR ANXIETY, Disp: 90 tablet, Rfl: 0    clotrimazole-betamethasone 1-0.05% (LOTRISONE) cream, Apply topically 2 (two) times daily as needed. , Disp: , Rfl:     cyclobenzaprine (FLEXERIL) 10 MG tablet, TAKE ONE TABLET BY MOUTH AT BEDTIME AS NEEDED, Disp: 90 tablet, Rfl: 2    diclofenac sodium (VOLTAREN) 1 % Gel, Apply 4 g topically 4 (four) times daily. Apply light film topically to knee up to four times daily, Disp: 3 Tube, Rfl: 2    docusate sodium (COLACE) 50 MG capsule, Take 1 capsule (50 mg total) by mouth 2 (two) times daily as needed for Constipation. (Patient taking differently: Take 50 mg by mouth 2 (two) times daily.), Disp: 30 capsule, Rfl: 0    entecavir (BARACLUDE) 0.5 MG Tab, Take 1 tablet (0.5 mg total) by mouth once daily., Disp: 90 tablet, Rfl: 3    ergocalciferol (ERGOCALCIFEROL) 50,000 unit Cap, Take 1 capsule (50,000 Units total) by mouth every 7 days., Disp: 12 capsule, Rfl: 3    fluocinonide 0.05% (LIDEX) 0.05 % cream, Apply topically 2 (two) times daily. To allergic rash, Disp: 30 g, Rfl: 0    FLUoxetine 20 MG capsule, Take 1 capsule (20 mg total) by mouth 2 (two) times daily., Disp: 180 capsule, Rfl: 1    fluticasone propionate (FLONASE) 50 mcg/actuation nasal spray, SPRAY 2 SPRAYS INTO EACH NOSTRIL ONCE A DAY, Disp: , Rfl:     fluticasone propionate (FLONASE) 50 mcg/actuation nasal spray, 2 sprays (100 mcg total) by Each Nostril route once daily., Disp: 18.2 mL, Rfl: 6    FREESTYLE LITE STRIPS Strp, test TWICE DAILY, Disp: 200 each, Rfl: 3    golimumab (SIMPONI) 50 mg/0.5 mL PnIj, Inject 50 mg into the skin every 30 days., Disp: 1.5 mL, Rfl: 1    hydroCHLOROthiazide (HYDRODIURIL) 25 MG tablet, TAKE ONE TABLET BY MOUTH DAILY, Disp: 90 tablet, Rfl: 3    hydrocortisone 2.5 % cream, Apply topically 2 (two) times daily. for 10 days, Disp: 28 g, Rfl: 1    hydroquinone 4 % Crea, Apply to dark areas qhs.  Not more than 6 months straight in same location.Use sunscreen in am, Disp:  46 g, Rfl: 1    INVOKANA 300 mg Tab tablet, TAKE ONE TABLET BY MOUTH BEFORE BREAKFAST, Disp: 90 tablet, Rfl: 1    lancets (FREESTYLE LANCETS) 28 gauge Misc, test TWICE DAILY, Disp: 200 each, Rfl: 3    levocetirizine (XYZAL) 5 MG tablet, TAKE ONE TABLET BY MOUTH EVERY EVENING, Disp: 90 tablet, Rfl: 2    losartan (COZAAR) 100 MG tablet, TAKE ONE TABLET BY MOUTH ONCE DAILY, Disp: 90 tablet, Rfl: 3    naproxen (NAPROSYN) 500 MG tablet, Take 1 tablet (500 mg total) by mouth 2 (two) times daily as needed., Disp: 180 tablet, Rfl: 0    neomycin-polymyxin-hydrocortisone (CORTISPORIN) 3.5-10,000-1 mg/mL-unit/mL-% otic suspension, Place 3 drops into the left ear 3 (three) times daily., Disp: 10 mL, Rfl: 0    nystatin (MYCOSTATIN) powder, Apply to affected area 3 times daily, Disp: 1 Bottle, Rfl: 3    pantoprazole (PROTONIX) 40 MG tablet, TAKE ONE TABLET BY MOUTH EVERY DAY, Disp: 90 tablet, Rfl: 2    prednisoLONE acetate (PRED FORTE) 1 % DrpS, Place 1 drop into both eyes daily as needed (For flare-ups until able to be seen by the Optometrist)., Disp: 5 mL, Rfl: 1    repaglinide (PRANDIN) 1 MG tablet, TAKE ONE TABLET BY MOUTH THREE TIMES DAILY BEFORE MEALS (TAKING PLACE OF TRAJENTA), Disp: 270 tablet, Rfl: 0    semaglutide (OZEMPIC) 1 mg/dose (4 mg/3 mL), Inject 1 mg into the skin every 7 days., Disp: 1 pen, Rfl: 11    sulfaSALAzine (AZULFIDINE) 500 MG EC tablet, Take 3 tablets (1,500 mg total) by mouth 2 (two) times daily., Disp: 540 tablet, Rfl: 0    terconazole (TERAZOL 7) 0.4 % Crea, Place 1 applicator vaginally every evening., Disp: 45 g, Rfl: 2    terconazole (TERAZOL 7) 0.4 % Crea, Place 1 applicator vaginally every evening., Disp: 7 g, Rfl: 2    terconazole (TERAZOL 7) 0.4 % Crea, Place 1 applicator vaginally every evening., Disp: 7 g, Rfl: 0    tiZANidine (ZANAFLEX) 2 MG tablet, Take 1-2 tablets (2-4 mg total) by mouth every 8 (eight) hours as needed., Disp: 90 tablet, Rfl: 2    topiramate (TOPAMAX) 100 MG tablet, Take  100 mg by mouth 2 (two) times daily., Disp: , Rfl:     triamcinolone acetonide 0.1% (KENALOG) 0.1 % cream, Apply topically 2 (two) times daily., Disp: 45 g, Rfl: 1    valACYclovir (VALTREX) 1000 MG tablet, valacyclovir 1 gram tablet  Take 0.5 tablets every 12 hours by oral route., Disp: , Rfl:     vitamin D (VITAMIN D3) 1000 units Tab, Take 1,000 Units by mouth once daily., Disp: , Rfl:     zolpidem (AMBIEN) 5 MG Tab, Take 1 tablet (5 mg total) by mouth nightly as needed., Disp: 90 tablet, Rfl: 0  No current facility-administered medications for this visit.    Facility-Administered Medications Ordered in Other Visits:     0.9%  NaCl infusion, , Intravenous, Continuous, Graciela Jerome MD     Review of Systems:  Review of Systems   Constitutional:  Negative for fever and weight loss.   HENT:  Negative for ear pain and tinnitus.    Eyes:  Positive for redness. Negative for pain.   Respiratory:  Negative for cough and shortness of breath.    Cardiovascular:  Negative for chest pain and palpitations.   Gastrointestinal:  Negative for constipation and heartburn.   Genitourinary: Negative.         Denies urinary incontinence. Denies urine retention.    Musculoskeletal:  Positive for back pain, myalgias and neck pain.   Skin:  Negative for itching and rash.   Neurological:  Positive for tingling and weakness. Negative for seizures.   Endo/Heme/Allergies:  Negative for environmental allergies. Does not bruise/bleed easily.   Psychiatric/Behavioral:  Negative for depression. The patient has insomnia. The patient is not nervous/anxious.      Allergies:  Bactrim [sulfamethoxazole-trimethoprim], Trulicity [dulaglutide], and Diflucan [fluconazole]     Medical History:   has a past medical history of Acid reflux, Anxiety (10/18/2012), Arthritis, Depression, Diabetic peripheral neuropathy - mild (10/21/2014), Difficult intubation, Dry eyes, Dry mouth, Fever blister, History of hepatitis B (10/3/2016), Hyperlipidemia, Hypertension,  Insomnia, Iritis (5/13/2014), Long-term current use of steroids (9/27/2012), Nausea & vomiting (2/4/2015), VAISHNAVI (obstructive sleep apnea), and Type II diabetes mellitus (10/1/2012).    Surgical History:   has a past surgical history that includes Tubal ligation; Knee arthroscopy (5-14-14); Hysterectomy (12/3/2014); Colonoscopy (N/A, 1/9/2018); Epidural steroid injection into cervical spine (N/A, 2/17/2021); Injection of anesthetic agent into sacroiliac joint (Left, 4/21/2021); and Injection of joint (Left, 4/21/2021).    Family History:  family history includes ADD / ADHD in her son; Cancer in her paternal grandfather; Cataracts in her mother; Colon cancer in her father; Depression in her mother; Diabetes in her maternal aunt and mother; Heart attack in her mother and paternal grandmother; Hyperlipidemia in her father; Hypertension in her father; No Known Problems in her brother, maternal grandfather, maternal grandmother, maternal uncle, paternal aunt, paternal uncle, and sister; Stroke in her father and mother.    Social History:   reports that she has never smoked. She has never used smokeless tobacco. She reports that she does not currently use alcohol. She reports that she does not use drugs.    Physical Exam:  LMP 11/25/2014   GEN: No acute distress. Calm, comfortable  HENT: Normocephalic, atraumatic, moist mucous membranes  EYE: Anicteric sclera, non-injected.   CV: Non-diaphoretic.   RESP: Breathing comfortably. Chest expansion symmetric.  PSYCH: Pleasant mood and appropriate affect. Recent and remote memory intact.       Imaging:  -MRI Cervical Spine Without Contrast 11/16/2020  FINDINGS:  Vertebral body height is normal and alignment is maintained. Marrow signal is within normal limits. The craniocervical junction is unremarkable. No abnormal cord signal or cord deformity.  T2 hyperintense, likely cystic focus within the left aspect of the thyroid gland is unchanged.  Intervertebral disc levels are as  follows:  C2-C3 disc: No significant disc pathology. Normal facet joints. No spinal canal or neural foraminal stenosis.  C3-C4 disc: Posterior disc bulge with a posterior annular fissure that is new compared to prior.  Normal uncovertebral joints and facet joints.  The thecal sac measures 9 mm AP.  Previously the thecal sac measured 10 mm AP.  C4-C5 disc: Disc space height loss with a posterior disc bulge.  Anterior osteophytes.  Normal vertebral joints and facet joints.  No significant stenosis.  The thecal sac measures 12 mm AP.  These findings are similar to prior.  C5-C6 disc: Posterior disc bulge.  Normal uncovertebral joints and facet joints.  The thecal sac measures 11 mm AP.  No significant foraminal stenosis.  Previously the thecal sac measured 11 mm AP at this level as well.  C6-C7 disc: Posterior disc bulge with a posterior annular fissure.  This is new compared to the prior study.  Normal uncovertebral joints and facet joints.  The thecal sac measures 11 mm AP.  No significant foraminal stenosis.  Previously the thecal sac measured 13 mm.  C7-T1 disc: Posterior disc bulge.  Normal facet joints.  The thecal sac measures 12 mm AP.  No significant foraminal stenosis.  These findings are similar to prior.  Impression:  1. There are new posterior disc bulges with annular fissures at C3-C4 and C6-C7.  There is now mild spinal stenosis at C3-C4.  2. No significant foraminal stenosis.    - MRI SIJ w/ & w/o contrast 9/11/16:  There are enhancing inflammatory changes about the anterior superior aspect of the left SI joint in the expected location of the medial lumbar or lumbosacral ligament, findings that are highly suggestive of enthesitis.  There is apparent trace enhancement on the posterior superior aspect of the right SI joint leads is overall nonspecific.  SI joints are symmetric.  No synovitis.  No joint effusions.  No osseous erosive changes.  Visualized musculature demonstrate normal bulk and signal  intensity.  Piriformis muscles are symmetric.  Ischiofemoral spaces are unremarkable.  Adductor and abductor tendons are unremarkable.  Hamstring tendons are normal.  Subcutaneous soft tissues are normal.  Limited evaluation of the lower abdominal and pelvic organs is unremarkable.  Uterus is surgically absent    - MRI Spine (Lumbar, Thoracic, Cervical) w/ & w/o contrast 9/11/16:  CERVICAL SPINE:  There is modest straightening of the normal cervical lordosis.  No spondylolisthesis.  Vertebral body heights are well-maintained without evidence for fracture.  Visualized osseous structures demonstrate intact pars signal intensity without findings to suggest an infiltrative process.  Cervical spinal cord demonstrates normal contour and signal intensity.  Cerebellar tonsils are in their expected location.  Cervicomedullary junction is unremarkable.  Postcontrast images demonstrate no abnormal enhancement.  Prevertebral soft tissues are normal.  Vertebral artery flow voids are present.  T2 hyperintense nodules are noted within the bilateral thyroid lobes.  The spinal musculature and trace normal bulk and signal intensity.  supraspinous ligament is unremarkable without findings to suggest enthesitis.  C2-C3: No spinal canal stenosis or neural foraminal narrowing.  C3-C4: There is mild bilateral facet arthropathy and uncovertebral joint spurring and mild bilateral neural foraminal narrowing.  No spinal canal stenosis.  C4-C5: No spinal canal stenosis or neural foraminal narrowing.  C5-C6: There is a moderate broad-based posterior disc ossified complex that partially effaces the anterior thecal sac.  There is mild left-sided uncovertebral joint spurring.  Findings contribute to mild spinal canal stenosis and mild left-sided neural foraminal narrowing.  C6-C7: No spinal canal stenosis or neural foraminal narrowing.  C7-T1: No spinal canal stenosis or neural foraminal narrowing.    THORACIC SPINE:  Thoracic spine alignment is  normal.  No spondylolisthesis.  Vertebral body heights are well-maintained without evidence for fracture.  There is enhancing inflammation of the left side zygapophyseal/facet joint at T5-T6.  Remaining visualized osseous structures demonstrate intact menisci and in density without findings to suggest an infiltrative process.  Note is made of the anterior vertebral body at this demonstrate intact signal intensity without findings to suggest spondylitis.  Visualized spinal cord demonstrates normal contour and signal intensity.  Postcontrast images demonstrate normal enhancement.  Visualized thoracic and upper abdominal organs are normal.  There is a low signal described focal area of signal abnormality within the posterior subcutaneous soft tissues and appears to demonstrate subtle enhancement and is favored to be benign though nonspecific.  No significant intervertebral disc abnormalities.  No spinal canal stenosis or neural foramina narrowing.    LUMBAR SPINE:  Lumbar spine alignment is normal.  No spondylolysis or spondylolisthesis.  Vertebral body heights are well-maintained without evidence for fracture.  Visualized osseous structures demonstrate intact pars signal intensity without findings to suggest an infiltrative process.  Visualized distal spinal cord demonstrates normal contour and signal intensity.  Cauda equina is normal mild without findings to suggest arachnoiditis.  Postcontrast images demonstrate normal enhancement.  There is a subcentimeter T2 hyperintense lesion within the right renal cortex, too small to accurately guided thighs.  Paraspinal musculature demonstrates normal bulk and signal intensity.  Subcutaneous soft tissues are within normal limits.  L1-L2: No significant intervertebral disc abnormalities.  Facet joints are well maintained.  No spinal canal stenosis or neural foraminal narrowing.  L2-L3: There is mild intervertebral disc desiccation.  Facet joints are well maintained.  No  spinal canal stenosis or neural foraminal narrowing.  L3-L4: There is mild intervertebral disc desiccation.  Facet joints are well maintained.  No spinal canal stenosis or neural foraminal narrowing.  L4-L5: There is mild intervertebral disc space narrowing and desiccation with small circumferential bulge.  There is mild bilateral facet arthropathy.  No spinal canal stenosis or neural foraminal narrowing.  L5-S1: There is mild intervertebral disc space narrowing desiccation with a small circumferential bulge and a small posterior annular fissure.  Findings contribute to mild left-sided neural foraminal narrowing. There is mild bilateral facet arthropathy.  No spinal canal stenosis.    IMPRESSION:   Enhancing inflammatory changes involving the left zygapophyseal/facet joint at T5-T6 that is nonspecific but can be seen with inflammatory arthropathies. No MR imaging finding to suggest enthesitis, spondylitis or spondylodiskitis.  Mild cervical and lumbar degenerative changes without significant spinal canal stenosis.  Cystic subcutaneous soft tissues within the posterior back, favored to be benign, possibly a complex sebaceous cyst.  Consider follow-up examination in 12 months      Labs:  BMP  Lab Results   Component Value Date     10/28/2022    K 3.7 10/28/2022     10/28/2022    CO2 28 10/28/2022    BUN 13 10/28/2022    CREATININE 0.58 10/28/2022    CALCIUM 9.0 10/28/2022    ANIONGAP 9 10/28/2022    ESTGFRAFRICA >60.0 06/17/2022    ESTGFRAFRICA >60.0 06/17/2022    EGFRNONAA >60.0 06/17/2022    EGFRNONAA >60.0 06/17/2022     Lab Results   Component Value Date    ALT 24 10/28/2022    AST 23 10/28/2022     (H) 08/18/2012    ALKPHOS 90 10/28/2022    BILITOT 0.4 10/28/2022     Lab Results   Component Value Date     10/28/2022       Assessment:  Althea Vazquez is a 57 y.o. female with the following diagnoses based on history, exam, and imaging:    Problem List Items Addressed This Visit           Neuro    Cervical radiculopathy    Relevant Orders    Ambulatory referral/consult to Physical/Occupational Therapy     Other Visit Diagnoses       Chronic left shoulder pain    -  Primary    Relevant Orders    Ambulatory referral/consult to Physical/Occupational Therapy    Chronic neck pain        Relevant Orders    Ambulatory referral/consult to Physical/Occupational Therapy    DDD (degenerative disc disease), cervical                  This is a pleasant 57 y.o. lady presenting with:     - Chronic neck pain with left cervical radiculopathy:  Posterior disc bulge with annular fissure at C6-7 which may be the cause.  She also has myofascial pain on exam.  - Chronic left shoulder pain with impingement signs on exam  - Chronic bilateral low back pain (worse on the left): She follows with rheumatology, Dr. Arvizu, and has been diagnosed with axial spondyloarthritis.  Her back pain appears multifactorial.  She localizes most of her pain over her left sacroiliac joint with evidence of inflammatory changes about the left SIJ on prior MRI, but she also has positive facet provocative maneuvers and on her previous lumbar MRI there is evidence of facet arthropathy as well, which does appears slightly worse to me on the left at L5-S1.  - left trochanteric bursitis  - Comorbidities:  Axial spondyloarthritis.  Anxiety and depression.  Type 2 diabetes mellitus.  BMI greater than 30.  Insomnia.  Obstructive sleep apnea.  History of long-term steroid use.      1/10/2023- 56 y/o female with chronic neck and  left cervical radiculopathy: MRI shows posterior discu    Treatment Plan:   - PT/OT/HEP:  Refer to PT today   - Procedures:  s/f a C7-T1 IL SHARONA  - Consider MBB vs FJI if no relief with SIJ injection  - Medications: No changes recommended at this time.  - Imaging: Reviewed.  - Labs: Reviewed.  Medications are appropriately dosed for current hepatorenal function.    Follow Up: RTC in 2-3 weeks jazmyn Osborne  NP-C  [facility]        Disclaimer: This note was partly generated using dictation software which may occasionally result in transcription errors.

## 2023-01-12 ENCOUNTER — OFFICE VISIT (OUTPATIENT)
Dept: DERMATOLOGY | Facility: CLINIC | Age: 58
End: 2023-01-12
Payer: COMMERCIAL

## 2023-01-12 DIAGNOSIS — L40.9 PSORIASIS: Primary | ICD-10-CM

## 2023-01-12 DIAGNOSIS — R22.9 SUBCUTANEOUS NODULE: ICD-10-CM

## 2023-01-12 PROCEDURE — 99214 PR OFFICE/OUTPT VISIT, EST, LEVL IV, 30-39 MIN: ICD-10-PCS | Mod: S$GLB,,, | Performed by: STUDENT IN AN ORGANIZED HEALTH CARE EDUCATION/TRAINING PROGRAM

## 2023-01-12 PROCEDURE — 3072F LOW RISK FOR RETINOPATHY: CPT | Mod: CPTII,S$GLB,, | Performed by: STUDENT IN AN ORGANIZED HEALTH CARE EDUCATION/TRAINING PROGRAM

## 2023-01-12 PROCEDURE — 1159F MED LIST DOCD IN RCRD: CPT | Mod: CPTII,S$GLB,, | Performed by: STUDENT IN AN ORGANIZED HEALTH CARE EDUCATION/TRAINING PROGRAM

## 2023-01-12 PROCEDURE — 1160F RVW MEDS BY RX/DR IN RCRD: CPT | Mod: CPTII,S$GLB,, | Performed by: STUDENT IN AN ORGANIZED HEALTH CARE EDUCATION/TRAINING PROGRAM

## 2023-01-12 PROCEDURE — 99999 PR PBB SHADOW E&M-EST. PATIENT-LVL IV: CPT | Mod: PBBFAC,,, | Performed by: STUDENT IN AN ORGANIZED HEALTH CARE EDUCATION/TRAINING PROGRAM

## 2023-01-12 PROCEDURE — 99214 OFFICE O/P EST MOD 30 MIN: CPT | Mod: S$GLB,,, | Performed by: STUDENT IN AN ORGANIZED HEALTH CARE EDUCATION/TRAINING PROGRAM

## 2023-01-12 PROCEDURE — 1159F PR MEDICATION LIST DOCUMENTED IN MEDICAL RECORD: ICD-10-PCS | Mod: CPTII,S$GLB,, | Performed by: STUDENT IN AN ORGANIZED HEALTH CARE EDUCATION/TRAINING PROGRAM

## 2023-01-12 PROCEDURE — 1160F PR REVIEW ALL MEDS BY PRESCRIBER/CLIN PHARMACIST DOCUMENTED: ICD-10-PCS | Mod: CPTII,S$GLB,, | Performed by: STUDENT IN AN ORGANIZED HEALTH CARE EDUCATION/TRAINING PROGRAM

## 2023-01-12 PROCEDURE — 3072F PR LOW RISK FOR RETINOPATHY: ICD-10-PCS | Mod: CPTII,S$GLB,, | Performed by: STUDENT IN AN ORGANIZED HEALTH CARE EDUCATION/TRAINING PROGRAM

## 2023-01-12 PROCEDURE — 99999 PR PBB SHADOW E&M-EST. PATIENT-LVL IV: ICD-10-PCS | Mod: PBBFAC,,, | Performed by: STUDENT IN AN ORGANIZED HEALTH CARE EDUCATION/TRAINING PROGRAM

## 2023-01-12 RX ORDER — KETOCONAZOLE 20 MG/ML
SHAMPOO, SUSPENSION TOPICAL WEEKLY
Qty: 120 ML | Refills: 12 | Status: SHIPPED | OUTPATIENT
Start: 2023-01-12 | End: 2023-11-09

## 2023-01-12 RX ORDER — CLOBETASOL PROPIONATE 0.46 MG/ML
SOLUTION TOPICAL DAILY
Qty: 50 ML | Refills: 3 | Status: SHIPPED | OUTPATIENT
Start: 2023-01-12 | End: 2023-11-09

## 2023-01-12 NOTE — PROGRESS NOTES
Subjective:       Patient ID:  Althea Vazquez is a 57 y.o. female who presents for   Chief Complaint   Patient presents with    Psoriasis     Psoriasis flare up along hairline. No Tx.     Cyst     Subcutaneus cyst that was removed has reappeared.      History of Present Illness: The patient presents with chief complaint of dandruff, possible scalp psoriasi.  Location: throughout the scalp and onto the top areas of the forehead  Duration: ongoing for months  Signs/Symptoms: thick, flaking, itching and noticeable rash on the forehead  Prior treatments: none      Psoriasis    Cyst      Review of Systems   Constitutional:  Negative for fever and chills.      Objective:    Physical Exam   Constitutional: She appears well-developed and well-nourished. No distress.   Neurological: She is alert and oriented to person, place, and time. She is not disoriented.   Psychiatric: She has a normal mood and affect.   Skin:   Areas Examined (abnormalities noted in diagram):   Scalp / Hair Palpated and Inspected  Head / Face Inspection Performed  Neck Inspection Performed  RUE Inspected  LUE Inspection Performed  RLE Inspected  LLE Inspection Performed            Diagram Legend     Erythematous scaling macule/papule c/w actinic keratosis       Vascular papule c/w angioma      Pigmented verrucoid papule/plaque c/w seborrheic keratosis      Yellow umbilicated papule c/w sebaceous hyperplasia      Irregularly shaped tan macule c/w lentigo     1-2 mm smooth white papules consistent with Milia      Movable subcutaneous cyst with punctum c/w epidermal inclusion cyst      Subcutaneous movable cyst c/w pilar cyst      Firm pink to brown papule c/w dermatofibroma      Pedunculated fleshy papule(s) c/w skin tag(s)      Evenly pigmented macule c/w junctional nevus     Mildly variegated pigmented, slightly irregular-bordered macule c/w mildly atypical nevus      Flesh colored to evenly pigmented papule c/w intradermal nevus        Pink pearly papule/plaque c/w basal cell carcinoma      Erythematous hyperkeratotic cursted plaque c/w SCC      Surgical scar with no sign of skin cancer recurrence      Open and closed comedones      Inflammatory papules and pustules      Verrucoid papule consistent consistent with wart     Erythematous eczematous patches and plaques     Dystrophic onycholytic nail with subungual debris c/w onychomycosis     Umbilicated papule    Erythematous-base heme-crusted tan verrucoid plaque consistent with inflamed seborrheic keratosis     Erythematous Silvery Scaling Plaque c/w Psoriasis     See annotation      Assessment / Plan:        Seborrheic dermatitis/Psoriasis - scalp. Some extension of eczematous appearing plaque extending down from the frontal hairline onto the forehead.   -     clobetasoL (TEMOVATE) 0.05 % external solution; Apply topically once daily.  Dispense: 50 mL; Refill: 3  -     ketoconazole (NIZORAL) 2 % shampoo; Apply topically once a week.  Dispense: 120 mL; Refill: 12    Subcutaneous nodule  Reassurance given to patient. No treatment is necessary.   Discussed treatment options - excision vs observation. Cysts may recur with excision. Pt will defer treatment at this time.           Follow up in about 3 months (around 4/12/2023).

## 2023-01-13 ENCOUNTER — TELEPHONE (OUTPATIENT)
Dept: PAIN MEDICINE | Facility: CLINIC | Age: 58
End: 2023-01-13
Payer: COMMERCIAL

## 2023-01-13 DIAGNOSIS — M54.12 CERVICAL RADICULOPATHY: Primary | ICD-10-CM

## 2023-01-19 ENCOUNTER — SPECIALTY PHARMACY (OUTPATIENT)
Dept: PHARMACY | Facility: CLINIC | Age: 58
End: 2023-01-19
Payer: COMMERCIAL

## 2023-01-19 NOTE — TELEPHONE ENCOUNTER
1/19 [facility]  Refill attempt for entecavir, RTS to schedule at this time. Patient fills for 90 day supply. Next fill date 2/11. [facility] will follow up on 2/5.

## 2023-01-23 ENCOUNTER — TELEPHONE (OUTPATIENT)
Dept: RHEUMATOLOGY | Facility: CLINIC | Age: 58
End: 2023-01-23
Payer: COMMERCIAL

## 2023-01-23 ENCOUNTER — LAB VISIT (OUTPATIENT)
Dept: LAB | Facility: HOSPITAL | Age: 58
End: 2023-01-23
Attending: INTERNAL MEDICINE
Payer: COMMERCIAL

## 2023-01-23 DIAGNOSIS — M47.819 SPONDYLOARTHRITIS: ICD-10-CM

## 2023-01-23 DIAGNOSIS — Z79.899 HIGH RISK MEDICATION USE: ICD-10-CM

## 2023-01-23 DIAGNOSIS — M46.90 AXIAL SPONDYLOARTHRITIS: Chronic | ICD-10-CM

## 2023-01-23 DIAGNOSIS — Z86.19 HISTORY OF HEPATITIS B: ICD-10-CM

## 2023-01-23 LAB
ALBUMIN SERPL BCP-MCNC: 5.1 G/DL (ref 3.5–5.2)
ALP SERPL-CCNC: 89 U/L (ref 38–126)
ALT SERPL W/O P-5'-P-CCNC: 22 U/L (ref 10–44)
ANION GAP SERPL CALC-SCNC: 6 MMOL/L (ref 8–16)
AST SERPL-CCNC: 26 U/L (ref 15–46)
BASOPHILS # BLD AUTO: 0.04 K/UL (ref 0–0.2)
BASOPHILS NFR BLD: 0.7 % (ref 0–1.9)
BILIRUB SERPL-MCNC: 0.4 MG/DL (ref 0.1–1)
CALCIUM SERPL-MCNC: 9 MG/DL (ref 8.7–10.5)
CHLORIDE SERPL-SCNC: 104 MMOL/L (ref 95–110)
CO2 SERPL-SCNC: 30 MMOL/L (ref 23–29)
CREAT SERPL-MCNC: 0.58 MG/DL (ref 0.5–1.4)
CRP SERPL-MCNC: 0.55 MG/DL (ref 0–1)
DIFFERENTIAL METHOD: ABNORMAL
EOSINOPHIL # BLD AUTO: 0.1 K/UL (ref 0–0.5)
EOSINOPHIL NFR BLD: 2.3 % (ref 0–8)
ERYTHROCYTE [DISTWIDTH] IN BLOOD BY AUTOMATED COUNT: 12.8 % (ref 11.5–14.5)
ERYTHROCYTE [SEDIMENTATION RATE] IN BLOOD BY PHOTOMETRIC METHOD: 4 MM/HR (ref 0–36)
EST. GFR  (NO RACE VARIABLE): >60 ML/MIN/1.73 M^2
GLUCOSE SERPL-MCNC: 92 MG/DL (ref 70–110)
HCT VFR BLD AUTO: 44.6 % (ref 37–48.5)
HGB BLD-MCNC: 14.9 G/DL (ref 12–16)
IMM GRANULOCYTES # BLD AUTO: 0.02 K/UL (ref 0–0.04)
IMM GRANULOCYTES NFR BLD AUTO: 0.3 % (ref 0–0.5)
LYMPHOCYTES # BLD AUTO: 3.5 K/UL (ref 1–4.8)
LYMPHOCYTES NFR BLD: 57.9 % (ref 18–48)
MCH RBC QN AUTO: 29.9 PG (ref 27–31)
MCHC RBC AUTO-ENTMCNC: 33.4 G/DL (ref 32–36)
MCV RBC AUTO: 90 FL (ref 82–98)
MONOCYTES # BLD AUTO: 0.6 K/UL (ref 0.3–1)
MONOCYTES NFR BLD: 9.7 % (ref 4–15)
NEUTROPHILS # BLD AUTO: 1.8 K/UL (ref 1.8–7.7)
NEUTROPHILS NFR BLD: 29.1 % (ref 38–73)
NRBC BLD-RTO: 0 /100 WBC
PLATELET # BLD AUTO: 238 K/UL (ref 150–450)
PMV BLD AUTO: 11 FL (ref 9.2–12.9)
POTASSIUM SERPL-SCNC: 3.9 MMOL/L (ref 3.5–5.1)
PROT SERPL-MCNC: 7.8 G/DL (ref 6–8.4)
RBC # BLD AUTO: 4.98 M/UL (ref 4–5.4)
SODIUM SERPL-SCNC: 140 MMOL/L (ref 136–145)
UUN UR-MCNC: 11 MG/DL (ref 7–17)
WBC # BLD AUTO: 6.01 K/UL (ref 3.9–12.7)

## 2023-01-23 PROCEDURE — 87517 HEPATITIS B DNA QUANT: CPT | Mod: PO | Performed by: INTERNAL MEDICINE

## 2023-01-23 PROCEDURE — 80053 COMPREHEN METABOLIC PANEL: CPT | Mod: PO | Performed by: INTERNAL MEDICINE

## 2023-01-23 PROCEDURE — 85652 RBC SED RATE AUTOMATED: CPT | Mod: PO | Performed by: INTERNAL MEDICINE

## 2023-01-23 PROCEDURE — 85025 COMPLETE CBC W/AUTO DIFF WBC: CPT | Mod: PO | Performed by: INTERNAL MEDICINE

## 2023-01-23 PROCEDURE — 86140 C-REACTIVE PROTEIN: CPT | Mod: PO | Performed by: INTERNAL MEDICINE

## 2023-01-23 PROCEDURE — 36415 COLL VENOUS BLD VENIPUNCTURE: CPT | Mod: PO | Performed by: INTERNAL MEDICINE

## 2023-01-23 RX ORDER — GOLIMUMAB 50 MG/.5ML
50 INJECTION, SOLUTION SUBCUTANEOUS
Qty: 1.5 ML | Refills: 1 | Status: SHIPPED | OUTPATIENT
Start: 2023-01-23 | End: 2023-02-15 | Stop reason: SDUPTHER

## 2023-01-23 RX ORDER — GOLIMUMAB 50 MG/.5ML
50 INJECTION, SOLUTION SUBCUTANEOUS
Qty: 1.5 ML | Refills: 1 | OUTPATIENT
Start: 2023-01-23 | End: 2023-01-23 | Stop reason: SDUPTHER

## 2023-02-02 ENCOUNTER — PATIENT MESSAGE (OUTPATIENT)
Dept: PHARMACY | Facility: CLINIC | Age: 58
End: 2023-02-02
Payer: COMMERCIAL

## 2023-02-06 ENCOUNTER — SPECIALTY PHARMACY (OUTPATIENT)
Dept: PHARMACY | Facility: CLINIC | Age: 58
End: 2023-02-06
Payer: COMMERCIAL

## 2023-02-06 DIAGNOSIS — M45.A0 NON-RADIOGRAPHIC AXIAL SPONDYLOARTHRITIS: Primary | ICD-10-CM

## 2023-02-06 NOTE — TELEPHONE ENCOUNTER
Specialty Pharmacy - Medication/Referral Authorization  Specialty Pharmacy - Refill Coordination  Specialty Pharmacy - Clinical Reassessment    Specialty Medication Orders Linked to Encounter      Flowsheet Row Most Recent Value   Medication #1 golimumab (SIMPONI) 50 mg/0.5 mL PnIj (Order#[id number], Rx#[id number])          Patient Diagnosis   M45.A0 - Non-radiographic axial spondyloarthritis    Althea Vazquez is a 57 y.o. female, who is followed by the specialty pharmacy service for management and education of her Simponi.  She has been on therapy with Simponi for 9 months.  I have reviewed her electronic medical record and current medication list and determined that specialty medication adjustment Is needed at this time.    Patient has not experienced adverse events.  She Is adherent reporting 0 missed doses since last review.  Adherence has been encouraged with the following mechanism(s): Proactive refills.  She is meeting goals of therapy and will continue treatment.        2/6/2023 1/9/2023 12/14/2022 11/16/2022 10/28/2022 9/7/2022 8/5/2022   Follow Up Review   # of missed doses 0    0 0 0 0 0 0 0   New Medications? No    No No No No No No No   New Conditions? No    No No No No No No No   New Allergies? No    No No No No No No No   Med Effective? Very good    Good Good Good Excellent Good Good Excellent   Urgent Care? No    No No No No No No No   Requested Pharmacist? No No No No No No No       Multiple values from one day are sorted in reverse-chronological order         Therapy is appropriate to continue.    Therapy is effective: Yes  On scale of 1 to 10, how does patient rank quality of life? (10 - Best): 7  Recommendations: none at this time.  Review Method: Patient Contact    Tasks added this encounter   4/30/2023 - Refill Call (Auto Added)   Tasks due within next 3 months   2/6/2023 - Clinical - Follow Up Assesement (Annual)     Eliceo Arias, PharmD  [facility]  [id number]  [facility] [address]  Phone: [phone]  Fax: [phone]

## 2023-02-06 NOTE — TELEPHONE ENCOUNTER
Specialty Pharmacy - Medication/Referral Authorization  Specialty Pharmacy - Refill Coordination    Specialty Medication Orders Linked to Encounter      Flowsheet Row Most Recent Value   Medication #1 golimumab (SIMPONI) 50 mg/0.5 mL PnIj (Order#[id number], Rx#[id number])            Refill Questions - Documented Responses      Flowsheet Row Most Recent Value   Refill Screening Questions    Changes to allergies? No   Changes to medications? No   New conditions since last clinic visit? No   Unplanned office visit, urgent care, ED, or hospital admission in the last 4 weeks? No   How does patient/caregiver feel medication is working? Very good   Financial problems or insurance changes? No   How many doses of your specialty medications were missed in the last 4 weeks? 0   Would patient like to speak to a pharmacist? No   When does the patient need to receive the medication? 02/14/23   Refill Delivery Questions    How will the patient receive the medication? MEDRx   When does the patient need to receive the medication? 02/14/23   Shipping Address Home   Address in [facility] confirmed and updated if neccessary? Yes   Expected Copay ($) 0   Is the patient able to afford the medication copay? Yes   Payment Method zero copay   Days supply of Refill 90   Supplies needed? No supplies needed   Refill activity completed? Yes   Refill activity plan Refill scheduled   Shipment/Pickup Date: 02/07/23            Current Outpatient Medications   Medication Sig    amLODIPine (NORVASC) 10 MG tablet TAKE ONE TABLET BY MOUTH DAILY    aspirin (ECOTRIN) 81 MG EC tablet Take 1 tablet (81 mg total) by mouth once daily.    atenoloL (TENORMIN) 100 MG tablet TAKE ONE TABLET BY MOUTH DAILY    atorvastatin (LIPITOR) 40 MG tablet TAKE ONE TABLET BY MOUTH DAILY    blood-glucose meter kit Use twice daily.    canagliflozin (INVOKANA) 300 mg Tab tablet Take 1 tablet (300 mg total) by mouth daily before breakfast.    clobetasoL (TEMOVATE) 0.05 %  external solution Apply topically once daily.    clonazePAM (KLONOPIN) 0.5 MG tablet TAKE 1 TABLET BY MOUTH ONCE DAILY AS NEEDED FOR ANXIETY    clotrimazole-betamethasone 1-0.05% (LOTRISONE) cream Apply topically 2 (two) times daily as needed.     cyclobenzaprine (FLEXERIL) 10 MG tablet TAKE ONE TABLET BY MOUTH AT BEDTIME AS NEEDED    diclofenac sodium (VOLTAREN) 1 % Gel Apply 4 g topically 4 (four) times daily. Apply light film topically to knee up to four times daily    docusate sodium (COLACE) 50 MG capsule Take 1 capsule (50 mg total) by mouth 2 (two) times daily as needed for Constipation. (Patient taking differently: Take 50 mg by mouth 2 (two) times daily.)    entecavir (BARACLUDE) 0.5 MG Tab Take 1 tablet (0.5 mg total) by mouth once daily.    ergocalciferol (ERGOCALCIFEROL) 50,000 unit Cap Take 1 capsule (50,000 Units total) by mouth every 7 days.    fluocinolone acetonide oiL 0.01 % Drop Place 3 drops into both ears 2 (two) times a day.    fluocinonide 0.05% (LIDEX) 0.05 % cream Apply topically 2 (two) times daily. To allergic rash    FLUoxetine 20 MG capsule Take 1 capsule (20 mg total) by mouth 2 (two) times daily.    FLUoxetine 20 MG capsule Take 1 capsule (20 mg total) by mouth 2 (two) times a day.    fluticasone propionate (FLONASE) 50 mcg/actuation nasal spray SPRAY 2 SPRAYS INTO EACH NOSTRIL ONCE A DAY    fluticasone propionate (FLONASE) 50 mcg/actuation nasal spray 2 sprays (100 mcg total) by Each Nostril route once daily.    fluticasone propionate (FLONASE) 50 mcg/actuation nasal spray Use 2 sprays in each nostril once daily.    fluticasone propionate (FLONASE) 50 mcg/actuation nasal spray Use 2 sprays in each nostril once daily    FREESTYLE LITE STRIPS Strp test TWICE DAILY    golimumab (SIMPONI) 50 mg/0.5 mL PnIj Inject 50 mg into the skin every 30 days.    hydroCHLOROthiazide (HYDRODIURIL) 25 MG tablet TAKE ONE TABLET BY MOUTH DAILY    hydrocortisone 2.5 % cream Apply topically 2 (two) times  daily. for 10 days    hydroquinone 4 % Crea Apply to dark areas qhs.  Not more than 6 months straight in same location.Use sunscreen in am    ketoconazole (NIZORAL) 2 % shampoo Apply topically once a week.    lancets (FREESTYLE LANCETS) 28 gauge Misc test TWICE DAILY    levocetirizine (XYZAL) 5 MG tablet TAKE ONE TABLET BY MOUTH EVERY EVENING    losartan (COZAAR) 100 MG tablet TAKE ONE TABLET BY MOUTH ONCE DAILY    metroNIDAZOLE (FLAGYL) 500 MG tablet Take 1 tablet (500 mg total) by mouth 2 (two) times a day for 14 days. NO ALCOHOL.    naproxen (NAPROSYN) 500 MG tablet Take 1 tablet (500 mg total) by mouth 2 (two) times daily as needed.    neomycin-polymyxin-hydrocortisone (CORTISPORIN) 3.5-10,000-1 mg/mL-unit/mL-% otic suspension Place 3 drops into the left ear 3 (three) times daily.    nystatin (MYCOSTATIN) powder Apply to affected area 3 times daily    pantoprazole (PROTONIX) 40 MG tablet Take 1 tablet (40 mg total) by mouth once daily.    prednisoLONE acetate (PRED FORTE) 1 % DrpS Place 1 drop into both eyes daily as needed (For flare-ups until able to be seen by the Optometrist).    repaglinide (PRANDIN) 1 MG tablet TAKE ONE TABLET BY MOUTH THREE TIMES DAILY BEFORE MEALS (TAKING PLACE OF TRA[place]ENTA)    semaglutide (OZEMPIC) 1 mg/dose (4 mg/3 mL) Inject 1 mg into the skin every 7 days.    semaglutide, weight loss, (WEGOVY) 1 mg/0.5 mL PnIj Inject 1 mg into the skin every 7 days.    sulfaSALAzine (AZULFIDINE) 500 MG EC tablet Take 3 tablets (1,500 mg total) by mouth 2 (two) times daily.    terconazole (TERAZOL 7) 0.4 % Crea Place 1 applicator vaginally every evening.    terconazole (TERAZOL 7) 0.4 % Crea Place 1 applicatorful vaginally every evening.    terconazole (TERAZOL 7) 0.4 % Crea Place 1 applicator vaginally every evening.    tiZANidine (ZANAFLEX) 2 MG tablet Take 1-2 tablets (2-4 mg total) by mouth every 8 (eight) hours as needed.    topiramate (TOPAMAX) 100 MG tablet Take 100 mg by mouth 2 (two) times  daily.    triamcinolone acetonide 0.1% (KENALOG) 0.1 % cream Apply topically 2 (two) times daily.    valACYclovir (VALTREX) 1000 MG tablet valacyclovir 1 gram tablet   Take 0.5 tablets every 12 hours by oral route.    vitamin D (VITAMIN D3) 1000 units Tab Take 1,000 Units by mouth once daily.    zolpidem (AMBIEN) 5 MG Tab Take 1 tablet (5 mg total) by mouth nightly as needed.   Last reviewed on 1/13/2023  6:51 AM by Ramin Aguilar MD    Review of patient's allergies indicates:   Allergen Reactions    Bactrim [sulfamethoxazole-trimethoprim] Itching    Trulicity [dulaglutide] Diarrhea and Other (See Comments)     Vomiting, abdominal pain    Diflucan [fluconazole] Swelling and Other (See Comments)     Sore on mouth    Last reviewed on  1/23/2023 1:19 PM by Gabriel Arviuz      Tasks added this encounter   4/30/2023 - Refill Call (Auto Added)   Tasks due within next 3 months   2/6/2023 - Clinical - Follow Up Assesement (Annual)     Eliceo Arias, PharmD  [facility]  [address] [place] [id number]  Phone: [phone]  Fax: [phone]

## 2023-02-06 NOTE — TELEPHONE ENCOUNTER
Submitted Simponi prior auth via [facility]. Key: [id number]. Simponi approved from 1/1/23 - 2/6/24. Pa Case: [id number].     Medicare Part D with Level 1 LIS. [facility] in network without penalty Proceeding with refill.

## 2023-02-06 NOTE — TELEPHONE ENCOUNTER
Specialty Pharmacy - Refill Coordination    Specialty Medication Orders Linked to Encounter      Flowsheet Row Most Recent Value   Medication #1 entecavir (BARACLUDE) 0.5 MG Tab (Order#[id number], Rx#[id number])          Simponi refill not completed since PA is required. Next dose is due on 2/14/23.     Refill Questions - Documented Responses      Flowsheet Row Most Recent Value   Patient Availability and HIPAA Verification    Does patient want to proceed with activity? Yes   HIPAA/medical authority confirmed? Yes   Relationship to patient of person spoken to? Self   Refill Screening Questions    Changes to allergies? No   Changes to medications? No   New conditions since last clinic visit? No   Unplanned office visit, urgent care, ED, or hospital admission in the last 4 weeks? No   How does patient/caregiver feel medication is working? Good   Financial problems or insurance changes? No   How many doses of your specialty medications were missed in the last 4 weeks? 0   When does the patient need to receive the medication? 02/09/23   Refill Delivery Questions    How will the patient receive the medication? MEDRx   When does the patient need to receive the medication? 02/09/23   Shipping Address Home   Address in [facility] confirmed and updated if neccessary? Yes   Expected Copay ($) 0   Is the patient able to afford the medication copay? Yes   Payment Method zero copay   Days supply of Refill 90   Supplies needed? No supplies needed   Refill activity completed? Yes   Refill activity plan Refill scheduled   Shipment/Pickup Date: 02/07/23            Current Outpatient Medications   Medication Sig    amLODIPine (NORVASC) 10 MG tablet TAKE ONE TABLET BY MOUTH DAILY    aspirin (ECOTRIN) 81 MG EC tablet Take 1 tablet (81 mg total) by mouth once daily.    atenoloL (TENORMIN) 100 MG tablet TAKE ONE TABLET BY MOUTH DAILY    atorvastatin (LIPITOR) 40 MG tablet TAKE ONE TABLET BY MOUTH DAILY    blood-glucose meter kit  Use twice daily.    canagliflozin (INVOKANA) 300 mg Tab tablet Take 1 tablet (300 mg total) by mouth daily before breakfast.    clobetasoL (TEMOVATE) 0.05 % external solution Apply topically once daily.    clonazePAM (KLONOPIN) 0.5 MG tablet TAKE 1 TABLET BY MOUTH ONCE DAILY AS NEEDED FOR ANXIETY    clotrimazole-betamethasone 1-0.05% (LOTRISONE) cream Apply topically 2 (two) times daily as needed.     cyclobenzaprine (FLEXERIL) 10 MG tablet TAKE ONE TABLET BY MOUTH AT BEDTIME AS NEEDED    diclofenac sodium (VOLTAREN) 1 % Gel Apply 4 g topically 4 (four) times daily. Apply light film topically to knee up to four times daily    docusate sodium (COLACE) 50 MG capsule Take 1 capsule (50 mg total) by mouth 2 (two) times daily as needed for Constipation. (Patient taking differently: Take 50 mg by mouth 2 (two) times daily.)    entecavir (BARACLUDE) 0.5 MG Tab Take 1 tablet (0.5 mg total) by mouth once daily.    ergocalciferol (ERGOCALCIFEROL) 50,000 unit Cap Take 1 capsule (50,000 Units total) by mouth every 7 days.    fluocinolone acetonide oiL 0.01 % Drop Place 3 drops into both ears 2 (two) times a day.    fluocinonide 0.05% (LIDEX) 0.05 % cream Apply topically 2 (two) times daily. To allergic rash    FLUoxetine 20 MG capsule Take 1 capsule (20 mg total) by mouth 2 (two) times daily.    FLUoxetine 20 MG capsule Take 1 capsule (20 mg total) by mouth 2 (two) times a day.    fluticasone propionate (FLONASE) 50 mcg/actuation nasal spray SPRAY 2 SPRAYS INTO EACH NOSTRIL ONCE A DAY    fluticasone propionate (FLONASE) 50 mcg/actuation nasal spray 2 sprays (100 mcg total) by Each Nostril route once daily.    fluticasone propionate (FLONASE) 50 mcg/actuation nasal spray Use 2 sprays in each nostril once daily.    fluticasone propionate (FLONASE) 50 mcg/actuation nasal spray Use 2 sprays in each nostril once daily    FREESTYLE LITE STRIPS Strp test TWICE DAILY    golimumab (SIMPONI) 50 mg/0.5 mL PnHarshad Inject 50 mg into the skin  every 30 days.    hydroCHLOROthiazide (HYDRODIURIL) 25 MG tablet TAKE ONE TABLET BY MOUTH DAILY    hydrocortisone 2.5 % cream Apply topically 2 (two) times daily. for 10 days    hydroquinone 4 % Crea Apply to dark areas qhs.  Not more than 6 months straight in same location.Use sunscreen in am    ketoconazole (NIZORAL) 2 % shampoo Apply topically once a week.    lancets (FREESTYLE LANCETS) 28 gauge Misc test TWICE DAILY    levocetirizine (XYZAL) 5 MG tablet TAKE ONE TABLET BY MOUTH EVERY EVENING    losartan (COZAAR) 100 MG tablet TAKE ONE TABLET BY MOUTH ONCE DAILY    metroNIDAZOLE (FLAGYL) 500 MG tablet Take 1 tablet (500 mg total) by mouth 2 (two) times a day for 14 days. NO ALCOHOL.    naproxen (NAPROSYN) 500 MG tablet Take 1 tablet (500 mg total) by mouth 2 (two) times daily as needed.    neomycin-polymyxin-hydrocortisone (CORTISPORIN) 3.5-10,000-1 mg/mL-unit/mL-% otic suspension Place 3 drops into the left ear 3 (three) times daily.    nystatin (MYCOSTATIN) powder Apply to affected area 3 times daily    pantoprazole (PROTONIX) 40 MG tablet Take 1 tablet (40 mg total) by mouth once daily.    prednisoLONE acetate (PRED FORTE) 1 % DrpS Place 1 drop into both eyes daily as needed (For flare-ups until able to be seen by the Optometrist).    repaglinide (PRANDIN) 1 MG tablet TAKE ONE TABLET BY MOUTH THREE TIMES DAILY BEFORE MEALS (TAKING PLACE OF TRA[place])    semaglutide (OZEMPIC) 1 mg/dose (4 mg/3 mL) Inject 1 mg into the skin every 7 days.    semaglutide, weight loss, (WEGOVY) 1 mg/0.5 mL PnIj Inject 1 mg into the skin every 7 days.    sulfaSALAzine (AZULFIDINE) 500 MG EC tablet Take 3 tablets (1,500 mg total) by mouth 2 (two) times daily.    terconazole (TERAZOL 7) 0.4 % Crea Place 1 applicator vaginally every evening.    terconazole (TERAZOL 7) 0.4 % Crea Place 1 applicatorful vaginally every evening.    terconazole (TERAZOL 7) 0.4 % Crea Place 1 applicator vaginally every evening.    tiZANidine (ZANAFLEX) 2 MG  tablet Take 1-2 tablets (2-4 mg total) by mouth every 8 (eight) hours as needed.    topiramate (TOPAMAX) 100 MG tablet Take 100 mg by mouth 2 (two) times daily.    triamcinolone acetonide 0.1% (KENALOG) 0.1 % cream Apply topically 2 (two) times daily.    valACYclovir (VALTREX) 1000 MG tablet valacyclovir 1 gram tablet   Take 0.5 tablets every 12 hours by oral route.    vitamin D (VITAMIN D3) 1000 units Tab Take 1,000 Units by mouth once daily.    zolpidem (AMBIEN) 5 MG Tab Take 1 tablet (5 mg total) by mouth nightly as needed.   Last reviewed on 1/13/2023  6:51 AM by Ramin Aguilar MD    Review of patient's allergies indicates:   Allergen Reactions    Bactrim [sulfamethoxazole-trimethoprim] Itching    Trulicity [dulaglutide] Diarrhea and Other (See Comments)     Vomiting, abdominal pain    Diflucan [fluconazole] Swelling and Other (See Comments)     Sore on mouth    Last reviewed on  1/23/2023 1:19 PM by Gabriel Arvizu      Tasks added this encounter   4/30/2023 - Refill Call (Auto Added)   Tasks due within next 3 months   No tasks due.     Daria Ovalle, PharmD  [facility]  [address] [place] [address]  Phone: [phone]  Fax: [phone]

## 2023-02-13 ENCOUNTER — TELEPHONE (OUTPATIENT)
Dept: INTERNAL MEDICINE | Facility: CLINIC | Age: 58
End: 2023-02-13
Payer: COMMERCIAL

## 2023-02-13 NOTE — TELEPHONE ENCOUNTER
----- Message from Karolyn Milner MA sent at 2/13/2023  3:39 PM CST -----  Regarding: med clearacne  Pt is scheduled to have a Cervical SHARONA with Dr. Segura on 2/23 and pt needs med clearance to hold asa 5 days prior. Please advise.    AR

## 2023-02-13 NOTE — TELEPHONE ENCOUNTER
Pt is scheduled to have a Cervical SHARONA with Dr. Segura on 2/23 and pt needs med clearance to hold asa 5 days prior

## 2023-02-15 ENCOUNTER — OFFICE VISIT (OUTPATIENT)
Dept: RHEUMATOLOGY | Facility: CLINIC | Age: 58
End: 2023-02-15
Payer: COMMERCIAL

## 2023-02-15 ENCOUNTER — LAB VISIT (OUTPATIENT)
Dept: LAB | Facility: HOSPITAL | Age: 58
End: 2023-02-15
Attending: INTERNAL MEDICINE
Payer: COMMERCIAL

## 2023-02-15 ENCOUNTER — TELEPHONE (OUTPATIENT)
Dept: PAIN MEDICINE | Facility: CLINIC | Age: 58
End: 2023-02-15
Payer: COMMERCIAL

## 2023-02-15 VITALS
SYSTOLIC BLOOD PRESSURE: 142 MMHG | WEIGHT: 205 LBS | BODY MASS INDEX: 32.18 KG/M2 | DIASTOLIC BLOOD PRESSURE: 93 MMHG | HEART RATE: 80 BPM | HEIGHT: 67 IN

## 2023-02-15 DIAGNOSIS — M47.819 SPONDYLOARTHRITIS: ICD-10-CM

## 2023-02-15 DIAGNOSIS — M02.30 REACTIVE ARTHRITIS: ICD-10-CM

## 2023-02-15 DIAGNOSIS — E78.5 HYPERLIPIDEMIA, UNSPECIFIED HYPERLIPIDEMIA TYPE: ICD-10-CM

## 2023-02-15 DIAGNOSIS — Z79.899 HIGH RISK MEDICATION USE: ICD-10-CM

## 2023-02-15 DIAGNOSIS — E55.9 VITAMIN D DEFICIENCY: ICD-10-CM

## 2023-02-15 DIAGNOSIS — M19.90 INFLAMMATORY ARTHRITIS: ICD-10-CM

## 2023-02-15 DIAGNOSIS — M25.551 RIGHT HIP PAIN: ICD-10-CM

## 2023-02-15 DIAGNOSIS — M46.90 AXIAL SPONDYLOARTHRITIS: Chronic | ICD-10-CM

## 2023-02-15 DIAGNOSIS — E55.9 VITAMIN D DEFICIENCY: Primary | ICD-10-CM

## 2023-02-15 LAB
25(OH)D3+25(OH)D2 SERPL-MCNC: 36 NG/ML (ref 30–96)
CHOLEST SERPL-MCNC: 179 MG/DL (ref 120–199)
CHOLEST/HDLC SERPL: 3.2 {RATIO} (ref 2–5)
HDLC SERPL-MCNC: 56 MG/DL (ref 40–75)
HDLC SERPL: 31.3 % (ref 20–50)
LDLC SERPL CALC-MCNC: 111.8 MG/DL (ref 63–159)
NONHDLC SERPL-MCNC: 123 MG/DL
TRIGL SERPL-MCNC: 56 MG/DL (ref 30–150)

## 2023-02-15 PROCEDURE — 3080F DIAST BP >= 90 MM HG: CPT | Mod: CPTII,S$GLB,, | Performed by: INTERNAL MEDICINE

## 2023-02-15 PROCEDURE — 36415 COLL VENOUS BLD VENIPUNCTURE: CPT | Performed by: INTERNAL MEDICINE

## 2023-02-15 PROCEDURE — 3072F LOW RISK FOR RETINOPATHY: CPT | Mod: CPTII,S$GLB,, | Performed by: INTERNAL MEDICINE

## 2023-02-15 PROCEDURE — 3077F SYST BP >= 140 MM HG: CPT | Mod: CPTII,S$GLB,, | Performed by: INTERNAL MEDICINE

## 2023-02-15 PROCEDURE — 3080F PR MOST RECENT DIASTOLIC BLOOD PRESSURE >= 90 MM HG: ICD-10-PCS | Mod: CPTII,S$GLB,, | Performed by: INTERNAL MEDICINE

## 2023-02-15 PROCEDURE — 99214 OFFICE O/P EST MOD 30 MIN: CPT | Mod: S$GLB,,, | Performed by: INTERNAL MEDICINE

## 2023-02-15 PROCEDURE — 99999 PR PBB SHADOW E&M-EST. PATIENT-LVL V: ICD-10-PCS | Mod: PBBFAC,,, | Performed by: INTERNAL MEDICINE

## 2023-02-15 PROCEDURE — 82306 VITAMIN D 25 HYDROXY: CPT | Performed by: INTERNAL MEDICINE

## 2023-02-15 PROCEDURE — 3008F PR BODY MASS INDEX (BMI) DOCUMENTED: ICD-10-PCS | Mod: CPTII,S$GLB,, | Performed by: INTERNAL MEDICINE

## 2023-02-15 PROCEDURE — 1159F MED LIST DOCD IN RCRD: CPT | Mod: CPTII,S$GLB,, | Performed by: INTERNAL MEDICINE

## 2023-02-15 PROCEDURE — 3077F PR MOST RECENT SYSTOLIC BLOOD PRESSURE >= 140 MM HG: ICD-10-PCS | Mod: CPTII,S$GLB,, | Performed by: INTERNAL MEDICINE

## 2023-02-15 PROCEDURE — 1159F PR MEDICATION LIST DOCUMENTED IN MEDICAL RECORD: ICD-10-PCS | Mod: CPTII,S$GLB,, | Performed by: INTERNAL MEDICINE

## 2023-02-15 PROCEDURE — 3008F BODY MASS INDEX DOCD: CPT | Mod: CPTII,S$GLB,, | Performed by: INTERNAL MEDICINE

## 2023-02-15 PROCEDURE — 3072F PR LOW RISK FOR RETINOPATHY: ICD-10-PCS | Mod: CPTII,S$GLB,, | Performed by: INTERNAL MEDICINE

## 2023-02-15 PROCEDURE — 99214 PR OFFICE/OUTPT VISIT, EST, LEVL IV, 30-39 MIN: ICD-10-PCS | Mod: S$GLB,,, | Performed by: INTERNAL MEDICINE

## 2023-02-15 PROCEDURE — 99999 PR PBB SHADOW E&M-EST. PATIENT-LVL V: CPT | Mod: PBBFAC,,, | Performed by: INTERNAL MEDICINE

## 2023-02-15 PROCEDURE — 80061 LIPID PANEL: CPT | Performed by: INTERNAL MEDICINE

## 2023-02-15 RX ORDER — SULFASALAZINE 500 MG/1
1500 TABLET, DELAYED RELEASE ORAL 2 TIMES DAILY
Qty: 540 TABLET | Refills: 0 | Status: SHIPPED | OUTPATIENT
Start: 2023-02-15 | End: 2023-05-29 | Stop reason: SDUPTHER

## 2023-02-15 RX ORDER — GOLIMUMAB 50 MG/.5ML
50 INJECTION, SOLUTION SUBCUTANEOUS
Qty: 1.5 ML | Refills: 1 | Status: SHIPPED | OUTPATIENT
Start: 2023-02-15 | End: 2023-05-29 | Stop reason: SDUPTHER

## 2023-02-15 RX ORDER — PANTOPRAZOLE SODIUM 40 MG/1
40 TABLET, DELAYED RELEASE ORAL DAILY
Qty: 90 TABLET | Refills: 2 | Status: SHIPPED | OUTPATIENT
Start: 2023-02-15 | End: 2023-05-29 | Stop reason: SDUPTHER

## 2023-02-15 RX ORDER — ERGOCALCIFEROL 1.25 MG/1
50000 CAPSULE ORAL
Qty: 12 CAPSULE | Refills: 3 | Status: SHIPPED | OUTPATIENT
Start: 2023-02-15 | End: 2023-11-16 | Stop reason: SDUPTHER

## 2023-02-15 RX ORDER — NAPROXEN 500 MG/1
500 TABLET ORAL 2 TIMES DAILY PRN
Qty: 180 TABLET | Refills: 0 | Status: SHIPPED | OUTPATIENT
Start: 2023-02-15 | End: 2023-05-29 | Stop reason: SDUPTHER

## 2023-02-15 ASSESSMENT — ANKYLOSING SPONDYLITIS DISEASE ACTIVITY SCORE (ASDAS-CRP)
MORNING_STIFFNESS: 5
TOTAL_SCORE: 2.75
CRP_MG_PER_LITER: 5.5
GLOBAL_ACTIVITY: 4
PAIN_SWELLING: 8
NBH_PAIN: 3

## 2023-02-15 NOTE — PROGRESS NOTES
Subjective:       Patient ID: Althea Vazquez is a 57 y.o. female.    Chief Complaint: AxSpA & pSpA; lumbar spondylosis    HPI having left cervical radiculopathy to have 2/23/23 injection( C7-T1 ILESI).  Otherwise doing well. Occ right knee pain  without swelling. 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  Low back doing better. Lost another 15#  Review of Systems   Constitutional:  Negative for appetite change, fatigue, fever and unexpected weight change.   HENT:  Negative for mouth sores and trouble swallowing.    Eyes:  Negative for redness and visual disturbance.   Respiratory:  Negative for cough, shortness of breath and wheezing.    Cardiovascular:  Negative for chest pain and palpitations.   Gastrointestinal:  Negative for abdominal pain, anal bleeding, blood in stool, constipation, diarrhea, nausea and vomiting.   Genitourinary:  Negative for dysuria, frequency, genital sores and urgency.   Musculoskeletal:  Negative for arthralgias, back pain, gait problem, joint swelling, myalgias, neck pain and neck stiffness.   Skin:  Negative for rash.   Neurological:  Negative for weakness, numbness and headaches.   Hematological:  Negative for adenopathy. Does not bruise/bleed easily.   Psychiatric/Behavioral:  Negative for sleep disturbance. The patient is not nervous/anxious.        Objective:   LMP 11/25/2014      Physical Exam   Constitutional: She is oriented to person, place, and time.   HENT:   Head: Normocephalic and atraumatic.   Mouth/Throat: Oropharynx is clear and moist.   Eyes: Conjunctivae are normal.   Neck: No thyromegaly present.   Cardiovascular: Normal rate, regular rhythm and normal heart sounds. Exam reveals no gallop and no friction rub.   No murmur heard.  Pulmonary/Chest: Breath sounds normal. She has no wheezes. She has no rales. She exhibits no tenderness.   Abdominal: Soft. She exhibits no distension and no mass. There is no abdominal tenderness.    Musculoskeletal:      Right shoulder: Normal.      Left shoulder: Normal.      Right elbow: Normal.      Left elbow: Normal.      Right wrist: Normal.      Left wrist: Normal.      Cervical back: Normal range of motion and neck supple.      Right knee: Normal.      Left knee: Normal.   Lymphadenopathy:     She has no cervical adenopathy.   Neurological: She is alert and oriented to person, place, and time. She displays normal reflexes. Gait normal.   Nl motor strength UE and LE bilateral       Right Side Rheumatological Exam     Examination finds the shoulder, elbow, wrist, knee, 1st PIP, 1st MCP, 2nd PIP, 2nd MCP, 3rd PIP, 3rd MCP, 4th PIP, 4th MCP, 5th PIP and 5th MCP normal.    Left Side Rheumatological Exam     Examination finds the shoulder, elbow, wrist, knee, 1st PIP, 1st MCP, 2nd PIP, 2nd MCP, 3rd PIP, 3rd MCP, 4th PIP, 4th MCP, 5th PIP and 5th MCP normal.           Schober's 10-14cm  Nl lumbar extension  and lateral flexion  Fabere neg bilateral   Chest expansion 7 cm improved  Neck rom nl  O-W nl         10/31/2022   Tender (CABALLERO-28) 0 / 28    Swollen (CABALLERO-28) 0 / 28    Provider Global --   Patient Global 45 mm   ESR 11 mm/hr   CRP 2.3 mg/L   CABALLERO-28 (ESR) 2.31 (Remission)   CABALLERO-28 (CRP) 2.02 (Remission)   CDAI Score --      Latest Reference Range & Units 01/23/23 11:07   WBC 3.90 - 12.70 K/uL 6.01   RBC 4.00 - 5.40 M/uL 4.98   Hemoglobin 12.0 - 16.0 g/dL 14.9   Hematocrit 37.0 - 48.5 % 44.6   MCV 82 - 98 fL 90   MCH 27.0 - 31.0 pg 29.9   MCHC 32.0 - 36.0 g/dL 33.4   RDW 11.5 - 14.5 % 12.8   Platelets 150 - 450 K/uL 238   MPV 9.2 - 12.9 fL 11.0   Gran % 38.0 - 73.0 % 29.1 (L)   Lymph % 18.0 - 48.0 % 57.9 (H)   Mono % 4.0 - 15.0 % 9.7   Eosinophil % 0.0 - 8.0 % 2.3   Basophil % 0.0 - 1.9 % 0.7   Immature Granulocytes 0.0 - 0.5 % 0.3   Gran # (ANC) 1.8 - 7.7 K/uL 1.8   Lymph # 1.0 - 4.8 K/uL 3.5   Mono # 0.3 - 1.0 K/uL 0.6   Eos # 0.0 - 0.5 K/uL 0.1   Baso # 0.00 - 0.20 K/uL 0.04   Immature Grans (Abs) 0.00  - 0.04 K/uL 0.02   nRBC 0 /100 WBC 0   Differential Method  Automated   Sed Rate 0 - 36 mm/Hr 4   Sodium 136 - 145 mmol/L 140   Potassium 3.5 - 5.1 mmol/L 3.9   Chloride 95 - 110 mmol/L 104   CO2 23 - 29 mmol/L 30 (H)   Anion Gap 8 - 16 mmol/L 6 (L)   BUN 7 - 17 mg/dL 11   Creatinine 0.50 - 1.40 mg/dL 0.58   eGFR >60 mL/min/1.73 m^2 >60.0   Glucose 70 - 110 mg/dL 92   Calcium 8.7 - 10.5 mg/dL 9.0   Alkaline Phosphatase 38 - 126 U/L 89   PROTEIN TOTAL 6.0 - 8.4 g/dL 7.8   Albumin 3.5 - 5.2 g/dL 5.1   BILIRUBIN TOTAL 0.1 - 1.0 mg/dL 0.4   AST 15 - 46 U/L 26   ALT 10 - 44 U/L 22   CRP 0.00 - 1.00 mg/dL 0.55   Hep B Viral DNA IU/ML <10 IU/mL <10   Hepatitis B Virus DNA Not detected  Not detected   Log HBV IU/mL <1.00 Log (10) IU/mL <1.00   (L): Data is abnormally low  (H): Data is abnormally high      Latest Reference Range & Units 03/09/22 07:49   Cholesterol 120 - 199 mg/dL 193   HDL 40 - 75 mg/dL 55   HDL/Cholesterol Ratio 20.0 - 50.0 % 28.5   LDL Cholesterol External 63.0 - 159.0 mg/dL 119.6   Non-HDL Cholesterol mg/dL 138   Total Cholesterol/HDL Ratio 2.0 - 5.0  3.5   Triglycerides 30 - 150 mg/dL 92   The 10-year ASCVD risk score (Dilcia JIMENEZ, et al., 2019) is: 17%    Values used to calculate the score:      Age: 57 years      Sex: Female      Is Non- : Yes      Diabetic: Yes      Tobacco smoker: No      Systolic Blood Pressure: 146 mmHg      Is BP treated: Yes      HDL Cholesterol: 55 mg/dL      Total Cholesterol: 193 mg/dL   Assessment:   adalimumab with Secondary inefficacy, tried to change to Simponi 50mg sc q 4 wks but insurance wouldn't cover, then on Enbrel Sureclick 50mg s q 7 days but has developed recurrent AAU OS  Then started certolizumab after 1/11/21 visit  had AAU with recurrence 1/28/21 just after starting certolizumab but had not completed loading dose   certolizumab ineffective        ASDAS-CRP: 2.75(HDA)    BASDAI 3.3(MDA)  BASFI 2.8(moderate functional  limitation      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  Chronic hepatitis B HBV DNA neg  EH  142/93 didn' take meds  Hyperlipidemia .6 3/9/22  on atorvastatin 40mg nightly  Obesity lost 15#  since July 2022on semaglutide 1mg q 7day  DXA 9/20/21 normal  Left cervical radiculopathy bulge C6-7  Chronic lumbar spondylosis, doing well at present     Plan:   Vitamin D, lipid panel today  golimumab  50mg sc q 4wks   Sulfasalazine.1500mg twice daily  Vitamin D2 50,000 units q 7days and vitamin D3 1000 units daily  entecvir 0.5mg daily  Vitamin D2 50,000 units q 7days   Continue exercise program  Cont weight reduction, Mediterranean diet as feasible, decrease rice portions as she is doing semaglutide  RTC 3 months with standing labs

## 2023-02-15 NOTE — PROGRESS NOTES
Rapid3 Question Responses and Scores 2/15/2023   MDHAQ Score 0.7   Psychologic Score 5.5   Pain Score 4.5   When you awakened in the morning OVER THE LAST WEEK, did you feel stiff? Yes   If Yes, please indicate the number of hours until you are as limber as you will be for the day 1   Fatigue Score 4   Global Health Score 3.5   RAPID3 Score 3.44     Answers submitted by the patient for this visit:  Rheumatology Questionnaire (Submitted on 2/15/2023)  fever: No  eye redness: No  mouth sores: No  headaches: No  shortness of breath: No  chest pain: No  trouble swallowing: No  diarrhea: No  constipation: No  unexpected weight change: No  genital sore: No  dysuria: No  During the last 3 days, have you had a skin rash?: No  Bruises or bleeds easily: No  cough: No

## 2023-02-15 NOTE — TELEPHONE ENCOUNTER
"I informed pt per Dr. Currie "okay to hold asa 5 days prior and restart the day after." Pt voiced understanding.  "

## 2023-02-16 ENCOUNTER — PATIENT MESSAGE (OUTPATIENT)
Dept: INTERNAL MEDICINE | Facility: CLINIC | Age: 58
End: 2023-02-16
Payer: COMMERCIAL

## 2023-02-16 DIAGNOSIS — M02.30 REACTIVE ARTHRITIS: ICD-10-CM

## 2023-02-16 DIAGNOSIS — E78.5 HYPERLIPIDEMIA: ICD-10-CM

## 2023-02-16 RX ORDER — ATORVASTATIN CALCIUM 40 MG/1
40 TABLET, FILM COATED ORAL DAILY
Qty: 90 TABLET | Refills: 3 | Status: SHIPPED | OUTPATIENT
Start: 2023-02-16 | End: 2023-03-02

## 2023-02-16 RX ORDER — AMLODIPINE BESYLATE 10 MG/1
10 TABLET ORAL DAILY
Qty: 90 TABLET | Refills: 3 | Status: SHIPPED | OUTPATIENT
Start: 2023-02-16 | End: 2024-02-17 | Stop reason: SDUPTHER

## 2023-02-16 RX ORDER — ATENOLOL 100 MG/1
100 TABLET ORAL DAILY
Qty: 90 TABLET | Refills: 3 | Status: SHIPPED | OUTPATIENT
Start: 2023-02-16 | End: 2024-02-17 | Stop reason: SDUPTHER

## 2023-02-16 RX ORDER — TOPIRAMATE 100 MG/1
100 TABLET, FILM COATED ORAL 2 TIMES DAILY
Qty: 180 TABLET | Refills: 3 | Status: SHIPPED | OUTPATIENT
Start: 2023-02-16 | End: 2024-02-16

## 2023-02-16 RX ORDER — LOSARTAN POTASSIUM 100 MG/1
100 TABLET ORAL DAILY
Qty: 90 TABLET | Refills: 3 | Status: SHIPPED | OUTPATIENT
Start: 2023-02-16 | End: 2023-11-09

## 2023-02-16 RX ORDER — HYDROCHLOROTHIAZIDE 25 MG/1
25 TABLET ORAL DAILY
Qty: 90 TABLET | Refills: 3 | Status: SHIPPED | OUTPATIENT
Start: 2023-02-16 | End: 2024-02-03 | Stop reason: SDUPTHER

## 2023-02-16 RX ORDER — CYCLOBENZAPRINE HCL 10 MG
TABLET ORAL
Qty: 90 TABLET | Refills: 2 | Status: SHIPPED | OUTPATIENT
Start: 2023-02-16

## 2023-02-16 NOTE — TELEPHONE ENCOUNTER
No new care gaps identified.  [facility] Embedded Care Gaps. Reference number: [id number]. 2/16/2023   9:00:51 AM CST

## 2023-02-22 ENCOUNTER — TELEPHONE (OUTPATIENT)
Dept: PAIN MEDICINE | Facility: HOSPITAL | Age: 58
End: 2023-02-22
Payer: COMMERCIAL

## 2023-02-22 NOTE — TELEPHONE ENCOUNTER
Patient notified of 10:45 am arrival time and the following:    Please remember:  Check in at the registration desk on the first floor of the hospital. [address]. [place] [address]  Please DO NOT eat or drink 8 hours prior to your arrival time for the procedure, if diabetic 6 hours prior. If you are taking medications for blood pressure, heart medications, thyroid, cholesterol; you can take them with a small sip of water.  Refrain from drinking alcohol within 24 hours prior to your procedure  You will need someone to drive you home after procedure. You cannot take taxi, Uber, or Lyft.  If you start feeling sick (fever, chills, or coughing) or start on any antibiotics please contact us at [phone] to reschedule.

## 2023-02-23 ENCOUNTER — HOSPITAL ENCOUNTER (OUTPATIENT)
Facility: HOSPITAL | Age: 58
Discharge: HOME | End: 2023-02-23
Attending: ANESTHESIOLOGY | Admitting: ANESTHESIOLOGY
Payer: COMMERCIAL

## 2023-02-23 VITALS
HEIGHT: 67 IN | RESPIRATION RATE: 16 BRPM | OXYGEN SATURATION: 93 % | DIASTOLIC BLOOD PRESSURE: 77 MMHG | HEART RATE: 81 BPM | WEIGHT: 204 LBS | BODY MASS INDEX: 32.02 KG/M2 | TEMPERATURE: 98 F | SYSTOLIC BLOOD PRESSURE: 164 MMHG

## 2023-02-23 DIAGNOSIS — M54.12 CERVICAL RADICULOPATHY: Primary | ICD-10-CM

## 2023-02-23 DIAGNOSIS — G89.29 CHRONIC PAIN: ICD-10-CM

## 2023-02-23 LAB — POCT GLUCOSE: 80 MG/DL (ref 70–110)

## 2023-02-23 PROCEDURE — A9579 GAD-BASE MR CONTRAST NOS,1ML: HCPCS | Performed by: ANESTHESIOLOGY

## 2023-02-23 PROCEDURE — 62321 NJX INTERLAMINAR CRV/THRC: CPT | Performed by: ANESTHESIOLOGY

## 2023-02-23 PROCEDURE — 63600175 PHARM REV CODE 636 W HCPCS: Performed by: ANESTHESIOLOGY

## 2023-02-23 PROCEDURE — 25000003 PHARM REV CODE 250: Performed by: ANESTHESIOLOGY

## 2023-02-23 PROCEDURE — 62321 PR INJ CERV/THORAC, W/GUIDANCE: ICD-10-PCS | Mod: ,,, | Performed by: ANESTHESIOLOGY

## 2023-02-23 PROCEDURE — A4217 STERILE WATER/SALINE, 500 ML: HCPCS | Performed by: ANESTHESIOLOGY

## 2023-02-23 PROCEDURE — 25500020 PHARM REV CODE 255: Performed by: ANESTHESIOLOGY

## 2023-02-23 PROCEDURE — 62321 NJX INTERLAMINAR CRV/THRC: CPT | Mod: ,,, | Performed by: ANESTHESIOLOGY

## 2023-02-23 RX ORDER — SODIUM CHLORIDE 9 MG/ML
500 INJECTION, SOLUTION INTRAVENOUS CONTINUOUS
Status: DISCONTINUED | OUTPATIENT
Start: 2023-02-23 | End: 2023-02-23 | Stop reason: HOSPADM

## 2023-02-23 RX ORDER — ALPRAZOLAM 0.5 MG/1
0.5 TABLET, ORALLY DISINTEGRATING ORAL ONCE
Status: COMPLETED | OUTPATIENT
Start: 2023-02-23 | End: 2023-02-23

## 2023-02-23 RX ORDER — WATER 1 ML/ML
IRRIGANT IRRIGATION
Status: DISCONTINUED | OUTPATIENT
Start: 2023-02-23 | End: 2023-02-23 | Stop reason: HOSPADM

## 2023-02-23 RX ORDER — LIDOCAINE HYDROCHLORIDE 20 MG/ML
INJECTION, SOLUTION EPIDURAL; INFILTRATION; INTRACAUDAL; PERINEURAL
Status: DISCONTINUED | OUTPATIENT
Start: 2023-02-23 | End: 2023-02-23 | Stop reason: HOSPADM

## 2023-02-23 RX ORDER — DEXAMETHASONE SODIUM PHOSPHATE 100 MG/10ML
INJECTION INTRAMUSCULAR; INTRAVENOUS
Status: DISCONTINUED | OUTPATIENT
Start: 2023-02-23 | End: 2023-02-23 | Stop reason: HOSPADM

## 2023-02-23 RX ORDER — LIDOCAINE HYDROCHLORIDE 10 MG/ML
INJECTION, SOLUTION EPIDURAL; INFILTRATION; INTRACAUDAL; PERINEURAL
Status: DISCONTINUED | OUTPATIENT
Start: 2023-02-23 | End: 2023-02-23 | Stop reason: HOSPADM

## 2023-02-23 RX ADMIN — ALPRAZOLAM 0.5 MG: 0.5 TABLET, ORALLY DISINTEGRATING ORAL at 11:02

## 2023-02-23 NOTE — PLAN OF CARE
Pt denies pain or discomfort @ this time. Pt states she is ready to be discharged home @ this time. PIV dc'd with tip intact. Dressing placed. Discharge instructions reviewed with patient, with time allotted for questions. Pt verbalizes understanding of instructions and states they have no further questions @ this time. Procedure site is clean, dry and intact. Band-aids in place. Vital signs are stable. No acute distress noted. Staff is at bedside to assist patient.

## 2023-02-23 NOTE — DISCHARGE SUMMARY
Discharge Note  Short Stay      SUMMARY     Admit Date: 2/23/2023    Attending Physician: Juvenal Segura    Discharge Physician: Ludmila Colin      Discharge Date: 2/23/2023 12:18 PM    Procedure(s) (LRB):  Injection-steroid-epidural-cervical  C7-T1 (N/A)    Final Diagnosis: Cervical radiculopathy [M54.12]    Disposition: Home or self care    Patient Instructions:   Current Discharge Medication List        CONTINUE these medications which have NOT CHANGED    Details   amLODIPine (NORVASC) 10 MG tablet Take 1 tablet (10 mg total) by mouth once daily.  Qty: 90 tablet, Refills: 3    Comments: Please delete all prior scripts with same name and strength including on holds.      atenoloL (TENORMIN) 100 MG tablet Take 1 tablet (100 mg total) by mouth once daily.  Qty: 90 tablet, Refills: 3    Comments: Please delete all prior scripts with same name and strength including on holds.      clonazePAM (KLONOPIN) 0.5 MG tablet TAKE 1 TABLET BY MOUTH ONCE DAILY AS NEEDED FOR ANXIETY  Qty: 90 tablet, Refills: 0    Associated Diagnoses: Generalized anxiety disorder; Panic disorder without agoraphobia      hydroCHLOROthiazide (HYDRODIURIL) 25 MG tablet Take 1 tablet (25 mg total) by mouth once daily.  Qty: 90 tablet, Refills: 3    Comments: Please delete all prior scripts with same name and strength including on holds.      losartan (COZAAR) 100 MG tablet Take 1 tablet (100 mg total) by mouth once daily.  Qty: 90 tablet, Refills: 3    Comments: Please delete all prior scripts with same name and strength including on holds.      aspirin (ECOTRIN) 81 MG EC tablet Take 1 tablet (81 mg total) by mouth once daily.  Qty: 30 tablet, Refills: 0      atorvastatin (LIPITOR) 40 MG tablet Take 1 tablet (40 mg total) by mouth once daily.  Qty: 90 tablet, Refills: 3    Comments: Please inactivate all prior scripts with same name and strength including on holds.  Associated Diagnoses: Hyperlipidemia      blood-glucose meter kit Use twice  daily.  Qty: 1 each, Refills: 0    Associated Diagnoses: Type 2 diabetes mellitus without complication, without long-term current use of insulin      canagliflozin (INVOKANA) 300 mg Tab tablet Take 1 tablet (300 mg total) by mouth daily before breakfast.  Qty: 90 tablet, Refills: 1    Comments: This prescription was filled on 3/18/2022. Any refills authorized will be placed on file.      clobetasoL (TEMOVATE) 0.05 % external solution Apply topically once daily.  Qty: 50 mL, Refills: 3      clotrimazole-betamethasone 1-0.05% (LOTRISONE) cream Apply topically 2 (two) times daily as needed.       cyclobenzaprine (FLEXERIL) 10 MG tablet TAKE ONE TABLET BY MOUTH AT BEDTIME AS NEEDED  Qty: 90 tablet, Refills: 2    Associated Diagnoses: Reactive arthritis      diclofenac sodium (VOLTAREN) 1 % Gel Apply 4 g topically 4 (four) times daily. Apply light film topically to knee up to four times daily  Qty: 3 Tube, Refills: 2      docusate sodium (COLACE) 50 MG capsule Take 1 capsule (50 mg total) by mouth 2 (two) times daily.  Qty: 180 capsule, Refills: 3      entecavir (BARACLUDE) 0.5 MG Tab Take 1 tablet (0.5 mg total) by mouth once daily.  Qty: 90 tablet, Refills: 3    Associated Diagnoses: History of hepatitis B      ergocalciferol (ERGOCALCIFEROL) 50,000 unit Cap Take 1 capsule (50,000 Units total) by mouth every 7 days.  Qty: 12 capsule, Refills: 3    Associated Diagnoses: Vitamin D deficiency      fluocinolone acetonide oiL 0.01 % Drop Place 3 drops into both ears 2 (two) times a day.  Qty: 20 mL, Refills: 3      fluocinonide 0.05% (LIDEX) 0.05 % cream Apply topically 2 (two) times daily. To allergic rash  Qty: 30 g, Refills: 0    Associated Diagnoses: Eczema, unspecified type      !! FLUoxetine 20 MG capsule Take 1 capsule (20 mg total) by mouth 2 (two) times daily.  Qty: 180 capsule, Refills: 1    Comments: sent as 180 with 1 refill. never filled per transferring pharmacy      !! FLUoxetine 20 MG capsule Take 1 capsule  (20 mg total) by mouth 2 (two) times a day.  Qty: 180 capsule, Refills: 0      !! fluticasone propionate (FLONASE) 50 mcg/actuation nasal spray 2 sprays (100 mcg total) by Each Nostril route once daily.  Qty: 16 g, Refills: 6      !! fluticasone propionate (FLONASE) 50 mcg/actuation nasal spray Use 2 sprays in each nostril once daily.  Qty: 16 g, Refills: 12      FREESTYLE LITE STRIPS Strp test TWICE DAILY  Qty: 200 each, Refills: 3    Comments: Please inactivate all prior scripts with same name and strength including any scripts on hold.  Associated Diagnoses: Type 2 diabetes mellitus without complication, without long-term current use of insulin      golimumab (SIMPONI) 50 mg/0.5 mL PnIj Inject 50 mg into the skin every 30 days.  Qty: 1.5 mL, Refills: 1    Associated Diagnoses: Axial spondyloarthritis      hydrocortisone 2.5 % cream Apply topically 2 (two) times daily. for 10 days  Qty: 28 g, Refills: 1      hydroquinone 4 % Crea Apply to dark areas qhs.  Not more than 6 months straight in same location.Use sunscreen in am  Qty: 46 g, Refills: 1    Associated Diagnoses: Post-inflammatory hyperpigmentation      ketoconazole (NIZORAL) 2 % shampoo Apply topically once a week.  Qty: 120 mL, Refills: 12      lancets (FREESTYLE LANCETS) 28 gauge Misc test TWICE DAILY  Qty: 200 each, Refills: 3    Comments: Please inactivate all prior scripts with same name and strength including any scripts on hold.  Associated Diagnoses: Type 2 diabetes mellitus without complication, without long-term current use of insulin      levocetirizine (XYZAL) 5 MG tablet TAKE ONE TABLET BY MOUTH EVERY EVENING  Qty: 90 tablet, Refills: 2    Comments: This prescription was filled on 7/7/2022. Any refills authorized will be placed on file.  Associated Diagnoses: Acute bronchitis, unspecified organism; Sinus headache      metroNIDAZOLE (FLAGYL) 500 MG tablet Take 1 tablet (500 mg total) by mouth 2 (two) times a day for 14 days. NO ALCOHOL.  Qty:  14 tablet, Refills: 0      naproxen (NAPROSYN) 500 MG tablet Take 1 tablet (500 mg total) by mouth 2 (two) times daily as needed  Qty: 180 tablet, Refills: 0    Associated Diagnoses: Right hip pain      neomycin-polymyxin-hydrocortisone (CORTISPORIN) 3.5-10,000-1 mg/mL-unit/mL-% otic suspension Place 3 drops into the left ear 3 (three) times daily.  Qty: 10 mL, Refills: 0    Associated Diagnoses: Left otitis media, unspecified otitis media type      nystatin (MYCOSTATIN) powder Apply to affected area 3 times daily  Qty: 1 Bottle, Refills: 3    Associated Diagnoses: Candida infection      pantoprazole (PROTONIX) 40 MG tablet Take 1 tablet (40 mg total) by mouth once daily.  Qty: 90 tablet, Refills: 2    Comments: This prescription was filled on 7/7/2022. Any refills authorized will be placed on file.      prednisoLONE acetate (PRED FORTE) 1 % DrpS Place 1 drop into both eyes daily as needed (For flare-ups until able to be seen by the Optometrist).  Qty: 5 mL, Refills: 1    Associated Diagnoses: Recurrent iritis of both eyes      repaglinide (PRANDIN) 1 MG tablet TAKE ONE TABLET BY MOUTH THREE TIMES DAILY BEFORE MEALS (TAKING PLACE OF [place])  Qty: 270 tablet, Refills: 0    Comments: Please delete all prior scripts with same name and strength including on holds.  Associated Diagnoses: Type 2 diabetes mellitus without complication, with long-term current use of insulin      semaglutide (OZEMPIC) 1 mg/dose (4 mg/3 mL) Inject 1 mg into the skin every 7 days.  Qty: 1 pen, Refills: 11    Associated Diagnoses: Type 2 diabetes mellitus with diabetic polyneuropathy, unspecified whether long term insulin use      semaglutide, weight loss, (WEGOVY) 1 mg/0.5 mL PnIj Inject 1 mg into the skin every 7 days.  Qty: 2 mL, Refills: 3      sulfaSALAzine (AZULFIDINE) 500 MG EC tablet Take 3 tablets (1,500 mg total) by mouth 2 (two) times daily.  Qty: 540 tablet, Refills: 0    Associated Diagnoses: Reactive arthritis; Inflammatory  arthritis; High risk medication use; Spondyloarthritis      !! terconazole (TERAZOL 7) 0.4 % Crea Place 1 applicator vaginally every evening.  Qty: 45 g, Refills: 2    Associated Diagnoses: Candida infection      !! terconazole (TERAZOL 7) 0.4 % Crea Place 1 applicatorful vaginally every evening.  Qty: 45 g, Refills: 2    Associated Diagnoses: Candida infection      !! terconazole (TERAZOL 7) 0.4 % Crea Place 1 applicator vaginally every evening.  Qty: 7 g, Refills: 0    Associated Diagnoses: Candida infection      tiZANidine (ZANAFLEX) 2 MG tablet Take 1-2 tablets (2-4 mg total) by mouth every 8 (eight) hours as needed.  Qty: 90 tablet, Refills: 2      topiramate (TOPAMAX) 100 MG tablet Take 1 tablet (100 mg total) by mouth 2 (two) times daily.  Qty: 180 tablet, Refills: 3      triamcinolone acetonide 0.1% (KENALOG) 0.1 % cream Apply topically 2 (two) times daily.  Qty: 45 g, Refills: 1      valACYclovir (VALTREX) 1000 MG tablet valacyclovir 1 gram tablet   Take 0.5 tablets every 12 hours by oral route.      vitamin D (VITAMIN D3) 1000 units Tab Take 1,000 Units by mouth once daily.      zolpidem (AMBIEN) 5 MG Tab Take 1 tablet (5 mg total) by mouth nightly as needed.  Qty: 90 tablet, Refills: 0       !! - Potential duplicate medications found. Please discuss with provider.              Discharge Diagnosis: Cervical radiculopathy [M54.12]  Condition on Discharge: Stable with no complications to procedure   Diet on Discharge: Same as before.  Activity: as per instruction sheet.  Discharge to: Home with a responsible adult.  Follow up: 2-4 weeks       Please call my office or pager at [phone] if experienced any weakness or loss of sensation, fever > 101.5, pain uncontrolled with oral medications, persistent nausea/vomiting/or diarrhea, redness or drainage from the incisions, or any other worrisome concerns. If physician on call was not reached or could not communicate with our office for any reason please go to  the nearest emergency department        Juvenal Segura

## 2023-02-23 NOTE — H&P
[facility]  Althea Vazquez presents for C7/T1 SHARONA.        Past Medical History:   Diagnosis Date    Acid reflux     Anxiety 10/18/2012    Arthritis     Depression     Diabetic peripheral neuropathy - mild 10/21/2014    Difficult intubation     Dry eyes     Dry mouth     Fever blister     History of hepatitis B 10/3/2016    Hep B core total Ab (+), no active/chronic infection    Hyperlipidemia     Hypertension     Insomnia     Iritis 5/13/2014    Long-term current use of steroids 9/27/2012    Nausea & vomiting 2/4/2015    VAISHNAVI (obstructive sleep apnea)     Type II diabetes mellitus 10/1/2012     Past Surgical History:   Procedure Laterality Date    COLONOSCOPY N/A 1/9/2018    Procedure: COLONOSCOPY;  Surgeon: Juwan Lopez MD;  Location: [facility] ENDO ([address] [place] [address]);  Service: Endoscopy;  Laterality: N/A;  per anesthesia, pt. needs to be on 2nd floor due to past Anesthesia problems    EPIDURAL STEROID INJECTION INTO CERVICAL SPINE N/A 2/17/2021    Procedure: Injection-steroid-epidural-cervical--C7-T1 IL SHARONA;  Surgeon: Graciela Jerome MD;  Location: [facility]IN [facility];  Service: Pain Management;  Laterality: N/A;    HYSTERECTOMY  12/3/2014    INJECTION OF ANESTHETIC AGENT INTO SACROILIAC JOINT Left 4/21/2021    Procedure: LEFT SACROILIAC JOINT STEROID INJECTION;  Surgeon: Graciela Jerome MD;  Location: [facility] PAIN T;  Service: Pain Management;  Laterality: Left;    INJECTION OF JOINT Left 4/21/2021    Procedure: Injection, Joint--LEFT GTB;  Surgeon: Graciela Jerome MD;  Location: [facility] PAIN MGT;  Service: Pain Management;  Laterality: Left;    KNEE ARTHROSCOPY  5-14-14    right    TUBAL LIGATION       Review of patient's allergies indicates:   Allergen Reactions    Bactrim [sulfamethoxazole-trimethoprim] Itching    Trulicity [dulaglutide] Diarrhea and Other (See Comments)     Vomiting, abdominal pain    Diflucan [fluconazole] Swelling and Other (See Comments)     Sore on mouth        PMHx, PSHx, Allergies, Medications reviewed  in epic      ROS negative except pain complaints in HPI    OBJECTIVE:    LMP 11/25/2014     PHYSICAL EXAMINATION:    GENERAL: Well appearing, in no acute distress, alert and oriented x3.  PSYCH:  Mood and affect appropriate.  SKIN: Skin color, texture, turgor normal, no rashes or lesions.  CV: RRR with palpation of the radial artery.  PULM: No evidence of respiratory difficulty, symmetric chest rise. Clear to auscultation.  NEURO: Cranial nerves grossly intact.    Plan:    Proceed with procedure as planned    Ludmila Colin  02/23/2023

## 2023-02-23 NOTE — DISCHARGE INSTRUCTIONS
Home Care Instructions Pain Management:    1.  DIET:    You may resume your normal diet today.    2.  BATHING:    You may shower with luke warm water.    3.  DRESSING:    You may remove your bandage today.    4.  ACTIVITY LEVEL:      You may resume your normal activities 24 hours after your procedure.    5.  MEDICATIONS:    You may resume your normal medications today.    6.  SPECIAL INSTRUCTIONS:    No heat to the injection site for 24 hours including bath or shower, heating pad, moist heat or hot tubs.    Use an ice pack to the injection site for any pain or discomfort.  Apply ice packs for 20 minute intervals as needed.    If you have received any sedatives by mouth today, you can not drive for 12 hours.    If you have received sedation through an IV, you can not drive for 24 hours.    PLEASE CALL YOUR DOCTOR FOR THE FOLLOWIN.  Redness or swelling around the injection site.  2.  Fever of 101 degrees.  3.  Drainage (pus) from the injection site.  4.  For any continuous bleeding (some dried blood over the incision is normal.)    FOR EMERGENCIES:    If any unusual problems or difficulties occur during clinic hours, call [phone] or dial [phone].    Follow up with with your physician in 2-3 weeks.

## 2023-02-23 NOTE — OP NOTE
Cervical Interlaminar Epidural Steroid Injection under Fluoroscopic Guidance    The procedure, risks, benefits, and options were discussed with the patient. There are no contraindications to the procedure. The patent expressed understanding and agreed to the procedure. Informed written consent was obtained prior to the start of the procedure and can be found in the patient's chart.     PATIENT NAME: Althea Vazquez   MRN: [id number]     DATE OF PROCEDURE: 02/23/2023    PROCEDURE: Cervical Interlaminar Epidural Steroid Injection C7/T1 under Fluoroscopic Guidance    PRE-OP DIAGNOSIS: Cervical radiculopathy [M54.12] Cervical radiculopathy [M54.12]    POST-OP DIAGNOSIS: Same    PHYSICIAN: Juvenal Segura MD     ASSISTANTS: Ludmila Colin MD [facility]      MEDICATIONS INJECTED: Preservative-free Decadron 10mg with 1cc of Lidocaine 1% MPF and preservative free normal saline    LOCAL ANESTHETIC INJECTED: Xylocaine 2%     SEDATION: None    ESTIMATED BLOOD LOSS: None    COMPLICATIONS: None    TECHNIQUE: Time-out was performed to identify the patient and procedure to be performed. With the patient laying in a prone position, the surgical area was prepped and draped in the usual sterile fashion using ChloraPrep and a fenestrated drape. The level was determined under fluoroscopy guidance. Skin anesthesia was achieved by injecting Lidocaine 2% over the injection site.  The interlaminar space was then approached with a 20 gauge, 3.5 inch Tuohy needle that was introduced under fluoroscopic guidance with AP, lateral and/or contralateral oblique imaging. Once the Ligamentum flavum was encountered loss of resistance to saline was used to enter the epidural space. With positive loss of resistance and negative aspiration for CSF or Blood, contrast dye Omniscan was injected to confirm placement and there was no vascular runoff. Then 3 mL of the medication mixture listed above was then injected slowly. Displacement of the radio  opaque contrast after injection of the medication confirmed that the medication went into the area of the epidural space. The needles were removed, and bleeding was nil. A sterile dressing was applied. No specimens collected. The patient tolerated the procedure well.       The patient was monitored after the procedure in the recovery area. They were given post-procedure and discharge instructions to follow at home. The patient was discharged in a stable condition.    Ludmila Colin MD [facility]    I reviewed and edited the fellow's note. I conducted my own interview and physical examination. I agree with the findings. I was present and supervising all critical portions of the procedure.    Juvenal Segura MD

## 2023-03-02 DIAGNOSIS — E11.9 TYPE 2 DIABETES MELLITUS WITHOUT COMPLICATION, WITHOUT LONG-TERM CURRENT USE OF INSULIN: ICD-10-CM

## 2023-03-02 DIAGNOSIS — E78.5 HYPERLIPIDEMIA, UNSPECIFIED HYPERLIPIDEMIA TYPE: Primary | ICD-10-CM

## 2023-03-02 RX ORDER — ROSUVASTATIN CALCIUM 40 MG/1
40 TABLET, COATED ORAL NIGHTLY
Qty: 90 TABLET | Refills: 3 | Status: SHIPPED | OUTPATIENT
Start: 2023-03-02 | End: 2023-03-03 | Stop reason: SDUPTHER

## 2023-03-02 NOTE — TELEPHONE ENCOUNTER
----- Message from Gabriel Arvizu MD sent at 2/15/2023  3:57 PM CST -----  The vitamin D is normal. Continue current vitamin D dosing.  Your lipid panel gives a 10-year ASCVD risk score (Dilcia JIMENEZ, et al., 2019) of: 14.3%(intermediate risk) The LDL is  111.8 which is slightly above. Will copy Dr. Begum to see if thinks the atorvastatin dose should be increased given your diabetes and spondylarthritis. CLEMENT

## 2023-03-02 NOTE — TELEPHONE ENCOUNTER
Advise pt I am changing her Atorvastatin to Crestor to lower her lipids more  Lipids/AST/ALT and HA1C in 3 months

## 2023-03-03 ENCOUNTER — TELEPHONE (OUTPATIENT)
Dept: INTERNAL MEDICINE | Facility: CLINIC | Age: 58
End: 2023-03-03
Payer: COMMERCIAL

## 2023-03-03 DIAGNOSIS — E11.9 TYPE 2 DIABETES MELLITUS WITHOUT COMPLICATION, WITHOUT LONG-TERM CURRENT USE OF INSULIN: ICD-10-CM

## 2023-03-03 RX ORDER — INSULIN PUMP SYRINGE, 3 ML
EACH MISCELLANEOUS
Qty: 1 EACH | Refills: 0 | Status: SHIPPED | OUTPATIENT
Start: 2023-03-03 | End: 2023-03-03 | Stop reason: SDUPTHER

## 2023-03-03 RX ORDER — ROSUVASTATIN CALCIUM 40 MG/1
40 TABLET, COATED ORAL NIGHTLY
Qty: 90 TABLET | Refills: 3 | Status: SHIPPED | OUTPATIENT
Start: 2023-03-03 | End: 2024-03-02

## 2023-03-03 NOTE — TELEPHONE ENCOUNTER
No new care gaps identified.  [facility] Embedded Care Gaps. Reference number: [id number]. 3/03/2023   8:56:16 AM CST

## 2023-03-03 NOTE — TELEPHONE ENCOUNTER
"Per pharmacy, "for the diabetic testing meter. that is not covered under [facility] part D. it maybe cover at another pharmacy that can bill part B"  " Patient/Caregiver provided printed discharge information.

## 2023-03-04 RX ORDER — INSULIN PUMP SYRINGE, 3 ML
EACH MISCELLANEOUS
Qty: 1 EACH | Refills: 0 | Status: SHIPPED | OUTPATIENT
Start: 2023-03-04

## 2023-03-27 ENCOUNTER — OFFICE VISIT (OUTPATIENT)
Dept: INTERNAL MEDICINE | Facility: CLINIC | Age: 58
End: 2023-03-27
Payer: COMMERCIAL

## 2023-03-27 VITALS
SYSTOLIC BLOOD PRESSURE: 130 MMHG | HEART RATE: 82 BPM | DIASTOLIC BLOOD PRESSURE: 80 MMHG | RESPIRATION RATE: 16 BRPM | BODY MASS INDEX: 32.53 KG/M2 | HEIGHT: 67 IN | WEIGHT: 207.25 LBS | TEMPERATURE: 97 F | OXYGEN SATURATION: 100 %

## 2023-03-27 DIAGNOSIS — H92.03 OTALGIA OF BOTH EARS: ICD-10-CM

## 2023-03-27 DIAGNOSIS — H66.91 RIGHT OTITIS MEDIA, UNSPECIFIED OTITIS MEDIA TYPE: Primary | ICD-10-CM

## 2023-03-27 PROCEDURE — 3079F DIAST BP 80-89 MM HG: CPT | Mod: CPTII,S$GLB,, | Performed by: HOSPITALIST

## 2023-03-27 PROCEDURE — 99213 OFFICE O/P EST LOW 20 MIN: CPT | Mod: S$GLB,,, | Performed by: HOSPITALIST

## 2023-03-27 PROCEDURE — 99999 PR PBB SHADOW E&M-EST. PATIENT-LVL III: CPT | Mod: PBBFAC,,, | Performed by: HOSPITALIST

## 2023-03-27 PROCEDURE — 3008F PR BODY MASS INDEX (BMI) DOCUMENTED: ICD-10-PCS | Mod: CPTII,S$GLB,, | Performed by: HOSPITALIST

## 2023-03-27 PROCEDURE — 4010F ACE/ARB THERAPY RXD/TAKEN: CPT | Mod: CPTII,S$GLB,, | Performed by: HOSPITALIST

## 2023-03-27 PROCEDURE — 99213 PR OFFICE/OUTPT VISIT, EST, LEVL III, 20-29 MIN: ICD-10-PCS | Mod: S$GLB,,, | Performed by: HOSPITALIST

## 2023-03-27 PROCEDURE — 1159F PR MEDICATION LIST DOCUMENTED IN MEDICAL RECORD: ICD-10-PCS | Mod: CPTII,S$GLB,, | Performed by: HOSPITALIST

## 2023-03-27 PROCEDURE — 1160F PR REVIEW ALL MEDS BY PRESCRIBER/CLIN PHARMACIST DOCUMENTED: ICD-10-PCS | Mod: CPTII,S$GLB,, | Performed by: HOSPITALIST

## 2023-03-27 PROCEDURE — 1160F RVW MEDS BY RX/DR IN RCRD: CPT | Mod: CPTII,S$GLB,, | Performed by: HOSPITALIST

## 2023-03-27 PROCEDURE — 3008F BODY MASS INDEX DOCD: CPT | Mod: CPTII,S$GLB,, | Performed by: HOSPITALIST

## 2023-03-27 PROCEDURE — 4010F PR ACE/ARB THEARPY RXD/TAKEN: ICD-10-PCS | Mod: CPTII,S$GLB,, | Performed by: HOSPITALIST

## 2023-03-27 PROCEDURE — 3079F PR MOST RECENT DIASTOLIC BLOOD PRESSURE 80-89 MM HG: ICD-10-PCS | Mod: CPTII,S$GLB,, | Performed by: HOSPITALIST

## 2023-03-27 PROCEDURE — 3072F LOW RISK FOR RETINOPATHY: CPT | Mod: CPTII,S$GLB,, | Performed by: HOSPITALIST

## 2023-03-27 PROCEDURE — 3075F SYST BP GE 130 - 139MM HG: CPT | Mod: CPTII,S$GLB,, | Performed by: HOSPITALIST

## 2023-03-27 PROCEDURE — 99999 PR PBB SHADOW E&M-EST. PATIENT-LVL III: ICD-10-PCS | Mod: PBBFAC,,, | Performed by: HOSPITALIST

## 2023-03-27 PROCEDURE — 3075F PR MOST RECENT SYSTOLIC BLOOD PRESS GE 130-139MM HG: ICD-10-PCS | Mod: CPTII,S$GLB,, | Performed by: HOSPITALIST

## 2023-03-27 PROCEDURE — 3072F PR LOW RISK FOR RETINOPATHY: ICD-10-PCS | Mod: CPTII,S$GLB,, | Performed by: HOSPITALIST

## 2023-03-27 PROCEDURE — 1159F MED LIST DOCD IN RCRD: CPT | Mod: CPTII,S$GLB,, | Performed by: HOSPITALIST

## 2023-03-27 RX ORDER — AMOXICILLIN AND CLAVULANATE POTASSIUM 875; 125 MG/1; MG/1
1 TABLET, FILM COATED ORAL EVERY 12 HOURS
Qty: 14 TABLET | Refills: 0 | Status: SHIPPED | OUTPATIENT
Start: 2023-03-27 | End: 2023-05-29

## 2023-03-27 RX ORDER — NEOMYCIN SULFATE, POLYMYXIN B SULFATE AND HYDROCORTISONE 10; 3.5; 1 MG/ML; MG/ML; [USP'U]/ML
3 SUSPENSION/ DROPS AURICULAR (OTIC) 3 TIMES DAILY PRN
Qty: 10 ML | Refills: 0 | Status: SHIPPED | OUTPATIENT
Start: 2023-03-27 | End: 2023-09-04 | Stop reason: SDUPTHER

## 2023-03-27 NOTE — PROGRESS NOTES
"Subjective:     @Patient ID: Althea Vazquez is a 57 y.o. female.    Chief Complaint: Ear Fullness, Otalgia, and Sore Throat    HPI    58 yo F with DM2, HTN presents for urgent visit with c/o bilateral ear itching and pain, mild throat irritation x 3-4 days   No f/c.  Tried aleve sinus and congestion, gargling with warm salt water   Reports xyzal makes her very drowsy.    Reports symptoms not improving        Review of Systems   Constitutional:  Negative for chills and fever.   HENT:  Positive for congestion and ear pain.    Respiratory:  Negative for cough.    Past medical history, surgical history, and family medical history reviewed and updated as appropriate.    Medications and allergies reviewed.     Objective:     Vitals:    03/27/23 1336   BP: 130/80   BP Location: Right arm   Patient Position: Sitting   BP Method: Medium (Manual)   Pulse: 82   Resp: 16   Temp: 97.3 °F (36.3 °C)   TempSrc: Temporal   SpO2: 100%   Weight: 94 kg (207 lb 3.7 oz)   Height: 5' 7" (1.702 m)     There is no height or weight on file to calculate BMI.  Physical Exam  Constitutional:       Appearance: Normal appearance.   HENT:      Head: Normocephalic and atraumatic.      Right Ear: Tympanic membrane is erythematous and bulging.      Left Ear: Tympanic membrane is erythematous.      Mouth/Throat:      Mouth: Mucous membranes are moist.      Pharynx: No oropharyngeal exudate.   Eyes:      General:         Right eye: No discharge.         Left eye: No discharge.      Conjunctiva/sclera: Conjunctivae normal.   Cardiovascular:      Rate and Rhythm: Normal rate and regular rhythm.      Heart sounds: No murmur heard.  Pulmonary:      Effort: Pulmonary effort is normal.      Breath sounds: Normal breath sounds.   Musculoskeletal:         General: Normal range of motion.      Cervical back: Normal range of motion and neck supple.   Skin:     General: Skin is warm and dry.   Neurological:      Mental Status: She is alert and oriented " to person, place, and time.   Psychiatric:         Mood and Affect: Mood normal.         Behavior: Behavior normal.       Lab Results   Component Value Date    WBC 6.01 01/23/2023    HGB 14.9 01/23/2023    HCT 44.6 01/23/2023     01/23/2023    CHOL 179 02/15/2023    TRIG 56 02/15/2023    HDL 56 02/15/2023    ALT 22 01/23/2023    AST 26 01/23/2023     01/23/2023    K 3.9 01/23/2023     01/23/2023    CREATININE 0.58 01/23/2023    BUN 11 01/23/2023    CO2 30 (H) 01/23/2023    TSH 2.350 03/09/2022    INR 1.0 09/09/2016    HGBA1C 6.7 (H) 06/17/2022       Assessment:     1. Right otitis media, unspecified otitis media type    2. Otalgia of both ears      Plan:   Althea was seen today for ear fullness, otalgia and sore throat.    Diagnoses and all orders for this visit:    Right otitis media, unspecified otitis media type  -     amoxicillin-clavulanate 875-125mg (AUGMENTIN) 875-125 mg per tablet; Take 1 tablet by mouth every 12 (twelve) hours.    Otalgia of both ears  -     neomycin-polymyxin-hydrocortisone (CORTISPORIN) 3.5-10,000-1 mg/mL-unit/mL-% otic suspension; Place 3 drops into both ears 3 (three) times daily as needed. Up to 5 days           Mayra Maravilla MD  Internal Medicine    3/27/2023

## 2023-03-31 ENCOUNTER — PATIENT MESSAGE (OUTPATIENT)
Dept: PSYCHIATRY | Facility: CLINIC | Age: 58
End: 2023-03-31
Payer: COMMERCIAL

## 2023-03-31 DIAGNOSIS — F41.0 PANIC DISORDER WITHOUT AGORAPHOBIA: ICD-10-CM

## 2023-03-31 DIAGNOSIS — F41.1 GENERALIZED ANXIETY DISORDER: ICD-10-CM

## 2023-03-31 RX ORDER — CLONAZEPAM 0.5 MG/1
TABLET ORAL
Qty: 90 TABLET | Refills: 0 | Status: SHIPPED | OUTPATIENT
Start: 2023-03-31 | End: 2024-01-10 | Stop reason: SDUPTHER

## 2023-03-31 RX ORDER — FLUOXETINE HYDROCHLORIDE 20 MG/1
20 CAPSULE ORAL 2 TIMES DAILY
Qty: 180 CAPSULE | Refills: 1 | Status: SHIPPED | OUTPATIENT
Start: 2023-03-31 | End: 2024-01-10 | Stop reason: SDUPTHER

## 2023-03-31 RX ORDER — ZOLPIDEM TARTRATE 5 MG/1
5 TABLET ORAL NIGHTLY PRN
Qty: 90 TABLET | Refills: 0 | Status: SHIPPED | OUTPATIENT
Start: 2023-03-31 | End: 2023-07-19 | Stop reason: SDUPTHER

## 2023-04-14 ENCOUNTER — TELEPHONE (OUTPATIENT)
Dept: OPHTHALMOLOGY | Facility: CLINIC | Age: 58
End: 2023-04-14
Payer: COMMERCIAL

## 2023-04-14 NOTE — TELEPHONE ENCOUNTER
Called patient regarding her concerns    ----- Message from Janae Goodrich sent at 4/13/2023  4:37 PM CDT -----  Regarding: FW: advice / appt access  Contact: self @ [phone]    ----- Message -----  From: Deanna Ramirez  Sent: 4/13/2023  12:07 PM CDT  To: [facility] Staff  Subject: advice / appt access                             Pt was advised to call Dr Tamayo if she started having issues.  She says her eyes swollen, red, itchy and leaking.  She is calling to see if he wants to call in a different medication or come in to be seen.  Pls call asap.

## 2023-04-17 ENCOUNTER — OFFICE VISIT (OUTPATIENT)
Dept: PAIN MEDICINE | Facility: CLINIC | Age: 58
End: 2023-04-17
Payer: COMMERCIAL

## 2023-04-17 VITALS
BODY MASS INDEX: 32.46 KG/M2 | WEIGHT: 207.25 LBS | DIASTOLIC BLOOD PRESSURE: 92 MMHG | SYSTOLIC BLOOD PRESSURE: 143 MMHG | HEART RATE: 86 BPM

## 2023-04-17 DIAGNOSIS — M17.11 PRIMARY OSTEOARTHRITIS OF RIGHT KNEE: Primary | ICD-10-CM

## 2023-04-17 DIAGNOSIS — M25.561 RIGHT KNEE PAIN, UNSPECIFIED CHRONICITY: ICD-10-CM

## 2023-04-17 PROCEDURE — 1160F RVW MEDS BY RX/DR IN RCRD: CPT | Mod: CPTII,S$GLB,, | Performed by: NURSE PRACTITIONER

## 2023-04-17 PROCEDURE — 99999 PR PBB SHADOW E&M-EST. PATIENT-LVL IV: CPT | Mod: PBBFAC,,, | Performed by: NURSE PRACTITIONER

## 2023-04-17 PROCEDURE — 99999 PR PBB SHADOW E&M-EST. PATIENT-LVL IV: ICD-10-PCS | Mod: PBBFAC,,, | Performed by: NURSE PRACTITIONER

## 2023-04-17 PROCEDURE — 3077F SYST BP >= 140 MM HG: CPT | Mod: CPTII,S$GLB,, | Performed by: NURSE PRACTITIONER

## 2023-04-17 PROCEDURE — 1159F PR MEDICATION LIST DOCUMENTED IN MEDICAL RECORD: ICD-10-PCS | Mod: CPTII,S$GLB,, | Performed by: NURSE PRACTITIONER

## 2023-04-17 PROCEDURE — 3072F LOW RISK FOR RETINOPATHY: CPT | Mod: CPTII,S$GLB,, | Performed by: NURSE PRACTITIONER

## 2023-04-17 PROCEDURE — 3008F PR BODY MASS INDEX (BMI) DOCUMENTED: ICD-10-PCS | Mod: CPTII,S$GLB,, | Performed by: NURSE PRACTITIONER

## 2023-04-17 PROCEDURE — 4010F ACE/ARB THERAPY RXD/TAKEN: CPT | Mod: CPTII,S$GLB,, | Performed by: NURSE PRACTITIONER

## 2023-04-17 PROCEDURE — 1160F PR REVIEW ALL MEDS BY PRESCRIBER/CLIN PHARMACIST DOCUMENTED: ICD-10-PCS | Mod: CPTII,S$GLB,, | Performed by: NURSE PRACTITIONER

## 2023-04-17 PROCEDURE — 3077F PR MOST RECENT SYSTOLIC BLOOD PRESSURE >= 140 MM HG: ICD-10-PCS | Mod: CPTII,S$GLB,, | Performed by: NURSE PRACTITIONER

## 2023-04-17 PROCEDURE — 3080F DIAST BP >= 90 MM HG: CPT | Mod: CPTII,S$GLB,, | Performed by: NURSE PRACTITIONER

## 2023-04-17 PROCEDURE — 99214 OFFICE O/P EST MOD 30 MIN: CPT | Mod: S$GLB,,, | Performed by: NURSE PRACTITIONER

## 2023-04-17 PROCEDURE — 4010F PR ACE/ARB THEARPY RXD/TAKEN: ICD-10-PCS | Mod: CPTII,S$GLB,, | Performed by: NURSE PRACTITIONER

## 2023-04-17 PROCEDURE — 3008F BODY MASS INDEX DOCD: CPT | Mod: CPTII,S$GLB,, | Performed by: NURSE PRACTITIONER

## 2023-04-17 PROCEDURE — 1159F MED LIST DOCD IN RCRD: CPT | Mod: CPTII,S$GLB,, | Performed by: NURSE PRACTITIONER

## 2023-04-17 PROCEDURE — 3080F PR MOST RECENT DIASTOLIC BLOOD PRESSURE >= 90 MM HG: ICD-10-PCS | Mod: CPTII,S$GLB,, | Performed by: NURSE PRACTITIONER

## 2023-04-17 PROCEDURE — 3072F PR LOW RISK FOR RETINOPATHY: ICD-10-PCS | Mod: CPTII,S$GLB,, | Performed by: NURSE PRACTITIONER

## 2023-04-17 PROCEDURE — 99214 PR OFFICE/OUTPT VISIT, EST, LEVL IV, 30-39 MIN: ICD-10-PCS | Mod: S$GLB,,, | Performed by: NURSE PRACTITIONER

## 2023-04-17 NOTE — PROGRESS NOTES
"[facility].      Chief Complaint:   Chief Complaint   Patient presents with    Knee Pain         History of Present Illness: Althea Vazquez is a 57 y.o. female here for cervical radiculopathy.      Onset: several years, no specific inciting event  Location: neck, in the midline and towards the left side  Radiation: left hand/arm  Timing: intermittent  Quality: Aching and Dull  Exacerbating Factors: lifting  Alleviating Factors: hot shower  Associated Symptoms: She feels weakness and numbness in the left arm/hand. Denies night fever/night sweats, urinary incontinence, bowel incontinence and significant weight loss    Severity: Currently: 5/10   Typical Range: 0-8/10     Exacerbation: 8/10     She also notes low back pain. Low back Pain has been present off and on for a few years, but worsened last week. Pain is localized to the bilateral low back (worse on the left) with occasional radiation down the left leg, but none currently. Pain is described as a "hurting pain". The pain is aggravated by spinning/turning. The pain is alleviated by warm bath, massage. She feels weakness in both legs. She denies numbness in her legs or feet. Denies changes in bowel or bladder function. Denies saddle anesthesia. Denies recent fevers or infections. Denies significant weight loss      Interval History (04/17/2023):  Althea Vazquez returns today with right knee pain that has been ongoing for 3-4 weeks.  Pain is constant pain described as aching and throbbing.  Pain is aggravated with walking, standing and rising from a seated position.  Has any radiating symptoms to her right lower extremity.   She denies any recent incident or trauma, denies any recent falls denies any profound weakness.  She also reports having swollen, red, itchy and leaky eyes.  She does have a history of chronic I infections.  She has a follow up with her ophthalmologist this week.  She is currently on " antibiotic eyedrops.       Current Pain Scales:  Current: 8/10              Typical Range: 8-9/10       Interval History  1/10/2023- 58 y/o female that presents virtually she is complaining neck and left arm pain that radiates into her hands and fingers.  She feels weakness in her left hand she does get numbness in her left hand as well.  She is a former patient of Dr. Jerome she has previously been provided a cervical SHARONA targeting C7-T1 on 02/17/2021 which provide her 80% relief she is requesting a repeat injection.  She denies any profound weakness, denies any bowel bladder dysfunction, denies the inability to write with a pen.    Pain Disability Index  Family/Home Responsibilities:: 7  Recreation:: 7  Social Activity:: 7  Occupation:: 7  Sexual Behavior:: 7  Self Care:: 6  Life-Support Activities:: 6  Pain Disability Index (PDI): 47    Previous Interventions:  -04/21/2021 Left  sacroiliac joint injection and  Left  greater trochanteric bursa injection -average relief with 70%  - 2/17/21: C7-T1 IL SHARONA w/ 80% relief    Previous Therapies:  PT/OT: yes for neck and back, helps, and she continues to do her HEP as much as possible.   Relevant Surgery: Right knee arthroscopy  Previous Medications:   - NSAIDS: Naproxen.  Ibuprofen did not help.  Sulindac.  - Muscle Relaxants: Flexeril. Tizanidine   - TCAs:   - SNRIs:   - Topicals:   - Anticonvulsants:    - Opioids: Norco. Oxycodone. (wants to avoid addicting medications)    Current Pain Medications:  Flexeril 10mg  Naproxen 500mg  Tizanidine 2mg     Blood Thinners: Aspirin 81mg    Full Medication List:    Current Outpatient Medications:     amLODIPine (NORVASC) 10 MG tablet, Take 1 tablet (10 mg total) by mouth once daily., Disp: 90 tablet, Rfl: 3    amoxicillin-clavulanate 875-125mg (AUGMENTIN) 875-125 mg per tablet, Take 1 tablet by mouth every 12 (twelve) hours., Disp: 14 tablet, Rfl: 0    aspirin (ECOTRIN) 81 MG EC tablet, Take 1 tablet (81 mg total) by mouth once  daily., Disp: 30 tablet, Rfl: 0    atenoloL (TENORMIN) 100 MG tablet, Take 1 tablet (100 mg total) by mouth once daily., Disp: 90 tablet, Rfl: 3    blood-glucose meter kit, Use twice daily., Disp: 1 each, Rfl: 0    canagliflozin (INVOKANA) 300 mg Tab tablet, Take 1 tablet (300 mg total) by mouth daily before breakfast., Disp: 90 tablet, Rfl: 1    clobetasoL (TEMOVATE) 0.05 % external solution, Apply topically once daily., Disp: 50 mL, Rfl: 3    clonazePAM (KLONOPIN) 0.5 MG tablet, TAKE 1 TABLET BY MOUTH ONCE DAILY AS NEEDED FOR ANXIETY, Disp: 90 tablet, Rfl: 0    clotrimazole-betamethasone 1-0.05% (LOTRISONE) cream, Apply topically 2 (two) times daily as needed. , Disp: , Rfl:     cyclobenzaprine (FLEXERIL) 10 MG tablet, TAKE ONE TABLET BY MOUTH AT BEDTIME AS NEEDED, Disp: 90 tablet, Rfl: 2    docusate sodium (COLACE) 50 MG capsule, Take 1 capsule (50 mg total) by mouth 2 (two) times daily., Disp: 180 capsule, Rfl: 3    entecavir (BARACLUDE) 0.5 MG Tab, Take 1 tablet (0.5 mg total) by mouth once daily., Disp: 90 tablet, Rfl: 3    ergocalciferol (ERGOCALCIFEROL) 50,000 unit Cap, Take 1 capsule (50,000 Units total) by mouth every 7 days., Disp: 12 capsule, Rfl: 3    fluocinolone acetonide oiL 0.01 % Drop, Place 3 drops into both ears 2 (two) times a day., Disp: 20 mL, Rfl: 3    fluocinonide 0.05% (LIDEX) 0.05 % cream, Apply topically 2 (two) times daily. To allergic rash, Disp: 30 g, Rfl: 0    FLUoxetine 20 MG capsule, Take 1 capsule (20 mg total) by mouth 2 (two) times a day., Disp: 180 capsule, Rfl: 0    FLUoxetine 20 MG capsule, Take 1 capsule (20 mg total) by mouth 2 (two) times daily., Disp: 180 capsule, Rfl: 1    fluticasone propionate (FLONASE) 50 mcg/actuation nasal spray, 2 sprays (100 mcg total) by Each Nostril route once daily., Disp: 16 g, Rfl: 6    fluticasone propionate (FLONASE) 50 mcg/actuation nasal spray, Use 2 sprays in each nostril once daily., Disp: 16 g, Rfl: 12    FREESTYLE LITE STRIPS Strp,  test TWICE DAILY, Disp: 200 each, Rfl: 3    golimumab (SIMPONI) 50 mg/0.5 mL PnIj, Inject 50 mg into the skin every 30 days., Disp: 1.5 mL, Rfl: 1    hydroCHLOROthiazide (HYDRODIURIL) 25 MG tablet, Take 1 tablet (25 mg total) by mouth once daily., Disp: 90 tablet, Rfl: 3    hydroquinone 4 % Crea, Apply to dark areas qhs.  Not more than 6 months straight in same location.Use sunscreen in am, Disp: 46 g, Rfl: 1    ketoconazole (NIZORAL) 2 % shampoo, Apply topically once a week., Disp: 120 mL, Rfl: 12    lancets (FREESTYLE LANCETS) 28 gauge Misc, test TWICE DAILY, Disp: 200 each, Rfl: 3    levocetirizine (XYZAL) 5 MG tablet, TAKE ONE TABLET BY MOUTH EVERY EVENING, Disp: 90 tablet, Rfl: 2    losartan (COZAAR) 100 MG tablet, Take 1 tablet (100 mg total) by mouth once daily., Disp: 90 tablet, Rfl: 3    naproxen (NAPROSYN) 500 MG tablet, Take 1 tablet (500 mg total) by mouth 2 (two) times daily as needed, Disp: 180 tablet, Rfl: 0    neomycin-polymyxin-hydrocortisone (CORTISPORIN) 3.5-10,000-1 mg/mL-unit/mL-% otic suspension, Place 3 drops into both ears 3 (three) times daily as needed. Up to 5 days, Disp: 10 mL, Rfl: 0    pantoprazole (PROTONIX) 40 MG tablet, Take 1 tablet (40 mg total) by mouth once daily., Disp: 90 tablet, Rfl: 2    prednisoLONE acetate (PRED FORTE) 1 % DrpS, Place 1 drop into both eyes daily as needed (For flare-ups until able to be seen by the Optometrist)., Disp: 5 mL, Rfl: 1    repaglinide (PRANDIN) 1 MG tablet, TAKE ONE TABLET BY MOUTH THREE TIMES DAILY BEFORE MEALS (TAKING PLACE OF TRAJENTA), Disp: 270 tablet, Rfl: 0    rosuvastatin (CRESTOR) 40 MG Tab, Take 1 tablet (40 mg total) by mouth every evening., Disp: 90 tablet, Rfl: 3    semaglutide (OZEMPIC) 1 mg/dose (4 mg/3 mL), Inject 1 mg into the skin every 7 days., Disp: 1 pen, Rfl: 11    semaglutide, weight loss, (WEGOVY) 1 mg/0.5 mL PnIj, Inject 1 mg into the skin every 7 days., Disp: 2 mL, Rfl: 3    sulfaSALAzine (AZULFIDINE) 500 MG EC tablet,  Take 3 tablets (1,500 mg total) by mouth 2 (two) times daily., Disp: 540 tablet, Rfl: 0    terconazole (TERAZOL 7) 0.4 % Crea, Place 1 applicator vaginally every evening., Disp: 45 g, Rfl: 2    terconazole (TERAZOL 7) 0.4 % Crea, Place 1 applicatorful vaginally every evening., Disp: 45 g, Rfl: 2    terconazole (TERAZOL 7) 0.4 % Crea, Place 1 applicator vaginally every evening., Disp: 7 g, Rfl: 0    tiZANidine (ZANAFLEX) 2 MG tablet, Take 1-2 tablets (2-4 mg total) by mouth every 8 (eight) hours as needed., Disp: 90 tablet, Rfl: 2    topiramate (TOPAMAX) 100 MG tablet, Take 1 tablet (100 mg total) by mouth 2 (two) times daily., Disp: 180 tablet, Rfl: 3    triamcinolone acetonide 0.1% (KENALOG) 0.1 % cream, Apply topically 2 (two) times daily., Disp: 45 g, Rfl: 1    valACYclovir (VALTREX) 1000 MG tablet, valacyclovir 1 gram tablet  Take 0.5 tablets every 12 hours by oral route., Disp: , Rfl:     vitamin D (VITAMIN D3) 1000 units Tab, Take 1,000 Units by mouth once daily., Disp: , Rfl:     zolpidem (AMBIEN) 5 MG Tab, Take 1 tablet (5 mg total) by mouth nightly as needed., Disp: 90 tablet, Rfl: 0    diclofenac sodium (VOLTAREN) 1 % Gel, Apply 4 g topically 4 (four) times daily. Apply light film topically to knee up to four times daily, Disp: 3 Tube, Rfl: 2    hydrocortisone 2.5 % cream, Apply topically 2 (two) times daily. for 10 days, Disp: 28 g, Rfl: 1    nystatin (MYCOSTATIN) powder, Apply to affected area 3 times daily, Disp: 1 Bottle, Rfl: 3  No current facility-administered medications for this visit.    Facility-Administered Medications Ordered in Other Visits:     0.9%  NaCl infusion, , Intravenous, Continuous, Graciela Jerome MD     Review of Systems:  Review of Systems   Constitutional:  Negative for fever and weight loss.   HENT:  Negative for ear pain and tinnitus.    Eyes:  Positive for redness. Negative for pain.   Respiratory:  Negative for cough and shortness of breath.    Cardiovascular:  Negative for  chest pain and palpitations.   Gastrointestinal:  Negative for constipation and heartburn.   Genitourinary: Negative.         Denies urinary incontinence. Denies urine retention.    Musculoskeletal:  Positive for back pain, myalgias and neck pain.   Skin:  Negative for itching and rash.   Neurological:  Positive for tingling and weakness. Negative for seizures.   Endo/Heme/Allergies:  Negative for environmental allergies. Does not bruise/bleed easily.   Psychiatric/Behavioral:  Negative for depression. The patient has insomnia. The patient is not nervous/anxious.      Allergies:  Bactrim [sulfamethoxazole-trimethoprim], Trulicity [dulaglutide], and Diflucan [fluconazole]     Medical History:   has a past medical history of Acid reflux, Anxiety (10/18/2012), Arthritis, Depression, Diabetic peripheral neuropathy - mild (10/21/2014), Difficult intubation, Dry eyes, Dry mouth, Fever blister, History of hepatitis B (10/3/2016), Hyperlipidemia, Hypertension, Insomnia, Iritis (5/13/2014), Long-term current use of steroids (9/27/2012), Nausea & vomiting (2/4/2015), VAISHNAVI (obstructive sleep apnea), and Type II diabetes mellitus (10/1/2012).    Surgical History:   has a past surgical history that includes Tubal ligation; Knee arthroscopy (5-14-14); Hysterectomy (12/3/2014); Colonoscopy (N/A, 1/9/2018); Epidural steroid injection into cervical spine (N/A, 2/17/2021); Injection of anesthetic agent into sacroiliac joint (Left, 4/21/2021); Injection of joint (Left, 4/21/2021); and Epidural steroid injection into cervical spine (N/A, 2/23/2023).    Family History:  family history includes ADD / ADHD in her son; Cancer in her paternal grandfather; Cataracts in her mother; Colon cancer in her father; Depression in her mother; Diabetes in her maternal aunt and mother; Heart attack in her mother and paternal grandmother; Hyperlipidemia in her father; Hypertension in her father; No Known Problems in her brother, maternal grandfather,  maternal grandmother, maternal uncle, paternal aunt, paternal uncle, and sister; Stroke in her father and mother.    Social History:   reports that she has never smoked. She has never used smokeless tobacco. She reports that she does not currently use alcohol. She reports that she does not use drugs.    Physical Exam:  BP (!) 143/92   Pulse 86   Wt 94 kg (207 lb 3.7 oz)   LMP 11/25/2014   BMI 32.46 kg/m²       General Musculoskeletal Exam   Gait: antalgic       Right Knee Exam     Inspection   Swelling: present    Tenderness   The patient is tender to palpation of the medial joint line.    Range of Motion   Extension:  abnormal   Flexion:  abnormal     Comments:  Positive for tenderness in the medial joint line, right knee also felt warm upon palpation and to touch.      Imaging:  -MRI Cervical Spine Without Contrast 11/16/2020  FINDINGS:  Vertebral body height is normal and alignment is maintained. Marrow signal is within normal limits. The craniocervical junction is unremarkable. No abnormal cord signal or cord deformity.  T2 hyperintense, likely cystic focus within the left aspect of the thyroid gland is unchanged.  Intervertebral disc levels are as follows:  C2-C3 disc: No significant disc pathology. Normal facet joints. No spinal canal or neural foraminal stenosis.  C3-C4 disc: Posterior disc bulge with a posterior annular fissure that is new compared to prior.  Normal uncovertebral joints and facet joints.  The thecal sac measures 9 mm AP.  Previously the thecal sac measured 10 mm AP.  C4-C5 disc: Disc space height loss with a posterior disc bulge.  Anterior osteophytes.  Normal vertebral joints and facet joints.  No significant stenosis.  The thecal sac measures 12 mm AP.  These findings are similar to prior.  C5-C6 disc: Posterior disc bulge.  Normal uncovertebral joints and facet joints.  The thecal sac measures 11 mm AP.  No significant foraminal stenosis.  Previously the thecal sac measured 11 mm AP  at this level as well.  C6-C7 disc: Posterior disc bulge with a posterior annular fissure.  This is new compared to the prior study.  Normal uncovertebral joints and facet joints.  The thecal sac measures 11 mm AP.  No significant foraminal stenosis.  Previously the thecal sac measured 13 mm.  C7-T1 disc: Posterior disc bulge.  Normal facet joints.  The thecal sac measures 12 mm AP.  No significant foraminal stenosis.  These findings are similar to prior.  Impression:  1. There are new posterior disc bulges with annular fissures at C3-C4 and C6-C7.  There is now mild spinal stenosis at C3-C4.  2. No significant foraminal stenosis.    - MRI SIJ w/ & w/o contrast 9/11/16:  There are enhancing inflammatory changes about the anterior superior aspect of the left SI joint in the expected location of the medial lumbar or lumbosacral ligament, findings that are highly suggestive of enthesitis.  There is apparent trace enhancement on the posterior superior aspect of the right SI joint leads is overall nonspecific.  SI joints are symmetric.  No synovitis.  No joint effusions.  No osseous erosive changes.  Visualized musculature demonstrate normal bulk and signal intensity.  Piriformis muscles are symmetric.  Ischiofemoral spaces are unremarkable.  Adductor and abductor tendons are unremarkable.  Hamstring tendons are normal.  Subcutaneous soft tissues are normal.  Limited evaluation of the lower abdominal and pelvic organs is unremarkable.  Uterus is surgically absent    - MRI Spine (Lumbar, Thoracic, Cervical) w/ & w/o contrast 9/11/16:  CERVICAL SPINE:  There is modest straightening of the normal cervical lordosis.  No spondylolisthesis.  Vertebral body heights are well-maintained without evidence for fracture.  Visualized osseous structures demonstrate intact pars signal intensity without findings to suggest an infiltrative process.  Cervical spinal cord demonstrates normal contour and signal intensity.  Cerebellar tonsils  are in their expected location.  Cervicomedullary junction is unremarkable.  Postcontrast images demonstrate no abnormal enhancement.  Prevertebral soft tissues are normal.  Vertebral artery flow voids are present.  T2 hyperintense nodules are noted within the bilateral thyroid lobes.  The spinal musculature and trace normal bulk and signal intensity.  supraspinous ligament is unremarkable without findings to suggest enthesitis.  C2-C3: No spinal canal stenosis or neural foraminal narrowing.  C3-C4: There is mild bilateral facet arthropathy and uncovertebral joint spurring and mild bilateral neural foraminal narrowing.  No spinal canal stenosis.  C4-C5: No spinal canal stenosis or neural foraminal narrowing.  C5-C6: There is a moderate broad-based posterior disc ossified complex that partially effaces the anterior thecal sac.  There is mild left-sided uncovertebral joint spurring.  Findings contribute to mild spinal canal stenosis and mild left-sided neural foraminal narrowing.  C6-C7: No spinal canal stenosis or neural foraminal narrowing.  C7-T1: No spinal canal stenosis or neural foraminal narrowing.    THORACIC SPINE:  Thoracic spine alignment is normal.  No spondylolisthesis.  Vertebral body heights are well-maintained without evidence for fracture.  There is enhancing inflammation of the left side zygapophyseal/facet joint at T5-T6.  Remaining visualized osseous structures demonstrate intact menisci and in density without findings to suggest an infiltrative process.  Note is made of the anterior vertebral body at this demonstrate intact signal intensity without findings to suggest spondylitis.  Visualized spinal cord demonstrates normal contour and signal intensity.  Postcontrast images demonstrate normal enhancement.  Visualized thoracic and upper abdominal organs are normal.  There is a low signal described focal area of signal abnormality within the posterior subcutaneous soft tissues and appears to  demonstrate subtle enhancement and is favored to be benign though nonspecific.  No significant intervertebral disc abnormalities.  No spinal canal stenosis or neural foramina narrowing.    LUMBAR SPINE:  Lumbar spine alignment is normal.  No spondylolysis or spondylolisthesis.  Vertebral body heights are well-maintained without evidence for fracture.  Visualized osseous structures demonstrate intact pars signal intensity without findings to suggest an infiltrative process.  Visualized distal spinal cord demonstrates normal contour and signal intensity.  Cauda equina is normal mild without findings to suggest arachnoiditis.  Postcontrast images demonstrate normal enhancement.  There is a subcentimeter T2 hyperintense lesion within the right renal cortex, too small to accurately guided thighs.  Paraspinal musculature demonstrates normal bulk and signal intensity.  Subcutaneous soft tissues are within normal limits.  L1-L2: No significant intervertebral disc abnormalities.  Facet joints are well maintained.  No spinal canal stenosis or neural foraminal narrowing.  L2-L3: There is mild intervertebral disc desiccation.  Facet joints are well maintained.  No spinal canal stenosis or neural foraminal narrowing.  L3-L4: There is mild intervertebral disc desiccation.  Facet joints are well maintained.  No spinal canal stenosis or neural foraminal narrowing.  L4-L5: There is mild intervertebral disc space narrowing and desiccation with small circumferential bulge.  There is mild bilateral facet arthropathy.  No spinal canal stenosis or neural foraminal narrowing.  L5-S1: There is mild intervertebral disc space narrowing desiccation with a small circumferential bulge and a small posterior annular fissure.  Findings contribute to mild left-sided neural foraminal narrowing. There is mild bilateral facet arthropathy.  No spinal canal stenosis.    IMPRESSION:   Enhancing inflammatory changes involving the left zygapophyseal/facet  joint at T5-T6 that is nonspecific but can be seen with inflammatory arthropathies. No MR imaging finding to suggest enthesitis, spondylitis or spondylodiskitis.  Mild cervical and lumbar degenerative changes without significant spinal canal stenosis.  Cystic subcutaneous soft tissues within the posterior back, favored to be benign, possibly a complex sebaceous cyst.  Consider follow-up examination in 12 months      Labs:  BMP  Lab Results   Component Value Date     01/23/2023    K 3.9 01/23/2023     01/23/2023    CO2 30 (H) 01/23/2023    BUN 11 01/23/2023    CREATININE 0.58 01/23/2023    CALCIUM 9.0 01/23/2023    ANIONGAP 6 (L) 01/23/2023    ESTGFRAFRICA >60.0 06/17/2022    ESTGFRAFRICA >60.0 06/17/2022    EGFRNONAA >60.0 06/17/2022    EGFRNONAA >60.0 06/17/2022     Lab Results   Component Value Date    ALT 22 01/23/2023    AST 26 01/23/2023     (H) 08/18/2012    ALKPHOS 89 01/23/2023    BILITOT 0.4 01/23/2023     Lab Results   Component Value Date     01/23/2023       Assessment:  Althea Vazquez is a 57 y.o. female with the following diagnoses based on history, exam, and imaging:    Problem List Items Addressed This Visit    None  Visit Diagnoses       Primary osteoarthritis of right knee    -  Primary    Right knee pain, unspecified chronicity                    This is a pleasant 57 y.o. lady presenting with:     - Chronic neck pain with left cervical radiculopathy:  Posterior disc bulge with annular fissure at C6-7 which may be the cause.  She also has myofascial pain on exam.  - Chronic left shoulder pain with impingement signs on exam  - Chronic bilateral low back pain (worse on the left): She follows with rheumatology, Dr. Arvizu, and has been diagnosed with axial spondyloarthritis.  Her back pain appears multifactorial.  She localizes most of her pain over her left sacroiliac joint with evidence of inflammatory changes about the left SIJ on prior MRI, but she also has  positive facet provocative maneuvers and on her previous lumbar MRI there is evidence of facet arthropathy as well, which does appears slightly worse to me on the left at L5-S1.  - left trochanteric bursitis  - Comorbidities:  Axial spondyloarthritis.  Anxiety and depression.  Type 2 diabetes mellitus.  BMI greater than 30.  Insomnia.  Obstructive sleep apnea.  History of long-term steroid use.      1/10/2023- 58 y/o female with chronic neck and  left cervical radiculopathy: MRI shows  Posterior disc bulge with annular fissure at C6-7 which may be the cause.  She also has myofascial pain on exam.    04/17/2023-Althea Vazquez is a 57 y.o. female who  has a past medical history of Acid reflux, Anxiety (10/18/2012), Arthritis, Depression, Diabetic peripheral neuropathy - mild (10/21/2014), Difficult intubation, Dry eyes, Dry mouth, Fever blister, History of hepatitis B (10/3/2016), Hyperlipidemia, Hypertension, Insomnia, Iritis (5/13/2014), Long-term current use of steroids (9/27/2012), Nausea & vomiting (2/4/2015), VAISHNAVI (obstructive sleep apnea), and Type II diabetes mellitus (10/1/2012).  By history and examination this patient has right knee pain  The underlying cause is osteoarthritis and degenerative joint disease.  Pathology is confirmed by imaging.  We discussed the underlying diagnoses and multiple treatment options including interventional procedures, physical therapy, home exercise, core muscle enhancement, activity modification, and weight loss.  The risks and benefits of each treatment option were discussed and all questions were answered.  Patient upon presentation appears to have bilateral eye infections as erythema and swelling was noted in both eyes.  Currently on eyedrops for this infection.  She will be following up with the ophthalmologist this week discussed with patient that I will hold off on providing her with a right knee intra-articular injection today once her infection has cleared  we can reconsider this injection in office.  Patient verbalized understanding agreed.      Treatment Plan:   - PT/OT/HEP:  Consider returning to physical therapy in the future specifically to strengthen her quadriceps, abductors adductors and hamstrings to help reduce from right knee pain.  - Procedures:  In the future consider a right intra-articular knee injection once her eye infection has subsided.  - Consider MBB vs FJI if no relief with SIJ injection  - Medications: No changes recommended at this time.  - Imaging: Reviewed and discussed in detail using images from chart.  - Labs: Reviewed.  Medications are appropriately dosed for current hepatorenal function.    Follow Up: RTC in 6-8 weeks or sooner    ROBIN Méndez  Interventional Pain Management        Disclaimer: This note was partly generated using dictation software which may occasionally result in transcription errors.

## 2023-04-20 ENCOUNTER — OFFICE VISIT (OUTPATIENT)
Dept: OPTOMETRY | Facility: CLINIC | Age: 58
End: 2023-04-20
Payer: COMMERCIAL

## 2023-04-20 DIAGNOSIS — H21.542 POSTERIOR SYNECHIAE (IRIS), LEFT EYE: ICD-10-CM

## 2023-04-20 DIAGNOSIS — H20.023 RECURRENT IRITIS OF BOTH EYES: Primary | ICD-10-CM

## 2023-04-20 DIAGNOSIS — H25.813 COMBINED FORMS OF AGE-RELATED CATARACT OF BOTH EYES: ICD-10-CM

## 2023-04-20 DIAGNOSIS — H25.13 NUCLEAR SCLEROSIS OF BOTH EYES: ICD-10-CM

## 2023-04-20 PROCEDURE — 99999 PR PBB SHADOW E&M-EST. PATIENT-LVL IV: ICD-10-PCS | Mod: PBBFAC,,, | Performed by: OPTOMETRIST

## 2023-04-20 PROCEDURE — 92012 INTRM OPH EXAM EST PATIENT: CPT | Mod: S$GLB,,, | Performed by: OPTOMETRIST

## 2023-04-20 PROCEDURE — 1159F PR MEDICATION LIST DOCUMENTED IN MEDICAL RECORD: ICD-10-PCS | Mod: CPTII,S$GLB,, | Performed by: OPTOMETRIST

## 2023-04-20 PROCEDURE — 92012 PR EYE EXAM, EST PATIENT,INTERMED: ICD-10-PCS | Mod: S$GLB,,, | Performed by: OPTOMETRIST

## 2023-04-20 PROCEDURE — 1159F MED LIST DOCD IN RCRD: CPT | Mod: CPTII,S$GLB,, | Performed by: OPTOMETRIST

## 2023-04-20 PROCEDURE — 99999 PR PBB SHADOW E&M-EST. PATIENT-LVL IV: CPT | Mod: PBBFAC,,, | Performed by: OPTOMETRIST

## 2023-04-20 PROCEDURE — 4010F PR ACE/ARB THEARPY RXD/TAKEN: ICD-10-PCS | Mod: CPTII,S$GLB,, | Performed by: OPTOMETRIST

## 2023-04-20 PROCEDURE — 4010F ACE/ARB THERAPY RXD/TAKEN: CPT | Mod: CPTII,S$GLB,, | Performed by: OPTOMETRIST
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
## 2023-04-20 NOTE — PATIENT INSTRUCTIONS
History of reactive arthritis.  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  Nuclear sclerosis and cortical cataract in both eyes.   Cyclopentolate ophthalmic solution 1.0% every twelve hours in both eyes.  Pred Forte 1.0% ophthalmic suspension every four hours while awake in the right eye and every two hours while awake in the left eye.  Return 04/24/2023 for recheck.  Advised to go to emergency room if signs/symptoms get worse over the weekend (and ask for ophthalmologist on call).

## 2023-04-21 ENCOUNTER — TELEPHONE (OUTPATIENT)
Dept: RHEUMATOLOGY | Facility: CLINIC | Age: 58
End: 2023-04-21
Payer: COMMERCIAL

## 2023-04-21 ENCOUNTER — PATIENT MESSAGE (OUTPATIENT)
Dept: RHEUMATOLOGY | Facility: CLINIC | Age: 58
End: 2023-04-21
Payer: COMMERCIAL

## 2023-04-21 ENCOUNTER — HOSPITAL ENCOUNTER (EMERGENCY)
Facility: HOSPITAL | Age: 58
Discharge: HOME | End: 2023-04-21
Attending: EMERGENCY MEDICINE
Payer: COMMERCIAL

## 2023-04-21 VITALS
WEIGHT: 206 LBS | BODY MASS INDEX: 32.33 KG/M2 | OXYGEN SATURATION: 98 % | RESPIRATION RATE: 18 BRPM | DIASTOLIC BLOOD PRESSURE: 89 MMHG | HEIGHT: 67 IN | HEART RATE: 73 BPM | TEMPERATURE: 98 F | SYSTOLIC BLOOD PRESSURE: 129 MMHG

## 2023-04-21 DIAGNOSIS — M79.671 FOOT PAIN, RIGHT: Primary | ICD-10-CM

## 2023-04-21 DIAGNOSIS — M25.579 ANKLE PAIN, UNSPECIFIED CHRONICITY, UNSPECIFIED LATERALITY: Primary | ICD-10-CM

## 2023-04-21 DIAGNOSIS — M79.673 FOOT PAIN: ICD-10-CM

## 2023-04-21 LAB — POCT GLUCOSE: 88 MG/DL (ref 70–110)

## 2023-04-21 PROCEDURE — 82962 GLUCOSE BLOOD TEST: CPT | Mod: ER

## 2023-04-21 PROCEDURE — 25000003 PHARM REV CODE 250: Mod: ER | Performed by: EMERGENCY MEDICINE

## 2023-04-21 PROCEDURE — 63600175 PHARM REV CODE 636 W HCPCS: Mod: ER | Performed by: EMERGENCY MEDICINE

## 2023-04-21 PROCEDURE — 99284 EMERGENCY DEPT VISIT MOD MDM: CPT | Mod: ER

## 2023-04-21 PROCEDURE — 96372 THER/PROPH/DIAG INJ SC/IM: CPT | Performed by: EMERGENCY MEDICINE

## 2023-04-21 RX ORDER — HYDROCODONE BITARTRATE AND ACETAMINOPHEN 5; 325 MG/1; MG/1
1 TABLET ORAL
Status: COMPLETED | OUTPATIENT
Start: 2023-04-21 | End: 2023-04-21

## 2023-04-21 RX ORDER — HYDROMORPHONE HYDROCHLORIDE 1 MG/ML
1 INJECTION, SOLUTION INTRAMUSCULAR; INTRAVENOUS; SUBCUTANEOUS
Status: COMPLETED | OUTPATIENT
Start: 2023-04-21 | End: 2023-04-21

## 2023-04-21 RX ORDER — HYDROCODONE BITARTRATE AND ACETAMINOPHEN 5; 325 MG/1; MG/1
1 TABLET ORAL EVERY 4 HOURS PRN
Qty: 12 TABLET | Refills: 0 | Status: SHIPPED | OUTPATIENT
Start: 2023-04-21 | End: 2023-04-24

## 2023-04-21 RX ORDER — METHYLPREDNISOLONE 4 MG/1
TABLET ORAL
Qty: 1 EACH | Refills: 0 | Status: SHIPPED | OUTPATIENT
Start: 2023-04-21 | End: 2023-05-29

## 2023-04-21 RX ADMIN — HYDROCODONE BITARTRATE AND ACETAMINOPHEN 1 TABLET: 5; 325 TABLET ORAL at 03:04

## 2023-04-21 RX ADMIN — HYDROMORPHONE HYDROCHLORIDE 1 MG: 1 INJECTION, SOLUTION INTRAMUSCULAR; INTRAVENOUS; SUBCUTANEOUS at 03:04

## 2023-04-21 NOTE — TELEPHONE ENCOUNTER
Please tell her the x-rays show bone spurs under and behind the right heel. Please schedule appt in Podiatry for next weeks. In meantime tell her to rest, ice the are,  wear slip on ankle support keep leg elevated. Can also apply Voltaren gel.

## 2023-04-21 NOTE — ED PROVIDER NOTES
Encounter Date: 4/21/2023       History     Chief Complaint   Patient presents with    Foot Pain     Pt starts she can not walk on right foot. Pt denies injury or fall.     57-year-old female with a history of diabetes, hypertension presents with 2 days of atraumatic severe right foot pain.  Patient said initially her symptoms were in her right knee but then this subsided and she then had pain in her right foot.  No calf pain.  No chest pain or shortness of breath.  Patient says she is having difficulty bearing weight.    Review of patient's allergies indicates:   Allergen Reactions    Bactrim [sulfamethoxazole-trimethoprim] Itching    Trulicity [dulaglutide] Diarrhea and Other (See Comments)     Vomiting, abdominal pain    Diflucan [fluconazole] Swelling and Other (See Comments)     Sore on mouth     Past Medical History:   Diagnosis Date    Acid reflux     Anxiety 10/18/2012    Arthritis     Depression     Diabetic peripheral neuropathy - mild 10/21/2014    Difficult intubation     Dry eyes     Dry mouth     Fever blister     History of hepatitis B 10/3/2016    Hep B core total Ab (+), no active/chronic infection    Hyperlipidemia     Hypertension     Insomnia     Iritis 5/13/2014    Long-term current use of steroids 9/27/2012    Nausea & vomiting 2/4/2015    VAISHNAVI (obstructive sleep apnea)     Type II diabetes mellitus 10/1/2012     Past Surgical History:   Procedure Laterality Date    COLONOSCOPY N/A 1/9/2018    Procedure: COLONOSCOPY;  Surgeon: Juwan Lopez MD;  Location: [facility] ([address] [place] [address]);  Service: Endoscopy;  Laterality: N/A;  per anesthesia, pt. needs to be on 2nd floor due to past Anesthesia problems    EPIDURAL STEROID INJECTION INTO CERVICAL SPINE N/A 2/17/2021    Procedure: Injection-steroid-epidural-cervical--C7-T1 IL SHARONA;  Surgeon: Graciela Jerome MD;  Location: [facility];  Service: Pain Management;  Laterality: N/A;    EPIDURAL STEROID INJECTION INTO CERVICAL SPINE N/A 2/23/2023    Procedure:  Injection-steroid-epidural-cervical  C7-T1;  Surgeon: Juvenal Segura MD;  Location: [facility];  Service: Pain Management;  Laterality: N/A;    HYSTERECTOMY  12/3/2014    INJECTION OF ANESTHETIC AGENT INTO SACROILIAC JOINT Left 4/21/2021    Procedure: LEFT SACROILIAC JOINT STEROID INJECTION;  Surgeon: Graciela Jerome MD;  Location: [facility];  Service: Pain Management;  Laterality: Left;    INJECTION OF JOINT Left 4/21/2021    Procedure: Injection, Joint--LEFT GTB;  Surgeon: Graciela Jerome MD;  Location: [facility];  Service: Pain Management;  Laterality: Left;    KNEE ARTHROSCOPY  5-14-14    right    TUBAL LIGATION       Family History   Problem Relation Age of Onset    Diabetes Mother     Cataracts Mother     Stroke Mother     Heart attack Mother     Depression Mother     Stroke Father     Hypertension Father     Hyperlipidemia Father     Colon cancer Father     Diabetes Maternal Aunt     Heart attack Paternal Grandmother     ADD / ADHD Son     No Known Problems Sister     No Known Problems Brother     No Known Problems Maternal Uncle     No Known Problems Paternal Aunt     No Known Problems Paternal Uncle     No Known Problems Maternal Grandmother     No Known Problems Maternal Grandfather     Cancer Paternal Grandfather         Lung CA    Melanoma Neg Hx     Eczema Neg Hx     Lupus Neg Hx     Psoriasis Neg Hx     Amblyopia Neg Hx     Blindness Neg Hx     Glaucoma Neg Hx     Macular degeneration Neg Hx     Retinal detachment Neg Hx     Strabismus Neg Hx     Thyroid disease Neg Hx     Suicide Neg Hx     Acne Neg Hx     Cirrhosis Neg Hx     Asthma Neg Hx     Emphysema Neg Hx     Breast cancer Neg Hx     Ovarian cancer Neg Hx      Social History     Tobacco Use    Smoking status: Never    Smokeless tobacco: Never   Substance Use Topics    Alcohol use: Not Currently     Comment: occas.    Drug use: No     Review of Systems   Constitutional:  Negative for fever.   Respiratory:  Negative for shortness of  breath.    Cardiovascular:  Negative for chest pain.   Gastrointestinal:  Negative for vomiting.   Musculoskeletal:  Positive for arthralgias. Negative for back pain and neck pain.        Right foot pain, right knee pain (resolved)   Skin:  Negative for rash.   Neurological:  Negative for headaches.     Physical Exam     Initial Vitals [04/21/23 1322]   BP Pulse Resp Temp SpO2   (!) 156/89 81 20 98.3 °F (36.8 °C) 99 %      MAP       --         Physical Exam    Nursing note and vitals reviewed.  HENT:   Head: Atraumatic.   Musculoskeletal:      Comments: Tenderness to palpation to the plantar aspect of the right foot without overlying skin changes or palpable mass.  Pain significant and elicited with dorsiflexion of foot     Neurological: She is alert and oriented to person, place, and time.   Skin: No rash noted.       ED Course   Procedures  Labs Reviewed   POCT GLUCOSE   POCT GLUCOSE MONITORING CONTINUOUS          Imaging Results              X-Ray Foot Complete Right (Final result)  Result time 04/21/23 13:52:43      Final result by VALERIE Duarte Sr., MD (04/21/23 13:52:43)                   Impression:      1. There is mild hallux valgus deformity.  2. There is a small spur at the site of attachment of the Achilles tendon to the calcaneus.      Electronically signed by: Gabriel Duarte MD  Date:    04/21/2023  Time:    13:52               Narrative:    EXAMINATION:  XR FOOT COMPLETE 3 VIEW RIGHT    CLINICAL HISTORY:  Pain in unspecified foot    COMPARISON:  09/20/2021    FINDINGS:  There is no fracture. There is no dislocation.  There is mild hallux valgus deformity.  There is a small spur at the site of attachment of the Achilles tendon to the calcaneus.                                       Medications   HYDROcodone-acetaminophen 5-325 mg per tablet 1 tablet (1 tablet Oral Given 4/21/23 1506)     Medical Decision Making:   Initial Assessment:   57-year-old female with 2 days of right foot pain.  No history  of trauma.  Exam shows pain with palpation, especially dorsiflexion.  Pulses intact.  No erythema.  No evidence of infection.  No calf tenderness.  Doubt DVT.  Doubt ischemic limb.  Unclear if symptoms are present plantar fasciitis, gout, osteoarthritis or other foot etiology.  Will discharge patient home with small course of pain medications and Medrol Dosepak.  I have sent her podiatrist a message to hopefully see her on Monday and follow up.  Return instructions given.                        Clinical Impression:   Final diagnoses:  [M79.673] Foot pain  [M79.671] Foot pain, right (Primary)        ED Disposition Condition    Discharge Stable          ED Prescriptions       Medication Sig Dispense Start Date End Date Auth. Provider    methylPREDNISolone (MEDROL DOSEPACK) 4 mg tablet Take as directed 1 each 4/21/2023 -- Sanford Weeks MD    HYDROcodone-acetaminophen (NORCO) 5-325 mg per tablet Take 1 tablet by mouth every 4 (four) hours as needed for Pain. 12 tablet 4/21/2023 -- Sanford Weeks MD          Follow-up Information       Follow up With Specialties Details Why Contact Info    Rebeca Linder DPM Podiatry Schedule an appointment as soon as possible for a visit   [address]  [address]  [place] [address]  [phone]               Sanford Weeks MD  04/21/23 8546

## 2023-04-21 NOTE — TELEPHONE ENCOUNTER
Spoke with patient. Pt stated she was in pain and couldn't walk. Notified Dr. Arvizu. Advised patient to go to ED. Pt agreed.

## 2023-04-21 NOTE — FIRST PROVIDER EVALUATION
Emergency Department TeleTriage Encounter Note      CHIEF COMPLAINT    Chief Complaint   Patient presents with    Foot Pain     Pt starts she can not walk on right foot. Pt denies injury or fall.       VITAL SIGNS   Initial Vitals [04/21/23 1322]   BP Pulse Resp Temp SpO2   (!) 156/89 81 20 98.3 °F (36.8 °C) 99 %      MAP       --            ALLERGIES    Review of patient's allergies indicates:   Allergen Reactions    Bactrim [sulfamethoxazole-trimethoprim] Itching    Trulicity [dulaglutide] Diarrhea and Other (See Comments)     Vomiting, abdominal pain    Diflucan [fluconazole] Swelling and Other (See Comments)     Sore on mouth       PROVIDER TRIAGE NOTE  This is a teletriage evaluation of a 57 y.o. female presenting to the ED complaining of right foot pain for the past few days. Denies trauma. States she is unable to walk. Pmhx of reactive arthritis. Denies fever. No relief from OTC medications.     Well-appearing, no distress. Will obtain xray.     Initial orders will be placed and care will be transferred to an alternate provider when patient is roomed for a full evaluation. Any additional orders and the final disposition will be determined by that provider.         ORDERS  Labs Reviewed   POCT GLUCOSE MONITORING CONTINUOUS       ED Orders (720h ago, onward)      Start Ordered     Status Ordering Provider    04/21/23 1330 04/21/23 1329  POCT glucose  Once         Ordered FRAN CEDILLO    04/21/23 1329 04/21/23 1329  X-Ray Foot Complete Right  1 time imaging         Ordered FRAN CEDILLO              Virtual Visit Note: The provider triage portion of this emergency department evaluation and documentation was performed via [employer], a HIPAA-compliant telemedicine application, in concert with a tele-presenter in the room. A face to face patient evaluation with one of my colleagues will occur once the patient is placed in an emergency department room.      DISCLAIMER: This note was  prepared with [employer] voice recognition transcription software. Garbled syntax, mangled pronouns, and other bizarre constructions may be attributed to that software system.

## 2023-04-24 ENCOUNTER — OFFICE VISIT (OUTPATIENT)
Dept: PODIATRY | Facility: CLINIC | Age: 58
End: 2023-04-24
Payer: COMMERCIAL

## 2023-04-24 ENCOUNTER — OFFICE VISIT (OUTPATIENT)
Dept: OPTOMETRY | Facility: CLINIC | Age: 58
End: 2023-04-24
Payer: COMMERCIAL

## 2023-04-24 ENCOUNTER — LAB VISIT (OUTPATIENT)
Dept: LAB | Facility: HOSPITAL | Age: 58
End: 2023-04-24
Attending: PODIATRIST
Payer: COMMERCIAL

## 2023-04-24 VITALS
DIASTOLIC BLOOD PRESSURE: 90 MMHG | RESPIRATION RATE: 18 BRPM | SYSTOLIC BLOOD PRESSURE: 152 MMHG | HEIGHT: 67 IN | BODY MASS INDEX: 32.02 KG/M2 | HEART RATE: 80 BPM | WEIGHT: 204 LBS

## 2023-04-24 DIAGNOSIS — H20.023 RECURRENT IRITIS OF BOTH EYES: Primary | ICD-10-CM

## 2023-04-24 DIAGNOSIS — M79.671 FOOT PAIN, RIGHT: ICD-10-CM

## 2023-04-24 DIAGNOSIS — M25.579 ANKLE PAIN, UNSPECIFIED CHRONICITY, UNSPECIFIED LATERALITY: ICD-10-CM

## 2023-04-24 DIAGNOSIS — M84.374A STRESS FRACTURE OF RIGHT FOOT, INITIAL ENCOUNTER: Primary | ICD-10-CM

## 2023-04-24 DIAGNOSIS — H25.13 NUCLEAR SCLEROSIS OF BOTH EYES: ICD-10-CM

## 2023-04-24 DIAGNOSIS — H21.542 POSTERIOR SYNECHIAE (IRIS), LEFT EYE: ICD-10-CM

## 2023-04-24 LAB
CRP SERPL-MCNC: 13.5 MG/L (ref 0–8.2)
ERYTHROCYTE [SEDIMENTATION RATE] IN BLOOD BY PHOTOMETRIC METHOD: 6 MM/HR (ref 0–36)
URATE SERPL-MCNC: 1.7 MG/DL (ref 2.4–5.7)

## 2023-04-24 PROCEDURE — 3072F PR LOW RISK FOR RETINOPATHY: ICD-10-PCS | Mod: CPTII,S$GLB,, | Performed by: PODIATRIST

## 2023-04-24 PROCEDURE — 3072F LOW RISK FOR RETINOPATHY: CPT | Mod: CPTII,S$GLB,, | Performed by: PODIATRIST

## 2023-04-24 PROCEDURE — 99999 PR PBB SHADOW E&M-EST. PATIENT-LVL II: CPT | Mod: PBBFAC,,, | Performed by: OPTOMETRIST

## 2023-04-24 PROCEDURE — 99999 PR PBB SHADOW E&M-EST. PATIENT-LVL V: ICD-10-PCS | Mod: PBBFAC,,, | Performed by: PODIATRIST

## 2023-04-24 PROCEDURE — 99213 OFFICE O/P EST LOW 20 MIN: CPT | Mod: S$GLB,,, | Performed by: PODIATRIST

## 2023-04-24 PROCEDURE — 3080F PR MOST RECENT DIASTOLIC BLOOD PRESSURE >= 90 MM HG: ICD-10-PCS | Mod: CPTII,S$GLB,, | Performed by: PODIATRIST

## 2023-04-24 PROCEDURE — 99999 PR PBB SHADOW E&M-EST. PATIENT-LVL II: ICD-10-PCS | Mod: PBBFAC,,, | Performed by: OPTOMETRIST

## 2023-04-24 PROCEDURE — 3080F DIAST BP >= 90 MM HG: CPT | Mod: CPTII,S$GLB,, | Performed by: PODIATRIST

## 2023-04-24 PROCEDURE — 86140 C-REACTIVE PROTEIN: CPT | Performed by: PODIATRIST

## 2023-04-24 PROCEDURE — 4010F ACE/ARB THERAPY RXD/TAKEN: CPT | Mod: CPTII,S$GLB,, | Performed by: OPTOMETRIST

## 2023-04-24 PROCEDURE — 1159F MED LIST DOCD IN RCRD: CPT | Mod: CPTII,S$GLB,, | Performed by: PODIATRIST

## 2023-04-24 PROCEDURE — 3077F PR MOST RECENT SYSTOLIC BLOOD PRESSURE >= 140 MM HG: ICD-10-PCS | Mod: CPTII,S$GLB,, | Performed by: PODIATRIST

## 2023-04-24 PROCEDURE — 85652 RBC SED RATE AUTOMATED: CPT | Performed by: PODIATRIST

## 2023-04-24 PROCEDURE — 1159F PR MEDICATION LIST DOCUMENTED IN MEDICAL RECORD: ICD-10-PCS | Mod: CPTII,S$GLB,, | Performed by: PODIATRIST

## 2023-04-24 PROCEDURE — 1159F PR MEDICATION LIST DOCUMENTED IN MEDICAL RECORD: ICD-10-PCS | Mod: CPTII,S$GLB,, | Performed by: OPTOMETRIST

## 2023-04-24 PROCEDURE — 36415 COLL VENOUS BLD VENIPUNCTURE: CPT | Performed by: PODIATRIST

## 2023-04-24 PROCEDURE — 4010F PR ACE/ARB THEARPY RXD/TAKEN: ICD-10-PCS | Mod: CPTII,S$GLB,, | Performed by: PODIATRIST

## 2023-04-24 PROCEDURE — 92012 INTRM OPH EXAM EST PATIENT: CPT | Mod: S$GLB,,, | Performed by: OPTOMETRIST

## 2023-04-24 PROCEDURE — 99213 PR OFFICE/OUTPT VISIT, EST, LEVL III, 20-29 MIN: ICD-10-PCS | Mod: S$GLB,,, | Performed by: PODIATRIST

## 2023-04-24 PROCEDURE — 99999 PR PBB SHADOW E&M-EST. PATIENT-LVL V: CPT | Mod: PBBFAC,,, | Performed by: PODIATRIST

## 2023-04-24 PROCEDURE — 3008F BODY MASS INDEX DOCD: CPT | Mod: CPTII,S$GLB,, | Performed by: PODIATRIST

## 2023-04-24 PROCEDURE — 3077F SYST BP >= 140 MM HG: CPT | Mod: CPTII,S$GLB,, | Performed by: PODIATRIST

## 2023-04-24 PROCEDURE — 84550 ASSAY OF BLOOD/URIC ACID: CPT | Performed by: PODIATRIST

## 2023-04-24 PROCEDURE — 1159F MED LIST DOCD IN RCRD: CPT | Mod: CPTII,S$GLB,, | Performed by: OPTOMETRIST

## 2023-04-24 PROCEDURE — 4010F PR ACE/ARB THEARPY RXD/TAKEN: ICD-10-PCS | Mod: CPTII,S$GLB,, | Performed by: OPTOMETRIST

## 2023-04-24 PROCEDURE — 4010F ACE/ARB THERAPY RXD/TAKEN: CPT | Mod: CPTII,S$GLB,, | Performed by: PODIATRIST

## 2023-04-24 PROCEDURE — 92012 PR EYE EXAM, EST PATIENT,INTERMED: ICD-10-PCS | Mod: S$GLB,,, | Performed by: OPTOMETRIST

## 2023-04-24 PROCEDURE — 3008F PR BODY MASS INDEX (BMI) DOCUMENTED: ICD-10-PCS | Mod: CPTII,S$GLB,, | Performed by: PODIATRIST

## 2023-04-24 RX ORDER — DICLOFENAC SODIUM 10 MG/G
4 GEL TOPICAL 4 TIMES DAILY
Qty: 3 EACH | Refills: 2 | Status: SHIPPED | OUTPATIENT
Start: 2023-04-24 | End: 2024-02-07

## 2023-04-24 RX ORDER — INDOMETHACIN 50 MG/1
50 CAPSULE ORAL 2 TIMES DAILY WITH MEALS
Qty: 30 CAPSULE | Refills: 1 | Status: SHIPPED | OUTPATIENT
Start: 2023-04-24 | End: 2023-05-29

## 2023-04-24 RX ORDER — OXYCODONE AND ACETAMINOPHEN 5; 325 MG/1; MG/1
1 TABLET ORAL EVERY 8 HOURS PRN
Qty: 30 TABLET | Refills: 0 | Status: SHIPPED | OUTPATIENT
Start: 2023-04-24 | End: 2023-11-09

## 2023-04-24 NOTE — PATIENT INSTRUCTIONS
History of reactive arthritis.  Ms. Vazquez presented at last visit with bilateral recurrent iritis/anterior uveitis, OD < OS.  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  Nuclear sclerosis and cortical cataract in both eyes.   Has been using Cyclopentolate ophthalmic solution 1.0% every twelve hours in both eyes.  Has been using Pred Forte 1.0% ophthalmic suspension every four hours while awake in the right eye and every two hours while awake in the left eye.    Taylor Hays reports both eyes feeling much better.  Slit liamp examination indicates both eyes showing satisfactory response to the topical medications she is currently using.    Continue Pred Forte in both eyes - forr times per day in the right eye  and every 2 hours while awake in the left eye  Disontinue Cyclgyl in OD, and continue twice per day in OS.  Return for recheck on 04/27/2023

## 2023-04-25 NOTE — PROGRESS NOTES
Assessment /Plan     For exam results, see Encounter Report.    1. Recurrent iritis of both eyes        2. Posterior synechiae (iris), left eye        3. Nuclear sclerosis of both eyes                           History of reactive arthritis.  Ms. Vazquez presented at last visit with bilateral recurrent iritis/anterior uveitis, OD < OS.  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  Nuclear sclerosis and cortical cataract in both eyes.   Has been using Cyclopentolate ophthalmic solution 1.0% every twelve hours in both eyes.  Has been using Pred Forte 1.0% ophthalmic suspension every four hours while awake in the right eye and every two hours while awake in the left eye.    Taylor Hays reports both eyes feeling much better.  Slit liamp examination indicates both eyes showing satisfactory response to the topical medications she is currently using.    Continue Pred Forte in both eyes - forr times per day in the right eye  and every 2 hours while awake in the left eye  Disontinue Cyclgyl in OD, and continue twice per day in OS.  Return for recheck on 04/27/2023

## 2023-04-26 ENCOUNTER — OFFICE VISIT (OUTPATIENT)
Dept: PSYCHIATRY | Facility: CLINIC | Age: 58
End: 2023-04-26
Payer: COMMERCIAL

## 2023-04-26 DIAGNOSIS — F41.1 GENERALIZED ANXIETY DISORDER: Primary | ICD-10-CM

## 2023-04-26 PROCEDURE — 99214 PR OFFICE/OUTPT VISIT, EST, LEVL IV, 30-39 MIN: ICD-10-PCS | Mod: 95,,, | Performed by: PSYCHIATRY & NEUROLOGY

## 2023-04-26 PROCEDURE — 4010F ACE/ARB THERAPY RXD/TAKEN: CPT | Mod: CPTII,95,, | Performed by: PSYCHIATRY & NEUROLOGY

## 2023-04-26 PROCEDURE — 3072F LOW RISK FOR RETINOPATHY: CPT | Mod: CPTII,95,, | Performed by: PSYCHIATRY & NEUROLOGY

## 2023-04-26 PROCEDURE — 4010F PR ACE/ARB THEARPY RXD/TAKEN: ICD-10-PCS | Mod: CPTII,95,, | Performed by: PSYCHIATRY & NEUROLOGY

## 2023-04-26 PROCEDURE — 99214 OFFICE O/P EST MOD 30 MIN: CPT | Mod: 95,,, | Performed by: PSYCHIATRY & NEUROLOGY

## 2023-04-26 PROCEDURE — 3072F PR LOW RISK FOR RETINOPATHY: ICD-10-PCS | Mod: CPTII,95,, | Performed by: PSYCHIATRY & NEUROLOGY

## 2023-04-26 NOTE — PROGRESS NOTES
The patient location is: [place], [place]  The chief complaint leading to consultation is: anxiety  Visit type: audiovisual  Total time spent with patient: 15 min  Each patient to whom he or she provides medical services by telemedicine is:  (1) informed of the relationship between the physician and patient and the respective role of any other health care provider with respect to management of the patient; and (2) notified that he or she may decline to receive medical services by telemedicine and may withdraw from such care at any time.    ESTABLISHED OUTPATIENT VISIT   E/M LEVEL 4: [id number]    ENCOUNTER DATE: 4/26/2023  SITE: [facility], [address]    HISTORY    CHIEF COMPLAINT   Althea Vazquez is a 57 y.o. female who presents for follow up of anxiety.    HPI     Father doing better.    Appears to be doing well psychiatrically. Appears euthymic during today's visit.    Satisfied with current psychotropic medication regimen.    Spiritual beliefs are supportive.    Has continued to stand up for herself, this is working well for her.    Psychiatric Review Of Systems - Is patient experiencing or having changes in:  sleep: takes Ambien  appetite: no  weight: working on losing weight  energy/anergy: no  interest/pleasure/anhedonia: no  somatic symptoms: no  libido: no  anxiety/panic: no  guilty/hopelessness: no  concentration: no  S.I.B.s/risky behavior: no  Irritability: no  Racing thoughts: no  Impulsive behaviors: no  Paranoia:no  AVH:no    Recent alcohol: rare small amount  Recent drug: no    Medical ROS   Foot pain     PAST MEDICAL, FAMILY AND SOCIAL HISTORY: The patient's past medical, family and social history have been reviewed and updated as appropriate within the electronic medical record - see encounter notes.    PSYCHOTROPIC MEDICATIONS   Prozac 20 mg qam and 20 mg qpm, Clonazepam 0.5 mg prn for anxiety[has been using up to 2 days per week], Ambien 5 mg at bedtime prn[recently has been  taking up 3 times per week]    Scheduled and PRN Medications     Current Outpatient Medications:     amLODIPine (NORVASC) 10 MG tablet, Take 1 tablet (10 mg total) by mouth once daily., Disp: 90 tablet, Rfl: 3    amoxicillin-clavulanate 875-125mg (AUGMENTIN) 875-125 mg per tablet, Take 1 tablet by mouth every 12 (twelve) hours. (Patient not taking: Reported on 4/24/2023), Disp: 14 tablet, Rfl: 0    aspirin (ECOTRIN) 81 MG EC tablet, Take 1 tablet (81 mg total) by mouth once daily., Disp: 30 tablet, Rfl: 0    atenoloL (TENORMIN) 100 MG tablet, Take 1 tablet (100 mg total) by mouth once daily., Disp: 90 tablet, Rfl: 3    blood-glucose meter kit, Use twice daily., Disp: 1 each, Rfl: 0    canagliflozin (INVOKANA) 300 mg Tab tablet, Take 1 tablet (300 mg total) by mouth daily before breakfast., Disp: 90 tablet, Rfl: 1    clobetasoL (TEMOVATE) 0.05 % external solution, Apply topically once daily., Disp: 50 mL, Rfl: 3    clonazePAM (KLONOPIN) 0.5 MG tablet, TAKE 1 TABLET BY MOUTH ONCE DAILY AS NEEDED FOR ANXIETY, Disp: 90 tablet, Rfl: 0    clotrimazole-betamethasone 1-0.05% (LOTRISONE) cream, Apply topically 2 (two) times daily as needed. , Disp: , Rfl:     cyclobenzaprine (FLEXERIL) 10 MG tablet, TAKE ONE TABLET BY MOUTH AT BEDTIME AS NEEDED, Disp: 90 tablet, Rfl: 2    diclofenac sodium (VOLTAREN) 1 % Gel, Apply 4 g topically 4 (four) times daily. Apply light film topically to knee up to four times daily, Disp: 3 each, Rfl: 2    docusate sodium (COLACE) 50 MG capsule, Take 1 capsule (50 mg total) by mouth 2 (two) times daily., Disp: 180 capsule, Rfl: 3    entecavir (BARACLUDE) 0.5 MG Tab, Take 1 tablet (0.5 mg total) by mouth once daily., Disp: 90 tablet, Rfl: 3    ergocalciferol (ERGOCALCIFEROL) 50,000 unit Cap, Take 1 capsule (50,000 Units total) by mouth every 7 days., Disp: 12 capsule, Rfl: 3    fluocinolone acetonide oiL 0.01 % Drop, Place 3 drops into both ears 2 (two) times a day., Disp: 20 mL, Rfl: 3     fluocinonide 0.05% (LIDEX) 0.05 % cream, Apply topically 2 (two) times daily. To allergic rash, Disp: 30 g, Rfl: 0    FLUoxetine 20 MG capsule, Take 1 capsule (20 mg total) by mouth 2 (two) times a day., Disp: 180 capsule, Rfl: 0    FLUoxetine 20 MG capsule, Take 1 capsule (20 mg total) by mouth 2 (two) times daily., Disp: 180 capsule, Rfl: 1    fluticasone propionate (FLONASE) 50 mcg/actuation nasal spray, 2 sprays (100 mcg total) by Each Nostril route once daily., Disp: 16 g, Rfl: 6    fluticasone propionate (FLONASE) 50 mcg/actuation nasal spray, Use 2 sprays in each nostril once daily., Disp: 16 g, Rfl: 12    FREESTYLE LITE STRIPS Strp, test TWICE DAILY, Disp: 200 each, Rfl: 3    golimumab (SIMPONI) 50 mg/0.5 mL PnIj, Inject 50 mg into the skin every 30 days., Disp: 1.5 mL, Rfl: 1    hydroCHLOROthiazide (HYDRODIURIL) 25 MG tablet, Take 1 tablet (25 mg total) by mouth once daily., Disp: 90 tablet, Rfl: 3    hydrocortisone 2.5 % cream, Apply topically 2 (two) times daily. for 10 days, Disp: 28 g, Rfl: 1    hydroquinone 4 % Crea, Apply to dark areas qhs.  Not more than 6 months straight in same location.Use sunscreen in am, Disp: 46 g, Rfl: 1    indomethacin (INDOCIN) 50 MG capsule, Take 1 capsule (50 mg total) by mouth 2 (two) times daily with meals., Disp: 30 capsule, Rfl: 1    ketoconazole (NIZORAL) 2 % shampoo, Apply topically once a week., Disp: 120 mL, Rfl: 12    lancets (FREESTYLE LANCETS) 28 gauge Misc, test TWICE DAILY, Disp: 200 each, Rfl: 3    levocetirizine (XYZAL) 5 MG tablet, TAKE ONE TABLET BY MOUTH EVERY EVENING, Disp: 90 tablet, Rfl: 2    losartan (COZAAR) 100 MG tablet, Take 1 tablet (100 mg total) by mouth once daily., Disp: 90 tablet, Rfl: 3    methylPREDNISolone (MEDROL DOSEPACK) 4 mg tablet, Take as directed, Disp: 1 each, Rfl: 0    naproxen (NAPROSYN) 500 MG tablet, Take 1 tablet (500 mg total) by mouth 2 (two) times daily as needed, Disp: 180 tablet, Rfl: 0     neomycin-polymyxin-hydrocortisone (CORTISPORIN) 3.5-10,000-1 mg/mL-unit/mL-% otic suspension, Place 3 drops into both ears 3 (three) times daily as needed. Up to 5 days, Disp: 10 mL, Rfl: 0    nystatin (MYCOSTATIN) powder, Apply to affected area 3 times daily, Disp: 1 Bottle, Rfl: 3    oxyCODONE-acetaminophen (PERCOCET) 5-325 mg per tablet, Take 1 tablet by mouth every 8 (eight) hours as needed for Pain., Disp: 30 tablet, Rfl: 0    pantoprazole (PROTONIX) 40 MG tablet, Take 1 tablet (40 mg total) by mouth once daily., Disp: 90 tablet, Rfl: 2    prednisoLONE acetate (PRED FORTE) 1 % DrpS, Place 1 drop into both eyes daily as needed (For flare-ups until able to be seen by the Optometrist)., Disp: 5 mL, Rfl: 1    repaglinide (PRANDIN) 1 MG tablet, TAKE ONE TABLET BY MOUTH THREE TIMES DAILY BEFORE MEALS (TAKING PLACE OF TRAJENTA), Disp: 270 tablet, Rfl: 0    rosuvastatin (CRESTOR) 40 MG Tab, Take 1 tablet (40 mg total) by mouth every evening., Disp: 90 tablet, Rfl: 3    semaglutide (OZEMPIC) 1 mg/dose (4 mg/3 mL), Inject 1 mg into the skin every 7 days., Disp: 1 pen, Rfl: 11    semaglutide, weight loss, (WEGOVY) 1 mg/0.5 mL PnIj, Inject 1 mg into the skin every 7 days., Disp: 2 mL, Rfl: 3    sulfaSALAzine (AZULFIDINE) 500 MG EC tablet, Take 3 tablets (1,500 mg total) by mouth 2 (two) times daily., Disp: 540 tablet, Rfl: 0    terconazole (TERAZOL 7) 0.4 % Crea, Place 1 applicator vaginally every evening., Disp: 45 g, Rfl: 2    terconazole (TERAZOL 7) 0.4 % Crea, Place 1 applicatorful vaginally every evening., Disp: 45 g, Rfl: 2    terconazole (TERAZOL 7) 0.4 % Crea, Place 1 applicator vaginally every evening., Disp: 7 g, Rfl: 0    tiZANidine (ZANAFLEX) 2 MG tablet, Take 1-2 tablets (2-4 mg total) by mouth every 8 (eight) hours as needed., Disp: 90 tablet, Rfl: 2    topiramate (TOPAMAX) 100 MG tablet, Take 1 tablet (100 mg total) by mouth 2 (two) times daily., Disp: 180 tablet, Rfl: 3    triamcinolone acetonide 0.1%  (KENALOG) 0.1 % cream, Apply topically 2 (two) times daily., Disp: 45 g, Rfl: 1    valACYclovir (VALTREX) 1000 MG tablet, valacyclovir 1 gram tablet  Take 0.5 tablets every 12 hours by oral route., Disp: , Rfl:     vitamin D (VITAMIN D3) 1000 units Tab, Take 1,000 Units by mouth once daily., Disp: , Rfl:     zolpidem (AMBIEN) 5 MG Tab, Take 1 tablet (5 mg total) by mouth nightly as needed., Disp: 90 tablet, Rfl: 0  No current facility-administered medications for this visit.    Facility-Administered Medications Ordered in Other Visits:     0.9%  NaCl infusion, , Intravenous, Continuous, Graciela Jerome MD    EXAMINATION    There were no vitals filed for this visit.      CONSTITUTIONAL  General Appearance: well nourished    MUSCULOSKELETAL  Muscle Strength and Tone: normal strength and tone  Abnormal Involuntary Movements: no abnormal movement noted  Gait and Station: normal gait    PSYCHIATRIC   Level of Consciousness: alert  Orientation: oriented to person, place and time  Grooming: well groomed  Psychomotor Behavior: no restlessness/agitation  Speech: normal in rate, rhythm and volume  Language: normal vocabulary  Mood: steady  Affect: full range and appropriate  Thought Process: logical and goal directed  Associations: intact associations  Thought Content: no SI/HI  Memory: grossly intact  Attention: intact to content of interview  Fund of Knowledge: appears adequate  Insight: good  Judgement: good    MEDICAL DECISION MAKING    DIAGNOSES  Anxiety d/o, unspecified    PROBLEM LIST AND MANAGEMENT PLANS    - anxiety: continue Prozac, prn Klonopin and prn Ambien as above  - rtc 3 months    Time with patient: 15 min    LABORATORY DATA  Lab Visit on 04/24/2023   Component Date Value Ref Range Status    Sed Rate 04/24/2023 6  0 - 36 mm/Hr Final    CRP 04/24/2023 13.5 (H)  0.0 - 8.2 mg/L Final    Uric Acid 04/24/2023 1.7 (L)  2.4 - 5.7 mg/dL Final   Admission on 04/21/2023, Discharged on 04/21/2023   Component Date Value  Ref Range Status    POCT Glucose 04/21/2023 88  70 - 110 mg/dL Final   Admission on 02/23/2023, Discharged on 02/23/2023   Component Date Value Ref Range Status    POCT Glucose 02/23/2023 80  70 - 110 mg/dL Final   Lab Visit on 02/15/2023   Component Date Value Ref Range Status    Vit D, 25-Hydroxy 02/15/2023 36  30 - 96 ng/mL Final    Comment: Vitamin D deficiency.........<10 ng/mL                              Vitamin D insufficiency......10-29 ng/mL       Vitamin D sufficiency........> or equal to 30 ng/mL  Vitamin D toxicity............>100 ng/mL      Cholesterol 02/15/2023 179  120 - 199 mg/dL Final    Comment: The National Cholesterol Education Program (NCEP) has set the  following guidelines (reference ranges) for Cholesterol:  Optimal.....................<200 mg/dL  Borderline High.............200-239 mg/dL  High........................> or = 240 mg/dL      Triglycerides 02/15/2023 56  30 - 150 mg/dL Final    Comment: The National Cholesterol Education Program (NCEP) has set the  following guidelines (reference values) for triglycerides:  Normal......................<150 mg/dL  Borderline High.............150-199 mg/dL  High........................200-499 mg/dL      HDL 02/15/2023 56  40 - 75 mg/dL Final    Comment: The National Cholesterol Education Program (NCEP) has set the  following guidelines (reference values) for HDL Cholesterol:  Low...............<40 mg/dL  Optimal...........>60 mg/dL      LDL Cholesterol 02/15/2023 111.8  63.0 - 159.0 mg/dL Final    Comment: The National Cholesterol Education Program (NCEP) has set the  following guidelines (reference values) for LDL Cholesterol:  Optimal.......................<130 mg/dL  Borderline High...............130-159 mg/dL  High..........................160-189 mg/dL  Very High.....................>190 mg/dL      HDL/Cholesterol Ratio 02/15/2023 31.3  20.0 - 50.0 % Final    Total Cholesterol/HDL Ratio 02/15/2023 3.2  2.0 - 5.0 Final    Non-HDL Cholesterol  02/15/2023 123  mg/dL Final    Comment: Risk category and Non-HDL cholesterol goals:  Coronary heart disease (CHD)or equivalent (10-year risk of CHD >20%):  Non-HDL cholesterol goal     <130 mg/dL  Two or more CHD risk factors and 10-year risk of CHD <= 20%:  Non-HDL cholesterol goal     <160 mg/dL  0 to 1 CHD risk factor:  Non-HDL cholesterol goal     <190 mg/dL             Tip Oliveira

## 2023-04-27 ENCOUNTER — OFFICE VISIT (OUTPATIENT)
Dept: OPTOMETRY | Facility: CLINIC | Age: 58
End: 2023-04-27
Payer: COMMERCIAL

## 2023-04-27 DIAGNOSIS — H20.023 RECURRENT IRITIS OF BOTH EYES: Primary | ICD-10-CM

## 2023-04-27 DIAGNOSIS — H21.542 POSTERIOR SYNECHIAE (IRIS), LEFT EYE: ICD-10-CM

## 2023-04-27 DIAGNOSIS — H25.13 NUCLEAR SCLEROSIS OF BOTH EYES: ICD-10-CM

## 2023-04-27 PROCEDURE — 4010F ACE/ARB THERAPY RXD/TAKEN: CPT | Mod: CPTII,S$GLB,, | Performed by: OPTOMETRIST

## 2023-04-27 PROCEDURE — 4010F PR ACE/ARB THEARPY RXD/TAKEN: ICD-10-PCS | Mod: CPTII,S$GLB,, | Performed by: OPTOMETRIST

## 2023-04-27 PROCEDURE — 1159F MED LIST DOCD IN RCRD: CPT | Mod: CPTII,S$GLB,, | Performed by: OPTOMETRIST

## 2023-04-27 PROCEDURE — 92012 PR EYE EXAM, EST PATIENT,INTERMED: ICD-10-PCS | Mod: S$GLB,,, | Performed by: OPTOMETRIST

## 2023-04-27 PROCEDURE — 99999 PR PBB SHADOW E&M-EST. PATIENT-LVL II: CPT | Mod: PBBFAC,,, | Performed by: OPTOMETRIST

## 2023-04-27 PROCEDURE — 1159F PR MEDICATION LIST DOCUMENTED IN MEDICAL RECORD: ICD-10-PCS | Mod: CPTII,S$GLB,, | Performed by: OPTOMETRIST

## 2023-04-27 PROCEDURE — 99999 PR PBB SHADOW E&M-EST. PATIENT-LVL II: ICD-10-PCS | Mod: PBBFAC,,, | Performed by: OPTOMETRIST

## 2023-04-27 PROCEDURE — 92012 INTRM OPH EXAM EST PATIENT: CPT | Mod: S$GLB,,, | Performed by: OPTOMETRIST
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
## 2023-04-28 NOTE — PATIENT INSTRUCTIONS
History of reactive arthritis.  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    Nuclear sclerosis and cortical cataract in both eyes.     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    Currently using Pred Forte 1.0% ophthalmic suspension every four hours while awake in the right eye and every two hours while awake in the left eye.  Coniuing to show improvement in signs and symptoms in both eye: no cells in anterior chamber in the right eye and 1-2+ cells in the anterior chamber of the left eye.      Reduce usage of Prednisolone Acetate Ophthalmic Suspension in both eyes - twice per day in the right eye through 04/30/2023, and every four hours while awake in the left eye until seen again.    Return for recheck in two weeks (05/11/2023) - or prior, if any problems or exacerbation of symptoms symptoms in the interim

## 2023-04-29 NOTE — PROGRESS NOTES
Subjective:      Patient ID: Althea Vazquez is a 57 y.o. female.    Chief Complaint:   Foot Problem (Swelling and pain ) and Foot Pain (Whole foot pain top and plantar)    Althea is a 57 y.o. female who presents to the podiatry clinic  with complaint of  right foot pain. Onset of the symptoms was several days ago. Precipitating event: none known. Current symptoms include: ability to bear weight, but with some pain. Aggravating factors: any weight bearing. Symptoms have gradually worsened. Patient has had no prior foot problems. Evaluation to date: none. Treatment to date: avoidance of offending activity. Patients rates pain 9/10 on pain scale.    Review of Systems   Constitutional: Negative for chills, decreased appetite, fever and malaise/fatigue.   HENT:  Negative for congestion, hearing loss, nosebleeds and tinnitus.    Eyes:  Negative for double vision, pain, photophobia and visual disturbance.   Cardiovascular:  Negative for chest pain, claudication, cyanosis and leg swelling.   Respiratory:  Negative for cough, hemoptysis, shortness of breath and wheezing.    Endocrine: Negative for cold intolerance and heat intolerance.   Hematologic/Lymphatic: Negative for adenopathy and bleeding problem.   Skin:  Negative for color change, dry skin, itching, nail changes and suspicious lesions.   Musculoskeletal:  Positive for back pain, joint pain, myalgias and stiffness. Negative for arthritis.   Gastrointestinal:  Negative for abdominal pain, jaundice, nausea and vomiting.   Genitourinary:  Negative for dysuria, frequency and hematuria.   Neurological:  Negative for difficulty with concentration, loss of balance, numbness, paresthesias and sensory change.   Psychiatric/Behavioral:  Negative for altered mental status, hallucinations and suicidal ideas. The patient is not nervous/anxious.    Allergic/Immunologic: Negative for environmental allergies and persistent infections.         Objective:      Physical  Exam  Vitals reviewed.   Constitutional:       Appearance: She is well-developed.   HENT:      Head: Normocephalic and atraumatic.   Cardiovascular:      Pulses:           Dorsalis pedis pulses are 2+ on the right side and 2+ on the left side.        Posterior tibial pulses are 2+ on the right side and 2+ on the left side.   Pulmonary:      Effort: Pulmonary effort is normal.   Musculoskeletal:         General: Normal range of motion.      Comments: Inspection and palpation of the muscles joints and bones of both lower extremities reveal that muscle strength for the anterior lateral and posterior muscle groups and intrinsic muscle groups of the foot are all 5 over 5 symmetrical.    Moderate to severe tenderness to the dorsum right foot overlying the 2nd and 3rd metatarsals and metatarsophalangeal joint.  Tenderness with palpation of the midfoot as well as inversion eversion of the midtarsal joint.   Skin:     General: Skin is warm and dry.      Capillary Refill: Capillary refill takes 2 to 3 seconds.      Comments: Skin turgor is normal bilaterally.  Skin texture is well hydrated to both lower extremities.  No lesions or rashes or wounds appreciated bilaterally.  Nail plates 1 through 5 bilaterally are within normal limits for length and thickness.  No nail clubbing or incurvation noted.   Neurological:      Mental Status: She is alert and oriented to person, place, and time.      Comments: Sharp dull light touch vibratory proprioceptive sensation are intact bilaterally.  Deep tendon reflexes to patellar and Achilles tendon are symmetrical 2 over 4 bilaterally.  No ankle clonus or Babinski reflexes noted bilaterally.  Coordination is normal to both feet and lower extremities.   Psychiatric:         Behavior: Behavior normal.           Assessment:       Encounter Diagnoses   Name Primary?    Foot pain, right     Ankle pain, unspecified chronicity, unspecified laterality     Stress fracture of right foot, initial  encounter Yes     Independent visualization of imaging was performed.  Results were reviewed in detail with patient.       Plan:       Althea was seen today for foot problem and foot pain.    Diagnoses and all orders for this visit:    Stress fracture of right foot, initial encounter  -     MRI Foot (Midfoot) Right Without Contrast; Future    Foot pain, right  -     Ambulatory referral/consult to Podiatry  -     URIC ACID; Future  -     Sedimentation rate; Future  -     C-REACTIVE PROTEIN; Future  -     MRI Foot (Midfoot) Right Without Contrast; Future    Ankle pain, unspecified chronicity, unspecified laterality  -     Ambulatory referral/consult to Podiatry    Other orders  -     indomethacin (INDOCIN) 50 MG capsule; Take 1 capsule (50 mg total) by mouth 2 (two) times daily with meals.  -     diclofenac sodium (VOLTAREN) 1 % Gel; Apply 4 g topically 4 (four) times daily. Apply light film topically to knee up to four times daily  -     oxyCODONE-acetaminophen (PERCOCET) 5-325 mg per tablet; Take 1 tablet by mouth every 8 (eight) hours as needed for Pain.      I counseled the patient on her conditions, their implications and medical management.    The nature of the condition, options for management, as well as potential risks and complications were discussed in detail with patient. Patient was amenable to my recommendations and left my office fully informed and will follow up as instructed or sooner if necessary.      Independent visualization of imaging was performed.  Results were reviewed in detail with patient.    Likely stress fracture/reaction, verses gout.  No history of injury/trauma.  Begin walking boot, warm compresses.  Oral and topical anti-inflammatory medication, recommend MRI.  Lab work ordered as well.  Follow-up for review lab work and imaging once available for review.

## 2023-05-04 ENCOUNTER — SPECIALTY PHARMACY (OUTPATIENT)
Dept: PHARMACY | Facility: CLINIC | Age: 58
End: 2023-05-04
Payer: COMMERCIAL

## 2023-05-04 DIAGNOSIS — J20.9 ACUTE BRONCHITIS, UNSPECIFIED ORGANISM: ICD-10-CM

## 2023-05-04 DIAGNOSIS — R51.9 SINUS HEADACHE: ICD-10-CM

## 2023-05-04 NOTE — TELEPHONE ENCOUNTER
Specialty Pharmacy - Refill Coordination    Specialty Medication Orders Linked to Encounter      Flowsheet Row Most Recent Value   Medication #1 entecavir (BARACLUDE) 0.5 MG Tab (Order#[id number], Rx#[id number])   Medication #2 golimumab (SIMPONI) 50 mg/0.5 mL PnIj (Order#[id number], Rx#[id number])            Refill Questions - Documented Responses      Flowsheet Row Most Recent Value   Patient Availability and HIPAA Verification    Does patient want to proceed with activity? Yes   HIPAA/medical authority confirmed? Yes   Relationship to patient of person spoken to? Self   Refill Screening Questions    Changes to allergies? No   Changes to medications? Yes  [Medrol and Norco for foot pain from ED provider - complete and no DDIs. Also prescribed Indomethacin PRN - no DDIs.]   New conditions since last clinic visit? No   Unplanned office visit, urgent care, ED, or hospital admission in the last 4 weeks? Yes  [ED for foot pain.]   How does patient/caregiver feel medication is working? Good   Financial problems or insurance changes? No   How many doses of your specialty medications were missed in the last 4 weeks? 0   When does the patient need to receive the medication? 05/14/23   Refill Delivery Questions    How will the patient receive the medication? MEDRx   When does the patient need to receive the medication? 05/14/23   Shipping Address Prescription   Address in [facility] confirmed and updated if neccessary? Yes   Expected Copay ($) 0   Is the patient able to afford the medication copay? Yes   Payment Method zero copay   Days supply of Refill 90   Supplies needed? No supplies needed   Refill activity completed? Yes   Refill activity plan Refill scheduled   Shipment/Pickup Date: 05/11/23            Current Outpatient Medications   Medication Sig    amLODIPine (NORVASC) 10 MG tablet Take 1 tablet (10 mg total) by mouth once daily.    amoxicillin-clavulanate 875-125mg (AUGMENTIN) 875-125 mg per tablet Take 1  tablet by mouth every 12 (twelve) hours. (Patient not taking: Reported on 4/24/2023)    aspirin (ECOTRIN) 81 MG EC tablet Take 1 tablet (81 mg total) by mouth once daily.    atenoloL (TENORMIN) 100 MG tablet Take 1 tablet (100 mg total) by mouth once daily.    blood-glucose meter kit Use twice daily.    canagliflozin (INVOKANA) 300 mg Tab tablet Take 1 tablet (300 mg total) by mouth daily before breakfast.    clobetasoL (TEMOVATE) 0.05 % external solution Apply topically once daily.    clonazePAM (KLONOPIN) 0.5 MG tablet TAKE 1 TABLET BY MOUTH ONCE DAILY AS NEEDED FOR ANXIETY    clotrimazole-betamethasone 1-0.05% (LOTRISONE) cream Apply topically 2 (two) times daily as needed.     cyclobenzaprine (FLEXERIL) 10 MG tablet TAKE ONE TABLET BY MOUTH AT BEDTIME AS NEEDED    diclofenac sodium (VOLTAREN) 1 % Gel Apply 4 g topically 4 (four) times daily. Apply light film topically to knee up to four times daily    docusate sodium (COLACE) 50 MG capsule Take 1 capsule (50 mg total) by mouth 2 (two) times daily.    entecavir (BARACLUDE) 0.5 MG Tab Take 1 tablet (0.5 mg total) by mouth once daily.    ergocalciferol (ERGOCALCIFEROL) 50,000 unit Cap Take 1 capsule (50,000 Units total) by mouth every 7 days.    fluocinolone acetonide oiL 0.01 % Drop Place 3 drops into both ears 2 (two) times a day.    fluocinonide 0.05% (LIDEX) 0.05 % cream Apply topically 2 (two) times daily. To allergic rash    FLUoxetine 20 MG capsule Take 1 capsule (20 mg total) by mouth 2 (two) times a day.    FLUoxetine 20 MG capsule Take 1 capsule (20 mg total) by mouth 2 (two) times daily.    fluticasone propionate (FLONASE) 50 mcg/actuation nasal spray 2 sprays (100 mcg total) by Each Nostril route once daily.    fluticasone propionate (FLONASE) 50 mcg/actuation nasal spray Use 2 sprays in each nostril once daily.    FREESTYLE LITE STRIPS Strp test TWICE DAILY    golimumab (SIMPONI) 50 mg/0.5 mL PnIj Inject 50 mg into the skin every 30 days.     hydroCHLOROthiazide (HYDRODIURIL) 25 MG tablet Take 1 tablet (25 mg total) by mouth once daily.    hydrocortisone 2.5 % cream Apply topically 2 (two) times daily. for 10 days    hydroquinone 4 % Crea Apply to dark areas qhs.  Not more than 6 months straight in same location.Use sunscreen in am    indomethacin (INDOCIN) 50 MG capsule Take 1 capsule (50 mg total) by mouth 2 (two) times daily with meals.    ketoconazole (NIZORAL) 2 % shampoo Apply topically once a week.    lancets (FREESTYLE LANCETS) 28 gauge Misc test TWICE DAILY    levocetirizine (XYZAL) 5 MG tablet TAKE ONE TABLET BY MOUTH EVERY EVENING    losartan (COZAAR) 100 MG tablet Take 1 tablet (100 mg total) by mouth once daily.    methylPREDNISolone (MEDROL DOSEPACK) 4 mg tablet Take as directed    naproxen (NAPROSYN) 500 MG tablet Take 1 tablet (500 mg total) by mouth 2 (two) times daily as needed    neomycin-polymyxin-hydrocortisone (CORTISPORIN) 3.5-10,000-1 mg/mL-unit/mL-% otic suspension Place 3 drops into both ears 3 (three) times daily as needed. Up to 5 days    nystatin (MYCOSTATIN) powder Apply to affected area 3 times daily    oxyCODONE-acetaminophen (PERCOCET) 5-325 mg per tablet Take 1 tablet by mouth every 8 (eight) hours as needed for Pain.    pantoprazole (PROTONIX) 40 MG tablet Take 1 tablet (40 mg total) by mouth once daily.    prednisoLONE acetate (PRED FORTE) 1 % DrpS Place 1 drop into both eyes daily as needed (For flare-ups until able to be seen by the Optometrist).    repaglinide (PRANDIN) 1 MG tablet TAKE ONE TABLET BY MOUTH THREE TIMES DAILY BEFORE MEALS (TAKING PLACE OF TRAJENTA)    rosuvastatin (CRESTOR) 40 MG Tab Take 1 tablet (40 mg total) by mouth every evening.    semaglutide (OZEMPIC) 1 mg/dose (4 mg/3 mL) Inject 1 mg into the skin every 7 days.    semaglutide, weight loss, (WEGOVY) 1 mg/0.5 mL PnIj Inject 1 mg into the skin every 7 days.    sulfaSALAzine (AZULFIDINE) 500 MG EC tablet Take 3 tablets (1,500 mg total) by mouth 2  (two) times daily.    terconazole (TERAZOL 7) 0.4 % Crea Place 1 applicator vaginally every evening.    terconazole (TERAZOL 7) 0.4 % Crea Place 1 applicatorful vaginally every evening.    terconazole (TERAZOL 7) 0.4 % Crea Place 1 applicator vaginally every evening.    tiZANidine (ZANAFLEX) 2 MG tablet Take 1-2 tablets (2-4 mg total) by mouth every 8 (eight) hours as needed.    topiramate (TOPAMAX) 100 MG tablet Take 1 tablet (100 mg total) by mouth 2 (two) times daily.    triamcinolone acetonide 0.1% (KENALOG) 0.1 % cream Apply topically 2 (two) times daily.    valACYclovir (VALTREX) 1000 MG tablet valacyclovir 1 gram tablet   Take 0.5 tablets every 12 hours by oral route.    vitamin D (VITAMIN D3) 1000 units Tab Take 1,000 Units by mouth once daily.    zolpidem (AMBIEN) 5 MG Tab Take 1 tablet (5 mg total) by mouth nightly as needed.   Last reviewed on 4/30/2023  9:58 AM by Rishi Tamayo, SUNIL    Review of patient's allergies indicates:   Allergen Reactions    Bactrim [sulfamethoxazole-trimethoprim] Itching    Trulicity [dulaglutide] Diarrhea and Other (See Comments)     Vomiting, abdominal pain    Diflucan [fluconazole] Swelling and Other (See Comments)     Sore on mouth    Last reviewed on  4/30/2023 9:58 AM by Rishi Tamayo      Tasks added this encounter   No tasks added.   Tasks due within next 3 months   No tasks due.     Daria Ovalle, PharmD  [facility]  [address] [place] [id number]  Phone: [phone]  Fax: [phone]

## 2023-05-04 NOTE — TELEPHONE ENCOUNTER
Care Due:                  Date            Visit Type   Department     Provider  --------------------------------------------------------------------------------                                EP -                              PRIMARY      [facility] INTERNAL  Last Visit: 03-      CARE ([facility])   MEDICINE       Mayra Maravilla                              EP -                              PRIMARY      [facility] INTERNAL  Next Visit: 05-      CARE ([facility])   MEDICINE       Weston Begum                                                            Last  Test          Frequency    Reason                     Performed    Due Date  --------------------------------------------------------------------------------    HBA1C.......  6 months...  canagliflozin,             06- 12-                             repaglinide, semaglutide,                             semaglutide,.............    [employer] Embedded Care Due Messages. Reference number: [id number].   5/04/2023 3:55:00 PM CDT

## 2023-05-05 RX ORDER — LEVOCETIRIZINE DIHYDROCHLORIDE 5 MG/1
5 TABLET, FILM COATED ORAL DAILY
Qty: 90 TABLET | Refills: 2 | Status: SHIPPED | OUTPATIENT
Start: 2023-05-05 | End: 2024-02-17 | Stop reason: SDUPTHER

## 2023-05-08 ENCOUNTER — PATIENT MESSAGE (OUTPATIENT)
Dept: RHEUMATOLOGY | Facility: CLINIC | Age: 58
End: 2023-05-08
Payer: COMMERCIAL

## 2023-05-08 ENCOUNTER — TELEPHONE (OUTPATIENT)
Dept: PODIATRY | Facility: CLINIC | Age: 58
End: 2023-05-08
Payer: COMMERCIAL

## 2023-05-08 ENCOUNTER — HOSPITAL ENCOUNTER (OUTPATIENT)
Dept: RADIOLOGY | Facility: HOSPITAL | Age: 58
Discharge: HOME | End: 2023-05-08
Attending: PODIATRIST
Payer: COMMERCIAL

## 2023-05-08 DIAGNOSIS — M79.671 FOOT PAIN, RIGHT: ICD-10-CM

## 2023-05-08 DIAGNOSIS — M84.374A STRESS FRACTURE OF RIGHT FOOT, INITIAL ENCOUNTER: ICD-10-CM

## 2023-05-08 PROCEDURE — 73718 MRI FOOT (MIDFOOT) RIGHT WITHOUT CONTRAST: ICD-10-PCS | Mod: 26,RT,, | Performed by: RADIOLOGY

## 2023-05-08 PROCEDURE — 73718 MRI LOWER EXTREMITY W/O DYE: CPT | Mod: TC,PO,RT

## 2023-05-08 PROCEDURE — 73718 MRI LOWER EXTREMITY W/O DYE: CPT | Mod: 26,RT,, | Performed by: RADIOLOGY

## 2023-05-08 NOTE — TELEPHONE ENCOUNTER
Spoke with patient in regards to her MRI results per: Dr. Brown(Podiatry), also her F/U appt. has been scheduled on 05/15/2023 at 3:30pm         ----- Message from Khari Brown DPM sent at 5/8/2023 11:20 AM CDT -----  Patient has severe arthritis throughout the midfoot/top of the arch as well as a possible nerve inflammation/neuroma.  Please have patient follow-up to discuss further treatment options including conservative and surgical treatment.  ----- Message -----  From: Interface, Rad Results In  Sent: 5/8/2023  11:01 AM CDT  To: Khari Brown DPM

## 2023-05-09 ENCOUNTER — OFFICE VISIT (OUTPATIENT)
Dept: INTERNAL MEDICINE | Facility: CLINIC | Age: 58
End: 2023-05-09
Payer: COMMERCIAL

## 2023-05-09 ENCOUNTER — TELEPHONE (OUTPATIENT)
Dept: INTERNAL MEDICINE | Facility: CLINIC | Age: 58
End: 2023-05-09

## 2023-05-09 VITALS
TEMPERATURE: 98 F | RESPIRATION RATE: 18 BRPM | SYSTOLIC BLOOD PRESSURE: 138 MMHG | DIASTOLIC BLOOD PRESSURE: 88 MMHG | HEIGHT: 67 IN | BODY MASS INDEX: 33.78 KG/M2 | OXYGEN SATURATION: 97 % | HEART RATE: 75 BPM | WEIGHT: 215.19 LBS

## 2023-05-09 DIAGNOSIS — E66.01 SEVERE OBESITY (BMI 35.0-39.9) WITH COMORBIDITY: ICD-10-CM

## 2023-05-09 DIAGNOSIS — E11.69 HYPERLIPIDEMIA ASSOCIATED WITH TYPE 2 DIABETES MELLITUS: ICD-10-CM

## 2023-05-09 DIAGNOSIS — K21.9 GASTROESOPHAGEAL REFLUX DISEASE, UNSPECIFIED WHETHER ESOPHAGITIS PRESENT: ICD-10-CM

## 2023-05-09 DIAGNOSIS — F41.9 ANXIETY: ICD-10-CM

## 2023-05-09 DIAGNOSIS — M46.90 AXIAL SPONDYLOARTHRITIS: Chronic | ICD-10-CM

## 2023-05-09 DIAGNOSIS — Z79.899 HIGH RISK MEDICATION USE: ICD-10-CM

## 2023-05-09 DIAGNOSIS — E11.42 TYPE 2 DIABETES MELLITUS WITH DIABETIC POLYNEUROPATHY, UNSPECIFIED WHETHER LONG TERM INSULIN USE: Chronic | ICD-10-CM

## 2023-05-09 DIAGNOSIS — G47.00 INSOMNIA, UNSPECIFIED TYPE: Chronic | ICD-10-CM

## 2023-05-09 DIAGNOSIS — E11.42 TYPE 2 DIABETES MELLITUS WITH DIABETIC POLYNEUROPATHY, UNSPECIFIED WHETHER LONG TERM INSULIN USE: ICD-10-CM

## 2023-05-09 DIAGNOSIS — E78.5 HYPERLIPIDEMIA ASSOCIATED WITH TYPE 2 DIABETES MELLITUS: ICD-10-CM

## 2023-05-09 DIAGNOSIS — E11.59 HYPERTENSION ASSOCIATED WITH DIABETES: Primary | ICD-10-CM

## 2023-05-09 DIAGNOSIS — E11.59 HYPERTENSION ASSOCIATED WITH DIABETES: ICD-10-CM

## 2023-05-09 DIAGNOSIS — Z00.00 ANNUAL PHYSICAL EXAM: ICD-10-CM

## 2023-05-09 DIAGNOSIS — M51.36 DDD (DEGENERATIVE DISC DISEASE), LUMBAR: Primary | ICD-10-CM

## 2023-05-09 DIAGNOSIS — Z12.11 COLON CANCER SCREENING: ICD-10-CM

## 2023-05-09 DIAGNOSIS — I15.2 HYPERTENSION ASSOCIATED WITH DIABETES: ICD-10-CM

## 2023-05-09 DIAGNOSIS — Z12.31 ENCOUNTER FOR SCREENING MAMMOGRAM FOR BREAST CANCER: ICD-10-CM

## 2023-05-09 DIAGNOSIS — I15.2 HYPERTENSION ASSOCIATED WITH DIABETES: Primary | ICD-10-CM

## 2023-05-09 PROCEDURE — 99999 PR PBB SHADOW E&M-EST. PATIENT-LVL V: ICD-10-PCS | Mod: PBBFAC,,, | Performed by: INTERNAL MEDICINE

## 2023-05-09 PROCEDURE — 3072F PR LOW RISK FOR RETINOPATHY: ICD-10-PCS | Mod: CPTII,S$GLB,, | Performed by: INTERNAL MEDICINE

## 2023-05-09 PROCEDURE — 1159F MED LIST DOCD IN RCRD: CPT | Mod: CPTII,S$GLB,, | Performed by: INTERNAL MEDICINE

## 2023-05-09 PROCEDURE — 3075F PR MOST RECENT SYSTOLIC BLOOD PRESS GE 130-139MM HG: ICD-10-PCS | Mod: CPTII,S$GLB,, | Performed by: INTERNAL MEDICINE

## 2023-05-09 PROCEDURE — G0009 PNEUMOCOCCAL CONJUGATE VACCINE 20-VALENT: ICD-10-PCS | Mod: S$GLB,,, | Performed by: INTERNAL MEDICINE

## 2023-05-09 PROCEDURE — 99396 PR PREVENTIVE VISIT,EST,40-64: ICD-10-PCS | Mod: 25,S$GLB,, | Performed by: INTERNAL MEDICINE

## 2023-05-09 PROCEDURE — 4010F PR ACE/ARB THEARPY RXD/TAKEN: ICD-10-PCS | Mod: CPTII,S$GLB,, | Performed by: INTERNAL MEDICINE

## 2023-05-09 PROCEDURE — 3079F PR MOST RECENT DIASTOLIC BLOOD PRESSURE 80-89 MM HG: ICD-10-PCS | Mod: CPTII,S$GLB,, | Performed by: INTERNAL MEDICINE

## 2023-05-09 PROCEDURE — 99999 PR PBB SHADOW E&M-EST. PATIENT-LVL V: CPT | Mod: PBBFAC,,, | Performed by: INTERNAL MEDICINE

## 2023-05-09 PROCEDURE — 90677 PCV20 VACCINE IM: CPT | Mod: S$GLB,,, | Performed by: INTERNAL MEDICINE

## 2023-05-09 PROCEDURE — 90677 PNEUMOCOCCAL CONJUGATE VACCINE 20-VALENT: ICD-10-PCS | Mod: S$GLB,,, | Performed by: INTERNAL MEDICINE

## 2023-05-09 PROCEDURE — 3072F LOW RISK FOR RETINOPATHY: CPT | Mod: CPTII,S$GLB,, | Performed by: INTERNAL MEDICINE

## 2023-05-09 PROCEDURE — 4010F ACE/ARB THERAPY RXD/TAKEN: CPT | Mod: CPTII,S$GLB,, | Performed by: INTERNAL MEDICINE

## 2023-05-09 PROCEDURE — 3008F PR BODY MASS INDEX (BMI) DOCUMENTED: ICD-10-PCS | Mod: CPTII,S$GLB,, | Performed by: INTERNAL MEDICINE

## 2023-05-09 PROCEDURE — 99396 PREV VISIT EST AGE 40-64: CPT | Mod: 25,S$GLB,, | Performed by: INTERNAL MEDICINE

## 2023-05-09 PROCEDURE — 1160F RVW MEDS BY RX/DR IN RCRD: CPT | Mod: CPTII,S$GLB,, | Performed by: INTERNAL MEDICINE

## 2023-05-09 PROCEDURE — 1159F PR MEDICATION LIST DOCUMENTED IN MEDICAL RECORD: ICD-10-PCS | Mod: CPTII,S$GLB,, | Performed by: INTERNAL MEDICINE

## 2023-05-09 PROCEDURE — 3008F BODY MASS INDEX DOCD: CPT | Mod: CPTII,S$GLB,, | Performed by: INTERNAL MEDICINE

## 2023-05-09 PROCEDURE — 1160F PR REVIEW ALL MEDS BY PRESCRIBER/CLIN PHARMACIST DOCUMENTED: ICD-10-PCS | Mod: CPTII,S$GLB,, | Performed by: INTERNAL MEDICINE

## 2023-05-09 PROCEDURE — G0009 ADMIN PNEUMOCOCCAL VACCINE: HCPCS | Mod: S$GLB,,, | Performed by: INTERNAL MEDICINE

## 2023-05-09 PROCEDURE — 3079F DIAST BP 80-89 MM HG: CPT | Mod: CPTII,S$GLB,, | Performed by: INTERNAL MEDICINE

## 2023-05-09 PROCEDURE — 3075F SYST BP GE 130 - 139MM HG: CPT | Mod: CPTII,S$GLB,, | Performed by: INTERNAL MEDICINE

## 2023-05-09 NOTE — PROGRESS NOTES
Subjective     Patient ID: Althea Vazquez is a 57 y.o. female.    Chief Complaint: Annual Exam    HPI  57 y.o. Female here for annual exam.      Vaccines: Influenza (2019); Tetanus (2014); PNA (current)  Sexual Screening: declined  Eye exam: 1/21  Mammogram: 4/22  Gyn exam: 9/18  Colonoscopy: 1/18     Exercise: no  Diet: regular     Past Medical History:    Acid reflux                                                   Anxiety                                         10/18/2012    Arthritis                                                     Depression                                          Diabetic peripheral neuropathy - mild           10/21/2014    Difficult intubation                                          Dry eyes                                                      Dry mouth                                                     Fever blister                                                 Hyperlipidemia                                                Hypertension                                                  Insomnia                                                      Iritis                                          5/13/2014     Long-term current use of steroids               9/27/2012     Nausea & vomiting                               2/4/2015      VAISHNAVI (obstructive sleep apnea)                                 Type II diabetes mellitus                       10/1/2012   Past Surgical History:    TUBAL LIGATION                                                 KNEE ARTHROSCOPY                                 5-14-14         Comment:right    HYSTERECTOMY                                     12/3/2014   Social History    Marital status:              Spouse name: Edward                 Years of education:                 Number of children: 2              Occupational History  Occupation          Employer            Comment               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                 Social History Main Topics    Smoking status: Never Smoker                                                                 Smokeless status: Never Used                        Alcohol use: No              Drug use: No              Sexual activity: Yes               Partners with: Male     Other Topics            Concern  Are you pregnant or th* No  Breast-feeding          No      -- Diflucan [Fluconazole] -- Other (See Comments)    --  Sore on mouth  Review of Systems   Constitutional:  Negative for activity change, appetite change, chills, diaphoresis, fatigue, fever and unexpected weight change.   HENT:  Negative for nasal congestion, mouth sores, postnasal drip, rhinorrhea, sinus pressure/congestion, sneezing, sore throat, trouble swallowing and voice change.    Eyes:  Negative for pain, discharge and visual disturbance.   Respiratory:  Negative for cough, shortness of breath and wheezing.    Cardiovascular:  Negative for chest pain, palpitations and leg swelling.   Gastrointestinal:  Negative for abdominal pain, blood in stool, constipation, diarrhea, nausea and vomiting.   Endocrine: Negative for cold intolerance and heat intolerance.   Genitourinary:  Negative for difficulty urinating, dysuria, frequency, hematuria and urgency.   Musculoskeletal:  Positive for arthralgias and back pain. Negative for myalgias.   Integumentary:  Negative for rash and wound.   Allergic/Immunologic: Negative for environmental allergies and food allergies.   Neurological:  Negative for dizziness, tremors, seizures, syncope, weakness, light-headedness and headaches.   Hematological:  Negative for adenopathy. Does not bruise/bleed easily.   Psychiatric/Behavioral:  Positive for sleep disturbance. Negative for confusion, self-injury and suicidal ideas. The patient is not nervous/anxious.         Objective     Physical Exam  Vitals and nursing note reviewed.   Constitutional:       General: She is not in acute  distress.     Appearance: Normal appearance. She is well-developed. She is not diaphoretic.   HENT:      Head: Normocephalic and atraumatic.      Right Ear: External ear normal.      Left Ear: External ear normal.      Nose: Nose normal.      Mouth/Throat:      Pharynx: No oropharyngeal exudate.   Eyes:      General: No scleral icterus.        Right eye: No discharge.         Left eye: No discharge.      Conjunctiva/sclera: Conjunctivae normal.      Pupils: Pupils are equal, round, and reactive to light.   Neck:      Thyroid: No thyromegaly.      Vascular: No JVD.   Cardiovascular:      Rate and Rhythm: Normal rate and regular rhythm.      Pulses: Normal pulses.      Heart sounds: Normal heart sounds. No murmur heard.  Pulmonary:      Effort: Pulmonary effort is normal. No respiratory distress.      Breath sounds: Normal breath sounds. No wheezing, rhonchi or rales.   Chest:      Chest wall: No tenderness.   Abdominal:      General: Bowel sounds are normal. There is no distension.      Palpations: Abdomen is soft.      Tenderness: There is no abdominal tenderness. There is no guarding or rebound.   Musculoskeletal:      Cervical back: Neck supple.      Right lower leg: No edema.      Left lower leg: No edema.   Lymphadenopathy:      Cervical: No cervical adenopathy.   Skin:     General: Skin is warm and dry.      Coloration: Skin is not pale.      Findings: No rash.   Neurological:      General: No focal deficit present.      Mental Status: She is alert and oriented to person, place, and time.      Gait: Gait normal.   Psychiatric:         Behavior: Behavior normal.         Thought Content: Thought content normal.         Judgment: Judgment normal.          Assessment and Plan     Problem List Items Addressed This Visit          Neuro    DDD (degenerative disc disease), lumbar - Primary    Relevant Orders    CBC Auto Differential    Comprehensive Metabolic Panel    TSH    Lipid Panel    Urinalysis    Hemoglobin A1C     Microalbumin/Creatinine Ratio, Urine       Psychiatric    Anxiety    Relevant Orders    CBC Auto Differential    Comprehensive Metabolic Panel    TSH    Lipid Panel    Urinalysis    Hemoglobin A1C    Microalbumin/Creatinine Ratio, Urine       Cardiac/Vascular    Hypertension associated with diabetes    Relevant Orders    CBC Auto Differential    Comprehensive Metabolic Panel    TSH    Lipid Panel    Urinalysis    Hemoglobin A1C    Microalbumin/Creatinine Ratio, Urine    Hyperlipidemia associated with type 2 diabetes mellitus    Relevant Orders    CBC Auto Differential    Comprehensive Metabolic Panel    TSH    Lipid Panel    Urinalysis    Hemoglobin A1C    Microalbumin/Creatinine Ratio, Urine       Endocrine    Type 2 diabetes mellitus with diabetic polyneuropathy (Chronic)    Relevant Orders    CBC Auto Differential    Comprehensive Metabolic Panel    TSH    Lipid Panel    Urinalysis    Hemoglobin A1C    Microalbumin/Creatinine Ratio, Urine    Severe obesity (BMI 35.0-39.9) with comorbidity    Relevant Orders    CBC Auto Differential    Comprehensive Metabolic Panel    TSH    Lipid Panel    Urinalysis    Hemoglobin A1C    Microalbumin/Creatinine Ratio, Urine       GI    Gastroesophageal reflux disease    Relevant Orders    CBC Auto Differential    Comprehensive Metabolic Panel    TSH    Lipid Panel    Urinalysis    Hemoglobin A1C    Microalbumin/Creatinine Ratio, Urine       Orthopedic    Axial spondyloarthritis (Chronic)    Relevant Orders    CBC Auto Differential    Comprehensive Metabolic Panel    TSH    Lipid Panel    Urinalysis    Hemoglobin A1C    Microalbumin/Creatinine Ratio, Urine       Palliative Care    High risk medication use-sulfasalazine 2 gm    Relevant Orders    CBC Auto Differential    Comprehensive Metabolic Panel    TSH    Lipid Panel    Urinalysis    Hemoglobin A1C    Microalbumin/Creatinine Ratio, Urine       Other    Insomnia (Chronic)    Relevant Orders    CBC Auto Differential     Comprehensive Metabolic Panel    TSH    Lipid Panel    Urinalysis    Hemoglobin A1C    Microalbumin/Creatinine Ratio, Urine     Other Visit Diagnoses       Colon cancer screening        Relevant Orders    Ambulatory referral/consult to Endo Procedure     Encounter for screening mammogram for breast cancer        Relevant Orders    Mammo Digital Screening Bilat w/ [facility]    Annual physical exam        Relevant Orders    (In Office Administered) Pneumococcal Conjugate Vaccine (20 Valent) (IM)        T2DM with neuropathy- stable with last HA1C of 6.7(6/22)<--8.3(3/22)<--7.7(5/21)<--8.3(4/20)<--6.7(9/19)<--5.9(4/18)<--5.8(2/17)<--6.4(4/16)       Continue Invokana/Ozempic       Pt stopped the Metformin 2/2 GI SE's      HTN- continue Losartan 100 mg, Atenolol 100 mg, Norvasc 10 mg, HCTZ 25 mg daily      HLD- stable, Lipitor changed to Crestor(has not been taking it)      Spondyloarthritis- stable on Sulfasalazine/Naproxen        Followed by Rheumatology      Anxiety- stable on Prozac/Klonopin, managed by Psyc      Severe obesity- pt advised on proper diet/exercise for weight loss      Insomnia- stable on Ambien 5 mg qHS PRN      Hx of Hep B- followed by Hepatology       F/u in 6 months     Over 1/2 of 40 minute visit spent reviewing pt's medical records, education/discussion of pt's medical conditions and medical management

## 2023-05-15 ENCOUNTER — TELEPHONE (OUTPATIENT)
Dept: OPHTHALMOLOGY | Facility: CLINIC | Age: 58
End: 2023-05-15
Payer: COMMERCIAL

## 2023-05-15 NOTE — TELEPHONE ENCOUNTER
Called patient regarding her concerns     ----- Message from Debby Pulido sent at 5/15/2023 11:00 AM CDT -----  Contact: pt @ [phone]  Althea Vazquez calling regarding Refills  (message) for #pt is calling to speak with someone in office concerning eye drops, asking for call back

## 2023-05-15 NOTE — TELEPHONE ENCOUNTER
Spoke to patient regarding her concerns     ----- Message from Karolyn Queen sent at 5/15/2023  9:53 AM CDT -----  Regarding: Pt advice  Contact: Pt  Pt is requesting a callback in regards to a follow up appt. Pt stated she was scheduled for 5/11 but didn't see it in th portal. Please adv pt      Confirmed contact below:   Contact Name:Althea Mondragonchester  Phone Number: [phone]

## 2023-05-16 DIAGNOSIS — H20.023 RECURRENT IRITIS OF BOTH EYES: ICD-10-CM

## 2023-05-16 RX ORDER — PREDNISOLONE ACETATE 10 MG/ML
SUSPENSION/ DROPS OPHTHALMIC
Qty: 5 ML | Refills: 1 | Status: SHIPPED | OUTPATIENT
Start: 2023-05-16 | End: 2023-09-08 | Stop reason: SDUPTHER

## 2023-05-17 NOTE — TELEPHONE ENCOUNTER
No care due was identified.  [facility] Embedded Care Due Messages. Reference number: [id number].   5/17/2023 8:51:10 AM CDT

## 2023-05-24 ENCOUNTER — TELEPHONE (OUTPATIENT)
Dept: PODIATRY | Facility: CLINIC | Age: 58
End: 2023-05-24
Payer: COMMERCIAL

## 2023-05-25 ENCOUNTER — HOSPITAL ENCOUNTER (OUTPATIENT)
Dept: RADIOLOGY | Facility: HOSPITAL | Age: 58
Discharge: HOME | End: 2023-05-25
Attending: INTERNAL MEDICINE
Payer: COMMERCIAL

## 2023-05-25 DIAGNOSIS — Z12.31 ENCOUNTER FOR SCREENING MAMMOGRAM FOR BREAST CANCER: ICD-10-CM

## 2023-05-25 PROCEDURE — 77067 SCR MAMMO BI INCL CAD: CPT | Mod: TC,PO

## 2023-05-25 PROCEDURE — 77067 SCR MAMMO BI INCL CAD: CPT | Mod: 26,,, | Performed by: RADIOLOGY

## 2023-05-25 PROCEDURE — 77063 BREAST TOMOSYNTHESIS BI: CPT | Mod: 26,,, | Performed by: RADIOLOGY

## 2023-05-25 PROCEDURE — 77063 MAMMO DIGITAL SCREENING BILAT WITH TOMO: ICD-10-PCS | Mod: 26,,, | Performed by: RADIOLOGY

## 2023-05-25 PROCEDURE — 77067 MAMMO DIGITAL SCREENING BILAT WITH TOMO: ICD-10-PCS | Mod: 26,,, | Performed by: RADIOLOGY

## 2023-05-29 ENCOUNTER — OFFICE VISIT (OUTPATIENT)
Dept: RHEUMATOLOGY | Facility: CLINIC | Age: 58
End: 2023-05-29
Payer: COMMERCIAL

## 2023-05-29 ENCOUNTER — LAB VISIT (OUTPATIENT)
Dept: LAB | Facility: HOSPITAL | Age: 58
End: 2023-05-29
Attending: INTERNAL MEDICINE
Payer: COMMERCIAL

## 2023-05-29 VITALS
SYSTOLIC BLOOD PRESSURE: 120 MMHG | HEIGHT: 67 IN | HEART RATE: 84 BPM | WEIGHT: 207 LBS | BODY MASS INDEX: 32.49 KG/M2 | DIASTOLIC BLOOD PRESSURE: 84 MMHG

## 2023-05-29 DIAGNOSIS — Z79.899 HIGH RISK MEDICATION USE: ICD-10-CM

## 2023-05-29 DIAGNOSIS — Z86.19 HISTORY OF HEPATITIS B: ICD-10-CM

## 2023-05-29 DIAGNOSIS — M47.819 SPONDYLOARTHRITIS: ICD-10-CM

## 2023-05-29 DIAGNOSIS — M19.90 INFLAMMATORY ARTHRITIS: ICD-10-CM

## 2023-05-29 DIAGNOSIS — M25.551 RIGHT HIP PAIN: ICD-10-CM

## 2023-05-29 DIAGNOSIS — M46.90 AXIAL SPONDYLOARTHRITIS: Chronic | ICD-10-CM

## 2023-05-29 DIAGNOSIS — Z78.0 MENOPAUSE: Primary | ICD-10-CM

## 2023-05-29 DIAGNOSIS — M02.30 REACTIVE ARTHRITIS: ICD-10-CM

## 2023-05-29 LAB
CRP SERPL-MCNC: 1.7 MG/L (ref 0–8.2)
ERYTHROCYTE [SEDIMENTATION RATE] IN BLOOD BY PHOTOMETRIC METHOD: 7 MM/HR (ref 0–36)

## 2023-05-29 PROCEDURE — 3066F PR DOCUMENTATION OF TREATMENT FOR NEPHROPATHY: ICD-10-PCS | Mod: CPTII,S$GLB,, | Performed by: INTERNAL MEDICINE

## 2023-05-29 PROCEDURE — 3072F LOW RISK FOR RETINOPATHY: CPT | Mod: CPTII,S$GLB,, | Performed by: INTERNAL MEDICINE

## 2023-05-29 PROCEDURE — 3061F PR NEG MICROALBUMINURIA RESULT DOCUMENTED/REVIEW: ICD-10-PCS | Mod: CPTII,S$GLB,, | Performed by: INTERNAL MEDICINE

## 2023-05-29 PROCEDURE — 4010F PR ACE/ARB THEARPY RXD/TAKEN: ICD-10-PCS | Mod: CPTII,S$GLB,, | Performed by: INTERNAL MEDICINE

## 2023-05-29 PROCEDURE — 3072F PR LOW RISK FOR RETINOPATHY: ICD-10-PCS | Mod: CPTII,S$GLB,, | Performed by: INTERNAL MEDICINE

## 2023-05-29 PROCEDURE — 3079F DIAST BP 80-89 MM HG: CPT | Mod: CPTII,S$GLB,, | Performed by: INTERNAL MEDICINE

## 2023-05-29 PROCEDURE — 87517 HEPATITIS B DNA QUANT: CPT | Performed by: INTERNAL MEDICINE

## 2023-05-29 PROCEDURE — 3061F NEG MICROALBUMINURIA REV: CPT | Mod: CPTII,S$GLB,, | Performed by: INTERNAL MEDICINE

## 2023-05-29 PROCEDURE — 3079F PR MOST RECENT DIASTOLIC BLOOD PRESSURE 80-89 MM HG: ICD-10-PCS | Mod: CPTII,S$GLB,, | Performed by: INTERNAL MEDICINE

## 2023-05-29 PROCEDURE — 99214 OFFICE O/P EST MOD 30 MIN: CPT | Mod: S$GLB,,, | Performed by: INTERNAL MEDICINE

## 2023-05-29 PROCEDURE — 99999 PR PBB SHADOW E&M-EST. PATIENT-LVL V: CPT | Mod: PBBFAC,,, | Performed by: INTERNAL MEDICINE

## 2023-05-29 PROCEDURE — 99214 PR OFFICE/OUTPT VISIT, EST, LEVL IV, 30-39 MIN: ICD-10-PCS | Mod: S$GLB,,, | Performed by: INTERNAL MEDICINE

## 2023-05-29 PROCEDURE — 1159F MED LIST DOCD IN RCRD: CPT | Mod: CPTII,S$GLB,, | Performed by: INTERNAL MEDICINE

## 2023-05-29 PROCEDURE — 86140 C-REACTIVE PROTEIN: CPT | Performed by: INTERNAL MEDICINE

## 2023-05-29 PROCEDURE — 99999 PR PBB SHADOW E&M-EST. PATIENT-LVL V: ICD-10-PCS | Mod: PBBFAC,,, | Performed by: INTERNAL MEDICINE

## 2023-05-29 PROCEDURE — 3044F PR MOST RECENT HEMOGLOBIN A1C LEVEL <7.0%: ICD-10-PCS | Mod: CPTII,S$GLB,, | Performed by: INTERNAL MEDICINE

## 2023-05-29 PROCEDURE — 85652 RBC SED RATE AUTOMATED: CPT | Performed by: INTERNAL MEDICINE

## 2023-05-29 PROCEDURE — 3044F HG A1C LEVEL LT 7.0%: CPT | Mod: CPTII,S$GLB,, | Performed by: INTERNAL MEDICINE

## 2023-05-29 PROCEDURE — 3074F SYST BP LT 130 MM HG: CPT | Mod: CPTII,S$GLB,, | Performed by: INTERNAL MEDICINE

## 2023-05-29 PROCEDURE — 3074F PR MOST RECENT SYSTOLIC BLOOD PRESSURE < 130 MM HG: ICD-10-PCS | Mod: CPTII,S$GLB,, | Performed by: INTERNAL MEDICINE

## 2023-05-29 PROCEDURE — 4010F ACE/ARB THERAPY RXD/TAKEN: CPT | Mod: CPTII,S$GLB,, | Performed by: INTERNAL MEDICINE

## 2023-05-29 PROCEDURE — 3008F PR BODY MASS INDEX (BMI) DOCUMENTED: ICD-10-PCS | Mod: CPTII,S$GLB,, | Performed by: INTERNAL MEDICINE

## 2023-05-29 PROCEDURE — 3066F NEPHROPATHY DOC TX: CPT | Mod: CPTII,S$GLB,, | Performed by: INTERNAL MEDICINE

## 2023-05-29 PROCEDURE — 1159F PR MEDICATION LIST DOCUMENTED IN MEDICAL RECORD: ICD-10-PCS | Mod: CPTII,S$GLB,, | Performed by: INTERNAL MEDICINE

## 2023-05-29 PROCEDURE — 3008F BODY MASS INDEX DOCD: CPT | Mod: CPTII,S$GLB,, | Performed by: INTERNAL MEDICINE

## 2023-05-29 RX ORDER — GOLIMUMAB 50 MG/.5ML
50 INJECTION, SOLUTION SUBCUTANEOUS
Qty: 1.5 ML | Refills: 1 | Status: ACTIVE | OUTPATIENT
Start: 2023-05-29 | End: 2023-11-16 | Stop reason: SDUPTHER

## 2023-05-29 RX ORDER — ENTECAVIR 0.5 MG/1
0.5 TABLET, FILM COATED ORAL DAILY
Qty: 90 TABLET | Refills: 3 | Status: ACTIVE | OUTPATIENT
Start: 2023-05-29 | End: 2023-11-16 | Stop reason: SDUPTHER

## 2023-05-29 RX ORDER — PANTOPRAZOLE SODIUM 40 MG/1
40 TABLET, DELAYED RELEASE ORAL DAILY
Qty: 90 TABLET | Refills: 2 | Status: SHIPPED | OUTPATIENT
Start: 2023-05-29

## 2023-05-29 RX ORDER — NAPROXEN 500 MG/1
500 TABLET ORAL 2 TIMES DAILY PRN
Qty: 180 TABLET | Refills: 0 | Status: SHIPPED | OUTPATIENT
Start: 2023-05-29 | End: 2023-11-09

## 2023-05-29 RX ORDER — SULFASALAZINE 500 MG/1
1500 TABLET, DELAYED RELEASE ORAL 2 TIMES DAILY
Qty: 540 TABLET | Refills: 0 | Status: SHIPPED | OUTPATIENT
Start: 2023-05-29 | End: 2023-11-16 | Stop reason: SDUPTHER

## 2023-05-29 RX ORDER — ENTECAVIR 0.5 MG/1
0.5 TABLET, FILM COATED ORAL DAILY
Qty: 90 TABLET | Refills: 3 | Status: SHIPPED | OUTPATIENT
Start: 2023-05-29 | End: 2023-05-29 | Stop reason: SDUPTHER

## 2023-05-29 ASSESSMENT — ANKYLOSING SPONDYLITIS DISEASE ACTIVITY SCORE (ASDAS-CRP)
MORNING_STIFFNESS: 5
GLOBAL_ACTIVITY: 5
NBH_PAIN: 6
CRP_MG_PER_LITER: 1.7
TOTAL_SCORE: 2.5
PAIN_SWELLING: 5

## 2023-05-29 NOTE — PROGRESS NOTES
Subjective:      Patient ID: Althea Vazquez is a 57 y.o. female.    Chief Complaint:p- and ax-SpA; lumbar spondylosis    HPI Had acute pain and swelling right foot in April, saw Dr. Brown in Podiatry with x-ray showing Achilles tendon and plantar heel  spur and hallux valgus. Rx with Medrol Dospak and indomethacin, now resolved. Some increased low back pain after picking up watermelon. Also saw Dr. Tamayo in Optometry for recurrent iritis both eyes, now improved with   Pred Forte eyedrops  Review of Systems   Constitutional:  Negative for appetite change, fatigue, fever and unexpected weight change.   HENT:  Negative for mouth sores and trouble swallowing.    Eyes:  Negative for redness and visual disturbance.   Respiratory:  Negative for cough, shortness of breath and wheezing.    Cardiovascular:  Negative for chest pain and palpitations.   Gastrointestinal:  Negative for abdominal pain, anal bleeding, blood in stool, constipation, diarrhea, nausea and vomiting.   Genitourinary:  Negative for dysuria, frequency, genital sores and urgency.   Musculoskeletal:  Negative for arthralgias, back pain, gait problem, joint swelling, myalgias, neck pain and neck stiffness.   Skin:  Negative for rash.   Neurological:  Negative for weakness, numbness and headaches.   Hematological:  Negative for adenopathy. Does not bruise/bleed easily.   Psychiatric/Behavioral:  Negative for sleep disturbance. The patient is not nervous/anxious.       Objective:   LMP 11/25/2014   Physical Exam      Schober's 10-14cm  Nl lumbar extension  and lateral flexion  Fabere neg bilateral   Chest expansion 8 cm improved  Neck rom nl      *Right foot exam normal     10/31/2022   Tender (CABALLERO-28) 0 / 28    Swollen (CABALLERO-28) 0 / 28    Provider Global --   Patient Global 45 mm   ESR 11 mm/hr   CRP 2.3 mg/L   CABALLERO-28 (ESR) 2.31 (Remission)   CABALLERO-28 (CRP) 2.02 (Remission)   CDAI Score --         Latest Reference Range & Units 05/25/23 07:28    WBC 3.90 - 12.70 K/uL 6.36   RBC 4.00 - 5.40 M/uL 4.52   Hemoglobin 12.0 - 16.0 g/dL 13.4   Hematocrit 37.0 - 48.5 % 41.2   MCV 82 - 98 fL 91   MCH 27.0 - 31.0 pg 29.6   MCHC 32.0 - 36.0 g/dL 32.5   RDW 11.5 - 14.5 % 13.2   Platelets 150 - 450 K/uL 291   MPV 9.2 - 12.9 fL 10.5   Gran % 38.0 - 73.0 % 35.3 (L)   Lymph % 18.0 - 48.0 % 50.2 (H)   Mono % 4.0 - 15.0 % 12.1   Eosinophil % 0.0 - 8.0 % 1.1   Basophil % 0.0 - 1.9 % 0.8   Immature Granulocytes 0.0 - 0.5 % 0.5   Gran # (ANC) 1.8 - 7.7 K/uL 2.3   Lymph # 1.0 - 4.8 K/uL 3.2   Mono # 0.3 - 1.0 K/uL 0.8   Eos # 0.0 - 0.5 K/uL 0.1   Baso # 0.00 - 0.20 K/uL 0.05   Immature Grans (Abs) 0.00 - 0.04 K/uL 0.03   nRBC 0 /100 WBC 0   Differential Method  Automated   Sodium 136 - 145 mmol/L 143   Potassium 3.5 - 5.1 mmol/L 3.8   Chloride 95 - 110 mmol/L 106   CO2 23 - 29 mmol/L 30 (H)   Anion Gap 8 - 16 mmol/L 7 (L)   BUN 7 - 17 mg/dL 10   Creatinine 0.50 - 1.40 mg/dL 0.60   eGFR >60 mL/min/1.73 m^2 >60.0   Glucose 70 - 110 mg/dL 107   Calcium 8.7 - 10.5 mg/dL 8.8   Alkaline Phosphatase 38 - 126 U/L 68   PROTEIN TOTAL 6.0 - 8.4 g/dL 7.7   Albumin 3.5 - 5.2 g/dL 4.4   Uric Acid 2.4 - 5.7 mg/dL 2.0 (L)   BILIRUBIN TOTAL 0.1 - 1.0 mg/dL 0.4   AST 15 - 46 U/L 30   ALT 10 - 44 U/L 30   Cholesterol 120 - 199 mg/dL 171   HDL 40 - 75 mg/dL 61   HDL/Cholesterol Ratio 20.0 - 50.0 % 35.7   LDL Cholesterol External 63.0 - 159.0 mg/dL 99.8   Non-HDL Cholesterol mg/dL 110   Total Cholesterol/HDL Ratio 2.0 - 5.0  2.8   Triglycerides 30 - 150 mg/dL 51   Hemoglobin A1C External 4.0 - 5.6 % 5.7 (H)   Estimated Avg Glucose 68 - 131 mg/dL 117   TSH 0.400 - 4.000 uIU/mL 2.190   (L): Data is abnormally low  (H): Data is abnormally high    CRP 13.5     Latest Reference Range & Units 02/15/23 09:33   Vit D, 25-Hydroxy 30 - 96 ng/mL 36      Latest Reference Range & Units 05/25/23 07:28   HDL 40 - 75 mg/dL 61   HDL/Cholesterol Ratio 20.0 - 50.0 % 35.7   LDL Cholesterol External 63.0 - 159.0 mg/dL  99.8   Non-HDL Cholesterol mg/dL 110   Total Cholesterol/HDL Ratio 2.0 - 5.0  2.8   Triglycerides 30 - 150 mg/dL 51    EXAMINATION:  XR FOOT COMPLETE 3 VIEW RIGHT     CLINICAL HISTORY:  Pain in unspecified foot     COMPARISON:  09/20/2021     FINDINGS:  There is no fracture. There is no dislocation.  There is mild hallux valgus deformity.  There is a small spur at the site of attachment of the Achilles tendon to the calcaneus.     Impression:     1. There is mild hallux valgus deformity.  2. There is a small spur at the site of attachment of the Achilles tendon to the calcaneus.        Electronically signed by: Gabriel Duarte MD  Date:                                            04/21/2023  Time:                                           13:52      EXAMINATION:  MRI FOOT (MIDFOOT) RIGHT WITHOUT CONTRAST     CLINICAL HISTORY:  Foot pain, stress fracture suspected, neg xray;  Pain in right foot     TECHNIQUE:  Standard multiplanar pulse sequences without     COMPARISON:  None     FINDINGS:  Bones of the midfoot and metatarsals are intact.  No fractures or suspicious osseous lesions.  Sesamoids and visualized phalanges are normal.     Moderate chondral thinning talonavicular and calcaneocuboid joints.  Moderate chondral thinning navicular-cuneiform joints.  Moderate chondral thinning of the 1st TMT joint.  Severe, full-thickness chondral loss of the 2nd, 3rd, 4th, and 5th TMT joints.  Severe reactive marrow edema.     Lisfranc ligament normal.  Extensor and flexor tendons are normal.  Distal peroneal tendons are normal.     Moderate subcutaneous soft tissue edema noted throughout the dorsum of the foot.  Mild intramuscular soft tissue edema.  Plantar fascia normal.  Hypointensity within the 2nd web space could represent a neuroma, measuring 9 x 3 x 9 mm.     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     9 mm hypointensity 2nd web space could represent a  neuroma.        Electronically signed by: Gabriel Casanova  Date:                                            05/08/2023  Time:                                           10:59  Assessment:   adalimumab with Secondary inefficacy, tried to change to Simponi 50mg sc q 4 wks but insurance wouldn't cover, then on Enbrel Sureclick 50mg s q 7 days but has developed recurrent AAU OS  Then started certolizumab after 1/11/21 visit  had AAU with recurrence 1/28/21 just after starting certolizumab but had not completed loading dose   certolizumab ineffective           ASDAS-CRP:2.5(HDA)  BASDAI 4.7(HDA)  BASFI didn't complete        Recurrent AAU subsiding flare  Chronic hepatitis B HBV DNA neg  EH  120/84  Hyperlipidemia LDL 99.8 5/25/23  on rosuvastatin 40mg nightly  Class 1 Obesity gained 2#   since 2/15/23  Body mass index is 32.42 kg/m². on semaglutide 1mg q 7day  DXA 9/20/21 normal  Left cervical radiculopathy bulge C6-7  Chronic lumbar spondylosis, doing well at present   Acute right foot pain and swelling, now resolved. Normal uric acid. MRI with severe TMT OA ? Charcot neuroarthropathy      Plan:   ESR, CRP today  HBV DNA pending  *bivalent Covid vaccine booster, one week prior to next golimumab dose discussed  golimumab  50mg sc q 4wks   Sulfasalazine.1500mg twice daily  Vitamin D 2 50,000 units q 7days and vitamin D3 1000 units daily  entecvir 0.5mg daily  Vitamin D2 50,000 units q 7days   Naproxen 500mg twice daily prn with pantoprazole 40mg daily for  prevention  Continue exercise program  Cont weight reduction, Mediterranean diet as feasible, decrease rice portions as she is doing semaglutide  DXA > 9/21/23  F/u Dr. Brown ?  RTC 3 months with standing labs

## 2023-05-29 NOTE — PROGRESS NOTES
Rapid3 Question Responses and Scores 5/29/2023   MDHAQ Score 0.8   Psychologic Score 3.3   Pain Score 7   When you awakened in the morning OVER THE LAST WEEK, did you feel stiff? Yes   If Yes, please indicate the number of hours until you are as limber as you will be for the day 1   Fatigue Score 4   Global Health Score 5   RAPID3 Score 4.89     Answers submitted by the patient for this visit:  Rheumatology Questionnaire (Submitted on 5/29/2023)  fever: No  eye redness: No  mouth sores: No  headaches: No  shortness of breath: No  chest pain: No  trouble swallowing: No  diarrhea: No  constipation: No  unexpected weight change: No  genital sore: No  dysuria: No  During the last 3 days, have you had a skin rash?: No  Bruises or bleeds easily: No  cough: No

## 2023-06-01 ENCOUNTER — OFFICE VISIT (OUTPATIENT)
Dept: PODIATRY | Facility: CLINIC | Age: 58
End: 2023-06-01
Payer: COMMERCIAL

## 2023-06-01 ENCOUNTER — OFFICE VISIT (OUTPATIENT)
Dept: OPTOMETRY | Facility: CLINIC | Age: 58
End: 2023-06-01
Payer: COMMERCIAL

## 2023-06-01 VITALS
HEIGHT: 67 IN | BODY MASS INDEX: 33.21 KG/M2 | SYSTOLIC BLOOD PRESSURE: 146 MMHG | DIASTOLIC BLOOD PRESSURE: 96 MMHG | WEIGHT: 211.63 LBS | HEART RATE: 81 BPM

## 2023-06-01 DIAGNOSIS — M89.8X7 EXOSTOSIS OF RIGHT FOOT: ICD-10-CM

## 2023-06-01 DIAGNOSIS — M02.30 REACTIVE ARTHRITIS, UNSPECIFIED SITE: ICD-10-CM

## 2023-06-01 DIAGNOSIS — H25.13 NUCLEAR SCLEROSIS OF BOTH EYES: ICD-10-CM

## 2023-06-01 DIAGNOSIS — H25.813 COMBINED FORMS OF AGE-RELATED CATARACT OF BOTH EYES: ICD-10-CM

## 2023-06-01 DIAGNOSIS — M19.079 ARTHRITIS OF MIDFOOT: ICD-10-CM

## 2023-06-01 DIAGNOSIS — H21.542 POSTERIOR SYNECHIAE (IRIS), LEFT EYE: ICD-10-CM

## 2023-06-01 DIAGNOSIS — H20.023 RECURRENT IRITIS OF BOTH EYES: Primary | ICD-10-CM

## 2023-06-01 DIAGNOSIS — M79.671 FOOT PAIN, RIGHT: Primary | ICD-10-CM

## 2023-06-01 PROCEDURE — 3061F NEG MICROALBUMINURIA REV: CPT | Mod: CPTII,S$GLB,, | Performed by: PODIATRIST

## 2023-06-01 PROCEDURE — 1159F PR MEDICATION LIST DOCUMENTED IN MEDICAL RECORD: ICD-10-PCS | Mod: CPTII,S$GLB,, | Performed by: OPTOMETRIST

## 2023-06-01 PROCEDURE — 4010F PR ACE/ARB THEARPY RXD/TAKEN: ICD-10-PCS | Mod: CPTII,S$GLB,, | Performed by: OPTOMETRIST

## 2023-06-01 PROCEDURE — 3072F PR LOW RISK FOR RETINOPATHY: ICD-10-PCS | Mod: CPTII,S$GLB,, | Performed by: PODIATRIST

## 2023-06-01 PROCEDURE — 3044F HG A1C LEVEL LT 7.0%: CPT | Mod: CPTII,S$GLB,, | Performed by: PODIATRIST

## 2023-06-01 PROCEDURE — 3072F LOW RISK FOR RETINOPATHY: CPT | Mod: CPTII,S$GLB,, | Performed by: PODIATRIST

## 2023-06-01 PROCEDURE — 99214 PR OFFICE/OUTPT VISIT, EST, LEVL IV, 30-39 MIN: ICD-10-PCS | Mod: S$GLB,,, | Performed by: PODIATRIST

## 2023-06-01 PROCEDURE — 1159F MED LIST DOCD IN RCRD: CPT | Mod: CPTII,S$GLB,, | Performed by: OPTOMETRIST

## 2023-06-01 PROCEDURE — 99214 OFFICE O/P EST MOD 30 MIN: CPT | Mod: S$GLB,,, | Performed by: PODIATRIST

## 2023-06-01 PROCEDURE — 3066F PR DOCUMENTATION OF TREATMENT FOR NEPHROPATHY: ICD-10-PCS | Mod: CPTII,S$GLB,, | Performed by: OPTOMETRIST

## 2023-06-01 PROCEDURE — 3061F PR NEG MICROALBUMINURIA RESULT DOCUMENTED/REVIEW: ICD-10-PCS | Mod: CPTII,S$GLB,, | Performed by: OPTOMETRIST

## 2023-06-01 PROCEDURE — 99999 PR PBB SHADOW E&M-EST. PATIENT-LVL IV: CPT | Mod: PBBFAC,,, | Performed by: OPTOMETRIST

## 2023-06-01 PROCEDURE — 92012 PR EYE EXAM, EST PATIENT,INTERMED: ICD-10-PCS | Mod: S$GLB,,, | Performed by: OPTOMETRIST

## 2023-06-01 PROCEDURE — 3080F DIAST BP >= 90 MM HG: CPT | Mod: CPTII,S$GLB,, | Performed by: PODIATRIST

## 2023-06-01 PROCEDURE — 99999 PR PBB SHADOW E&M-EST. PATIENT-LVL IV: ICD-10-PCS | Mod: PBBFAC,,, | Performed by: OPTOMETRIST

## 2023-06-01 PROCEDURE — 3066F NEPHROPATHY DOC TX: CPT | Mod: CPTII,S$GLB,, | Performed by: OPTOMETRIST

## 2023-06-01 PROCEDURE — 3044F HG A1C LEVEL LT 7.0%: CPT | Mod: CPTII,S$GLB,, | Performed by: OPTOMETRIST

## 2023-06-01 PROCEDURE — 99999 PR PBB SHADOW E&M-EST. PATIENT-LVL IV: CPT | Mod: PBBFAC,,, | Performed by: PODIATRIST

## 2023-06-01 PROCEDURE — 3061F NEG MICROALBUMINURIA REV: CPT | Mod: CPTII,S$GLB,, | Performed by: OPTOMETRIST

## 2023-06-01 PROCEDURE — 3044F PR MOST RECENT HEMOGLOBIN A1C LEVEL <7.0%: ICD-10-PCS | Mod: CPTII,S$GLB,, | Performed by: PODIATRIST

## 2023-06-01 PROCEDURE — 3008F PR BODY MASS INDEX (BMI) DOCUMENTED: ICD-10-PCS | Mod: CPTII,S$GLB,, | Performed by: PODIATRIST

## 2023-06-01 PROCEDURE — 92012 INTRM OPH EXAM EST PATIENT: CPT | Mod: S$GLB,,, | Performed by: OPTOMETRIST

## 2023-06-01 PROCEDURE — 3080F PR MOST RECENT DIASTOLIC BLOOD PRESSURE >= 90 MM HG: ICD-10-PCS | Mod: CPTII,S$GLB,, | Performed by: PODIATRIST

## 2023-06-01 PROCEDURE — 4010F ACE/ARB THERAPY RXD/TAKEN: CPT | Mod: CPTII,S$GLB,, | Performed by: PODIATRIST

## 2023-06-01 PROCEDURE — 3066F PR DOCUMENTATION OF TREATMENT FOR NEPHROPATHY: ICD-10-PCS | Mod: CPTII,S$GLB,, | Performed by: PODIATRIST

## 2023-06-01 PROCEDURE — 4010F ACE/ARB THERAPY RXD/TAKEN: CPT | Mod: CPTII,S$GLB,, | Performed by: OPTOMETRIST

## 2023-06-01 PROCEDURE — 3044F PR MOST RECENT HEMOGLOBIN A1C LEVEL <7.0%: ICD-10-PCS | Mod: CPTII,S$GLB,, | Performed by: OPTOMETRIST

## 2023-06-01 PROCEDURE — 3008F BODY MASS INDEX DOCD: CPT | Mod: CPTII,S$GLB,, | Performed by: PODIATRIST

## 2023-06-01 PROCEDURE — 99999 PR PBB SHADOW E&M-EST. PATIENT-LVL IV: ICD-10-PCS | Mod: PBBFAC,,, | Performed by: PODIATRIST

## 2023-06-01 PROCEDURE — 4010F PR ACE/ARB THEARPY RXD/TAKEN: ICD-10-PCS | Mod: CPTII,S$GLB,, | Performed by: PODIATRIST

## 2023-06-01 PROCEDURE — 3061F PR NEG MICROALBUMINURIA RESULT DOCUMENTED/REVIEW: ICD-10-PCS | Mod: CPTII,S$GLB,, | Performed by: PODIATRIST

## 2023-06-01 PROCEDURE — 3066F NEPHROPATHY DOC TX: CPT | Mod: CPTII,S$GLB,, | Performed by: PODIATRIST

## 2023-06-01 PROCEDURE — 3077F PR MOST RECENT SYSTOLIC BLOOD PRESSURE >= 140 MM HG: ICD-10-PCS | Mod: CPTII,S$GLB,, | Performed by: PODIATRIST

## 2023-06-01 PROCEDURE — 3077F SYST BP >= 140 MM HG: CPT | Mod: CPTII,S$GLB,, | Performed by: PODIATRIST

## 2023-06-01 NOTE — PATIENT INSTRUCTIONS
History of reactive arthritis.  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     Nuclear sclerosis and cortical cataract in both eyes.      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     No longer using Pred Forte 1.0% ophthalmic suspension in the right eye, and using every four hours while awake in the left eye.  Inflammation appears to have resolved in both eyes.  No evidence of cells/flare in anterior chamber of either eye.    Remain off Prednisolone drops in the right eye, and begin to taper use of dropsin the left eye:  three times per day for four days, then two times per day for four days, and once per day for four days.  Then discontinue drops in the left eye.    Return two weeks (06/15) for progress check - or prior, if any exacerbation of signs/symptoms in either eye.

## 2023-06-03 NOTE — PROGRESS NOTES
HPI     Eye Problem            Comments: In for progress check on bilateral recurrent iritis/anterior   uveitis.  H/o reactive arthritis.   H/o posterior synechia in the left eye.  Using Prednisolone Acetate 1.0% ophthamic suspension in the left eye only   (q 4-6 hours).  Left eye still bothersome.  VA fluctuates in the left eye.  Sometimes   okay, sometimes blurry.  Not using drops in OD, and OD feels okay.          Comments    Patient in for progress check.    Being followed for (diagnosis):   Iritis OS    Date last seen:  04/27/2023    Doctor last seen:  Dr. Tamayo    Prescribed eye medications(s) using:  Prednisolone Q4-6H OS only    OTC eye medication(s) using:  N/A    Signs/symptoms of condition resolved/better/stable/worse?:  Patient states   her vision is still blurry. Feels like trash is in her left eye. Patient   also states she is sensitive to the light.   Last instilled one drop into OS approx 24 hours ago.                      Last edited by Rishi Tamayo, OD on 6/1/2023 10:48 AM.            Assessment /Plan     For exam results, see Encounter Report.    1. Recurrent iritis of both eyes        2. Posterior synechiae (iris), left eye        3. Nuclear sclerosis of both eyes        4. 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 Reactive arthritis, unspecified site                     History of reactive arthritis.  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     Nuclear sclerosis and cortical cataract in both eyes.      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     No longer using Pred Forte 1.0% ophthalmic suspension in the right eye, and using every four hours while awake in the left eye.  Inflammation appears to have resolved in both eyes.  No evidence of cells/flare in anterior chamber of either eye.    Remain off Prednisolone drops in the right eye, and begin to taper use of dropsin the left eye:  three times per day for four days,  then two times per day for four days, and once per day for four days.  Then discontinue drops in the left eye.    Return two weeks (06/15) for progress check - or prior, if any exacerbation of signs/symptoms in either eye.

## 2023-06-07 ENCOUNTER — PATIENT OUTREACH (OUTPATIENT)
Dept: ADMINISTRATIVE | Facility: HOSPITAL | Age: 58
End: 2023-06-07
Payer: COMMERCIAL

## 2023-06-07 NOTE — PROGRESS NOTES
Received message via JOSEFIN[facility] in box that patient would like to schedule colonoscopy. Patient has referral to endo  for colonoscopy entered

## 2023-06-11 NOTE — PROGRESS NOTES
Subjective:      Patient ID: Althea Vazquez is a 57 y.o. female.    Chief Complaint:   Foot Pain (Right foot/Swelling at times )    Althea is a 57 y.o. female who presents to the podiatry clinic  with complaint of  right foot pain.  Often on edema right foot with some discomfort.  She is improving overall.  She is here for final evaluation.    Review of Systems   Constitutional: Negative for chills, decreased appetite, fever and malaise/fatigue.   HENT:  Negative for congestion, hearing loss, nosebleeds and tinnitus.    Eyes:  Negative for double vision, pain, photophobia and visual disturbance.   Cardiovascular:  Negative for chest pain, claudication, cyanosis and leg swelling.   Respiratory:  Negative for cough, hemoptysis, shortness of breath and wheezing.    Endocrine: Negative for cold intolerance and heat intolerance.   Hematologic/Lymphatic: Negative for adenopathy and bleeding problem.   Skin:  Negative for color change, dry skin, itching, nail changes and suspicious lesions.   Musculoskeletal:  Positive for back pain, joint pain, myalgias and stiffness. Negative for arthritis.   Gastrointestinal:  Negative for abdominal pain, jaundice, nausea and vomiting.   Genitourinary:  Negative for dysuria, frequency and hematuria.   Neurological:  Negative for difficulty with concentration, loss of balance, numbness, paresthesias and sensory change.   Psychiatric/Behavioral:  Negative for altered mental status, hallucinations and suicidal ideas. The patient is not nervous/anxious.    Allergic/Immunologic: Negative for environmental allergies and persistent infections.         Objective:      Physical Exam  Vitals reviewed.   Constitutional:       Appearance: She is well-developed.   HENT:      Head: Normocephalic and atraumatic.   Cardiovascular:      Pulses:           Dorsalis pedis pulses are 2+ on the right side and 2+ on the left side.        Posterior tibial pulses are 2+ on the right side and 2+ on  the left side.   Pulmonary:      Effort: Pulmonary effort is normal.   Musculoskeletal:         General: Normal range of motion.      Comments: Inspection and palpation of the muscles joints and bones of both lower extremities reveal that muscle strength for the anterior lateral and posterior muscle groups and intrinsic muscle groups of the foot are all 5 over 5 symmetrical.    Minimal tenderness to the dorsum right foot overlying the 2nd and 3rd metatarsals and metatarsophalangeal joint.  Mild Tenderness with palpation of the midfoot as well as inversion eversion of the midtarsal joint.   Skin:     General: Skin is warm and dry.      Capillary Refill: Capillary refill takes 2 to 3 seconds.      Comments: Skin turgor is normal bilaterally.  Skin texture is well hydrated to both lower extremities.  No lesions or rashes or wounds appreciated bilaterally.  Nail plates 1 through 5 bilaterally are within normal limits for length and thickness.  No nail clubbing or incurvation noted.   Neurological:      Mental Status: She is alert and oriented to person, place, and time.      Comments: Sharp dull light touch vibratory proprioceptive sensation are intact bilaterally.  Deep tendon reflexes to patellar and Achilles tendon are symmetrical 2 over 4 bilaterally.  No ankle clonus or Babinski reflexes noted bilaterally.  Coordination is normal to both feet and lower extremities.   Psychiatric:         Behavior: Behavior normal.           Assessment:       Encounter Diagnoses   Name Primary?    Foot pain, right Yes    Arthritis of midfoot     Exostosis of right foot      Independent visualization of imaging was performed.  Results were reviewed in detail with patient.       Plan:       Althea was seen today for foot pain.    Diagnoses and all orders for this visit:    Foot pain, right    Arthritis of midfoot    Exostosis of right foot      I counseled the patient on her conditions, their implications and medical  management.    The nature of the condition, options for management, as well as potential risks and complications were discussed in detail with patient. Patient was amenable to my recommendations and left my office fully informed and will follow up as instructed or sooner if necessary.      Independent visualization of imaging was performed.  Results were reviewed in detail with patient.    I recommended patient be fitted for orthoses.  I explained that orthoses may improve function of the foot, reduce pain, decrease pronation, increase efficiency of muscle function of the foot and ankle and prevent surgery.  Alternative forms of biomechanical control of the foot and ankle were discussed with the patient.

## 2023-06-15 ENCOUNTER — OFFICE VISIT (OUTPATIENT)
Dept: OPTOMETRY | Facility: CLINIC | Age: 58
End: 2023-06-15
Payer: COMMERCIAL

## 2023-06-15 DIAGNOSIS — H21.542 POSTERIOR SYNECHIAE (IRIS), LEFT EYE: ICD-10-CM

## 2023-06-15 DIAGNOSIS — M02.30 REACTIVE ARTHRITIS, UNSPECIFIED SITE: ICD-10-CM

## 2023-06-15 DIAGNOSIS — H20.023 RECURRENT IRITIS OF BOTH EYES: Primary | ICD-10-CM

## 2023-06-15 DIAGNOSIS — H25.813 COMBINED FORMS OF AGE-RELATED CATARACT OF BOTH EYES: ICD-10-CM

## 2023-06-15 DIAGNOSIS — H25.13 NUCLEAR SCLEROSIS OF BOTH EYES: ICD-10-CM

## 2023-06-15 PROCEDURE — 4010F ACE/ARB THERAPY RXD/TAKEN: CPT | Mod: CPTII,S$GLB,, | Performed by: OPTOMETRIST

## 2023-06-15 PROCEDURE — 3066F PR DOCUMENTATION OF TREATMENT FOR NEPHROPATHY: ICD-10-PCS | Mod: CPTII,S$GLB,, | Performed by: OPTOMETRIST

## 2023-06-15 PROCEDURE — 3066F NEPHROPATHY DOC TX: CPT | Mod: CPTII,S$GLB,, | Performed by: OPTOMETRIST

## 2023-06-15 PROCEDURE — 3044F PR MOST RECENT HEMOGLOBIN A1C LEVEL <7.0%: ICD-10-PCS | Mod: CPTII,S$GLB,, | Performed by: OPTOMETRIST

## 2023-06-15 PROCEDURE — 92012 INTRM OPH EXAM EST PATIENT: CPT | Mod: S$GLB,,, | Performed by: OPTOMETRIST

## 2023-06-15 PROCEDURE — 1159F PR MEDICATION LIST DOCUMENTED IN MEDICAL RECORD: ICD-10-PCS | Mod: CPTII,S$GLB,, | Performed by: OPTOMETRIST

## 2023-06-15 PROCEDURE — 4010F PR ACE/ARB THEARPY RXD/TAKEN: ICD-10-PCS | Mod: CPTII,S$GLB,, | Performed by: OPTOMETRIST

## 2023-06-15 PROCEDURE — 99999 PR PBB SHADOW E&M-EST. PATIENT-LVL IV: ICD-10-PCS | Mod: PBBFAC,,, | Performed by: OPTOMETRIST

## 2023-06-15 PROCEDURE — 3061F PR NEG MICROALBUMINURIA RESULT DOCUMENTED/REVIEW: ICD-10-PCS | Mod: CPTII,S$GLB,, | Performed by: OPTOMETRIST

## 2023-06-15 PROCEDURE — 92012 PR EYE EXAM, EST PATIENT,INTERMED: ICD-10-PCS | Mod: S$GLB,,, | Performed by: OPTOMETRIST

## 2023-06-15 PROCEDURE — 3061F NEG MICROALBUMINURIA REV: CPT | Mod: CPTII,S$GLB,, | Performed by: OPTOMETRIST

## 2023-06-15 PROCEDURE — 3044F HG A1C LEVEL LT 7.0%: CPT | Mod: CPTII,S$GLB,, | Performed by: OPTOMETRIST

## 2023-06-15 PROCEDURE — 1159F MED LIST DOCD IN RCRD: CPT | Mod: CPTII,S$GLB,, | Performed by: OPTOMETRIST

## 2023-06-15 PROCEDURE — 99999 PR PBB SHADOW E&M-EST. PATIENT-LVL IV: CPT | Mod: PBBFAC,,, | Performed by: OPTOMETRIST

## 2023-06-16 NOTE — PATIENT INSTRUCTIONS
History of reactive arthritis.  Recent history of bilateral recurrent iritis/anterior uveitis, OD < OS.     Nuclear sclerosis and cortical cataract in both eyes.      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     No longer using Pred Forte 1.0% ophthalmic suspension in either eye, having tapered off Prednisolone Acetate drops in the left eye as advised at the last visit    Inflammation appears to have resolved in both eyes.  No evidence of cells/flare in anterior chamber of either eye.     Return when appropriate for general eye exam and refraction - or prior, if any problem or recurrence of signs/symptoms noted in the interim.

## 2023-07-12 ENCOUNTER — PATIENT MESSAGE (OUTPATIENT)
Dept: RHEUMATOLOGY | Facility: CLINIC | Age: 58
End: 2023-07-12
Payer: COMMERCIAL

## 2023-07-13 ENCOUNTER — OFFICE VISIT (OUTPATIENT)
Dept: PODIATRY | Facility: CLINIC | Age: 58
End: 2023-07-13
Payer: COMMERCIAL

## 2023-07-13 VITALS
SYSTOLIC BLOOD PRESSURE: 144 MMHG | BODY MASS INDEX: 33.12 KG/M2 | WEIGHT: 211 LBS | HEART RATE: 77 BPM | HEIGHT: 67 IN | DIASTOLIC BLOOD PRESSURE: 90 MMHG

## 2023-07-13 DIAGNOSIS — M89.8X7 EXOSTOSIS OF RIGHT FOOT: ICD-10-CM

## 2023-07-13 DIAGNOSIS — M79.671 FOOT PAIN, RIGHT: Primary | ICD-10-CM

## 2023-07-13 DIAGNOSIS — M19.079 ARTHRITIS OF MIDFOOT: ICD-10-CM

## 2023-07-13 PROCEDURE — 1159F PR MEDICATION LIST DOCUMENTED IN MEDICAL RECORD: ICD-10-PCS | Mod: CPTII,S$GLB,, | Performed by: PODIATRIST

## 2023-07-13 PROCEDURE — 3066F NEPHROPATHY DOC TX: CPT | Mod: CPTII,S$GLB,, | Performed by: PODIATRIST

## 2023-07-13 PROCEDURE — 3061F PR NEG MICROALBUMINURIA RESULT DOCUMENTED/REVIEW: ICD-10-PCS | Mod: CPTII,S$GLB,, | Performed by: PODIATRIST

## 2023-07-13 PROCEDURE — 3077F SYST BP >= 140 MM HG: CPT | Mod: CPTII,S$GLB,, | Performed by: PODIATRIST

## 2023-07-13 PROCEDURE — 3061F NEG MICROALBUMINURIA REV: CPT | Mod: CPTII,S$GLB,, | Performed by: PODIATRIST

## 2023-07-13 PROCEDURE — 3044F HG A1C LEVEL LT 7.0%: CPT | Mod: CPTII,S$GLB,, | Performed by: PODIATRIST

## 2023-07-13 PROCEDURE — 99214 PR OFFICE/OUTPT VISIT, EST, LEVL IV, 30-39 MIN: ICD-10-PCS | Mod: S$GLB,,, | Performed by: PODIATRIST

## 2023-07-13 PROCEDURE — 99999 PR PBB SHADOW E&M-EST. PATIENT-LVL IV: CPT | Mod: PBBFAC,,, | Performed by: PODIATRIST

## 2023-07-13 PROCEDURE — 4010F PR ACE/ARB THEARPY RXD/TAKEN: ICD-10-PCS | Mod: CPTII,S$GLB,, | Performed by: PODIATRIST

## 2023-07-13 PROCEDURE — 3044F PR MOST RECENT HEMOGLOBIN A1C LEVEL <7.0%: ICD-10-PCS | Mod: CPTII,S$GLB,, | Performed by: PODIATRIST

## 2023-07-13 PROCEDURE — 3080F PR MOST RECENT DIASTOLIC BLOOD PRESSURE >= 90 MM HG: ICD-10-PCS | Mod: CPTII,S$GLB,, | Performed by: PODIATRIST

## 2023-07-13 PROCEDURE — 3072F PR LOW RISK FOR RETINOPATHY: ICD-10-PCS | Mod: CPTII,S$GLB,, | Performed by: PODIATRIST

## 2023-07-13 PROCEDURE — 3008F BODY MASS INDEX DOCD: CPT | Mod: CPTII,S$GLB,, | Performed by: PODIATRIST

## 2023-07-13 PROCEDURE — 99214 OFFICE O/P EST MOD 30 MIN: CPT | Mod: S$GLB,,, | Performed by: PODIATRIST

## 2023-07-13 PROCEDURE — 4010F ACE/ARB THERAPY RXD/TAKEN: CPT | Mod: CPTII,S$GLB,, | Performed by: PODIATRIST

## 2023-07-13 PROCEDURE — 3072F LOW RISK FOR RETINOPATHY: CPT | Mod: CPTII,S$GLB,, | Performed by: PODIATRIST

## 2023-07-13 PROCEDURE — 3066F PR DOCUMENTATION OF TREATMENT FOR NEPHROPATHY: ICD-10-PCS | Mod: CPTII,S$GLB,, | Performed by: PODIATRIST

## 2023-07-13 PROCEDURE — 99999 PR PBB SHADOW E&M-EST. PATIENT-LVL IV: ICD-10-PCS | Mod: PBBFAC,,, | Performed by: PODIATRIST

## 2023-07-13 PROCEDURE — 3008F PR BODY MASS INDEX (BMI) DOCUMENTED: ICD-10-PCS | Mod: CPTII,S$GLB,, | Performed by: PODIATRIST

## 2023-07-13 PROCEDURE — 3077F PR MOST RECENT SYSTOLIC BLOOD PRESSURE >= 140 MM HG: ICD-10-PCS | Mod: CPTII,S$GLB,, | Performed by: PODIATRIST

## 2023-07-13 PROCEDURE — 1159F MED LIST DOCD IN RCRD: CPT | Mod: CPTII,S$GLB,, | Performed by: PODIATRIST

## 2023-07-13 PROCEDURE — 3080F DIAST BP >= 90 MM HG: CPT | Mod: CPTII,S$GLB,, | Performed by: PODIATRIST

## 2023-07-14 ENCOUNTER — PATIENT MESSAGE (OUTPATIENT)
Dept: PSYCHIATRY | Facility: CLINIC | Age: 58
End: 2023-07-14
Payer: COMMERCIAL

## 2023-07-14 RX ORDER — ZOLPIDEM TARTRATE 5 MG/1
5 TABLET ORAL NIGHTLY PRN
Qty: 90 TABLET | Refills: 0 | OUTPATIENT
Start: 2023-07-14

## 2023-07-18 RX ORDER — ZOLPIDEM TARTRATE 5 MG/1
5 TABLET ORAL NIGHTLY PRN
Qty: 90 TABLET | Refills: 0 | OUTPATIENT
Start: 2023-07-18

## 2023-07-19 RX ORDER — ZOLPIDEM TARTRATE 5 MG/1
5 TABLET ORAL NIGHTLY PRN
Qty: 90 TABLET | Refills: 0 | Status: SHIPPED | OUTPATIENT
Start: 2023-07-19 | End: 2024-01-10 | Stop reason: SDUPTHER

## 2023-07-21 NOTE — PROGRESS NOTES
Subjective:      Patient ID: Althea Vazquez is a 57 y.o. female.    Chief Complaint:   Foot Pain (Right foot pain f/u )    Althea is a 57 y.o. female who presents to the podiatry clinic  with complaint of  right foot pain.  She is improving overall.      Review of Systems   Constitutional: Negative for chills, decreased appetite, fever and malaise/fatigue.   HENT:  Negative for congestion, hearing loss, nosebleeds and tinnitus.    Eyes:  Negative for double vision, pain, photophobia and visual disturbance.   Cardiovascular:  Negative for chest pain, claudication, cyanosis and leg swelling.   Respiratory:  Negative for cough, hemoptysis, shortness of breath and wheezing.    Endocrine: Negative for cold intolerance and heat intolerance.   Hematologic/Lymphatic: Negative for adenopathy and bleeding problem.   Skin:  Negative for color change, dry skin, itching, nail changes and suspicious lesions.   Musculoskeletal:  Positive for back pain, joint pain, myalgias and stiffness. Negative for arthritis.   Gastrointestinal:  Negative for abdominal pain, jaundice, nausea and vomiting.   Genitourinary:  Negative for dysuria, frequency and hematuria.   Neurological:  Negative for difficulty with concentration, loss of balance, numbness, paresthesias and sensory change.   Psychiatric/Behavioral:  Negative for altered mental status, hallucinations and suicidal ideas. The patient is not nervous/anxious.    Allergic/Immunologic: Negative for environmental allergies and persistent infections.         Objective:      Physical Exam  Vitals reviewed.   Constitutional:       Appearance: She is well-developed.   HENT:      Head: Normocephalic and atraumatic.   Cardiovascular:      Pulses:           Dorsalis pedis pulses are 2+ on the right side and 2+ on the left side.        Posterior tibial pulses are 2+ on the right side and 2+ on the left side.   Pulmonary:      Effort: Pulmonary effort is normal.   Musculoskeletal:          General: Normal range of motion.      Comments: Inspection and palpation of the muscles joints and bones of both lower extremities reveal that muscle strength for the anterior lateral and posterior muscle groups and intrinsic muscle groups of the foot are all 5 over 5 symmetrical.    Minimal tenderness to the dorsum right foot overlying the 2nd and 3rd metatarsals and metatarsophalangeal joint.  Mild Tenderness with palpation of the midfoot as well as inversion eversion of the midtarsal joint.   Skin:     General: Skin is warm and dry.      Capillary Refill: Capillary refill takes 2 to 3 seconds.      Comments: Skin turgor is normal bilaterally.  Skin texture is well hydrated to both lower extremities.  No lesions or rashes or wounds appreciated bilaterally.  Nail plates 1 through 5 bilaterally are within normal limits for length and thickness.  No nail clubbing or incurvation noted.   Neurological:      Mental Status: She is alert and oriented to person, place, and time.      Comments: Sharp dull light touch vibratory proprioceptive sensation are intact bilaterally.  Deep tendon reflexes to patellar and Achilles tendon are symmetrical 2 over 4 bilaterally.  No ankle clonus or Babinski reflexes noted bilaterally.  Coordination is normal to both feet and lower extremities.   Psychiatric:         Behavior: Behavior normal.           Assessment:       Encounter Diagnoses   Name Primary?    Foot pain, right Yes    Arthritis of midfoot     Exostosis of right foot      Independent visualization of imaging was performed.  Results were reviewed in detail with patient.       Plan:       Althea was seen today for foot pain.    Diagnoses and all orders for this visit:    Foot pain, right    Arthritis of midfoot    Exostosis of right foot      I counseled the patient on her conditions, their implications and medical management.    The nature of the condition, options for management, as well as potential risks and  complications were discussed in detail with patient. Patient was amenable to my recommendations and left my office fully informed and will follow up as instructed or sooner if necessary.      Independent visualization of imaging was performed.  Results were reviewed in detail with patient.    Follow-up as needed may consider surgical intervention in the future for midfoot arthritis/midfoot arthrodesis procedure.

## 2023-08-02 ENCOUNTER — SPECIALTY PHARMACY (OUTPATIENT)
Dept: PHARMACY | Facility: CLINIC | Age: 58
End: 2023-08-02
Payer: COMMERCIAL

## 2023-08-02 NOTE — TELEPHONE ENCOUNTER
Specialty Pharmacy - Refill Coordination    Specialty Medication Orders Linked to Encounter      Flowsheet Row Most Recent Value   Medication #1 entecavir (BARACLUDE) 0.5 MG Tab (Order#[id number], Rx#[id number])   Medication #2 golimumab (SIMPONI) 50 mg/0.5 mL PnIj (Order#[id number], Rx#[id number])            Refill Questions - Documented Responses      Flowsheet Row Most Recent Value   Patient Availability and HIPAA Verification    Does patient want to proceed with activity? Yes   HIPAA/medical authority confirmed? Yes   Relationship to patient of person spoken to? Self   Refill Screening Questions    Changes to allergies? No   Changes to medications? No   New conditions since last clinic visit? No   Unplanned office visit, urgent care, ED, or hospital admission in the last 4 weeks? No   How does patient/caregiver feel medication is working? Good   Financial problems or insurance changes? No   How many doses of your specialty medications were missed in the last 4 weeks? 0   Would patient like to speak to a pharmacist? No   When does the patient need to receive the medication? 08/12/23   Refill Delivery Questions    How will the patient receive the medication? [facility]   When does the patient need to receive the medication? 08/12/23   Shipping Address Prescription   Address in [facility] confirmed and updated if neccessary? Yes   Expected Copay ($) 0   Is the patient able to afford the medication copay? Yes   Payment Method zero copay   Days supply of Refill 90   Supplies needed? No supplies needed   Refill activity completed? Yes   Refill activity plan Refill scheduled   Shipment/Pickup Date: 08/09/23            Current Outpatient Medications   Medication Sig    amLODIPine (NORVASC) 10 MG tablet Take 1 tablet (10 mg total) by mouth once daily.    aspirin (ECOTRIN) 81 MG EC tablet Take 1 tablet (81 mg total) by mouth once daily.    atenoloL (TENORMIN) 100 MG tablet Take 1 tablet (100 mg total) by mouth once  daily.    blood-glucose meter kit Use twice daily.    canagliflozin (INVOKANA) 300 mg Tab tablet Take 1 tablet (300 mg total) by mouth daily before breakfast.    clobetasoL (TEMOVATE) 0.05 % external solution Apply topically once daily.    clonazePAM (KLONOPIN) 0.5 MG tablet TAKE 1 TABLET BY MOUTH ONCE DAILY AS NEEDED FOR ANXIETY    clotrimazole-betamethasone 1-0.05% (LOTRISONE) cream Apply topically 2 (two) times daily as needed.     cyclobenzaprine (FLEXERIL) 10 MG tablet TAKE ONE TABLET BY MOUTH AT BEDTIME AS NEEDED    diclofenac sodium (VOLTAREN) 1 % Gel Apply 4 g topically 4 (four) times daily. Apply light film topically to knee up to four times daily    docusate sodium (COLACE) 50 MG capsule Take 1 capsule (50 mg total) by mouth 2 (two) times daily.    entecavir (BARACLUDE) 0.5 MG Tab Take 1 tablet (0.5 mg total) by mouth once daily.    ergocalciferol (ERGOCALCIFEROL) 50,000 unit Cap Take 1 capsule (50,000 Units total) by mouth every 7 days.    fluocinolone acetonide oiL 0.01 % Drop Place 3 drops into both ears 2 (two) times a day.    fluocinonide 0.05% (LIDEX) 0.05 % cream Apply topically 2 (two) times daily. To allergic rash    FLUoxetine 20 MG capsule Take 1 capsule (20 mg total) by mouth 2 (two) times a day.    FLUoxetine 20 MG capsule Take 1 capsule (20 mg total) by mouth 2 (two) times daily.    fluticasone propionate (FLONASE) 50 mcg/actuation nasal spray 2 sprays (100 mcg total) by Each Nostril route once daily.    fluticasone propionate (FLONASE) 50 mcg/actuation nasal spray Use 2 sprays in each nostril once daily.    FREESTYLE LITE STRIPS Strp test TWICE DAILY    golimumab (SIMPONI) 50 mg/0.5 mL PnIj Inject 50 mg into the skin every 30 days.    hydroCHLOROthiazide (HYDRODIURIL) 25 MG tablet Take 1 tablet (25 mg total) by mouth once daily.    hydrocortisone 2.5 % cream Apply topically 2 (two) times daily. for 10 days    hydroquinone 4 % Crea Apply to dark areas qhs.  Not more than 6 months straight in  same location.Use sunscreen in am    ketoconazole (NIZORAL) 2 % shampoo Apply topically once a week.    lancets (FREESTYLE LANCETS) 28 gauge Misc test TWICE DAILY    levocetirizine (XYZAL) 5 MG tablet TAKE ONE TABLET BY MOUTH EVERY EVENING    losartan (COZAAR) 100 MG tablet Take 1 tablet (100 mg total) by mouth once daily.    naproxen (NAPROSYN) 500 MG tablet Take 1 tablet (500 mg total) by mouth 2 (two) times daily as needed    neomycin-polymyxin-hydrocortisone (CORTISPORIN) 3.5-10,000-1 mg/mL-unit/mL-% otic suspension Place 3 drops into both ears 3 (three) times daily as needed. Up to 5 days    nystatin (MYCOSTATIN) powder Apply to affected area 3 times daily    oxyCODONE-acetaminophen (PERCOCET) 5-325 mg per tablet Take 1 tablet by mouth every 8 (eight) hours as needed for Pain. (Patient not taking: Reported on 7/13/2023)    pantoprazole (PROTONIX) 40 MG tablet Take 1 tablet (40 mg total) by mouth once daily.    prednisoLONE acetate (PRED FORTE) 1 % DrpS Instill one drop into affect eye(s) as directed    repaglinide (PRANDIN) 1 MG tablet TAKE ONE TABLET BY MOUTH THREE TIMES DAILY BEFORE MEALS (TAKING PLACE OF [place])    rosuvastatin (CRESTOR) 40 MG Tab Take 1 tablet (40 mg total) by mouth every evening.    semaglutide (OZEMPIC) 1 mg/dose (4 mg/3 mL) Inject 1 mg into the skin every 7 days.    sulfaSALAzine (AZULFIDINE) 500 MG EC tablet Take 3 tablets (1,500 mg total) by mouth 2 (two) times daily.    terconazole (TERAZOL 7) 0.4 % Crea Place 1 applicator vaginally every evening.    terconazole (TERAZOL 7) 0.4 % Crea Place 1 applicatorful vaginally every evening.    terconazole (TERAZOL 7) 0.4 % Crea Place 1 applicator vaginally every evening.    tiZANidine (ZANAFLEX) 2 MG tablet Take 1-2 tablets (2-4 mg total) by mouth every 8 (eight) hours as needed.    topiramate (TOPAMAX) 100 MG tablet Take 1 tablet (100 mg total) by mouth 2 (two) times daily.    triamcinolone acetonide 0.1% (KENALOG) 0.1 % cream Apply  topically 2 (two) times daily.    valACYclovir (VALTREX) 1000 MG tablet valacyclovir 1 gram tablet   Take 0.5 tablets every 12 hours by oral route.    vitamin D (VITAMIN D3) 1000 units Tab Take 1,000 Units by mouth once daily.    zolpidem (AMBIEN) 5 MG Tab Take 1 tablet (5 mg total) by mouth nightly as needed.   Last reviewed on 7/13/2023  8:58 AM by Blanche Tidwell MA    Review of patient's allergies indicates:   Allergen Reactions    Bactrim [sulfamethoxazole-trimethoprim] Itching    Trulicity [dulaglutide] Diarrhea and Other (See Comments)     Vomiting, abdominal pain    Diflucan [fluconazole] Swelling and Other (See Comments)     Sore on mouth    Last reviewed on  7/19/2023 8:15 AM by Tip Oliveira      Tasks added this encounter   No tasks added.   Tasks due within next 3 months   8/2/2023 - Refill Coordination Outreach (1 time occurrence)  8/2/2023 - Refill Coordination Outreach (1 time occurrence)     [facility]  [address] [facility] [address]  Phone: [phone]  Fax: [phone]

## 2023-08-04 ENCOUNTER — OFFICE VISIT (OUTPATIENT)
Dept: DERMATOLOGY | Facility: CLINIC | Age: 58
End: 2023-08-04
Payer: COMMERCIAL

## 2023-08-04 DIAGNOSIS — L21.9 SEBORRHEIC DERMATITIS: Primary | ICD-10-CM

## 2023-08-04 PROCEDURE — 1160F RVW MEDS BY RX/DR IN RCRD: CPT | Mod: CPTII,S$GLB,, | Performed by: STUDENT IN AN ORGANIZED HEALTH CARE EDUCATION/TRAINING PROGRAM

## 2023-08-04 PROCEDURE — 4010F PR ACE/ARB THEARPY RXD/TAKEN: ICD-10-PCS | Mod: CPTII,S$GLB,, | Performed by: STUDENT IN AN ORGANIZED HEALTH CARE EDUCATION/TRAINING PROGRAM

## 2023-08-04 PROCEDURE — 99999 PR PBB SHADOW E&M-EST. PATIENT-LVL IV: CPT | Mod: PBBFAC,,, | Performed by: STUDENT IN AN ORGANIZED HEALTH CARE EDUCATION/TRAINING PROGRAM

## 2023-08-04 PROCEDURE — 3044F HG A1C LEVEL LT 7.0%: CPT | Mod: CPTII,S$GLB,, | Performed by: STUDENT IN AN ORGANIZED HEALTH CARE EDUCATION/TRAINING PROGRAM

## 2023-08-04 PROCEDURE — 3066F NEPHROPATHY DOC TX: CPT | Mod: CPTII,S$GLB,, | Performed by: STUDENT IN AN ORGANIZED HEALTH CARE EDUCATION/TRAINING PROGRAM

## 2023-08-04 PROCEDURE — 1160F PR REVIEW ALL MEDS BY PRESCRIBER/CLIN PHARMACIST DOCUMENTED: ICD-10-PCS | Mod: CPTII,S$GLB,, | Performed by: STUDENT IN AN ORGANIZED HEALTH CARE EDUCATION/TRAINING PROGRAM

## 2023-08-04 PROCEDURE — 3066F PR DOCUMENTATION OF TREATMENT FOR NEPHROPATHY: ICD-10-PCS | Mod: CPTII,S$GLB,, | Performed by: STUDENT IN AN ORGANIZED HEALTH CARE EDUCATION/TRAINING PROGRAM

## 2023-08-04 PROCEDURE — 99999 PR PBB SHADOW E&M-EST. PATIENT-LVL IV: ICD-10-PCS | Mod: PBBFAC,,, | Performed by: STUDENT IN AN ORGANIZED HEALTH CARE EDUCATION/TRAINING PROGRAM

## 2023-08-04 PROCEDURE — 99212 PR OFFICE/OUTPT VISIT, EST, LEVL II, 10-19 MIN: ICD-10-PCS | Mod: S$GLB,,, | Performed by: STUDENT IN AN ORGANIZED HEALTH CARE EDUCATION/TRAINING PROGRAM

## 2023-08-04 PROCEDURE — 4010F ACE/ARB THERAPY RXD/TAKEN: CPT | Mod: CPTII,S$GLB,, | Performed by: STUDENT IN AN ORGANIZED HEALTH CARE EDUCATION/TRAINING PROGRAM

## 2023-08-04 PROCEDURE — 3061F NEG MICROALBUMINURIA REV: CPT | Mod: CPTII,S$GLB,, | Performed by: STUDENT IN AN ORGANIZED HEALTH CARE EDUCATION/TRAINING PROGRAM

## 2023-08-04 PROCEDURE — 3061F PR NEG MICROALBUMINURIA RESULT DOCUMENTED/REVIEW: ICD-10-PCS | Mod: CPTII,S$GLB,, | Performed by: STUDENT IN AN ORGANIZED HEALTH CARE EDUCATION/TRAINING PROGRAM

## 2023-08-04 PROCEDURE — 99212 OFFICE O/P EST SF 10 MIN: CPT | Mod: S$GLB,,, | Performed by: STUDENT IN AN ORGANIZED HEALTH CARE EDUCATION/TRAINING PROGRAM

## 2023-08-04 PROCEDURE — 1159F PR MEDICATION LIST DOCUMENTED IN MEDICAL RECORD: ICD-10-PCS | Mod: CPTII,S$GLB,, | Performed by: STUDENT IN AN ORGANIZED HEALTH CARE EDUCATION/TRAINING PROGRAM

## 2023-08-04 PROCEDURE — 1159F MED LIST DOCD IN RCRD: CPT | Mod: CPTII,S$GLB,, | Performed by: STUDENT IN AN ORGANIZED HEALTH CARE EDUCATION/TRAINING PROGRAM

## 2023-08-04 PROCEDURE — 3044F PR MOST RECENT HEMOGLOBIN A1C LEVEL <7.0%: ICD-10-PCS | Mod: CPTII,S$GLB,, | Performed by: STUDENT IN AN ORGANIZED HEALTH CARE EDUCATION/TRAINING PROGRAM

## 2023-08-04 PROCEDURE — 3072F PR LOW RISK FOR RETINOPATHY: ICD-10-PCS | Mod: CPTII,S$GLB,, | Performed by: STUDENT IN AN ORGANIZED HEALTH CARE EDUCATION/TRAINING PROGRAM

## 2023-08-04 PROCEDURE — 3072F LOW RISK FOR RETINOPATHY: CPT | Mod: CPTII,S$GLB,, | Performed by: STUDENT IN AN ORGANIZED HEALTH CARE EDUCATION/TRAINING PROGRAM

## 2023-08-04 RX ORDER — CLOBETASOL PROPIONATE 0.46 MG/ML
SOLUTION TOPICAL DAILY
Qty: 50 ML | Refills: 6 | Status: SHIPPED | OUTPATIENT
Start: 2023-08-04

## 2023-08-04 RX ORDER — KETOCONAZOLE 20 MG/ML
SHAMPOO, SUSPENSION TOPICAL
Qty: 120 ML | Refills: 6 | Status: SHIPPED | OUTPATIENT
Start: 2023-08-07

## 2023-08-04 NOTE — PROGRESS NOTES
Subjective:       Patient ID:  Althea Vazquez is a 57 y.o. female who presents for   Chief Complaint   Patient presents with    Psoriasis     Pt is present for f/u of psoriasis. Pt states that scalp area is improving. No pain     History of Present Illness: The patient presents for follow up of seborrheic dermatitis/psoriasis of the scalp, last seen on 1/12/23 where she was started on ketoconazole shampoo and clobetasol solution. Reports much improvement in symptoms. Has noticing clearing of the scalp with less itching, redness, and other symptoms. No other concerning rash or skin lesions.       Psoriasis        Review of Systems   Constitutional:  Negative for fever, chills and fatigue.   Skin:  Negative for itching, rash and dry skin.        Objective:    Physical Exam   Constitutional: She appears well-developed and well-nourished. No distress.   Neurological: She is alert and oriented to person, place, and time. She is not disoriented.   Psychiatric: She has a normal mood and affect.   Skin:   Areas Examined (abnormalities noted in diagram):   Scalp / Hair Palpated and Inspected  Head / Face Inspection Performed  Neck Inspection Performed  RUE Inspected  LUE Inspection Performed              Diagram Legend     Erythematous scaling macule/papule c/w actinic keratosis       Vascular papule c/w angioma      Pigmented verrucoid papule/plaque c/w seborrheic keratosis      Yellow umbilicated papule c/w sebaceous hyperplasia      Irregularly shaped tan macule c/w lentigo     1-2 mm smooth white papules consistent with Milia      Movable subcutaneous cyst with punctum c/w epidermal inclusion cyst      Subcutaneous movable cyst c/w pilar cyst      Firm pink to brown papule c/w dermatofibroma      Pedunculated fleshy papule(s) c/w skin tag(s)      Evenly pigmented macule c/w junctional nevus     Mildly variegated pigmented, slightly irregular-bordered macule c/w mildly atypical nevus      Flesh colored to  evenly pigmented papule c/w intradermal nevus       Pink pearly papule/plaque c/w basal cell carcinoma      Erythematous hyperkeratotic cursted plaque c/w SCC      Surgical scar with no sign of skin cancer recurrence      Open and closed comedones      Inflammatory papules and pustules      Verrucoid papule consistent consistent with wart     Erythematous eczematous patches and plaques     Dystrophic onycholytic nail with subungual debris c/w onychomycosis     Umbilicated papule    Erythematous-base heme-crusted tan verrucoid plaque consistent with inflamed seborrheic keratosis     Erythematous Silvery Scaling Plaque c/w Psoriasis     See annotation      Assessment / Plan:        Seborrheic dermatitis - symptoms doing well with good control over symptoms, no flares. Will continue ketoconazole and clobetasol as needed. Refill sent.  -     ketoconazole (NIZORAL) 2 % shampoo; Apply topically twice a week.  Dispense: 120 mL; Refill: 6  -     clobetasoL (TEMOVATE) 0.05 % external solution; Apply to affected area once daily  Dispense: 50 mL; Refill: 6         Follow up in about 1 year (around 8/4/2024).

## 2023-08-31 DIAGNOSIS — E11.42 TYPE 2 DIABETES MELLITUS WITH DIABETIC POLYNEUROPATHY, UNSPECIFIED WHETHER LONG TERM INSULIN USE: Chronic | ICD-10-CM

## 2023-08-31 DIAGNOSIS — H69.92 ETD (EUSTACHIAN TUBE DYSFUNCTION), LEFT: Primary | ICD-10-CM

## 2023-08-31 RX ORDER — SEMAGLUTIDE 1.34 MG/ML
1 INJECTION, SOLUTION SUBCUTANEOUS
Qty: 3 EACH | Refills: 0 | Status: SHIPPED | OUTPATIENT
Start: 2023-08-31 | End: 2023-09-01 | Stop reason: SDUPTHER

## 2023-08-31 RX ORDER — FLUTICASONE PROPIONATE 50 MCG
2 SPRAY, SUSPENSION (ML) NASAL DAILY
Qty: 16 G | Refills: 6 | Status: SHIPPED | OUTPATIENT
Start: 2023-08-31

## 2023-08-31 NOTE — TELEPHONE ENCOUNTER
Provider Staff:  Action required for this patient     Please see care gap opportunities below in Care Due Message.    Thanks!  [facility]     Appointments      Date Provider   Last Visit   5/9/2023 Weston Begum, DO   Next Visit   11/9/2023 Weston Begum,      Refill Decision Note   Althea [place]ragonchester  is requesting a refill authorization.  Brief Assessment and Rationale for Refill:  Approve     Medication Therapy Plan:         Comments:     Note composed:1:23 PM 08/31/2023             Appointments     Last Visit   5/9/2023 Weston Begum, DO   Next Visit   11/9/2023 Weston Bgeum, DO

## 2023-08-31 NOTE — TELEPHONE ENCOUNTER
Care Due:                  Date            Visit Type   Department     Provider  --------------------------------------------------------------------------------                                EP -                              PRIMARY      [facility] INTERNAL  Last Visit: 05-      CARE ([facility])   MEDICINE       Weston Begum                              EP -                              PRIMARY      [facility] INTERNAL  Next Visit: 11-      CARE ([facility])   [facility]       Weston Begum                                                            Last  Test          Frequency    Reason                     Performed    Due Date  --------------------------------------------------------------------------------    HBA1C.......  6 months...  canagliflozin,             05- 11-                             repaglinide, semaglutide.    [facility] Embedded Care Due Messages. Reference number: [id number].   8/31/2023 10:26:14 AM CDT

## 2023-09-01 DIAGNOSIS — E11.42 TYPE 2 DIABETES MELLITUS WITH DIABETIC POLYNEUROPATHY, UNSPECIFIED WHETHER LONG TERM INSULIN USE: Chronic | ICD-10-CM

## 2023-09-01 RX ORDER — SEMAGLUTIDE 1.34 MG/ML
1 INJECTION, SOLUTION SUBCUTANEOUS
Qty: 3 EACH | Refills: 5 | Status: SHIPPED | OUTPATIENT
Start: 2023-09-01 | End: 2023-11-09

## 2023-09-04 DIAGNOSIS — H92.03 OTALGIA OF BOTH EARS: ICD-10-CM

## 2023-09-05 RX ORDER — NEOMYCIN SULFATE, POLYMYXIN B SULFATE AND HYDROCORTISONE 10; 3.5; 1 MG/ML; MG/ML; [USP'U]/ML
3 SUSPENSION/ DROPS AURICULAR (OTIC) 3 TIMES DAILY PRN
Qty: 10 ML | Refills: 0 | Status: SHIPPED | OUTPATIENT
Start: 2023-09-05 | End: 2023-11-09

## 2023-09-06 ENCOUNTER — PATIENT MESSAGE (OUTPATIENT)
Dept: RHEUMATOLOGY | Facility: CLINIC | Age: 58
End: 2023-09-06
Payer: COMMERCIAL

## 2023-09-06 ENCOUNTER — TELEPHONE (OUTPATIENT)
Dept: PODIATRY | Facility: CLINIC | Age: 58
End: 2023-09-06
Payer: COMMERCIAL

## 2023-09-06 NOTE — TELEPHONE ENCOUNTER
Spoke with pt. Informed pt that provider is out of clinic 9/7/2023 and she will be called tomorrow to r/s. Pt verbalized understanding.

## 2023-09-07 ENCOUNTER — TELEPHONE (OUTPATIENT)
Dept: PODIATRY | Facility: CLINIC | Age: 58
End: 2023-09-07
Payer: COMMERCIAL

## 2023-09-07 NOTE — TELEPHONE ENCOUNTER
Spoke to pt and rescheduled appt. Original appt: 9/7/2023. New Appt: 9/12/2023 with Dr. Campuzano. Pt verbalized understanding.

## 2023-09-08 ENCOUNTER — TELEPHONE (OUTPATIENT)
Dept: PODIATRY | Facility: CLINIC | Age: 58
End: 2023-09-08
Payer: COMMERCIAL

## 2023-09-08 DIAGNOSIS — H20.023 RECURRENT IRITIS OF BOTH EYES: ICD-10-CM

## 2023-09-08 NOTE — TELEPHONE ENCOUNTER
----- Message from Pat Campuzano DPM sent at 9/8/2023  7:33 AM CDT -----  Regarding: Appointment  I see reason for appointment is established patient requesting injection before trip patient is a dr short patient      Please advise patient because she is new to me I will need to evaluate her as I do not see mention of injection in her last Dr. Brown note.  I do she she had an MRI and x-rays in the past.    I will be happy to see her however if she would like to switch and see Dr. Brown instead to get the injection they may have discussed in clinic please help her find an appointment

## 2023-09-11 ENCOUNTER — OFFICE VISIT (OUTPATIENT)
Dept: OPHTHALMOLOGY | Facility: CLINIC | Age: 58
End: 2023-09-11
Payer: COMMERCIAL

## 2023-09-11 ENCOUNTER — PATIENT MESSAGE (OUTPATIENT)
Dept: OPTOMETRY | Facility: CLINIC | Age: 58
End: 2023-09-11
Payer: COMMERCIAL

## 2023-09-11 ENCOUNTER — TELEPHONE (OUTPATIENT)
Dept: OPHTHALMOLOGY | Facility: CLINIC | Age: 58
End: 2023-09-11
Payer: COMMERCIAL

## 2023-09-11 DIAGNOSIS — H20.021 RECURRENT IRITIS OF RIGHT EYE: Primary | ICD-10-CM

## 2023-09-11 DIAGNOSIS — H21.542 POSTERIOR SYNECHIAE OF LEFT EYE: ICD-10-CM

## 2023-09-11 DIAGNOSIS — H25.13 NUCLEAR SCLEROSIS OF BOTH EYES: ICD-10-CM

## 2023-09-11 PROCEDURE — 3061F PR NEG MICROALBUMINURIA RESULT DOCUMENTED/REVIEW: ICD-10-PCS | Mod: CPTII,S$GLB,, | Performed by: OPHTHALMOLOGY

## 2023-09-11 PROCEDURE — 3066F NEPHROPATHY DOC TX: CPT | Mod: CPTII,S$GLB,, | Performed by: OPHTHALMOLOGY

## 2023-09-11 PROCEDURE — 4010F ACE/ARB THERAPY RXD/TAKEN: CPT | Mod: CPTII,S$GLB,, | Performed by: OPHTHALMOLOGY

## 2023-09-11 PROCEDURE — 3044F PR MOST RECENT HEMOGLOBIN A1C LEVEL <7.0%: ICD-10-PCS | Mod: CPTII,S$GLB,, | Performed by: OPHTHALMOLOGY

## 2023-09-11 PROCEDURE — 3066F PR DOCUMENTATION OF TREATMENT FOR NEPHROPATHY: ICD-10-PCS | Mod: CPTII,S$GLB,, | Performed by: OPHTHALMOLOGY

## 2023-09-11 PROCEDURE — 3044F HG A1C LEVEL LT 7.0%: CPT | Mod: CPTII,S$GLB,, | Performed by: OPHTHALMOLOGY

## 2023-09-11 PROCEDURE — 1160F RVW MEDS BY RX/DR IN RCRD: CPT | Mod: CPTII,S$GLB,, | Performed by: OPHTHALMOLOGY

## 2023-09-11 PROCEDURE — 4010F PR ACE/ARB THEARPY RXD/TAKEN: ICD-10-PCS | Mod: CPTII,S$GLB,, | Performed by: OPHTHALMOLOGY

## 2023-09-11 PROCEDURE — 3061F NEG MICROALBUMINURIA REV: CPT | Mod: CPTII,S$GLB,, | Performed by: OPHTHALMOLOGY

## 2023-09-11 PROCEDURE — 92012 PR EYE EXAM, EST PATIENT,INTERMED: ICD-10-PCS | Mod: S$GLB,,, | Performed by: OPHTHALMOLOGY

## 2023-09-11 PROCEDURE — 99999 PR PBB SHADOW E&M-EST. PATIENT-LVL IV: ICD-10-PCS | Mod: PBBFAC,,, | Performed by: OPHTHALMOLOGY

## 2023-09-11 PROCEDURE — 92012 INTRM OPH EXAM EST PATIENT: CPT | Mod: S$GLB,,, | Performed by: OPHTHALMOLOGY

## 2023-09-11 PROCEDURE — 1159F PR MEDICATION LIST DOCUMENTED IN MEDICAL RECORD: ICD-10-PCS | Mod: CPTII,S$GLB,, | Performed by: OPHTHALMOLOGY

## 2023-09-11 PROCEDURE — 1160F PR REVIEW ALL MEDS BY PRESCRIBER/CLIN PHARMACIST DOCUMENTED: ICD-10-PCS | Mod: CPTII,S$GLB,, | Performed by: OPHTHALMOLOGY

## 2023-09-11 PROCEDURE — 99999 PR PBB SHADOW E&M-EST. PATIENT-LVL IV: CPT | Mod: PBBFAC,,, | Performed by: OPHTHALMOLOGY

## 2023-09-11 PROCEDURE — 1159F MED LIST DOCD IN RCRD: CPT | Mod: CPTII,S$GLB,, | Performed by: OPHTHALMOLOGY

## 2023-09-11 RX ORDER — PREDNISOLONE ACETATE 10 MG/ML
SUSPENSION/ DROPS OPHTHALMIC
Qty: 5 ML | Refills: 1 | Status: SHIPPED | OUTPATIENT
Start: 2023-09-11

## 2023-09-11 NOTE — TELEPHONE ENCOUNTER
----- Message from Karolyn Queen sent at 9/11/2023 10:54 AM CDT -----  Regarding: Reschedule Urgent Appt  Contact: Pt  Pt is  requesting   a callback regarding Urgent appt scheduled today at 11:30 am. Pt stated she's unable to make it that soon and requesting a later time but need to be seen today. Please adv pt          Confirmed contact below:   Contact Name:Rogeliolissette Taylor  Phone Number: [phone]

## 2023-09-11 NOTE — TELEPHONE ENCOUNTER
----- Message from Swathi Christianson sent at 9/11/2023  8:33 AM CDT -----  Regarding: Eye Pressure  Patient called about having eye pressure in right eye with the feeling of something in eye.    Call back- [phone]

## 2023-09-11 NOTE — PROGRESS NOTES
"Subjective:       Patient ID: Althea Vazquez is a 58 y.o. female.    Chief Complaint: Concerns About Ocular Health    HPI    DSL- 6/15/2023 Dr. Sandoval    57 y/o female present to clinic for urgent care. Pt states X 1 week ago   she was developing pain in RT eye until she woke up with severe pain and   pressure sensation, felt like "eye was pooping out". She assumed another   flare up and restarted Atropine and PF.  She states minimal relief.   Today's visit, she states pain is now a grade 8. She is still light   sensitive, Epiphora, and blurry vision.     Eyemeds  Atropine OD QD  PF Q2h OD    Last edited by Mary Phipps on 9/11/2023  2:29 PM.             Assessment:       1. Recurrent iritis of right eye    2. Posterior synechiae of left eye    3. Nuclear sclerosis of both eyes        Plan:       Recurrent iritis OD-Needs more PF & Atropine.  Post synechiae OS-Stable.  Cataracts- Not visually significant.      Increase PF OD to q 1 hr today & tomorrow, then q 2 hrs x 1 wk, then q 3 hrs.  Cont Atropine bid OD.  RTC Dr Tamayo in 2 wks.      "

## 2023-09-12 ENCOUNTER — OFFICE VISIT (OUTPATIENT)
Dept: PODIATRY | Facility: CLINIC | Age: 58
End: 2023-09-12
Payer: COMMERCIAL

## 2023-09-12 VITALS
DIASTOLIC BLOOD PRESSURE: 90 MMHG | BODY MASS INDEX: 33.74 KG/M2 | WEIGHT: 215 LBS | SYSTOLIC BLOOD PRESSURE: 144 MMHG | HEART RATE: 77 BPM | HEIGHT: 67 IN

## 2023-09-12 DIAGNOSIS — M79.671 FOOT PAIN, RIGHT: Primary | ICD-10-CM

## 2023-09-12 DIAGNOSIS — G57.91 NEURITIS OF RIGHT FOOT: ICD-10-CM

## 2023-09-12 DIAGNOSIS — M19.079 ARTHRITIS OF MIDFOOT: ICD-10-CM

## 2023-09-12 DIAGNOSIS — E11.42 TYPE 2 DIABETES MELLITUS WITH DIABETIC POLYNEUROPATHY, UNSPECIFIED WHETHER LONG TERM INSULIN USE: Chronic | ICD-10-CM

## 2023-09-12 PROCEDURE — 1159F PR MEDICATION LIST DOCUMENTED IN MEDICAL RECORD: ICD-10-PCS | Mod: CPTII,S$GLB,, | Performed by: PODIATRIST

## 2023-09-12 PROCEDURE — 3077F PR MOST RECENT SYSTOLIC BLOOD PRESSURE >= 140 MM HG: ICD-10-PCS | Mod: CPTII,S$GLB,, | Performed by: PODIATRIST

## 2023-09-12 PROCEDURE — 1160F RVW MEDS BY RX/DR IN RCRD: CPT | Mod: CPTII,S$GLB,, | Performed by: PODIATRIST

## 2023-09-12 PROCEDURE — 3008F BODY MASS INDEX DOCD: CPT | Mod: CPTII,S$GLB,, | Performed by: PODIATRIST

## 2023-09-12 PROCEDURE — 20600 SMALL JOINT ASPIRATION/INJECTION: R INTERTARSAL: ICD-10-PCS | Mod: RT,S$GLB,, | Performed by: PODIATRIST

## 2023-09-12 PROCEDURE — 99214 PR OFFICE/OUTPT VISIT, EST, LEVL IV, 30-39 MIN: ICD-10-PCS | Mod: 25,S$GLB,, | Performed by: PODIATRIST

## 2023-09-12 PROCEDURE — 3080F DIAST BP >= 90 MM HG: CPT | Mod: CPTII,S$GLB,, | Performed by: PODIATRIST

## 2023-09-12 PROCEDURE — 3044F PR MOST RECENT HEMOGLOBIN A1C LEVEL <7.0%: ICD-10-PCS | Mod: CPTII,S$GLB,, | Performed by: PODIATRIST

## 2023-09-12 PROCEDURE — 4010F ACE/ARB THERAPY RXD/TAKEN: CPT | Mod: CPTII,S$GLB,, | Performed by: PODIATRIST

## 2023-09-12 PROCEDURE — 1159F MED LIST DOCD IN RCRD: CPT | Mod: CPTII,S$GLB,, | Performed by: PODIATRIST

## 2023-09-12 PROCEDURE — 4010F PR ACE/ARB THEARPY RXD/TAKEN: ICD-10-PCS | Mod: CPTII,S$GLB,, | Performed by: PODIATRIST

## 2023-09-12 PROCEDURE — 3066F PR DOCUMENTATION OF TREATMENT FOR NEPHROPATHY: ICD-10-PCS | Mod: CPTII,S$GLB,, | Performed by: PODIATRIST

## 2023-09-12 PROCEDURE — 3044F HG A1C LEVEL LT 7.0%: CPT | Mod: CPTII,S$GLB,, | Performed by: PODIATRIST

## 2023-09-12 PROCEDURE — 1160F PR REVIEW ALL MEDS BY PRESCRIBER/CLIN PHARMACIST DOCUMENTED: ICD-10-PCS | Mod: CPTII,S$GLB,, | Performed by: PODIATRIST

## 2023-09-12 PROCEDURE — 3061F PR NEG MICROALBUMINURIA RESULT DOCUMENTED/REVIEW: ICD-10-PCS | Mod: CPTII,S$GLB,, | Performed by: PODIATRIST

## 2023-09-12 PROCEDURE — 3061F NEG MICROALBUMINURIA REV: CPT | Mod: CPTII,S$GLB,, | Performed by: PODIATRIST

## 2023-09-12 PROCEDURE — 3066F NEPHROPATHY DOC TX: CPT | Mod: CPTII,S$GLB,, | Performed by: PODIATRIST

## 2023-09-12 PROCEDURE — 20600 DRAIN/INJ JOINT/BURSA W/O US: CPT | Mod: RT,S$GLB,, | Performed by: PODIATRIST

## 2023-09-12 PROCEDURE — 3080F PR MOST RECENT DIASTOLIC BLOOD PRESSURE >= 90 MM HG: ICD-10-PCS | Mod: CPTII,S$GLB,, | Performed by: PODIATRIST

## 2023-09-12 PROCEDURE — 99214 OFFICE O/P EST MOD 30 MIN: CPT | Mod: 25,S$GLB,, | Performed by: PODIATRIST

## 2023-09-12 PROCEDURE — 3008F PR BODY MASS INDEX (BMI) DOCUMENTED: ICD-10-PCS | Mod: CPTII,S$GLB,, | Performed by: PODIATRIST

## 2023-09-12 PROCEDURE — 99999 PR PBB SHADOW E&M-EST. PATIENT-LVL IV: CPT | Mod: PBBFAC,,, | Performed by: PODIATRIST

## 2023-09-12 PROCEDURE — 3077F SYST BP >= 140 MM HG: CPT | Mod: CPTII,S$GLB,, | Performed by: PODIATRIST

## 2023-09-12 PROCEDURE — 99999 PR PBB SHADOW E&M-EST. PATIENT-LVL IV: ICD-10-PCS | Mod: PBBFAC,,, | Performed by: PODIATRIST

## 2023-09-12 RX ORDER — ATORVASTATIN CALCIUM 40 MG/1
1 TABLET, FILM COATED ORAL DAILY
COMMUNITY
End: 2023-11-09

## 2023-09-12 RX ORDER — DEXAMETHASONE SODIUM PHOSPHATE 4 MG/ML
4 INJECTION, SOLUTION INTRA-ARTICULAR; INTRALESIONAL; INTRAMUSCULAR; INTRAVENOUS; SOFT TISSUE
Status: DISCONTINUED | OUTPATIENT
Start: 2023-09-12 | End: 2023-09-12 | Stop reason: HOSPADM

## 2023-09-12 RX ORDER — ADALIMUMAB 40MG/0.8ML
KIT SUBCUTANEOUS
COMMUNITY
End: 2023-11-16

## 2023-09-12 RX ADMIN — DEXAMETHASONE SODIUM PHOSPHATE 4 MG: 4 INJECTION, SOLUTION INTRA-ARTICULAR; INTRALESIONAL; INTRAMUSCULAR; INTRAVENOUS; SOFT TISSUE at 03:09

## 2023-09-12 NOTE — PROGRESS NOTES
Subjective:      Patient ID: Althea Vazquez is a 58 y.o. female.    Chief Complaint:   Foot Pain (Right foot pain /Sharp stabbing pain )    Althea is a 58 y.o. female who presents to the podiatry clinic  with complaint of  right foot pain.    Patient is new to me she is here to request an injection before her trip to [facility] on Saturday    Patient relates that she is been followed by Dr. Brown and she was rescheduled to see me due to him being out of the clinic.  Patient relates that she is had some right foot pain for a while.  X-rays and MRIs were negative for any stress fracture she does discussed her arthritis she also discusses a recent eye issue requiring increased drops which is also related to arthritis    Patient relates that she did try the walking boot which helped the foot with pain however she felt that the foot Became more swollen     she took the prescribed anti-inflammatories but her primary took her off of them because she was told she needed to watch her kidneys    Patient relates that she is relatively pain-free when she uses her inserts in tennis shoes until she starts walking a lot    Patient relates that she is diabetic well controlled  Does not take her glucose every day as she was not directed to do so    Patient relates that she was worked up for gout however there was no indication that this foot pain has been gout    Patient relates she is aware she may need surgery however she will be traveling a lot this year and discussed with Dr. Brown she will consider next year  Past Medical History:   Diagnosis Date    Acid reflux     Anxiety 10/18/2012    Arthritis     Depression     Diabetic peripheral neuropathy - mild 10/21/2014    Difficult intubation     Dry eyes     Dry mouth     Fever blister     History of hepatitis B 10/3/2016    Hep B core total Ab (+), no active/chronic infection    Hyperlipidemia     Hypertension     Insomnia     Iritis 5/13/2014    Long-term current use of  steroids 9/27/2012    Nausea & vomiting 2/4/2015    VAISHNAVI (obstructive sleep apnea)     Type II diabetes mellitus 10/1/2012     Past Surgical History:   Procedure Laterality Date    COLONOSCOPY N/A 1/9/2018    Procedure: COLONOSCOPY;  Surgeon: Juwan Lopez MD;  Location: [facility] ENDO (2ND FLR);  Service: Endoscopy;  Laterality: N/A;  per anesthesia, pt. needs to be on 2nd floor due to past Anesthesia problems    EPIDURAL STEROID INJECTION INTO CERVICAL SPINE N/A 2/17/2021    Procedure: Injection-steroid-epidural-cervical--C7-T1 IL SHARONA;  Surgeon: Graciela Jerome MD;  Location: [facility] PAIN MGT;  Service: Pain Management;  Laterality: N/A;    EPIDURAL STEROID INJECTION INTO CERVICAL SPINE N/A 2/23/2023    Procedure: Injection-steroid-epidural-cervical  C7-T1;  Surgeon: Juvenal Segura MD;  Location: [facility] PAIN MGT;  Service: Pain Management;  Laterality: N/A;    HYSTERECTOMY  12/3/2014    INJECTION OF ANESTHETIC AGENT INTO SACROILIAC JOINT Left 4/21/2021    Procedure: LEFT SACROILIAC JOINT STEROID INJECTION;  Surgeon: Graciela Jerome MD;  Location: [facility] PAIN MGT;  Service: Pain Management;  Laterality: Left;    INJECTION OF JOINT Left 4/21/2021    Procedure: Injection, Joint--LEFT GTB;  Surgeon: Graciela Jerome MD;  Location: [facility] PAIN MGT;  Service: Pain Management;  Laterality: Left;    KNEE ARTHROSCOPY  5-14-14    right    TUBAL LIGATION       Current Outpatient Medications on File Prior to Visit   Medication Sig Dispense Refill    adalimumab (HUMIRA PEN) PnKt injection 40 mg total every 14 days      amLODIPine (NORVASC) 10 MG tablet Take 1 tablet (10 mg total) by mouth once daily. 90 tablet 3    aspirin (ECOTRIN) 81 MG EC tablet Take 1 tablet (81 mg total) by mouth once daily. 30 tablet 0    atenoloL (TENORMIN) 100 MG tablet Take 1 tablet (100 mg total) by mouth once daily. 90 tablet 3    atorvastatin (LIPITOR) 40 MG tablet Take 1 tablet by mouth once daily.      blood-glucose meter kit Use twice daily. 1 each 0     canagliflozin (INVOKANA) 300 mg Tab tablet Take 1 tablet (300 mg total) by mouth daily before breakfast. 90 tablet 1    clobetasoL (TEMOVATE) 0.05 % external solution Apply topically once daily. 50 mL 3    clobetasoL (TEMOVATE) 0.05 % external solution Apply to affected area once daily 50 mL 6    clonazePAM (KLONOPIN) 0.5 MG tablet TAKE 1 TABLET BY MOUTH ONCE DAILY AS NEEDED FOR ANXIETY 90 tablet 0    clotrimazole-betamethasone 1-0.05% (LOTRISONE) cream Apply topically 2 (two) times daily as needed.       cyclobenzaprine (FLEXERIL) 10 MG tablet TAKE ONE TABLET BY MOUTH AT BEDTIME AS NEEDED 90 tablet 2    docusate sodium (COLACE) 50 MG capsule Take 1 capsule (50 mg total) by mouth 2 (two) times daily. 180 capsule 3    entecavir (BARACLUDE) 0.5 MG Tab Take 1 tablet (0.5 mg total) by mouth once daily. 90 tablet 3    ergocalciferol (ERGOCALCIFEROL) 50,000 unit Cap Take 1 capsule (50,000 Units total) by mouth every 7 days. 12 capsule 3    fluocinolone acetonide oiL 0.01 % Drop Place 3 drops into both ears 2 (two) times a day. 20 mL 3    fluocinonide 0.05% (LIDEX) 0.05 % cream Apply topically 2 (two) times daily. To allergic rash 30 g 0    FLUoxetine 20 MG capsule Take 1 capsule (20 mg total) by mouth 2 (two) times a day. 180 capsule 0    FLUoxetine 20 MG capsule Take 1 capsule (20 mg total) by mouth 2 (two) times daily. 180 capsule 1    fluticasone propionate (FLONASE) 50 mcg/actuation nasal spray Use 2 sprays in each nostril once daily. 16 g 12    fluticasone propionate (FLONASE) 50 mcg/actuation nasal spray 2 sprays (100 mcg total) by Each Nostril route once daily. 16 g 6    FREESTYLE LITE STRIPS Strp test TWICE DAILY 200 each 3    golimumab (SIMPONI) 50 mg/0.5 mL PnIj Inject 50 mg into the skin every 30 days. 1.5 mL 1    hydroCHLOROthiazide (HYDRODIURIL) 25 MG tablet Take 1 tablet (25 mg total) by mouth once daily. 90 tablet 3    hydroquinone 4 % Crea Apply to dark areas qhs.  Not more than 6 months straight in same  location.Use sunscreen in am 46 g 1    ketoconazole (NIZORAL) 2 % shampoo Apply topically once a week. 120 mL 12    ketoconazole (NIZORAL) 2 % shampoo Apply topically twice a week. 120 mL 6    lancets (FREESTYLE LANCETS) 28 gauge Misc test TWICE DAILY 200 each 3    levocetirizine (XYZAL) 5 MG tablet TAKE ONE TABLET BY MOUTH EVERY EVENING 90 tablet 2    losartan (COZAAR) 100 MG tablet Take 1 tablet (100 mg total) by mouth once daily. 90 tablet 3    naproxen (NAPROSYN) 500 MG tablet Take 1 tablet (500 mg total) by mouth 2 (two) times daily as needed 180 tablet 0    neomycin-polymyxin-hydrocortisone (CORTISPORIN) 3.5-10,000-1 mg/mL-unit/mL-% otic suspension Place 3 drops into both ears 3 (three) times daily as needed. Up to 5 days 10 mL 0    oxyCODONE-acetaminophen (PERCOCET) 5-325 mg per tablet Take 1 tablet by mouth every 8 (eight) hours as needed for Pain. 30 tablet 0    pantoprazole (PROTONIX) 40 MG tablet Take 1 tablet (40 mg total) by mouth once daily. 90 tablet 2    prednisoLONE acetate (PRED FORTE) 1 % DrpS Instill one drop into affect eye(s) as directed 5 mL 1    repaglinide (PRANDIN) 1 MG tablet TAKE ONE TABLET BY MOUTH THREE TIMES DAILY BEFORE MEALS (TAKING PLACE OF [facility]) 270 tablet 0    rosuvastatin (CRESTOR) 40 MG Tab Take 1 tablet (40 mg total) by mouth every evening. 90 tablet 3    semaglutide (OZEMPIC) 1 mg/dose (4 mg/3 mL) Inject 1 mg into the skin every 7 days. 3 each 5    sulfaSALAzine (AZULFIDINE) 500 MG EC tablet Take 3 tablets (1,500 mg total) by mouth 2 (two) times daily. 540 tablet 0    terconazole (TERAZOL 7) 0.4 % Crea Place 1 applicator vaginally every evening. 45 g 2    terconazole (TERAZOL 7) 0.4 % Crea Place 1 applicatorful vaginally every evening. 45 g 2    terconazole (TERAZOL 7) 0.4 % Crea Place 1 applicator vaginally every evening. 7 g 0    tiZANidine (ZANAFLEX) 2 MG tablet Take 1-2 tablets (2-4 mg total) by mouth every 8 (eight) hours as needed. 90 tablet 2    topiramate (TOPAMAX)  100 MG tablet Take 1 tablet (100 mg total) by mouth 2 (two) times daily. 180 tablet 3    triamcinolone acetonide 0.1% (KENALOG) 0.1 % cream Apply topically 2 (two) times daily. 45 g 1    valACYclovir (VALTREX) 1000 MG tablet valacyclovir 1 gram tablet   Take 0.5 tablets every 12 hours by oral route.      vitamin D (VITAMIN D3) 1000 units Tab Take 1,000 Units by mouth once daily.      zolpidem (AMBIEN) 5 MG Tab Take 1 tablet (5 mg total) by mouth nightly as needed. 90 tablet 0    diclofenac sodium (VOLTAREN) 1 % Gel Apply 4 g topically 4 (four) times daily. Apply light film topically to knee up to four times daily 3 each 2    hydrocortisone 2.5 % cream Apply topically 2 (two) times daily. for 10 days 28 g 1    nystatin (MYCOSTATIN) powder Apply to affected area 3 times daily 1 Bottle 3     Current Facility-Administered Medications on File Prior to Visit   Medication Dose Route Frequency Provider Last Rate Last Admin    0.9%  NaCl infusion   Intravenous Continuous Graciela Jerome MD         Review of patient's allergies indicates:   Allergen Reactions    Bactrim [sulfamethoxazole-trimethoprim] Itching    Trulicity [dulaglutide] Diarrhea and Other (See Comments)     Vomiting, abdominal pain    Diflucan [fluconazole] Swelling and Other (See Comments)     Sore on mouth       Review of Systems   Constitutional: Negative for chills, decreased appetite, fever, malaise/fatigue, night sweats, weight gain and weight loss.   Eyes:  Positive for visual disturbance.   Cardiovascular:  Negative for chest pain, claudication, dyspnea on exertion, leg swelling, palpitations and syncope.   Respiratory:  Negative for cough and shortness of breath.    Endocrine: Negative for cold intolerance and heat intolerance.   Hematologic/Lymphatic: Negative for bleeding problem. Does not bruise/bleed easily.   Skin:  Negative for color change, dry skin, flushing, itching, nail changes, poor wound healing, rash, skin cancer, suspicious lesions and  "unusual hair distribution.   Musculoskeletal:  Positive for arthritis. Negative for back pain, falls, gout, joint pain, joint swelling, muscle cramps, muscle weakness, myalgias, neck pain and stiffness.   Gastrointestinal:  Negative for diarrhea, nausea and vomiting.   Neurological:  Negative for dizziness, focal weakness, light-headedness, numbness, paresthesias, tremors, vertigo and weakness.   Psychiatric/Behavioral:  Negative for altered mental status and depression. The patient does not have insomnia.    Allergic/Immunologic: Negative.            Objective:       Vitals:    09/12/23 1430   BP: (!) 144/90   Pulse: 77   Weight: 97.5 kg (215 lb)   Height: 5' 7" (1.702 m)   PainSc:   5   PainLoc: Foot   97.5 kg (215 lb)     Physical Exam  Vitals reviewed.   Constitutional:       General: She is not in acute distress.     Appearance: She is well-developed. She is not ill-appearing, toxic-appearing or diaphoretic.      Comments: Supportive slip-on sandals   Cardiovascular:      Pulses:           Dorsalis pedis pulses are 2+ on the right side and 2+ on the left side.        Posterior tibial pulses are 2+ on the right side and 2+ on the left side.      Comments: No appreciable edema  Musculoskeletal:         General: No deformity.      Right lower leg: No edema.      Left lower leg: No edema.      Right ankle: Normal.      Right Achilles Tendon: Normal.      Left ankle: Normal.      Left Achilles Tendon: Normal.      Right foot: Decreased range of motion. Tenderness present. No deformity or bony tenderness.      Left foot: Decreased range of motion. No deformity, tenderness or bony tenderness.      Comments: Right foot positive neuritis elicited along the midfoot just proximal to the 2nd and 3rd metatarsal bases cuneiform area this is just medial to the vascular palpable pulse    Patient also experiences pain on palpation to 2nd interspace /with side-to-side compression    There is no pain to other interspaces are " metatarsals   Feet:      Right foot:      Protective Sensation: 10 sites tested.  10 sites sensed.      Skin integrity: No ulcer, blister, skin breakdown, erythema, warmth, callus or dry skin.      Toenail Condition: Right toenails are normal.      Left foot:      Protective Sensation: 10 sites tested.  10 sites sensed.      Skin integrity: No ulcer, blister, skin breakdown, erythema, warmth, callus or dry skin.      Toenail Condition: Left toenails are normal.      Comments: No open lesions no signs of infection no fluctuance noted  Skin:     General: Skin is warm.      Capillary Refill: Capillary refill takes 2 to 3 seconds.      Coloration: Skin is not pale.      Findings: No erythema or rash.   Neurological:      Mental Status: She is alert and oriented to person, place, and time.      Sensory: Sensory deficit present.      Gait: Gait is intact.   Psychiatric:         Attention and Perception: Attention normal.         Mood and Affect: Mood normal.         Speech: Speech normal.         Behavior: Behavior normal.         Thought Content: Thought content normal.         Cognition and Memory: Cognition normal.         Judgment: Judgment normal.              Assessment:       Encounter Diagnoses   Name Primary?    Foot pain, right Yes    Arthritis of midfoot     Neuritis of right foot     Type 2 diabetes mellitus with diabetic polyneuropathy, unspecified whether long term insulin use          Plan:       Althea was seen today for foot pain.    Diagnoses and all orders for this visit:    Foot pain, right  -     Small Joint Aspiration/Injection: R intertarsal    Arthritis of midfoot  -     Small Joint Aspiration/Injection: R intertarsal    Neuritis of right foot  -     Small Joint Aspiration/Injection: R intertarsal    Type 2 diabetes mellitus with diabetic polyneuropathy, unspecified whether long term insulin use    Other orders  -     Cancel: Neuroma injection      I counseled the patient on her conditions, their  implications and medical management.    Imaging reviewed    Chart review    Discussed with patient she appears to have both a neuritis entrapment to the dorsal midfoot as well as a neuroma in the 2nd interspace which was noted on MRI  Patient appears to be only symptomatic today to the midfoot    Offered injection today patient tolerated well she was pain-free upon leaving clinic    Encouraged patient to avoid any shoe gear that crosses midfoot discussed relacing tennis shoes      prn    Follow up if symptoms worsen or fail to improve.

## 2023-09-13 ENCOUNTER — TELEPHONE (OUTPATIENT)
Dept: OPTOMETRY | Facility: CLINIC | Age: 58
End: 2023-09-13
Payer: COMMERCIAL

## 2023-09-26 ENCOUNTER — PATIENT MESSAGE (OUTPATIENT)
Dept: OPTOMETRY | Facility: CLINIC | Age: 58
End: 2023-09-26
Payer: COMMERCIAL

## 2023-09-27 ENCOUNTER — TELEPHONE (OUTPATIENT)
Dept: OPTOMETRY | Facility: CLINIC | Age: 58
End: 2023-09-27
Payer: COMMERCIAL

## 2023-09-27 ENCOUNTER — PATIENT MESSAGE (OUTPATIENT)
Dept: OPTOMETRY | Facility: CLINIC | Age: 58
End: 2023-09-27
Payer: COMMERCIAL

## 2023-09-27 RX ORDER — FLUOXETINE HYDROCHLORIDE 20 MG/1
20 CAPSULE ORAL 2 TIMES DAILY
Qty: 180 CAPSULE | Refills: 1 | Status: SHIPPED | OUTPATIENT
Start: 2023-09-27 | End: 2023-11-09

## 2023-10-03 ENCOUNTER — PATIENT MESSAGE (OUTPATIENT)
Dept: OPTOMETRY | Facility: CLINIC | Age: 58
End: 2023-10-03
Payer: COMMERCIAL

## 2023-10-05 ENCOUNTER — OFFICE VISIT (OUTPATIENT)
Dept: OPTOMETRY | Facility: CLINIC | Age: 58
End: 2023-10-05
Payer: COMMERCIAL

## 2023-10-05 DIAGNOSIS — M02.30 REACTIVE ARTHRITIS, UNSPECIFIED SITE: ICD-10-CM

## 2023-10-05 DIAGNOSIS — H25.13 NUCLEAR SCLEROSIS OF BOTH EYES: ICD-10-CM

## 2023-10-05 DIAGNOSIS — H20.023 RECURRENT IRITIS OF BOTH EYES: Primary | ICD-10-CM

## 2023-10-05 DIAGNOSIS — H21.542 POSTERIOR SYNECHIAE OF LEFT EYE: ICD-10-CM

## 2023-10-05 DIAGNOSIS — H25.813 COMBINED FORMS OF AGE-RELATED CATARACT OF BOTH EYES: ICD-10-CM

## 2023-10-05 DIAGNOSIS — H21.542 POSTERIOR SYNECHIAE (IRIS), LEFT EYE: ICD-10-CM

## 2023-10-05 PROCEDURE — 92012 PR EYE EXAM, EST PATIENT,INTERMED: ICD-10-PCS | Mod: S$GLB,,, | Performed by: OPTOMETRIST

## 2023-10-05 PROCEDURE — 3061F PR NEG MICROALBUMINURIA RESULT DOCUMENTED/REVIEW: ICD-10-PCS | Mod: CPTII,S$GLB,, | Performed by: OPTOMETRIST

## 2023-10-05 PROCEDURE — 99999 PR PBB SHADOW E&M-EST. PATIENT-LVL III: CPT | Mod: PBBFAC,,, | Performed by: OPTOMETRIST

## 2023-10-05 PROCEDURE — 3066F NEPHROPATHY DOC TX: CPT | Mod: CPTII,S$GLB,, | Performed by: OPTOMETRIST

## 2023-10-05 PROCEDURE — 99999 PR PBB SHADOW E&M-EST. PATIENT-LVL III: ICD-10-PCS | Mod: PBBFAC,,, | Performed by: OPTOMETRIST

## 2023-10-05 PROCEDURE — 3066F PR DOCUMENTATION OF TREATMENT FOR NEPHROPATHY: ICD-10-PCS | Mod: CPTII,S$GLB,, | Performed by: OPTOMETRIST

## 2023-10-05 PROCEDURE — 3044F HG A1C LEVEL LT 7.0%: CPT | Mod: CPTII,S$GLB,, | Performed by: OPTOMETRIST

## 2023-10-05 PROCEDURE — 92012 INTRM OPH EXAM EST PATIENT: CPT | Mod: S$GLB,,, | Performed by: OPTOMETRIST

## 2023-10-05 PROCEDURE — 4010F PR ACE/ARB THEARPY RXD/TAKEN: ICD-10-PCS | Mod: CPTII,S$GLB,, | Performed by: OPTOMETRIST

## 2023-10-05 PROCEDURE — 1159F MED LIST DOCD IN RCRD: CPT | Mod: CPTII,S$GLB,, | Performed by: OPTOMETRIST

## 2023-10-05 PROCEDURE — 1159F PR MEDICATION LIST DOCUMENTED IN MEDICAL RECORD: ICD-10-PCS | Mod: CPTII,S$GLB,, | Performed by: OPTOMETRIST

## 2023-10-05 PROCEDURE — 3061F NEG MICROALBUMINURIA REV: CPT | Mod: CPTII,S$GLB,, | Performed by: OPTOMETRIST

## 2023-10-05 PROCEDURE — 3044F PR MOST RECENT HEMOGLOBIN A1C LEVEL <7.0%: ICD-10-PCS | Mod: CPTII,S$GLB,, | Performed by: OPTOMETRIST

## 2023-10-05 PROCEDURE — 4010F ACE/ARB THERAPY RXD/TAKEN: CPT | Mod: CPTII,S$GLB,, | Performed by: OPTOMETRIST

## 2023-10-05 NOTE — PATIENT INSTRUCTIONS
History of reactive arthritis, with seconday recurrent bilateal iritis/anterior uveitis, most recently in the right   Recent consult with Dr. Cuellar who re-prescribed Prednisolone Acetate ophthalmic suspension  (with instruction to tape use) and Atropine ophthalmic  suspension (used twice per day) in the right eye only.    Ms. Vazquez returns today with report that the right eye feels much better (although still bothersome).   No longer experiencing severe pain in thee right eye.However, she notes decreased visual acuity in the right eye.    Slit lamp examination reveals bilateral nuclear sclerotic/cortical cataract, more advanced in the right eye.  Trace cells noted  in the anterior chamber of the right eye.  No cells noted in the anterior chamber of the right eye.    Discussed apparen need for cataract surgery.    Refer to Dr. Mortensen for evaluation of need for cataract surgery in the right eye, and for follow-up on iritis/anterior uveitis in the right eye, presumably secondary to reactive arthritis.     In the interim continue to instill one drop of Prednisolone Acetate 0.1% ophthalmic suspension into the right eye three times per day.  Follow up, thereafter, as recommended by Dr. Mortensen

## 2023-10-06 ENCOUNTER — TELEPHONE (OUTPATIENT)
Dept: OPHTHALMOLOGY | Facility: CLINIC | Age: 58
End: 2023-10-06
Payer: COMMERCIAL

## 2023-10-09 NOTE — PROGRESS NOTES
HPI     Eye Problem            Comments: In for follow-up visit for recurrence of iritis/anterior   uveitis in the right eye.  Saw Dr. Sandoval at [facility].  Was placed back on topical steroid   eyedrop, and was advised to taper use of drops.  Today using drops  every   three hours.  Inititially was placed on drops every hour while awake,    then every two hours, and now every three hours while awake.  States OD is   feeling somewhat better - not hurting, but still photophobic.  Vision   blurry in OD.  History of reactive arthritis (Dr. Arvizu)         Last edited by Rishi Tamayo, OD on 10/5/2023  3:19 PM.            Assessment /Plan     For exam results, see Encounter Report.    1. Recurrent iritis of both eyes        2. Posterior synechiae (iris), left eye        3. Posterior synechiae of left eye        4. Nuclear sclerosis of both eyes        5. Combined forms of age-related cataract of both eyes        6. Reactive arthritis, unspecified site                       History of reactive arthritis, with seconday recurrent bilateal iritis/anterior uveitis, most recently in the right   Recent consult with Dr. Cuellar who re-prescribed Prednisolone Acetate ophthalmic suspension  (with instruction to tape use) and Atropine ophthalmic  suspension (used twice per day) in the right eye only.    Ms. Vazquez returns today with report that the right eye feels much better (although still bothersome).   No longer experiencing severe pain in thee right eye.However, she notes decreased visual acuity in the right eye.    Slit lamp examination reveals bilateral nuclear sclerotic/cortical cataract, more advanced in the right eye.  Trace cells noted  in the anterior chamber of the right eye.  No cells noted in the anterior chamber of the right eye.    Discussed apparen need for cataract surgery.    Refer to Dr. Mortensen for evaluation of need for cataract surgery in the right eye, and for follow-up on  iritis/anterior uveitis in the right eye, presumably secondary to reactive arthritis.     In the interim continue to instill one drop of Prednisolone Acetate 0.1% ophthalmic suspension into the right eye three times per day.  Follow up, thereafter, as recommended by Dr. Mortensen

## 2023-11-09 ENCOUNTER — TELEPHONE (OUTPATIENT)
Dept: INTERNAL MEDICINE | Facility: CLINIC | Age: 58
End: 2023-11-09

## 2023-11-09 ENCOUNTER — PATIENT MESSAGE (OUTPATIENT)
Dept: INTERNAL MEDICINE | Facility: CLINIC | Age: 58
End: 2023-11-09

## 2023-11-09 ENCOUNTER — OFFICE VISIT (OUTPATIENT)
Dept: INTERNAL MEDICINE | Facility: CLINIC | Age: 58
End: 2023-11-09
Payer: COMMERCIAL

## 2023-11-09 ENCOUNTER — LAB VISIT (OUTPATIENT)
Dept: LAB | Facility: HOSPITAL | Age: 58
End: 2023-11-09
Attending: INTERNAL MEDICINE
Payer: COMMERCIAL

## 2023-11-09 VITALS
RESPIRATION RATE: 18 BRPM | HEIGHT: 67 IN | HEART RATE: 69 BPM | DIASTOLIC BLOOD PRESSURE: 88 MMHG | BODY MASS INDEX: 33.41 KG/M2 | SYSTOLIC BLOOD PRESSURE: 138 MMHG | WEIGHT: 212.88 LBS | OXYGEN SATURATION: 99 %

## 2023-11-09 DIAGNOSIS — E11.42 TYPE 2 DIABETES MELLITUS WITH DIABETIC POLYNEUROPATHY, UNSPECIFIED WHETHER LONG TERM INSULIN USE: Chronic | ICD-10-CM

## 2023-11-09 DIAGNOSIS — I15.2 HYPERTENSION ASSOCIATED WITH DIABETES: Primary | ICD-10-CM

## 2023-11-09 DIAGNOSIS — M51.36 DDD (DEGENERATIVE DISC DISEASE), LUMBAR: ICD-10-CM

## 2023-11-09 DIAGNOSIS — G47.00 INSOMNIA, UNSPECIFIED TYPE: Chronic | ICD-10-CM

## 2023-11-09 DIAGNOSIS — E78.5 HYPERLIPIDEMIA ASSOCIATED WITH TYPE 2 DIABETES MELLITUS: ICD-10-CM

## 2023-11-09 DIAGNOSIS — E11.69 HYPERLIPIDEMIA ASSOCIATED WITH TYPE 2 DIABETES MELLITUS: ICD-10-CM

## 2023-11-09 DIAGNOSIS — E08.65 DIABETES MELLITUS DUE TO UNDERLYING CONDITION WITH HYPERGLYCEMIA, UNSPECIFIED WHETHER LONG TERM INSULIN USE: ICD-10-CM

## 2023-11-09 DIAGNOSIS — E66.01 SEVERE OBESITY (BMI 35.0-39.9) WITH COMORBIDITY: ICD-10-CM

## 2023-11-09 DIAGNOSIS — E11.42 TYPE 2 DIABETES MELLITUS WITH DIABETIC POLYNEUROPATHY, UNSPECIFIED WHETHER LONG TERM INSULIN USE: ICD-10-CM

## 2023-11-09 DIAGNOSIS — I15.2 HYPERTENSION ASSOCIATED WITH DIABETES: ICD-10-CM

## 2023-11-09 DIAGNOSIS — M46.90 AXIAL SPONDYLOARTHRITIS: Chronic | ICD-10-CM

## 2023-11-09 DIAGNOSIS — E11.59 HYPERTENSION ASSOCIATED WITH DIABETES: Primary | ICD-10-CM

## 2023-11-09 DIAGNOSIS — E11.59 HYPERTENSION ASSOCIATED WITH DIABETES: ICD-10-CM

## 2023-11-09 DIAGNOSIS — G47.10 HYPERSOMNOLENCE: Primary | ICD-10-CM

## 2023-11-09 LAB
ALBUMIN SERPL BCP-MCNC: 4.1 G/DL (ref 3.5–5.2)
ALP SERPL-CCNC: 63 U/L (ref 55–135)
ALT SERPL W/O P-5'-P-CCNC: 24 U/L (ref 10–44)
ANION GAP SERPL CALC-SCNC: 8 MMOL/L (ref 8–16)
AST SERPL-CCNC: 24 U/L (ref 10–40)
BASOPHILS # BLD AUTO: 0.03 K/UL (ref 0–0.2)
BASOPHILS NFR BLD: 0.5 % (ref 0–1.9)
BILIRUB SERPL-MCNC: 0.3 MG/DL (ref 0.1–1)
BUN SERPL-MCNC: 12 MG/DL (ref 6–20)
CALCIUM SERPL-MCNC: 9.5 MG/DL (ref 8.7–10.5)
CHLORIDE SERPL-SCNC: 105 MMOL/L (ref 95–110)
CO2 SERPL-SCNC: 28 MMOL/L (ref 23–29)
CREAT SERPL-MCNC: 0.7 MG/DL (ref 0.5–1.4)
DIFFERENTIAL METHOD: ABNORMAL
EOSINOPHIL # BLD AUTO: 0.1 K/UL (ref 0–0.5)
EOSINOPHIL NFR BLD: 0.8 % (ref 0–8)
ERYTHROCYTE [DISTWIDTH] IN BLOOD BY AUTOMATED COUNT: 13.2 % (ref 11.5–14.5)
EST. GFR  (NO RACE VARIABLE): >60 ML/MIN/1.73 M^2
ESTIMATED AVG GLUCOSE: 111 MG/DL (ref 68–131)
GLUCOSE SERPL-MCNC: 82 MG/DL (ref 70–110)
HBA1C MFR BLD: 5.5 % (ref 4–5.6)
HCT VFR BLD AUTO: 43 % (ref 37–48.5)
HGB BLD-MCNC: 14 G/DL (ref 12–16)
IMM GRANULOCYTES # BLD AUTO: 0.05 K/UL (ref 0–0.04)
IMM GRANULOCYTES NFR BLD AUTO: 0.8 % (ref 0–0.5)
LYMPHOCYTES # BLD AUTO: 3.7 K/UL (ref 1–4.8)
LYMPHOCYTES NFR BLD: 59 % (ref 18–48)
MCH RBC QN AUTO: 29.7 PG (ref 27–31)
MCHC RBC AUTO-ENTMCNC: 32.6 G/DL (ref 32–36)
MCV RBC AUTO: 91 FL (ref 82–98)
MONOCYTES # BLD AUTO: 0.4 K/UL (ref 0.3–1)
MONOCYTES NFR BLD: 7 % (ref 4–15)
NEUTROPHILS # BLD AUTO: 2 K/UL (ref 1.8–7.7)
NEUTROPHILS NFR BLD: 31.9 % (ref 38–73)
NRBC BLD-RTO: 0 /100 WBC
PLATELET # BLD AUTO: 285 K/UL (ref 150–450)
PMV BLD AUTO: 11.2 FL (ref 9.2–12.9)
POTASSIUM SERPL-SCNC: 4.3 MMOL/L (ref 3.5–5.1)
PROT SERPL-MCNC: 7.5 G/DL (ref 6–8.4)
RBC # BLD AUTO: 4.71 M/UL (ref 4–5.4)
SODIUM SERPL-SCNC: 141 MMOL/L (ref 136–145)
WBC # BLD AUTO: 6.32 K/UL (ref 3.9–12.7)

## 2023-11-09 PROCEDURE — 3061F NEG MICROALBUMINURIA REV: CPT | Mod: CPTII,S$GLB,, | Performed by: INTERNAL MEDICINE

## 2023-11-09 PROCEDURE — 3075F SYST BP GE 130 - 139MM HG: CPT | Mod: CPTII,S$GLB,, | Performed by: INTERNAL MEDICINE

## 2023-11-09 PROCEDURE — 3079F PR MOST RECENT DIASTOLIC BLOOD PRESSURE 80-89 MM HG: ICD-10-PCS | Mod: CPTII,S$GLB,, | Performed by: INTERNAL MEDICINE

## 2023-11-09 PROCEDURE — 85025 COMPLETE CBC W/AUTO DIFF WBC: CPT | Performed by: INTERNAL MEDICINE

## 2023-11-09 PROCEDURE — 36415 COLL VENOUS BLD VENIPUNCTURE: CPT | Mod: PO | Performed by: INTERNAL MEDICINE

## 2023-11-09 PROCEDURE — 83036 HEMOGLOBIN GLYCOSYLATED A1C: CPT | Performed by: INTERNAL MEDICINE

## 2023-11-09 PROCEDURE — 4010F PR ACE/ARB THEARPY RXD/TAKEN: ICD-10-PCS | Mod: CPTII,S$GLB,, | Performed by: INTERNAL MEDICINE

## 2023-11-09 PROCEDURE — 3008F PR BODY MASS INDEX (BMI) DOCUMENTED: ICD-10-PCS | Mod: CPTII,S$GLB,, | Performed by: INTERNAL MEDICINE

## 2023-11-09 PROCEDURE — 3079F DIAST BP 80-89 MM HG: CPT | Mod: CPTII,S$GLB,, | Performed by: INTERNAL MEDICINE

## 2023-11-09 PROCEDURE — 80053 COMPREHEN METABOLIC PANEL: CPT | Performed by: INTERNAL MEDICINE

## 2023-11-09 PROCEDURE — 99999 PR PBB SHADOW E&M-EST. PATIENT-LVL V: ICD-10-PCS | Mod: PBBFAC,,, | Performed by: INTERNAL MEDICINE

## 2023-11-09 PROCEDURE — 3075F PR MOST RECENT SYSTOLIC BLOOD PRESS GE 130-139MM HG: ICD-10-PCS | Mod: CPTII,S$GLB,, | Performed by: INTERNAL MEDICINE

## 2023-11-09 PROCEDURE — 1159F PR MEDICATION LIST DOCUMENTED IN MEDICAL RECORD: ICD-10-PCS | Mod: CPTII,S$GLB,, | Performed by: INTERNAL MEDICINE

## 2023-11-09 PROCEDURE — 3061F PR NEG MICROALBUMINURIA RESULT DOCUMENTED/REVIEW: ICD-10-PCS | Mod: CPTII,S$GLB,, | Performed by: INTERNAL MEDICINE

## 2023-11-09 PROCEDURE — 4010F ACE/ARB THERAPY RXD/TAKEN: CPT | Mod: CPTII,S$GLB,, | Performed by: INTERNAL MEDICINE

## 2023-11-09 PROCEDURE — 3044F HG A1C LEVEL LT 7.0%: CPT | Mod: CPTII,S$GLB,, | Performed by: INTERNAL MEDICINE

## 2023-11-09 PROCEDURE — 99214 PR OFFICE/OUTPT VISIT, EST, LEVL IV, 30-39 MIN: ICD-10-PCS | Mod: S$GLB,,, | Performed by: INTERNAL MEDICINE

## 2023-11-09 PROCEDURE — 99214 OFFICE O/P EST MOD 30 MIN: CPT | Mod: S$GLB,,, | Performed by: INTERNAL MEDICINE

## 2023-11-09 PROCEDURE — 3066F NEPHROPATHY DOC TX: CPT | Mod: CPTII,S$GLB,, | Performed by: INTERNAL MEDICINE

## 2023-11-09 PROCEDURE — 3008F BODY MASS INDEX DOCD: CPT | Mod: CPTII,S$GLB,, | Performed by: INTERNAL MEDICINE

## 2023-11-09 PROCEDURE — 99999 PR PBB SHADOW E&M-EST. PATIENT-LVL V: CPT | Mod: PBBFAC,,, | Performed by: INTERNAL MEDICINE

## 2023-11-09 PROCEDURE — 3066F PR DOCUMENTATION OF TREATMENT FOR NEPHROPATHY: ICD-10-PCS | Mod: CPTII,S$GLB,, | Performed by: INTERNAL MEDICINE

## 2023-11-09 PROCEDURE — 1159F MED LIST DOCD IN RCRD: CPT | Mod: CPTII,S$GLB,, | Performed by: INTERNAL MEDICINE

## 2023-11-09 PROCEDURE — 3044F PR MOST RECENT HEMOGLOBIN A1C LEVEL <7.0%: ICD-10-PCS | Mod: CPTII,S$GLB,, | Performed by: INTERNAL MEDICINE

## 2023-11-09 RX ORDER — VALSARTAN 320 MG/1
320 TABLET ORAL DAILY
Qty: 90 TABLET | Refills: 3 | Status: SHIPPED | OUTPATIENT
Start: 2023-11-09 | End: 2024-11-08

## 2023-11-09 RX ORDER — LOSARTAN POTASSIUM 100 MG/1
100 TABLET ORAL DAILY
Qty: 90 TABLET | Refills: 3 | Status: CANCELLED | OUTPATIENT
Start: 2023-11-09

## 2023-11-09 RX ORDER — SEMAGLUTIDE 2.68 MG/ML
2 INJECTION, SOLUTION SUBCUTANEOUS
Qty: 9 ML | Refills: 3 | Status: SHIPPED | OUTPATIENT
Start: 2023-11-09 | End: 2024-11-08

## 2023-11-09 NOTE — PROGRESS NOTES
Subjective     Patient ID: Althea Vazquez is a 58 y.o. female.    Chief Complaint: Follow-up and Headache (Daily after taking current Rx)    HPI  Pt with T2DM, HTN, HLD, Reactive arthritis, Severe obesity, Insomnia, Hx of Hep B is here for f/u. Pt is c/o worsening hypersomnolence a/w snoring, generalized fatigue and short term memory loss/ Previously saw sleep medicine back in 2015 and was diagnosed with UARS. Insurance would not cover CPAP machine at that time.   Review of Systems   Constitutional:  Negative for activity change, appetite change, chills, diaphoresis, fatigue, fever and unexpected weight change.   HENT:  Negative for postnasal drip, rhinorrhea, sinus pressure/congestion, sneezing, sore throat, trouble swallowing and voice change.    Respiratory:  Negative for cough, shortness of breath and wheezing.    Cardiovascular:  Negative for chest pain, palpitations and leg swelling.   Gastrointestinal:  Negative for abdominal pain, blood in stool, constipation, diarrhea, nausea and vomiting.   Genitourinary:  Negative for dysuria.   Musculoskeletal:  Positive for arthralgias and back pain. Negative for myalgias.   Integumentary:  Negative for rash and wound.   Allergic/Immunologic: Negative for environmental allergies and food allergies.   Hematological:  Negative for adenopathy. Does not bruise/bleed easily.   Psychiatric/Behavioral:  Positive for sleep disturbance. Negative for self-injury and suicidal ideas. The patient is nervous/anxious.           Objective     Physical Exam  Constitutional:       General: She is not in acute distress.     Appearance: Normal appearance. She is well-developed. She is not diaphoretic.   HENT:      Head: Normocephalic and atraumatic.      Right Ear: External ear normal.      Left Ear: External ear normal.      Nose: Nose normal.      Mouth/Throat:      Pharynx: No oropharyngeal exudate.   Eyes:      General: No scleral icterus.        Right eye: No discharge.          Left eye: No discharge.      Conjunctiva/sclera: Conjunctivae normal.      Pupils: Pupils are equal, round, and reactive to light.   Neck:      Vascular: No JVD.   Cardiovascular:      Rate and Rhythm: Normal rate and regular rhythm.      Pulses: Normal pulses.      Heart sounds: Normal heart sounds.   Pulmonary:      Effort: Pulmonary effort is normal. No respiratory distress.      Breath sounds: Normal breath sounds. No wheezing, rhonchi or rales.   Abdominal:      General: Bowel sounds are normal. There is no distension.      Palpations: Abdomen is soft.      Tenderness: There is no abdominal tenderness. There is no guarding or rebound.   Musculoskeletal:      Cervical back: Neck supple.      Right lower leg: No edema.      Left lower leg: No edema.   Lymphadenopathy:      Cervical: No cervical adenopathy.   Skin:     General: Skin is warm and dry.      Coloration: Skin is not pale.      Findings: No rash.   Neurological:      General: No focal deficit present.      Mental Status: She is alert and oriented to person, place, and time.      Gait: Gait normal.   Psychiatric:         Behavior: Behavior normal.         Thought Content: Thought content normal.            Assessment and Plan     1. Hypersomnolence  -     Ambulatory referral/consult to Sleep Disorders; Future; Expected date: 11/16/2023    2. Hypertension associated with diabetes    3. Hyperlipidemia associated with type 2 diabetes mellitus  Overview:  Hyperlipidemia with intermediate risk, on atorvastatin 40mg every evening consider increase to 80mg every evening, will leave to 's opinion  Results for NABOR HERNANDEZ (MRN [id number]) as of 5/7/2021 11:19   Ref. Range 4/9/2020 09:45   Cholesterol Latest Ref Range: 120 - 199 mg/dL 205 (H)   HDL Latest Ref Range: 40 - 75 mg/dL 60   HDL/Cholesterol Ratio Latest Ref Range: 20.0 - 50.0 % 29.3   LDL Cholesterol External Latest Ref Range: 63 - 159 mg/dL 126.4   Non-HDL Cholesterol  Latest Units: mg/dL 145   Total Cholesterol/HDL Ratio Latest Ref Range: 2.0 - 5.0  3.4   Triglycerides Latest Ref Range: 30 - 150 mg/dL 93   The 10-year ASCVD risk score (Darío REY Jr., et al., 2013) is: 11.2%    Values used to calculate the score:      Age: 55 years      Sex: Female      Is Non- : Yes      Diabetic: Yes      Tobacco smoker: No      Systolic Blood Pressure: 135 mmHg      Is BP treated: Yes      HDL Cholesterol: 60 mg/dL      Total Cholesterol: 205 mg/dL      4. Type 2 diabetes mellitus with diabetic polyneuropathy, unspecified whether long term insulin use  -     CBC Auto Differential; Future; Expected date: 11/09/2023  -     Comprehensive Metabolic Panel; Future; Expected date: 11/09/2023  -     Hemoglobin A1C; Future; Expected date: 11/09/2023    5. Severe obesity (BMI 35.0-39.9) with comorbidity    6. Diabetes mellitus due to underlying condition with hyperglycemia, unspecified whether long term insulin use    7. DDD (degenerative disc disease), lumbar    8. Axial spondyloarthritis  Overview:  Results for NABOR HERNANDEZ (MRN [id number]) as of 5/7/2021 11:14   Ref. Range 9/27/2012 11:12   HLA B27 Unknown Positive     EXAMINATION:  XR HIPS BILATERAL 2 VIEW INCL AP PELVIS     CLINICAL HISTORY:  Low back pain     TECHNIQUE:  AP view of the pelvis and frogleg lateral views of both hips were performed.     COMPARISON:  No 09/09/2016 ne.     FINDINGS:  Mild DJD.  Mild narrowing of the hip joint spaces.  No fracture or bone destruction identified     IMPRESSION:      See above        Electronically signed by: Cristi Leggett MD  Date:                                            01/30/2019  Time:                                           13:47                     Result Care Coordination      Result Notes       The x-rays of the hips show mild  Narrowing of the hips likely related to the ankylosing spondylitis. Also some sclerosis and irregularity right sacroiliac joint. Osteitis  pubis and enthesitis around the greater trochanters ibrahima on right where you have tendinitis/bursitis. RJQ           Narrative & Impression  Technique: Routine MRI evaluation of the SI joints performed with and without the use of 10 cc of Gadavist IV contrast.       Comparison: Radiograph 09/09/2016.     Findings:     There are enhancing inflammatory changes about the anterior superior aspect of the left SI joint in the expected location of the medial lumbar or lumbosacral ligament, findings that are highly suggestive of enthesitis.  There is apparent trace enhancement on the posterior superior aspect of the right SI joint leads is overall nonspecific.     SI joints are symmetric.  No synovitis.  No joint effusions.  No osseous erosive changes.     Visualized musculature demonstrate normal bulk and signal intensity.  Piriformis muscles are symmetric.  Ischiofemoral spaces are unremarkable.     Adductor and abductor tendons are unremarkable.  Hamstring tendons are normal.     Subcutaneous soft tissues are normal.     Limited evaluation of the lower abdominal and pelvic organs is unremarkable.  Uterus is surgically absent.  IMPRESSION:         Inflammatory enthesitis affecting the anterior superior aspect of the left sacral ala with mild associated reactive marrow edema, findings that are favored to represent sequela of patient's reported history of spondyloarthropathy.  Note is made of possible trace inflammatory changes adjacent to the posterior aspect of the right SI joints which may relate to a prominent vessel or mild additional enthesitis.     Unremarkable evaluation of the SI joints specifically without imaging findings to suggest sacroiliitis. No osseous erosive changes or joint effusions. No synovitis.  ______________________________________      Electronically signed by: ALEX CRAIG MD  Date:                                            09/11/16  Time:                                           20:30        Humira with Secondary inefficacy, tried to change to Simponi 50mg sc q 4 wks but insurance wouldn't cover, now on Enbrel Sureclick 50mg s q 7 days but has developed recurrent AAU OS  Humira with Secondary inefficacy, tried to change to Simponi 50mg sc q 4 wks but insurance wouldn't cover, now on Enbrel Sureclick 50mg s q 7 days but has developed recurrent AAU OS  started certolizumab after 1/11/21 visit as had AAU while on etanercept( with recurrence 1/28/21 after starting certolizumab but had not completed loading dose  and had had secondary failure adalimumab.      9. Insomnia, unspecified type    Other orders  -     valsartan (DIOVAN) 320 MG tablet; Take 1 tablet (320 mg total) by mouth once daily.  Dispense: 90 tablet; Refill: 3          T2DM with neuropathy- stable with last HA1C of 5.7(5/23)<--6.7(6/22)<--8.3(3/22)<--7.7(5/21)<--8.3(4/20)<--6.7(9/19)<--5.9(4/18)<--5.8(2/17)<--6.4(4/16)       Continue Invokana/Ozempic       Pt stopped the Metformin 2/2 GI SE's      HTN- continue Atenolol 100 mg, Norvasc 10 mg, HCTZ 25 mg daily and will change Losartan to Valsartan 320 mg qd      HLD- stable, Lipitor changed to Crestor(has not been taking it)      Spondyloarthritis- stable on Sulfasalazine/Naproxen        Followed by Rheumatology      Anxiety- stable on Prozac/Klonopin, managed by Ps[facility]      Severe obesity- pt advised on proper diet/exercise for weight loss      Insomnia- stable on Ambien 5 mg qHS PRN      Hx of Hep B- followed by Hepatology       Hypersomnolence- referral back to sleep medicine     F/u in 6 months for annual exam

## 2023-11-09 NOTE — TELEPHONE ENCOUNTER
Care Due:                  Date            Visit Type   Department     Provider  --------------------------------------------------------------------------------                                EP -                              PRIMARY      MET INTERNAL  Last Visit: 11-      CARE ([facility])   MEDICINE       Weston Begum                              EP -                              PRIMARY      [facility] INTERNAL  Next Visit: 05-      CARE ([facility])   [facility]       Weston Begum                                                            Last  Test          Frequency    Reason                     Performed    Due Date  --------------------------------------------------------------------------------    HBA1C.......  6 months...  canagliflozin............  05- 11-    Health [facility] Embedded Care Due Messages. Reference number: [id number].   11/09/2023 11:55:55 AM CST

## 2023-11-16 ENCOUNTER — OFFICE VISIT (OUTPATIENT)
Dept: RHEUMATOLOGY | Facility: CLINIC | Age: 58
End: 2023-11-16
Payer: COMMERCIAL

## 2023-11-16 VITALS
WEIGHT: 209.75 LBS | HEIGHT: 67 IN | BODY MASS INDEX: 32.92 KG/M2 | SYSTOLIC BLOOD PRESSURE: 125 MMHG | HEART RATE: 70 BPM | DIASTOLIC BLOOD PRESSURE: 81 MMHG

## 2023-11-16 DIAGNOSIS — M47.819 SPONDYLOARTHRITIS: ICD-10-CM

## 2023-11-16 DIAGNOSIS — E55.9 VITAMIN D DEFICIENCY: ICD-10-CM

## 2023-11-16 DIAGNOSIS — M46.90 AXIAL SPONDYLOARTHRITIS: Chronic | ICD-10-CM

## 2023-11-16 DIAGNOSIS — D84.9 IMMUNOCOMPROMISED: Primary | ICD-10-CM

## 2023-11-16 DIAGNOSIS — M19.90 INFLAMMATORY ARTHRITIS: ICD-10-CM

## 2023-11-16 DIAGNOSIS — H20.9 UVEITIS: ICD-10-CM

## 2023-11-16 DIAGNOSIS — M02.30 REACTIVE ARTHRITIS: ICD-10-CM

## 2023-11-16 DIAGNOSIS — Z79.899 HIGH RISK MEDICATION USE: ICD-10-CM

## 2023-11-16 DIAGNOSIS — Z86.19 HISTORY OF HEPATITIS B: ICD-10-CM

## 2023-11-16 PROCEDURE — 3066F PR DOCUMENTATION OF TREATMENT FOR NEPHROPATHY: ICD-10-PCS | Mod: CPTII,S$GLB,, | Performed by: INTERNAL MEDICINE

## 2023-11-16 PROCEDURE — 99214 PR OFFICE/OUTPT VISIT, EST, LEVL IV, 30-39 MIN: ICD-10-PCS | Mod: S$GLB,,, | Performed by: INTERNAL MEDICINE

## 2023-11-16 PROCEDURE — 3044F PR MOST RECENT HEMOGLOBIN A1C LEVEL <7.0%: ICD-10-PCS | Mod: CPTII,S$GLB,, | Performed by: INTERNAL MEDICINE

## 2023-11-16 PROCEDURE — 3079F DIAST BP 80-89 MM HG: CPT | Mod: CPTII,S$GLB,, | Performed by: INTERNAL MEDICINE

## 2023-11-16 PROCEDURE — 4010F ACE/ARB THERAPY RXD/TAKEN: CPT | Mod: CPTII,S$GLB,, | Performed by: INTERNAL MEDICINE

## 2023-11-16 PROCEDURE — 3044F HG A1C LEVEL LT 7.0%: CPT | Mod: CPTII,S$GLB,, | Performed by: INTERNAL MEDICINE

## 2023-11-16 PROCEDURE — 3008F PR BODY MASS INDEX (BMI) DOCUMENTED: ICD-10-PCS | Mod: CPTII,S$GLB,, | Performed by: INTERNAL MEDICINE

## 2023-11-16 PROCEDURE — 3061F PR NEG MICROALBUMINURIA RESULT DOCUMENTED/REVIEW: ICD-10-PCS | Mod: CPTII,S$GLB,, | Performed by: INTERNAL MEDICINE

## 2023-11-16 PROCEDURE — 3061F NEG MICROALBUMINURIA REV: CPT | Mod: CPTII,S$GLB,, | Performed by: INTERNAL MEDICINE

## 2023-11-16 PROCEDURE — 3066F NEPHROPATHY DOC TX: CPT | Mod: CPTII,S$GLB,, | Performed by: INTERNAL MEDICINE

## 2023-11-16 PROCEDURE — 1159F MED LIST DOCD IN RCRD: CPT | Mod: CPTII,S$GLB,, | Performed by: INTERNAL MEDICINE

## 2023-11-16 PROCEDURE — 3008F BODY MASS INDEX DOCD: CPT | Mod: CPTII,S$GLB,, | Performed by: INTERNAL MEDICINE

## 2023-11-16 PROCEDURE — 99999 PR PBB SHADOW E&M-EST. PATIENT-LVL V: ICD-10-PCS | Mod: PBBFAC,,, | Performed by: INTERNAL MEDICINE

## 2023-11-16 PROCEDURE — 4010F PR ACE/ARB THEARPY RXD/TAKEN: ICD-10-PCS | Mod: CPTII,S$GLB,, | Performed by: INTERNAL MEDICINE

## 2023-11-16 PROCEDURE — 3079F PR MOST RECENT DIASTOLIC BLOOD PRESSURE 80-89 MM HG: ICD-10-PCS | Mod: CPTII,S$GLB,, | Performed by: INTERNAL MEDICINE

## 2023-11-16 PROCEDURE — 99999 PR PBB SHADOW E&M-EST. PATIENT-LVL V: CPT | Mod: PBBFAC,,, | Performed by: INTERNAL MEDICINE

## 2023-11-16 PROCEDURE — 3074F SYST BP LT 130 MM HG: CPT | Mod: CPTII,S$GLB,, | Performed by: INTERNAL MEDICINE

## 2023-11-16 PROCEDURE — 1159F PR MEDICATION LIST DOCUMENTED IN MEDICAL RECORD: ICD-10-PCS | Mod: CPTII,S$GLB,, | Performed by: INTERNAL MEDICINE

## 2023-11-16 PROCEDURE — 99214 OFFICE O/P EST MOD 30 MIN: CPT | Mod: S$GLB,,, | Performed by: INTERNAL MEDICINE

## 2023-11-16 PROCEDURE — 3074F PR MOST RECENT SYSTOLIC BLOOD PRESSURE < 130 MM HG: ICD-10-PCS | Mod: CPTII,S$GLB,, | Performed by: INTERNAL MEDICINE

## 2023-11-16 RX ORDER — METHOTREXATE 2.5 MG/1
15 TABLET ORAL
Qty: 24 TABLET | Refills: 1 | Status: SHIPPED | OUTPATIENT
Start: 2023-11-16 | End: 2024-01-01 | Stop reason: SDUPTHER

## 2023-11-16 RX ORDER — ERGOCALCIFEROL 1.25 MG/1
50000 CAPSULE ORAL
Qty: 12 CAPSULE | Refills: 3 | Status: SHIPPED | OUTPATIENT
Start: 2023-11-16

## 2023-11-16 RX ORDER — SULFASALAZINE 500 MG/1
1500 TABLET, DELAYED RELEASE ORAL 2 TIMES DAILY
Qty: 540 TABLET | Refills: 0 | Status: SHIPPED | OUTPATIENT
Start: 2023-11-16 | End: 2024-02-14 | Stop reason: SDUPTHER

## 2023-11-16 RX ORDER — FOLIC ACID 1 MG/1
1 TABLET ORAL DAILY
Qty: 90 TABLET | Refills: 1 | Status: SHIPPED | OUTPATIENT
Start: 2023-11-16 | End: 2024-11-15

## 2023-11-16 RX ORDER — GOLIMUMAB 50 MG/.5ML
50 INJECTION, SOLUTION SUBCUTANEOUS
Qty: 1.5 ML | Refills: 1 | Status: ACTIVE | OUTPATIENT
Start: 2023-11-16 | End: 2024-11-15

## 2023-11-16 RX ORDER — ENTECAVIR 0.5 MG/1
0.5 TABLET, FILM COATED ORAL DAILY
Qty: 90 TABLET | Refills: 3 | Status: ACTIVE | OUTPATIENT
Start: 2023-11-16

## 2023-11-16 ASSESSMENT — ANKYLOSING SPONDYLITIS DISEASE ACTIVITY SCORE (ASDAS-CRP)
GLOBAL_ACTIVITY: 4
MORNING_STIFFNESS: 5
NBH_PAIN: 3
PAIN_SWELLING: 5

## 2023-11-16 ASSESSMENT — ROUTINE ASSESSMENT OF PATIENT INDEX DATA (RAPID3)
PAIN SCORE: 5
PSYCHOLOGICAL DISTRESS SCORE: 3.3
PATIENT GLOBAL ASSESSMENT SCORE: 0
MDHAQ FUNCTION SCORE: 0.5
AM STIFFNESS SCORE: 1, YES
TOTAL RAPID3 SCORE: 2.22
WHEN YOU AWAKENED IN THE MORNING OVER THE LAST WEEK, PLEASE INDICATE THE AMOUNT OF TIME IT TAKES UNTIL YOU ARE AS LIMBER AS YOU WILL BE FOR THE DAY: 1 HOUR
FATIGUE SCORE: 6

## 2023-11-16 NOTE — PATIENT INSTRUCTIONS
ESR, CRP, HBV DNA today   *new Covid vaccine  Add methotrexate 15mg po once a week with folic acid 1mg daily for recurrent AAU OD  DXA   Golimumab 50mg sc q 28 days  Sulfasalazine.1500mg twice daily  Vitamin D 2 50,000 units q 7days and vitamin D3 1000 units daily  entecvir 0.5mg daily  Naproxen 500mg twice daily prn with pantoprazole 40mg daily for  prevention  Continue exercise program  Cont weight reduction, Mediterranean diet as feasible, decrease rice portions as she is doing semaglutide  RTC 3 months with standing labs

## 2023-11-16 NOTE — PROGRESS NOTES
Subjective:      Patient ID: Althea Vazquez is a 58 y.o. female.    Chief Complaint: nr-ax-SpA; lumbar spondylosis    HPI has had uveitis recurrence OD on Pred Forte 1 gtt q  3hrs seeing Dr Tamayo referred to Soumya Mortensen. Back is fine. Some right knee with minimal swelling.   Review of Systems   Constitutional:  Negative for appetite change, fatigue, fever and unexpected weight change.   HENT:  Negative for mouth sores.    Eyes:  Negative for visual disturbance.   Respiratory:  Negative for cough, shortness of breath and wheezing.    Cardiovascular:  Negative for chest pain and palpitations.   Gastrointestinal:  Negative for abdominal pain, anal bleeding, blood in stool, constipation, diarrhea, nausea and vomiting.   Genitourinary:  Negative for dysuria, frequency and urgency.   Musculoskeletal:  Negative for arthralgias, back pain, gait problem, joint swelling, myalgias, neck pain and neck stiffness.   Skin:  Negative for rash.   Neurological:  Negative for weakness, numbness and headaches.   Hematological:  Negative for adenopathy. Does not bruise/bleed easily.   Psychiatric/Behavioral:  Negative for sleep disturbance. The patient is not nervous/anxious.         Objective:   LMP 11/25/2014   Physical Exam   Pulmonary/Chest: Effort normal and breath sounds normal. No stridor. No respiratory distress. She has no wheezes. She has no rhonchi. She has no rales.     Schober's 10-13.25 cm  Nl lumbar extension  and lateral flexion  Fabere neg bilateral   Chest expansion 5cm decreased  Neck rom nl     10/31/2022   Tender (CABALLERO-28) 0 / 28    Swollen (CABALLERO-28) 0 / 28    Provider Global --   Patient Global 45 mm   ESR 11 mm/hr   CRP 2.3 mg/L   CABALLERO-28 (ESR) 2.31 (Remission)   CABALLERO-28 (CRP) 2.02 (Remission)   CDAI Score --      Latest Reference Range & Units 11/09/23 11:14   WBC 3.90 - 12.70 K/uL 6.32   RBC 4.00 - 5.40 M/uL 4.71   Hemoglobin 12.0 - 16.0 g/dL 14.0   Hematocrit 37.0 - 48.5 % 43.0   MCV 82 - 98 fL 91    MCH 27.0 - 31.0 pg 29.7   MCHC 32.0 - 36.0 g/dL 32.6   RDW 11.5 - 14.5 % 13.2   Platelet Count 150 - 450 K/uL 285   MPV 9.2 - 12.9 fL 11.2   Gran % 38.0 - 73.0 % 31.9 (L)   Lymph % 18.0 - 48.0 % 59.0 (H)   Mono % 4.0 - 15.0 % 7.0   Eosinophil % 0.0 - 8.0 % 0.8   Basophil % 0.0 - 1.9 % 0.5   Immature Granulocytes 0.0 - 0.5 % 0.8 (H)   Gran # (ANC) 1.8 - 7.7 K/uL 2.0   Lymph # 1.0 - 4.8 K/uL 3.7   Mono # 0.3 - 1.0 K/uL 0.4   Eos # 0.0 - 0.5 K/uL 0.1   Baso # 0.00 - 0.20 K/uL 0.03   Immature Grans (Abs) 0.00 - 0.04 K/uL 0.05 (H)   nRBC 0 /100 WBC 0   Differential Method  Automated   Sodium 136 - 145 mmol/L 141   Potassium 3.5 - 5.1 mmol/L 4.3   Chloride 95 - 110 mmol/L 105   CO2 23 - 29 mmol/L 28   Anion Gap 8 - 16 mmol/L 8   BUN 6 - 20 mg/dL 12   Creatinine 0.5 - 1.4 mg/dL 0.7   eGFR >60 mL/min/1.73 m^2 >60.0   Glucose 70 - 110 mg/dL 82   Calcium 8.7 - 10.5 mg/dL 9.5   ALP 55 - 135 U/L 63   PROTEIN TOTAL 6.0 - 8.4 g/dL 7.5   Albumin 3.5 - 5.2 g/dL 4.1   BILIRUBIN TOTAL 0.1 - 1.0 mg/dL 0.3   AST 10 - 40 U/L 24   ALT 10 - 44 U/L 24   Hemoglobin A1C External 4.0 - 5.6 % 5.5   Estimated Avg Glucose 68 - 131 mg/dL 111   (L): Data is abnormally low  (H): Data is abnormally high     Assessment:   adalimumab with Secondary inefficacy, tried to change to Simponi 50mg sc q 4 wks but insurance wouldn't cover, then on Enbrel Sureclick 50mg s q 7 days but has developed recurrent AAU OS  Then started certolizumab after 1/11/21 visit  had AAU with recurrence 1/28/21 just after starting certolizumab but had not completed loading dose   certolizumab ineffective           ASDAS-CRP pending CRP  BASDAI 2.8 (MDA)  BASFI 5.8(severe functional limitation)        Recurrent AAU  OD major issues currently despite anti-TNF will add mtx  Has failed certolizumab and adalimumab.  ?infliximab in future if mtx not helpful  Chronic hepatitis B HBV DNA due  EH   125/81  Hyperlipidemia LDL 99.8 5/25/23  on rosuvastatin 40mg nightly  Class 1 obesity  Body mass index is 32.85 kg/m².    DXA 9/20/21 normal due for repeat DXA  Left cervical radiculopathy bulge C6-7 resolved  Chronic lumbar spondylosis   MRI with severe TMT OA ? Charcot neuroarthropathy  Vitamin D deficiency    38 2/15/23  on vitamin D2 50,000 IU weekly and vitamin D3 1000 IU  daily    Plan:   ESR, CRP, HBV DNA today   *new Covid vaccine  Add methotrexate 15mg po once a week(divided dose) with folic acid 1mg daily for recurrent AAU OD  Monthly cbc, cmp x 3   DXA   Golimumab 50mg sc q 28 days  Sulfasalazine.1500mg twice daily  Vitamin D 2 50,000 units q 7days and vitamin D3 1000 units daily  entecvir 0.5mg daily  Naproxen 500mg twice daily prn with pantoprazole 40mg daily for  prevention  Continue exercise program  Cont weight reduction, Mediterranean diet as feasible, decrease rice portions as she is doing semaglutide  RTC 3 months with standing labs

## 2023-12-05 ENCOUNTER — TELEPHONE (OUTPATIENT)
Dept: OPHTHALMOLOGY | Facility: CLINIC | Age: 58
End: 2023-12-05
Payer: COMMERCIAL

## 2023-12-05 ENCOUNTER — OFFICE VISIT (OUTPATIENT)
Dept: OPHTHALMOLOGY | Facility: CLINIC | Age: 58
End: 2023-12-05
Payer: COMMERCIAL

## 2023-12-05 DIAGNOSIS — H21.542 POSTERIOR SYNECHIAE OF LEFT EYE: ICD-10-CM

## 2023-12-05 DIAGNOSIS — H25.11 NUCLEAR SCLEROTIC CATARACT OF RIGHT EYE: Primary | ICD-10-CM

## 2023-12-05 DIAGNOSIS — H20.021 RECURRENT IRITIS OF RIGHT EYE: ICD-10-CM

## 2023-12-05 DIAGNOSIS — H20.023 RECURRENT IRITIS OF BOTH EYES: ICD-10-CM

## 2023-12-05 DIAGNOSIS — M46.90 AXIAL SPONDYLOARTHRITIS: Chronic | ICD-10-CM

## 2023-12-05 DIAGNOSIS — H25.12 NUCLEAR SCLEROTIC CATARACT OF LEFT EYE: ICD-10-CM

## 2023-12-05 LAB
LEFT EYE DM RETINOPATHY: NEGATIVE
RIGHT EYE DM RETINOPATHY: NEGATIVE

## 2023-12-05 PROCEDURE — 99999 PR PBB SHADOW E&M-EST. PATIENT-LVL III: ICD-10-PCS | Mod: PBBFAC,,, | Performed by: OPHTHALMOLOGY

## 2023-12-05 PROCEDURE — 92136 IOL MASTER - OD - RIGHT EYE: ICD-10-PCS | Mod: S$GLB,,, | Performed by: OPHTHALMOLOGY

## 2023-12-05 PROCEDURE — 2023F DILAT RTA XM W/O RTNOPTHY: CPT | Mod: CPTII,S$GLB,, | Performed by: OPHTHALMOLOGY

## 2023-12-05 PROCEDURE — 1159F PR MEDICATION LIST DOCUMENTED IN MEDICAL RECORD: ICD-10-PCS | Mod: CPTII,S$GLB,, | Performed by: OPHTHALMOLOGY

## 2023-12-05 PROCEDURE — 4010F PR ACE/ARB THEARPY RXD/TAKEN: ICD-10-PCS | Mod: CPTII,S$GLB,, | Performed by: OPHTHALMOLOGY

## 2023-12-05 PROCEDURE — 3066F NEPHROPATHY DOC TX: CPT | Mod: CPTII,S$GLB,, | Performed by: OPHTHALMOLOGY

## 2023-12-05 PROCEDURE — 4010F ACE/ARB THERAPY RXD/TAKEN: CPT | Mod: CPTII,S$GLB,, | Performed by: OPHTHALMOLOGY

## 2023-12-05 PROCEDURE — 2023F PR DILATED RETINAL EXAM W/O EVID OF RETINOPATHY: ICD-10-PCS | Mod: CPTII,S$GLB,, | Performed by: OPHTHALMOLOGY

## 2023-12-05 PROCEDURE — 99999 PR PBB SHADOW E&M-EST. PATIENT-LVL III: CPT | Mod: PBBFAC,,, | Performed by: OPHTHALMOLOGY

## 2023-12-05 PROCEDURE — 3066F PR DOCUMENTATION OF TREATMENT FOR NEPHROPATHY: ICD-10-PCS | Mod: CPTII,S$GLB,, | Performed by: OPHTHALMOLOGY

## 2023-12-05 PROCEDURE — 99215 PR OFFICE/OUTPT VISIT, EST, LEVL V, 40-54 MIN: ICD-10-PCS | Mod: S$GLB,,, | Performed by: OPHTHALMOLOGY

## 2023-12-05 PROCEDURE — 3061F NEG MICROALBUMINURIA REV: CPT | Mod: CPTII,S$GLB,, | Performed by: OPHTHALMOLOGY

## 2023-12-05 PROCEDURE — 99215 OFFICE O/P EST HI 40 MIN: CPT | Mod: S$GLB,,, | Performed by: OPHTHALMOLOGY

## 2023-12-05 PROCEDURE — 92136 OPHTHALMIC BIOMETRY: CPT | Mod: S$GLB,,, | Performed by: OPHTHALMOLOGY

## 2023-12-05 PROCEDURE — 1159F MED LIST DOCD IN RCRD: CPT | Mod: CPTII,S$GLB,, | Performed by: OPHTHALMOLOGY

## 2023-12-05 PROCEDURE — 3044F PR MOST RECENT HEMOGLOBIN A1C LEVEL <7.0%: ICD-10-PCS | Mod: CPTII,S$GLB,, | Performed by: OPHTHALMOLOGY

## 2023-12-05 PROCEDURE — 3061F PR NEG MICROALBUMINURIA RESULT DOCUMENTED/REVIEW: ICD-10-PCS | Mod: CPTII,S$GLB,, | Performed by: OPHTHALMOLOGY

## 2023-12-05 PROCEDURE — 3044F HG A1C LEVEL LT 7.0%: CPT | Mod: CPTII,S$GLB,, | Performed by: OPHTHALMOLOGY

## 2023-12-05 RX ORDER — KETOROLAC TROMETHAMINE 5 MG/ML
1 SOLUTION OPHTHALMIC 3 TIMES DAILY
Qty: 5 ML | Refills: 3 | Status: SHIPPED | OUTPATIENT
Start: 2023-12-05

## 2023-12-05 RX ORDER — PREDNISOLONE ACETATE 10 MG/ML
1 SUSPENSION/ DROPS OPHTHALMIC 3 TIMES DAILY
Qty: 5 ML | Refills: 3 | Status: SHIPPED | OUTPATIENT
Start: 2023-12-05

## 2023-12-05 RX ORDER — OFLOXACIN 3 MG/ML
1 SOLUTION/ DROPS OPHTHALMIC 3 TIMES DAILY
Qty: 5 ML | Refills: 3 | Status: SHIPPED | OUTPATIENT
Start: 2023-12-05

## 2023-12-05 NOTE — PROGRESS NOTES
HPI    Roni / JG    Recurrent iritis OU  Posterior synechiae OU  Cataracts OU    PF TID-QID OD    Patient here for cataract evaluation. Patient states vision is worse OD>OS   and has glare problems day and night.  Patient denies diplopia, headaches, flashes/floaters, and pain.      Last edited by Soumya Mortensen MD on 12/5/2023  9:08 AM.            Assessment /Plan     For exam results, see Encounter Report.    Nuclear sclerotic cataract of right eye  -     IOL Master - OD - Right Eye    Nuclear sclerotic cataract of left eye    Recurrent iritis of right eye    Posterior synechiae of left eye    Recurrent iritis of both eyes    Axial spondyloarthritis      Visually significant nuclear sclerotic cataract   - Interfering with activities of daily living.  Pt desires cataract surgery for [facility].   - R/B/A discussed and pt agrees to proceed with surgery.   - IOL options discussed according to patient's goals and concomitant ocular pathology; and pt content with monofocal lens.    - Target: plano.    Diboo 21.5 OD  VB/ PS    *watch for increased inflammation post sx.    (Diboo 21.0 OS)  VB/ PS    Recurrent iritis 2/2 autoimmune dz / reactive arthritis  - has been on many DMARD tx -          Per Dr. Arvizu: - adalimumab with Secondary inefficacy, tried to change to Simponi 50mg sc q 4 wks but insurance wouldn't cover, then on Enbrel Sureclick 50mg s q 7 days but has developed recurrent AAU OS / Then started certolizumab after 1/11/21 visit  had AAU with recurrence 1/28/21 just after starting certolizumab but had not completed loading dose   certolizumab ineffective    Will be starting MTX.

## 2023-12-11 ENCOUNTER — LAB VISIT (OUTPATIENT)
Dept: LAB | Facility: HOSPITAL | Age: 58
End: 2023-12-11
Attending: INTERNAL MEDICINE
Payer: COMMERCIAL

## 2023-12-11 DIAGNOSIS — M46.90 AXIAL SPONDYLOARTHRITIS: Chronic | ICD-10-CM

## 2023-12-11 DIAGNOSIS — Z86.19 HISTORY OF HEPATITIS B: ICD-10-CM

## 2023-12-11 LAB
ALBUMIN SERPL BCP-MCNC: 4.4 G/DL (ref 3.5–5.2)
ALP SERPL-CCNC: 77 U/L (ref 38–126)
ALT SERPL W/O P-5'-P-CCNC: 24 U/L (ref 10–44)
ANION GAP SERPL CALC-SCNC: 10 MMOL/L (ref 8–16)
AST SERPL-CCNC: 25 U/L (ref 15–46)
BASOPHILS # BLD AUTO: 0.03 K/UL (ref 0–0.2)
BASOPHILS NFR BLD: 0.6 % (ref 0–1.9)
BILIRUB SERPL-MCNC: 0.3 MG/DL (ref 0.1–1)
CALCIUM SERPL-MCNC: 9.1 MG/DL (ref 8.7–10.5)
CHLORIDE SERPL-SCNC: 106 MMOL/L (ref 95–110)
CO2 SERPL-SCNC: 28 MMOL/L (ref 23–29)
CREAT SERPL-MCNC: 0.64 MG/DL (ref 0.5–1.4)
CRP SERPL-MCNC: 0.24 MG/DL (ref 0–1)
DIFFERENTIAL METHOD: ABNORMAL
EOSINOPHIL # BLD AUTO: 0.1 K/UL (ref 0–0.5)
EOSINOPHIL NFR BLD: 2.1 % (ref 0–8)
ERYTHROCYTE [DISTWIDTH] IN BLOOD BY AUTOMATED COUNT: 13 % (ref 11.5–14.5)
ERYTHROCYTE [SEDIMENTATION RATE] IN BLOOD BY PHOTOMETRIC METHOD: 11 MM/HR (ref 0–36)
EST. GFR  (NO RACE VARIABLE): >60 ML/MIN/1.73 M^2
GLUCOSE SERPL-MCNC: 143 MG/DL (ref 70–110)
HCT VFR BLD AUTO: 42 % (ref 37–48.5)
HGB BLD-MCNC: 13.9 G/DL (ref 12–16)
IMM GRANULOCYTES # BLD AUTO: 0.03 K/UL (ref 0–0.04)
IMM GRANULOCYTES NFR BLD AUTO: 0.6 % (ref 0–0.5)
LYMPHOCYTES # BLD AUTO: 2.3 K/UL (ref 1–4.8)
LYMPHOCYTES NFR BLD: 43.5 % (ref 18–48)
MCH RBC QN AUTO: 30.2 PG (ref 27–31)
MCHC RBC AUTO-ENTMCNC: 33.1 G/DL (ref 32–36)
MCV RBC AUTO: 91 FL (ref 82–98)
MONOCYTES # BLD AUTO: 0.4 K/UL (ref 0.3–1)
MONOCYTES NFR BLD: 7.7 % (ref 4–15)
NEUTROPHILS # BLD AUTO: 2.4 K/UL (ref 1.8–7.7)
NEUTROPHILS NFR BLD: 45.5 % (ref 38–73)
NRBC BLD-RTO: 0 /100 WBC
PLATELET # BLD AUTO: 231 K/UL (ref 150–450)
PMV BLD AUTO: 10.6 FL (ref 9.2–12.9)
POTASSIUM SERPL-SCNC: 3.9 MMOL/L (ref 3.5–5.1)
PROT SERPL-MCNC: 7.7 G/DL (ref 6–8.4)
RBC # BLD AUTO: 4.6 M/UL (ref 4–5.4)
SODIUM SERPL-SCNC: 144 MMOL/L (ref 136–145)
UUN UR-MCNC: 14 MG/DL (ref 7–17)
WBC # BLD AUTO: 5.33 K/UL (ref 3.9–12.7)

## 2023-12-11 PROCEDURE — 86140 C-REACTIVE PROTEIN: CPT | Mod: PN | Performed by: INTERNAL MEDICINE

## 2023-12-11 PROCEDURE — 85025 COMPLETE CBC W/AUTO DIFF WBC: CPT | Mod: PN | Performed by: INTERNAL MEDICINE

## 2023-12-11 PROCEDURE — 36415 COLL VENOUS BLD VENIPUNCTURE: CPT | Mod: PN | Performed by: INTERNAL MEDICINE

## 2023-12-11 PROCEDURE — 87517 HEPATITIS B DNA QUANT: CPT | Mod: PN | Performed by: INTERNAL MEDICINE

## 2023-12-11 PROCEDURE — 80053 COMPREHEN METABOLIC PANEL: CPT | Mod: PN | Performed by: INTERNAL MEDICINE

## 2023-12-11 PROCEDURE — 85652 RBC SED RATE AUTOMATED: CPT | Mod: PN | Performed by: INTERNAL MEDICINE

## 2023-12-12 LAB
HEPATITIS B VIRUS DNA: NORMAL
HEPATITIS B VIRUS PCR, QUANT: NOT DETECTED IU/ML

## 2023-12-14 DIAGNOSIS — H25.12 NUCLEAR SCLEROTIC CATARACT OF LEFT EYE: Primary | ICD-10-CM

## 2023-12-14 RX ORDER — KETOROLAC TROMETHAMINE 5 MG/ML
1 SOLUTION OPHTHALMIC 3 TIMES DAILY
Qty: 5 ML | Refills: 3 | Status: SHIPPED | OUTPATIENT
Start: 2023-12-14

## 2023-12-14 RX ORDER — PREDNISOLONE ACETATE 10 MG/ML
1 SUSPENSION/ DROPS OPHTHALMIC 3 TIMES DAILY
Qty: 5 ML | Refills: 3 | Status: SHIPPED | OUTPATIENT
Start: 2023-12-14

## 2023-12-14 RX ORDER — OFLOXACIN 3 MG/ML
1 SOLUTION/ DROPS OPHTHALMIC 3 TIMES DAILY
Qty: 5 ML | Refills: 3 | Status: SHIPPED | OUTPATIENT
Start: 2023-12-14

## 2023-12-22 ENCOUNTER — TELEPHONE (OUTPATIENT)
Dept: OPHTHALMOLOGY | Facility: CLINIC | Age: 58
End: 2023-12-22
Payer: COMMERCIAL

## 2024-01-01 DIAGNOSIS — H20.9 UVEITIS: ICD-10-CM

## 2024-01-02 ENCOUNTER — TELEPHONE (OUTPATIENT)
Dept: OPHTHALMOLOGY | Facility: CLINIC | Age: 59
End: 2024-01-02
Payer: COMMERCIAL

## 2024-01-02 ENCOUNTER — ANESTHESIA EVENT (OUTPATIENT)
Dept: SURGERY | Facility: HOSPITAL | Age: 59
End: 2024-01-02
Payer: COMMERCIAL

## 2024-01-02 RX ORDER — METHOTREXATE 2.5 MG/1
15 TABLET ORAL
Qty: 24 TABLET | Refills: 1 | Status: SHIPPED | OUTPATIENT
Start: 2024-01-02

## 2024-01-02 NOTE — TELEPHONE ENCOUNTER
Spoke with pt provided arrival time of 7:00 for sx on 1/3/24 with Dr. Mortensen @ [place]. Pt has eyedrops and packet.

## 2024-01-02 NOTE — H&P
History    Chief complaint:  Painless progressive vision loss    Present Ilness/Diagnosis: Nuclear sclerotic Cataract    Past Medical History:  has a past medical history of Acid reflux, Anxiety (10/18/2012), Arthritis, Depression, Diabetic peripheral neuropathy - mild (10/21/2014), Difficult intubation, Dry eyes, Dry mouth, Fever blister, History of hepatitis B (10/3/2016), Hyperlipidemia, Hypertension, Insomnia, Iritis (5/13/2014), Long-term current use of steroids (9/27/2012), Nausea & vomiting (2/4/2015), VAISHNAVI (obstructive sleep apnea), and Type II diabetes mellitus (10/1/2012).    Family History/Social History: refer to chart    Allergies:   Review of patient's allergies indicates:   Allergen Reactions    Bactrim [sulfamethoxazole-trimethoprim] Itching    Trulicity [dulaglutide] Diarrhea and Other (See Comments)     Vomiting, abdominal pain    Diflucan [fluconazole] Swelling and Other (See Comments)     Sore on mouth       Current Medications: No current facility-administered medications for this encounter.    Current Outpatient Medications:     amLODIPine (NORVASC) 10 MG tablet, Take 1 tablet (10 mg total) by mouth once daily., Disp: 90 tablet, Rfl: 3    aspirin (ECOTRIN) 81 MG EC tablet, Take 1 tablet (81 mg total) by mouth once daily., Disp: 30 tablet, Rfl: 0    atenoloL (TENORMIN) 100 MG tablet, Take 1 tablet (100 mg total) by mouth once daily., Disp: 90 tablet, Rfl: 3    blood-glucose meter kit, Use twice daily., Disp: 1 each, Rfl: 0    canagliflozin (INVOKANA) 300 mg Tab tablet, Take 1 tablet (300 mg total) by mouth daily before breakfast., Disp: 90 tablet, Rfl: 1    clobetasoL (TEMOVATE) 0.05 % external solution, Apply to affected area once daily, Disp: 50 mL, Rfl: 6    clonazePAM (KLONOPIN) 0.5 MG tablet, TAKE 1 TABLET BY MOUTH ONCE DAILY AS NEEDED FOR ANXIETY, Disp: 90 tablet, Rfl: 0    cyclobenzaprine (FLEXERIL) 10 MG tablet, TAKE ONE TABLET BY MOUTH AT BEDTIME AS NEEDED, Disp: 90 tablet, Rfl: 2     diclofenac sodium (VOLTAREN) 1 % Gel, Apply 4 g topically 4 (four) times daily. Apply light film topically to knee up to four times daily, Disp: 3 each, Rfl: 2    docusate sodium (COLACE) 50 MG capsule, Take 1 capsule (50 mg total) by mouth 2 (two) times daily., Disp: 180 capsule, Rfl: 3    entecavir (BARACLUDE) 0.5 MG Tab, Take 1 tablet (0.5 mg total) by mouth once daily., Disp: 90 tablet, Rfl: 3    ergocalciferol (ERGOCALCIFEROL) 50,000 unit Cap, Take 1 capsule (50,000 Units total) by mouth every 7 days., Disp: 12 capsule, Rfl: 3    fluocinolone acetonide oiL 0.01 % Drop, Place 3 drops into both ears 2 (two) times a day., Disp: 20 mL, Rfl: 3    FLUoxetine 20 MG capsule, Take 1 capsule (20 mg total) by mouth 2 (two) times daily., Disp: 180 capsule, Rfl: 1    fluticasone propionate (FLONASE) 50 mcg/actuation nasal spray, 2 sprays (100 mcg total) by Each Nostril route once daily., Disp: 16 g, Rfl: 6    folic acid (FOLVITE) 1 MG tablet, Take 1 tablet (1 mg total) by mouth once daily., Disp: 90 tablet, Rfl: 1    golimumab (SIMPONI) 50 mg/0.5 mL PnIj, Inject 50 mg into the skin every 30 days., Disp: 1.5 mL, Rfl: 1    hydroCHLOROthiazide (HYDRODIURIL) 25 MG tablet, Take 1 tablet (25 mg total) by mouth once daily., Disp: 90 tablet, Rfl: 3    hydrocortisone 2.5 % cream, Apply topically 2 (two) times daily. for 10 days, Disp: 28 g, Rfl: 1    hydroquinone 4 % Crea, Apply to dark areas qhs.  Not more than 6 months straight in same location.Use sunscreen in am, Disp: 46 g, Rfl: 1    ketoconazole (NIZORAL) 2 % shampoo, Apply topically twice a week., Disp: 120 mL, Rfl: 6    ketorolac 0.5% (ACULAR) 0.5 % Drop, Place 1 drop into the right eye 3 (three) times daily., Disp: 5 mL, Rfl: 3    ketorolac 0.5% (ACULAR) 0.5 % Drop, Place 1 drop into the left eye 3 (three) times daily., Disp: 5 mL, Rfl: 3    lancets (FREESTYLE LANCETS) 28 gauge Misc, test TWICE DAILY, Disp: 200 each, Rfl: 3    levocetirizine (XYZAL) 5 MG tablet, TAKE ONE  TABLET BY MOUTH EVERY EVENING, Disp: 90 tablet, Rfl: 2    methotrexate 2.5 MG Tab, Take 6 tablets (15 mg total) by mouth every 7 days., Disp: 24 tablet, Rfl: 1    ofloxacin (OCUFLOX) 0.3 % ophthalmic solution, Place 1 drop into the right eye 3 (three) times daily., Disp: 5 mL, Rfl: 3    ofloxacin (OCUFLOX) 0.3 % ophthalmic solution, Place 1 drop into the left eye 3 (three) times daily., Disp: 5 mL, Rfl: 3    pantoprazole (PROTONIX) 40 MG tablet, Take 1 tablet (40 mg total) by mouth once daily., Disp: 90 tablet, Rfl: 2    prednisoLONE acetate (PRED FORTE) 1 % DrpS, Instill one drop into affect eye(s) as directed, Disp: 5 mL, Rfl: 1    prednisoLONE acetate (PRED FORTE) 1 % DrpS, Place 1 drop into the right eye 3 (three) times daily., Disp: 5 mL, Rfl: 3    prednisoLONE acetate (PRED FORTE) 1 % DrpS, Place 1 drop into the left eye 3 (three) times daily., Disp: 5 mL, Rfl: 3    rosuvastatin (CRESTOR) 40 MG Tab, Take 1 tablet (40 mg total) by mouth every evening., Disp: 90 tablet, Rfl: 3    semaglutide (OZEMPIC) 2 mg/dose (8 mg/3 mL) PnIj, Inject 2 mg into the skin every 7 days., Disp: 9 mL, Rfl: 3    sulfaSALAzine (AZULFIDINE) 500 MG EC tablet, Take 3 tablets (1,500 mg total) by mouth 2 (two) times daily., Disp: 540 tablet, Rfl: 0    tiZANidine (ZANAFLEX) 2 MG tablet, Take 1-2 tablets (2-4 mg total) by mouth every 8 (eight) hours as needed., Disp: 90 tablet, Rfl: 2    topiramate (TOPAMAX) 100 MG tablet, Take 1 tablet (100 mg total) by mouth 2 (two) times daily., Disp: 180 tablet, Rfl: 3    triamcinolone acetonide 0.1% (KENALOG) 0.1 % cream, Apply topically 2 (two) times daily., Disp: 45 g, Rfl: 1    valACYclovir (VALTREX) 1000 MG tablet, valacyclovir 1 gram tablet  Take 0.5 tablets every 12 hours by oral route., Disp: , Rfl:     valsartan (DIOVAN) 320 MG tablet, Take 1 tablet (320 mg total) by mouth once daily., Disp: 90 tablet, Rfl: 3    vitamin D (VITAMIN D3) 1000 units Tab, Take 1,000 Units by mouth once daily., Disp:  , Rfl:     zolpidem (AMBIEN) 5 MG Tab, Take 1 tablet (5 mg total) by mouth nightly as needed., Disp: 90 tablet, Rfl: 0    Facility-Administered Medications Ordered in Other Encounters:     0.9%  NaCl infusion, , Intravenous, Continuous, Graciela Jerome MD    Physical Exam    BP: Vital signs stable  General: No apparent distress  HEENT: nuclear sclerotic cataract  Lungs: adequate respirations  Heart: + pulses  Abdomen: soft  Rectal/pelvic: deferred    Impression: Visually significant Cataract.    See previous clinic notes for surgical indications.    Plan: Phacoemulsification with implantation of Intraocular lens

## 2024-01-02 NOTE — PRE-PROCEDURE INSTRUCTIONS
01/02  The following pre-procedure instructions and arrival time have been reviewed with patient via phone and sent to patient portal for review.  Patient verbalized an understanding.       Dear Althea     Below you will find basic pre-procedure instructions in preparation for your procedure on 1/3 with Dr. Mortensen  (Arrival time Verified as 07:00 am)     - Nothing to eat or drink after midnight the night before your surgery, except AM meds with small sips of water     - HOLD all Diabetic meds AM of surgery  - HOLD all Insulin AM of surgery  - HOLD all Fluid pills AM of surgery  - HOLD all non-insulin shots until after surgery (Ozempic, Mounjaro, Trulicity, Victoza, Byetta, Wegovy and Adlyxin) (up to 7 days prior)  - HOLD all vitamins and herbal meds AM of surgery   - TAKE all B/P meds, EXCEPT those that contain a fluid pill  - USE inhalers as needed and bring AM of surgery  - USE eye drops as directed  -TAKE blood thinner meds AM of surgery unless otherwise instructed     - Shower and wash face with dial soap for 3 mins PM prior and AM of surgery  - No powder, lotions, creams, oils, gels, ointments, makeup,  or jewelry    - Wear comfortable clothing (button up shirt)     (Patient is required to have a responsible ride to transport home, ride may not leave while patient is in surgery)     -- [facility], 2nd floor Surgery Center, located   @ [address] [place] [address]  2nd Floor Registration        If you have any questions or concerns please feel free to contact your surgeon's office.                 BRAD Portersama Huerta [facility]  Pre-Admit - Anesthesia Dept

## 2024-01-03 ENCOUNTER — HOSPITAL ENCOUNTER (OUTPATIENT)
Facility: HOSPITAL | Age: 59
Discharge: HOME | End: 2024-01-03
Attending: OPHTHALMOLOGY | Admitting: OPHTHALMOLOGY
Payer: COMMERCIAL

## 2024-01-03 ENCOUNTER — ANESTHESIA (OUTPATIENT)
Dept: SURGERY | Facility: HOSPITAL | Age: 59
End: 2024-01-03
Payer: COMMERCIAL

## 2024-01-03 VITALS
SYSTOLIC BLOOD PRESSURE: 109 MMHG | WEIGHT: 209 LBS | OXYGEN SATURATION: 95 % | HEART RATE: 70 BPM | DIASTOLIC BLOOD PRESSURE: 67 MMHG | HEIGHT: 67 IN | BODY MASS INDEX: 32.8 KG/M2 | TEMPERATURE: 97 F | RESPIRATION RATE: 15 BRPM

## 2024-01-03 DIAGNOSIS — H25.13 NUCLEAR SCLEROTIC CATARACT OF BOTH EYES: Primary | ICD-10-CM

## 2024-01-03 DIAGNOSIS — H25.10 AGE-RELATED NUCLEAR CATARACT: ICD-10-CM

## 2024-01-03 LAB — POCT GLUCOSE: 109 MG/DL (ref 70–110)

## 2024-01-03 PROCEDURE — 71000015 HC POSTOP RECOV 1ST HR: Performed by: OPHTHALMOLOGY

## 2024-01-03 PROCEDURE — 66982 XCAPSL CTRC RMVL CPLX WO ECP: CPT | Mod: RT,,, | Performed by: OPHTHALMOLOGY

## 2024-01-03 PROCEDURE — 99900035 HC TECH TIME PER 15 MIN (STAT)

## 2024-01-03 PROCEDURE — 94761 N-INVAS EAR/PLS OXIMETRY MLT: CPT

## 2024-01-03 PROCEDURE — 25000003 PHARM REV CODE 250: Performed by: OPHTHALMOLOGY

## 2024-01-03 PROCEDURE — 37000009 HC ANESTHESIA EA ADD 15 MINS: Performed by: OPHTHALMOLOGY

## 2024-01-03 PROCEDURE — V2632 POST CHMBR INTRAOCULAR LENS: HCPCS | Performed by: OPHTHALMOLOGY

## 2024-01-03 PROCEDURE — 37000008 HC ANESTHESIA 1ST 15 MINUTES: Performed by: OPHTHALMOLOGY

## 2024-01-03 PROCEDURE — 63600175 PHARM REV CODE 636 W HCPCS: Performed by: NURSE ANESTHETIST, CERTIFIED REGISTERED

## 2024-01-03 PROCEDURE — D9220A PRA ANESTHESIA: Mod: ,,, | Performed by: NURSE ANESTHETIST, CERTIFIED REGISTERED

## 2024-01-03 PROCEDURE — 27201423 OPTIME MED/SURG SUP & DEVICES STERILE SUPPLY: Performed by: OPHTHALMOLOGY

## 2024-01-03 PROCEDURE — 36000706: Performed by: OPHTHALMOLOGY

## 2024-01-03 PROCEDURE — 36000707: Performed by: OPHTHALMOLOGY

## 2024-01-03 DEVICE — LENS EYHANCE +21.5D: Type: IMPLANTABLE DEVICE | Site: EYE | Status: FUNCTIONAL

## 2024-01-03 RX ORDER — ACETAMINOPHEN 325 MG/1
650 TABLET ORAL EVERY 4 HOURS PRN
Status: CANCELLED | OUTPATIENT
Start: 2024-01-03

## 2024-01-03 RX ORDER — MOXIFLOXACIN 5 MG/ML
1 SOLUTION/ DROPS OPHTHALMIC
Status: CANCELLED | OUTPATIENT
Start: 2024-01-03 | End: 2024-01-03

## 2024-01-03 RX ORDER — MOXIFLOXACIN 5 MG/ML
1 SOLUTION/ DROPS OPHTHALMIC
Status: COMPLETED | OUTPATIENT
Start: 2024-01-03 | End: 2024-01-03

## 2024-01-03 RX ORDER — CYCLOP/TROP/PROPA/PHEN/KET/WAT 1-1-0.1%
1 DROPS (EA) OPHTHALMIC (EYE)
Status: COMPLETED | OUTPATIENT
Start: 2024-01-03 | End: 2024-01-03

## 2024-01-03 RX ORDER — LIDOCAINE HYDROCHLORIDE 40 MG/ML
INJECTION, SOLUTION RETROBULBAR
Status: DISCONTINUED | OUTPATIENT
Start: 2024-01-03 | End: 2024-01-03 | Stop reason: HOSPADM

## 2024-01-03 RX ORDER — MOXIFLOXACIN 5 MG/ML
SOLUTION/ DROPS OPHTHALMIC
Status: DISCONTINUED | OUTPATIENT
Start: 2024-01-03 | End: 2024-01-03 | Stop reason: HOSPADM

## 2024-01-03 RX ORDER — MIDAZOLAM HYDROCHLORIDE 1 MG/ML
INJECTION, SOLUTION INTRAMUSCULAR; INTRAVENOUS
Status: DISCONTINUED | OUTPATIENT
Start: 2024-01-03 | End: 2024-01-03

## 2024-01-03 RX ORDER — PHENYLEPHRINE HYDROCHLORIDE 100 MG/ML
1 SOLUTION/ DROPS OPHTHALMIC
Status: DISCONTINUED | OUTPATIENT
Start: 2024-01-03 | End: 2024-01-03 | Stop reason: HOSPADM

## 2024-01-03 RX ORDER — FENTANYL CITRATE 50 UG/ML
INJECTION, SOLUTION INTRAMUSCULAR; INTRAVENOUS
Status: DISCONTINUED | OUTPATIENT
Start: 2024-01-03 | End: 2024-01-03

## 2024-01-03 RX ORDER — PREDNISOLONE ACETATE 10 MG/ML
SUSPENSION/ DROPS OPHTHALMIC
Status: DISCONTINUED | OUTPATIENT
Start: 2024-01-03 | End: 2024-01-03 | Stop reason: HOSPADM

## 2024-01-03 RX ORDER — PROPARACAINE HYDROCHLORIDE 5 MG/ML
1 SOLUTION/ DROPS OPHTHALMIC DAILY PRN
Status: DISCONTINUED | OUTPATIENT
Start: 2024-01-03 | End: 2024-01-03 | Stop reason: HOSPADM

## 2024-01-03 RX ORDER — PROPARACAINE HYDROCHLORIDE 5 MG/ML
1 SOLUTION/ DROPS OPHTHALMIC
Status: CANCELLED | OUTPATIENT
Start: 2024-01-03

## 2024-01-03 RX ORDER — TETRACAINE HYDROCHLORIDE 5 MG/ML
1 SOLUTION OPHTHALMIC
Status: DISCONTINUED | OUTPATIENT
Start: 2024-01-03 | End: 2024-01-03 | Stop reason: HOSPADM

## 2024-01-03 RX ORDER — LIDOCAINE HYDROCHLORIDE 10 MG/ML
INJECTION, SOLUTION EPIDURAL; INFILTRATION; INTRACAUDAL; PERINEURAL
Status: DISCONTINUED | OUTPATIENT
Start: 2024-01-03 | End: 2024-01-03 | Stop reason: HOSPADM

## 2024-01-03 RX ORDER — SODIUM CHLORIDE 0.9 % (FLUSH) 0.9 %
2 SYRINGE (ML) INJECTION
Status: DISCONTINUED | OUTPATIENT
Start: 2024-01-03 | End: 2024-01-03 | Stop reason: HOSPADM

## 2024-01-03 RX ADMIN — MOXIFLOXACIN: 5 SOLUTION/ DROPS OPHTHALMIC at 09:01

## 2024-01-03 RX ADMIN — FENTANYL CITRATE 25 MCG: 50 INJECTION, SOLUTION INTRAMUSCULAR; INTRAVENOUS at 08:01

## 2024-01-03 RX ADMIN — Medication 1 DROP: at 07:01

## 2024-01-03 RX ADMIN — MOXIFLOXACIN OPHTHALMIC 1 DROP: 5 SOLUTION/ DROPS OPHTHALMIC at 07:01

## 2024-01-03 RX ADMIN — MIDAZOLAM HYDROCHLORIDE 1 MG: 1 INJECTION, SOLUTION INTRAMUSCULAR; INTRAVENOUS at 08:01

## 2024-01-03 NOTE — DISCHARGE INSTRUCTIONS
Dr. Mortensen     Cataract Post-Operative Instructions       Day of surgery:     -Resume drops THREE times daily into the operative eye.     -Do not rub your eye     -Wear protective sunglasses during the day.     -Resume moderate activity.     -Bathe/shower/wash face normally     -Do not apply makeup around the operative eye for 1 week.     -You should expect:     Blurry vision and halos for 24-48 hours     Dilated pupil for 24-48 hours     Scratchy feeling in the eye for 1-2 days     Curved shadow in your peripheral vision for 2-3 weeks     Occasional flickering of lights for up to 1 week     -If you experience severe pain or nausea, call Dr. Mortensen or the on-call doctor at [phone].       Plan to see Dr. Mortensen tomorrow at:     [facility] [address]     10TH FLOOR     [facility] [id number]     **Most patients can drive the next morning.  If you do not feel comfortable driving, please arrange for transportation. **

## 2024-01-03 NOTE — ANESTHESIA POSTPROCEDURE EVALUATION
Anesthesia Post Evaluation    Patient: Althea Mondragonchester    Procedure(s) Performed: Procedure(s) (LRB):  EXTRACTION, CATARACT, WITH IOL INSERTION (Right)    Final Anesthesia Type: MAC      Patient location during evaluation: [facility]  Patient participation: Yes- Able to Participate  Level of consciousness: awake and alert  Post-procedure vital signs: reviewed and stable  Pain management: adequate  Airway patency: patent  VAISHNAVI mitigation strategies: Multimodal analgesia  PONV status at discharge: No PONV  Anesthetic complications: no      Cardiovascular status: hemodynamically stable and blood pressure returned to baseline  Respiratory status: unassisted, spontaneous ventilation and room air  Hydration status: euvolemic  Follow-up not needed.              Vitals Value Taken Time   /71 01/03/24 0922   Temp 36.2 °C (97.2 °F) 01/03/24 0905   Pulse 72 01/03/24 0921   Resp 15 01/03/24 0910   SpO2 95 % 01/03/24 0921   Vitals shown include unvalidated device data.      No case tracking events are documented in the log.      Pain/Imelda Score: Imelda Score: 10 (1/3/2024  9:05 AM)

## 2024-01-03 NOTE — PLAN OF CARE
Discharge instructions given and explained to patient and family with verbalization of understanding all instructions. Eye drops and next doses explained . Patients v/s stable, denies n/v and tolerating po, rates pain level tolerable, IV removed, and family at bedside for patient discharge home.

## 2024-01-03 NOTE — DISCHARGE SUMMARY
[facility]  Discharge Note  Short Stay    Procedure(s) (LRB):  EXTRACTION, CATARACT, WITH IOL INSERTION (Right)    BRIEF DISCHARGE NOTE:    Date of discharge: 01/03/2024    Reason for hospitalization -  Cataract surgery     Final Diagnosis - Visually significant Cataract    Procedures and treatment provided - Status post phacoemulsification with placement of intraocular lens     Diet - Advance to regular as tolerated    Activity - as tolerated    Disposition at the end of the case - Good.    Discharge: to home    The patient tolerated the procedure well and knows to follow up with me tomorrow morning in the eye clinic, sooner if needed.    Patient and family instructions (as appropriate) - Given to patient on discharge    Soumya Mortensen MD

## 2024-01-03 NOTE — OP NOTE
DATE OF PROCEDURE: 01/03/2024    SURGEON: HEMALATHA ACOSTA MD    PREOPERATIVE DIAGNOSIS:  1. Senile nuclear sclerotic cataract right eye. 2. Posterior synechiae right eye    POSTOPERATIVE DIAGNOSIS: 1.Senile nuclear sclerotic cataract right eye. 2. Posterior synechiae right eye    PROCEDURE PERFORMED:  Complex phacoemulsification with placement of intraocular lens, right eye, with placement of a Malyugin ring.    IMPLANT:  DIB00 21.5    ANESTHESIA:  Topical and MAC    COMPLICATIONS: none    ESTIMATED BLOOD LOSS: <1cc    SPECIMENS: none    INDICATIONS FOR PROCEDURE:   The patient has a history of painless progressive vision loss.  The patient has described difficulties with activities of daily living, which specifically include driving, which is secondary to cataract formation and progression. After we had a thorough discussion about risks, benefits, and alternatives to cataract surgery, the patient agreed to proceed with phacoemulsification and implantation of a lens in the right eye.  These risks include, but are not limited to, hemorrhage, pain, infection, need for additional surgery, need for glasses or contacts, loss of vision, or even loss of the eye.       PROCEDURE IN DETAIL:  The patient was met in the preop holding area.  Consent was confirmed to be signed.  The operative site was marked.  The patient was brought into the operating room by the anesthesia team and placed under monitored anesthesia care.  The right eye was prepped and draped in a sterile ophthalmic fashion.  A Humble speculum was placed into the right eye.   A paracentesis site was made and 1% preservative-free lidocaine was injected into the anterior chamber.  Viscoelastic  material was injected into the anterior chamber.  A keratome blade was used to make a clear corneal incision. The malyugin ring was inserted and positioned into place, assisting with pupillary dilation.  A cystotome was used to initiate the continuous curvilinear  capsulorrhexis which was completed with Utrata forceps.  BSS on a neville cannula was used to perform hydrodissection.  The phacoemulsification tip was introduced into the eye and the nucleus was removed in a standard divide-and-conquer fashion.  Remaining cortical material was removed from the eye using irrigation-aspiration.  The capsular bag was filled with viscoelastic material and the intraocular lens was injected and positioned into place. The Malyugin ring was removed from the eye. Remaining viscoelastic material was removed from the eye using irrigation and aspiration.  The corneal wounds were hydrated.  The eye was filled to physiologic pressure. The wounds were found to be watertight. Drops of Vigamox and prednisilone were placed into the eye.  The eye was washed, dried, and shielded.  The patient tolerated the procedure well and knows to follow up with me tomorrow morning, sooner if needed.

## 2024-01-03 NOTE — TRANSFER OF CARE
"Anesthesia Transfer of Care Note    Patient: Althea Mondragonchester    Procedure(s) Performed: Procedure(s) (LRB):  EXTRACTION, CATARACT, WITH IOL INSERTION (Right)    Patient location: PACU    Anesthesia Type: MAC    Transport from OR: Transported from OR on room air with adequate spontaneous ventilation    Post pain: adequate analgesia    Post assessment: no apparent anesthetic complications and tolerated procedure well    Post vital signs: stable    Level of consciousness: awake, alert and oriented    Nausea/Vomiting: no nausea/vomiting    Complications: none    Transfer of care protocol was followed    Last vitals: Visit Vitals  /89 (BP Location: Left arm, Patient Position: Lying)   Pulse 70   Temp 36.8 °C (98.2 °F) (Oral)   Resp 18   Ht 5' 7" (1.702 m)   Wt 94.8 kg (209 lb)   LMP 11/25/2014   SpO2 99%   Breastfeeding No   BMI 32.73 kg/m²     "

## 2024-01-03 NOTE — ANESTHESIA PREPROCEDURE EVALUATION
01/02/2024  [facility]  Anesthesia Pre-Operative Evaluation     Patient Name: Althea Vazquez  YOB: 1965  MRN: [id number]    SUBJECTIVE:     Pre-operative evaluation for Procedure(s) (LRB):  EXTRACTION, CATARACT, WITH IOL INSERTION (Right)     01/02/2024    Althea Vazquez is a 58 y.o. female   Patient now presents for the above procedure(s).    Previous airway 2014:  Method of Intubation: Glidescope; Inserted by: CRNA; Airway Device: Endotracheal Tube-Hi/Lo; Mask Ventilation: Mod Diff - oral; Intubated: Postinduction; Airway Device Size: 7.5; Style: Cuffed; Cuff Inflation: Minimal occlusive pressure; Inflation Amount: 5; Placement Verified By: Auscultation, Capnometry; Grade: Grade II; Complicating Factors: Short neck, Obesity, Small mouth; Intubation Findings: Positive EtCO2, Bilateral breath sounds;  Depth of Insertion: 20; Securment: Lips; Complications: None; Breath Sounds: Equal Bilateral; Insertion Attempts: 1; Removal Date: 12/03/14;  Removal Time: 1012     Patient Active Problem List   Diagnosis    Axial spondyloarthritis    Idiopathic edema    Vitamin D deficiency    High risk medication use-sulfasalazine 2 gm    Cervical radiculopathy    Insomnia    Type 2 diabetes mellitus with diabetic polyneuropathy    Weakness of right lower extremity    Weakness of right upper extremity    Decreased right shoulder range of motion    DDD (degenerative disc disease), lumbar    Osteoarthritis involving multiple joints on both sides of body    Non-radiographic axial spondyloarthritis    Abnormal chest CT    Gastroesophageal reflux disease    Right shoulder pain    Weakness of both legs    Cervicogenic headache    Severe obesity (BMI 35.0-39.9) with comorbidity    Hyperlipidemia associated with type 2 diabetes mellitus    Anxiety    Hypertension associated with  diabetes    Tendinopathy of right gluteal region    Hypocalcemia    Diabetes mellitus with hyperglycemia    Positive depression screening    Sacroiliitis    Bilateral shoulder pain    Upper extremity weakness    Pain    Greater trochanteric bursitis of left hip    Axillary abscess       Review of patient's allergies indicates:   Allergen Reactions    Bactrim [sulfamethoxazole-trimethoprim] Itching    Trulicity [dulaglutide] Diarrhea and Other (See Comments)     Vomiting, abdominal pain    Diflucan [fluconazole] Swelling and Other (See Comments)     Sore on mouth       Current Inpatient Medications:      Current Facility-Administered Medications on File Prior to Encounter   Medication Dose Route Frequency Provider Last Rate Last Admin    0.9%  NaCl infusion   Intravenous Continuous Graciela Jerome MD         Current Outpatient Medications on File Prior to Encounter   Medication Sig Dispense Refill    amLODIPine (NORVASC) 10 MG tablet Take 1 tablet (10 mg total) by mouth once daily. 90 tablet 3    aspirin (ECOTRIN) 81 MG EC tablet Take 1 tablet (81 mg total) by mouth once daily. 30 tablet 0    atenoloL (TENORMIN) 100 MG tablet Take 1 tablet (100 mg total) by mouth once daily. 90 tablet 3    blood-glucose meter kit Use twice daily. 1 each 0    canagliflozin (INVOKANA) 300 mg Tab tablet Take 1 tablet (300 mg total) by mouth daily before breakfast. 90 tablet 1    clobetasoL (TEMOVATE) 0.05 % external solution Apply to affected area once daily 50 mL 6    clonazePAM (KLONOPIN) 0.5 MG tablet TAKE 1 TABLET BY MOUTH ONCE DAILY AS NEEDED FOR ANXIETY 90 tablet 0    cyclobenzaprine (FLEXERIL) 10 MG tablet TAKE ONE TABLET BY MOUTH AT BEDTIME AS NEEDED 90 tablet 2    diclofenac sodium (VOLTAREN) 1 % Gel Apply 4 g topically 4 (four) times daily. Apply light film topically to knee up to four times daily 3 each 2    docusate sodium (COLACE) 50 MG capsule Take 1 capsule (50 mg total) by mouth 2 (two) times daily. 180 capsule 3     entecavir (BARACLUDE) 0.5 MG Tab Take 1 tablet (0.5 mg total) by mouth once daily. 90 tablet 3    ergocalciferol (ERGOCALCIFEROL) 50,000 unit Cap Take 1 capsule (50,000 Units total) by mouth every 7 days. 12 capsule 3    fluocinolone acetonide oiL 0.01 % Drop Place 3 drops into both ears 2 (two) times a day. 20 mL 3    FLUoxetine 20 MG capsule Take 1 capsule (20 mg total) by mouth 2 (two) times daily. 180 capsule 1    fluticasone propionate (FLONASE) 50 mcg/actuation nasal spray 2 sprays (100 mcg total) by Each Nostril route once daily. 16 g 6    folic acid (FOLVITE) 1 MG tablet Take 1 tablet (1 mg total) by mouth once daily. 90 tablet 1    golimumab (SIMPONI) 50 mg/0.5 mL PnIj Inject 50 mg into the skin every 30 days. 1.5 mL 1    hydroCHLOROthiazide (HYDRODIURIL) 25 MG tablet Take 1 tablet (25 mg total) by mouth once daily. 90 tablet 3    hydrocortisone 2.5 % cream Apply topically 2 (two) times daily. for 10 days 28 g 1    hydroquinone 4 % Crea Apply to dark areas qhs.  Not more than 6 months straight in same location.Use sunscreen in am 46 g 1    ketoconazole (NIZORAL) 2 % shampoo Apply topically twice a week. 120 mL 6    lancets (FREESTYLE LANCETS) 28 gauge Misc test TWICE DAILY 200 each 3    levocetirizine (XYZAL) 5 MG tablet TAKE ONE TABLET BY MOUTH EVERY EVENING 90 tablet 2    pantoprazole (PROTONIX) 40 MG tablet Take 1 tablet (40 mg total) by mouth once daily. 90 tablet 2    prednisoLONE acetate (PRED FORTE) 1 % DrpS Instill one drop into affect eye(s) as directed 5 mL 1    rosuvastatin (CRESTOR) 40 MG Tab Take 1 tablet (40 mg total) by mouth every evening. 90 tablet 3    semaglutide (OZEMPIC) 2 mg/dose (8 mg/3 mL) PnIj Inject 2 mg into the skin every 7 days. 9 mL 3    sulfaSALAzine (AZULFIDINE) 500 MG EC tablet Take 3 tablets (1,500 mg total) by mouth 2 (two) times daily. 540 tablet 0    tiZANidine (ZANAFLEX) 2 MG tablet Take 1-2 tablets (2-4 mg total) by mouth every 8 (eight) hours as needed. 90 tablet 2     topiramate (TOPAMAX) 100 MG tablet Take 1 tablet (100 mg total) by mouth 2 (two) times daily. 180 tablet 3    triamcinolone acetonide 0.1% (KENALOG) 0.1 % cream Apply topically 2 (two) times daily. 45 g 1    valACYclovir (VALTREX) 1000 MG tablet valacyclovir 1 gram tablet   Take 0.5 tablets every 12 hours by oral route.      valsartan (DIOVAN) 320 MG tablet Take 1 tablet (320 mg total) by mouth once daily. 90 tablet 3    vitamin D (VITAMIN D3) 1000 units Tab Take 1,000 Units by mouth once daily.      zolpidem (AMBIEN) 5 MG Tab Take 1 tablet (5 mg total) by mouth nightly as needed. 90 tablet 0       Past Surgical History:   Procedure Laterality Date    COLONOSCOPY N/A 1/9/2018    Procedure: COLONOSCOPY;  Surgeon: Juwan Lopez MD;  [facility]  Service: Endoscopy;  Laterality: N/A;  per anesthesia, pt. needs to be on 2nd floor due to past Anesthesia problems    EPIDURAL STEROID INJECTION INTO CERVICAL SPINE N/A 2/17/2021    Procedure: Injection-steroid-epidural-cervical--C7-T1 IL SHARONA;  Surgeon: Graciela Jerome MD;  Location: [facility];  Service: Pain Management;  Laterality: N/A;    EPIDURAL STEROID INJECTION INTO CERVICAL SPINE N/A 2/23/2023    Procedure: Injection-steroid-epidural-cervical  C7-T1;  Surgeon: Juvenal Segura MD;  Location: [facility];  Service: Pain Management;  Laterality: N/A;    HYSTERECTOMY  12/3/2014    INJECTION OF ANESTHETIC AGENT INTO SACROILIAC JOINT Left 4/21/2021    Procedure: LEFT SACROILIAC JOINT STEROID INJECTION;  Surgeon: Graciela Jerome MD;  Location: [facility];  Service: Pain Management;  Laterality: Left;    INJECTION OF JOINT Left 4/21/2021    Procedure: Injection, Joint--LEFT GTB;  Surgeon: Graciela Jerome MD;  Location: [facility];  Service: Pain Management;  Laterality: Left;    KNEE ARTHROSCOPY  5-14-14    right    TUBAL LIGATION         OBJECTIVE:     Vital Signs Range (Last 24H):         Significant Labs:  Lab Results   Component Value  Date    WBC 5.33 12/11/2023    HGB 13.9 12/11/2023    HCT 42.0 12/11/2023     12/11/2023    CHOL 171 05/25/2023    TRIG 51 05/25/2023    HDL 61 05/25/2023    ALT 24 12/11/2023    AST 25 12/11/2023     12/11/2023    K 3.9 12/11/2023     12/11/2023    CREATININE 0.64 12/11/2023    BUN 14 12/11/2023    CO2 28 12/11/2023    TSH 2.190 05/25/2023    INR 1.0 09/09/2016    HGBA1C 5.5 11/09/2023       Diagnostic Studies: No relevant studies.    EKG:   No results found. However, due to the size of the patient record, not all encounters were searched. Please check Results Review for a complete set of results.    ASSESSMENT/PLAN:           Pre-op Assessment    I have reviewed the Patient Summary Reports.     I have reviewed the Nursing Notes.    I have reviewed the Medications.     Review of Systems  Anesthesia Hx:  No problems with previous Anesthesia  Unable to intubate w glide scope,[facility] resched epidural 3 wks later for knee surg           Denies Family Hx of Anesthesia complications.    Denies Personal Hx of Anesthesia complications.                    Social:  Non-Smoker       Hematology/Oncology:  Hematology Normal   Oncology Normal                                   EENT/Dental:  EENT/Dental Normal           Cardiovascular:  Exercise tolerance: good   Hypertension    Denies CAD.    Dysrhythmias   Denies Angina.          Functional Capacity Can you climb two flights of stairs? ==> Yes                         Pulmonary:     Denies Asthma.  Shortness of breath (with SVT    )  Denies Recent URI.  Denies Sleep Apnea.                Renal/:  Renal/ Normal                 Hepatic/GI:   Denies PUD.  Denies Hiatal Hernia. GERD, well controlled Denies Liver Disease.  Denies Hepatitis.           Musculoskeletal:  Arthritis               Neurological:    Denies CVA.   Headaches Denies Seizures.                                Endocrine:  Diabetes, well controlled, type 2 Denies Hypothyroidism.           Dermatological:  Skin Normal    Psych:  Psychiatric Normal                    Physical Exam  General: Cooperative, Alert and Oriented        Anesthesia Plan  Type of Anesthesia, risks & benefits discussed:    Anesthesia Type: MAC  Intra-op Monitoring Plan: Standard ASA Monitors  Post Op Pain Control Plan: multimodal analgesia and IV/PO Opioids PRN  Induction:  IV  Informed Consent: Informed consent signed with the Patient and all parties understand the risks and agree with anesthesia plan.  All questions answered.   ASA Score: 3  Day of Surgery Review of History & Physical: H&P Update referred to the surgeon/provider.    Ready For Surgery From Anesthesia Perspective.     .

## 2024-01-04 ENCOUNTER — OFFICE VISIT (OUTPATIENT)
Dept: OPHTHALMOLOGY | Facility: CLINIC | Age: 59
End: 2024-01-04
Payer: COMMERCIAL

## 2024-01-04 DIAGNOSIS — H25.11 NUCLEAR SCLEROSIS OF RIGHT EYE: ICD-10-CM

## 2024-01-04 DIAGNOSIS — Z98.890 POST-OPERATIVE STATE: Primary | ICD-10-CM

## 2024-01-04 PROCEDURE — 99999 PR PBB SHADOW E&M-EST. PATIENT-LVL III: CPT | Mod: PBBFAC,,, | Performed by: OPHTHALMOLOGY

## 2024-01-04 PROCEDURE — 1159F MED LIST DOCD IN RCRD: CPT | Mod: CPTII,S$GLB,, | Performed by: OPHTHALMOLOGY

## 2024-01-04 PROCEDURE — 99024 POSTOP FOLLOW-UP VISIT: CPT | Mod: S$GLB,,, | Performed by: OPHTHALMOLOGY

## 2024-01-04 NOTE — PROGRESS NOTES
HPI     Post-op Evaluation     Additional comments: 1 day phaco OD           Comments    Roni / MECHE    S/p phaco OD 1/3/24  Recurrent iritis OU  Posterior synechiae OU  Cataracts OS      Moxifloxacin/Ofloxacin TID OD  Ketorolac TID OD  PF TID OD  MTX, Azulfidine  Patient here for 1 day phaco OD. Patient states OD doing well. No pain or   discomfort.               Last edited by Soumya Mortensen MD on 1/4/2024  3:27 PM.            Assessment /Plan     For exam results, see Encounter Report.    Post-operative state    Nuclear sclerosis of right eye      Slit Lamp Exam  L/L - normal  C/s - quiet  Cornea - clear  A/C - 3+ cell  Lens - PCIOL    POD #1 s/p phaco/IOL  - doing well  - continue the following drops:    vigamox or ocuflox TID x 1 wk then stop  Pred forte 5-6/x day  Ketorolac TID until runs out    Versus:    Combination drop - 1 drop TID x total of 1 month    Appropriate precautions and post op medications reviewed.  Patient instructed to call or come in if symptoms of redness, decreased vision, or pain are experienced.    -f/up 1-2 wks, sooner PRN. Or 4 wks with optom for mrx if needed.

## 2024-01-05 ENCOUNTER — PATIENT MESSAGE (OUTPATIENT)
Dept: PSYCHIATRY | Facility: CLINIC | Age: 59
End: 2024-01-05
Payer: COMMERCIAL

## 2024-01-10 ENCOUNTER — OFFICE VISIT (OUTPATIENT)
Dept: PSYCHIATRY | Facility: CLINIC | Age: 59
End: 2024-01-10
Payer: COMMERCIAL

## 2024-01-10 ENCOUNTER — LAB VISIT (OUTPATIENT)
Dept: LAB | Facility: HOSPITAL | Age: 59
End: 2024-01-10
Attending: INTERNAL MEDICINE
Payer: COMMERCIAL

## 2024-01-10 ENCOUNTER — OFFICE VISIT (OUTPATIENT)
Dept: PAIN MEDICINE | Facility: CLINIC | Age: 59
End: 2024-01-10
Payer: COMMERCIAL

## 2024-01-10 VITALS
SYSTOLIC BLOOD PRESSURE: 141 MMHG | BODY MASS INDEX: 32.73 KG/M2 | DIASTOLIC BLOOD PRESSURE: 89 MMHG | HEART RATE: 73 BPM | HEIGHT: 67 IN

## 2024-01-10 DIAGNOSIS — M17.11 PRIMARY OSTEOARTHRITIS OF RIGHT KNEE: Primary | ICD-10-CM

## 2024-01-10 DIAGNOSIS — G89.4 CHRONIC PAIN SYNDROME: ICD-10-CM

## 2024-01-10 DIAGNOSIS — M25.561 RIGHT KNEE PAIN, UNSPECIFIED CHRONICITY: ICD-10-CM

## 2024-01-10 DIAGNOSIS — F41.0 PANIC DISORDER WITHOUT AGORAPHOBIA: ICD-10-CM

## 2024-01-10 DIAGNOSIS — M46.90 AXIAL SPONDYLOARTHRITIS: Chronic | ICD-10-CM

## 2024-01-10 DIAGNOSIS — Z86.19 HISTORY OF HEPATITIS B: ICD-10-CM

## 2024-01-10 DIAGNOSIS — F41.1 GENERALIZED ANXIETY DISORDER: ICD-10-CM

## 2024-01-10 LAB
ALBUMIN SERPL BCP-MCNC: 3.9 G/DL (ref 3.5–5.2)
ALP SERPL-CCNC: 74 U/L (ref 55–135)
ALT SERPL W/O P-5'-P-CCNC: 38 U/L (ref 10–44)
ANION GAP SERPL CALC-SCNC: 8 MMOL/L (ref 8–16)
AST SERPL-CCNC: 25 U/L (ref 10–40)
BASOPHILS # BLD AUTO: 0.04 K/UL (ref 0–0.2)
BASOPHILS NFR BLD: 0.5 % (ref 0–1.9)
BILIRUB SERPL-MCNC: 0.4 MG/DL (ref 0.1–1)
BUN SERPL-MCNC: 11 MG/DL (ref 6–20)
CALCIUM SERPL-MCNC: 9.4 MG/DL (ref 8.7–10.5)
CHLORIDE SERPL-SCNC: 103 MMOL/L (ref 95–110)
CO2 SERPL-SCNC: 28 MMOL/L (ref 23–29)
CREAT SERPL-MCNC: 0.7 MG/DL (ref 0.5–1.4)
CRP SERPL-MCNC: 3.7 MG/L (ref 0–8.2)
DIFFERENTIAL METHOD BLD: ABNORMAL
EOSINOPHIL # BLD AUTO: 0.1 K/UL (ref 0–0.5)
EOSINOPHIL NFR BLD: 1.5 % (ref 0–8)
ERYTHROCYTE [DISTWIDTH] IN BLOOD BY AUTOMATED COUNT: 14.7 % (ref 11.5–14.5)
ERYTHROCYTE [SEDIMENTATION RATE] IN BLOOD BY PHOTOMETRIC METHOD: 6 MM/HR (ref 0–36)
EST. GFR  (NO RACE VARIABLE): >60 ML/MIN/1.73 M^2
GLUCOSE SERPL-MCNC: 98 MG/DL (ref 70–110)
HCT VFR BLD AUTO: 42 % (ref 37–48.5)
HGB BLD-MCNC: 13.9 G/DL (ref 12–16)
IMM GRANULOCYTES # BLD AUTO: 0.05 K/UL (ref 0–0.04)
IMM GRANULOCYTES NFR BLD AUTO: 0.6 % (ref 0–0.5)
LYMPHOCYTES # BLD AUTO: 3.8 K/UL (ref 1–4.8)
LYMPHOCYTES NFR BLD: 49.4 % (ref 18–48)
MCH RBC QN AUTO: 30.6 PG (ref 27–31)
MCHC RBC AUTO-ENTMCNC: 33.1 G/DL (ref 32–36)
MCV RBC AUTO: 93 FL (ref 82–98)
MONOCYTES # BLD AUTO: 0.9 K/UL (ref 0.3–1)
MONOCYTES NFR BLD: 11.3 % (ref 4–15)
NEUTROPHILS # BLD AUTO: 2.8 K/UL (ref 1.8–7.7)
NEUTROPHILS NFR BLD: 36.7 % (ref 38–73)
NRBC BLD-RTO: 0 /100 WBC
PLATELET # BLD AUTO: 267 K/UL (ref 150–450)
PMV BLD AUTO: 10.6 FL (ref 9.2–12.9)
POTASSIUM SERPL-SCNC: 4.1 MMOL/L (ref 3.5–5.1)
PROT SERPL-MCNC: 7.3 G/DL (ref 6–8.4)
RBC # BLD AUTO: 4.54 M/UL (ref 4–5.4)
SODIUM SERPL-SCNC: 139 MMOL/L (ref 136–145)
WBC # BLD AUTO: 7.76 K/UL (ref 3.9–12.7)

## 2024-01-10 PROCEDURE — 3072F LOW RISK FOR RETINOPATHY: CPT | Mod: CPTII,S$GLB,, | Performed by: NURSE PRACTITIONER

## 2024-01-10 PROCEDURE — 87517 HEPATITIS B DNA QUANT: CPT | Performed by: INTERNAL MEDICINE

## 2024-01-10 PROCEDURE — 3008F BODY MASS INDEX DOCD: CPT | Mod: CPTII,S$GLB,, | Performed by: NURSE PRACTITIONER

## 2024-01-10 PROCEDURE — 3072F LOW RISK FOR RETINOPATHY: CPT | Mod: CPTII,95,, | Performed by: PSYCHIATRY & NEUROLOGY

## 2024-01-10 PROCEDURE — 85652 RBC SED RATE AUTOMATED: CPT | Performed by: INTERNAL MEDICINE

## 2024-01-10 PROCEDURE — 85025 COMPLETE CBC W/AUTO DIFF WBC: CPT | Performed by: INTERNAL MEDICINE

## 2024-01-10 PROCEDURE — 86140 C-REACTIVE PROTEIN: CPT | Performed by: INTERNAL MEDICINE

## 2024-01-10 PROCEDURE — 3079F DIAST BP 80-89 MM HG: CPT | Mod: CPTII,S$GLB,, | Performed by: NURSE PRACTITIONER

## 2024-01-10 PROCEDURE — 1160F RVW MEDS BY RX/DR IN RCRD: CPT | Mod: CPTII,S$GLB,, | Performed by: NURSE PRACTITIONER

## 2024-01-10 PROCEDURE — 3077F SYST BP >= 140 MM HG: CPT | Mod: CPTII,S$GLB,, | Performed by: NURSE PRACTITIONER

## 2024-01-10 PROCEDURE — 99214 OFFICE O/P EST MOD 30 MIN: CPT | Mod: 95,,, | Performed by: PSYCHIATRY & NEUROLOGY

## 2024-01-10 PROCEDURE — 36415 COLL VENOUS BLD VENIPUNCTURE: CPT | Performed by: INTERNAL MEDICINE

## 2024-01-10 PROCEDURE — 80053 COMPREHEN METABOLIC PANEL: CPT | Performed by: INTERNAL MEDICINE

## 2024-01-10 PROCEDURE — 20610 DRAIN/INJ JOINT/BURSA W/O US: CPT | Mod: RT,S$GLB,, | Performed by: NURSE PRACTITIONER

## 2024-01-10 PROCEDURE — 1159F MED LIST DOCD IN RCRD: CPT | Mod: CPTII,S$GLB,, | Performed by: NURSE PRACTITIONER

## 2024-01-10 PROCEDURE — 99999 PR PBB SHADOW E&M-EST. PATIENT-LVL IV: CPT | Mod: PBBFAC,,, | Performed by: NURSE PRACTITIONER

## 2024-01-10 PROCEDURE — 99214 OFFICE O/P EST MOD 30 MIN: CPT | Mod: 25,S$GLB,, | Performed by: NURSE PRACTITIONER

## 2024-01-10 RX ORDER — ZOLPIDEM TARTRATE 5 MG/1
5 TABLET ORAL NIGHTLY PRN
Qty: 90 TABLET | Refills: 0 | Status: SHIPPED | OUTPATIENT
Start: 2024-01-10

## 2024-01-10 RX ORDER — FLUOXETINE HYDROCHLORIDE 20 MG/1
20 CAPSULE ORAL 2 TIMES DAILY
Qty: 180 CAPSULE | Refills: 1 | Status: SHIPPED | OUTPATIENT
Start: 2024-01-10

## 2024-01-10 RX ORDER — CLONAZEPAM 0.5 MG/1
TABLET ORAL
Qty: 90 TABLET | Refills: 0 | Status: SHIPPED | OUTPATIENT
Start: 2024-01-10

## 2024-01-10 NOTE — PROGRESS NOTES
The patient location is: [place], [place]  The chief complaint leading to consultation is: anxiety  Visit type: audiovisual  Total time spent with patient: 15 min  Each patient to whom he or she provides medical services by telemedicine is:  (1) informed of the relationship between the physician and patient and the respective role of any other health care provider with respect to management of the patient; and (2) notified that he or she may decline to receive medical services by telemedicine and may withdraw from such care at any time.    ESTABLISHED OUTPATIENT VISIT   E/M LEVEL 4: [id number]    ENCOUNTER DATE: 1/10/2024  SITE: [facility], [address] HISTORY    CHIEF COMPLAINT   Althea Vazquez is a 58 y.o. female who presents for follow up of anxiety.    HPI     Appears to be doing reasonably well psychiatrically. Appears euthymic during today's visit.    Satisfied with current psychotropic medication regimen.    Spiritual beliefs are supportive.    Has continued to stand up for herself, this is working well for her.    Psychiatric Review Of Systems - Is patient experiencing or having changes in:  sleep: takes Ambien  appetite: no  weight: working on losing weight  energy/anergy: no  interest/pleasure/anhedonia: no  somatic symptoms: no  libido: no  anxiety/panic: no  guilty/hopelessness: no  concentration: no  S.I.B.s/risky behavior: no  Irritability: no  Racing thoughts: no  Impulsive behaviors: no  Paranoia:no  AVH:no    Recent alcohol: rare small amount  Recent drug: no    Medical ROS   Foot pain     PAST MEDICAL, FAMILY AND SOCIAL HISTORY: The patient's past medical, family and social history have been reviewed and updated as appropriate within the electronic medical record - see encounter notes.    PSYCHOTROPIC MEDICATIONS   Prozac 20 mg qam and 20 mg at bedtime, Clonazepam 0.5 mg prn for anxiety[has been using up to 2 days per week], Ambien 5 mg at bedtime prn[recently has been taking 3  times per week]    Scheduled and PRN Medications     Current Outpatient Medications:     amLODIPine (NORVASC) 10 MG tablet, Take 1 tablet (10 mg total) by mouth once daily., Disp: 90 tablet, Rfl: 3    aspirin (ECOTRIN) 81 MG EC tablet, Take 1 tablet (81 mg total) by mouth once daily., Disp: 30 tablet, Rfl: 0    atenoloL (TENORMIN) 100 MG tablet, Take 1 tablet (100 mg total) by mouth once daily., Disp: 90 tablet, Rfl: 3    blood-glucose meter kit, Use twice daily., Disp: 1 each, Rfl: 0    canagliflozin (INVOKANA) 300 mg Tab tablet, Take 1 tablet (300 mg total) by mouth daily before breakfast., Disp: 90 tablet, Rfl: 1    clobetasoL (TEMOVATE) 0.05 % external solution, Apply to affected area once daily, Disp: 50 mL, Rfl: 6    clonazePAM (KLONOPIN) 0.5 MG tablet, TAKE 1 TABLET BY MOUTH ONCE DAILY AS NEEDED FOR ANXIETY, Disp: 90 tablet, Rfl: 0    cyclobenzaprine (FLEXERIL) 10 MG tablet, TAKE ONE TABLET BY MOUTH AT BEDTIME AS NEEDED, Disp: 90 tablet, Rfl: 2    diclofenac sodium (VOLTAREN) 1 % Gel, Apply 4 g topically 4 (four) times daily. Apply light film topically to knee up to four times daily, Disp: 3 each, Rfl: 2    docusate sodium (COLACE) 50 MG capsule, Take 1 capsule (50 mg total) by mouth 2 (two) times daily., Disp: 180 capsule, Rfl: 3    entecavir (BARACLUDE) 0.5 MG Tab, Take 1 tablet (0.5 mg total) by mouth once daily., Disp: 90 tablet, Rfl: 3    ergocalciferol (ERGOCALCIFEROL) 50,000 unit Cap, Take 1 capsule (50,000 Units total) by mouth every 7 days., Disp: 12 capsule, Rfl: 3    fluocinolone acetonide oiL 0.01 % Drop, Place 3 drops into both ears 2 (two) times a day., Disp: 20 mL, Rfl: 3    FLUoxetine 20 MG capsule, Take 1 capsule (20 mg total) by mouth 2 (two) times daily., Disp: 180 capsule, Rfl: 1    fluticasone propionate (FLONASE) 50 mcg/actuation nasal spray, 2 sprays (100 mcg total) by Each Nostril route once daily., Disp: 16 g, Rfl: 6    folic acid (FOLVITE) 1 MG tablet, Take 1 tablet (1 mg total) by  mouth once daily., Disp: 90 tablet, Rfl: 1    golimumab (SIMPONI) 50 mg/0.5 mL PnIj, Inject 50 mg into the skin every 30 days., Disp: 1.5 mL, Rfl: 1    hydroCHLOROthiazide (HYDRODIURIL) 25 MG tablet, Take 1 tablet (25 mg total) by mouth once daily., Disp: 90 tablet, Rfl: 3    hydrocortisone 2.5 % cream, Apply topically 2 (two) times daily. for 10 days, Disp: 28 g, Rfl: 1    hydroquinone 4 % Crea, Apply to dark areas qhs.  Not more than 6 months straight in same location.Use sunscreen in am, Disp: 46 g, Rfl: 1    ketoconazole (NIZORAL) 2 % shampoo, Apply topically twice a week., Disp: 120 mL, Rfl: 6    ketorolac 0.5% (ACULAR) 0.5 % Drop, Place 1 drop into the right eye 3 (three) times daily., Disp: 5 mL, Rfl: 3    ketorolac 0.5% (ACULAR) 0.5 % Drop, Place 1 drop into the left eye 3 (three) times daily., Disp: 5 mL, Rfl: 3    lancets (FREESTYLE LANCETS) 28 gauge Misc, test TWICE DAILY, Disp: 200 each, Rfl: 3    levocetirizine (XYZAL) 5 MG tablet, TAKE ONE TABLET BY MOUTH EVERY EVENING, Disp: 90 tablet, Rfl: 2    methotrexate 2.5 MG Tab, Take 6 tablets (15 mg total) by mouth every 7 days., Disp: 24 tablet, Rfl: 1    ofloxacin (OCUFLOX) 0.3 % ophthalmic solution, Place 1 drop into the right eye 3 (three) times daily., Disp: 5 mL, Rfl: 3    ofloxacin (OCUFLOX) 0.3 % ophthalmic solution, Place 1 drop into the left eye 3 (three) times daily., Disp: 5 mL, Rfl: 3    pantoprazole (PROTONIX) 40 MG tablet, Take 1 tablet (40 mg total) by mouth once daily., Disp: 90 tablet, Rfl: 2    prednisoLONE acetate (PRED FORTE) 1 % DrpS, Instill one drop into affect eye(s) as directed, Disp: 5 mL, Rfl: 1    prednisoLONE acetate (PRED FORTE) 1 % DrpS, Place 1 drop into the right eye 3 (three) times daily., Disp: 5 mL, Rfl: 3    prednisoLONE acetate (PRED FORTE) 1 % DrpS, Place 1 drop into the left eye 3 (three) times daily., Disp: 5 mL, Rfl: 3    rosuvastatin (CRESTOR) 40 MG Tab, Take 1 tablet (40 mg total) by mouth every evening., Disp: 90  tablet, Rfl: 3    semaglutide (OZEMPIC) 2 mg/dose (8 mg/3 mL) PnIj, Inject 2 mg into the skin every 7 days., Disp: 9 mL, Rfl: 3    sulfaSALAzine (AZULFIDINE) 500 MG EC tablet, Take 3 tablets (1,500 mg total) by mouth 2 (two) times daily., Disp: 540 tablet, Rfl: 0    tiZANidine (ZANAFLEX) 2 MG tablet, Take 1-2 tablets (2-4 mg total) by mouth every 8 (eight) hours as needed., Disp: 90 tablet, Rfl: 2    topiramate (TOPAMAX) 100 MG tablet, Take 1 tablet (100 mg total) by mouth 2 (two) times daily., Disp: 180 tablet, Rfl: 3    triamcinolone acetonide 0.1% (KENALOG) 0.1 % cream, Apply topically 2 (two) times daily., Disp: 45 g, Rfl: 1    valACYclovir (VALTREX) 1000 MG tablet, valacyclovir 1 gram tablet  Take 0.5 tablets every 12 hours by oral route., Disp: , Rfl:     valsartan (DIOVAN) 320 MG tablet, Take 1 tablet (320 mg total) by mouth once daily., Disp: 90 tablet, Rfl: 3    vitamin D (VITAMIN D3) 1000 units Tab, Take 1,000 Units by mouth once daily., Disp: , Rfl:     zolpidem (AMBIEN) 5 MG Tab, Take 1 tablet (5 mg total) by mouth nightly as needed., Disp: 90 tablet, Rfl: 0  No current facility-administered medications for this visit.    Facility-Administered Medications Ordered in Other Visits:     0.9%  NaCl infusion, , Intravenous, Continuous, Graciela Jerome MD    EXAMINATION    There were no vitals filed for this visit.      CONSTITUTIONAL  General Appearance: well nourished    MUSCULOSKELETAL  Muscle Strength and Tone: normal strength and tone  Abnormal Involuntary Movements: no abnormal movement noted  Gait and Station: normal gait    PSYCHIATRIC   Level of Consciousness: alert  Orientation: oriented to person, place and time  Grooming: well groomed  Psychomotor Behavior: no restlessness/agitation  Speech: normal in rate, rhythm and volume  Language: normal vocabulary  Mood: steady  Affect: full range and appropriate  Thought Process: logical and goal directed  Associations: intact associations  Thought Content:  no SI/HI  Memory: grossly intact  Attention: intact to content of interview  Fund of Knowledge: appears adequate  Insight: good  Judgement: good    MEDICAL DECISION MAKING    DIAGNOSES  Anxiety d/o, unspecified    PROBLEM LIST AND MANAGEMENT PLANS    - anxiety: continue Prozac, prn Klonopin and prn Ambien as above  - rtc 3 months    Time with patient: 15 min    LABORATORY DATA  Admission on 01/03/2024, Discharged on 01/03/2024   Component Date Value Ref Range Status    POCT Glucose 01/03/2024 109  70 - 110 mg/dL Final   Lab Visit on 12/11/2023   Component Date Value Ref Range Status    Sed Rate 12/11/2023 11  0 - 36 mm/Hr Final    CRP 12/11/2023 0.24  0.00 - 1.00 mg/dL Final    Sodium 12/11/2023 144  136 - 145 mmol/L Final    Potassium 12/11/2023 3.9  3.5 - 5.1 mmol/L Final    Chloride 12/11/2023 106  95 - 110 mmol/L Final    CO2 12/11/2023 28  23 - 29 mmol/L Final    Glucose 12/11/2023 143 (H)  70 - 110 mg/dL Final    BUN 12/11/2023 14  7 - 17 mg/dL Final    Creatinine 12/11/2023 0.64  0.50 - 1.40 mg/dL Final    Calcium 12/11/2023 9.1  8.7 - 10.5 mg/dL Final    Total Protein 12/11/2023 7.7  6.0 - 8.4 g/dL Final    Albumin 12/11/2023 4.4  3.5 - 5.2 g/dL Final    Total Bilirubin 12/11/2023 0.3  0.1 - 1.0 mg/dL Final    Comment: For infants and newborns, interpretation of results should be based  on gestational age, weight and in agreement with clinical  observations.    Premature Infant recommended reference ranges:  Up to 24 hours.............<8.0 mg/dL  Up to 48 hours............<12.0 mg/dL  3-5 days..................<15.0 mg/dL  6-29 days.................<15.0 mg/dL      Alkaline Phosphatase 12/11/2023 77  38 - 126 U/L Final    AST 12/11/2023 25  15 - 46 U/L Final    ALT 12/11/2023 24  10 - 44 U/L Final    Anion Gap 12/11/2023 10  8 - 16 mmol/L Final    eGFR 12/11/2023 >60.0  >60 mL/min/1.73 m^2 Final    WBC 12/11/2023 5.33  3.90 - 12.70 K/uL Final    RBC 12/11/2023 4.60  4.00 - 5.40 M/uL Final    Hemoglobin  12/11/2023 13.9  12.0 - 16.0 g/dL Final    Hematocrit 12/11/2023 42.0  37.0 - 48.5 % Final    MCV 12/11/2023 91  82 - 98 fL Final    MCH 12/11/2023 30.2  27.0 - 31.0 pg Final    MCHC 12/11/2023 33.1  32.0 - 36.0 g/dL Final    RDW 12/11/2023 13.0  11.5 - 14.5 % Final    Platelets 12/11/2023 231  150 - 450 K/uL Final    MPV 12/11/2023 10.6  9.2 - 12.9 fL Final    Immature Granulocytes 12/11/2023 0.6 (H)  0.0 - 0.5 % Final    Gran # (ANC) 12/11/2023 2.4  1.8 - 7.7 K/uL Final    Immature Grans (Abs) 12/11/2023 0.03  0.00 - 0.04 K/uL Final    Comment: Mild elevation in immature granulocytes is non specific and   can be seen in a variety of conditions including stress response,   acute inflammation, trauma and pregnancy. Correlation with other   laboratory and clinical findings is essential.      Lymph # 12/11/2023 2.3  1.0 - 4.8 K/uL Final    Mono # 12/11/2023 0.4  0.3 - 1.0 K/uL Final    Eos # 12/11/2023 0.1  0.0 - 0.5 K/uL Final    Baso # 12/11/2023 0.03  0.00 - 0.20 K/uL Final    nRBC 12/11/2023 0  0 /100 WBC Final    Gran % 12/11/2023 45.5  38.0 - 73.0 % Final    Lymph % 12/11/2023 43.5  18.0 - 48.0 % Final    Mono % 12/11/2023 7.7  4.0 - 15.0 % Final    Eosinophil % 12/11/2023 2.1  0.0 - 8.0 % Final    Basophil % 12/11/2023 0.6  0.0 - 1.9 % Final    Differential Method 12/11/2023 Automated   Final    Hepatitis B Virus PCR, Quant 12/11/2023 Not Detected  <12 IU/mL Final    Hepatitis B Virus DNA 12/11/2023 HBV DNA not detected  Not Detected Final   Lab Visit on 11/09/2023   Component Date Value Ref Range Status    WBC 11/09/2023 6.32  3.90 - 12.70 K/uL Final    RBC 11/09/2023 4.71  4.00 - 5.40 M/uL Final    Hemoglobin 11/09/2023 14.0  12.0 - 16.0 g/dL Final    Hematocrit 11/09/2023 43.0  37.0 - 48.5 % Final    MCV 11/09/2023 91  82 - 98 fL Final    MCH 11/09/2023 29.7  27.0 - 31.0 pg Final    MCHC 11/09/2023 32.6  32.0 - 36.0 g/dL Final    RDW 11/09/2023 13.2  11.5 - 14.5 % Final    Platelets 11/09/2023 285  150 - 450  K/uL Final    MPV 11/09/2023 11.2  9.2 - 12.9 fL Final    Immature Granulocytes 11/09/2023 0.8 (H)  0.0 - 0.5 % Final    Gran # (ANC) 11/09/2023 2.0  1.8 - 7.7 K/uL Final    Immature Grans (Abs) 11/09/2023 0.05 (H)  0.00 - 0.04 K/uL Final    Comment: Mild elevation in immature granulocytes is non specific and   can be seen in a variety of conditions including stress response,   acute inflammation, trauma and pregnancy. Correlation with other   laboratory and clinical findings is essential.      Lymph # 11/09/2023 3.7  1.0 - 4.8 K/uL Final    Mono # 11/09/2023 0.4  0.3 - 1.0 K/uL Final    Eos # 11/09/2023 0.1  0.0 - 0.5 K/uL Final    Baso # 11/09/2023 0.03  0.00 - 0.20 K/uL Final    nRBC 11/09/2023 0  0 /100 WBC Final    Gran % 11/09/2023 31.9 (L)  38.0 - 73.0 % Final    Lymph % 11/09/2023 59.0 (H)  18.0 - 48.0 % Final    Mono % 11/09/2023 7.0  4.0 - 15.0 % Final    Eosinophil % 11/09/2023 0.8  0.0 - 8.0 % Final    Basophil % 11/09/2023 0.5  0.0 - 1.9 % Final    Differential Method 11/09/2023 Automated   Final    Sodium 11/09/2023 141  136 - 145 mmol/L Final    Potassium 11/09/2023 4.3  3.5 - 5.1 mmol/L Final    Chloride 11/09/2023 105  95 - 110 mmol/L Final    CO2 11/09/2023 28  23 - 29 mmol/L Final    Glucose 11/09/2023 82  70 - 110 mg/dL Final    BUN 11/09/2023 12  6 - 20 mg/dL Final    Creatinine 11/09/2023 0.7  0.5 - 1.4 mg/dL Final    Calcium 11/09/2023 9.5  8.7 - 10.5 mg/dL Final    Total Protein 11/09/2023 7.5  6.0 - 8.4 g/dL Final    Albumin 11/09/2023 4.1  3.5 - 5.2 g/dL Final    Total Bilirubin 11/09/2023 0.3  0.1 - 1.0 mg/dL Final    Comment: For infants and newborns, interpretation of results should be based  on gestational age, weight and in agreement with clinical  observations.    Premature Infant recommended reference ranges:  Up to 24 hours.............<8.0 mg/dL  Up to 48 hours............<12.0 mg/dL  3-5 days..................<15.0 mg/dL  6-29 days.................<15.0 mg/dL      Alkaline  Phosphatase 11/09/2023 63  55 - 135 U/L Final    AST 11/09/2023 24  10 - 40 U/L Final    ALT 11/09/2023 24  10 - 44 U/L Final    eGFR 11/09/2023 >60.0  >60 mL/min/1.73 m^2 Final    Anion Gap 11/09/2023 8  8 - 16 mmol/L Final    Hemoglobin A1C 11/09/2023 5.5  4.0 - 5.6 % Final    Comment: ADA Screening Guidelines:  5.7-6.4%  Consistent with prediabetes  >or=6.5%  Consistent with diabetes    High levels of fetal hemoglobin interfere with the HbA1C  assay. Heterozygous hemoglobin variants (HbS, HgC, etc)do  not significantly interfere with this assay.   However, presence of multiple variants may affect accuracy.      Estimated Avg Glucose 11/09/2023 111  68 - 131 mg/dL Final           Tip Oliveira

## 2024-01-11 LAB
HEPATITIS B VIRUS DNA: NORMAL
HEPATITIS B VIRUS PCR, QUANT: NOT DETECTED IU/ML

## 2024-01-11 RX ORDER — TRIAMCINOLONE ACETONIDE 40 MG/ML
40 INJECTION, SUSPENSION INTRA-ARTICULAR; INTRAMUSCULAR
Status: DISCONTINUED | OUTPATIENT
Start: 2024-01-11 | End: 2024-02-07 | Stop reason: ALTCHOICE

## 2024-01-11 NOTE — PROGRESS NOTES
"[facility]  [facility].      Chief Complaint:   Chief Complaint   Patient presents with    Knee Pain     Right          History of Present Illness: Althea Vazquez is a 58 y.o. female here for cervical radiculopathy.      Onset: several years, no specific inciting event  Location: neck, in the midline and towards the left side  Radiation: left hand/arm  Timing: intermittent  Quality: Aching and Dull  Exacerbating Factors: lifting  Alleviating Factors: hot shower  Associated Symptoms: She feels weakness and numbness in the left arm/hand. Denies night fever/night sweats, urinary incontinence, bowel incontinence and significant weight loss    Severity: Currently: 5/10   Typical Range: 0-8/10     Exacerbation: 8/10     She also notes low back pain. Low back Pain has been present off and on for a few years, but worsened last week. Pain is localized to the bilateral low back (worse on the left) with occasional radiation down the left leg, but none currently. Pain is described as a "hurting pain". The pain is aggravated by spinning/turning. The pain is alleviated by warm bath, massage. She feels weakness in both legs. She denies numbness in her legs or feet. Denies changes in bowel or bladder function. Denies saddle anesthesia. Denies recent fevers or infections. Denies significant weight loss      Interval history 01/10/2024  58-year-old female that presents today complaining primarily of right knee pain pain has been ongoing for years, pain is in the medial and lateral aspect of the right knee patient reports pain is intermittent pain is described as weakness and throbbing and soreness pain is aggravated with standing patient reports leaning mitigate her symptoms are taking pressure off her right knee.  She denies any radicular symptoms in the lower extremity.  Her worst pain is 6/10.  Last clinic visit her and I discussed possibly considering her for a right intra-articular knee injection " with steroids.  Patient does have a history of type 2 diabetes.  Patient denies any recent incident or trauma, denies any profound weakness, denies any bowel bladder dysfunction        Interval History (04/17/2023):   Althea Vazquez returns today with right knee pain that has been ongoing for 3-4 weeks.  Pain is constant pain described as aching and throbbing.  Pain is aggravated with walking, standing and rising from a seated position.  Has any radiating symptoms to her right lower extremity.   She denies any recent incident or trauma, denies any recent falls denies any profound weakness.  She also reports having swollen, red, itchy and leaky eyes.  She does have a history of chronic I infections.  She has a follow up with her ophthalmologist this week.  She is currently on antibiotic eyedrops.       Current Pain Scales:  Current: 8/10              Typical Range: 8-9/10       Interval History  1/10/2023- 56 y/o female that presents virtually she is complaining neck and left arm pain that radiates into her hands and fingers.  She feels weakness in her left hand she does get numbness in her left hand as well.  She is a former patient of Dr. Jerome she has previously been provided a cervical SHARONA targeting C7-T1 on 02/17/2021 which provide her 80% relief she is requesting a repeat injection.  She denies any profound weakness, denies any bowel bladder dysfunction, denies the inability to write with a pen.    Pain Disability Index  Family/Home Responsibilities:: 6  Recreation:: 6  Social Activity:: 6  Occupation:: 6  Sexual Behavior:: 0  Self Care:: 6  Life-Support Activities:: 6  Pain Disability Index (PDI): 36    Previous Interventions:  -04/21/2021 Left  sacroiliac joint injection and  Left  greater trochanteric bursa injection -average relief with 70%  - 2/17/21: C7-T1 IL SHARONA w/ 80% relief    Previous Therapies:  PT/OT: yes for neck and back, helps, and she continues to do her HEP as much as possible.    Relevant Surgery: Right knee arthroscopy  Previous Medications:   - NSAIDS: Naproxen.  Ibuprofen did not help.  Sulindac.  - Muscle Relaxants: Flexeril. Tizanidine   - TCAs:   - SNRIs:   - Topicals:   - Anticonvulsants:    - Opioids: Norco. Oxycodone. (wants to avoid addicting medications)    Current Pain Medications:  Flexeril 10mg  Naproxen 500mg  Tizanidine 2mg     Blood Thinners: Aspirin 81mg    Full Medication List:    Current Outpatient Medications:     amLODIPine (NORVASC) 10 MG tablet, Take 1 tablet (10 mg total) by mouth once daily., Disp: 90 tablet, Rfl: 3    aspirin (ECOTRIN) 81 MG EC tablet, Take 1 tablet (81 mg total) by mouth once daily., Disp: 30 tablet, Rfl: 0    atenoloL (TENORMIN) 100 MG tablet, Take 1 tablet (100 mg total) by mouth once daily., Disp: 90 tablet, Rfl: 3    blood-glucose meter kit, Use twice daily., Disp: 1 each, Rfl: 0    canagliflozin (INVOKANA) 300 mg Tab tablet, Take 1 tablet (300 mg total) by mouth daily before breakfast., Disp: 90 tablet, Rfl: 1    clobetasoL (TEMOVATE) 0.05 % external solution, Apply to affected area once daily, Disp: 50 mL, Rfl: 6    clonazePAM (KLONOPIN) 0.5 MG tablet, TAKE 1 TABLET BY MOUTH ONCE DAILY AS NEEDED FOR ANXIETY, Disp: 90 tablet, Rfl: 0    cyclobenzaprine (FLEXERIL) 10 MG tablet, TAKE ONE TABLET BY MOUTH AT BEDTIME AS NEEDED, Disp: 90 tablet, Rfl: 2    docusate sodium (COLACE) 50 MG capsule, Take 1 capsule (50 mg total) by mouth 2 (two) times daily., Disp: 180 capsule, Rfl: 3    entecavir (BARACLUDE) 0.5 MG Tab, Take 1 tablet (0.5 mg total) by mouth once daily., Disp: 90 tablet, Rfl: 3    ergocalciferol (ERGOCALCIFEROL) 50,000 unit Cap, Take 1 capsule (50,000 Units total) by mouth every 7 days., Disp: 12 capsule, Rfl: 3    fluocinolone acetonide oiL 0.01 % Drop, Place 3 drops into both ears 2 (two) times a day., Disp: 20 mL, Rfl: 3    FLUoxetine 20 MG capsule, Take 1 capsule (20 mg total) by mouth 2 (two) times daily., Disp: 180 capsule, Rfl:  1    fluticasone propionate (FLONASE) 50 mcg/actuation nasal spray, 2 sprays (100 mcg total) by Each Nostril route once daily., Disp: 16 g, Rfl: 6    folic acid (FOLVITE) 1 MG tablet, Take 1 tablet (1 mg total) by mouth once daily., Disp: 90 tablet, Rfl: 1    golimumab (SIMPONI) 50 mg/0.5 mL PnIj, Inject 50 mg into the skin every 30 days., Disp: 1.5 mL, Rfl: 1    hydroCHLOROthiazide (HYDRODIURIL) 25 MG tablet, Take 1 tablet (25 mg total) by mouth once daily., Disp: 90 tablet, Rfl: 3    hydroquinone 4 % Crea, Apply to dark areas qhs.  Not more than 6 months straight in same location.Use sunscreen in am, Disp: 46 g, Rfl: 1    ketoconazole (NIZORAL) 2 % shampoo, Apply topically twice a week., Disp: 120 mL, Rfl: 6    ketorolac 0.5% (ACULAR) 0.5 % Drop, Place 1 drop into the right eye 3 (three) times daily., Disp: 5 mL, Rfl: 3    ketorolac 0.5% (ACULAR) 0.5 % Drop, Place 1 drop into the left eye 3 (three) times daily., Disp: 5 mL, Rfl: 3    lancets (FREESTYLE LANCETS) 28 gauge Misc, test TWICE DAILY, Disp: 200 each, Rfl: 3    levocetirizine (XYZAL) 5 MG tablet, TAKE ONE TABLET BY MOUTH EVERY EVENING, Disp: 90 tablet, Rfl: 2    methotrexate 2.5 MG Tab, Take 6 tablets (15 mg total) by mouth every 7 days., Disp: 24 tablet, Rfl: 1    ofloxacin (OCUFLOX) 0.3 % ophthalmic solution, Place 1 drop into the right eye 3 (three) times daily., Disp: 5 mL, Rfl: 3    ofloxacin (OCUFLOX) 0.3 % ophthalmic solution, Place 1 drop into the left eye 3 (three) times daily., Disp: 5 mL, Rfl: 3    pantoprazole (PROTONIX) 40 MG tablet, Take 1 tablet (40 mg total) by mouth once daily., Disp: 90 tablet, Rfl: 2    prednisoLONE acetate (PRED FORTE) 1 % DrpS, Instill one drop into affect eye(s) as directed, Disp: 5 mL, Rfl: 1    prednisoLONE acetate (PRED FORTE) 1 % DrpS, Place 1 drop into the right eye 3 (three) times daily., Disp: 5 mL, Rfl: 3    prednisoLONE acetate (PRED FORTE) 1 % DrpS, Place 1 drop into the left eye 3 (three) times daily.,  Disp: 5 mL, Rfl: 3    rosuvastatin (CRESTOR) 40 MG Tab, Take 1 tablet (40 mg total) by mouth every evening., Disp: 90 tablet, Rfl: 3    semaglutide (OZEMPIC) 2 mg/dose (8 mg/3 mL) PnIj, Inject 2 mg into the skin every 7 days., Disp: 9 mL, Rfl: 3    sulfaSALAzine (AZULFIDINE) 500 MG EC tablet, Take 3 tablets (1,500 mg total) by mouth 2 (two) times daily., Disp: 540 tablet, Rfl: 0    tiZANidine (ZANAFLEX) 2 MG tablet, Take 1-2 tablets (2-4 mg total) by mouth every 8 (eight) hours as needed., Disp: 90 tablet, Rfl: 2    topiramate (TOPAMAX) 100 MG tablet, Take 1 tablet (100 mg total) by mouth 2 (two) times daily., Disp: 180 tablet, Rfl: 3    triamcinolone acetonide 0.1% (KENALOG) 0.1 % cream, Apply topically 2 (two) times daily., Disp: 45 g, Rfl: 1    valACYclovir (VALTREX) 1000 MG tablet, valacyclovir 1 gram tablet  Take 0.5 tablets every 12 hours by oral route., Disp: , Rfl:     valsartan (DIOVAN) 320 MG tablet, Take 1 tablet (320 mg total) by mouth once daily., Disp: 90 tablet, Rfl: 3    vitamin D (VITAMIN D3) 1000 units Tab, Take 1,000 Units by mouth once daily., Disp: , Rfl:     zolpidem (AMBIEN) 5 MG Tab, Take 1 tablet (5 mg total) by mouth nightly as needed., Disp: 90 tablet, Rfl: 0    diclofenac sodium (VOLTAREN) 1 % Gel, Apply 4 g topically 4 (four) times daily. Apply light film topically to knee up to four times daily, Disp: 3 each, Rfl: 2    hydrocortisone 2.5 % cream, Apply topically 2 (two) times daily. for 10 days, Disp: 28 g, Rfl: 1  No current facility-administered medications for this visit.    Facility-Administered Medications Ordered in Other Visits:     0.9%  NaCl infusion, , Intravenous, Continuous, Graciela Jerome MD     Review of Systems:  Review of Systems   Constitutional:  Negative for fever and weight loss.   HENT:  Negative for ear pain and tinnitus.    Eyes:  Positive for redness. Negative for pain.   Respiratory:  Negative for cough and shortness of breath.    Cardiovascular:  Negative for  chest pain and palpitations.   Gastrointestinal:  Negative for constipation and heartburn.   Genitourinary: Negative.         Denies urinary incontinence. Denies urine retention.    Musculoskeletal:  Positive for back pain, myalgias and neck pain.   Skin:  Negative for itching and rash.   Neurological:  Positive for tingling and weakness. Negative for seizures.   Endo/Heme/Allergies:  Negative for environmental allergies. Does not bruise/bleed easily.   Psychiatric/Behavioral:  Negative for depression. The patient has insomnia. The patient is not nervous/anxious.        Allergies:  Bactrim [sulfamethoxazole-trimethoprim], Trulicity [dulaglutide], and Diflucan [fluconazole]     Medical History:   has a past medical history of Acid reflux, Anxiety (10/18/2012), Arthritis, Cataract, Depression, Diabetic peripheral neuropathy - mild (10/21/2014), Difficult intubation, Dry eyes, Dry mouth, Fever blister, History of hepatitis B (10/03/2016), Hyperlipidemia, Hypertension, Insomnia, Iritis (05/13/2014), Long-term current use of steroids (09/27/2012), Nausea & vomiting (02/04/2015), VAISHNAVI (obstructive sleep apnea), and Type II diabetes mellitus (10/01/2012).    Surgical History:   has a past surgical history that includes Tubal ligation; Knee arthroscopy (5-14-14); Hysterectomy (12/3/2014); Colonoscopy (N/A, 1/9/2018); Epidural steroid injection into cervical spine (N/A, 2/17/2021); Injection of anesthetic agent into sacroiliac joint (Left, 4/21/2021); Injection of joint (Left, 4/21/2021); Epidural steroid injection into cervical spine (N/A, 2/23/2023); and Cataract extraction w/  intraocular lens implant (Right, 1/3/2024).    Family History:  family history includes ADD / ADHD in her son; Cancer in her paternal grandfather; Cataracts in her mother; Colon cancer in her father; Depression in her mother; Diabetes in her maternal aunt and mother; Heart attack in her mother and paternal grandmother; Hyperlipidemia in her father;  "Hypertension in her father; No Known Problems in her brother, maternal grandfather, maternal grandmother, maternal uncle, paternal aunt, paternal uncle, and sister; Stroke in her father and mother.    Social History:   reports that she has never smoked. She has never used smokeless tobacco. She reports that she does not currently use alcohol. She reports that she does not use drugs.    Physical Exam:  BP (!) 141/89   Pulse 73   Ht 5' 7" (1.702 m)   LMP 11/25/2014   BMI 32.73 kg/m²       General Musculoskeletal Exam   Gait: antalgic       Right Knee Exam     Tenderness   The patient is tender to palpation of the medial joint line and lateral joint line.    Range of Motion   Extension:  abnormal   Flexion:  abnormal     Comments:  Positive for tenderness in the medial joint line        Imaging:  -MRI Cervical Spine Without Contrast 11/16/2020  FINDINGS:  Vertebral body height is normal and alignment is maintained. Marrow signal is within normal limits. The craniocervical junction is unremarkable. No abnormal cord signal or cord deformity.  T2 hyperintense, likely cystic focus within the left aspect of the thyroid gland is unchanged.  Intervertebral disc levels are as follows:  C2-C3 disc: No significant disc pathology. Normal facet joints. No spinal canal or neural foraminal stenosis.  C3-C4 disc: Posterior disc bulge with a posterior annular fissure that is new compared to prior.  Normal uncovertebral joints and facet joints.  The thecal sac measures 9 mm AP.  Previously the thecal sac measured 10 mm AP.  C4-C5 disc: Disc space height loss with a posterior disc bulge.  Anterior osteophytes.  Normal vertebral joints and facet joints.  No significant stenosis.  The thecal sac measures 12 mm AP.  These findings are similar to prior.  C5-C6 disc: Posterior disc bulge.  Normal uncovertebral joints and facet joints.  The thecal sac measures 11 mm AP.  No significant foraminal stenosis.  Previously the thecal sac " measured 11 mm AP at this level as well.  C6-C7 disc: Posterior disc bulge with a posterior annular fissure.  This is new compared to the prior study.  Normal uncovertebral joints and facet joints.  The thecal sac measures 11 mm AP.  No significant foraminal stenosis.  Previously the thecal sac measured 13 mm.  C7-T1 disc: Posterior disc bulge.  Normal facet joints.  The thecal sac measures 12 mm AP.  No significant foraminal stenosis.  These findings are similar to prior.  Impression:  1. There are new posterior disc bulges with annular fissures at C3-C4 and C6-C7.  There is now mild spinal stenosis at C3-C4.  2. No significant foraminal stenosis.    - MRI SIJ w/ & w/o contrast 9/11/16:  There are enhancing inflammatory changes about the anterior superior aspect of the left SI joint in the expected location of the medial lumbar or lumbosacral ligament, findings that are highly suggestive of enthesitis.  There is apparent trace enhancement on the posterior superior aspect of the right SI joint leads is overall nonspecific.  SI joints are symmetric.  No synovitis.  No joint effusions.  No osseous erosive changes.  Visualized musculature demonstrate normal bulk and signal intensity.  Piriformis muscles are symmetric.  Ischiofemoral spaces are unremarkable.  Adductor and abductor tendons are unremarkable.  Hamstring tendons are normal.  Subcutaneous soft tissues are normal.  Limited evaluation of the lower abdominal and pelvic organs is unremarkable.  Uterus is surgically absent    - MRI Spine (Lumbar, Thoracic, Cervical) w/ & w/o contrast 9/11/16:  CERVICAL SPINE:  There is modest straightening of the normal cervical lordosis.  No spondylolisthesis.  Vertebral body heights are well-maintained without evidence for fracture.  Visualized osseous structures demonstrate intact pars signal intensity without findings to suggest an infiltrative process.  Cervical spinal cord demonstrates normal contour and signal intensity.   Cerebellar tonsils are in their expected location.  Cervicomedullary junction is unremarkable.  Postcontrast images demonstrate no abnormal enhancement.  Prevertebral soft tissues are normal.  Vertebral artery flow voids are present.  T2 hyperintense nodules are noted within the bilateral thyroid lobes.  The spinal musculature and trace normal bulk and signal intensity.  supraspinous ligament is unremarkable without findings to suggest enthesitis.  C2-C3: No spinal canal stenosis or neural foraminal narrowing.  C3-C4: There is mild bilateral facet arthropathy and uncovertebral joint spurring and mild bilateral neural foraminal narrowing.  No spinal canal stenosis.  C4-C5: No spinal canal stenosis or neural foraminal narrowing.  C5-C6: There is a moderate broad-based posterior disc ossified complex that partially effaces the anterior thecal sac.  There is mild left-sided uncovertebral joint spurring.  Findings contribute to mild spinal canal stenosis and mild left-sided neural foraminal narrowing.  C6-C7: No spinal canal stenosis or neural foraminal narrowing.  C7-T1: No spinal canal stenosis or neural foraminal narrowing.    THORACIC SPINE:  Thoracic spine alignment is normal.  No spondylolisthesis.  Vertebral body heights are well-maintained without evidence for fracture.  There is enhancing inflammation of the left side zygapophyseal/facet joint at T5-T6.  Remaining visualized osseous structures demonstrate intact menisci and in density without findings to suggest an infiltrative process.  Note is made of the anterior vertebral body at this demonstrate intact signal intensity without findings to suggest spondylitis.  Visualized spinal cord demonstrates normal contour and signal intensity.  Postcontrast images demonstrate normal enhancement.  Visualized thoracic and upper abdominal organs are normal.  There is a low signal described focal area of signal abnormality within the posterior subcutaneous soft tissues and  appears to demonstrate subtle enhancement and is favored to be benign though nonspecific.  No significant intervertebral disc abnormalities.  No spinal canal stenosis or neural foramina narrowing.    LUMBAR SPINE:  Lumbar spine alignment is normal.  No spondylolysis or spondylolisthesis.  Vertebral body heights are well-maintained without evidence for fracture.  Visualized osseous structures demonstrate intact pars signal intensity without findings to suggest an infiltrative process.  Visualized distal spinal cord demonstrates normal contour and signal intensity.  Cauda equina is normal mild without findings to suggest arachnoiditis.  Postcontrast images demonstrate normal enhancement.  There is a subcentimeter T2 hyperintense lesion within the right renal cortex, too small to accurately guided thighs.  Paraspinal musculature demonstrates normal bulk and signal intensity.  Subcutaneous soft tissues are within normal limits.  L1-L2: No significant intervertebral disc abnormalities.  Facet joints are well maintained.  No spinal canal stenosis or neural foraminal narrowing.  L2-L3: There is mild intervertebral disc desiccation.  Facet joints are well maintained.  No spinal canal stenosis or neural foraminal narrowing.  L3-L4: There is mild intervertebral disc desiccation.  Facet joints are well maintained.  No spinal canal stenosis or neural foraminal narrowing.  L4-L5: There is mild intervertebral disc space narrowing and desiccation with small circumferential bulge.  There is mild bilateral facet arthropathy.  No spinal canal stenosis or neural foraminal narrowing.  L5-S1: There is mild intervertebral disc space narrowing desiccation with a small circumferential bulge and a small posterior annular fissure.  Findings contribute to mild left-sided neural foraminal narrowing. There is mild bilateral facet arthropathy.  No spinal canal stenosis.    IMPRESSION:   Enhancing inflammatory changes involving the left  zygapophyseal/facet joint at T5-T6 that is nonspecific but can be seen with inflammatory arthropathies. No MR imaging finding to suggest enthesitis, spondylitis or spondylodiskitis.  Mild cervical and lumbar degenerative changes without significant spinal canal stenosis.  Cystic subcutaneous soft tissues within the posterior back, favored to be benign, possibly a complex sebaceous cyst.  Consider follow-up examination in 12 months      Labs:  BMP  Lab Results   Component Value Date     01/10/2024    K 4.1 01/10/2024     01/10/2024    CO2 28 01/10/2024    BUN 11 01/10/2024    CREATININE 0.7 01/10/2024    CALCIUM 9.4 01/10/2024    ANIONGAP 8 01/10/2024    ESTGFRAFRICA >60.0 06/17/2022    ESTGFRAFRICA >60.0 06/17/2022    EGFRNONAA >60.0 06/17/2022    EGFRNONAA >60.0 06/17/2022     Lab Results   Component Value Date    ALT 38 01/10/2024    AST 25 01/10/2024     (H) 08/18/2012    ALKPHOS 74 01/10/2024    BILITOT 0.4 01/10/2024     Lab Results   Component Value Date     01/10/2024       Assessment:  Althea Vazquez is a 58 y.o. female with the following diagnoses based on history, exam, and imaging:    Problem List Items Addressed This Visit    None            This is a pleasant 58 y.o. lady presenting with:     - Chronic neck pain with left cervical radiculopathy:  Posterior disc bulge with annular fissure at C6-7 which may be the cause.  She also has myofascial pain on exam.  - Chronic left shoulder pain with impingement signs on exam  - Chronic bilateral low back pain (worse on the left): She follows with rheumatology, Dr. Arvizu, and has been diagnosed with axial spondyloarthritis.  Her back pain appears multifactorial.  She localizes most of her pain over her left sacroiliac joint with evidence of inflammatory changes about the left SIJ on prior MRI, but she also has positive facet provocative maneuvers and on her previous lumbar MRI there is evidence of facet arthropathy as well,  which does appears slightly worse to me on the left at L5-S1.  - left trochanteric bursitis  - Comorbidities:  Axial spondyloarthritis.  Anxiety and depression.  Type 2 diabetes mellitus.  BMI greater than 30.  Insomnia.  Obstructive sleep apnea.  History of long-term steroid use.      1/10/2023- 58 y/o female with chronic neck and  left cervical radiculopathy: MRI shows  Posterior disc bulge with annular fissure at C6-7 which may be the cause.  She also has myofascial pain on exam.    04/17/2023-Althea Vazquez is a 58 y.o. female who  has a past medical history of Acid reflux, Anxiety (10/18/2012), Arthritis, Cataract, Depression, Diabetic peripheral neuropathy - mild (10/21/2014), Difficult intubation, Dry eyes, Dry mouth, Fever blister, History of hepatitis B (10/03/2016), Hyperlipidemia, Hypertension, Insomnia, Iritis (05/13/2014), Long-term current use of steroids (09/27/2012), Nausea & vomiting (02/04/2015), VAISHNAVI (obstructive sleep apnea), and Type II diabetes mellitus (10/01/2012).  By history and examination this patient has right knee pain  The underlying cause is osteoarthritis and degenerative joint disease.  Pathology is confirmed by imaging.  We discussed the underlying diagnoses and multiple treatment options including interventional procedures, physical therapy, home exercise, core muscle enhancement, activity modification, and weight loss.  The risks and benefits of each treatment option were discussed and all questions were answered.  Patient upon presentation appears to have bilateral eye infections as erythema and swelling was noted in both eyes.  Currently on eyedrops for this infection.  She will be following up with the ophthalmologist this week discussed with patient that I will hold off on providing her with a right knee intra-articular injection today once her infection has cleared we can reconsider this injection in office.  Patient verbalized understanding  agreed.        1--58-year-old female with a history of chronic knee pain by exam patient has chronic right knee pain, underlying diagnosis is osteoarthritis and degenerative joint disease.  Pathology has been confirmed upon review of her recent images.  Discussed underlying diagnosis and that I would recommend a right intra-articular knee injection with steroids patient and I discussed her history of diabetes and the increase of her blood sugar once steroids are given.  She denies any recent infections denied being on antibiotics.  See plan agreed upon below.    Treatment Plan:   - PT/OT/HEP:  Consider returning to physical therapy in the future specifically to strengthen her quadriceps, abductors adductors and hamstrings to help reduce from right knee pain. Educated on RICE therapy.   - Procedures:  Scheduled for a right intra-articular knee injection with steroids today  - Consider MBB vs FJI if no relief with SIJ injection  - Medications: No changes recommended at this time.  Discussed and educated on monitoring her blood sugar over the next 7-10 days  - Imaging: Reviewed and discussed in detail using images from chart.  - Labs: Reviewed.  Medications are appropriately dosed for current hepatorenal function.    Follow Up:  4 weeks [facility] effectiveness [facility] injection    ROBIN Méndez  Interventional Pain Management        Disclaimer: This note was partly generated using dictation software which may occasionally result in transcription errors.

## 2024-01-12 ENCOUNTER — OFFICE VISIT (OUTPATIENT)
Dept: OPHTHALMOLOGY | Facility: CLINIC | Age: 59
End: 2024-01-12
Payer: COMMERCIAL

## 2024-01-12 DIAGNOSIS — H25.12 NUCLEAR SCLEROTIC CATARACT OF LEFT EYE: ICD-10-CM

## 2024-01-12 DIAGNOSIS — H25.11 NUCLEAR SCLEROTIC CATARACT OF RIGHT EYE: ICD-10-CM

## 2024-01-12 DIAGNOSIS — H25.11 NUCLEAR SCLEROSIS OF RIGHT EYE: ICD-10-CM

## 2024-01-12 DIAGNOSIS — Z98.890 POST-OPERATIVE STATE: Primary | ICD-10-CM

## 2024-01-12 PROCEDURE — 99024 POSTOP FOLLOW-UP VISIT: CPT | Mod: S$GLB,,, | Performed by: OPHTHALMOLOGY

## 2024-01-12 PROCEDURE — 92136 OPHTHALMIC BIOMETRY: CPT | Mod: 26,LT,S$GLB, | Performed by: OPHTHALMOLOGY

## 2024-01-12 PROCEDURE — 99999 PR PBB SHADOW E&M-EST. PATIENT-LVL III: CPT | Mod: PBBFAC,,, | Performed by: OPHTHALMOLOGY

## 2024-01-12 PROCEDURE — 1159F MED LIST DOCD IN RCRD: CPT | Mod: CPTII,S$GLB,, | Performed by: OPHTHALMOLOGY

## 2024-01-12 NOTE — PROGRESS NOTES
HPI    Roni / JG    S/p phaco OD 1/3/24  Recurrent iritis OU  Posterior synechiae OU  Cataracts OS        Moxifloxacin/Ofloxacin TID OD  Ketorolac TID OD  PF TID OD  MTX, Azulfidine    Patient present today for 1 week Post Op OD  Pt state OD stable. No pain or discomfort   Last edited by Soumya Mortensen MD on 1/12/2024  3:20 PM.            Assessment /Plan     For exam results, see Encounter Report.    Post-operative state    Nuclear sclerosis of right eye  -     Cancel: IOL Master - OD - Right Eye    Nuclear sclerotic cataract of right eye    Nuclear sclerotic cataract of left eye  -     IOL Master - OU - Both Eyes      PO week #1 s/p phaco/IOL -    - doing well, no issues    Continue combo drops for a total of 1 month versus: d/c abx gtt, continue PF/ketorolocTID for total of 1 month       Oct mac OD - wnl      Visually significant nuclear sclerotic cataract   - Interfering with activities of daily living.  Pt desires cataract surgery for [facility] rehabilitation.   - R/B/A discussed and pt agrees to proceed with surgery.   - IOL options discussed according to patient's goals and concomitant ocular pathology; and pt content with monofocal lens.    - Target: plano.       *watch for increased inflammation post sx.    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

## 2024-01-30 ENCOUNTER — TELEPHONE (OUTPATIENT)
Dept: OPHTHALMOLOGY | Facility: CLINIC | Age: 59
End: 2024-01-30
Payer: COMMERCIAL

## 2024-02-03 NOTE — TELEPHONE ENCOUNTER
No care due was identified.  [facility] Embedded Care Due Messages. Reference number: [id number].   2/03/2024 5:09:11 AM CST

## 2024-02-04 NOTE — TELEPHONE ENCOUNTER
Refill Routing Note   Medication(s) are not appropriate for processing by [facility] for the following reason(s):        Required vitals abnormal    [facility] action(s):  Defer               Appointments  past 12m or future 3m with PCP    Date Provider   Last Visit   11/9/2023 Weston Begum, DO   Next Visit   5/14/2024 Weston Begum, DO   ED visits in past 90 days: 0        Note composed:2:58 AM 02/04/2024

## 2024-02-05 ENCOUNTER — HOSPITAL ENCOUNTER (OUTPATIENT)
Dept: RADIOLOGY | Facility: HOSPITAL | Age: 59
Discharge: HOME | End: 2024-02-05
Attending: INTERNAL MEDICINE
Payer: COMMERCIAL

## 2024-02-05 DIAGNOSIS — Z78.0 MENOPAUSE: ICD-10-CM

## 2024-02-05 PROCEDURE — 77080 DXA BONE DENSITY AXIAL: CPT | Mod: TC,PN

## 2024-02-05 PROCEDURE — 77080 DXA BONE DENSITY AXIAL: CPT | Mod: 26,,, | Performed by: RADIOLOGY

## 2024-02-05 RX ORDER — HYDROCHLOROTHIAZIDE 25 MG/1
25 TABLET ORAL DAILY
Qty: 90 TABLET | Refills: 3 | Status: SHIPPED | OUTPATIENT
Start: 2024-02-05

## 2024-02-06 ENCOUNTER — TELEPHONE (OUTPATIENT)
Dept: OPHTHALMOLOGY | Facility: CLINIC | Age: 59
End: 2024-02-06
Payer: COMMERCIAL

## 2024-02-06 NOTE — H&P
History    Chief complaint:  Painless progressive vision loss    Present Ilness/Diagnosis: Nuclear sclerotic Cataract    Past Medical History:  has a past medical history of Acid reflux, Anxiety (10/18/2012), Arthritis, Cataract, Depression, Diabetic peripheral neuropathy - mild (10/21/2014), Difficult intubation, Dry eyes, Dry mouth, Fever blister, History of hepatitis B (10/03/2016), Hyperlipidemia, Hypertension, Insomnia, Iritis (05/13/2014), Long-term current use of steroids (09/27/2012), Nausea & vomiting (02/04/2015), VAISHNAVI (obstructive sleep apnea), and Type II diabetes mellitus (10/01/2012).    Family History/Social History: refer to chart    Allergies:   Review of patient's allergies indicates:   Allergen Reactions    Bactrim [sulfamethoxazole-trimethoprim] Itching    Trulicity [dulaglutide] Diarrhea and Other (See Comments)     Vomiting, abdominal pain    Diflucan [fluconazole] Swelling and Other (See Comments)     Sore on mouth       Current Medications:   Current Facility-Administered Medications:     triamcinolone acetonide injection 40 mg, 40 mg, Intramuscular, 1 time in Clinic/HOD, Desmond Osborne FNP    Current Outpatient Medications:     amLODIPine (NORVASC) 10 MG tablet, Take 1 tablet (10 mg total) by mouth once daily., Disp: 90 tablet, Rfl: 3    aspirin (ECOTRIN) 81 MG EC tablet, Take 1 tablet (81 mg total) by mouth once daily., Disp: 30 tablet, Rfl: 0    atenoloL (TENORMIN) 100 MG tablet, Take 1 tablet (100 mg total) by mouth once daily., Disp: 90 tablet, Rfl: 3    blood-glucose meter kit, Use twice daily., Disp: 1 each, Rfl: 0    canagliflozin (INVOKANA) 300 mg Tab tablet, Take 1 tablet (300 mg total) by mouth daily before breakfast., Disp: 90 tablet, Rfl: 1    clobetasoL (TEMOVATE) 0.05 % external solution, Apply to affected area once daily, Disp: 50 mL, Rfl: 6    clonazePAM (KLONOPIN) 0.5 MG tablet, TAKE 1 TABLET BY MOUTH ONCE DAILY AS NEEDED FOR ANXIETY, Disp: 90 tablet, Rfl: 0     cyclobenzaprine (FLEXERIL) 10 MG tablet, TAKE ONE TABLET BY MOUTH AT BEDTIME AS NEEDED, Disp: 90 tablet, Rfl: 2    diclofenac sodium (VOLTAREN) 1 % Gel, Apply 4 g topically 4 (four) times daily. Apply light film topically to knee up to four times daily, Disp: 3 each, Rfl: 2    docusate sodium (COLACE) 50 MG capsule, Take 1 capsule (50 mg total) by mouth 2 (two) times daily., Disp: 180 capsule, Rfl: 3    entecavir (BARACLUDE) 0.5 MG Tab, Take 1 tablet (0.5 mg total) by mouth once daily., Disp: 90 tablet, Rfl: 3    ergocalciferol (ERGOCALCIFEROL) 50,000 unit Cap, Take 1 capsule (50,000 Units total) by mouth every 7 days., Disp: 12 capsule, Rfl: 3    fluocinolone acetonide oiL 0.01 % Drop, Place 3 drops into both ears 2 (two) times a day., Disp: 20 mL, Rfl: 3    FLUoxetine 20 MG capsule, Take 1 capsule (20 mg total) by mouth 2 (two) times daily., Disp: 180 capsule, Rfl: 1    fluticasone propionate (FLONASE) 50 mcg/actuation nasal spray, 2 sprays (100 mcg total) by Each Nostril route once daily., Disp: 16 g, Rfl: 6    folic acid (FOLVITE) 1 MG tablet, Take 1 tablet (1 mg total) by mouth once daily., Disp: 90 tablet, Rfl: 1    golimumab (SIMPONI) 50 mg/0.5 mL PnIj, Inject 50 mg into the skin every 30 days., Disp: 1.5 mL, Rfl: 1    hydroCHLOROthiazide (HYDRODIURIL) 25 MG tablet, Take 1 tablet (25 mg total) by mouth once daily., Disp: 90 tablet, Rfl: 3    hydrocortisone 2.5 % cream, Apply topically 2 (two) times daily. for 10 days, Disp: 28 g, Rfl: 1    hydroquinone 4 % Crea, Apply to dark areas qhs.  Not more than 6 months straight in same location.Use sunscreen in am, Disp: 46 g, Rfl: 1    ketoconazole (NIZORAL) 2 % shampoo, Apply topically twice a week., Disp: 120 mL, Rfl: 6    ketorolac 0.5% (ACULAR) 0.5 % Drop, Place 1 drop into the right eye 3 (three) times daily., Disp: 5 mL, Rfl: 3    ketorolac 0.5% (ACULAR) 0.5 % Drop, Place 1 drop into the left eye 3 (three) times daily., Disp: 5 mL, Rfl: 3    lancets (FREESTYLE  LANCETS) 28 gauge Misc, test TWICE DAILY, Disp: 200 each, Rfl: 3    levocetirizine (XYZAL) 5 MG tablet, TAKE ONE TABLET BY MOUTH EVERY EVENING, Disp: 90 tablet, Rfl: 2    methotrexate 2.5 MG Tab, Take 6 tablets (15 mg total) by mouth every 7 days., Disp: 24 tablet, Rfl: 1    ofloxacin (OCUFLOX) 0.3 % ophthalmic solution, Place 1 drop into the right eye 3 (three) times daily., Disp: 5 mL, Rfl: 3    ofloxacin (OCUFLOX) 0.3 % ophthalmic solution, Place 1 drop into the left eye 3 (three) times daily., Disp: 5 mL, Rfl: 3    pantoprazole (PROTONIX) 40 MG tablet, Take 1 tablet (40 mg total) by mouth once daily., Disp: 90 tablet, Rfl: 2    prednisoLONE acetate (PRED FORTE) 1 % DrpS, Instill one drop into affect eye(s) as directed, Disp: 5 mL, Rfl: 1    prednisoLONE acetate (PRED FORTE) 1 % DrpS, Place 1 drop into the right eye 3 (three) times daily., Disp: 5 mL, Rfl: 3    prednisoLONE acetate (PRED FORTE) 1 % DrpS, Place 1 drop into the left eye 3 (three) times daily., Disp: 5 mL, Rfl: 3    rosuvastatin (CRESTOR) 40 MG Tab, Take 1 tablet (40 mg total) by mouth every evening., Disp: 90 tablet, Rfl: 3    semaglutide (OZEMPIC) 2 mg/dose (8 mg/3 mL) PnIj, Inject 2 mg into the skin every 7 days., Disp: 9 mL, Rfl: 3    sulfaSALAzine (AZULFIDINE) 500 MG EC tablet, Take 3 tablets (1,500 mg total) by mouth 2 (two) times daily., Disp: 540 tablet, Rfl: 0    tiZANidine (ZANAFLEX) 2 MG tablet, Take 1-2 tablets (2-4 mg total) by mouth every 8 (eight) hours as needed., Disp: 90 tablet, Rfl: 2    topiramate (TOPAMAX) 100 MG tablet, Take 1 tablet (100 mg total) by mouth 2 (two) times daily., Disp: 180 tablet, Rfl: 3    triamcinolone acetonide 0.1% (KENALOG) 0.1 % cream, Apply topically 2 (two) times daily., Disp: 45 g, Rfl: 1    valACYclovir (VALTREX) 1000 MG tablet, valacyclovir 1 gram tablet  Take 0.5 tablets every 12 hours by oral route., Disp: , Rfl:     valsartan (DIOVAN) 320 MG tablet, Take 1 tablet (320 mg total) by mouth once  daily., Disp: 90 tablet, Rfl: 3    vitamin D (VITAMIN D3) 1000 units Tab, Take 1,000 Units by mouth once daily., Disp: , Rfl:     zolpidem (AMBIEN) 5 MG Tab, Take 1 tablet (5 mg total) by mouth nightly as needed., Disp: 90 tablet, Rfl: 0    Facility-Administered Medications Ordered in Other Encounters:     0.9%  NaCl infusion, , Intravenous, Continuous, Graciela Jerome MD    Physical Exam    BP: Vital signs stable  General: No apparent distress  HEENT: nuclear sclerotic cataract  Lungs: adequate respirations  Heart: + pulses  Abdomen: soft  Rectal/pelvic: deferred    Impression: Visually significant Cataract.    See previous clinic notes for surgical indications.    Plan: Phacoemulsification with implantation of Intraocular lens

## 2024-02-06 NOTE — PRE-PROCEDURE INSTRUCTIONS
Patient reviewed on 2/7/2024.  Okay to proceed at [place]. The following pre-procedure instructions and arrival time have been reviewed with patient via phone and sent to patient portal for review.  Patient verbalized an understanding.  Pt to be accompanied by A GIRLFRIEND day of procedure as responsible .     Dear Althea     Below you will find basic pre-procedure instructions in preparation for your procedure on 2/7/24 with Dr. Mortensen     - Nothing to eat or drink after midnight the night before your procedure until after your procedure, except AM meds with small sips of water.     - HOLD all Diabetic meds AM of surgery  - HOLD all Insulin AM of surgery  - HOLD all Fluid pills AM of surgery  - HOLD all non-insulin shots until after surgery (Ozempic, Mounjaro, Trulicity, Victoza, Byetta, Wegovy and Adlyxin) (up to 7 days prior)  - HOLD all vitamins and herbal meds AM of surgery   - TAKE all B/P meds, EXCEPT those that contain a fluid pill  - USE inhalers as needed and bring AM of surgery  - USE eye drops as directed  -TAKE blood thinner meds AM of surgery unless otherwise instructed     - Shower and wash face with dial soap for 3 mins PM prior and AM of surgery  - No powder, lotions, creams, oils, gels, ointments, makeup,  or jewelry    - Wear comfortable clothing (button up shirt)     (Patient is required to have a responsible ride to transport home, ride may not leave while patient is in surgery)     -- [facility], 2nd floor Surgery Center, located   @ [address] [place] [address]  2nd Floor Registration        If you have any questions or concerns please feel free to contact your surgeon's office.                 BRAD Porter  [facility]  Pre-Admit - Anesthesia Dept

## 2024-02-06 NOTE — TELEPHONE ENCOUNTER
Spoke with pt provided arrival time of 8:00 for sx on 2/7/24 with Dr. Mortensen @ [place]. Pt has eyedrops and packet.

## 2024-02-07 ENCOUNTER — ANESTHESIA EVENT (OUTPATIENT)
Dept: SURGERY | Facility: HOSPITAL | Age: 59
End: 2024-02-07
Payer: COMMERCIAL

## 2024-02-07 ENCOUNTER — ANESTHESIA (OUTPATIENT)
Dept: SURGERY | Facility: HOSPITAL | Age: 59
End: 2024-02-07
Payer: COMMERCIAL

## 2024-02-07 ENCOUNTER — HOSPITAL ENCOUNTER (OUTPATIENT)
Facility: HOSPITAL | Age: 59
Discharge: HOME | End: 2024-02-07
Attending: OPHTHALMOLOGY | Admitting: OPHTHALMOLOGY
Payer: COMMERCIAL

## 2024-02-07 VITALS
HEART RATE: 77 BPM | SYSTOLIC BLOOD PRESSURE: 119 MMHG | DIASTOLIC BLOOD PRESSURE: 79 MMHG | RESPIRATION RATE: 18 BRPM | TEMPERATURE: 98 F | OXYGEN SATURATION: 100 %

## 2024-02-07 DIAGNOSIS — H25.12 NUCLEAR SCLEROTIC CATARACT OF LEFT EYE: Primary | ICD-10-CM

## 2024-02-07 DIAGNOSIS — H25.12 AGE-RELATED NUCLEAR CATARACT, LEFT: ICD-10-CM

## 2024-02-07 LAB — POCT GLUCOSE: 91 MG/DL (ref 70–110)

## 2024-02-07 PROCEDURE — D9220A PRA ANESTHESIA: Mod: ,,, | Performed by: NURSE ANESTHETIST, CERTIFIED REGISTERED

## 2024-02-07 PROCEDURE — V2632 POST CHMBR INTRAOCULAR LENS: HCPCS | Performed by: OPHTHALMOLOGY

## 2024-02-07 PROCEDURE — 94761 N-INVAS EAR/PLS OXIMETRY MLT: CPT

## 2024-02-07 PROCEDURE — 36000707: Performed by: OPHTHALMOLOGY

## 2024-02-07 PROCEDURE — 66982 XCAPSL CTRC RMVL CPLX WO ECP: CPT | Mod: 79,LT,, | Performed by: OPHTHALMOLOGY

## 2024-02-07 PROCEDURE — 25000003 PHARM REV CODE 250: Performed by: OPHTHALMOLOGY

## 2024-02-07 PROCEDURE — 82962 GLUCOSE BLOOD TEST: CPT | Performed by: OPHTHALMOLOGY

## 2024-02-07 PROCEDURE — 36000706: Performed by: OPHTHALMOLOGY

## 2024-02-07 PROCEDURE — 71000015 HC POSTOP RECOV 1ST HR: Performed by: OPHTHALMOLOGY

## 2024-02-07 PROCEDURE — 99900035 HC TECH TIME PER 15 MIN (STAT)

## 2024-02-07 PROCEDURE — 37000008 HC ANESTHESIA 1ST 15 MINUTES: Performed by: OPHTHALMOLOGY

## 2024-02-07 PROCEDURE — 27201423 OPTIME MED/SURG SUP & DEVICES STERILE SUPPLY: Performed by: OPHTHALMOLOGY

## 2024-02-07 PROCEDURE — 63600175 PHARM REV CODE 636 W HCPCS: Performed by: NURSE ANESTHETIST, CERTIFIED REGISTERED

## 2024-02-07 PROCEDURE — 37000009 HC ANESTHESIA EA ADD 15 MINS: Performed by: OPHTHALMOLOGY

## 2024-02-07 DEVICE — LENS EYHANCE +21.0D: Type: IMPLANTABLE DEVICE | Site: EYE | Status: FUNCTIONAL

## 2024-02-07 RX ORDER — FENTANYL CITRATE 50 UG/ML
INJECTION, SOLUTION INTRAMUSCULAR; INTRAVENOUS
Status: DISCONTINUED | OUTPATIENT
Start: 2024-02-07 | End: 2024-02-07

## 2024-02-07 RX ORDER — MOXIFLOXACIN 5 MG/ML
SOLUTION/ DROPS OPHTHALMIC
Status: DISCONTINUED | OUTPATIENT
Start: 2024-02-07 | End: 2024-02-07 | Stop reason: HOSPADM

## 2024-02-07 RX ORDER — SODIUM CHLORIDE 0.9 % (FLUSH) 0.9 %
10 SYRINGE (ML) INJECTION
Status: DISCONTINUED | OUTPATIENT
Start: 2024-02-07 | End: 2024-02-07 | Stop reason: HOSPADM

## 2024-02-07 RX ORDER — MOXIFLOXACIN 5 MG/ML
1 SOLUTION/ DROPS OPHTHALMIC
Status: COMPLETED | OUTPATIENT
Start: 2024-02-07 | End: 2024-02-07

## 2024-02-07 RX ORDER — PHENYLEPHRINE HYDROCHLORIDE 100 MG/ML
1 SOLUTION/ DROPS OPHTHALMIC
Status: DISCONTINUED | OUTPATIENT
Start: 2024-02-07 | End: 2024-02-07 | Stop reason: HOSPADM

## 2024-02-07 RX ORDER — PROPARACAINE HYDROCHLORIDE 5 MG/ML
1 SOLUTION/ DROPS OPHTHALMIC
Status: DISCONTINUED | OUTPATIENT
Start: 2024-02-07 | End: 2024-02-07 | Stop reason: HOSPADM

## 2024-02-07 RX ORDER — TETRACAINE HYDROCHLORIDE 5 MG/ML
1 SOLUTION OPHTHALMIC
Status: DISCONTINUED | OUTPATIENT
Start: 2024-02-07 | End: 2024-02-07 | Stop reason: HOSPADM

## 2024-02-07 RX ORDER — PREDNISOLONE ACETATE 10 MG/ML
SUSPENSION/ DROPS OPHTHALMIC
Status: DISCONTINUED | OUTPATIENT
Start: 2024-02-07 | End: 2024-02-07 | Stop reason: HOSPADM

## 2024-02-07 RX ORDER — LIDOCAINE HYDROCHLORIDE 10 MG/ML
INJECTION, SOLUTION EPIDURAL; INFILTRATION; INTRACAUDAL; PERINEURAL
Status: DISCONTINUED | OUTPATIENT
Start: 2024-02-07 | End: 2024-02-07 | Stop reason: HOSPADM

## 2024-02-07 RX ORDER — MIDAZOLAM HYDROCHLORIDE 1 MG/ML
INJECTION, SOLUTION INTRAMUSCULAR; INTRAVENOUS
Status: DISCONTINUED | OUTPATIENT
Start: 2024-02-07 | End: 2024-02-07

## 2024-02-07 RX ORDER — LIDOCAINE HYDROCHLORIDE 40 MG/ML
INJECTION, SOLUTION RETROBULBAR
Status: DISCONTINUED | OUTPATIENT
Start: 2024-02-07 | End: 2024-02-07 | Stop reason: HOSPADM

## 2024-02-07 RX ORDER — CYCLOP/TROP/PROPA/PHEN/KET/WAT 1-1-0.1%
1 DROPS (EA) OPHTHALMIC (EYE)
Status: COMPLETED | OUTPATIENT
Start: 2024-02-07 | End: 2024-02-07

## 2024-02-07 RX ORDER — ACETAMINOPHEN 325 MG/1
650 TABLET ORAL EVERY 4 HOURS PRN
Status: DISCONTINUED | OUTPATIENT
Start: 2024-02-07 | End: 2024-02-07 | Stop reason: HOSPADM

## 2024-02-07 RX ADMIN — MOXIFLOXACIN OPHTHALMIC 1 DROP: 5 SOLUTION/ DROPS OPHTHALMIC at 08:02

## 2024-02-07 RX ADMIN — Medication 1 DROP: at 08:02

## 2024-02-07 RX ADMIN — MOXIFLOXACIN 1 DROP: 5 SOLUTION/ DROPS OPHTHALMIC at 09:02

## 2024-02-07 RX ADMIN — MIDAZOLAM HYDROCHLORIDE 1 MG: 1 INJECTION, SOLUTION INTRAMUSCULAR; INTRAVENOUS at 09:02

## 2024-02-07 RX ADMIN — FENTANYL CITRATE 25 MCG: 50 INJECTION, SOLUTION INTRAMUSCULAR; INTRAVENOUS at 09:02

## 2024-02-07 NOTE — OP NOTE
DATE OF PROCEDURE: 02/07/2024    SURGEON: HEMALATHA ACOSTA MD    PREOPERATIVE DIAGNOSIS:  1. Senile nuclear sclerotic cataract left eye. 2. 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     POSTOPERATIVE DIAGNOSIS: 1.Senile nuclear sclerotic cataract left eye. 2.  Posterior synechiae left eye and Poor mydriasis secondary to floppy iris syndrome left eye    PROCEDURE PERFORMED:  Complex phacoemulsification with placement of intraocular lens, left eye, with placement of a Malyugin ring.    IMPLANT:  DIB00 21.0    ANESTHESIA:  Topical and MAC    COMPLICATIONS: none    ESTIMATED BLOOD LOSS: <1cc    SPECIMENS: none    INDICATIONS FOR PROCEDURE:   The patient has a history of painless progressive vision loss.  The patient has described difficulties with activities of daily living, which specifically include driving, which is secondary to cataract formation and progression. After we had a thorough discussion about risks, benefits, and alternatives to cataract surgery, the patient agreed to proceed with phacoemulsification and implantation of a lens in the left eye.  These risks include, but are not limited to, hemorrhage, pain, infection, need for additional surgery, need for glasses or contacts, loss of vision, or even loss of the eye.      PROCEDURE IN DETAIL:  The patient was met in the preop holding area.  Consent was confirmed to be signed.  The operative site was marked.  The patient was brought into the operating room by the anesthesia team and placed under monitored anesthesia care.  The left eye was prepped and draped in a sterile ophthalmic fashion.  A Humble speculum was placed into the left eye.   A paracentesis site was made and 1% preservative-free lidocaine was injected into the anterior chamber.  Viscoelastic  material was injected into the anterior chamber.  A keratome blade was used to make a clear corneal incision. The malyugin ring was inserted and positioned into place, assisting with  pupillary dilation.  A cystotome was used to initiate the continuous curvilinear capsulorrhexis which was completed with Utrata forceps.  BSS on a neville cannula was used to perform hydrodissection.  The phacoemulsification tip was introduced into the eye and the nucleus was removed in a standard divide-and-conquer fashion.  Remaining cortical material was removed from the eye using irrigation-aspiration.  The capsular bag was filled with viscoelastic material and the intraocular lens was injected and positioned into place. The Malyugin ring was removed from the eye. Remaining viscoelastic material was removed from the eye using irrigation and aspiration.  The corneal wounds were hydrated.  The eye was filled to physiologic pressure. The wounds were found to be watertight. Drops of Vigamox and prednisilone were placed into the eye.  The eye was washed, dried, and shielded.  The patient tolerated the procedure well and knows to follow up with me tomorrow morning, sooner if needed.

## 2024-02-07 NOTE — DISCHARGE SUMMARY
[facility]  Discharge Note  Short Stay    Procedure(s) (LRB):  EXTRACTION, CATARACT, WITH IOL INSERTION (Left)    BRIEF DISCHARGE NOTE:    Date of discharge: 02/07/2024    Reason for hospitalization -  Cataract surgery     Final Diagnosis - Visually significant Cataract    Procedures and treatment provided - Status post phacoemulsification with placement of intraocular lens     Diet - Advance to regular as tolerated    Activity - as tolerated    Disposition at the end of the case - Good.    Discharge: to home    The patient tolerated the procedure well and knows to follow up with me tomorrow morning in the eye clinic, sooner if needed.    Patient and family instructions (as appropriate) - Given to patient on discharge    Soumya Mortensen MD

## 2024-02-07 NOTE — DISCHARGE INSTRUCTIONS
Addended by: JEOVANNY ARMSTRONG on: 3/18/2022 10:36 AM     Modules accepted: Orders     Dr. Mortensen     Cataract Post-Operative Instructions       Day of surgery:     -Resume drops THREE times daily into the operative eye.     -Do not rub your eye     -Wear protective sunglasses during the day.     -Resume moderate activity.     -Bathe/shower/wash face normally     -Do not apply makeup around the operative eye for 1 week.     -You should expect:     Blurry vision and halos for 24-48 hours     Dilated pupil for 24-48 hours     Scratchy feeling in the eye for 1-2 days     Curved shadow in your peripheral vision for 2-3 weeks     Occasional flickering of lights for up to 1 week     -If you experience severe pain or nausea, call Dr. Mortensen or the on-call doctor at [phone].       Plan to see Dr. Mortensen tomorrow at:     [facility] [address]     10TH FLOOR     [facility] [address]     **Most patients can drive the next morning.  If you do not feel comfortable driving, please arrange for transportation. **

## 2024-02-07 NOTE — ANESTHESIA PREPROCEDURE EVALUATION
02/07/2024  Althea Vazquez is a 58 y.o., female.      Pre-op Assessment    I have reviewed the Patient Summary Reports.     I have reviewed the Nursing Notes.    I have reviewed the Medications.     Review of Systems  Anesthesia Hx:             Denies Family Hx of Anesthesia complications.    Denies Personal Hx of Anesthesia complications.                       Patient Active Problem List   Diagnosis    Axial spondyloarthritis    Idiopathic edema    Vitamin D deficiency    High risk medication use-sulfasalazine 2 gm    Cervical radiculopathy    Insomnia    Type 2 diabetes mellitus with diabetic polyneuropathy    Weakness of right lower extremity    Weakness of right upper extremity    Decreased right shoulder range of motion    DDD (degenerative disc disease), lumbar    Osteoarthritis involving multiple joints on both sides of body    Non-radiographic axial spondyloarthritis    Abnormal chest CT    Gastroesophageal reflux disease    Right shoulder pain    Weakness of both legs    Cervicogenic headache    Severe obesity (BMI 35.0-39.9) with comorbidity    Hyperlipidemia associated with type 2 diabetes mellitus    Anxiety    Hypertension associated with diabetes    Tendinopathy of right gluteal region    Hypocalcemia    Diabetes mellitus with hyperglycemia    Positive depression screening    Sacroiliitis    Bilateral shoulder pain    Upper extremity weakness    Pain    Greater trochanteric bursitis of left hip    Axillary abscess       Past Medical History:   Diagnosis Date    Acid reflux     Anxiety 10/18/2012    Arthritis     Cataract     Depression     Diabetic peripheral neuropathy - mild 10/21/2014    Difficult intubation     Dry eyes     Dry mouth     Fever blister     History of hepatitis B 10/03/2016    Hep B core total Ab (+), no active/chronic infection    Hyperlipidemia     Hypertension      Insomnia     Iritis 05/13/2014    Long-term current use of steroids 09/27/2012    Nausea & vomiting 02/04/2015    VAISHNAVI (obstructive sleep apnea)     Type II diabetes mellitus 10/01/2012       ECHO: No results found for this or any previous visit.      There is no height or weight on file to calculate BMI.    Tobacco Use: Low Risk  (2/7/2024)    Patient History     Smoking Tobacco Use: Never     Smokeless Tobacco Use: Never     Passive Exposure: Not on file       Social History     Substance and Sexual Activity   Drug Use No        Alcohol Use: Patient Declined (12/4/2023)    AUDIT-C     Frequency of Alcohol Consumption: Patient declined     Average Number of Drinks: Patient declined     Frequency of Binge Drinking: Patient declined       Review of patient's allergies indicates:   Allergen Reactions    Bactrim [sulfamethoxazole-trimethoprim] Itching    Trulicity [dulaglutide] Diarrhea and Other (See Comments)     Vomiting, abdominal pain    Diflucan [fluconazole] Swelling and Other (See Comments)     Sore on mouth         Airway:  No value filed.     Physical Exam  General: Cooperative, Alert and Oriented        Anesthesia Plan  Type of Anesthesia, risks & benefits discussed:    Anesthesia Type: MAC  Intra-op Monitoring Plan: Standard ASA Monitors  Induction:  IV  Informed Consent: Informed consent signed with the Patient and all parties understand the risks and agree with anesthesia plan.  All questions answered.   ASA Score: 3  Day of Surgery Review of History & Physical: H&P Update referred to the surgeon/provider.    Ready For Surgery From Anesthesia Perspective.     .

## 2024-02-07 NOTE — ANESTHESIA POSTPROCEDURE EVALUATION
Anesthesia Post Evaluation    Patient: Althea Mondragonchester    Procedure(s) Performed: Procedure(s) (LRB):  EXTRACTION, CATARACT, WITH IOL INSERTION (Left)    Final Anesthesia Type: MAC      Patient location during evaluation: PACU  Patient participation: Yes- Able to Participate  Level of consciousness: awake and alert  Post-procedure vital signs: reviewed and stable  Pain management: adequate  Airway patency: patent    PONV status at discharge: No PONV  Anesthetic complications: no      Cardiovascular status: stable  Respiratory status: spontaneous ventilation  Hydration status: euvolemic  Follow-up not needed.              Vitals Value Taken Time   /79 02/07/24 1006   Temp 36.6 °C (97.9 °F) 02/07/24 0945   Pulse 77 02/07/24 1006   Resp 18 02/07/24 1006   SpO2 100 % 02/07/24 1006         No case tracking events are documented in the log.      Pain/Imelda Score: Imelda Score: 10 (2/7/2024  9:45 AM)

## 2024-02-07 NOTE — TRANSFER OF CARE
Anesthesia Transfer of Care Note    Patient: Althea Vazquez    Procedure(s) Performed: Procedure(s) (LRB):  EXTRACTION, CATARACT, WITH IOL INSERTION (Left)    Patient location: PACU    Anesthesia Type: MAC    Transport from OR: Transported from OR on room air with adequate spontaneous ventilation    Post pain: adequate analgesia    Post assessment: no apparent anesthetic complications and tolerated procedure well    Post vital signs: stable    Level of consciousness: awake, alert and oriented    Nausea/Vomiting: no nausea/vomiting    Complications: none    Transfer of care protocol was followed      Last vitals: Visit Vitals  BP (!) 154/79 (BP Location: Right arm, Patient Position: Sitting)   Pulse 74   Temp 36.4 °C (97.5 °F) (Skin)   Resp 18   LMP 11/25/2014   SpO2 97%   Breastfeeding No

## 2024-02-08 ENCOUNTER — OFFICE VISIT (OUTPATIENT)
Dept: OPHTHALMOLOGY | Facility: CLINIC | Age: 59
End: 2024-02-08
Payer: COMMERCIAL

## 2024-02-08 DIAGNOSIS — Z98.890 POST-OPERATIVE STATE: Primary | ICD-10-CM

## 2024-02-08 DIAGNOSIS — H25.12 NUCLEAR SCLEROSIS OF LEFT EYE: ICD-10-CM

## 2024-02-08 PROCEDURE — 99999 PR PBB SHADOW E&M-EST. PATIENT-LVL III: CPT | Mod: PBBFAC,,, | Performed by: OPHTHALMOLOGY

## 2024-02-08 PROCEDURE — 1159F MED LIST DOCD IN RCRD: CPT | Mod: CPTII,S$GLB,, | Performed by: OPHTHALMOLOGY

## 2024-02-08 PROCEDURE — 99024 POSTOP FOLLOW-UP VISIT: CPT | Mod: S$GLB,,, | Performed by: OPHTHALMOLOGY

## 2024-02-08 NOTE — PROGRESS NOTES
HPI     Post-op Evaluation     Additional comments: 1 day phaco OS           Comments    Roni / MECHE     S/p phaco OS 2/7/24  S/p phaco OD 1/3/24   Recurrent iritis OU   Posterior synechiae OU   Cataracts OS         Ofloxacin TID OS  Ketorolac TID OS  PF TID OS  MTX, Azulfidine     Patient here for 1 day phaco OS. OS vision doing better since cataract   surgery. Picked up OTC readers today.           Last edited by Sherrie Pa MA on 2/8/2024  3:28 PM.            Assessment /Plan     For exam results, see Encounter Report.    Post-operative state    Nuclear sclerosis of left eye      Slit Lamp Exam  L/L - normal  C/s - quiet  Cornea - clear  A/C - 1+ cell  Lens - PCIOL    POD #1 s/p phaco/IOL  - doing well  - continue the following drops:    vigamox or ocuflox TID x 1 wk then stop  Pred forte TID x  4 wks  Ketorolac TID until runs out    Versus:    Combination drop - 1 drop TID x total of 1 month    Appropriate precautions and post op medications reviewed.  Patient instructed to call or come in if symptoms of redness, decreased vision, or pain are experienced.    -f/up 1-2 wks, sooner PRN. Or 4 wks with optom for mrx if needed.

## 2024-02-14 DIAGNOSIS — Z79.899 HIGH RISK MEDICATION USE: ICD-10-CM

## 2024-02-14 DIAGNOSIS — M02.30 REACTIVE ARTHRITIS: ICD-10-CM

## 2024-02-14 DIAGNOSIS — M19.90 INFLAMMATORY ARTHRITIS: ICD-10-CM

## 2024-02-14 DIAGNOSIS — M47.819 SPONDYLOARTHRITIS: ICD-10-CM

## 2024-02-14 RX ORDER — SULFASALAZINE 500 MG/1
1500 TABLET, DELAYED RELEASE ORAL 2 TIMES DAILY
Qty: 540 TABLET | Refills: 0 | Status: SHIPPED | OUTPATIENT
Start: 2024-02-14 | End: 2024-04-09 | Stop reason: SDUPTHER

## 2024-02-17 DIAGNOSIS — J20.9 ACUTE BRONCHITIS, UNSPECIFIED ORGANISM: ICD-10-CM

## 2024-02-17 DIAGNOSIS — R51.9 SINUS HEADACHE: ICD-10-CM

## 2024-02-17 RX ORDER — AMLODIPINE BESYLATE 10 MG/1
10 TABLET ORAL DAILY
Qty: 90 TABLET | Refills: 2 | Status: SHIPPED | OUTPATIENT
Start: 2024-02-17

## 2024-02-17 RX ORDER — LEVOCETIRIZINE DIHYDROCHLORIDE 5 MG/1
5 TABLET, FILM COATED ORAL DAILY
Qty: 90 TABLET | Refills: 2 | Status: SHIPPED | OUTPATIENT
Start: 2024-02-17

## 2024-02-17 RX ORDER — ATENOLOL 100 MG/1
100 TABLET ORAL DAILY
Qty: 90 TABLET | Refills: 2 | Status: SHIPPED | OUTPATIENT
Start: 2024-02-17

## 2024-02-17 NOTE — TELEPHONE ENCOUNTER
Care Due:                  Date            Visit Type   Department     Provider  --------------------------------------------------------------------------------                                EP -                              PRIMARY      [facility] INTERNAL  Last Visit: 11-      CARE ([facility])   MEDICINE       Weston Begum                              EP -                              PRIMARY      [facility] INTERNAL  Next Visit: 05-      CARE ([facility])   [facility]       Weston Begum                                                            Last  Test          Frequency    Reason                     Performed    Due Date  --------------------------------------------------------------------------------    HBA1C.......  6 months...  canagliflozin,             11-   05-                             semaglutide..............    [employer] Embedded Care Due Messages. Reference number: [id number].   2/17/2024 5:09:01 AM CST

## 2024-02-17 NOTE — TELEPHONE ENCOUNTER
Provider Staff:  Action required for this patient    Requires labs      Please see care gap opportunities below in Care Due Message.    Thanks!  [facility]     Appointments      Date Provider   Last Visit   11/9/2023 Weston Begum, DO   Next Visit   5/14/2024 Weston Begum, DO     Refill Decision Note   Althea Vazquez  is requesting a refill authorization.  Brief Assessment and Rationale for Refill:  Approve     Medication Therapy Plan:        Comments:     Note composed:11:01 AM 02/17/2024            - Podiatry c/s following   - dry dressing in place  - Vascular is following, scheduled for bypass grafting   -  peripheral angiogram results as documented.  CRE in wound, ID FU noted  By pass grafting planned for tomorrow  RCRI risk is moderate, medically optimized for the same. - Podiatry c/s following - s/p excisional debridement at bedside  - dry dressing in place  - Vascular is following, scheduled for bypass grafting next week, cardiology VIVEROS ordered  -  peripheral angiogram results as documented.  CRE in wound, ID FU noted  By pas grafting planned for Wednesday.  RCRI risk is moderate, medically optimized for the same - Podiatry c/s following - s/p excisional debridement at bedside  - dry dressing in place  - Vascular is following  -  - - On clindamycin iv  CTA of LE shows occlusion , scheduled for peripheral angiogram.  Will need cardiac clearance, called - Podiatry c/s following - s/p excisional debridement at bedside  - dry dressing in place  - Vascular is following  - Arterial Duplex of RLE ordered  - - On clindamycin iv  - NEeds CTA of legs. IV heparin started, stop Eliquis - Podiatry c/s following - s/p excisional debridement at bedside  - dry dressing in place  - Vascular is following, scheduled for bypass grafting next week, cardiology VIVEROS ordered  -  peripheral angiogram results as documented.  CRE in wound, ID FU noted - Podiatry c/s following   - dry dressing in place  - Vascular is following, S/p BKA - Podiatry c/s following -   - dry dressing in place  - Vascular is following, scheduled for bypass grafting   -  peripheral angiogram results as documented.  CRE in wound, ID FU noted  By pas grafting planned for Wednesday.  RCRI risk is moderate, medically optimized for the same. - Podiatry c/s following - s/p excisional debridement at bedside  - dry dressing in place  - Vascular is following  -  - - On clindamycin iv, ID consult called  CTA of LE shows occlusion , scheduled for peripheral angiogram.  Cardiac clearance, noted.   Medically stable for the procedure - Podiatry c/s following   - dry dressing in place  - Vascular is following, scheduled for bypass grafting   -  peripheral angiogram results as documented.  CRE in wound, ID FU noted  BKA is planned now for 10/8  RCRI risk is moderate, medically optimized for the same. - Podiatry c/s following - s/p excisional debridement at bedside  - dry dressing in place  - Vascular is following  -  CTA of LE shows occlusion , scheduled for peripheral angiogram.  Cardiac clearance, noted.   Medically stable for the procedure  ID FU noted - Podiatry c/s following - s/p excisional debridement at bedside  - dry dressing in place  - Vascular is following  -  peripheral angiogram results as documented.  PODIATRY IS PLANNING A 5TH PARTIAL RAY AMPUTATION  Cardiac clearance, noted.   Medically stable for the procedure  ID FU noted - Podiatry c/s following - s/p excisional debridement at bedside  - dry dressing in place  - Vascular is following, scheduled for bypass grafting next week, cardiology VIVEROS ordered  -  peripheral angiogram results as documented.  CRE in wound, ID FU noted  By pas grafting planned for Wednesday.  RCRI risk is moderate, medically optimized for the same.

## 2024-03-11 ENCOUNTER — OFFICE VISIT (OUTPATIENT)
Dept: OPTOMETRY | Facility: CLINIC | Age: 59
End: 2024-03-11
Payer: COMMERCIAL

## 2024-03-11 DIAGNOSIS — Z96.1 PSEUDOPHAKIA OF BOTH EYES: ICD-10-CM

## 2024-03-11 DIAGNOSIS — Z98.890 POST-OPERATIVE STATE: Primary | ICD-10-CM

## 2024-03-11 DIAGNOSIS — Z13.5 SCREENING FOR EYE CONDITION: ICD-10-CM

## 2024-03-11 DIAGNOSIS — Z98.42 S/P BILATERAL CATARACT EXTRACTION: ICD-10-CM

## 2024-03-11 DIAGNOSIS — Z98.41 S/P BILATERAL CATARACT EXTRACTION: ICD-10-CM

## 2024-03-11 DIAGNOSIS — H52.7 REFRACTIVE ERRORS: ICD-10-CM

## 2024-03-11 PROCEDURE — 1159F MED LIST DOCD IN RCRD: CPT | Mod: CPTII,S$GLB,, | Performed by: OPTOMETRIST

## 2024-03-11 PROCEDURE — 92015 DETERMINE REFRACTIVE STATE: CPT | Mod: S$GLB,,, | Performed by: OPTOMETRIST

## 2024-03-11 PROCEDURE — 99999 PR PBB SHADOW E&M-EST. PATIENT-LVL IV: CPT | Mod: PBBFAC,,, | Performed by: OPTOMETRIST

## 2024-03-11 PROCEDURE — 99024 POSTOP FOLLOW-UP VISIT: CPT | Mod: S$GLB,,, | Performed by: OPTOMETRIST

## 2024-03-11 NOTE — PATIENT INSTRUCTIONS
S/p bilateral cataract surgery, with bilateral posterior chamber intraocular lens.  Slight wrinkling of intact posterior lens capsule in each eye.  Monitor only.  No need for YAG laser posterior capsulotomy in either eye.    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  Left eye quiet.  No need to resume use of Prednisolone Acetate drops in either eye.    Residual distance refractive error in each eye.  Best-corrected VA of 20/25+4 in the right eye, and 20?25-1+4 in the left eye.    New spectacle lens Rx issued for use as desired.  Recheck in one year - or prior, if any problems noted in the interim.    Okay to use Systane or Refresh Optive artificial tears as needed throughout the day in both eyes for dryness and itching in the eyes.
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
## 2024-03-13 ENCOUNTER — OFFICE VISIT (OUTPATIENT)
Dept: SLEEP MEDICINE | Facility: CLINIC | Age: 59
End: 2024-03-13
Attending: PSYCHIATRY & NEUROLOGY
Payer: COMMERCIAL

## 2024-03-13 VITALS
SYSTOLIC BLOOD PRESSURE: 148 MMHG | HEART RATE: 69 BPM | WEIGHT: 231.13 LBS | DIASTOLIC BLOOD PRESSURE: 87 MMHG | BODY MASS INDEX: 36.2 KG/M2

## 2024-03-13 DIAGNOSIS — G47.30 SLEEP APNEA, UNSPECIFIED TYPE: Primary | ICD-10-CM

## 2024-03-13 DIAGNOSIS — G47.10 HYPERSOMNOLENCE: ICD-10-CM

## 2024-03-13 PROCEDURE — 3008F BODY MASS INDEX DOCD: CPT | Mod: CPTII,S$GLB,, | Performed by: PSYCHIATRY & NEUROLOGY

## 2024-03-13 PROCEDURE — 99204 OFFICE O/P NEW MOD 45 MIN: CPT | Mod: S$GLB,,, | Performed by: PSYCHIATRY & NEUROLOGY

## 2024-03-13 PROCEDURE — 99999 PR PBB SHADOW E&M-EST. PATIENT-LVL III: CPT | Mod: PBBFAC,,, | Performed by: PSYCHIATRY & NEUROLOGY

## 2024-03-13 PROCEDURE — 3079F DIAST BP 80-89 MM HG: CPT | Mod: CPTII,S$GLB,, | Performed by: PSYCHIATRY & NEUROLOGY

## 2024-03-13 PROCEDURE — 3072F LOW RISK FOR RETINOPATHY: CPT | Mod: CPTII,S$GLB,, | Performed by: PSYCHIATRY & NEUROLOGY

## 2024-03-13 PROCEDURE — 3077F SYST BP >= 140 MM HG: CPT | Mod: CPTII,S$GLB,, | Performed by: PSYCHIATRY & NEUROLOGY

## 2024-03-13 NOTE — PROGRESS NOTES
Althea Vazquez is a 58 y.o. female is here to be evaluated for a sleep disorder; referred by Weston Begum, *.    HAd PSG in 2014 - had UAAERS: gained 15 lbs since previosu stidu - weight goes up and ndown.  Never tereated for UARS>  Stil reports louid snoring; may be choking for air - once jum,ped out of the sleep;excessive daytime sleepiness and fatigue  Reports bad dreams upon retuirning to sleep.    + restless sleep; jumping out of her sleep - the patient herself is not aware.     Seeing pscychiatry for anxiety. Clonazepam - very PRN.      Althea Vazquez denied  witnessed apneas.    The patient does not feel rested upon awakening. Takes naps.     The patient  reports morning headaches and reports  dry mouth on awakening.   Althea Vazquez denies  nasal congestion.    The patient never had tonsillectomy, adenoidectomy or UPPP        Althea Vazquez  reports somniloquy. No somnambulism  The patient  denies auxiliary symptoms of narcolepsy including sleep onset paralysis, hypnagogic hallucinations, sleep attacks and cataplexy.    Althea Vazquez denied symptoms concerning for RLS; nocturnal leg movements have not been noticed.   The patient does not experience sleep related leg cramps.         Medications pertinent to sleep  disorders taken currently: Clonazepam  Previous  medications taken  for sleep disorders:  no    Sleep studies  PSG 2015 - Medicare criteriA:    Soft snoring was present. There were rare hypopneas, but they did not reach significance by AHI and O2 criteria. The overall AHI was 2.9 with an oxygen larisa of 88.0%. The supine AHI was 15.4 and the REM AHI was 6.2. The RDI (Respiratory Disturbance Index) was greater than the AHI due to the presence of frequent RERAs (respiratory effort related arousals), primarily while sleeping supine. The overall RDI was 11.1. The supine RDI was 15.4.             3/12/2024     6:33 PM 8/1/2018      2:29 PM   EPWORTH SLEEPINESS SCALE TOTAL SCORE    Total score 8 5             3/12/2024     6:33 PM   EPWORTH SLEEPINESS SCALE   Sitting and reading 1   Watching TV 3   Sitting, inactive in a public place (e.g. a theatre or a meeting) 1   As a passenger in a car for an hour without a break 1   Lying down to rest in the afternoon when circumstances permit 1   Sitting and talking to someone 0   Sitting quietly after a lunch without alcohol 1   In a car, while stopped for a few minutes in traffic 0   Total score 8               3/12/2024     6:33 PM   EPWORTH SLEEPINESS SCALE   Sitting and reading 1   Watching TV 3   Sitting, inactive in a public place (e.g. a theatre or a meeting) 1   As a passenger in a car for an hour without a break 1   Lying down to rest in the afternoon when circumstances permit 1   Sitting and talking to someone 0   Sitting quietly after a lunch without alcohol 1   In a car, while stopped for a few minutes in traffic 0   Total score 8         3/12/2024     6:44 PM   Sleep Clinic New Patient   What time do you go to bed on a week day? (Give a range) 9 or 10   What time do you go to bed on a day off? (Give a range) Dont work   How long does it take you to fall asleep? (Give a range) 20 to 25 minutes   On average, how many times per night do you wake up? One   How long does it take you to fall back into sleep? (Give a range) Some time I dont   What time do you wake up to start your day on a week day? (Give a range) 6:00   What time do you wake up to start your day on a day off? (Give a range) Dont work   What time do you get out of bed? (Give a range) 6:00   On average, how many hours do you sleep? 5   On average, how many naps do you take per day? One   Rate your sleep quality from 0 to 5 (0-poor, 5-great). 2   Have you experienced:  Weight gain?   How much weight have you lost or gained (in lbs.) in the last year? 20lb   On average, how many times per night do you go to the bathroom?  One    Have you ever had a sleep study/CPAP machine/surgery for sleep apnea? No   Have you ever had a CPAP machine for sleep apnea? No   Have you ever had surgery for sleep apnea? No           3/12/2024     6:44 PM   Sleep Clinic ROS    Difficulty breathing through the nose?  Sometimes   Sore throat or dry mouth in the morning? Yes   Irregular or very fast heart beat?  No   Shortness of breath?  No   Acid reflux? Sometimes   Body aches and pains?  Yes   Morning headaches? Sometimes   Dizziness? Sometimes   Mood changes?  Sometimes   Do you exercise?  No   Do you feel like moving your legs a lot?  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    Idiopathic edema 09/27/2012    Vitamin D deficiency 10/18/2012    High risk medication use-sulfasalazine 2 gm 05/29/2013    Cervical radiculopathy 08/28/2014    Insomnia 08/05/2015    Type 2 diabetes mellitus with diabetic polyneuropathy 11/11/2015    Weakness of right lower extremity 07/07/2016    Weakness of right upper extremity 07/07/2016    Decreased right shoulder range of motion 07/26/2016    DDD (degenerative disc disease), lumbar 08/29/2016    Osteoarthritis involving multiple joints on both sides of body 09/09/2016    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/09/2016    History of hepatitis B 10/03/2016    Abnormal chest CT 10/07/2016    Gastroesophageal reflux disease 11/07/2016    Right shoulder pain 02/14/2017    Weakness of both legs 03/12/2017    Cervicogenic headache 04/17/2017    Severe obesity (BMI 35.0-39.9) with comorbidity 12/01/2017    Hyperlipidemia associated with type 2 diabetes mellitus 04/30/2018    Anxiety 04/30/2018    Hypertension associated with diabetes 09/12/2018    Tendinopathy of right gluteal region 01/30/2019    Hypocalcemia 08/30/2019    Diabetes mellitus with hyperglycemia 12/02/2019    Positive depression screening 12/02/2019    Sacroiliitis  07/30/2020    Bilateral shoulder pain 08/04/2020    Upper extremity weakness 08/04/2020    Pain 02/17/2021    Greater trochanteric bursitis of left hip 04/21/2021    Axillary abscess 05/07/2021     Resolved Ambulatory Problems     Diagnosis Date Noted    Reactive arthritis 09/06/2012    Effusion of right knee 09/06/2012    Long-term current use of steroids 09/27/2012    Fatigue 09/27/2012    Hyperpigmentation of skin 09/27/2012    Dactylitis 09/27/2012    Hypertension 10/01/2012    Anxiety 10/18/2012    Tachycardia 01/10/2013    Type II or unspecified type diabetes mellitus with neurological manifestations, not stated as uncontrolled(250.60) 05/29/2013    Right knee pain 04/21/2014    Iritis 05/13/2014    Depression     Knee pain 05/14/2014    Status post arthroscopy of right knee 5/14/2014 05/20/2014    Annual physical exam 08/28/2014    Numbness and tingling of right arm 08/28/2014    Menorrhagia 10/14/2014    Diabetic peripheral neuropathy - mild 10/21/2014    Hyperlipidemia 10/21/2014    Sleep difficulties 10/21/2014    Obesity 10/21/2014    Pre-op evaluation 11/19/2014    S/P laparoscopic hysterectomy 12/03/2014    Type II or unspecified type diabetes mellitus with neurological manifestations, not stated as uncontrolled(250.60) 01/02/2015    Angioedema of lips 02/10/2015    Essential hypertension 11/11/2015    Anxiety and depression 07/07/2016    Muscle weakness of lower extremity 07/18/2016    Difficulty walking 07/18/2016    Pain aggravated by walking 07/18/2016    Multiple falls 07/18/2016    Muscle weakness of right upper extremity 07/26/2016    Enthesitis related arthritis 09/12/2016    Cough 10/07/2016    Mass 10/13/2016    Decreased range of motion of neck 10/15/2016    Abnormality of gait 10/15/2016    Weakness of both legs 10/15/2016    Pain aggravated by physical activity 10/15/2016    Shoulder pain, bilateral 10/19/2016    Decreased range of motion of shoulder 10/19/2016    Muscle weakness of upper  extremity 10/19/2016    Decreased range of motion (ROM) of shoulder 02/14/2017    Decreased functional mobility and endurance 03/12/2017    Muscle spasms of neck 04/17/2017    Colon cancer screening 01/09/2018    Essential hypertension 04/30/2018    Morbid obesity with BMI of 40.0-44.9, adult 06/08/2018    Diabetes mellitus type 2 without retinopathy 12/02/2019    DM type 2, not at goal 12/02/2019    Immunosuppressed status 12/02/2019    Osteoarthritis involving multiple joints on both sides of body 07/30/2020     Past Medical History:   Diagnosis Date    Acid reflux     Arthritis     Cataract     Difficult intubation     Dry eyes     Dry mouth     Fever blister     Nausea & vomiting 02/04/2015    VAISHNAVI (obstructive sleep apnea)     Type II diabetes mellitus 10/01/2012                PAST SURGICAL HISTORY:    Past Surgical History:   Procedure Laterality Date    CATARACT EXTRACTION W/  INTRAOCULAR LENS IMPLANT Right 1/3/2024    Procedure: EXTRACTION, CATARACT, WITH IOL INSERTION;  Surgeon: Soumya Mortensen MD;  Location: [facility] OR;  Service: Ophthalmology;  Laterality: Right;    CATARACT EXTRACTION W/  INTRAOCULAR LENS IMPLANT Left 2/7/2024    Procedure: EXTRACTION, CATARACT, WITH IOL INSERTION;  Surgeon: Soumya Mortensen MD;  Location: [facility] OR;  Service: Ophthalmology;  Laterality: Left;    COLONOSCOPY N/A 1/9/2018    Procedure: COLONOSCOPY;  Surgeon: Juwan Lopez MD;  Location: [facility] ([address] [place] [address]);  Service: Endoscopy;  Laterality: N/A;  per anesthesia, pt. needs to be on 2nd floor due to past Anesthesia problems    EPIDURAL STEROID INJECTION INTO CERVICAL SPINE N/A 2/17/2021    Procedure: Injection-steroid-epidural-cervical--C7-T1 IL SHARONA;  Surgeon: Graciela Jerome MD;  Location: [facility] PAIN MGT;  Service: Pain Management;  Laterality: N/A;    EPIDURAL STEROID INJECTION INTO CERVICAL SPINE N/A 2/23/2023    Procedure: Injection-steroid-epidural-cervical  C7-T1;  Surgeon: Juvenal Segura MD;  Location: [facility] PAIN  MGT;  Service: Pain Management;  Laterality: N/A;    HYSTERECTOMY  12/3/2014    INJECTION OF ANESTHETIC AGENT INTO SACROILIAC JOINT Left 4/21/2021    Procedure: LEFT SACROILIAC JOINT STEROID INJECTION;  Surgeon: Graciela Jerome MD;  Location: [facility] PAIN MGT;  Service: Pain Management;  Laterality: Left;    INJECTION OF JOINT Left 4/21/2021    Procedure: Injection, Joint--LEFT GTB;  Surgeon: Graciela Jerome MD;  Location: [facility] PAIN MGT;  Service: Pain Management;  Laterality: Left;    KNEE ARTHROSCOPY  5-14-14    right    TUBAL LIGATION           FAMILY HISTORY:                Family History   Problem Relation Age of Onset    Diabetes Mother     Cataracts Mother     Stroke Mother     Heart attack Mother     Depression Mother     Stroke Father     Hypertension Father     Hyperlipidemia Father     Colon cancer Father     Diabetes Maternal Aunt     Heart attack Paternal Grandmother     ADD / ADHD Son     No Known Problems Sister     No Known Problems Brother     No Known Problems Maternal Uncle     No Known Problems Paternal Aunt     No Known Problems Paternal Uncle     No Known Problems Maternal Grandmother     No Known Problems Maternal Grandfather     Cancer Paternal Grandfather         Lung CA    Melanoma Neg Hx     Eczema Neg Hx     Lupus Neg Hx     Psoriasis Neg Hx     Amblyopia Neg Hx     Blindness Neg Hx     Glaucoma Neg Hx     Macular degeneration Neg Hx     Retinal detachment Neg Hx     Strabismus Neg Hx     Thyroid disease Neg Hx     Suicide Neg Hx     Acne Neg Hx     Cirrhosis Neg Hx     Asthma Neg Hx     Emphysema Neg Hx     Breast cancer Neg Hx     Ovarian cancer Neg Hx        SOCIAL HISTORY:          Tobacco:   Social History     Tobacco Use   Smoking Status Never   Smokeless Tobacco Never       alcohol use:    Social History     Substance and Sexual Activity   Alcohol Use Not Currently    Comment: occas.                   ALLERGIES:    Review of patient's allergies indicates:   Allergen Reactions     Bactrim [sulfamethoxazole-trimethoprim] Itching    Trulicity [dulaglutide] Diarrhea and Other (See Comments)     Vomiting, abdominal pain    Diflucan [fluconazole] Swelling and Other (See Comments)     Sore on mouth       CURRENT MEDICATIONS:    Current Outpatient Medications   Medication Sig Dispense Refill    amLODIPine (NORVASC) 10 MG tablet Take 1 tablet (10 mg total) by mouth once daily. 90 tablet 2    aspirin (ECOTRIN) 81 MG EC tablet Take 1 tablet (81 mg total) by mouth once daily. 30 tablet 0    atenoloL (TENORMIN) 100 MG tablet Take 1 tablet (100 mg total) by mouth once daily. 90 tablet 2    blood-glucose meter kit Use twice daily. 1 each 0    canagliflozin (INVOKANA) 300 mg Tab tablet Take 1 tablet (300 mg total) by mouth daily before breakfast. 90 tablet 1    clobetasoL (TEMOVATE) 0.05 % external solution Apply to affected area once daily 50 mL 6    clonazePAM (KLONOPIN) 0.5 MG tablet TAKE 1 TABLET BY MOUTH ONCE DAILY AS NEEDED FOR ANXIETY 90 tablet 0    cyclobenzaprine (FLEXERIL) 10 MG tablet TAKE ONE TABLET BY MOUTH AT BEDTIME AS NEEDED 90 tablet 2    entecavir (BARACLUDE) 0.5 MG Tab Take 1 tablet (0.5 mg total) by mouth once daily. 90 tablet 3    ergocalciferol (ERGOCALCIFEROL) 50,000 unit Cap Take 1 capsule (50,000 Units total) by mouth every 7 days. 12 capsule 3    fluocinolone acetonide oiL 0.01 % Drop Place 3 drops into both ears 2 (two) times a day. 20 mL 3    FLUoxetine 20 MG capsule Take 1 capsule (20 mg total) by mouth 2 (two) times daily. 180 capsule 1    fluticasone propionate (FLONASE) 50 mcg/actuation nasal spray 2 sprays (100 mcg total) by Each Nostril route once daily. 16 g 6    folic acid (FOLVITE) 1 MG tablet Take 1 tablet (1 mg total) by mouth once daily. 90 tablet 1    golimumab (SIMPONI) 50 mg/0.5 mL PnIj Inject 50 mg into the skin every 30 days. 1.5 mL 1    hydroCHLOROthiazide (HYDRODIURIL) 25 MG tablet Take 1 tablet (25 mg total) by mouth once daily. 90 tablet 3    ketoconazole  (NIZORAL) 2 % shampoo Apply topically twice a week. 120 mL 6    ketorolac 0.5% (ACULAR) 0.5 % Drop Place 1 drop into the right eye 3 (three) times daily. 5 mL 3    ketorolac 0.5% (ACULAR) 0.5 % Drop Place 1 drop into the left eye 3 (three) times daily. 5 mL 3    lancets (FREESTYLE LANCETS) 28 gauge Misc test TWICE DAILY 200 each 3    levocetirizine (XYZAL) 5 MG tablet TAKE ONE TABLET BY MOUTH EVERY EVENING 90 tablet 2    methotrexate 2.5 MG Tab Take 6 tablets (15 mg total) by mouth every 7 days. 24 tablet 1    ofloxacin (OCUFLOX) 0.3 % ophthalmic solution Place 1 drop into the right eye 3 (three) times daily. 5 mL 3    ofloxacin (OCUFLOX) 0.3 % ophthalmic solution Place 1 drop into the left eye 3 (three) times daily. 5 mL 3    pantoprazole (PROTONIX) 40 MG tablet Take 1 tablet (40 mg total) by mouth once daily. 90 tablet 2    prednisoLONE acetate (PRED FORTE) 1 % DrpS Instill one drop into affect eye(s) as directed 5 mL 1    prednisoLONE acetate (PRED FORTE) 1 % DrpS Place 1 drop into the right eye 3 (three) times daily. 5 mL 3    prednisoLONE acetate (PRED FORTE) 1 % DrpS Place 1 drop into the left eye 3 (three) times daily. 5 mL 3    semaglutide (OZEMPIC) 2 mg/dose (8 mg/3 mL) PnIj Inject 2 mg into the skin every 7 days. 9 mL 3    sulfaSALAzine (AZULFIDINE) 500 MG EC tablet Take 3 tablets (1,500 mg total) by mouth 2 (two) times daily. 540 tablet 0    tiZANidine (ZANAFLEX) 2 MG tablet Take 1-2 tablets (2-4 mg total) by mouth every 8 (eight) hours as needed. 90 tablet 2    valsartan (DIOVAN) 320 MG tablet Take 1 tablet (320 mg total) by mouth once daily. 90 tablet 3    vitamin D (VITAMIN D3) 1000 units Tab Take 1,000 Units by mouth once daily.      zolpidem (AMBIEN) 5 MG Tab Take 1 tablet (5 mg total) by mouth nightly as needed. 90 tablet 0    diclofenac sodium (VOLTAREN) 1 % Gel Apply 4 g topically 4 (four) times daily. Apply light film topically to knee up to four times daily 3 each 2    docusate sodium (COLACE)  50 MG capsule Take 1 capsule (50 mg total) by mouth 2 (two) times daily. 180 capsule 3    hydrocortisone 2.5 % cream Apply topically 2 (two) times daily. for 10 days 28 g 1    rosuvastatin (CRESTOR) 40 MG Tab Take 1 tablet (40 mg total) by mouth every evening. 90 tablet 3    topiramate (TOPAMAX) 100 MG tablet Take 1 tablet (100 mg total) by mouth 2 (two) times daily. 180 tablet 3     No current facility-administered medications for this visit.     Facility-Administered Medications Ordered in Other Visits   Medication Dose Route Frequency Provider Last Rate Last Admin    0.9%  NaCl infusion   Intravenous Continuous Graciela Jerome MD                            PHYSICAL EXAM:  BP (!) 148/87 (BP Location: Left arm, Patient Position: Sitting, BP Method: Large (Automatic))   Pulse 69   Wt 104.8 kg (231 lb 1.6 oz)   LMP 11/25/2014   BMI 36.20 kg/m²   GENERAL: Overweight body habitus, well groomed.  HEENT:   HEENT:  Conjunctivae are non-erythematous; Pupils equal, round, and reactive to light; Nose is symmetrical; Nasal mucosa is pink and moist; Modified Mallampati:III Posterior palate is low; Tonsils not visualized; Uvula is wide and elongated;Tongue is enlarged; Dentition is fair; No TMJ tenderness; Jaw opening and protrusion without click and without discomfort.  NECK: Supple. Neck circumference is 17.5 inches. No thyromegaly. No palpable nodes.     SKIN: On face and neck: No abrasions, no rashes, no lesions.  No subcutaneous nodules are palpable.  RESPIRATORY: Chest is clear to auscultation.  Normal chest expansion and non-labored breathing at rest.  CARDIOVASCULAR: Normal S1, S2.  No murmurs, gallops or rubs. No carotid bruits bilaterally.  No edema. No clubbing. No cyanosis.    NEURO: Oriented to time, place and person. Normal attention span and concentration. Gait normal.    PSYCH: Affect is full. Mood is normal  MUSCULOSKELETAL: Moves 4 extremities. Gait normal.           ASSESSMENT:    1. Sleep Apnea NEC. The  patient symptomatically has  snoring, choking , excessive daytime sleepiness, and excessive daytime fatigue  with exam findings of crowded airway and elevated BMI. The patient has medical co-morbidities of diabetes  which can be worsened by VAISHNAVI. This warrants further investigation for possible obstructive sleep apnea.              PLAN:    Diagnostic: Home Sleep Study. The nature of this procedure and its indication was discussed with the patient. We will notify the patient about sleep study resuts via My Chart.          During our discussion today, we talked about the etiology of  VAISHNAVI as well as the potential ramifications of untreated sleep apnea, which could include daytime sleepiness, hypertension, heart disease and/or stroke.  We discussed potential treatment options, which could include weight loss, body positioning, continuous positive airway pressure (CPAP), or referral for surgical consideration. Meanwhile, she  is urged to avoid supine sleep, weight gain and alcoholic beverages since all of these can worsen VAISHNAVI.     The patient was given open opportunity to ask questions and/or express concerns about treatment plan. Two point patient identifier confirmed.       Precautions: The patient was advised to abstain from driving should he feel sleepy or drowsy.    Follow up: MD after the sleep study has been completed.     ESS ([place] Sleepiness Scale) and other sleep medicine related questionnaires were reviewed with the patient.      46-minute visit. >50% spent counseling patient and coordination of care.  The patient was  cautioned against drowsy driving.

## 2024-03-13 NOTE — PATIENT INSTRUCTIONS
SLEEP LAB ([facility]) will contact you to schedulethe sleep study. Their number is [phone] (ext 2). Please call them if you do not hear from them in 2 weeks from now.  [facility] is located on 7th floor of the [facility] (for home and in lab studies); [place] [facility] is located in [facility] ( in lab studies only).    SLEEP CLINIC (my assistant) will call you when the sleep study results are ready or I will message you through the portal with the results as we have discussed - if you have not heard from us by 2 weeks from the date of the study, or you can use My [employer] to contact me.    Our clinic phone number is [phone] (ext 1)       You are advised to abstain from driving should you feel sleepy or drowsy.

## 2024-03-28 ENCOUNTER — LAB VISIT (OUTPATIENT)
Dept: LAB | Facility: HOSPITAL | Age: 59
End: 2024-03-28
Attending: INTERNAL MEDICINE
Payer: COMMERCIAL

## 2024-03-28 DIAGNOSIS — M46.90 AXIAL SPONDYLOARTHRITIS: Chronic | ICD-10-CM

## 2024-03-28 DIAGNOSIS — Z86.19 HISTORY OF HEPATITIS B: ICD-10-CM

## 2024-03-28 LAB
ALBUMIN SERPL BCP-MCNC: 4.5 G/DL (ref 3.5–5.2)
ALP SERPL-CCNC: 63 U/L (ref 38–126)
ALT SERPL W/O P-5'-P-CCNC: 18 U/L (ref 10–44)
ANION GAP SERPL CALC-SCNC: 9 MMOL/L (ref 8–16)
AST SERPL-CCNC: 22 U/L (ref 15–46)
BASOPHILS # BLD AUTO: 0.05 K/UL (ref 0–0.2)
BASOPHILS NFR BLD: 0.8 % (ref 0–1.9)
BILIRUB SERPL-MCNC: 0.5 MG/DL (ref 0.1–1)
CALCIUM SERPL-MCNC: 9.3 MG/DL (ref 8.7–10.5)
CHLORIDE SERPL-SCNC: 101 MMOL/L (ref 95–110)
CO2 SERPL-SCNC: 29 MMOL/L (ref 23–29)
CREAT SERPL-MCNC: 0.52 MG/DL (ref 0.5–1.4)
CRP SERPL-MCNC: 0.19 MG/DL (ref 0–1)
DIFFERENTIAL METHOD BLD: ABNORMAL
EOSINOPHIL # BLD AUTO: 0.1 K/UL (ref 0–0.5)
EOSINOPHIL NFR BLD: 1.2 % (ref 0–8)
ERYTHROCYTE [DISTWIDTH] IN BLOOD BY AUTOMATED COUNT: 12.7 % (ref 11.5–14.5)
ERYTHROCYTE [SEDIMENTATION RATE] IN BLOOD BY PHOTOMETRIC METHOD: 16 MM/HR (ref 0–36)
EST. GFR  (NO RACE VARIABLE): >60 ML/MIN/1.73 M^2
GLUCOSE SERPL-MCNC: 88 MG/DL (ref 70–110)
HCT VFR BLD AUTO: 45.9 % (ref 37–48.5)
HGB BLD-MCNC: 15.6 G/DL (ref 12–16)
IMM GRANULOCYTES # BLD AUTO: 0.02 K/UL (ref 0–0.04)
IMM GRANULOCYTES NFR BLD AUTO: 0.3 % (ref 0–0.5)
LYMPHOCYTES # BLD AUTO: 3.2 K/UL (ref 1–4.8)
LYMPHOCYTES NFR BLD: 53.6 % (ref 18–48)
MCH RBC QN AUTO: 30.9 PG (ref 27–31)
MCHC RBC AUTO-ENTMCNC: 34 G/DL (ref 32–36)
MCV RBC AUTO: 91 FL (ref 82–98)
MONOCYTES # BLD AUTO: 0.5 K/UL (ref 0.3–1)
MONOCYTES NFR BLD: 8 % (ref 4–15)
NEUTROPHILS # BLD AUTO: 2.2 K/UL (ref 1.8–7.7)
NEUTROPHILS NFR BLD: 36.1 % (ref 38–73)
NRBC BLD-RTO: 0 /100 WBC
PLATELET # BLD AUTO: 285 K/UL (ref 150–450)
PMV BLD AUTO: 11 FL (ref 9.2–12.9)
POTASSIUM SERPL-SCNC: 4.1 MMOL/L (ref 3.5–5.1)
PROT SERPL-MCNC: 7.8 G/DL (ref 6–8.4)
RBC # BLD AUTO: 5.05 M/UL (ref 4–5.4)
SODIUM SERPL-SCNC: 139 MMOL/L (ref 136–145)
UUN UR-MCNC: 9 MG/DL (ref 7–17)
WBC # BLD AUTO: 5.99 K/UL (ref 3.9–12.7)

## 2024-03-28 PROCEDURE — 80053 COMPREHEN METABOLIC PANEL: CPT | Mod: PN | Performed by: INTERNAL MEDICINE

## 2024-03-28 PROCEDURE — 85652 RBC SED RATE AUTOMATED: CPT | Mod: PN | Performed by: INTERNAL MEDICINE

## 2024-03-28 PROCEDURE — 85025 COMPLETE CBC W/AUTO DIFF WBC: CPT | Mod: PN | Performed by: INTERNAL MEDICINE

## 2024-03-28 PROCEDURE — 36415 COLL VENOUS BLD VENIPUNCTURE: CPT | Mod: PN | Performed by: INTERNAL MEDICINE

## 2024-03-28 PROCEDURE — 87517 HEPATITIS B DNA QUANT: CPT | Mod: PN | Performed by: INTERNAL MEDICINE

## 2024-03-28 PROCEDURE — 86140 C-REACTIVE PROTEIN: CPT | Mod: PN | Performed by: INTERNAL MEDICINE

## 2024-04-01 LAB
HEPATITIS B VIRUS DNA: NORMAL
HEPATITIS B VIRUS PCR, QUANT: NOT DETECTED IU/ML

## 2024-04-09 ENCOUNTER — TELEPHONE (OUTPATIENT)
Dept: SLEEP MEDICINE | Facility: OTHER | Age: 59
End: 2024-04-09
Payer: COMMERCIAL

## 2024-04-09 ENCOUNTER — OFFICE VISIT (OUTPATIENT)
Dept: RHEUMATOLOGY | Facility: CLINIC | Age: 59
End: 2024-04-09
Payer: COMMERCIAL

## 2024-04-09 VITALS
WEIGHT: 222.69 LBS | HEART RATE: 78 BPM | DIASTOLIC BLOOD PRESSURE: 90 MMHG | BODY MASS INDEX: 34.95 KG/M2 | SYSTOLIC BLOOD PRESSURE: 158 MMHG | HEIGHT: 67 IN

## 2024-04-09 DIAGNOSIS — M19.90 INFLAMMATORY ARTHRITIS: ICD-10-CM

## 2024-04-09 DIAGNOSIS — Z79.899 HIGH RISK MEDICATION USE: ICD-10-CM

## 2024-04-09 DIAGNOSIS — R13.10 DYSPHAGIA, UNSPECIFIED TYPE: Primary | ICD-10-CM

## 2024-04-09 DIAGNOSIS — H20.9 UVEITIS: ICD-10-CM

## 2024-04-09 DIAGNOSIS — M02.30 REACTIVE ARTHRITIS: ICD-10-CM

## 2024-04-09 DIAGNOSIS — M46.90 AXIAL SPONDYLOARTHRITIS: Chronic | ICD-10-CM

## 2024-04-09 DIAGNOSIS — Z86.19 HISTORY OF HEPATITIS B: ICD-10-CM

## 2024-04-09 DIAGNOSIS — M47.819 SPONDYLOARTHRITIS: ICD-10-CM

## 2024-04-09 PROCEDURE — 1159F MED LIST DOCD IN RCRD: CPT | Mod: CPTII,S$GLB,, | Performed by: INTERNAL MEDICINE

## 2024-04-09 PROCEDURE — 3072F LOW RISK FOR RETINOPATHY: CPT | Mod: CPTII,S$GLB,, | Performed by: INTERNAL MEDICINE

## 2024-04-09 PROCEDURE — 3008F BODY MASS INDEX DOCD: CPT | Mod: CPTII,S$GLB,, | Performed by: INTERNAL MEDICINE

## 2024-04-09 PROCEDURE — 99214 OFFICE O/P EST MOD 30 MIN: CPT | Mod: S$GLB,,, | Performed by: INTERNAL MEDICINE

## 2024-04-09 PROCEDURE — 3080F DIAST BP >= 90 MM HG: CPT | Mod: CPTII,S$GLB,, | Performed by: INTERNAL MEDICINE

## 2024-04-09 PROCEDURE — 99999 PR PBB SHADOW E&M-EST. PATIENT-LVL V: CPT | Mod: PBBFAC,,, | Performed by: INTERNAL MEDICINE

## 2024-04-09 PROCEDURE — 3077F SYST BP >= 140 MM HG: CPT | Mod: CPTII,S$GLB,, | Performed by: INTERNAL MEDICINE

## 2024-04-09 RX ORDER — METHOTREXATE 2.5 MG/1
15 TABLET ORAL
Qty: 72 TABLET | Refills: 0 | Status: SHIPPED | OUTPATIENT
Start: 2024-04-09

## 2024-04-09 RX ORDER — ENTECAVIR 0.5 MG/1
0.5 TABLET, FILM COATED ORAL DAILY
Qty: 90 TABLET | Refills: 3 | Status: ACTIVE | OUTPATIENT
Start: 2024-04-09

## 2024-04-09 RX ORDER — GOLIMUMAB 50 MG/.5ML
50 INJECTION, SOLUTION SUBCUTANEOUS
Qty: 1.5 ML | Refills: 1 | Status: ACTIVE | OUTPATIENT
Start: 2024-04-09 | End: 2025-04-09

## 2024-04-09 RX ORDER — ENTECAVIR 0.5 MG/1
0.5 TABLET, FILM COATED ORAL DAILY
Qty: 90 TABLET | Refills: 3 | Status: SHIPPED | OUTPATIENT
Start: 2024-04-09 | End: 2024-04-09 | Stop reason: SDUPTHER

## 2024-04-09 RX ORDER — FOLIC ACID 1 MG/1
1 TABLET ORAL DAILY
Qty: 90 TABLET | Refills: 1 | Status: SHIPPED | OUTPATIENT
Start: 2024-04-09 | End: 2025-04-09

## 2024-04-09 RX ORDER — SULFASALAZINE 500 MG/1
1500 TABLET, DELAYED RELEASE ORAL 2 TIMES DAILY
Qty: 540 TABLET | Refills: 0 | Status: SHIPPED | OUTPATIENT
Start: 2024-04-09

## 2024-04-09 ASSESSMENT — ROUTINE ASSESSMENT OF PATIENT INDEX DATA (RAPID3)
WHEN YOU AWAKENED IN THE MORNING OVER THE LAST WEEK, PLEASE INDICATE THE AMOUNT OF TIME IT TAKES UNTIL YOU ARE AS LIMBER AS YOU WILL BE FOR THE DAY: 1
AM STIFFNESS SCORE: 1, YES
PAIN SCORE: 7.5
PSYCHOLOGICAL DISTRESS SCORE: 4.4
TOTAL RAPID3 SCORE: 6.17
PATIENT GLOBAL ASSESSMENT SCORE: 7
FATIGUE SCORE: 7.5
MDHAQ FUNCTION SCORE: 1.2

## 2024-04-09 ASSESSMENT — ANKYLOSING SPONDYLITIS DISEASE ACTIVITY SCORE (ASDAS-CRP)
PAIN_SWELLING: 3
GLOBAL_ACTIVITY: 4
NBH_PAIN: 7
TOTAL_SCORE: 2.53
CRP_MG_PER_LITER: 1.9
MORNING_STIFFNESS: 7

## 2024-04-09 NOTE — PROGRESS NOTES
4/9/2024    12:28 AM   Rapid3 Question Responses and Scores   MDHAQ Score 1.2   Psychologic Score 4.4   Pain Score 7.5   When you awakened in the morning OVER THE LAST WEEK, did you feel stiff? Yes   If Yes, please indicate the number of hours until you are as limber as you will be for the day 1   Fatigue Score 7.5   Global Health Score 7   RAPID3 Score 6.17     Answers submitted by the patient for this visit:  Rheumatology Questionnaire (Submitted on 4/9/2024)  fever: No  eye redness: No  mouth sores: No  headaches: No  shortness of breath: No  chest pain: No  trouble swallowing: Yes  diarrhea: No  constipation: No  unexpected weight change: No  genital sore: No  dysuria: No  During the last 3 days, have you had a skin rash?: No  Bruises or bleeds easily: No  cough: No

## 2024-04-09 NOTE — PROGRESS NOTES
"Subjective:      Patient ID: Althea Vazquez is a 58 y.o. female.    Chief Complaint: nr-axSpA; recurrent AAU    HPI no recurrences  of AAU since starting methotrexate. Has pain right knee with crepitus, no help with injection. Back is the same  Review of Systems   Constitutional:  Negative for fever and unexpected weight change.   HENT:  Positive for trouble swallowing. Negative for mouth sores.    Eyes:  Negative for redness.   Respiratory:  Negative for cough and shortness of breath.    Cardiovascular:  Negative for chest pain.   Gastrointestinal:  Negative for constipation and diarrhea.   Genitourinary:  Negative for dysuria and genital sores.   Skin:  Negative for rash.   Neurological:  Negative for headaches.   Hematological:  Does not bruise/bleed easily.   All other systems reviewed and are negative.       Objective:   BP (!) 158/90   Pulse 78   Ht 5' 7" (1.702 m)   Wt 101 kg (222 lb 10.6 oz)   LMP 11/25/2014   BMI 34.87 kg/m²   Physical Exam      Schober's 10-14.5cm improved  Nl lumbar extension  and lateral flexion  Fabere neg bilateral   Chest expansion 8 cm increased  Neck rom nl      Crepitus and limited flexion R>L knee  Nl DP and PT pulses   10/31/2022   Tender (CABALLERO-28) 0 / 28    Swollen (CABALLERO-28) 0 / 28    Provider Global --   Patient Global 45 mm   ESR 11 mm/hr   CRP 2.3 mg/L   CABALLERO-28 (ESR) 2.31 (Remission)   CABALLERO-28 (CRP) 2.02 (Remission)   CDAI Score --      Latest Reference Range & Units 03/28/24 10:55   WBC 3.90 - 12.70 K/uL 5.99   RBC 4.00 - 5.40 M/uL 5.05   Hemoglobin 12.0 - 16.0 g/dL 15.6   Hematocrit 37.0 - 48.5 % 45.9   MCV 82 - 98 fL 91   MCH 27.0 - 31.0 pg 30.9   MCHC 32.0 - 36.0 g/dL 34.0   RDW 11.5 - 14.5 % 12.7   Platelet Count 150 - 450 K/uL 285   MPV 9.2 - 12.9 fL 11.0   Gran % 38.0 - 73.0 % 36.1 (L)   Lymph % 18.0 - 48.0 % 53.6 (H)   Mono % 4.0 - 15.0 % 8.0   Eos % 0.0 - 8.0 % 1.2   Basophil % 0.0 - 1.9 % 0.8   Immature Granulocytes 0.0 - 0.5 % 0.3   Gran # (ANC) 1.8 - " 7.7 K/uL 2.2   Lymph # 1.0 - 4.8 K/uL 3.2   Mono # 0.3 - 1.0 K/uL 0.5   Eos # 0.0 - 0.5 K/uL 0.1   Baso # 0.00 - 0.20 K/uL 0.05   Immature Grans (Abs) 0.00 - 0.04 K/uL 0.02   nRBC 0 /100 WBC 0   Differential Method  Automated   Sed Rate 0 - 36 mm/Hr 16   Sodium 136 - 145 mmol/L 139   Potassium 3.5 - 5.1 mmol/L 4.1   Chloride 95 - 110 mmol/L 101   CO2 23 - 29 mmol/L 29   Anion Gap 8 - 16 mmol/L 9   BUN 7 - 17 mg/dL 9   Creatinine 0.50 - 1.40 mg/dL 0.52   eGFR >60 mL/min/1.73 m^2 >60.0   Glucose 70 - 110 mg/dL 88   Calcium 8.7 - 10.5 mg/dL 9.3   ALP 38 - 126 U/L 63   PROTEIN TOTAL 6.0 - 8.4 g/dL 7.8   Albumin 3.5 - 5.2 g/dL 4.5   BILIRUBIN TOTAL 0.1 - 1.0 mg/dL 0.5   AST 15 - 46 U/L 22   ALT 10 - 44 U/L 18   CRP 0.00 - 1.00 mg/dL 0.19   Hepatitis B Virus DNA Not Detected  HBV DNA not detected   Hepatitis B Virus PCR, Quant <12 IU/mL Not Detected   (L): Data is abnormally low  (H): Data is abnormally high     Assessment:     adalimumab with Secondary inefficacy, tried to change to Simponi 50mg sc q 4 wks but insurance wouldn't cover, then on Enbrel Sureclick 50mg s q 7 days but has developed recurrent AAU OS  Then started certolizumab after 1/11/21 visit  had AAU with recurrence 1/28/21 just after starting certolizumab but had not completed loading dose   certolizumab ineffective           ASDAS-CRP 2.53 (HDA)   BASDAI 5.6(HDA)  BASFI 4.5(severe functional limitation)        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  ?infliximab in future if mtx not helpful  Chronic hepatitis B HBV DNA neg  EH   158/90  Hyperlipidemia LDL 99.8 5/25/23  on rosuvastatin 40mg nightly  Class 1 obesity Body mass index is 34.87 kg/m².    DXA 2/5/24  normal   Left cervical radiculopathy bulge C6-7 resolved  Chronic lumbar spondylosis   MRI with severe TMT OA ? Charcot neuroarthropathy  Vitamin D deficiency    36 2/15/23  on vitamin D2 50,000 IU weekly and vitamin D3 1000 IU  daily  N.  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    Plan:   *Covid vaccine  Continue methotrexate 15mg po once a week(divided dose) with folic acid 1mg daily for recurrent AAU OD refilled [facility]  Golimumab 50mg sc q 28 days refilled [facility]  Sulfasalazine.1500mg twice daily refilled [facility]  Vitamin D2 50,000 units q 7days and vitamin D3 1000 units daily  entecvir 0.5mg daily refilled [facility]  Naproxen 500mg twice daily prn with pantoprazole 40mg daily for  prevention  Continue exercise program  Cont weight reduction, Mediterranean diet as feasible, decrease rice portions as she is doing semaglutide  F/u Dr Begum for EH  RTC 3 months with standing labs

## 2024-04-09 NOTE — PROGRESS NOTES
"Subjective:      Patient ID: Althea Vazquez is a 58 y.o. female.    Chief Complaint: Disease Management and Pain    HPI   Review of Systems   Constitutional:  Negative for fever and unexpected weight change.   HENT:  Negative for mouth sores and trouble swallowing.    Eyes:  Negative for redness.   Respiratory:  Negative for cough and shortness of breath.    Cardiovascular:  Negative for chest pain.   Gastrointestinal:  Negative for constipation and diarrhea.   Genitourinary:  Negative for dysuria and genital sores.   Skin:  Negative for rash.   Neurological:  Negative for headaches.   Hematological:  Does not bruise/bleed easily.        Objective:   BP (!) 158/90   Pulse 78   Ht 5' 7" (1.702 m)   Wt 101 kg (222 lb 10.6 oz)   LMP 11/25/2014   BMI 34.87 kg/m²   Physical Exam     10/31/2022   Tender (CABALLERO-28) 0 / 28    Swollen (CABALLERO-28) 0 / 28    Provider Global --   Patient Global 45 mm   ESR 11 mm/hr   CRP 2.3 mg/L   CABALLERO-28 (ESR) 2.31 (Remission)   CABALLERO-28 (CRP) 2.02 (Remission)   CDAI Score --        Assessment:     1. Dysphagia, unspecified type          Plan:     Problem List Items Addressed This Visit    None  Visit Diagnoses       Dysphagia, unspecified type    -  Primary    Relevant Orders    Ambulatory referral/consult to Gastroenterology          ***    "

## 2024-04-15 ENCOUNTER — PATIENT OUTREACH (OUTPATIENT)
Dept: ADMINISTRATIVE | Facility: HOSPITAL | Age: 59
End: 2024-04-15
Payer: COMMERCIAL

## 2024-04-16 ENCOUNTER — HOSPITAL ENCOUNTER (OUTPATIENT)
Dept: SLEEP MEDICINE | Facility: HOSPITAL | Age: 59
Discharge: HOME | End: 2024-04-16
Attending: PSYCHIATRY & NEUROLOGY
Payer: COMMERCIAL

## 2024-04-16 DIAGNOSIS — G47.30 SLEEP APNEA, UNSPECIFIED TYPE: ICD-10-CM

## 2024-04-16 DIAGNOSIS — G47.33 OSA (OBSTRUCTIVE SLEEP APNEA): Primary | ICD-10-CM

## 2024-04-16 PROCEDURE — 95800 SLP STDY UNATTENDED: CPT

## 2024-04-16 NOTE — PROGRESS NOTES
Per physician orders, patient was given home sleep testing device and instructed on how to apply the device before going to bed tonight. I sized the device and showed the patient using a mirror how the device fits and what it should look like so they can use a mirror when putting it on themselves at home. We reviewed the instruction booklet. Patient verbalized understanding of the instructions and teach back complete. Patient was instructed to return the device the next day before 9am.

## 2024-04-28 PROBLEM — G47.33 OSA (OBSTRUCTIVE SLEEP APNEA): Status: ACTIVE | Noted: 2024-04-28

## 2024-04-28 PROBLEM — G47.30 SLEEP APNEA: Status: ACTIVE | Noted: 2024-04-28

## 2024-04-29 PROCEDURE — 95806 SLEEP STUDY UNATT&RESP EFFT: CPT | Mod: 26,,, | Performed by: PSYCHIATRY & NEUROLOGY

## 2024-04-29 NOTE — PROCEDURES
Patient Name Althea Vazquez Study Ordered By Ruth Ann Lemons  Date of Night 1 04/16/2024 11:48:40 PM Date of Birth 1965  Identification Number [id number]  Overall AHI (4%)* Overall RDI % time < 90% Sp02 Mean Sp02 % time snoring > 30dB  10 16 3.6% 95.3% 16.4%  PHYSICIAN INTERPRETATION AND COMMENTS: Findings are consistent with mild, obstructive sleep apnea (VAISHNAVI) (G47.33),  by overall AHI (apnea hypopnea index). However, findings on this study suggest that the degree of sleep disordered  breathing is in the moderate severity range, when RDI is measured. This study was technically adequate to allow for  interpretation.  CLINICAL HISTORY: 58 year old female presented with: 16 inch neck, BMI of 34.6, an [place] sleepiness score of 14, history  of hypertension, diabetes, depression and symptoms of nocturnal snoring, waking up choking and witnessed apneas.  Based on the clinical history, the patient has a high pre-test probability of having Severe VAISHNAVI.  SLEEP STUDY FINDINGS: Patient underwent a 1 night Home Sleep Test and by behavioral criteria, slept for approximately  5.28 hours, with a sleep latency of 5 minutes and a sleep efficiency of 83.4%. Mild sleep disordered breathing (AHI=10) is  noted based on a 4% hypopnea desaturation criteria. When considering more subtle measures of sleep disordered  breathing, the overall respiratory disturbance index is moderate (RDI=16) based on a 1% hypopnea desaturation criteria  with confirmation by surrogate arousal indicators. The apneas/hypopneas are accompanied by minimal oxygen  desaturation (percent time below 90% SpO2: 3.6%, Min SpO2: 82.8%). The average desaturation across all sleep disordered  breathing events is 3.4%. Snoring occurs for 16.4% (30 dB) of the study, 8.9% is very loud. The mean pulse rate is 76.8 BPM,  with frequent pulse rate variability (49 events with >= 6 BPM increase/decrease per hour).  TREATMENT CONSIDERATIONS: Consider trial of  Auto-titrating CPAP 6-20 cm, mask of patient's choice, and heated  humidification. If patient has difficulty with CPAP adherence or ongoing VAISHNAVI symptoms or despite CPAP adherence, then  consider an in-lab titration sleep study in order to determine optimal fixed CPAP setting. Alternatively consider oral  appliance fitted by a dentist specializing in these devices, or surgical consultation for uvulopalatopharyngoplasty (UPPP)  for treatment of obstructive sleep apnea.  DISEASE MANAGEMENT CONSIDERATIONS: Definitive treatment for VAISHNAVI is recommended. Consider Sleep Clinic referral for  VAISHNAVI management.  Signature: Date: 04- 00:01:51 EST  Study Review: The raw data of this ANTHONY study has been reviewed, with the report confirmed and electronically signed by Ruth Ann Lemons, Read  and interpreted by Ruth Ann Lemons MD, Diplomate, Sleep Medicine, ABPN.. Caution: The diagnosis of the Obstructive Sleep Apnea Syndrome must  be based on all available clinical data, of which this study is only a part. Thus final diagnosis and treatment recommendations should include  information from an examination of the patient by a knowledgeable healthcare provider.  *Average number of apneas and hypopneas (4%) per hour of valid recording time (Note: CMS RDI; AASM RAINA)  ANTHONY Traceability: (SN: [id number] ) PatientStudyReportOutputGUID: [id number] ; v68

## 2024-05-01 ENCOUNTER — PATIENT MESSAGE (OUTPATIENT)
Dept: SLEEP MEDICINE | Facility: CLINIC | Age: 59
End: 2024-05-01
Payer: COMMERCIAL

## 2024-05-08 ENCOUNTER — PATIENT OUTREACH (OUTPATIENT)
Dept: ADMINISTRATIVE | Facility: HOSPITAL | Age: 59
End: 2024-05-08
Payer: COMMERCIAL

## 2024-05-08 DIAGNOSIS — Z12.31 ENCOUNTER FOR SCREENING MAMMOGRAM FOR MALIGNANT NEOPLASM OF BREAST: Primary | ICD-10-CM

## 2024-05-08 NOTE — PROGRESS NOTES
Population Health Chart Review & Patient Outreach Details      Additional [facility] Health Notes:               Updates Requested / Reviewed:      Care Everywhere, , and Immunizations Reconciliation Completed or Queried: [place]         Health Maintenance Topics Overdue:      VB Score: 2     Colon Cancer Screening  Uncontrolled BP                       Health Maintenance Topic(s) Outreach Outcomes & Actions Taken:    Primary Care Appt - Outreach Outcomes & Actions Taken  : Primary Care Appt Scheduled    Breast Cancer Screening - Outreach Outcomes & Actions Taken  : Mammogram Order Placed

## 2024-05-14 ENCOUNTER — OFFICE VISIT (OUTPATIENT)
Dept: INTERNAL MEDICINE | Facility: CLINIC | Age: 59
End: 2024-05-14
Payer: COMMERCIAL

## 2024-05-14 VITALS
HEIGHT: 67 IN | BODY MASS INDEX: 35.03 KG/M2 | DIASTOLIC BLOOD PRESSURE: 82 MMHG | HEART RATE: 82 BPM | RESPIRATION RATE: 19 BRPM | TEMPERATURE: 98 F | OXYGEN SATURATION: 98 % | WEIGHT: 223.19 LBS | SYSTOLIC BLOOD PRESSURE: 132 MMHG

## 2024-05-14 DIAGNOSIS — M46.90 AXIAL SPONDYLOARTHRITIS: Chronic | ICD-10-CM

## 2024-05-14 DIAGNOSIS — G47.00 INSOMNIA, UNSPECIFIED TYPE: Chronic | ICD-10-CM

## 2024-05-14 DIAGNOSIS — G47.33 OSA (OBSTRUCTIVE SLEEP APNEA): ICD-10-CM

## 2024-05-14 DIAGNOSIS — E11.59 HYPERTENSION ASSOCIATED WITH DIABETES: ICD-10-CM

## 2024-05-14 DIAGNOSIS — Z00.00 ANNUAL PHYSICAL EXAM: Primary | ICD-10-CM

## 2024-05-14 DIAGNOSIS — E11.69 HYPERLIPIDEMIA ASSOCIATED WITH TYPE 2 DIABETES MELLITUS: ICD-10-CM

## 2024-05-14 DIAGNOSIS — Z86.19 HISTORY OF HEPATITIS B: ICD-10-CM

## 2024-05-14 DIAGNOSIS — E11.42 TYPE 2 DIABETES MELLITUS WITH DIABETIC POLYNEUROPATHY, UNSPECIFIED WHETHER LONG TERM INSULIN USE: Chronic | ICD-10-CM

## 2024-05-14 DIAGNOSIS — E78.5 HYPERLIPIDEMIA ASSOCIATED WITH TYPE 2 DIABETES MELLITUS: ICD-10-CM

## 2024-05-14 DIAGNOSIS — I15.2 HYPERTENSION ASSOCIATED WITH DIABETES: ICD-10-CM

## 2024-05-14 DIAGNOSIS — Z12.11 COLON CANCER SCREENING: ICD-10-CM

## 2024-05-14 DIAGNOSIS — F41.9 ANXIETY: ICD-10-CM

## 2024-05-14 PROCEDURE — 99999 PR PBB SHADOW E&M-EST. PATIENT-LVL V: CPT | Mod: PBBFAC,,, | Performed by: INTERNAL MEDICINE

## 2024-05-14 PROCEDURE — 3079F DIAST BP 80-89 MM HG: CPT | Mod: CPTII,S$GLB,, | Performed by: INTERNAL MEDICINE

## 2024-05-14 PROCEDURE — 3075F SYST BP GE 130 - 139MM HG: CPT | Mod: CPTII,S$GLB,, | Performed by: INTERNAL MEDICINE

## 2024-05-14 PROCEDURE — 99396 PREV VISIT EST AGE 40-64: CPT | Mod: S$GLB,,, | Performed by: INTERNAL MEDICINE

## 2024-05-14 PROCEDURE — 3008F BODY MASS INDEX DOCD: CPT | Mod: CPTII,S$GLB,, | Performed by: INTERNAL MEDICINE

## 2024-05-14 PROCEDURE — 3072F LOW RISK FOR RETINOPATHY: CPT | Mod: CPTII,S$GLB,, | Performed by: INTERNAL MEDICINE

## 2024-05-14 PROCEDURE — 1159F MED LIST DOCD IN RCRD: CPT | Mod: CPTII,S$GLB,, | Performed by: INTERNAL MEDICINE

## 2024-05-14 PROCEDURE — 1160F RVW MEDS BY RX/DR IN RCRD: CPT | Mod: CPTII,S$GLB,, | Performed by: INTERNAL MEDICINE

## 2024-05-14 RX ORDER — HYDROCORTISONE 25 MG/G
CREAM TOPICAL 2 TIMES DAILY
Qty: 28 G | Refills: 1 | Status: SHIPPED | OUTPATIENT
Start: 2024-05-14 | End: 2024-06-16

## 2024-05-14 NOTE — PROGRESS NOTES
Subjective     Patient ID: Althea Vazquez is a 58 y.o. female.    Chief Complaint: Annual Exam    HPI  58 y.o. Female here for annual exam.      Vaccines: Influenza (2019); Tetanus (2014); PNA (current); Shingrix (2021)  Eye exam: current  Mammogram: 5/23  Gyn exam: 9/18  Colonoscopy: 1/18  DEXA: 2/24(NL)     Exercise: no  Diet: regular     Past Medical History:    Acid reflux                                                   Anxiety                                         10/18/2012    Arthritis                                                     Depression                                          Diabetic peripheral neuropathy - mild           10/21/2014    Difficult intubation                                          Dry eyes                                                      Dry mouth                                                     Fever blister                                                 Hyperlipidemia                                                Hypertension                                                  Insomnia                                                      Iritis                                          5/13/2014     Long-term current use of steroids               9/27/2012     Nausea & vomiting                               2/4/2015      VAISHNAVI (obstructive sleep apnea)                                 Type II diabetes mellitus                       10/1/2012   Past Surgical History:    TUBAL LIGATION                                                 KNEE ARTHROSCOPY                                 5-14-14         Comment:right    HYSTERECTOMY                                     12/3/2014   Social History    Marital status:              Spouse name: Edward                 Years of education:                 Number of children: 2              Occupational History  Occupation          Employer            Comment               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                 Social History Main Topics    Smoking status: Never Smoker                                                                 Smokeless status: Never Used                        Alcohol use: No              Drug use: No              Sexual activity: Yes               Partners with: Male     Other Topics            Concern  Are you pregnant or th* No  Breast-feeding          No      -- Diflucan [Fluconazole] -- Other (See Comments)    --  Sore on mouth  Review of Systems   Constitutional:  Negative for activity change, appetite change, chills, diaphoresis, fatigue, fever and unexpected weight change.   HENT:  Negative for nasal congestion, mouth sores, postnasal drip, rhinorrhea, sinus pressure/congestion, sneezing, sore throat, trouble swallowing and voice change.    Eyes:  Negative for pain, discharge and visual disturbance.   Respiratory:  Negative for cough, shortness of breath and wheezing.    Cardiovascular:  Negative for chest pain, palpitations and leg swelling.   Gastrointestinal:  Negative for abdominal pain, blood in stool, constipation, diarrhea, nausea and vomiting.   Endocrine: Negative for cold intolerance and heat intolerance.   Genitourinary:  Negative for difficulty urinating, dysuria, frequency, hematuria and urgency.   Musculoskeletal:  Positive for arthralgias. Negative for myalgias.   Integumentary:  Negative for rash and wound.   Allergic/Immunologic: Negative for environmental allergies and food allergies.   Neurological:  Negative for dizziness, tremors, seizures, syncope, weakness, light-headedness and headaches.   Hematological:  Negative for adenopathy. Does not bruise/bleed easily.   Psychiatric/Behavioral:  Positive for sleep disturbance. Negative for confusion, self-injury and suicidal ideas. The patient is nervous/anxious.           Objective     Physical Exam  Vitals and nursing note reviewed.   Constitutional:       General: She is not in acute  distress.     Appearance: Normal appearance. She is well-developed. She is not diaphoretic.   HENT:      Head: Normocephalic and atraumatic.      Right Ear: External ear normal.      Left Ear: External ear normal.      Nose: Nose normal.      Mouth/Throat:      Pharynx: No oropharyngeal exudate.   Eyes:      General: No scleral icterus.        Right eye: No discharge.         Left eye: No discharge.      Conjunctiva/sclera: Conjunctivae normal.      Pupils: Pupils are equal, round, and reactive to light.   Neck:      Thyroid: No thyromegaly.      Vascular: No JVD.   Cardiovascular:      Rate and Rhythm: Normal rate and regular rhythm.      Pulses: Normal pulses.      Heart sounds: Normal heart sounds. No murmur heard.  Pulmonary:      Effort: Pulmonary effort is normal. No respiratory distress.      Breath sounds: Normal breath sounds. No wheezing, rhonchi or rales.   Chest:      Chest wall: No tenderness.   Abdominal:      General: Bowel sounds are normal. There is no distension.      Palpations: Abdomen is soft.      Tenderness: There is no abdominal tenderness. There is no guarding or rebound.   Musculoskeletal:      Cervical back: Neck supple.      Right lower leg: No edema.      Left lower leg: No edema.   Lymphadenopathy:      Cervical: No cervical adenopathy.   Skin:     General: Skin is warm and dry.      Coloration: Skin is not pale.      Findings: No rash.   Neurological:      General: No focal deficit present.      Mental Status: She is alert and oriented to person, place, and time.      Gait: Gait normal.   Psychiatric:         Behavior: Behavior normal.         Thought Content: Thought content normal.         Judgment: Judgment normal.            Assessment and Plan     1. Annual physical exam    2. Anxiety    3. Hyperlipidemia associated with type 2 diabetes mellitus  Overview:  Hyperlipidemia with intermediate risk, on atorvastatin 40mg every evening consider increase to 80mg every evening, will leave  to 's opinion  Results for NABOR HERNANDEZ (MRN [id number]) as of 5/7/2021 11:19   Ref. Range 4/9/2020 09:45   Cholesterol Latest Ref Range: 120 - 199 mg/dL 205 (H)   HDL Latest Ref Range: 40 - 75 mg/dL 60   HDL/Cholesterol Ratio Latest Ref Range: 20.0 - 50.0 % 29.3   LDL Cholesterol External Latest Ref Range: 63 - 159 mg/dL 126.4   Non-HDL Cholesterol Latest Units: mg/dL 145   Total Cholesterol/HDL Ratio Latest Ref Range: 2.0 - 5.0  3.4   Triglycerides Latest Ref Range: 30 - 150 mg/dL 93   The 10-year ASCVD risk score (Darío REY Jr., et al., 2013) is: 11.2%    Values used to calculate the score:      Age: 55 years      Sex: Female      Is Non- : Yes      Diabetic: Yes      Tobacco smoker: No      Systolic Blood Pressure: 135 mmHg      Is BP treated: Yes      HDL Cholesterol: 60 mg/dL      Total Cholesterol: 205 mg/dL      4. Hypertension associated with diabetes    5. Type 2 diabetes mellitus with diabetic polyneuropathy, unspecified whether long term insulin use    6. History of hepatitis B  Overview:  Hep B core total Ab (+), no active/chronic infection  Results for NABOR HERNANDEZ (MRN [id number]) as of 5/7/2021 11:24   Ref. Range 2/2/2021 12:02   HBV DNA, Qualitative PCR Latest Ref Range: Not detected  Not detected         7. Axial spondyloarthritis  Overview:  Results for NABOR HERNANDEZ (MRN [id number]) as of 5/7/2021 11:14   Ref. Range 9/27/2012 11:12   HLA B27 Unknown Positive     EXAMINATION:  XR HIPS BILATERAL 2 VIEW INCL AP PELVIS     CLINICAL HISTORY:  Low back pain     TECHNIQUE:  AP view of the pelvis and frogleg lateral views of both hips were performed.     COMPARISON:  No 09/09/2016 ne.     FINDINGS:  Mild DJD.  Mild narrowing of the hip joint spaces.  No fracture or bone destruction identified     IMPRESSION:      See above        Electronically signed by: Cristi Leggett MD  Date:                                            01/30/2019  Time:                                            13:47                     Result Care Coordination      Result Notes       The x-rays of the hips show mild  Narrowing of the hips likely related to the ankylosing spondylitis. Also some sclerosis and irregularity right sacroiliac joint. Osteitis pubis and enthesitis around the greater trochanters ibrahima on right where you have tendinitis/bursitis. RJQ           Narrative & Impression  Technique: Routine MRI evaluation of the SI joints performed with and without the use of 10 cc of Gadavist IV contrast.       Comparison: Radiograph 09/09/2016.     Findings:     There are enhancing inflammatory changes about the anterior superior aspect of the left SI joint in the expected location of the medial lumbar or lumbosacral ligament, findings that are highly suggestive of enthesitis.  There is apparent trace enhancement on the posterior superior aspect of the right SI joint leads is overall nonspecific.     SI joints are symmetric.  No synovitis.  No joint effusions.  No osseous erosive changes.     Visualized musculature demonstrate normal bulk and signal intensity.  Piriformis muscles are symmetric.  Ischiofemoral spaces are unremarkable.     Adductor and abductor tendons are unremarkable.  Hamstring tendons are normal.     Subcutaneous soft tissues are normal.     Limited evaluation of the lower abdominal and pelvic organs is unremarkable.  Uterus is surgically absent.  IMPRESSION:         Inflammatory enthesitis affecting the anterior superior aspect of the left sacral ala with mild associated reactive marrow edema, findings that are favored to represent sequela of patient's reported history of spondyloarthropathy.  Note is made of possible trace inflammatory changes adjacent to the posterior aspect of the right SI joints which may relate to a prominent vessel or mild additional enthesitis.     Unremarkable evaluation of the SI joints specifically without imaging findings to suggest  sacroiliitis. No osseous erosive changes or joint effusions. No synovitis.  ______________________________________      Electronically signed by: ALEX CRAIG MD  Date:                                            09/11/16  Time:                                           20:30       Humira with Secondary inefficacy, tried to change to Simponi 50mg sc q 4 wks but insurance wouldn't cover, now on Enbrel Sureclick 50mg s q 7 days but has developed recurrent AAU OS  Humira with Secondary inefficacy, tried to change to Simponi 50mg sc q 4 wks but insurance wouldn't cover, now on Enbrel Sureclick 50mg s q 7 days but has developed recurrent AAU OS  started certolizumab after 1/11/21 visit as had AAU while on etanercept( with recurrence 1/28/21 after starting certolizumab but had not completed loading dose  and had had secondary failure adalimumab.      8. Insomnia, unspecified type    9. VAISHNAVI (obstructive sleep apnea)    10. Colon cancer screening  -     Ambulatory referral/consult to Endo Procedure ; Future; Expected date: 05/15/2024    Other orders  -     hydrocortisone 2.5 % cream; Apply topically 2 (two) times daily. for 10 days  Dispense: 28 g; Refill: 1          Blood work ordered     T2DM with neuropathy- stable with last HA1C of 5.5(11/23)<--5.7(5/23)<--6.7(6/22)<--8.3(3/22)<--7.7(5/21)<--8.3(4/20)       Continue Invokana/Ozempic       Pt stopped the Metformin 2/2 GI SE's      HTN- continue Atenolol 100 mg, Norvasc 10 mg, HCTZ 25 mg, Valsartan 320 mg qd      HLD- stable, Lipitor changed to Crestor(has not been taking it)      Spondyloarthritis- stable on Sulfasalazine/Naproxen        Followed by Rheumatology      Anxiety- stable on Prozac/Klonopin, managed by [facility]      Severe obesity- pt advised on proper diet/exercise for weight loss      Insomnia- stable on Ambien 5 mg qHS PRN      Hx of Hep B- followed by Hepatology       VAISHNAVI- awaiting CPAP from sleep medicine       F/u in 6 months

## 2024-05-15 ENCOUNTER — TELEPHONE (OUTPATIENT)
Dept: SLEEP MEDICINE | Facility: CLINIC | Age: 59
End: 2024-05-15
Payer: COMMERCIAL

## 2024-05-15 DIAGNOSIS — G47.33 OSA (OBSTRUCTIVE SLEEP APNEA): Primary | ICD-10-CM

## 2024-05-15 NOTE — TELEPHONE ENCOUNTER
Markos Rebollar,    Would you please call Althea Murali Taylor that I am ordering CPAP for her, and the DME will call her in several weeks to arrange CPAP .  I have sent her the details on the portal; the reason I'm sending this message to  you, is that she needs to have CPAP follow-up appt scheduled (she has Medicaid, which has pretty much the same rules as Medicare to try to expedite her follow-up appointment).    Thank you!

## 2024-05-16 RX ORDER — PANTOPRAZOLE SODIUM 40 MG/1
40 TABLET, DELAYED RELEASE ORAL DAILY
Qty: 90 TABLET | Refills: 0 | Status: SHIPPED | OUTPATIENT
Start: 2024-05-16 | End: 2024-06-03 | Stop reason: SDUPTHER

## 2024-05-16 RX ORDER — ZOLPIDEM TARTRATE 5 MG/1
5 TABLET ORAL NIGHTLY PRN
Qty: 90 TABLET | Refills: 0 | Status: SHIPPED | OUTPATIENT
Start: 2024-05-16

## 2024-05-20 ENCOUNTER — PATIENT MESSAGE (OUTPATIENT)
Dept: INTERNAL MEDICINE | Facility: CLINIC | Age: 59
End: 2024-05-20
Payer: COMMERCIAL

## 2024-05-20 ENCOUNTER — LAB VISIT (OUTPATIENT)
Dept: LAB | Facility: HOSPITAL | Age: 59
End: 2024-05-20
Attending: INTERNAL MEDICINE
Payer: COMMERCIAL

## 2024-05-20 DIAGNOSIS — E11.69 HYPERLIPIDEMIA ASSOCIATED WITH TYPE 2 DIABETES MELLITUS: ICD-10-CM

## 2024-05-20 DIAGNOSIS — E78.5 HYPERLIPIDEMIA ASSOCIATED WITH TYPE 2 DIABETES MELLITUS: ICD-10-CM

## 2024-05-20 DIAGNOSIS — I15.2 HYPERTENSION ASSOCIATED WITH DIABETES: ICD-10-CM

## 2024-05-20 DIAGNOSIS — E11.42 TYPE 2 DIABETES MELLITUS WITH DIABETIC POLYNEUROPATHY, UNSPECIFIED WHETHER LONG TERM INSULIN USE: ICD-10-CM

## 2024-05-20 DIAGNOSIS — E11.9 TYPE 2 DIABETES MELLITUS WITHOUT COMPLICATION, WITHOUT LONG-TERM CURRENT USE OF INSULIN: ICD-10-CM

## 2024-05-20 DIAGNOSIS — E11.59 HYPERTENSION ASSOCIATED WITH DIABETES: ICD-10-CM

## 2024-05-20 LAB
ALBUMIN/CREAT UR: NORMAL UG/MG (ref 0–30)
BACTERIA #/AREA URNS AUTO: NORMAL /HPF
BILIRUB UR QL STRIP: NEGATIVE
CLARITY UR REFRACT.AUTO: CLEAR
COLOR UR AUTO: YELLOW
CREAT UR-MCNC: 95 MG/DL (ref 15–325)
GLUCOSE UR QL STRIP: ABNORMAL
HGB UR QL STRIP: NEGATIVE
KETONES UR QL STRIP: NEGATIVE
LEUKOCYTE ESTERASE UR QL STRIP: NEGATIVE
MICROALBUMIN UR DL<=1MG/L-MCNC: <5 UG/ML
MICROSCOPIC COMMENT: NORMAL
NITRITE UR QL STRIP: NEGATIVE
PH UR STRIP: 6 [PH] (ref 5–8)
PROT UR QL STRIP: NEGATIVE
RBC #/AREA URNS AUTO: 1 /HPF (ref 0–4)
SP GR UR STRIP: 1.02 (ref 1–1.03)
URN SPEC COLLECT METH UR: ABNORMAL
UROBILINOGEN UR STRIP-ACNC: NEGATIVE EU/DL
WBC #/AREA URNS AUTO: 1 /HPF (ref 0–5)

## 2024-05-20 PROCEDURE — 81000 URINALYSIS NONAUTO W/SCOPE: CPT | Mod: PN | Performed by: INTERNAL MEDICINE

## 2024-05-20 PROCEDURE — 82043 UR ALBUMIN QUANTITATIVE: CPT | Mod: PN | Performed by: INTERNAL MEDICINE

## 2024-05-20 NOTE — TELEPHONE ENCOUNTER
No care due was identified.  [facility] Embedded Care Due Messages. Reference number: [id number].   5/20/2024 2:41:10 PM CDT

## 2024-05-21 RX ORDER — INSULIN PUMP SYRINGE, 3 ML
EACH MISCELLANEOUS
Qty: 1 EACH | Refills: 0 | Status: SHIPPED | OUTPATIENT
Start: 2024-05-21 | End: 2024-05-29

## 2024-05-23 ENCOUNTER — TELEPHONE (OUTPATIENT)
Dept: INTERNAL MEDICINE | Facility: CLINIC | Age: 59
End: 2024-05-23

## 2024-05-23 NOTE — TELEPHONE ENCOUNTER
Changed A1c from 5.5 to 5.7. Thank you Dr. Begum. This result correction Dr. Begum, it was called to me however as a critical at [facility] on the day your clinic was closed. Thank you.

## 2024-05-29 RX ORDER — LANCETS
EACH MISCELLANEOUS
Qty: 200 EACH | Refills: 11 | Status: SHIPPED | OUTPATIENT
Start: 2024-05-29

## 2024-05-29 RX ORDER — INSULIN PUMP SYRINGE, 3 ML
EACH MISCELLANEOUS
Qty: 1 EACH | Refills: 0 | Status: SHIPPED | OUTPATIENT
Start: 2024-05-29

## 2024-05-29 NOTE — TELEPHONE ENCOUNTER
Called Pt.   Regarding Diabetic equipment Rx.    -glucometer  -strips  -lancets    Send to [facility] in [facility]

## 2024-06-03 RX ORDER — ZOLPIDEM TARTRATE 5 MG/1
5 TABLET ORAL NIGHTLY PRN
Qty: 90 TABLET | Refills: 0 | OUTPATIENT
Start: 2024-06-03

## 2024-06-04 RX ORDER — PANTOPRAZOLE SODIUM 40 MG/1
40 TABLET, DELAYED RELEASE ORAL DAILY
Qty: 90 TABLET | Refills: 0 | Status: SHIPPED | OUTPATIENT
Start: 2024-06-04

## 2024-06-11 ENCOUNTER — OFFICE VISIT (OUTPATIENT)
Dept: GASTROENTEROLOGY | Facility: CLINIC | Age: 59
End: 2024-06-11
Payer: COMMERCIAL

## 2024-06-11 VITALS
WEIGHT: 221.25 LBS | SYSTOLIC BLOOD PRESSURE: 129 MMHG | DIASTOLIC BLOOD PRESSURE: 79 MMHG | BODY MASS INDEX: 34.73 KG/M2 | HEART RATE: 79 BPM | HEIGHT: 67 IN

## 2024-06-11 DIAGNOSIS — Z86.010 HISTORY OF COLON POLYPS: ICD-10-CM

## 2024-06-11 DIAGNOSIS — K59.09 CHRONIC CONSTIPATION: ICD-10-CM

## 2024-06-11 DIAGNOSIS — R13.10 DYSPHAGIA, UNSPECIFIED TYPE: Primary | ICD-10-CM

## 2024-06-11 PROBLEM — Z13.31 POSITIVE DEPRESSION SCREENING: Status: RESOLVED | Noted: 2019-12-02 | Resolved: 2024-06-11

## 2024-06-11 PROBLEM — M25.511 RIGHT SHOULDER PAIN: Status: RESOLVED | Noted: 2017-02-14 | Resolved: 2024-06-11

## 2024-06-11 PROBLEM — E83.51 HYPOCALCEMIA: Status: RESOLVED | Noted: 2019-08-30 | Resolved: 2024-06-11

## 2024-06-11 PROBLEM — M25.511 BILATERAL SHOULDER PAIN: Status: RESOLVED | Noted: 2020-08-04 | Resolved: 2024-06-11

## 2024-06-11 PROBLEM — M25.512 BILATERAL SHOULDER PAIN: Status: RESOLVED | Noted: 2020-08-04 | Resolved: 2024-06-11

## 2024-06-11 PROBLEM — L02.419 AXILLARY ABSCESS: Status: RESOLVED | Noted: 2021-05-07 | Resolved: 2024-06-11

## 2024-06-11 PROBLEM — Z86.0100 HISTORY OF COLON POLYPS: Status: ACTIVE | Noted: 2024-06-11

## 2024-06-11 PROBLEM — R29.898 UPPER EXTREMITY WEAKNESS: Status: RESOLVED | Noted: 2020-08-04 | Resolved: 2024-06-11

## 2024-06-11 PROBLEM — R29.898 WEAKNESS OF BOTH LEGS: Status: RESOLVED | Noted: 2017-03-12 | Resolved: 2024-06-11

## 2024-06-11 PROCEDURE — 99999 PR PBB SHADOW E&M-EST. PATIENT-LVL V: CPT | Mod: PBBFAC,,, | Performed by: NURSE PRACTITIONER

## 2024-06-11 PROCEDURE — 3066F NEPHROPATHY DOC TX: CPT | Mod: CPTII,S$GLB,, | Performed by: NURSE PRACTITIONER

## 2024-06-11 PROCEDURE — 3061F NEG MICROALBUMINURIA REV: CPT | Mod: CPTII,S$GLB,, | Performed by: NURSE PRACTITIONER

## 2024-06-11 PROCEDURE — 3072F LOW RISK FOR RETINOPATHY: CPT | Mod: CPTII,S$GLB,, | Performed by: NURSE PRACTITIONER

## 2024-06-11 PROCEDURE — 3074F SYST BP LT 130 MM HG: CPT | Mod: CPTII,S$GLB,, | Performed by: NURSE PRACTITIONER

## 2024-06-11 PROCEDURE — 99204 OFFICE O/P NEW MOD 45 MIN: CPT | Mod: S$GLB,,, | Performed by: NURSE PRACTITIONER

## 2024-06-11 PROCEDURE — 1159F MED LIST DOCD IN RCRD: CPT | Mod: CPTII,S$GLB,, | Performed by: NURSE PRACTITIONER

## 2024-06-11 PROCEDURE — 3008F BODY MASS INDEX DOCD: CPT | Mod: CPTII,S$GLB,, | Performed by: NURSE PRACTITIONER

## 2024-06-11 PROCEDURE — 3078F DIAST BP <80 MM HG: CPT | Mod: CPTII,S$GLB,, | Performed by: NURSE PRACTITIONER

## 2024-06-11 PROCEDURE — 1160F RVW MEDS BY RX/DR IN RCRD: CPT | Mod: CPTII,S$GLB,, | Performed by: NURSE PRACTITIONER

## 2024-06-11 PROCEDURE — 3044F HG A1C LEVEL LT 7.0%: CPT | Mod: CPTII,S$GLB,, | Performed by: NURSE PRACTITIONER

## 2024-06-11 RX ORDER — SODIUM, POTASSIUM,MAG SULFATES 17.5-3.13G
1 SOLUTION, RECONSTITUTED, ORAL ORAL DAILY
Qty: 1 KIT | Refills: 0 | Status: SHIPPED | OUTPATIENT
Start: 2024-06-11 | End: 2024-06-19

## 2024-06-11 RX ORDER — POLYETHYLENE GLYCOL 3350 17 G/17G
17 POWDER, FOR SOLUTION ORAL DAILY
Qty: 510 G | Refills: 11 | Status: SHIPPED | OUTPATIENT
Start: 2024-06-11

## 2024-06-11 NOTE — PROGRESS NOTES
Subjective:       Patient ID: Althea Vazquez is a 58 y.o. female.    Chief Complaint: Dysphagia and Constipation    57 y/o female with HTN, T2DM, and chronic inflammatory arthritis referred by rheumatology for dysphagia. Patient reports painful swallowing that has progressively worsened over the last 3 months. Has pain just above sternal notch with swallowing liquids and solid food but worse with solids. Occasional coughing and choking while eating. She is able to eat most of the time and denies unintentional weight loss. Has hx of GERD well controlled with daily PPI. Denies heartburn or regurgitation. Reports increased constipation and bloating after Ozempic. Has BM 1-2x/week. Taking Colace as needed without much relief. Last colonoscopy in 2018 with one colon polyp removed.         Past Medical History:   Diagnosis Date    Acid reflux     Anxiety 10/18/2012    Arthritis     Cataract     Depression     Diabetic peripheral neuropathy - mild 10/21/2014    Difficult intubation     Dry eyes     Dry mouth     Fever blister     History of hepatitis B 10/03/2016    Hep B core total Ab (+), no active/chronic infection    Hyperlipidemia     Hypertension     Insomnia     Iritis 05/13/2014    Long-term current use of steroids 09/27/2012    Nausea & vomiting 02/04/2015    VAISHNAVI (obstructive sleep apnea)     Type II diabetes mellitus 10/01/2012       Past Surgical History:   Procedure Laterality Date    CATARACT EXTRACTION W/  INTRAOCULAR LENS IMPLANT Right 1/3/2024    Procedure: EXTRACTION, CATARACT, WITH IOL INSERTION;  Surgeon: Soumya Mortensen MD;  Location: [facility] OR;  Service: Ophthalmology;  Laterality: Right;    CATARACT EXTRACTION W/  INTRAOCULAR LENS IMPLANT Left 2/7/2024    Procedure: EXTRACTION, CATARACT, WITH IOL INSERTION;  Surgeon: Soumya Mortensen MD;  Location: [facility] OR;  Service: Ophthalmology;  Laterality: Left;    COLONOSCOPY N/A 1/9/2018    Procedure: COLONOSCOPY;  Surgeon: Juwan Lopez MD;   Location: [facility] ENDO (2ND FLR);  Service: Endoscopy;  Laterality: N/A;  per anesthesia, pt. needs to be on 2nd floor due to past Anesthesia problems    EPIDURAL STEROID INJECTION INTO CERVICAL SPINE N/A 2/17/2021    Procedure: Injection-steroid-epidural-cervical--C7-T1 IL SHARONA;  Surgeon: Graciela Jerome MD;  Location: [facility] PAIN MGT;  Service: Pain Management;  Laterality: N/A;    EPIDURAL STEROID INJECTION INTO CERVICAL SPINE N/A 2/23/2023    Procedure: Injection-steroid-epidural-cervical  C7-T1;  Surgeon: Juvenal Segura MD;  Location: [facility] PAIN MGT;  Service: Pain Management;  Laterality: N/A;    HYSTERECTOMY  12/3/2014    INJECTION OF ANESTHETIC AGENT INTO SACROILIAC JOINT Left 4/21/2021    Procedure: LEFT SACROILIAC JOINT STEROID INJECTION;  Surgeon: Graciela Jerome MD;  Location: [facility] PAIN MGT;  Service: Pain Management;  Laterality: Left;    INJECTION OF JOINT Left 4/21/2021    Procedure: Injection, Joint--LEFT GTB;  Surgeon: Graciela Jerome MD;  Location: [facility] PAIN MGT;  Service: Pain Management;  Laterality: Left;    KNEE ARTHROSCOPY  5-14-14    right    TUBAL LIGATION         Family History   Problem Relation Name Age of Onset    Diabetes Mother      Cataracts Mother      Stroke Mother      Heart attack Mother      Depression Mother      Stroke Father      Hypertension Father      Hyperlipidemia Father      Colon cancer Father      Diabetes Maternal Aunt      Heart attack Paternal Grandmother      ADD / ADHD Son      No Known Problems Sister      No Known Problems Brother 2     No Known Problems Maternal Uncle      No Known Problems Paternal Aunt      No Known Problems Paternal Uncle      No Known Problems Maternal Grandmother      No Known Problems Maternal Grandfather      Cancer Paternal Grandfather          Lung CA    Melanoma Neg Hx      Eczema Neg Hx      Lupus Neg Hx      Psoriasis Neg Hx      Amblyopia Neg Hx      Blindness Neg Hx      Glaucoma Neg Hx      Macular degeneration Neg Hx      Retinal  detachment Neg Hx      Strabismus Neg Hx      Thyroid disease Neg Hx      Suicide Neg Hx      Acne Neg Hx      Cirrhosis Neg Hx      Asthma Neg Hx      Emphysema Neg Hx      Breast cancer Neg Hx      Ovarian cancer Neg Hx         Social History     Socioeconomic History    Marital status:      Spouse name: Edward    Number of children: 2   Occupational History    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    Drug use: No    Sexual activity: Yes     Partners: Male     Birth control/protection: Post-menopausal, See Surgical Hx   Other Topics Concern    Are you pregnant or think you may be? No    Breast-feeding No     Social Determinants of Health     Financial Resource Strain: Patient Declined (5/13/2024)    Overall Financial Resource Strain (CARDIA)     Difficulty of Paying Living Expenses: Patient declined   Food Insecurity: Patient Declined (5/13/2024)    Hunger Vital Sign     Worried About Running Out of Food in the Last Year: Patient declined     Ran Out of Food in the Last Year: Patient declined   Transportation Needs: No Transportation Needs (5/13/2024)    PRAPARE - Transportation     Lack of Transportation (Medical): No     Lack of Transportation (Non-Medical): No   Physical Activity: Insufficiently Active (5/13/2024)    Exercise Vital Sign     Days of Exercise per Week: 2 days     Minutes of Exercise per Session: 30 min   Stress: Stress Concern Present (5/13/2024)    [employer] [place] [employer] - Occupational Stress Questionnaire     Feeling of Stress : Rather much   Housing Stability: Unknown (5/13/2024)    Housing Stability Vital Sign     Unable to Pay for Housing in the Last Year: Patient declined       Review of Systems   Constitutional:  Negative for appetite change and unexpected weight  "change.   HENT:  Positive for trouble swallowing. Negative for voice change.    Respiratory:  Positive for cough and choking. Negative for shortness of breath.    Cardiovascular:  Negative for chest pain.   Gastrointestinal:  Positive for abdominal distention and constipation. Negative for abdominal pain, blood in stool, nausea and vomiting.   Hematological:  Negative for adenopathy. Does not bruise/bleed easily.   Psychiatric/Behavioral:  Negative for dysphoric mood.          Objective:     Vitals:    06/11/24 0946   BP: 129/79   BP Location: Right arm   Patient Position: Sitting   BP Method: Medium (Automatic)   Pulse: 79   Weight: 100.4 kg (221 lb 3.7 oz)   Height: 5' 7" (1.702 m)          Physical Exam  Constitutional:       General: She is not in acute distress.     Appearance: Normal appearance.   HENT:      Head: Normocephalic.   Eyes:      Conjunctiva/sclera: Conjunctivae normal.   Pulmonary:      Effort: Pulmonary effort is normal. No respiratory distress.   Abdominal:      General: Bowel sounds are normal. There is no distension.      Palpations: Abdomen is soft.      Tenderness: There is no abdominal tenderness.   Musculoskeletal:         General: Normal range of motion.      Cervical back: Normal range of motion.   Skin:     General: Skin is warm and dry.   Neurological:      Mental Status: She is alert and oriented to person, place, and time.   Psychiatric:         Mood and Affect: Mood normal.         Behavior: Behavior normal.               Assessment:         ICD-10-CM ICD-9-CM   1. Dysphagia, unspecified type  R13.10 787.20   2. Chronic constipation  K59.09 564.00   3. History of colon polyps  Z86.010 V12.72       Plan:       Dysphagia, unspecified type  -     Recommend soft diet  -     Case Request Endoscopy: EGD (ESOPHAGOGASTRODUODENOSCOPY)  -     FL Esophagram With Barium Tablet; Future; Expected date: 06/11/2024    Chronic constipation  -     Start Miralax and fiber supplement daily  -     " polyethylene glycol (GLYCOLAX) 17 gram/dose powder; Take 17 g by mouth once daily.  Dispense: 510 g; Refill: 11    History of colon polyps  -     Schedule screening colonoscopy  -     sodium,potassium,mag sulfates (SUPREP BOWEL PREP KIT) 17.5-3.13-1.6 gram SolR; Take 177 mLs by mouth once daily. for 2 days  Dispense: 1 kit; Refill: 0      Follow up if symptoms worsen or fail to improve.     Patient's Medications   New Prescriptions    POLYETHYLENE GLYCOL (GLYCOLAX) 17 GRAM/DOSE POWDER    Take 17 g by mouth once daily.    SODIUM,POTASSIUM,MAG SULFATES (SUPREP BOWEL PREP KIT) 17.5-3.13-1.6 GRAM SOLR    Take 177 mLs by mouth once daily. for 2 days   Previous Medications    AMLODIPINE (NORVASC) 10 MG TABLET    Take 1 tablet (10 mg total) by mouth once daily.    ASPIRIN (ECOTRIN) 81 MG EC TABLET    Take 1 tablet (81 mg total) by mouth once daily.    ATENOLOL (TENORMIN) 100 MG TABLET    Take 1 tablet (100 mg total) by mouth once daily.    BLOOD SUGAR DIAGNOSTIC STRP    Check blood sugars BID    BLOOD-GLUCOSE METER KIT    Use twice daily.    CANAGLIFLOZIN (INVOKANA) 300 MG TAB TABLET    Take 1 tablet (300 mg total) by mouth daily before breakfast.    CLOBETASOL (TEMOVATE) 0.05 % EXTERNAL SOLUTION    Apply to affected area once daily    CLONAZEPAM (KLONOPIN) 0.5 MG TABLET    TAKE 1 TABLET BY MOUTH ONCE DAILY AS NEEDED FOR ANXIETY    CYCLOBENZAPRINE (FLEXERIL) 10 MG TABLET    TAKE ONE TABLET BY MOUTH AT BEDTIME AS NEEDED    DICLOFENAC SODIUM (VOLTAREN) 1 % GEL    Apply 4 g topically 4 (four) times daily. Apply light film topically to knee up to four times daily    DOCUSATE SODIUM (COLACE) 50 MG CAPSULE    Take 1 capsule (50 mg total) by mouth 2 (two) times daily.    ENTECAVIR (BARACLUDE) 0.5 MG TAB    Take 1 tablet (0.5 mg total) by mouth once daily.    ERGOCALCIFEROL (ERGOCALCIFEROL) 50,000 UNIT CAP    Take 1 capsule (50,000 Units total) by mouth every 7 days.    FLUOCINOLONE ACETONIDE OIL 0.01 % DROP    Place 3 drops into  both ears 2 (two) times a day.    FLUOXETINE 20 MG CAPSULE    Take 1 capsule (20 mg total) by mouth 2 (two) times daily.    FLUTICASONE PROPIONATE (FLONASE) 50 MCG/ACTUATION NASAL SPRAY    2 sprays (100 mcg total) by Each Nostril route once daily.    FOLIC ACID (FOLVITE) 1 MG TABLET    Take 1 tablet (1 mg total) by mouth once daily.    GOLIMUMAB (SIMPONI) 50 MG/0.5 ML PNIJ    Inject 50 mg into the skin every 30 days.    HYDROCHLOROTHIAZIDE (HYDRODIURIL) 25 MG TABLET    Take 1 tablet (25 mg total) by mouth once daily.    HYDROCORTISONE 2.5 % CREAM    Apply topically 2 (two) times daily. for 10 days    KETOCONAZOLE (NIZORAL) 2 % SHAMPOO    Apply topically twice a week.    KETOROLAC 0.5% (ACULAR) 0.5 % DROP    Place 1 drop into the right eye 3 (three) times daily.    KETOROLAC 0.5% (ACULAR) 0.5 % DROP    Place 1 drop into the left eye 3 (three) times daily.    LANCETS (ACCU-CHEK SOFTCLIX LANCETS) MISC    Check blood sugars BID    LANCETS (FREESTYLE LANCETS) 28 GAUGE MISC    test TWICE DAILY    LEVOCETIRIZINE (XYZAL) 5 MG TABLET    TAKE ONE TABLET BY MOUTH EVERY EVENING    METHOTREXATE 2.5 MG TAB    Take 6 tablets (15 mg total) by mouth every 7 days. Take 3 tabs Wed am and 3 tabs Wed pm only    OFLOXACIN (OCUFLOX) 0.3 % OPHTHALMIC SOLUTION    Place 1 drop into the right eye 3 (three) times daily.    OFLOXACIN (OCUFLOX) 0.3 % OPHTHALMIC SOLUTION    Place 1 drop into the left eye 3 (three) times daily.    PANTOPRAZOLE (PROTONIX) 40 MG TABLET    Take 1 tablet (40 mg total) by mouth once daily.    PREDNISOLONE ACETATE (PRED FORTE) 1 % DRPS    Instill one drop into affect eye(s) as directed    PREDNISOLONE ACETATE (PRED FORTE) 1 % DRPS    Place 1 drop into the right eye 3 (three) times daily.    PREDNISOLONE ACETATE (PRED FORTE) 1 % DRPS    Place 1 drop into the left eye 3 (three) times daily.    ROSUVASTATIN (CRESTOR) 40 MG TAB    Take 1 tablet (40 mg total) by mouth every evening.    SEMAGLUTIDE (OZEMPIC) 2 MG/DOSE (8 MG/3  ML) PNIJ    Inject 2 mg into the skin every 7 days.    SULFASALAZINE (AZULFIDINE) 500 MG EC TABLET    Take 3 tablets (1,500 mg total) by mouth 2 (two) times daily.    TIZANIDINE (ZANAFLEX) 2 MG TABLET    Take 1-2 tablets (2-4 mg total) by mouth every 8 (eight) hours as needed.    TOPIRAMATE (TOPAMAX) 100 MG TABLET    Take 1 tablet (100 mg total) by mouth 2 (two) times daily.    VALSARTAN (DIOVAN) 320 MG TABLET    Take 1 tablet (320 mg total) by mouth once daily.    VITAMIN D (VITAMIN D3) 1000 UNITS TAB    Take 1,000 Units by mouth once daily.    ZOLPIDEM (AMBIEN) 5 MG TAB    Take 1 tablet (5 mg total) by mouth nightly as needed.   Modified Medications    No medications on file   Discontinued Medications    No medications on file

## 2024-06-11 NOTE — PATIENT INSTRUCTIONS
For constipation, start Miralax 1 capful in 6-8 ounces of water or juice daily (I will send prescription to your pharmacy) and an over the counter fiber supplement (such as Citrucel or Metamucil capsules once daily).     Hold Ozempic injection one week prior to endoscopy; last dose on 7/8/2024. DO NOT TAKE DOSE ON 7/15/2024.    SUPREP Instructions    [facility] [address] [place]  [address]    You are scheduled for a EGD/colonoscopy with Dr. Soriano on 7/16/2024 at [facility]. You will enter through the [facility] entrance and check in at Same Day Surgery.  To ensure that your test is accurate and complete, you MUST follow these instructions listed below.  If you have any questions, please call our office at [phone].  Plan on being at the hospital for your procedure for 3-4 hours.    1.  Follow a CLEAR LIQUID DIET for the entire day before your scheduled colonoscopy.  This means no solid food the entire day starting when you wake.  You may have as much of the clear liquids as you want throughout the day.   CLEAR LIQUID DIET:   - NO DAIRY   - You can have:  Coffee with sugar (no creamer), tea, water, soda, apple or white grape juice, chicken or beef broth/bouillon (no meat, noodles, or veggies), popsicles, Jell-O, lemonade.    2.  AT 5 pm the evening before your colonoscopy, POUR ONE (1) BOTTLE OF SUPREP INTO THE MIXING CONTAINER, PROVIDED INSIDE THE BOX.  ADD WATER TO THE LINE ON THE CONTAINER AND MIX IT WELL.  DRINK THE ENTIRE CONTAINER AND THEN DRINK TWO (2) MORE CONTAINERS OF WATER OVER THE NEXT 1 HOUR.  This is sometimes easier to drink if this solution is cold, so you can mix the solution 20 minutes ahead of time and place in the refrigerator prior to drinking.  You have to drink the solution within 30-45 minutes of mixing it.  Do NOT put this solution over ice.  It IS ok to drink with a straw.    3.  The endoscopy department will call you 1 day before your  colonoscopy to tell you the exact time to arrive, AND to tell you the exact time to drink the 2nd portion of your prep (which will be FIVE HOURS BEFORE YOUR ARRIVAL TIME).  At this time given to you, POUR ONE (1) BOTTLE OF SUPREP INTO THE MIXING CONTAINER, PROVIDED INSIDE THE BOX.  ADD WATER TO THE LINE ON THE CONTAINER AND MIX IT WELL.  DRINK THE ENTIRE CONTAINER AND THEN DRINK TWO (2) MORE CONTAINERS OF WATER OVER THE NEXT 1 HOUR.  This is sometimes easier to drink if this solution is cold, so you can mix the solution 20 minutes ahead of time and place in the refrigerator prior to drinking.  You have to drink the solution within 30-45 minutes of mixing it.  Do NOT put this solution over ice.  It IS ok to drink with a straw.  Once this is complete, you may not have ANYTHING else by mouth!    4.  You must have someone with you to DRIVE YOU HOME since you will be receiving IV sedation for the colonoscopy.    5.  It is ok to take MOST of your REGULAR MEDICATIONS  in the morning of your test with a SIP of water.  THE ONLY MEDS YOU NEED TO HOLD ARE YOUR DIABETES MEDICATIONS,  SOME BLOOD PRESSURE MEDS, AND BLOOD THINNERS IF OK'D BY YOUR DOCTOR.  Do NOT have anything else to eat or drink the morning of your colonoscopy.  It is ok to brush your teeth.    6.  If you are on blood thinners THAT YOU HAVE BEEN INSTRUCTED TO HOLD BY YOUR DOCTOR FOR THIS PROCEDURE, then do NOT take this the morning of your colonoscopy.  Do NOT stop these medications on your own, they must be approved to be held by your doctor.  Your colonoscopy can NOT be done if you are on these medications.  Examples of blood thinners include: Coumadin, Aggrenox, Plavix, Pradaxa, Reapro, Pletal, Xarelto, Ticagrelor, Brilinta, Eliquis, and high dose aspirin (325 mg).  You do not have to stop baby aspirin 81 mg.    7.  IF YOU ARE DIABETIC:  NO INSULIN OR ORAL MEDICATIONS THE MORNING OF THE COLONOSCOPY.  TAKE ONLY HALF THE DOSE OF YOUR INSULIN THE DAY BEFORE THE  COLONOSCOPY.  DO NOT TAKE ANY ORAL DIABETIC MEDICATIONS THE DAY BEFORE THE COLONOSCOPY.  IF YOU ARE AN INSULIN DEPENDENT DIABETIC WITH UNSTABLE BLOOD SUGARS, NOTIFY YOUR PRIMARY CARE PHYSICIAN FOR INSTRUCTIONS.    You will receive a call the afternoon before your colonoscopy to tell you the time to arrive.  If you have not received a call by the day before your procedure, call the Pre-op Coordinator at [phone].

## 2024-06-13 DIAGNOSIS — L21.9 SEBORRHEIC DERMATITIS: ICD-10-CM

## 2024-06-14 DIAGNOSIS — L21.9 SEBORRHEIC DERMATITIS: ICD-10-CM

## 2024-06-14 RX ORDER — KETOCONAZOLE 20 MG/ML
SHAMPOO, SUSPENSION TOPICAL
Qty: 120 ML | Refills: 6 | Status: CANCELLED | OUTPATIENT
Start: 2024-06-17

## 2024-06-14 RX ORDER — KETOCONAZOLE 20 MG/ML
SHAMPOO, SUSPENSION TOPICAL
Qty: 120 ML | Refills: 6 | OUTPATIENT
Start: 2024-06-17

## 2024-06-22 DIAGNOSIS — H20.9 UVEITIS: ICD-10-CM

## 2024-06-23 RX ORDER — METHOTREXATE 2.5 MG/1
15 TABLET ORAL
Qty: 72 TABLET | Refills: 0 | Status: SHIPPED | OUTPATIENT
Start: 2024-06-23

## 2024-06-25 ENCOUNTER — PATIENT MESSAGE (OUTPATIENT)
Dept: GASTROENTEROLOGY | Facility: CLINIC | Age: 59
End: 2024-06-25
Payer: COMMERCIAL

## 2024-06-25 DIAGNOSIS — Z86.010 HISTORY OF COLON POLYPS: Primary | ICD-10-CM

## 2024-06-25 RX ORDER — SODIUM, POTASSIUM,MAG SULFATES 17.5-3.13G
1 SOLUTION, RECONSTITUTED, ORAL ORAL DAILY
Qty: 1 KIT | Refills: 0 | Status: SHIPPED | OUTPATIENT
Start: 2024-06-25 | End: 2024-06-28

## 2024-06-25 NOTE — TELEPHONE ENCOUNTER
Patient request a new rx for her Suprep bowel prep. Patient states that she took bowel prep on Sunday, thinking her colonoscopy was Monday. Patient [facility] in [place].

## 2024-06-26 ENCOUNTER — HOSPITAL ENCOUNTER (OUTPATIENT)
Dept: RADIOLOGY | Facility: HOSPITAL | Age: 59
Discharge: HOME | End: 2024-06-26
Attending: INTERNAL MEDICINE
Payer: COMMERCIAL

## 2024-06-26 DIAGNOSIS — Z12.31 ENCOUNTER FOR SCREENING MAMMOGRAM FOR MALIGNANT NEOPLASM OF BREAST: ICD-10-CM

## 2024-06-26 PROCEDURE — 77067 SCR MAMMO BI INCL CAD: CPT | Mod: TC,PN

## 2024-06-26 PROCEDURE — 77067 SCR MAMMO BI INCL CAD: CPT | Mod: 26,,, | Performed by: RADIOLOGY

## 2024-06-26 PROCEDURE — 77063 BREAST TOMOSYNTHESIS BI: CPT | Mod: 26,,, | Performed by: RADIOLOGY

## 2024-06-29 DIAGNOSIS — M02.30 REACTIVE ARTHRITIS: ICD-10-CM

## 2024-06-29 RX ORDER — CYCLOBENZAPRINE HCL 10 MG
TABLET ORAL
Qty: 90 TABLET | Refills: 2 | Status: SHIPPED | OUTPATIENT
Start: 2024-06-29

## 2024-06-29 NOTE — TELEPHONE ENCOUNTER
Refill Routing Note   Medication(s) are not appropriate for processing by [facility] for the following reason(s):        Medication outside of protocol    [facility] action(s):  Route               Appointments  past 12m or future 3m with PCP    Date Provider   Last Visit   5/14/2024 Weston Begum,    Next Visit   Visit date not found Weston Begum,    ED visits in past 90 days: 0        Note composed:6:55 PM 06/29/2024

## 2024-07-01 RX ORDER — ZOLPIDEM TARTRATE 5 MG/1
5 TABLET ORAL NIGHTLY PRN
Qty: 90 TABLET | Refills: 0 | OUTPATIENT
Start: 2024-07-01

## 2024-07-01 NOTE — TELEPHONE ENCOUNTER
Refill Routing Note   Medication(s) are not appropriate for processing by [facility] for the following reason(s):        Outside of protocol    [facility] action(s):  Route               Appointments  past 12m or future 3m with PCP    Date Provider   Last Visit   5/14/2024 Weston Begum, DO   Next Visit   6/29/2024 Weston Begum, DO   ED visits in past 90 days: 0        Note composed:3:07 PM 07/01/2024

## 2024-07-02 RX ORDER — HYDROCORTISONE 25 MG/G
CREAM TOPICAL 2 TIMES DAILY
Qty: 28 G | Refills: 1 | Status: SHIPPED | OUTPATIENT
Start: 2024-07-02 | End: 2024-07-15

## 2024-07-09 ENCOUNTER — TELEPHONE (OUTPATIENT)
Dept: ENDOSCOPY | Facility: HOSPITAL | Age: 59
End: 2024-07-09
Payer: COMMERCIAL

## 2024-07-31 ENCOUNTER — PATIENT MESSAGE (OUTPATIENT)
Dept: RHEUMATOLOGY | Facility: CLINIC | Age: 59
End: 2024-07-31
Payer: COMMERCIAL

## 2024-08-01 ENCOUNTER — PATIENT MESSAGE (OUTPATIENT)
Dept: RHEUMATOLOGY | Facility: CLINIC | Age: 59
End: 2024-08-01
Payer: COMMERCIAL

## 2024-08-03 ENCOUNTER — TELEPHONE (OUTPATIENT)
Dept: RHEUMATOLOGY | Facility: CLINIC | Age: 59
End: 2024-08-03
Payer: COMMERCIAL

## 2024-08-03 DIAGNOSIS — M46.90 AXIAL SPONDYLOARTHRITIS: ICD-10-CM

## 2024-08-03 DIAGNOSIS — B18.1 CHRONIC VIRAL HEPATITIS B WITHOUT DELTA AGENT AND WITHOUT COMA: Primary | ICD-10-CM

## 2024-08-04 RX ORDER — FLUOXETINE HYDROCHLORIDE 20 MG/1
20 CAPSULE ORAL 2 TIMES DAILY
Qty: 180 CAPSULE | Refills: 0 | Status: SHIPPED | OUTPATIENT
Start: 2024-08-04

## 2024-08-05 DIAGNOSIS — M47.819 SPONDYLOARTHRITIS: Primary | ICD-10-CM

## 2024-08-05 DIAGNOSIS — J20.9 ACUTE BRONCHITIS, UNSPECIFIED ORGANISM: ICD-10-CM

## 2024-08-05 DIAGNOSIS — R51.9 SINUS HEADACHE: ICD-10-CM

## 2024-08-05 DIAGNOSIS — K21.9 GASTROESOPHAGEAL REFLUX DISEASE, UNSPECIFIED WHETHER ESOPHAGITIS PRESENT: ICD-10-CM

## 2024-08-05 DIAGNOSIS — Z79.899 HIGH RISK MEDICATION USE: ICD-10-CM

## 2024-08-05 DIAGNOSIS — M02.30 REACTIVE ARTHRITIS: ICD-10-CM

## 2024-08-05 DIAGNOSIS — M19.90 INFLAMMATORY ARTHRITIS: ICD-10-CM

## 2024-08-05 RX ORDER — PANTOPRAZOLE SODIUM 40 MG/1
40 TABLET, DELAYED RELEASE ORAL DAILY
Qty: 90 TABLET | Refills: 0 | Status: CANCELLED | OUTPATIENT
Start: 2024-08-05

## 2024-08-05 RX ORDER — PANTOPRAZOLE SODIUM 40 MG/1
40 TABLET, DELAYED RELEASE ORAL DAILY
Qty: 90 TABLET | Refills: 0 | Status: SHIPPED | OUTPATIENT
Start: 2024-08-05

## 2024-08-05 RX ORDER — LEVOCETIRIZINE DIHYDROCHLORIDE 5 MG/1
5 TABLET, FILM COATED ORAL DAILY
Qty: 90 TABLET | Refills: 2 | Status: CANCELLED | OUTPATIENT
Start: 2024-08-05

## 2024-08-05 RX ORDER — FLUOXETINE HYDROCHLORIDE 20 MG/1
20 CAPSULE ORAL 2 TIMES DAILY
Qty: 180 CAPSULE | Refills: 1 | OUTPATIENT
Start: 2024-08-05

## 2024-08-05 RX ORDER — ZOLPIDEM TARTRATE 5 MG/1
5 TABLET ORAL NIGHTLY PRN
Qty: 90 TABLET | Refills: 0 | OUTPATIENT
Start: 2024-08-05

## 2024-08-05 RX ORDER — LEVOCETIRIZINE DIHYDROCHLORIDE 5 MG/1
5 TABLET, FILM COATED ORAL DAILY
Qty: 90 TABLET | Refills: 3 | Status: SHIPPED | OUTPATIENT
Start: 2024-08-05

## 2024-08-05 RX ORDER — ZOLPIDEM TARTRATE 5 MG/1
5 TABLET ORAL NIGHTLY PRN
Qty: 90 TABLET | Refills: 0 | Status: CANCELLED | OUTPATIENT
Start: 2024-08-05

## 2024-08-05 RX ORDER — SULFASALAZINE 500 MG/1
1500 TABLET, DELAYED RELEASE ORAL 2 TIMES DAILY
Qty: 540 TABLET | Refills: 0 | Status: SHIPPED | OUTPATIENT
Start: 2024-08-05

## 2024-08-06 DIAGNOSIS — L21.9 SEBORRHEIC DERMATITIS: ICD-10-CM

## 2024-08-06 RX ORDER — KETOCONAZOLE 20 MG/ML
SHAMPOO, SUSPENSION TOPICAL
Qty: 120 ML | Refills: 6 | OUTPATIENT
Start: 2024-08-08

## 2024-08-07 ENCOUNTER — PATIENT MESSAGE (OUTPATIENT)
Dept: INTERNAL MEDICINE | Facility: CLINIC | Age: 59
End: 2024-08-07
Payer: COMMERCIAL

## 2024-08-07 ENCOUNTER — OFFICE VISIT (OUTPATIENT)
Dept: PAIN MEDICINE | Facility: CLINIC | Age: 59
End: 2024-08-07
Payer: COMMERCIAL

## 2024-08-07 VITALS
BODY MASS INDEX: 35.6 KG/M2 | DIASTOLIC BLOOD PRESSURE: 88 MMHG | HEIGHT: 67 IN | SYSTOLIC BLOOD PRESSURE: 157 MMHG | HEART RATE: 90 BPM

## 2024-08-07 DIAGNOSIS — G89.4 CHRONIC PAIN SYNDROME: ICD-10-CM

## 2024-08-07 DIAGNOSIS — E11.9 TYPE 2 DIABETES MELLITUS WITHOUT COMPLICATION, WITHOUT LONG-TERM CURRENT USE OF INSULIN: Primary | ICD-10-CM

## 2024-08-07 DIAGNOSIS — M17.11 PRIMARY OSTEOARTHRITIS OF RIGHT KNEE: Primary | ICD-10-CM

## 2024-08-07 DIAGNOSIS — M25.561 RIGHT KNEE PAIN, UNSPECIFIED CHRONICITY: ICD-10-CM

## 2024-08-07 PROCEDURE — 99999 PR PBB SHADOW E&M-EST. PATIENT-LVL IV: CPT | Mod: PBBFAC,,, | Performed by: NURSE PRACTITIONER

## 2024-08-07 PROCEDURE — 3044F HG A1C LEVEL LT 7.0%: CPT | Mod: CPTII,S$GLB,, | Performed by: NURSE PRACTITIONER

## 2024-08-07 PROCEDURE — 3008F BODY MASS INDEX DOCD: CPT | Mod: CPTII,S$GLB,, | Performed by: NURSE PRACTITIONER

## 2024-08-07 PROCEDURE — 3079F DIAST BP 80-89 MM HG: CPT | Mod: CPTII,S$GLB,, | Performed by: NURSE PRACTITIONER

## 2024-08-07 PROCEDURE — 3061F NEG MICROALBUMINURIA REV: CPT | Mod: CPTII,S$GLB,, | Performed by: NURSE PRACTITIONER

## 2024-08-07 PROCEDURE — 1160F RVW MEDS BY RX/DR IN RCRD: CPT | Mod: CPTII,S$GLB,, | Performed by: NURSE PRACTITIONER

## 2024-08-07 PROCEDURE — 3077F SYST BP >= 140 MM HG: CPT | Mod: CPTII,S$GLB,, | Performed by: NURSE PRACTITIONER

## 2024-08-07 PROCEDURE — 99214 OFFICE O/P EST MOD 30 MIN: CPT | Mod: S$GLB,,, | Performed by: NURSE PRACTITIONER

## 2024-08-07 PROCEDURE — 3066F NEPHROPATHY DOC TX: CPT | Mod: CPTII,S$GLB,, | Performed by: NURSE PRACTITIONER

## 2024-08-07 PROCEDURE — 3072F LOW RISK FOR RETINOPATHY: CPT | Mod: CPTII,S$GLB,, | Performed by: NURSE PRACTITIONER

## 2024-08-07 PROCEDURE — 1159F MED LIST DOCD IN RCRD: CPT | Mod: CPTII,S$GLB,, | Performed by: NURSE PRACTITIONER

## 2024-08-07 RX ORDER — CELECOXIB 200 MG/1
200 CAPSULE ORAL 2 TIMES DAILY
Qty: 42 CAPSULE | Refills: 0 | Status: SHIPPED | OUTPATIENT
Start: 2024-08-07 | End: 2024-08-28

## 2024-08-08 ENCOUNTER — TELEPHONE (OUTPATIENT)
Dept: RHEUMATOLOGY | Facility: CLINIC | Age: 59
End: 2024-08-08
Payer: COMMERCIAL

## 2024-08-09 RX ORDER — SEMAGLUTIDE 0.68 MG/ML
INJECTION, SOLUTION SUBCUTANEOUS
Qty: 3 ML | Refills: 3 | Status: SHIPPED | OUTPATIENT
Start: 2024-08-09

## 2024-08-12 ENCOUNTER — LAB VISIT (OUTPATIENT)
Dept: LAB | Facility: HOSPITAL | Age: 59
End: 2024-08-12
Attending: INTERNAL MEDICINE
Payer: COMMERCIAL

## 2024-08-12 DIAGNOSIS — M46.90 AXIAL SPONDYLOARTHRITIS: ICD-10-CM

## 2024-08-12 DIAGNOSIS — G89.4 CHRONIC PAIN SYNDROME: ICD-10-CM

## 2024-08-12 DIAGNOSIS — M25.561 RIGHT KNEE PAIN, UNSPECIFIED CHRONICITY: ICD-10-CM

## 2024-08-12 DIAGNOSIS — M17.11 PRIMARY OSTEOARTHRITIS OF RIGHT KNEE: Primary | ICD-10-CM

## 2024-08-12 LAB
ALBUMIN SERPL BCP-MCNC: 4.5 G/DL (ref 3.5–5.2)
ALP SERPL-CCNC: 74 U/L (ref 38–126)
ALT SERPL W/O P-5'-P-CCNC: 26 U/L (ref 10–44)
ANION GAP SERPL CALC-SCNC: 14 MMOL/L (ref 8–16)
AST SERPL-CCNC: 28 U/L (ref 15–46)
BASOPHILS # BLD AUTO: 0.03 K/UL (ref 0–0.2)
BASOPHILS NFR BLD: 0.4 % (ref 0–1.9)
BILIRUB SERPL-MCNC: 0.4 MG/DL (ref 0.1–1)
CALCIUM SERPL-MCNC: 9.5 MG/DL (ref 8.7–10.5)
CHLORIDE SERPL-SCNC: 100 MMOL/L (ref 95–110)
CO2 SERPL-SCNC: 26 MMOL/L (ref 23–29)
CREAT SERPL-MCNC: 0.65 MG/DL (ref 0.5–1.4)
CRP SERPL-MCNC: 0.29 MG/DL (ref 0–1)
DIFFERENTIAL METHOD BLD: ABNORMAL
EOSINOPHIL # BLD AUTO: 0.1 K/UL (ref 0–0.5)
EOSINOPHIL NFR BLD: 1.6 % (ref 0–8)
ERYTHROCYTE [DISTWIDTH] IN BLOOD BY AUTOMATED COUNT: 13.6 % (ref 11.5–14.5)
ERYTHROCYTE [SEDIMENTATION RATE] IN BLOOD BY PHOTOMETRIC METHOD: 36 MM/HR (ref 0–36)
EST. GFR  (NO RACE VARIABLE): >60 ML/MIN/1.73 M^2
GLUCOSE SERPL-MCNC: 116 MG/DL (ref 70–110)
HCT VFR BLD AUTO: 40.1 % (ref 37–48.5)
HGB BLD-MCNC: 13.3 G/DL (ref 12–16)
IMM GRANULOCYTES # BLD AUTO: 0.05 K/UL (ref 0–0.04)
IMM GRANULOCYTES NFR BLD AUTO: 0.7 % (ref 0–0.5)
LYMPHOCYTES # BLD AUTO: 3.5 K/UL (ref 1–4.8)
LYMPHOCYTES NFR BLD: 50.9 % (ref 18–48)
MCH RBC QN AUTO: 30.5 PG (ref 27–31)
MCHC RBC AUTO-ENTMCNC: 33.2 G/DL (ref 32–36)
MCV RBC AUTO: 92 FL (ref 82–98)
MONOCYTES # BLD AUTO: 0.6 K/UL (ref 0.3–1)
MONOCYTES NFR BLD: 9.1 % (ref 4–15)
NEUTROPHILS # BLD AUTO: 2.6 K/UL (ref 1.8–7.7)
NEUTROPHILS NFR BLD: 37.3 % (ref 38–73)
NRBC BLD-RTO: 0 /100 WBC
PLATELET # BLD AUTO: 298 K/UL (ref 150–450)
PMV BLD AUTO: 11.1 FL (ref 9.2–12.9)
POTASSIUM SERPL-SCNC: 3.8 MMOL/L (ref 3.5–5.1)
PROT SERPL-MCNC: 8.1 G/DL (ref 6–8.4)
RBC # BLD AUTO: 4.36 M/UL (ref 4–5.4)
SODIUM SERPL-SCNC: 140 MMOL/L (ref 136–145)
UUN UR-MCNC: 12 MG/DL (ref 7–17)
WBC # BLD AUTO: 6.91 K/UL (ref 3.9–12.7)

## 2024-08-12 PROCEDURE — 85025 COMPLETE CBC W/AUTO DIFF WBC: CPT | Mod: PN | Performed by: INTERNAL MEDICINE

## 2024-08-12 PROCEDURE — 87517 HEPATITIS B DNA QUANT: CPT | Mod: PN | Performed by: INTERNAL MEDICINE

## 2024-08-12 PROCEDURE — 80053 COMPREHEN METABOLIC PANEL: CPT | Mod: PN | Performed by: INTERNAL MEDICINE

## 2024-08-12 PROCEDURE — 85652 RBC SED RATE AUTOMATED: CPT | Performed by: INTERNAL MEDICINE

## 2024-08-12 PROCEDURE — 86140 C-REACTIVE PROTEIN: CPT | Mod: PN | Performed by: INTERNAL MEDICINE

## 2024-08-12 PROCEDURE — 36415 COLL VENOUS BLD VENIPUNCTURE: CPT | Mod: PN | Performed by: INTERNAL MEDICINE

## 2024-08-13 LAB
HEPATITIS B VIRUS DNA: NORMAL
HEPATITIS B VIRUS PCR, QUANT: NOT DETECTED IU/ML

## 2024-08-15 ENCOUNTER — OFFICE VISIT (OUTPATIENT)
Dept: RHEUMATOLOGY | Facility: CLINIC | Age: 59
End: 2024-08-15
Payer: COMMERCIAL

## 2024-08-15 VITALS
HEART RATE: 60 BPM | SYSTOLIC BLOOD PRESSURE: 116 MMHG | WEIGHT: 228.38 LBS | BODY MASS INDEX: 35.84 KG/M2 | DIASTOLIC BLOOD PRESSURE: 75 MMHG | HEIGHT: 67 IN

## 2024-08-15 DIAGNOSIS — M25.569 KNEE PAIN, UNSPECIFIED CHRONICITY, UNSPECIFIED LATERALITY: ICD-10-CM

## 2024-08-15 DIAGNOSIS — M46.90 AXIAL SPONDYLOARTHRITIS: Primary | ICD-10-CM

## 2024-08-15 PROCEDURE — 3072F LOW RISK FOR RETINOPATHY: CPT | Mod: CPTII,S$GLB,, | Performed by: INTERNAL MEDICINE

## 2024-08-15 PROCEDURE — 3008F BODY MASS INDEX DOCD: CPT | Mod: CPTII,S$GLB,, | Performed by: INTERNAL MEDICINE

## 2024-08-15 PROCEDURE — 3066F NEPHROPATHY DOC TX: CPT | Mod: CPTII,S$GLB,, | Performed by: INTERNAL MEDICINE

## 2024-08-15 PROCEDURE — 99214 OFFICE O/P EST MOD 30 MIN: CPT | Mod: S$GLB,,, | Performed by: INTERNAL MEDICINE

## 2024-08-15 PROCEDURE — 99999 PR PBB SHADOW E&M-EST. PATIENT-LVL V: CPT | Mod: PBBFAC,,, | Performed by: INTERNAL MEDICINE

## 2024-08-15 PROCEDURE — 3061F NEG MICROALBUMINURIA REV: CPT | Mod: CPTII,S$GLB,, | Performed by: INTERNAL MEDICINE

## 2024-08-15 PROCEDURE — 3044F HG A1C LEVEL LT 7.0%: CPT | Mod: CPTII,S$GLB,, | Performed by: INTERNAL MEDICINE

## 2024-08-15 PROCEDURE — 3074F SYST BP LT 130 MM HG: CPT | Mod: CPTII,S$GLB,, | Performed by: INTERNAL MEDICINE

## 2024-08-15 PROCEDURE — 1159F MED LIST DOCD IN RCRD: CPT | Mod: CPTII,S$GLB,, | Performed by: INTERNAL MEDICINE

## 2024-08-15 PROCEDURE — 3078F DIAST BP <80 MM HG: CPT | Mod: CPTII,S$GLB,, | Performed by: INTERNAL MEDICINE

## 2024-08-15 ASSESSMENT — ANKYLOSING SPONDYLITIS DISEASE ACTIVITY SCORE (ASDAS-CRP)
TOTAL_SCORE: 3.15
GLOBAL_ACTIVITY: 6
CRP_MG_PER_LITER: 2.9
MORNING_STIFFNESS: 5
NBH_PAIN: 7
PAIN_SWELLING: 8

## 2024-08-15 NOTE — PROGRESS NOTES
Patient ID:  Althea Carrion Bixby    YOB: 1965     MRN:  [id number]     Subjective:     Chief Complaint:  Disease Management     History of Present Illness:  Pt is a 58 y.o. female with nr-axSpA, recurrent AAU  who presents today for f/u. LCV was 4/9/24. At that time she had no recurrence of AAU and was continued on same regimen.    Today: She has been having pain in the right knee and right-sided back. Was scheduled to get gel injection in her right knee this week, however insurance denied it. Tried steroid injection previously but didn't help. Back pain started about a month ago and she describes it as right-sided. Worse when walking and standing. Reports some stiffness both in morning and after exercise. Takes about 1 hour and a warm shower to improve in the morning. Feels like she has been adjusting her body bc of her knee pain when walking and standing. Follows with pain management for knee and back pain. Want to try to manage knee pain first before back.    Followed with eye doctor in Nov/Dec. No recurrence of AAU since last clinic visit.     Current medications: methotrexate 15 weekly, folic acid 1mg daily, golimumab (simponi) 50mg q 28 days, sulfasalazine 1500 BID, vitamin D2 50,000 weekly, D3 1000 daily, entecavir 0.5 daily    Exercise: doing exercises every day for LE and neck. Not able to walk with back pain  Diet: Not following any particular diet.    Denies hair loss, dry eyes, vision changes, dry mouth, oral/nasal ulcers, trouble swallowing, new rashes, joint swelling, morning stiffness, or GI disturbances.      Review of Systems   Review of Systems   Constitutional:  Negative for fever and unexpected weight change.   HENT:  Negative for mouth sores and trouble swallowing.    Eyes:  Negative for redness.   Respiratory:  Negative for cough and shortness of breath.    Cardiovascular:  Negative for chest pain.   Gastrointestinal:  Negative for constipation and diarrhea.   Genitourinary:   Negative for dysuria and genital sores.   Skin:  Negative for rash.   Neurological:  Negative for headaches.   Hematological:  Does not bruise/bleed easily.        Current Medications:    Current Outpatient Medications:     amLODIPine (NORVASC) 10 MG tablet, Take 1 tablet (10 mg total) by mouth once daily., Disp: 90 tablet, Rfl: 2    aspirin (ECOTRIN) 81 MG EC tablet, Take 1 tablet (81 mg total) by mouth once daily., Disp: 30 tablet, Rfl: 0    atenoloL (TENORMIN) 100 MG tablet, Take 1 tablet (100 mg total) by mouth once daily., Disp: 90 tablet, Rfl: 2    blood sugar diagnostic Strp, Check blood sugars BID, Disp: 200 strip, Rfl: 11    blood-glucose meter kit, Use twice daily., Disp: 1 each, Rfl: 0    canagliflozin (INVOKANA) 300 mg Tab tablet, Take 1 tablet (300 mg total) by mouth daily before breakfast., Disp: 90 tablet, Rfl: 1    celecoxib (CELEBREX) 200 MG capsule, Take 1 capsule (200 mg total) by mouth 2 (two) times daily. for 21 days, Disp: 42 capsule, Rfl: 0    clobetasoL (TEMOVATE) 0.05 % external solution, Apply to affected area once daily, Disp: 50 mL, Rfl: 6    clonazePAM (KLONOPIN) 0.5 MG tablet, TAKE 1 TABLET BY MOUTH ONCE DAILY AS NEEDED FOR ANXIETY, Disp: 90 tablet, Rfl: 0    cyclobenzaprine (FLEXERIL) 10 MG tablet, TAKE ONE TABLET BY MOUTH AT BEDTIME AS NEEDED, Disp: 90 tablet, Rfl: 2    entecavir (BARACLUDE) 0.5 MG Tab, Take 1 tablet (0.5 mg total) by mouth once daily., Disp: 90 tablet, Rfl: 3    ergocalciferol (ERGOCALCIFEROL) 50,000 unit Cap, Take 1 capsule (50,000 Units total) by mouth every 7 days., Disp: 12 capsule, Rfl: 3    fluocinolone acetonide oiL 0.01 % Drop, Place 3 drops into both ears 2 (two) times a day., Disp: 20 mL, Rfl: 3    FLUoxetine 20 MG capsule, Take 1 capsule (20 mg total) by mouth 2 (two) times daily., Disp: 180 capsule, Rfl: 0    fluticasone propionate (FLONASE) 50 mcg/actuation nasal spray, 2 sprays (100 mcg total) by Each Nostril route once daily., Disp: 16 g, Rfl: 6     folic acid (FOLVITE) 1 MG tablet, Take 1 tablet (1 mg total) by mouth once daily., Disp: 90 tablet, Rfl: 1    golimumab (SIMPONI) 50 mg/0.5 mL PnIj, Inject 50 mg into the skin every 30 days., Disp: 1.5 mL, Rfl: 1    hydroCHLOROthiazide (HYDRODIURIL) 25 MG tablet, Take 1 tablet (25 mg total) by mouth once daily., Disp: 90 tablet, Rfl: 3    hydrocortisone 2.5 % cream, Apply topically 2 (two) times daily. for 10 days, Disp: 28 g, Rfl: 1    ketoconazole (NIZORAL) 2 % shampoo, Apply topically twice a week., Disp: 120 mL, Rfl: 6    ketorolac 0.5% (ACULAR) 0.5 % Drop, Place 1 drop into the right eye 3 (three) times daily., Disp: 5 mL, Rfl: 3    ketorolac 0.5% (ACULAR) 0.5 % Drop, Place 1 drop into the left eye 3 (three) times daily., Disp: 5 mL, Rfl: 3    lancets (ACCU-CHEK SOFTCLIX LANCETS) Misc, Check blood sugars BID, Disp: 200 each, Rfl: 11    lancets (FREESTYLE LANCETS) 28 gauge Misc, test TWICE DAILY, Disp: 200 each, Rfl: 3    levocetirizine (XYZAL) 5 MG tablet, TAKE ONE TABLET BY MOUTH EVERY EVENING, Disp: 90 tablet, Rfl: 3    methotrexate 2.5 MG Tab, Take 6 tablets (15 mg total) by mouth every 7 days. Take 3 tabs Wed am and 3 tabs Wed pm only, Disp: 72 tablet, Rfl: 0    ofloxacin (OCUFLOX) 0.3 % ophthalmic solution, Place 1 drop into the right eye 3 (three) times daily., Disp: 5 mL, Rfl: 3    ofloxacin (OCUFLOX) 0.3 % ophthalmic solution, Place 1 drop into the left eye 3 (three) times daily., Disp: 5 mL, Rfl: 3    pantoprazole (PROTONIX) 40 MG tablet, Take 1 tablet (40 mg total) by mouth once daily., Disp: 90 tablet, Rfl: 0    polyethylene glycol (GLYCOLAX) 17 gram/dose powder, Mix and dissolve one capful (17 grams) into a beverage and take by mouth once daily., Disp: 510 g, Rfl: 11    prednisoLONE acetate (PRED FORTE) 1 % DrpS, Instill one drop into affect eye(s) as directed, Disp: 5 mL, Rfl: 1    prednisoLONE acetate (PRED FORTE) 1 % DrpS, Place 1 drop into the right eye 3 (three) times daily., Disp: 5 mL, Rfl:  3    prednisoLONE acetate (PRED FORTE) 1 % DrpS, Place 1 drop into the left eye 3 (three) times daily., Disp: 5 mL, Rfl: 3    semaglutide (OZEMPIC) 0.25 mg or 0.5 mg (2 mg/3 mL) pen injector, Inject 0.25 mg into the skin weekly for 2 weeks and then increase to 0.5 mg into the skin weekly after, Disp: 3 mL, Rfl: 3    semaglutide (OZEMPIC) 2 mg/dose (8 mg/3 mL) PnIj, Inject 2 mg into the skin every 7 days., Disp: 9 mL, Rfl: 3    sulfaSALAzine (AZULFIDINE) 500 MG EC tablet, Take 3 tablets (1,500 mg total) by mouth 2 (two) times daily., Disp: 540 tablet, Rfl: 0    tiZANidine (ZANAFLEX) 2 MG tablet, Take 1-2 tablets (2-4 mg total) by mouth every 8 (eight) hours as needed., Disp: 90 tablet, Rfl: 2    valsartan (DIOVAN) 320 MG tablet, Take 1 tablet (320 mg total) by mouth once daily., Disp: 90 tablet, Rfl: 3    vitamin D (VITAMIN D3) 1000 units Tab, Take 1,000 Units by mouth once daily., Disp: , Rfl:     zolpidem (AMBIEN) 5 MG Tab, Take 1 tablet (5 mg total) by mouth nightly as needed., Disp: 90 tablet, Rfl: 0    diclofenac sodium (VOLTAREN) 1 % Gel, Apply 4 g topically 4 (four) times daily. Apply light film topically to knee up to four times daily, Disp: 3 each, Rfl: 2    docusate sodium (COLACE) 50 MG capsule, Take 1 capsule (50 mg total) by mouth 2 (two) times daily., Disp: 180 capsule, Rfl: 3    rosuvastatin (CRESTOR) 40 MG Tab, Take 1 tablet (40 mg total) by mouth every evening., Disp: 90 tablet, Rfl: 3    topiramate (TOPAMAX) 100 MG tablet, Take 1 tablet (100 mg total) by mouth 2 (two) times daily., Disp: 180 tablet, Rfl: 3    Current Facility-Administered Medications:     sodium hyaluronate (EUFLEXXA) 10 mg/mL(mw 2.4 -3.6 million) injection 20 mg, 20 mg, Intra-articular, Weekly,     Facility-Administered Medications Ordered in Other Visits:     0.9%  NaCl infusion, , Intravenous, Continuous, Graciela Jerome MD     Objective:     Vitals:    08/15/24 1025   BP: 116/75   Pulse: 60      Body mass index is 35.77 kg/m².      Physical Exam   HENT:   Head: Normocephalic.   Right Ear: External ear normal.   Left Ear: External ear normal.   Nose: Nose normal.   Eyes: Conjunctivae are normal.   Cardiovascular: Normal rate.   Pulmonary/Chest: Effort normal. No respiratory distress.   Musculoskeletal:         General: Normal range of motion.      Right shoulder: Normal.      Left shoulder: Normal.      Right elbow: Normal.      Left elbow: Normal.      Right wrist: Normal.      Left wrist: Normal.      Cervical back: Normal range of motion. No rigidity or tenderness.      Right knee: Normal.      Left knee: Normal.   Neurological: She is alert.       Right Side Rheumatological Exam     Examination finds the shoulder, elbow, wrist, knee, 1st PIP, 1st MCP, 2nd PIP, 2nd MCP, 3rd PIP, 3rd MCP, 4th PIP, 4th MCP, 5th PIP and 5th MCP normal.    Muscle Strength (0-5 scale):  Deltoid:  5  Biceps: 5/5   Triceps:  5  : 5/5   Iliopsoas: 5  Quadriceps:  5     Left Side Rheumatological Exam     Examination finds the shoulder, elbow, wrist, knee, 1st PIP, 1st MCP, 2nd PIP, 2nd MCP, 3rd PIP, 3rd MCP, 4th PIP, 4th MCP, 5th PIP and 5th MCP normal.    Muscle Strength (0-5 scale):  Deltoid:  5  Biceps: 5/5   Triceps:  5  :  5/5   Iliopsoas: 5  Quadriceps:  5        Schober's 10-13.5cm  Nl lumbar extension and lateral flexion  Amarilys + on right   Chest expansion 5 cm increased  Neck rom nl       10/31/2022   Tender (CABALLERO-28) 0 / 28    Swollen (CABALLERO-28) 0 / 28    Provider Global --   Patient Global 45 mm   ESR 11 mm/hr   CRP 2.3 mg/L   CABALLERO-28 (ESR) 2.31 (Remission)   CABALLERO-28 (CRP) 2.02 (Remission)   CDAI Score --         Assessment:     1. Axial spondyloarthritis    2. Knee pain, unspecified chronicity, unspecified laterality       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  ?infliximab in future if mtx not helpful  Chronic hepatitis B HBV DNA neg  EH   158/90  Hyperlipidemia LDL 99.8 5/25/23  on  rosuvastatin 40mg nightly  Class 1 obesity Body mass index is 34.87 kg/m².    DXA 2/5/24  normal   Left cervical radiculopathy bulge C6-7 resolved  Chronic lumbar spondylosis   MRI with severe TMT OA ? Charcot neuroarthropathy  Vitamin D deficiency    36 2/15/23  on vitamin D2 50,000 IU weekly and vitamin D3 1000 IU  daily  N. 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     Plan:      Problem List Items Addressed This Visit          Orthopedic    Axial spondyloarthritis - Primary (Chronic)    Relevant Orders    MRI Sacroiliac Joint W W/O Contrast     Other Visit Diagnoses       Knee pain, unspecified chronicity, unspecified laterality        Relevant Orders    X-ray Knee Ortho Bilateral with Flexion              Ordered SI joint MRI  Ordered Bilateral knee X-rays  Continue methotrexate 15mg PO once a week, divided dose, with folic acid 1mg daily for recurrent AAU OD  Continue Golimumab 50mg sc q 28 days  Continue Sulfasalazine 1500mg twice daily  Continue Vitamin D2 50,000 units q 7 days and vitamin D3 1000 units daily  entecvir 0.5mg daily refilled [facility]  Sleep handouts provided; can also download [employer] or Sleepio apps to help with sleep  Continue exercise program, handouts provided  Cont weight reduction, Mediterranean diet as feasible  F/u pain management for knee  F/u Dr Begum for   RTC 3 months with standing labs      Grey Cutler M.D.  PGY-1  [employer] [facility]

## 2024-08-15 NOTE — PROGRESS NOTES
8/15/2024     5:22 AM   Rapid3 Question Responses and Scores   MDHAQ Score 1.4   Psychologic Score 4.4   Pain Score 8   When you awakened in the morning OVER THE LAST WEEK, did you feel stiff? Yes   If Yes, please indicate the number of hours until you are as limber as you will be for the day 1   Fatigue Score 4.5   Global Health Score 7.5   RAPID3 Score 6.72    Answers submitted by the patient for this visit:  Rheumatology Questionnaire (Submitted on 8/15/2024)  fever: No  eye redness: No  mouth sores: No  headaches: No  shortness of breath: No  chest pain: No  trouble swallowing: No  diarrhea: No  constipation: No  unexpected weight change: No  genital sore: No  dysuria: No  During the last 3 days, have you had a skin rash?: No  Bruises or bleeds easily: No  cough: No

## 2024-08-15 NOTE — PATIENT INSTRUCTIONS
SI joint MRI  Bilateral Knee X-rays  Continue methotrexate 15mg po once a week(divided dose) with folic acid 1mg daily for recurrent AAU OD  Golimumab 50mg sc q 28 days  Sulfasalazine.1500mg twice daily  Vitamin D2 50,000 units q 7days and vitamin D3 1000 units daily  entecvir 0.5mg daily refilled [facility]  Sleep handouts provided; can also download [employer] or [employer] apps to help with sleep  Continue exercise program, handouts provided  Cont weight reduction, Mediterranean diet as feasible, decrease rice portions as   F/u pain management for knee  F/u Dr Begum for   RTC 3 months with standing labs

## 2024-08-15 NOTE — PROGRESS NOTES
I have personally taken the history and examined the patient and agree with the resident,s note as stated above      Answers submitted by the patient for this visit:  Rheumatology Questionnaire (Submitted on 8/15/2024)  fever: No  eye redness: No  mouth sores: No  headaches: No  shortness of breath: No  chest pain: No  trouble swallowing: No  diarrhea: No  constipation: No  unexpected weight change: No  genital sore: No  dysuria: No  During the last 3 days, have you had a skin rash?: No  Bruises or bleeds easily: No  cough: No          Schober's 10-13 cm  Nl lat flex and ext  +Fabere right only  Chest expansion 5 cm  Neck rom nl   O-W nl        Latest Reference Range & Units 08/12/24 12:35   WBC 3.90 - 12.70 K/uL 6.91   RBC 4.00 - 5.40 M/uL 4.36   Hemoglobin 12.0 - 16.0 g/dL 13.3   Hematocrit 37.0 - 48.5 % 40.1   MCV 82 - 98 fL 92   MCH 27.0 - 31.0 pg 30.5   MCHC 32.0 - 36.0 g/dL 33.2   RDW 11.5 - 14.5 % 13.6   Platelet Count 150 - 450 K/uL 298   MPV 9.2 - 12.9 fL 11.1   Gran % 38.0 - 73.0 % 37.3 (L)   Lymph % 18.0 - 48.0 % 50.9 (H)   Mono % 4.0 - 15.0 % 9.1   Eos % 0.0 - 8.0 % 1.6   Basophil % 0.0 - 1.9 % 0.4   Immature Granulocytes 0.0 - 0.5 % 0.7 (H)   Gran # (ANC) 1.8 - 7.7 K/uL 2.6   Lymph # 1.0 - 4.8 K/uL 3.5   Mono # 0.3 - 1.0 K/uL 0.6   Eos # 0.0 - 0.5 K/uL 0.1   Baso # 0.00 - 0.20 K/uL 0.03   Immature Grans (Abs) 0.00 - 0.04 K/uL 0.05 (H)   nRBC 0 /100 WBC 0   Differential Method  Automated   Sed Rate 0 - 36 mm/Hr 36   Sodium 136 - 145 mmol/L 140   Potassium 3.5 - 5.1 mmol/L 3.8   Chloride 95 - 110 mmol/L 100   CO2 23 - 29 mmol/L 26   Anion Gap 8 - 16 mmol/L 14   BUN 7 - 17 mg/dL 12   Creatinine 0.50 - 1.40 mg/dL 0.65   eGFR >60 mL/min/1.73 m^2 >60.0   Glucose 70 - 110 mg/dL 116 (H)   Calcium 8.7 - 10.5 mg/dL 9.5   ALP 38 - 126 U/L 74   PROTEIN TOTAL 6.0 - 8.4 g/dL 8.1   Albumin 3.5 - 5.2 g/dL 4.5   BILIRUBIN TOTAL 0.1 - 1.0 mg/dL 0.4   AST 15 - 46 U/L 28   ALT 10 - 44 U/L 26   CRP 0.00 - 1.00 mg/dL 0.29    Hepatitis B Virus DNA Not Detected  HBV DNA not detected   Hepatitis B Virus PCR, Quant <12 IU/mL Not Detected   (L): Data is abnormally low  (H): Data is abnormally high     Latest Reference Range & Units 05/20/24 08:04   Cholesterol Total 120 - 199 mg/dL 228 (H)   HDL 40 - 75 mg/dL 65   HDL/Cholesterol Ratio 20.0 - 50.0 % 28.5   Non-HDL Cholesterol mg/dL 163   Total Cholesterol/HDL Ratio 2.0 - 5.0  3.5   Triglycerides 30 - 150 mg/dL 84   LDL Cholesterol 63.0 - 159.0 mg/dL 146.2   (H): Data is abnormally high    The 10-year ASCVD risk score (Dilcia JIMENEZ, et al., 2019) is: 8.6%    Values used to calculate the score:      Age: 58 years      Sex: Female      Is Non- : Yes      Diabetic: Yes      Tobacco smoker: No      Systolic Blood Pressure: 116 mmHg      Is BP treated: Yes      HDL Cholesterol: 65 mg/dL      Total Cholesterol: 228 mg/dL           ASSESSMENT:    adalimumab with Secondary inefficacy, tried to change to golimumab 50mg sc q 4 wks but insurance wouldn't cover, then on Enbrel Sureclick 50mg s q 7 days but has developed recurrent AAU OS  Then started certolizumab after 1/11/21 visit  had AAU with recurrence 1/28/21 just after starting certolizumab but had not completed loading dose   certolizumab ineffective  Now on golimumab 50mg sc q 28 days           ASDAS-CRP 3.15  (HDA)   BASDAI 6.85(HDA)  BASFI 4.5(severe functional limitation)        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 Now on golimumab  anti-IL17(ixekizumab or secukinumab)  Chronic hepatitis B HBV DNA neg  EH  116/75  Hyperlipidemia QUO125.2  5/20/24   on rosuvastatin 40mg nightly  Class 2 obesity Body mass index is 35.77 kg/m².   DXA 2/5/24  normal   Left cervical radiculopathy bulge C6-7 resolved  Chronic lumbar spondylosis increased symptoms   MRI with severe TMT OA ? Charcot neuroarthropathy  Vitamin D deficiency    36  2/15/23  on vitamin D2 50,000 IU weekly  and vitamin D3 1000 IU  daily  N. LE pulses  OA knees right>eft      PLAN:    Ortho knee series  MRI SIJ. If stil active inflammation on current therapy consider change to  anti-IL-17 now that there is data for  that class reducing AAU though not quite as much as anti-TNF  F/u Desmond Osborne for right knee and low back  Continue methotrexate 15mg po once a week(divided dose) with folic acid 1mg daily for recurrent AAU OD refilled [facility]  Golimumab 50mg sc q 28 days refilled [facility]  Sulfasalazine.1500mg twice daily refilled [facility]  Vitamin D2 50,000 units q 7days and vitamin D3 1000 units daily  entecvir 0.5mg daily refilled [facility]  Naproxen 500mg twice daily prn with pantoprazole 40mg daily for  prevention  Continue exercise program ibrahima quad strengthening HEP per handout  resume weight reduction, Mediterranean diet as feasible, decrease rice portions as she is doing semaglutide  RTC 3 months with standing labs  Answers submitted by the patient for this visit:  Rheumatology Questionnaire (Submitted on 8/15/2024)  fever: No  eye redness: No  mouth sores: No  headaches: No  shortness of breath: No  chest pain: No  trouble swallowing: No  diarrhea: No  constipation: No  unexpected weight change: No  genital sore: No  dysuria: No  During the last 3 days, have you had a skin rash?: No  Bruises or bleeds easily: No  cough: No

## 2024-08-21 ENCOUNTER — HOSPITAL ENCOUNTER (OUTPATIENT)
Dept: RADIOLOGY | Facility: HOSPITAL | Age: 59
Discharge: HOME | End: 2024-08-21
Attending: INTERNAL MEDICINE
Payer: COMMERCIAL

## 2024-08-21 ENCOUNTER — HOSPITAL ENCOUNTER (OUTPATIENT)
Dept: RADIOLOGY | Facility: HOSPITAL | Age: 59
Discharge: HOME | End: 2024-08-21
Payer: COMMERCIAL

## 2024-08-21 DIAGNOSIS — M46.90 AXIAL SPONDYLOARTHRITIS: ICD-10-CM

## 2024-08-21 DIAGNOSIS — M25.569 KNEE PAIN, UNSPECIFIED CHRONICITY, UNSPECIFIED LATERALITY: ICD-10-CM

## 2024-08-21 PROCEDURE — 72197 MRI PELVIS W/O & W/DYE: CPT | Mod: TC,PN

## 2024-08-21 PROCEDURE — 72197 MRI PELVIS W/O & W/DYE: CPT | Mod: 26,,, | Performed by: RADIOLOGY

## 2024-08-21 PROCEDURE — 25500020 PHARM REV CODE 255: Mod: PN | Performed by: INTERNAL MEDICINE

## 2024-08-21 PROCEDURE — A9585 GADOBUTROL INJECTION: HCPCS | Mod: PN | Performed by: INTERNAL MEDICINE

## 2024-08-21 PROCEDURE — 73564 X-RAY EXAM KNEE 4 OR MORE: CPT | Mod: TC,50,FY,PN

## 2024-08-21 RX ORDER — GADOBUTROL 604.72 MG/ML
10 INJECTION INTRAVENOUS
Status: COMPLETED | OUTPATIENT
Start: 2024-08-21 | End: 2024-08-21

## 2024-08-21 RX ADMIN — GADOBUTROL 10 ML: 604.72 INJECTION INTRAVENOUS at 10:08

## 2024-08-30 ENCOUNTER — PROCEDURE VISIT (OUTPATIENT)
Dept: PAIN MEDICINE | Facility: CLINIC | Age: 59
End: 2024-08-30
Payer: COMMERCIAL

## 2024-08-30 VITALS
HEIGHT: 67 IN | SYSTOLIC BLOOD PRESSURE: 135 MMHG | BODY MASS INDEX: 35.77 KG/M2 | HEART RATE: 70 BPM | DIASTOLIC BLOOD PRESSURE: 92 MMHG

## 2024-08-30 DIAGNOSIS — M25.561 CHRONIC PAIN OF RIGHT KNEE: ICD-10-CM

## 2024-08-30 DIAGNOSIS — G89.29 CHRONIC PAIN OF RIGHT KNEE: ICD-10-CM

## 2024-08-30 DIAGNOSIS — M17.11 PRIMARY OSTEOARTHRITIS OF RIGHT KNEE: Primary | ICD-10-CM

## 2024-08-30 NOTE — PROCEDURES
Large Joint Aspiration/Injection- Euflexxa 1 of # 3: R knee    Date/Time: 8/30/2024 10:20 AM    Performed by: Desmond Osborne FNP  Authorized by: Desmond Osborne FNP    Consent Done?:  Yes (Written)  Indications:  Arthritis and pain  Timeout: prior to procedure the correct patient, procedure, and site was verified    Local anesthetic:  Lidocaine 1% without epinephrine  Anesthetic total (ml):  1      Details:  Needle Size:  22 G  Approach:  Anteromedial  Location:  Knee  Site:  R knee  Medications:  20 mg sodium hyaluronate (EUFLEXXA) solution 10 mg/mL  Patient tolerance:  Patient tolerated the procedure well with no immediate complications

## 2024-09-05 DIAGNOSIS — L21.9 SEBORRHEIC DERMATITIS: ICD-10-CM

## 2024-09-05 RX ORDER — CLOBETASOL PROPIONATE 0.5 MG/ML
SOLUTION TOPICAL DAILY
Qty: 50 ML | Refills: 6 | OUTPATIENT
Start: 2024-09-05

## 2024-09-10 ENCOUNTER — PROCEDURE VISIT (OUTPATIENT)
Dept: PAIN MEDICINE | Facility: CLINIC | Age: 59
End: 2024-09-10
Payer: COMMERCIAL

## 2024-09-10 VITALS
BODY MASS INDEX: 36.66 KG/M2 | WEIGHT: 233.56 LBS | SYSTOLIC BLOOD PRESSURE: 152 MMHG | HEART RATE: 79 BPM | DIASTOLIC BLOOD PRESSURE: 88 MMHG | HEIGHT: 67 IN

## 2024-09-10 DIAGNOSIS — M17.11 PRIMARY OSTEOARTHRITIS OF RIGHT KNEE: Primary | ICD-10-CM

## 2024-09-10 DIAGNOSIS — G89.4 CHRONIC PAIN SYNDROME: ICD-10-CM

## 2024-09-10 DIAGNOSIS — H20.9 UVEITIS: ICD-10-CM

## 2024-09-10 DIAGNOSIS — G89.29 CHRONIC PAIN OF RIGHT KNEE: ICD-10-CM

## 2024-09-10 DIAGNOSIS — M25.561 CHRONIC PAIN OF RIGHT KNEE: ICD-10-CM

## 2024-09-10 PROCEDURE — 20610 DRAIN/INJ JOINT/BURSA W/O US: CPT | Mod: RT,S$GLB,, | Performed by: NURSE PRACTITIONER

## 2024-09-10 RX ORDER — HYDROCORTISONE 25 MG/G
CREAM TOPICAL 2 TIMES DAILY
Qty: 28 G | Refills: 8 | Status: SHIPPED | OUTPATIENT
Start: 2024-09-10

## 2024-09-10 NOTE — PROCEDURES
Right Intraarticular knee injection Eufexxa 2 of # 3: R knee    Date/Time: 9/10/2024 10:00 AM    Performed by: Desmond Osborne FNP  Authorized by: Desmond Osborne FNP    Consent Done?:  Yes (Written)  Indications:  Pain and arthritis  Timeout: prior to procedure the correct patient, procedure, and site was verified      Local anesthesia used?: Yes    Local anesthetic:  Lidocaine 1% without epinephrine    Details:  Needle Size:  22 G  Ultrasonic Guidance for needle placement?: No    Approach:  Anteromedial  Location:  Knee  Site:  R knee  Medications comment:  Euflexxa   Patient tolerance:  Patient tolerated the procedure well with no immediate complications

## 2024-09-10 NOTE — TELEPHONE ENCOUNTER
Provider Staff:  Action required for this patient     Please see care gap opportunities below in Care Due Message:   A1C due 11/19/24    Thanks!  [facility]     Appointments      Date Provider   Last Visit   5/14/2024 Weston Begum,    Next Visit   Visit date not found Weston Begum,      Refill Decision Note   Althea Vazquez  is requesting a refill authorization.  Brief Assessment and Rationale for Refill:  Approve     Medication Therapy Plan:         Comments:     Note composed:4:03 PM 09/10/2024

## 2024-09-10 NOTE — TELEPHONE ENCOUNTER
Care Due:                  Date            Visit Type   Department     Provider  --------------------------------------------------------------------------------                                EP -                              PRIMARY      MET INTERNAL  Last Visit: 05-      CARE ([facility])   MEDICINE       Weston Begum  Next Visit: None Scheduled  None         None Found                                                            Last  Test          Frequency    Reason                     Performed    Due Date  --------------------------------------------------------------------------------    HBA1C.......  6 months...  canagliflozin,             05- 11-                             semaglutide..............    [employer] Embedded Care Due Messages. Reference number: [id number].   9/10/2024 2:02:07 PM CDT

## 2024-09-11 RX ORDER — METHOTREXATE 2.5 MG/1
15 TABLET ORAL
Qty: 72 TABLET | Refills: 0 | Status: SHIPPED | OUTPATIENT
Start: 2024-09-11

## 2024-09-11 RX ORDER — FOLIC ACID 1 MG/1
1 TABLET ORAL DAILY
Qty: 90 TABLET | Refills: 1 | Status: SHIPPED | OUTPATIENT
Start: 2024-09-11 | End: 2025-09-11

## 2024-09-13 ENCOUNTER — TELEPHONE (OUTPATIENT)
Dept: PAIN MEDICINE | Facility: CLINIC | Age: 59
End: 2024-09-13
Payer: COMMERCIAL

## 2024-09-13 NOTE — TELEPHONE ENCOUNTER
I attempted to contact pt in regards to message in the portal. Pt didn't answer. I left vm asking for a call back. ----- Message from Evita Mehta sent at 9/13/2024  9:56 AM CDT -----  Contact: pt  Type:  Procedure     Who Called: Pt   Would the patient rather a call back or a response via [facility]? call  Best Call Back Number: [phone]  Additional Information:   Pt requesting a call regarding procedure 09/18

## 2024-09-17 ENCOUNTER — PROCEDURE VISIT (OUTPATIENT)
Dept: PAIN MEDICINE | Facility: CLINIC | Age: 59
End: 2024-09-17
Payer: COMMERCIAL

## 2024-09-17 VITALS
HEIGHT: 67 IN | BODY MASS INDEX: 36.58 KG/M2 | DIASTOLIC BLOOD PRESSURE: 84 MMHG | SYSTOLIC BLOOD PRESSURE: 139 MMHG | HEART RATE: 82 BPM

## 2024-09-17 DIAGNOSIS — M17.11 PRIMARY OSTEOARTHRITIS OF RIGHT KNEE: Primary | ICD-10-CM

## 2024-09-17 DIAGNOSIS — G89.4 CHRONIC PAIN SYNDROME: ICD-10-CM

## 2024-09-17 DIAGNOSIS — G89.29 CHRONIC PAIN OF RIGHT KNEE: ICD-10-CM

## 2024-09-17 DIAGNOSIS — M25.561 CHRONIC PAIN OF RIGHT KNEE: ICD-10-CM

## 2024-09-17 PROCEDURE — 20610 DRAIN/INJ JOINT/BURSA W/O US: CPT | Mod: S$GLB,,, | Performed by: NURSE PRACTITIONER

## 2024-09-17 PROCEDURE — 99214 OFFICE O/P EST MOD 30 MIN: CPT | Mod: 25,S$GLB,, | Performed by: NURSE PRACTITIONER

## 2024-09-17 NOTE — PROCEDURES
3  of  #3   Right Intraarticular Euflexxa Large Joint Aspiration/Injection: R knee    Date/Time: 9/17/2024 9:00 AM    Performed by: Desmond Osborne FNP  Authorized by: Desmond Osborne FNP    Consent Done?:  Yes (Written)  Indications:  Arthritis and pain  Site marked: the procedure site was marked    Timeout: prior to procedure the correct patient, procedure, and site was verified      Local anesthesia used?: Yes    Local anesthetic:  Lidocaine 1% without epinephrine  Anesthetic total (ml):  3      Details:  Needle Size:  22 G  Ultrasonic Guidance for needle placement?: No    Approach:  Anteromedial  Location:  Knee  Site:  R knee  Medications:  10 mg sodium hyaluronate (EUFLEXXA) 10 mg/mL(mw 2.4 -3.6 million)  Patient tolerance:  Patient tolerated the procedure well with no immediate complications

## 2024-10-01 ENCOUNTER — PATIENT MESSAGE (OUTPATIENT)
Dept: INTERNAL MEDICINE | Facility: CLINIC | Age: 59
End: 2024-10-01
Payer: COMMERCIAL

## 2024-10-10 DIAGNOSIS — M46.90 AXIAL SPONDYLOARTHRITIS: Chronic | ICD-10-CM

## 2024-10-11 RX ORDER — GOLIMUMAB 50 MG/.5ML
50 INJECTION, SOLUTION SUBCUTANEOUS
Qty: 1.5 ML | Refills: 1 | Status: SHIPPED | OUTPATIENT
Start: 2024-10-11 | End: 2025-10-11

## 2024-10-16 ENCOUNTER — OFFICE VISIT (OUTPATIENT)
Dept: PSYCHIATRY | Facility: CLINIC | Age: 59
End: 2024-10-16
Payer: COMMERCIAL

## 2024-10-16 VITALS
WEIGHT: 233.94 LBS | SYSTOLIC BLOOD PRESSURE: 165 MMHG | HEART RATE: 70 BPM | DIASTOLIC BLOOD PRESSURE: 83 MMHG | BODY MASS INDEX: 36.64 KG/M2

## 2024-10-16 DIAGNOSIS — F41.0 PANIC DISORDER WITHOUT AGORAPHOBIA: ICD-10-CM

## 2024-10-16 DIAGNOSIS — F41.1 GENERALIZED ANXIETY DISORDER: ICD-10-CM

## 2024-10-16 PROCEDURE — 3061F NEG MICROALBUMINURIA REV: CPT | Mod: CPTII,S$GLB,, | Performed by: PSYCHIATRY & NEUROLOGY

## 2024-10-16 PROCEDURE — 3044F HG A1C LEVEL LT 7.0%: CPT | Mod: CPTII,S$GLB,, | Performed by: PSYCHIATRY & NEUROLOGY

## 2024-10-16 PROCEDURE — 3066F NEPHROPATHY DOC TX: CPT | Mod: CPTII,S$GLB,, | Performed by: PSYCHIATRY & NEUROLOGY

## 2024-10-16 PROCEDURE — 3072F LOW RISK FOR RETINOPATHY: CPT | Mod: CPTII,S$GLB,, | Performed by: PSYCHIATRY & NEUROLOGY

## 2024-10-16 PROCEDURE — 99214 OFFICE O/P EST MOD 30 MIN: CPT | Mod: S$GLB,,, | Performed by: PSYCHIATRY & NEUROLOGY

## 2024-10-16 RX ORDER — CLONAZEPAM 0.5 MG/1
TABLET ORAL
Qty: 90 TABLET | Refills: 0 | Status: SHIPPED | OUTPATIENT
Start: 2024-10-16

## 2024-10-16 RX ORDER — ZOLPIDEM TARTRATE 5 MG/1
5 TABLET ORAL NIGHTLY PRN
Qty: 90 TABLET | Refills: 0 | Status: SHIPPED | OUTPATIENT
Start: 2024-10-16

## 2024-10-16 RX ORDER — FLUOXETINE HYDROCHLORIDE 20 MG/1
20 CAPSULE ORAL 2 TIMES DAILY
Qty: 180 CAPSULE | Refills: 1 | Status: SHIPPED | OUTPATIENT
Start: 2024-10-16

## 2024-10-16 NOTE — PROGRESS NOTES
ESTABLISHED OUTPATIENT VISIT   E/M LEVEL 4: [id number]    ENCOUNTER DATE: 10/16/2024  SITE: [facility], [address]    HISTORY    CHIEF COMPLAINT   Althea Vazquez is a 59 y.o. female who presents for follow up of anxiety.    HPI     Appears to be doing reasonably well psychiatrically. Appears euthymic during today's visit.    Satisfied with current psychotropic medication regimen.    Spiritual beliefs are supportive.    Has continued to stand up for herself, this is working well for her.    Travels for pleasure.    Psychiatric Review Of Systems - Is patient experiencing or having changes in:  sleep: takes Ambien  appetite: no  weight: working on losing weight  energy/anergy: no  interest/pleasure/anhedonia: no  somatic symptoms: no  libido: no  anxiety/panic: no  guilty/hopelessness: no  concentration: no  S.I.B.s/risky behavior: no  Irritability: no  Racing thoughts: no  Impulsive behaviors: no  Paranoia:no  AVH:no    Recent alcohol: rare small amount  Recent drug: no    Medical ROS   Foot pain     PAST MEDICAL, FAMILY AND SOCIAL HISTORY: The patient's past medical, family and social history have been reviewed and updated as appropriate within the electronic medical record - see encounter notes.    PSYCHOTROPIC MEDICATIONS   Prozac 20 mg qam and 20 mg at bedtime, Clonazepam 0.5 mg prn for anxiety[has been using up to 2 days per week], Ambien 5 mg at bedtime prn[recently has been taking 3 times per week]    Scheduled and PRN Medications     Current Outpatient Medications:     amLODIPine (NORVASC) 10 MG tablet, Take 1 tablet (10 mg total) by mouth once daily., Disp: 90 tablet, Rfl: 2    aspirin (ECOTRIN) 81 MG EC tablet, Take 1 tablet (81 mg total) by mouth once daily., Disp: 30 tablet, Rfl: 0    atenoloL (TENORMIN) 100 MG tablet, Take 1 tablet (100 mg total) by mouth once daily., Disp: 90 tablet, Rfl: 2    blood sugar diagnostic Strp, Check blood sugars BID, Disp: 200 strip, Rfl: 11     blood-glucose meter kit, Use twice daily., Disp: 1 each, Rfl: 0    canagliflozin (INVOKANA) 300 mg Tab tablet, Take 1 tablet (300 mg total) by mouth daily before breakfast., Disp: 90 tablet, Rfl: 1    clobetasoL (TEMOVATE) 0.05 % external solution, Apply to affected area once daily, Disp: 50 mL, Rfl: 6    clonazePAM (KLONOPIN) 0.5 MG tablet, TAKE 1 TABLET BY MOUTH ONCE DAILY AS NEEDED FOR ANXIETY, Disp: 90 tablet, Rfl: 0    cyclobenzaprine (FLEXERIL) 10 MG tablet, TAKE ONE TABLET BY MOUTH AT BEDTIME AS NEEDED, Disp: 90 tablet, Rfl: 2    diclofenac sodium (VOLTAREN) 1 % Gel, Apply 4 g topically 4 (four) times daily. Apply light film topically to knee up to four times daily, Disp: 3 each, Rfl: 2    docusate sodium (COLACE) 50 MG capsule, Take 1 capsule (50 mg total) by mouth 2 (two) times daily., Disp: 180 capsule, Rfl: 3    entecavir (BARACLUDE) 0.5 MG Tab, Take 1 tablet (0.5 mg total) by mouth once daily., Disp: 90 tablet, Rfl: 3    ergocalciferol (ERGOCALCIFEROL) 50,000 unit Cap, Take 1 capsule (50,000 Units total) by mouth every 7 days., Disp: 12 capsule, Rfl: 3    fluocinolone acetonide oiL 0.01 % Drop, Place 3 drops into both ears 2 (two) times a day., Disp: 20 mL, Rfl: 3    FLUoxetine 20 MG capsule, Take 1 capsule (20 mg total) by mouth 2 (two) times daily., Disp: 180 capsule, Rfl: 0    fluticasone propionate (FLONASE) 50 mcg/actuation nasal spray, 2 sprays (100 mcg total) by Each Nostril route once daily., Disp: 16 g, Rfl: 6    folic acid (FOLVITE) 1 MG tablet, Take 1 tablet (1 mg total) by mouth once daily., Disp: 90 tablet, Rfl: 1    golimumab (SIMPONI) 50 mg/0.5 mL PnIj, Inject 50 mg into the skin every 30 days., Disp: 1.5 mL, Rfl: 1    hydroCHLOROthiazide (HYDRODIURIL) 25 MG tablet, Take 1 tablet (25 mg total) by mouth once daily., Disp: 90 tablet, Rfl: 3    hydrocortisone 2.5 % cream, Apply topically to the affected area 2 (two) times daily, Disp: 28 g, Rfl: 8    ketoconazole (NIZORAL) 2 % shampoo, Apply  topically twice a week., Disp: 120 mL, Rfl: 6    ketorolac 0.5% (ACULAR) 0.5 % Drop, Place 1 drop into the right eye 3 (three) times daily., Disp: 5 mL, Rfl: 3    ketorolac 0.5% (ACULAR) 0.5 % Drop, Place 1 drop into the left eye 3 (three) times daily., Disp: 5 mL, Rfl: 3    lancets (ACCU-CHEK SOFTCLIX LANCETS) Misc, Check blood sugars BID, Disp: 200 each, Rfl: 11    lancets (FREESTYLE LANCETS) 28 gauge Misc, test TWICE DAILY, Disp: 200 each, Rfl: 3    levocetirizine (XYZAL) 5 MG tablet, TAKE ONE TABLET BY MOUTH EVERY EVENING, Disp: 90 tablet, Rfl: 3    methotrexate 2.5 MG Tab, Take 6 tablets (15 mg total) by mouth every 7 days. Take 3 tabs Wed am and 3 tabs Wed pm only, Disp: 72 tablet, Rfl: 0    ofloxacin (OCUFLOX) 0.3 % ophthalmic solution, Place 1 drop into the right eye 3 (three) times daily., Disp: 5 mL, Rfl: 3    ofloxacin (OCUFLOX) 0.3 % ophthalmic solution, Place 1 drop into the left eye 3 (three) times daily., Disp: 5 mL, Rfl: 3    pantoprazole (PROTONIX) 40 MG tablet, Take 1 tablet (40 mg total) by mouth once daily., Disp: 90 tablet, Rfl: 0    polyethylene glycol (GLYCOLAX) 17 gram/dose powder, Mix and dissolve one capful (17 grams) into a beverage and take by mouth once daily., Disp: 510 g, Rfl: 11    prednisoLONE acetate (PRED FORTE) 1 % DrpS, Instill one drop into affect eye(s) as directed, Disp: 5 mL, Rfl: 1    prednisoLONE acetate (PRED FORTE) 1 % DrpS, Place 1 drop into the right eye 3 (three) times daily., Disp: 5 mL, Rfl: 3    prednisoLONE acetate (PRED FORTE) 1 % DrpS, Place 1 drop into the left eye 3 (three) times daily., Disp: 5 mL, Rfl: 3    rosuvastatin (CRESTOR) 40 MG Tab, Take 1 tablet (40 mg total) by mouth every evening., Disp: 90 tablet, Rfl: 3    semaglutide (OZEMPIC) 0.25 mg or 0.5 mg (2 mg/3 mL) pen injector, Inject 0.25 mg into the skin weekly for 2 weeks and then increase to 0.5 mg into the skin weekly after, Disp: 3 mL, Rfl: 3    semaglutide (OZEMPIC) 2 mg/dose (8 mg/3 mL) PnIj,  Inject 2 mg into the skin every 7 days., Disp: 9 mL, Rfl: 3    sulfaSALAzine (AZULFIDINE) 500 MG EC tablet, Take 3 tablets (1,500 mg total) by mouth 2 (two) times daily., Disp: 540 tablet, Rfl: 0    tiZANidine (ZANAFLEX) 2 MG tablet, Take 1-2 tablets (2-4 mg total) by mouth every 8 (eight) hours as needed., Disp: 90 tablet, Rfl: 2    topiramate (TOPAMAX) 100 MG tablet, Take 1 tablet (100 mg total) by mouth 2 (two) times daily., Disp: 180 tablet, Rfl: 3    valsartan (DIOVAN) 320 MG tablet, Take 1 tablet (320 mg total) by mouth once daily., Disp: 90 tablet, Rfl: 3    vitamin D (VITAMIN D3) 1000 units Tab, Take 1,000 Units by mouth once daily., Disp: , Rfl:     zolpidem (AMBIEN) 5 MG Tab, Take 1 tablet (5 mg total) by mouth nightly as needed., Disp: 90 tablet, Rfl: 0  No current facility-administered medications for this visit.    Facility-Administered Medications Ordered in Other Visits:     0.9%  NaCl infusion, , Intravenous, Continuous, Graciela Jerome MD    EXAMINATION    There were no vitals filed for this visit.      CONSTITUTIONAL  General Appearance: well nourished    MUSCULOSKELETAL  Muscle Strength and Tone: normal strength and tone  Abnormal Involuntary Movements: no abnormal movement noted  Gait and Station: normal gait    PSYCHIATRIC   Level of Consciousness: alert  Orientation: oriented to person, place and time  Grooming: well groomed  Psychomotor Behavior: no restlessness/agitation  Speech: normal in rate, rhythm and volume  Language: normal vocabulary  Mood: steady  Affect: full range and appropriate  Thought Process: logical and goal directed  Associations: intact associations  Thought Content: no SI/HI  Memory: grossly intact  Attention: intact to content of interview  Fund of Knowledge: appears adequate  Insight: good  Judgement: good    MEDICAL DECISION MAKING    DIAGNOSES  Anxiety d/o, unspecified    PROBLEM LIST AND MANAGEMENT PLANS    - anxiety: continue Prozac, prn Klonopin and prn Ambien as  above  - rtc 3 months    Time with patient: 20 min    LABORATORY DATA  Lab Visit on 08/12/2024   Component Date Value Ref Range Status    Sed Rate 08/12/2024 36  0 - 36 mm/Hr Final    CRP 08/12/2024 0.29  0.00 - 1.00 mg/dL Final    Sodium 08/12/2024 140  136 - 145 mmol/L Final    Potassium 08/12/2024 3.8  3.5 - 5.1 mmol/L Final    Chloride 08/12/2024 100  95 - 110 mmol/L Final    CO2 08/12/2024 26  23 - 29 mmol/L Final    Glucose 08/12/2024 116 (H)  70 - 110 mg/dL Final    BUN 08/12/2024 12  7 - 17 mg/dL Final    Creatinine 08/12/2024 0.65  0.50 - 1.40 mg/dL Final    Calcium 08/12/2024 9.5  8.7 - 10.5 mg/dL Final    Total Protein 08/12/2024 8.1  6.0 - 8.4 g/dL Final    Albumin 08/12/2024 4.5  3.5 - 5.2 g/dL Final    Total Bilirubin 08/12/2024 0.4  0.1 - 1.0 mg/dL Final    Comment: For infants and newborns, interpretation of results should be based  on gestational age, weight and in agreement with clinical  observations.    Premature Infant recommended reference ranges:  Up to 24 hours.............<8.0 mg/dL  Up to 48 hours............<12.0 mg/dL  3-5 days..................<15.0 mg/dL  6-29 days.................<15.0 mg/dL      Alkaline Phosphatase 08/12/2024 74  38 - 126 U/L Final    AST 08/12/2024 28  15 - 46 U/L Final    ALT 08/12/2024 26  10 - 44 U/L Final    Anion Gap 08/12/2024 14  8 - 16 mmol/L Final    eGFR 08/12/2024 >60.0  >60 mL/min/1.73 m^2 Final    WBC 08/12/2024 6.91  3.90 - 12.70 K/uL Final    RBC 08/12/2024 4.36  4.00 - 5.40 M/uL Final    Hemoglobin 08/12/2024 13.3  12.0 - 16.0 g/dL Final    Hematocrit 08/12/2024 40.1  37.0 - 48.5 % Final    MCV 08/12/2024 92  82 - 98 fL Final    MCH 08/12/2024 30.5  27.0 - 31.0 pg Final    MCHC 08/12/2024 33.2  32.0 - 36.0 g/dL Final    RDW 08/12/2024 13.6  11.5 - 14.5 % Final    Platelets 08/12/2024 298  150 - 450 K/uL Final    MPV 08/12/2024 11.1  9.2 - 12.9 fL Final    Immature Granulocytes 08/12/2024 0.7 (H)  0.0 - 0.5 % Final    Gran # (ANC) 08/12/2024 2.6  1.8  - 7.7 K/uL Final    Immature Grans (Abs) 08/12/2024 0.05 (H)  0.00 - 0.04 K/uL Final    Comment: Mild elevation in immature granulocytes is non specific and   can be seen in a variety of conditions including stress response,   acute inflammation, trauma and pregnancy. Correlation with other   laboratory and clinical findings is essential.      Lymph # 08/12/2024 3.5  1.0 - 4.8 K/uL Final    Mono # 08/12/2024 0.6  0.3 - 1.0 K/uL Final    Eos # 08/12/2024 0.1  0.0 - 0.5 K/uL Final    Baso # 08/12/2024 0.03  0.00 - 0.20 K/uL Final    nRBC 08/12/2024 0  0 /100 WBC Final    Gran % 08/12/2024 37.3 (L)  38.0 - 73.0 % Final    Lymph % 08/12/2024 50.9 (H)  18.0 - 48.0 % Final    Mono % 08/12/2024 9.1  4.0 - 15.0 % Final    Eosinophil % 08/12/2024 1.6  0.0 - 8.0 % Final    Basophil % 08/12/2024 0.4  0.0 - 1.9 % Final    Differential Method 08/12/2024 Automated   Final    Hepatitis B Virus PCR, Quant 08/12/2024 Not Detected  <12 IU/mL Final    Hepatitis B Virus DNA 08/12/2024 HBV DNA not detected  Not Detected Final           Tip Oliveira

## 2024-10-17 DIAGNOSIS — Z79.899 HIGH RISK MEDICATION USE: ICD-10-CM

## 2024-10-17 NOTE — TELEPHONE ENCOUNTER
No care due was identified.  [facility] Embedded Care Due Messages. Reference number: [id number].   10/17/2024 3:44:32 PM CDT

## 2024-10-18 RX ORDER — ATENOLOL 100 MG/1
100 TABLET ORAL DAILY
Qty: 90 TABLET | Refills: 2 | Status: SHIPPED | OUTPATIENT
Start: 2024-10-18

## 2024-10-18 RX ORDER — AMLODIPINE BESYLATE 10 MG/1
10 TABLET ORAL DAILY
Qty: 90 TABLET | Refills: 2 | Status: SHIPPED | OUTPATIENT
Start: 2024-10-18

## 2024-10-18 RX ORDER — SEMAGLUTIDE 2.68 MG/ML
2 INJECTION, SOLUTION SUBCUTANEOUS
Qty: 9 ML | Refills: 0 | Status: SHIPPED | OUTPATIENT
Start: 2024-10-18 | End: 2025-10-18

## 2024-10-18 NOTE — TELEPHONE ENCOUNTER
Refill Routing Note   Medication(s) are not appropriate for processing by [facility] for the following reason(s):        Required vitals abnormal    [facility] action(s):  Approve  Defer             Appointments  past 12m or future 3m with PCP    Date Provider   Last Visit   5/14/2024 Weston Begum, DO   Next Visit   1/8/2025 Weston Begum, DO   ED visits in past 90 days: 0        Note composed:12:23 AM 10/18/2024

## 2024-10-29 ENCOUNTER — TELEPHONE (OUTPATIENT)
Dept: INTERNAL MEDICINE | Facility: CLINIC | Age: 59
End: 2024-10-29
Payer: COMMERCIAL

## 2024-10-29 DIAGNOSIS — E11.9 TYPE 2 DIABETES MELLITUS WITHOUT COMPLICATION, WITHOUT LONG-TERM CURRENT USE OF INSULIN: Primary | ICD-10-CM

## 2024-11-07 DIAGNOSIS — E11.9 TYPE 2 DIABETES MELLITUS WITHOUT COMPLICATION, WITHOUT LONG-TERM CURRENT USE OF INSULIN: ICD-10-CM

## 2024-11-07 NOTE — TELEPHONE ENCOUNTER
No care due was identified.  [facility] Embedded Care Due Messages. Reference number: [id number].   11/07/2024 2:35:57 PM CST

## 2024-11-08 RX ORDER — SEMAGLUTIDE 0.68 MG/ML
INJECTION, SOLUTION SUBCUTANEOUS
Qty: 9 ML | Refills: 0 | Status: SHIPPED | OUTPATIENT
Start: 2024-11-08

## 2024-11-08 NOTE — TELEPHONE ENCOUNTER
Refill Routing Note   Medication(s) are not appropriate for processing by [facility] for the following reason(s):        Clarification of medication (Rx) details  New or recently adjusted medication    [facility] action(s):  Defer        Medication Therapy Plan: Adjusted sig/qty to refect current dose pending provider review      Appointments  past 12m or future 3m with PCP    Date Provider   Last Visit   5/14/2024 Weston Begum, DO   Next Visit   1/8/2025 Weston Begum, DO   ED visits in past 90 days: 0        Note composed:9:08 PM 11/07/2024

## 2024-11-20 ENCOUNTER — OFFICE VISIT (OUTPATIENT)
Dept: DERMATOLOGY | Facility: CLINIC | Age: 59
End: 2024-11-20
Payer: COMMERCIAL

## 2024-11-20 DIAGNOSIS — L30.4 INTERTRIGO: ICD-10-CM

## 2024-11-20 DIAGNOSIS — L21.9 SEBORRHEIC DERMATITIS: Primary | ICD-10-CM

## 2024-11-20 PROCEDURE — 1160F RVW MEDS BY RX/DR IN RCRD: CPT | Mod: CPTII,95,, | Performed by: STUDENT IN AN ORGANIZED HEALTH CARE EDUCATION/TRAINING PROGRAM

## 2024-11-20 PROCEDURE — 3044F HG A1C LEVEL LT 7.0%: CPT | Mod: CPTII,95,, | Performed by: STUDENT IN AN ORGANIZED HEALTH CARE EDUCATION/TRAINING PROGRAM

## 2024-11-20 PROCEDURE — 1159F MED LIST DOCD IN RCRD: CPT | Mod: CPTII,95,, | Performed by: STUDENT IN AN ORGANIZED HEALTH CARE EDUCATION/TRAINING PROGRAM

## 2024-11-20 PROCEDURE — 3066F NEPHROPATHY DOC TX: CPT | Mod: CPTII,95,, | Performed by: STUDENT IN AN ORGANIZED HEALTH CARE EDUCATION/TRAINING PROGRAM

## 2024-11-20 PROCEDURE — 3061F NEG MICROALBUMINURIA REV: CPT | Mod: CPTII,95,, | Performed by: STUDENT IN AN ORGANIZED HEALTH CARE EDUCATION/TRAINING PROGRAM

## 2024-11-20 PROCEDURE — 3072F LOW RISK FOR RETINOPATHY: CPT | Mod: CPTII,95,, | Performed by: STUDENT IN AN ORGANIZED HEALTH CARE EDUCATION/TRAINING PROGRAM

## 2024-11-20 PROCEDURE — 4010F ACE/ARB THERAPY RXD/TAKEN: CPT | Mod: CPTII,95,, | Performed by: STUDENT IN AN ORGANIZED HEALTH CARE EDUCATION/TRAINING PROGRAM

## 2024-11-20 PROCEDURE — 99214 OFFICE O/P EST MOD 30 MIN: CPT | Mod: 95,,, | Performed by: STUDENT IN AN ORGANIZED HEALTH CARE EDUCATION/TRAINING PROGRAM

## 2024-11-20 PROCEDURE — G2211 COMPLEX E/M VISIT ADD ON: HCPCS | Mod: 95,,, | Performed by: STUDENT IN AN ORGANIZED HEALTH CARE EDUCATION/TRAINING PROGRAM

## 2024-11-20 RX ORDER — TRIAMCINOLONE ACETONIDE 0.25 MG/G
CREAM TOPICAL
Qty: 80 G | Refills: 1 | Status: SHIPPED | OUTPATIENT
Start: 2024-11-20

## 2024-11-20 RX ORDER — CLOBETASOL PROPIONATE 0.5 MG/ML
SOLUTION TOPICAL DAILY
Qty: 50 ML | Refills: 6 | Status: SHIPPED | OUTPATIENT
Start: 2024-11-20

## 2024-11-20 RX ORDER — KETOCONAZOLE 20 MG/ML
SHAMPOO, SUSPENSION TOPICAL WEEKLY
Qty: 120 ML | Refills: 12 | Status: SHIPPED | OUTPATIENT
Start: 2024-11-20

## 2024-11-21 NOTE — PROGRESS NOTES
The patient location is: home  The chief complaint leading to consultation is: follow-up seb derm    Visit type: audiovisual    Face to Face time with patient: 10mins  10 minutes of total time spent on the encounter, which includes face to face time and non-face to face time preparing to see the patient (eg, review of tests), Obtaining and/or reviewing separately obtained history, Documenting clinical information in the electronic or other health record, Independently interpreting results (not separately reported) and communicating results to the patient/family/caregiver, or Care coordination (not separately reported).         Each patient to whom he or she provides medical services by telemedicine is:  (1) informed of the relationship between the physician and patient and the respective role of any other health care provider with respect to management of the patient; and (2) notified that he or she may decline to receive medical services by telemedicine and may withdraw from such care at any time.    Notes: Follow-up of seborrheic dermatitis, last seen on 8/4/23/ Patient typically uses ketoconazole shampoo and TAC cream which helps keep symptoms controlled. However, she has been having a flare recently on the scalp with a lot of itching, flaking, redness and irritation on the scalp/hairline. Has been out of medications so not currently using anything for symptoms.     ROS: Otherwise negative    PE: const/neuro - AAOx3, NAD          Skin -            A/P: 1) Seborrheic dermatitis - patient in flare of symptoms since being out of medications. Will refill today (clobetasol, ketoconazole shampoo and TAC 0.025% cream). If no improvement, consider adding temporary oral antifungal to regimen.

## 2024-12-02 ENCOUNTER — OFFICE VISIT (OUTPATIENT)
Dept: PAIN MEDICINE | Facility: CLINIC | Age: 59
End: 2024-12-02
Payer: COMMERCIAL

## 2024-12-02 VITALS
HEART RATE: 76 BPM | SYSTOLIC BLOOD PRESSURE: 168 MMHG | DIASTOLIC BLOOD PRESSURE: 78 MMHG | HEIGHT: 67 IN | BODY MASS INDEX: 36.64 KG/M2

## 2024-12-02 DIAGNOSIS — M17.11 PRIMARY OSTEOARTHRITIS OF RIGHT KNEE: Primary | ICD-10-CM

## 2024-12-02 DIAGNOSIS — M25.561 CHRONIC PAIN OF RIGHT KNEE: ICD-10-CM

## 2024-12-02 DIAGNOSIS — G89.29 CHRONIC PAIN OF RIGHT KNEE: ICD-10-CM

## 2024-12-02 DIAGNOSIS — G89.4 CHRONIC PAIN SYNDROME: ICD-10-CM

## 2024-12-02 PROCEDURE — 4010F ACE/ARB THERAPY RXD/TAKEN: CPT | Mod: CPTII,S$GLB,, | Performed by: NURSE PRACTITIONER

## 2024-12-02 PROCEDURE — 99214 OFFICE O/P EST MOD 30 MIN: CPT | Mod: S$GLB,,, | Performed by: NURSE PRACTITIONER

## 2024-12-02 PROCEDURE — 1159F MED LIST DOCD IN RCRD: CPT | Mod: CPTII,S$GLB,, | Performed by: NURSE PRACTITIONER

## 2024-12-02 PROCEDURE — 3072F LOW RISK FOR RETINOPATHY: CPT | Mod: CPTII,S$GLB,, | Performed by: NURSE PRACTITIONER

## 2024-12-02 PROCEDURE — 3008F BODY MASS INDEX DOCD: CPT | Mod: CPTII,S$GLB,, | Performed by: NURSE PRACTITIONER

## 2024-12-02 PROCEDURE — 3066F NEPHROPATHY DOC TX: CPT | Mod: CPTII,S$GLB,, | Performed by: NURSE PRACTITIONER

## 2024-12-02 PROCEDURE — 3077F SYST BP >= 140 MM HG: CPT | Mod: CPTII,S$GLB,, | Performed by: NURSE PRACTITIONER

## 2024-12-02 PROCEDURE — 1160F RVW MEDS BY RX/DR IN RCRD: CPT | Mod: CPTII,S$GLB,, | Performed by: NURSE PRACTITIONER

## 2024-12-02 PROCEDURE — 3044F HG A1C LEVEL LT 7.0%: CPT | Mod: CPTII,S$GLB,, | Performed by: NURSE PRACTITIONER

## 2024-12-02 PROCEDURE — 3061F NEG MICROALBUMINURIA REV: CPT | Mod: CPTII,S$GLB,, | Performed by: NURSE PRACTITIONER

## 2024-12-02 PROCEDURE — 3078F DIAST BP <80 MM HG: CPT | Mod: CPTII,S$GLB,, | Performed by: NURSE PRACTITIONER

## 2024-12-02 PROCEDURE — 99999 PR PBB SHADOW E&M-EST. PATIENT-LVL IV: CPT | Mod: PBBFAC,,, | Performed by: NURSE PRACTITIONER

## 2024-12-02 RX ORDER — ETODOLAC 300 MG/1
300 CAPSULE ORAL 2 TIMES DAILY PRN
Qty: 60 CAPSULE | Refills: 0 | Status: SHIPPED | OUTPATIENT
Start: 2024-12-02 | End: 2025-01-02

## 2024-12-02 NOTE — PROGRESS NOTES
"[facility] Established  Clinic Visit       Chief Complaint:   Chief Complaint   Patient presents with    Leg Pain     Right     Knee Pain     Right          History of Present Illness: Althea Vazquez is a 59 y.o. female here for cervical radiculopathy.      Onset: several years, no specific inciting event  Location: neck, in the midline and towards the left side  Radiation: left hand/arm  Timing: intermittent  Quality: Aching and Dull  Exacerbating Factors: lifting  Alleviating Factors: hot shower  Associated Symptoms: She feels weakness and numbness in the left arm/hand. Denies night fever/night sweats, urinary incontinence, bowel incontinence and significant weight loss    Severity: Currently: 5/10   Typical Range: 0-8/10     Exacerbation: 8/10     She also notes low back pain. Low back Pain has been present off and on for a few years, but worsened last week. Pain is localized to the bilateral low back (worse on the left) with occasional radiation down the left leg, but none currently. Pain is described as a "hurting pain". The pain is aggravated by spinning/turning. The pain is alleviated by warm bath, massage. She feels weakness in both legs. She denies numbness in her legs or feet. Denies changes in bowel or bladder function. Denies saddle anesthesia. Denies recent fevers or infections. Denies significant weight loss    Interval History 12/2/2024:   59-year-old female that presents today complaining of right knee and right lower extremity leg pain onset 2 weeks she denies any right knee pain today however experienced intense pain during the Thanksgiving holiday.  Pain started in the right lateral aspect of her knee with referred pain into the right lower extremity.  Patient reports that pain was constant for minimal 1 week standing walking and performing ADLs were increasing her symptoms sitting mitigate her symptoms.  Denies any paresthesia like symptoms in the right lower extremity.  She " does believe that the majority of her pain was coming from her right arthritic knee.  She denies any pain today her pain score today is 2/10.  She denies any recent falls denies profound weakness she did utilize naproxen which helped reduce some of her symptoms.  She has established office and was previously provided Euflexxa/viscosupplementation injections approximately 3 months ago.        Interval Update 12/02/2024: Patient return to clinic for follow up on right leg pain.    Interval history 08/07/2024:  58-year-old female presents today complaining of continued right knee pain she was seen approximately 6 months ago for the same issue.  She was provided a right intra-articular knee injection but states it helped minimally.  She would like to be considered for viscosupplementation injection in her right she denies any recent incident or trauma she does report that her pain is increased over the last few weeks and it prohibits her from having a quality of life.  Her pain score today is 7/10.  She reports difficulty bending and performing daily activities.      Interval history 01/10/2024  58-year-old female that presents today complaining primarily of right knee pain pain has been ongoing for years, pain is in the medial and lateral aspect of the right knee patient reports pain is intermittent pain is described as weakness and throbbing and soreness pain is aggravated with standing patient reports leaning mitigate her symptoms are taking pressure off her right knee.  She denies any radicular symptoms in the lower extremity.  Her worst pain is 6/10.  Last clinic visit her and I discussed possibly considering her for a right intra-articular knee injection with steroids.  Patient does have a history of type 2 diabetes.  Patient denies any recent incident or trauma, denies any profound weakness, denies any bowel bladder dysfunction        Interval History (04/17/2023):   Althea [place] returns today with  right knee pain that has been ongoing for 3-4 weeks.  Pain is constant pain described as aching and throbbing.  Pain is aggravated with walking, standing and rising from a seated position.  Has any radiating symptoms to her right lower extremity.   She denies any recent incident or trauma, denies any recent falls denies any profound weakness.  She also reports having swollen, red, itchy and leaky eyes.  She does have a history of chronic I infections.  She has a follow up with her ophthalmologist this week.  She is currently on antibiotic eyedrops.       Current Pain Scales:  Current: 8/10              Typical Range: 8-9/10       Interval History  1/10/2023- 58 y/o female that presents virtually she is complaining neck and left arm pain that radiates into her hands and fingers.  She feels weakness in her left hand she does get numbness in her left hand as well.  She is a former patient of Dr. Jerome she has previously been provided a cervical SHARONA targeting C7-T1 on 02/17/2021 which provide her 80% relief she is requesting a repeat injection.  She denies any profound weakness, denies any bowel bladder dysfunction, denies the inability to write with a pen.         Previous Interventions:  -9/17/2024- Euflexa right knee   -9/10/2024  Euflexa right knee   - 8/30/2024  Euflexa right knee   -04/21/2021 Left  sacroiliac joint injection and  Left  greater trochanteric bursa injection -average relief with 70%  - 2/17/21: C7-T1 IL SHARONA w/ 80% relief    Previous Therapies:  PT/OT: yes for neck and back, helps, and she continues to do her HEP as much as possible.   Relevant Surgery: Right knee arthroscopy  Previous Medications:   - NSAIDS: Naproxen.  Ibuprofen did not help.  Sulindac.  - Muscle Relaxants: Flexeril. Tizanidine   - TCAs:   - SNRIs:   - Topicals:   - Anticonvulsants:    - Opioids: Norco. Oxycodone. (wants to avoid addicting medications)    Current Pain Medications:  Flexeril 10mg  Naproxen 500mg  Tizanidine 2mg      Blood Thinners: Aspirin 81mg    Full Medication List:    Current Outpatient Medications:     amLODIPine (NORVASC) 10 MG tablet, Take 1 tablet (10 mg total) by mouth once daily., Disp: 90 tablet, Rfl: 2    aspirin (ECOTRIN) 81 MG EC tablet, Take 1 tablet (81 mg total) by mouth once daily., Disp: 30 tablet, Rfl: 0    atenoloL (TENORMIN) 100 MG tablet, Take 1 tablet (100 mg total) by mouth once daily., Disp: 90 tablet, Rfl: 2    blood sugar diagnostic Strp, Check blood sugars BID, Disp: 200 strip, Rfl: 11    blood-glucose meter kit, Use twice daily., Disp: 1 each, Rfl: 0    canagliflozin (INVOKANA) 300 mg Tab tablet, Take 1 tablet (300 mg total) by mouth daily before breakfast., Disp: 90 tablet, Rfl: 1    clobetasoL (TEMOVATE) 0.05 % external solution, Apply to affected area once daily, Disp: 50 mL, Rfl: 6    clobetasoL (TEMOVATE) 0.05 % external solution, Apply topically once daily., Disp: 50 mL, Rfl: 6    clonazePAM (KLONOPIN) 0.5 MG tablet, TAKE 1 TABLET BY MOUTH ONCE DAILY AS NEEDED FOR ANXIETY, Disp: 90 tablet, Rfl: 0    cyclobenzaprine (FLEXERIL) 10 MG tablet, TAKE ONE TABLET BY MOUTH AT BEDTIME AS NEEDED, Disp: 90 tablet, Rfl: 2    diclofenac sodium (VOLTAREN) 1 % Gel, Apply 4 g topically 4 (four) times daily. Apply light film topically to knee up to four times daily, Disp: 3 each, Rfl: 2    docusate sodium (COLACE) 50 MG capsule, Take 1 capsule (50 mg total) by mouth 2 (two) times daily., Disp: 180 capsule, Rfl: 3    entecavir (BARACLUDE) 0.5 MG Tab, Take 1 tablet (0.5 mg total) by mouth once daily., Disp: 90 tablet, Rfl: 3    ergocalciferol (ERGOCALCIFEROL) 50,000 unit Cap, Take 1 capsule (50,000 Units total) by mouth every 7 days., Disp: 12 capsule, Rfl: 3    fluocinolone acetonide oiL 0.01 % Drop, Place 3 drops into both ears 2 (two) times a day., Disp: 20 mL, Rfl: 3    FLUoxetine 20 MG capsule, Take 1 capsule (20 mg total) by mouth 2 (two) times daily., Disp: 180 capsule, Rfl: 1    fluticasone  propionate (FLONASE) 50 mcg/actuation nasal spray, 2 sprays (100 mcg total) by Each Nostril route once daily., Disp: 16 g, Rfl: 6    folic acid (FOLVITE) 1 MG tablet, Take 1 tablet (1 mg total) by mouth once daily., Disp: 90 tablet, Rfl: 1    golimumab (SIMPONI) 50 mg/0.5 mL PnIj, Inject 50 mg into the skin every 30 days., Disp: 1.5 mL, Rfl: 1    hydroCHLOROthiazide (HYDRODIURIL) 25 MG tablet, Take 1 tablet (25 mg total) by mouth once daily., Disp: 90 tablet, Rfl: 3    hydrocortisone 2.5 % cream, Apply topically to the affected area 2 (two) times daily, Disp: 28 g, Rfl: 8    ketoconazole (NIZORAL) 2 % shampoo, Apply topically twice a week., Disp: 120 mL, Rfl: 6    ketoconazole (NIZORAL) 2 % shampoo, Apply topically once a week., Disp: 120 mL, Rfl: 12    ketorolac 0.5% (ACULAR) 0.5 % Drop, Place 1 drop into the right eye 3 (three) times daily., Disp: 5 mL, Rfl: 3    ketorolac 0.5% (ACULAR) 0.5 % Drop, Place 1 drop into the left eye 3 (three) times daily., Disp: 5 mL, Rfl: 3    lancets (ACCU-CHEK SOFTCLIX LANCETS) Misc, Check blood sugars BID, Disp: 200 each, Rfl: 11    lancets (FREESTYLE LANCETS) 28 gauge Misc, test TWICE DAILY, Disp: 200 each, Rfl: 3    levocetirizine (XYZAL) 5 MG tablet, TAKE ONE TABLET BY MOUTH EVERY EVENING, Disp: 90 tablet, Rfl: 3    methotrexate 2.5 MG Tab, Take 6 tablets (15 mg total) by mouth every 7 days. Take 3 tabs Wed am and 3 tabs Wed pm only, Disp: 72 tablet, Rfl: 0    ofloxacin (OCUFLOX) 0.3 % ophthalmic solution, Place 1 drop into the right eye 3 (three) times daily., Disp: 5 mL, Rfl: 3    ofloxacin (OCUFLOX) 0.3 % ophthalmic solution, Place 1 drop into the left eye 3 (three) times daily., Disp: 5 mL, Rfl: 3    pantoprazole (PROTONIX) 40 MG tablet, Take 1 tablet (40 mg total) by mouth once daily., Disp: 90 tablet, Rfl: 0    polyethylene glycol (GLYCOLAX) 17 gram/dose powder, Mix and dissolve one capful (17 grams) into a beverage and take by mouth once daily., Disp: 510 g, Rfl: 11     prednisoLONE acetate (PRED FORTE) 1 % DrpS, Instill one drop into affect eye(s) as directed, Disp: 5 mL, Rfl: 1    prednisoLONE acetate (PRED FORTE) 1 % DrpS, Place 1 drop into the right eye 3 (three) times daily., Disp: 5 mL, Rfl: 3    prednisoLONE acetate (PRED FORTE) 1 % DrpS, Place 1 drop into the left eye 3 (three) times daily., Disp: 5 mL, Rfl: 3    rosuvastatin (CRESTOR) 40 MG Tab, Take 1 tablet (40 mg total) by mouth every evening., Disp: 90 tablet, Rfl: 3    semaglutide (OZEMPIC) 0.25 mg or 0.5 mg (2 mg/3 mL) pen injector, Inject 0.25 mg into the skin weekly for 2 weeks and then increase to 0.5 mg into the skin weekly after, Disp: 9 mL, Rfl: 0    semaglutide (OZEMPIC) 2 mg/dose (8 mg/3 mL) PnIj, Inject 2 mg into the skin every 7 days., Disp: 9 mL, Rfl: 0    sulfaSALAzine (AZULFIDINE) 500 MG EC tablet, Take 3 tablets (1,500 mg total) by mouth 2 (two) times daily., Disp: 540 tablet, Rfl: 0    tiZANidine (ZANAFLEX) 2 MG tablet, Take 1-2 tablets (2-4 mg total) by mouth every 8 (eight) hours as needed., Disp: 90 tablet, Rfl: 2    topiramate (TOPAMAX) 100 MG tablet, Take 1 tablet (100 mg total) by mouth 2 (two) times daily., Disp: 180 tablet, Rfl: 3    triamcinolone acetonide 0.025% (KENALOG) 0.025 % cream, apply to affected area twice daily., Disp: 80 g, Rfl: 1    valsartan (DIOVAN) 320 MG tablet, Take 1 tablet (320 mg total) by mouth once daily., Disp: 90 tablet, Rfl: 3    vitamin D (VITAMIN D3) 1000 units Tab, Take 1,000 Units by mouth once daily., Disp: , Rfl:     zolpidem (AMBIEN) 5 MG Tab, Take 1 tablet (5 mg total) by mouth nightly as needed., Disp: 90 tablet, Rfl: 0  No current facility-administered medications for this visit.    Facility-Administered Medications Ordered in Other Visits:     0.9%  NaCl infusion, , Intravenous, Continuous, Graciela Jeroem MD     Review of Systems:  Review of Systems   Constitutional:  Negative for fever and weight loss.   HENT:  Negative for ear pain and tinnitus.     Eyes:  Positive for redness. Negative for pain.   Respiratory:  Negative for cough and shortness of breath.    Cardiovascular:  Negative for chest pain and palpitations.   Gastrointestinal:  Negative for constipation and heartburn.   Genitourinary: Negative.         Denies urinary incontinence. Denies urine retention.    Musculoskeletal:  Positive for back pain, myalgias and neck pain.   Skin:  Negative for itching and rash.   Neurological:  Positive for tingling and weakness. Negative for seizures.   Endo/Heme/Allergies:  Negative for environmental allergies. Does not bruise/bleed easily.   Psychiatric/Behavioral:  Negative for depression. The patient has insomnia. The patient is not nervous/anxious.        Allergies:  Bactrim [sulfamethoxazole-trimethoprim], Trulicity [dulaglutide], and Diflucan [fluconazole]     Medical History:   has a past medical history of Acid reflux, Anxiety (10/18/2012), Arthritis, Cataract, Depression, Diabetic peripheral neuropathy - mild (10/21/2014), Difficult intubation, Dry eyes, Dry mouth, Fever blister, History of hepatitis B (10/03/2016), Hyperlipidemia, Hypertension, Insomnia, Iritis (05/13/2014), Long-term current use of steroids (09/27/2012), Nausea & vomiting (02/04/2015), VAISHNAVI (obstructive sleep apnea), and Type II diabetes mellitus (10/01/2012).    Surgical History:   has a past surgical history that includes Tubal ligation; Knee arthroscopy (5-14-14); Hysterectomy (12/3/2014); Colonoscopy (N/A, 1/9/2018); Epidural steroid injection into cervical spine (N/A, 2/17/2021); Injection of anesthetic agent into sacroiliac joint (Left, 4/21/2021); Injection of joint (Left, 4/21/2021); Epidural steroid injection into cervical spine (N/A, 2/23/2023); Cataract extraction w/  intraocular lens implant (Right, 1/3/2024); Cataract extraction w/  intraocular lens implant (Left, 2/7/2024); Esophagogastroduodenoscopy (N/A, 7/16/2024); and Colonoscopy (N/A, 7/16/2024).    Family  History:  family history includes ADD / ADHD in her son; Cancer in her paternal grandfather; Cataracts in her mother; Colon cancer in her father; Depression in her mother; Diabetes in her maternal aunt and mother; Heart attack in her mother and paternal grandmother; Hyperlipidemia in her father; Hypertension in her father; No Known Problems in her brother, maternal grandfather, maternal grandmother, maternal uncle, paternal aunt, paternal uncle, and sister; Stroke in her father and mother.    Social History:   reports that she has never smoked. She has never used smokeless tobacco. She reports that she does not currently use alcohol. She reports that she does not use drugs.    Physical Exam:  LMP 11/25/2014       General Musculoskeletal Exam   Gait: antalgic       Right Knee Exam     Tenderness   The patient is tender to palpation of the medial joint line and lateral joint line.    Range of Motion   Extension:  abnormal   Flexion:  abnormal     Comments:  Positive for tenderness in the medial joint line  Grade 3 Kellgren- Zuhair scale        Imaging:  -MRI Cervical Spine Without Contrast 11/16/2020  FINDINGS:  Vertebral body height is normal and alignment is maintained. Marrow signal is within normal limits. The craniocervical junction is unremarkable. No abnormal cord signal or cord deformity.  T2 hyperintense, likely cystic focus within the left aspect of the thyroid gland is unchanged.  Intervertebral disc levels are as follows:  C2-C3 disc: No significant disc pathology. Normal facet joints. No spinal canal or neural foraminal stenosis.  C3-C4 disc: Posterior disc bulge with a posterior annular fissure that is new compared to prior.  Normal uncovertebral joints and facet joints.  The thecal sac measures 9 mm AP.  Previously the thecal sac measured 10 mm AP.  C4-C5 disc: Disc space height loss with a posterior disc bulge.  Anterior osteophytes.  Normal vertebral joints and facet joints.  No significant  stenosis.  The thecal sac measures 12 mm AP.  These findings are similar to prior.  C5-C6 disc: Posterior disc bulge.  Normal uncovertebral joints and facet joints.  The thecal sac measures 11 mm AP.  No significant foraminal stenosis.  Previously the thecal sac measured 11 mm AP at this level as well.  C6-C7 disc: Posterior disc bulge with a posterior annular fissure.  This is new compared to the prior study.  Normal uncovertebral joints and facet joints.  The thecal sac measures 11 mm AP.  No significant foraminal stenosis.  Previously the thecal sac measured 13 mm.  C7-T1 disc: Posterior disc bulge.  Normal facet joints.  The thecal sac measures 12 mm AP.  No significant foraminal stenosis.  These findings are similar to prior.  Impression:  1. There are new posterior disc bulges with annular fissures at C3-C4 and C6-C7.  There is now mild spinal stenosis at C3-C4.  2. No significant foraminal stenosis.    - MRI SIJ w/ & w/o contrast 9/11/16:  There are enhancing inflammatory changes about the anterior superior aspect of the left SI joint in the expected location of the medial lumbar or lumbosacral ligament, findings that are highly suggestive of enthesitis.  There is apparent trace enhancement on the posterior superior aspect of the right SI joint leads is overall nonspecific.  SI joints are symmetric.  No synovitis.  No joint effusions.  No osseous erosive changes.  Visualized musculature demonstrate normal bulk and signal intensity.  Piriformis muscles are symmetric.  Ischiofemoral spaces are unremarkable.  Adductor and abductor tendons are unremarkable.  Hamstring tendons are normal.  Subcutaneous soft tissues are normal.  Limited evaluation of the lower abdominal and pelvic organs is unremarkable.  Uterus is surgically absent    - MRI Spine (Lumbar, Thoracic, Cervical) w/ & w/o contrast 9/11/16:  CERVICAL SPINE:  There is modest straightening of the normal cervical lordosis.  No spondylolisthesis.  Vertebral  body heights are well-maintained without evidence for fracture.  Visualized osseous structures demonstrate intact pars signal intensity without findings to suggest an infiltrative process.  Cervical spinal cord demonstrates normal contour and signal intensity.  Cerebellar tonsils are in their expected location.  Cervicomedullary junction is unremarkable.  Postcontrast images demonstrate no abnormal enhancement.  Prevertebral soft tissues are normal.  Vertebral artery flow voids are present.  T2 hyperintense nodules are noted within the bilateral thyroid lobes.  The spinal musculature and trace normal bulk and signal intensity.  supraspinous ligament is unremarkable without findings to suggest enthesitis.  C2-C3: No spinal canal stenosis or neural foraminal narrowing.  C3-C4: There is mild bilateral facet arthropathy and uncovertebral joint spurring and mild bilateral neural foraminal narrowing.  No spinal canal stenosis.  C4-C5: No spinal canal stenosis or neural foraminal narrowing.  C5-C6: There is a moderate broad-based posterior disc ossified complex that partially effaces the anterior thecal sac.  There is mild left-sided uncovertebral joint spurring.  Findings contribute to mild spinal canal stenosis and mild left-sided neural foraminal narrowing.  C6-C7: No spinal canal stenosis or neural foraminal narrowing.  C7-T1: No spinal canal stenosis or neural foraminal narrowing.    THORACIC SPINE:  Thoracic spine alignment is normal.  No spondylolisthesis.  Vertebral body heights are well-maintained without evidence for fracture.  There is enhancing inflammation of the left side zygapophyseal/facet joint at T5-T6.  Remaining visualized osseous structures demonstrate intact menisci and in density without findings to suggest an infiltrative process.  Note is made of the anterior vertebral body at this demonstrate intact signal intensity without findings to suggest spondylitis.  Visualized spinal cord demonstrates  normal contour and signal intensity.  Postcontrast images demonstrate normal enhancement.  Visualized thoracic and upper abdominal organs are normal.  There is a low signal described focal area of signal abnormality within the posterior subcutaneous soft tissues and appears to demonstrate subtle enhancement and is favored to be benign though nonspecific.  No significant intervertebral disc abnormalities.  No spinal canal stenosis or neural foramina narrowing.    LUMBAR SPINE:  Lumbar spine alignment is normal.  No spondylolysis or spondylolisthesis.  Vertebral body heights are well-maintained without evidence for fracture.  Visualized osseous structures demonstrate intact pars signal intensity without findings to suggest an infiltrative process.  Visualized distal spinal cord demonstrates normal contour and signal intensity.  Cauda equina is normal mild without findings to suggest arachnoiditis.  Postcontrast images demonstrate normal enhancement.  There is a subcentimeter T2 hyperintense lesion within the right renal cortex, too small to accurately guided thighs.  Paraspinal musculature demonstrates normal bulk and signal intensity.  Subcutaneous soft tissues are within normal limits.  L1-L2: No significant intervertebral disc abnormalities.  Facet joints are well maintained.  No spinal canal stenosis or neural foraminal narrowing.  L2-L3: There is mild intervertebral disc desiccation.  Facet joints are well maintained.  No spinal canal stenosis or neural foraminal narrowing.  L3-L4: There is mild intervertebral disc desiccation.  Facet joints are well maintained.  No spinal canal stenosis or neural foraminal narrowing.  L4-L5: There is mild intervertebral disc space narrowing and desiccation with small circumferential bulge.  There is mild bilateral facet arthropathy.  No spinal canal stenosis or neural foraminal narrowing.  L5-S1: There is mild intervertebral disc space narrowing desiccation with a small  circumferential bulge and a small posterior annular fissure.  Findings contribute to mild left-sided neural foraminal narrowing. There is mild bilateral facet arthropathy.  No spinal canal stenosis.    IMPRESSION:   Enhancing inflammatory changes involving the left zygapophyseal/facet joint at T5-T6 that is nonspecific but can be seen with inflammatory arthropathies. No MR imaging finding to suggest enthesitis, spondylitis or spondylodiskitis.  Mild cervical and lumbar degenerative changes without significant spinal canal stenosis.  Cystic subcutaneous soft tissues within the posterior back, favored to be benign, possibly a complex sebaceous cyst.  Consider follow-up examination in 12 months      Labs:  BMP  Lab Results   Component Value Date     08/12/2024    K 3.8 08/12/2024     08/12/2024    CO2 26 08/12/2024    BUN 12 08/12/2024    CREATININE 0.65 08/12/2024    CALCIUM 9.5 08/12/2024    ANIONGAP 14 08/12/2024    ESTGFRAFRICA >60.0 06/17/2022    ESTGFRAFRICA >60.0 06/17/2022    EGFRNONAA >60.0 06/17/2022    EGFRNONAA >60.0 06/17/2022     Lab Results   Component Value Date    ALT 26 08/12/2024    AST 28 08/12/2024     (H) 08/18/2012    ALKPHOS 74 08/12/2024    BILITOT 0.4 08/12/2024     Lab Results   Component Value Date     08/12/2024       Assessment:  Althea Vazquez is a 59 y.o. female with the following diagnoses based on history, exam, and imaging:    Problem List Items Addressed This Visit    None  Visit Diagnoses       Primary osteoarthritis of right knee    -  Primary    Relevant Medications    etodolac (LODINE) 300 MG Cap    Chronic pain of right knee        Relevant Medications    etodolac (LODINE) 300 MG Cap    Chronic pain syndrome                  This is a pleasant 59 y.o. lady presenting with:     - Chronic neck pain with left cervical radiculopathy:  Posterior disc bulge with annular fissure at C6-7 which may be the cause.  She also has myofascial pain on  exam.  - Chronic left shoulder pain with impingement signs on exam  - Chronic bilateral low back pain (worse on the left): She follows with rheumatology, Dr. Arvizu, and has been diagnosed with axial spondyloarthritis.  Her back pain appears multifactorial.  She localizes most of her pain over her left sacroiliac joint with evidence of inflammatory changes about the left SIJ on prior MRI, but she also has positive facet provocative maneuvers and on her previous lumbar MRI there is evidence of facet arthropathy as well, which does appears slightly worse to me on the left at L5-S1.  - left trochanteric bursitis  - Comorbidities:  Axial spondyloarthritis.  Anxiety and depression.  Type 2 diabetes mellitus.  BMI greater than 30.  Insomnia.  Obstructive sleep apnea.  History of long-term steroid use.      1/10/2023- 58 y/o female with chronic neck and  left cervical radiculopathy: MRI shows  Posterior disc bulge with annular fissure at C6-7 which may be the cause.  She also has myofascial pain on exam.    04/17/2023-Althea Vazquez is a 59 y.o. female who  has a past medical history of Acid reflux, Anxiety (10/18/2012), Arthritis, Cataract, Depression, Diabetic peripheral neuropathy - mild (10/21/2014), Difficult intubation, Dry eyes, Dry mouth, Fever blister, History of hepatitis B (10/03/2016), Hyperlipidemia, Hypertension, Insomnia, Iritis (05/13/2014), Long-term current use of steroids (09/27/2012), Nausea & vomiting (02/04/2015), VAISHNAVI (obstructive sleep apnea), and Type II diabetes mellitus (10/01/2012).  By history and examination this patient has right knee pain  The underlying cause is osteoarthritis and degenerative joint disease.  Pathology is confirmed by imaging.  We discussed the underlying diagnoses and multiple treatment options including interventional procedures, physical therapy, home exercise, core muscle enhancement, activity modification, and weight loss.  The risks and benefits of each  treatment option were discussed and all questions were answered.  Patient upon presentation appears to have bilateral eye infections as erythema and swelling was noted in both eyes.  Currently on eyedrops for this infection.  She will be following up with the ophthalmologist this week discussed with patient that I will hold off on providing her with a right knee intra-articular injection today once her infection has cleared we can reconsider this injection in office.  Patient verbalized understanding agreed.    1--58-year-old female with a history of chronic knee pain by exam patient has chronic right knee pain, underlying diagnosis is osteoarthritis and degenerative joint disease.  Pathology has been confirmed upon review of her recent images.  Discussed underlying diagnosis and that I would recommend a right intra-articular knee injection with steroids patient and I discussed her history of diabetes and the increase of her blood sugar once steroids are given.  She denies any recent infections denied being on antibiotics.  See plan agreed upon below.    08/07/2024-Althea Vazquez is a 59 y.o. female who  has a past medical history of Acid reflux, Anxiety (10/18/2012), Arthritis, Cataract, Depression, Diabetic peripheral neuropathy - mild (10/21/2014), Difficult intubation, Dry eyes, Dry mouth, Fever blister, History of hepatitis B (10/03/2016), Hyperlipidemia, Hypertension, Insomnia, Iritis (05/13/2014), Long-term current use of steroids (09/27/2012), Nausea & vomiting (02/04/2015), VAISHNVAI (obstructive sleep apnea), and Type II diabetes mellitus (10/01/2012).  By history and examination this patient has chronic right knee pain related to osteoarthritis.  The underlying cause is OA. Pathology is confirmed by imaging.  We discussed the underlying diagnoses and multiple treatment options including non-opioid medications, interventional procedures, physical therapy, home exercise, and weight loss.  The  risks and benefits of each treatment option were discussed and all questions were answered.  [employer]    12/2/2024- Althea Vazquez is a 59 y.o. female who  has a past medical history of Acid reflux, Anxiety (10/18/2012), Arthritis, Cataract, Depression, Diabetic peripheral neuropathy - mild (10/21/2014), Difficult intubation, Dry eyes, Dry mouth, Fever blister, History of hepatitis B (10/03/2016), Hyperlipidemia, Hypertension, Insomnia, Iritis (05/13/2014), Long-term current use of steroids (09/27/2012), Nausea & vomiting (02/04/2015), VAISHNAVI (obstructive sleep apnea), and Type II diabetes mellitus (10/01/2012).  By history and examination this patient has chronic RIGHT knee pain r/t OA.   Pathology is confirmed by imaging.  We discussed the underlying diagnoses and multiple treatment options including interventional procedures, physical therapy, home exercise, core muscle enhancement, activity modification, and weight loss.  The risks and benefits of each treatment option were discussed and all questions were answered.    Long discussion regarding patient establish some type of home exercise plan to strengthen her hamstrings, abductors adductors and quadriceps muscles to help reduce her concurrent right knee pain.  Also educated patient to utilize the rice technique in effort to reduce some of her symptoms in an acute/subacute situation.  I will also provide her with a new NSAID in effort to reduce some of her symptoms educated patient to discontinue all over-the-counter NSAIDs while using this medication.      Treatment Plan:   - PT/OT/HEP:  Consider returning to physical therapy in the future specifically to strengthen her quadriceps, abductors adductors and hamstrings to help reduce from right knee pain. Educated on RICE therapy.   - Procedures:  none at this time. Discussed PRP treatment   - Consider MBB vs FJI if no relief with SIJ injection  - Medications:  New prescription  etodolac 300 mg mg b.i.d. p.r.n. short term, discontinue all other NSAIDs.  Educated patient.  - Imaging: Reviewed and discussed in detail using images from chart.  - Labs: Reviewed.  Medications are appropriately dosed for current hepatorenal function.    Follow Up:  6-8 weeks or sooner if needed.    ROBIN Méndez  Interventional Pain Management        Disclaimer: This note was partly generated using dictation software which may occasionally result in transcription errors.

## 2024-12-03 ENCOUNTER — PATIENT MESSAGE (OUTPATIENT)
Dept: RHEUMATOLOGY | Facility: CLINIC | Age: 59
End: 2024-12-03
Payer: COMMERCIAL

## 2024-12-03 DIAGNOSIS — H20.9 UVEITIS: ICD-10-CM

## 2024-12-10 ENCOUNTER — TELEPHONE (OUTPATIENT)
Dept: RHEUMATOLOGY | Facility: CLINIC | Age: 59
End: 2024-12-10
Payer: COMMERCIAL

## 2024-12-10 RX ORDER — METHOTREXATE 2.5 MG/1
15 TABLET ORAL
Qty: 24 TABLET | Refills: 0 | Status: SHIPPED | OUTPATIENT
Start: 2024-12-10

## 2024-12-11 ENCOUNTER — PATIENT MESSAGE (OUTPATIENT)
Dept: RHEUMATOLOGY | Facility: CLINIC | Age: 59
End: 2024-12-11
Payer: COMMERCIAL

## 2024-12-19 ENCOUNTER — PATIENT MESSAGE (OUTPATIENT)
Dept: RHEUMATOLOGY | Facility: CLINIC | Age: 59
End: 2024-12-19
Payer: COMMERCIAL

## 2024-12-31 DIAGNOSIS — H20.9 UVEITIS: ICD-10-CM

## 2025-01-02 ENCOUNTER — PATIENT MESSAGE (OUTPATIENT)
Dept: RHEUMATOLOGY | Facility: CLINIC | Age: 60
End: 2025-01-02
Payer: COMMERCIAL

## 2025-01-02 ENCOUNTER — TELEPHONE (OUTPATIENT)
Dept: RHEUMATOLOGY | Facility: CLINIC | Age: 60
End: 2025-01-02
Payer: COMMERCIAL

## 2025-01-02 RX ORDER — METHOTREXATE 2.5 MG/1
15 TABLET ORAL
Qty: 24 TABLET | Refills: 0 | Status: SHIPPED | OUTPATIENT
Start: 2025-01-02

## 2025-01-02 NOTE — TELEPHONE ENCOUNTER
Please schedule way overdue standing labs including HBV DNA Friday or early next week. Thank you CLEMENT

## 2025-01-08 ENCOUNTER — LAB VISIT (OUTPATIENT)
Dept: LAB | Facility: HOSPITAL | Age: 60
End: 2025-01-08
Attending: INTERNAL MEDICINE
Payer: COMMERCIAL

## 2025-01-08 ENCOUNTER — OFFICE VISIT (OUTPATIENT)
Dept: INTERNAL MEDICINE | Facility: CLINIC | Age: 60
End: 2025-01-08
Payer: COMMERCIAL

## 2025-01-08 VITALS
TEMPERATURE: 98 F | BODY MASS INDEX: 37.63 KG/M2 | HEIGHT: 67 IN | HEART RATE: 76 BPM | DIASTOLIC BLOOD PRESSURE: 70 MMHG | RESPIRATION RATE: 14 BRPM | SYSTOLIC BLOOD PRESSURE: 136 MMHG | OXYGEN SATURATION: 97 % | WEIGHT: 239.75 LBS

## 2025-01-08 DIAGNOSIS — E66.01 SEVERE OBESITY (BMI 35.0-39.9) WITH COMORBIDITY: ICD-10-CM

## 2025-01-08 DIAGNOSIS — G47.00 INSOMNIA, UNSPECIFIED TYPE: Chronic | ICD-10-CM

## 2025-01-08 DIAGNOSIS — L30.4 INTERTRIGO: ICD-10-CM

## 2025-01-08 DIAGNOSIS — M51.369 DEGENERATION OF INTERVERTEBRAL DISC OF LUMBAR REGION, UNSPECIFIED WHETHER PAIN PRESENT: ICD-10-CM

## 2025-01-08 DIAGNOSIS — M46.90 AXIAL SPONDYLOARTHRITIS: ICD-10-CM

## 2025-01-08 DIAGNOSIS — E78.5 HYPERLIPIDEMIA ASSOCIATED WITH TYPE 2 DIABETES MELLITUS: ICD-10-CM

## 2025-01-08 DIAGNOSIS — E11.59 HYPERTENSION ASSOCIATED WITH DIABETES: ICD-10-CM

## 2025-01-08 DIAGNOSIS — E11.69 HYPERLIPIDEMIA ASSOCIATED WITH TYPE 2 DIABETES MELLITUS: ICD-10-CM

## 2025-01-08 DIAGNOSIS — Z23 NEED FOR VACCINATION: ICD-10-CM

## 2025-01-08 DIAGNOSIS — Z00.00 ANNUAL PHYSICAL EXAM: ICD-10-CM

## 2025-01-08 DIAGNOSIS — Z79.899 HIGH RISK MEDICATION USE: ICD-10-CM

## 2025-01-08 DIAGNOSIS — E11.42 TYPE 2 DIABETES MELLITUS WITH DIABETIC POLYNEUROPATHY, UNSPECIFIED WHETHER LONG TERM INSULIN USE: Chronic | ICD-10-CM

## 2025-01-08 DIAGNOSIS — Z86.19 HISTORY OF HEPATITIS B: ICD-10-CM

## 2025-01-08 DIAGNOSIS — F41.0 PANIC DISORDER WITHOUT AGORAPHOBIA: ICD-10-CM

## 2025-01-08 DIAGNOSIS — Z00.00 ANNUAL PHYSICAL EXAM: Primary | ICD-10-CM

## 2025-01-08 DIAGNOSIS — F41.1 GENERALIZED ANXIETY DISORDER: ICD-10-CM

## 2025-01-08 DIAGNOSIS — G47.00 INSOMNIA, UNSPECIFIED TYPE: Primary | ICD-10-CM

## 2025-01-08 DIAGNOSIS — G47.30 SLEEP APNEA, UNSPECIFIED TYPE: ICD-10-CM

## 2025-01-08 DIAGNOSIS — M46.90 AXIAL SPONDYLOARTHRITIS: Chronic | ICD-10-CM

## 2025-01-08 DIAGNOSIS — I15.2 HYPERTENSION ASSOCIATED WITH DIABETES: ICD-10-CM

## 2025-01-08 DIAGNOSIS — B18.1 CHRONIC VIRAL HEPATITIS B WITHOUT DELTA AGENT AND WITHOUT COMA: ICD-10-CM

## 2025-01-08 DIAGNOSIS — E11.9 TYPE 2 DIABETES MELLITUS WITHOUT COMPLICATION, WITHOUT LONG-TERM CURRENT USE OF INSULIN: ICD-10-CM

## 2025-01-08 LAB
ALBUMIN SERPL BCP-MCNC: 4.5 G/DL (ref 3.5–5.2)
ALBUMIN SERPL BCP-MCNC: 4.5 G/DL (ref 3.5–5.2)
ALP SERPL-CCNC: 92 U/L (ref 40–150)
ALP SERPL-CCNC: 92 U/L (ref 40–150)
ALT SERPL W/O P-5'-P-CCNC: 19 U/L (ref 10–44)
ALT SERPL W/O P-5'-P-CCNC: 19 U/L (ref 10–44)
ANION GAP SERPL CALC-SCNC: 11 MMOL/L (ref 8–16)
ANION GAP SERPL CALC-SCNC: 11 MMOL/L (ref 8–16)
AST SERPL-CCNC: 20 U/L (ref 10–40)
AST SERPL-CCNC: 20 U/L (ref 10–40)
BASOPHILS # BLD AUTO: 0.03 K/UL (ref 0–0.2)
BASOPHILS # BLD AUTO: 0.03 K/UL (ref 0–0.2)
BASOPHILS NFR BLD: 0.4 % (ref 0–1.9)
BASOPHILS NFR BLD: 0.4 % (ref 0–1.9)
BILIRUB SERPL-MCNC: 0.4 MG/DL (ref 0.1–1)
BILIRUB SERPL-MCNC: 0.4 MG/DL (ref 0.1–1)
BUN SERPL-MCNC: 13 MG/DL (ref 6–20)
BUN SERPL-MCNC: 13 MG/DL (ref 6–20)
CALCIUM SERPL-MCNC: 9.7 MG/DL (ref 8.7–10.5)
CALCIUM SERPL-MCNC: 9.7 MG/DL (ref 8.7–10.5)
CHLORIDE SERPL-SCNC: 101 MMOL/L (ref 95–110)
CHLORIDE SERPL-SCNC: 101 MMOL/L (ref 95–110)
CHOLEST SERPL-MCNC: 263 MG/DL (ref 120–199)
CHOLEST/HDLC SERPL: 4.2 {RATIO} (ref 2–5)
CO2 SERPL-SCNC: 28 MMOL/L (ref 23–29)
CO2 SERPL-SCNC: 28 MMOL/L (ref 23–29)
CREAT SERPL-MCNC: 0.8 MG/DL (ref 0.5–1.4)
CREAT SERPL-MCNC: 0.8 MG/DL (ref 0.5–1.4)
CRP SERPL-MCNC: 10.3 MG/L (ref 0–8.2)
DIFFERENTIAL METHOD BLD: ABNORMAL
DIFFERENTIAL METHOD BLD: ABNORMAL
EOSINOPHIL # BLD AUTO: 0.1 K/UL (ref 0–0.5)
EOSINOPHIL # BLD AUTO: 0.1 K/UL (ref 0–0.5)
EOSINOPHIL NFR BLD: 0.9 % (ref 0–8)
EOSINOPHIL NFR BLD: 0.9 % (ref 0–8)
ERYTHROCYTE [DISTWIDTH] IN BLOOD BY AUTOMATED COUNT: 15.3 % (ref 11.5–14.5)
ERYTHROCYTE [DISTWIDTH] IN BLOOD BY AUTOMATED COUNT: 15.3 % (ref 11.5–14.5)
ERYTHROCYTE [SEDIMENTATION RATE] IN BLOOD BY PHOTOMETRIC METHOD: 24 MM/HR (ref 0–36)
EST. GFR  (NO RACE VARIABLE): >60 ML/MIN/1.73 M^2
EST. GFR  (NO RACE VARIABLE): >60 ML/MIN/1.73 M^2
ESTIMATED AVG GLUCOSE: 169 MG/DL (ref 68–131)
ESTIMATED AVG GLUCOSE: 169 MG/DL (ref 68–131)
GLUCOSE SERPL-MCNC: 139 MG/DL (ref 70–110)
GLUCOSE SERPL-MCNC: 139 MG/DL (ref 70–110)
HBA1C MFR BLD: 7.5 % (ref 4–5.6)
HBA1C MFR BLD: 7.5 % (ref 4–5.6)
HBV SURFACE AG SERPL QL IA: NORMAL
HCT VFR BLD AUTO: 45.6 % (ref 37–48.5)
HCT VFR BLD AUTO: 45.6 % (ref 37–48.5)
HDLC SERPL-MCNC: 62 MG/DL (ref 40–75)
HDLC SERPL: 23.6 % (ref 20–50)
HGB BLD-MCNC: 14.9 G/DL (ref 12–16)
HGB BLD-MCNC: 14.9 G/DL (ref 12–16)
IMM GRANULOCYTES # BLD AUTO: 0.06 K/UL (ref 0–0.04)
IMM GRANULOCYTES # BLD AUTO: 0.06 K/UL (ref 0–0.04)
IMM GRANULOCYTES NFR BLD AUTO: 0.8 % (ref 0–0.5)
IMM GRANULOCYTES NFR BLD AUTO: 0.8 % (ref 0–0.5)
LDLC SERPL CALC-MCNC: 184.4 MG/DL (ref 63–159)
LYMPHOCYTES # BLD AUTO: 4.6 K/UL (ref 1–4.8)
LYMPHOCYTES # BLD AUTO: 4.6 K/UL (ref 1–4.8)
LYMPHOCYTES NFR BLD: 58.8 % (ref 18–48)
LYMPHOCYTES NFR BLD: 58.8 % (ref 18–48)
MCH RBC QN AUTO: 29.3 PG (ref 27–31)
MCH RBC QN AUTO: 29.3 PG (ref 27–31)
MCHC RBC AUTO-ENTMCNC: 32.7 G/DL (ref 32–36)
MCHC RBC AUTO-ENTMCNC: 32.7 G/DL (ref 32–36)
MCV RBC AUTO: 90 FL (ref 82–98)
MCV RBC AUTO: 90 FL (ref 82–98)
MONOCYTES # BLD AUTO: 0.6 K/UL (ref 0.3–1)
MONOCYTES # BLD AUTO: 0.6 K/UL (ref 0.3–1)
MONOCYTES NFR BLD: 7.5 % (ref 4–15)
MONOCYTES NFR BLD: 7.5 % (ref 4–15)
NEUTROPHILS # BLD AUTO: 2.5 K/UL (ref 1.8–7.7)
NEUTROPHILS # BLD AUTO: 2.5 K/UL (ref 1.8–7.7)
NEUTROPHILS NFR BLD: 31.6 % (ref 38–73)
NEUTROPHILS NFR BLD: 31.6 % (ref 38–73)
NONHDLC SERPL-MCNC: 201 MG/DL
NRBC BLD-RTO: 0 /100 WBC
NRBC BLD-RTO: 0 /100 WBC
PLATELET # BLD AUTO: 301 K/UL (ref 150–450)
PLATELET # BLD AUTO: 301 K/UL (ref 150–450)
PMV BLD AUTO: 11.4 FL (ref 9.2–12.9)
PMV BLD AUTO: 11.4 FL (ref 9.2–12.9)
POTASSIUM SERPL-SCNC: 3.7 MMOL/L (ref 3.5–5.1)
POTASSIUM SERPL-SCNC: 3.7 MMOL/L (ref 3.5–5.1)
PROT SERPL-MCNC: 8.9 G/DL (ref 6–8.4)
PROT SERPL-MCNC: 8.9 G/DL (ref 6–8.4)
RBC # BLD AUTO: 5.09 M/UL (ref 4–5.4)
RBC # BLD AUTO: 5.09 M/UL (ref 4–5.4)
SODIUM SERPL-SCNC: 140 MMOL/L (ref 136–145)
SODIUM SERPL-SCNC: 140 MMOL/L (ref 136–145)
TRIGL SERPL-MCNC: 83 MG/DL (ref 30–150)
TSH SERPL DL<=0.005 MIU/L-ACNC: 1.41 UIU/ML (ref 0.4–4)
WBC # BLD AUTO: 7.89 K/UL (ref 3.9–12.7)
WBC # BLD AUTO: 7.89 K/UL (ref 3.9–12.7)

## 2025-01-08 PROCEDURE — 80061 LIPID PANEL: CPT | Performed by: INTERNAL MEDICINE

## 2025-01-08 PROCEDURE — 80053 COMPREHEN METABOLIC PANEL: CPT | Performed by: INTERNAL MEDICINE

## 2025-01-08 PROCEDURE — 99999 PR PBB SHADOW E&M-EST. PATIENT-LVL V: CPT | Mod: PBBFAC,,, | Performed by: INTERNAL MEDICINE

## 2025-01-08 PROCEDURE — 84443 ASSAY THYROID STIM HORMONE: CPT | Performed by: INTERNAL MEDICINE

## 2025-01-08 PROCEDURE — 83036 HEMOGLOBIN GLYCOSYLATED A1C: CPT | Performed by: INTERNAL MEDICINE

## 2025-01-08 PROCEDURE — 86140 C-REACTIVE PROTEIN: CPT | Performed by: INTERNAL MEDICINE

## 2025-01-08 PROCEDURE — 85025 COMPLETE CBC W/AUTO DIFF WBC: CPT | Performed by: INTERNAL MEDICINE

## 2025-01-08 PROCEDURE — 85652 RBC SED RATE AUTOMATED: CPT | Performed by: INTERNAL MEDICINE

## 2025-01-08 PROCEDURE — 87340 HEPATITIS B SURFACE AG IA: CPT | Performed by: INTERNAL MEDICINE

## 2025-01-08 PROCEDURE — 87517 HEPATITIS B DNA QUANT: CPT | Performed by: INTERNAL MEDICINE

## 2025-01-08 RX ORDER — VALSARTAN 320 MG/1
320 TABLET ORAL DAILY
Qty: 90 TABLET | Refills: 1 | Status: CANCELLED | OUTPATIENT
Start: 2025-01-08 | End: 2026-01-08

## 2025-01-08 RX ORDER — SEMAGLUTIDE 2.68 MG/ML
2 INJECTION, SOLUTION SUBCUTANEOUS
Qty: 9 ML | Refills: 0 | OUTPATIENT
Start: 2025-01-08 | End: 2026-01-08

## 2025-01-08 RX ORDER — TIRZEPATIDE 2.5 MG/.5ML
2.5 INJECTION, SOLUTION SUBCUTANEOUS
Qty: 2 PEN | Refills: 1 | Status: SHIPPED | OUTPATIENT
Start: 2025-01-08 | End: 2025-02-03 | Stop reason: SDUPTHER

## 2025-01-08 RX ORDER — ZOLPIDEM TARTRATE 5 MG/1
5 TABLET ORAL NIGHTLY PRN
Qty: 90 TABLET | Refills: 0 | Status: SHIPPED | OUTPATIENT
Start: 2025-01-08

## 2025-01-08 RX ORDER — CLONAZEPAM 0.5 MG/1
TABLET ORAL
Qty: 90 TABLET | Refills: 0 | Status: SHIPPED | OUTPATIENT
Start: 2025-01-08

## 2025-01-08 RX ORDER — VALSARTAN 320 MG/1
320 TABLET ORAL DAILY
Qty: 90 TABLET | Refills: 3 | Status: SHIPPED | OUTPATIENT
Start: 2025-01-08 | End: 2026-01-08

## 2025-01-08 NOTE — PROGRESS NOTES
Subjective     Patient ID: Althea Vazquez is a 59 y.o. female.    Chief Complaint: Annual Exam    HPI  59 y.o. Female here for annual exam.      Vaccines: Influenza (2019); Tetanus (2014); PNA (current); Shingrix (2021); Shingrix (2021)  Eye exam: current  Mammogram: 6/24  Gyn exam: 9/18  Colonoscopy: 7/24  DEXA: 2/24(NL)     Exercise: no  Diet: regular     Past Medical History:    Acid reflux                                                   Anxiety                                         10/18/2012    Arthritis                                                     Depression                                          Diabetic peripheral neuropathy - mild           10/21/2014    Difficult intubation                                          Dry eyes                                                      Dry mouth                                                     Fever blister                                                 Hyperlipidemia                                                Hypertension                                                  Insomnia                                                      Iritis                                          5/13/2014     Long-term current use of steroids               9/27/2012     Nausea & vomiting                               2/4/2015      VAISHNAVI (obstructive sleep apnea)                                 Type II diabetes mellitus                       10/1/2012   Past Surgical History:    TUBAL LIGATION                                                 KNEE ARTHROSCOPY                                 5-14-14         Comment:right    HYSTERECTOMY                                     12/3/2014   Social History    Marital status:              Spouse name: Edward                 Years of education:                 Number of children: 2              Occupational History  Occupation          Employer            Comment               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                 Social History Main Topics    Smoking status: Never Smoker                                                                 Smokeless status: Never Used                        Alcohol use: No              Drug use: No              Sexual activity: Yes               Partners with: Male     Other Topics            Concern  Are you pregnant or th* No  Breast-feeding          No      -- Diflucan [Fluconazole] -- Other (See Comments)    --  Sore on mouth  Review of Systems   Constitutional:  Negative for activity change, appetite change, chills, diaphoresis, fatigue, fever and unexpected weight change.   HENT:  Negative for nasal congestion, hearing loss, mouth sores, postnasal drip, rhinorrhea, sinus pressure/congestion, sneezing, sore throat, trouble swallowing and voice change.    Eyes:  Positive for visual disturbance. Negative for pain and discharge.   Respiratory:  Negative for cough, chest tightness, shortness of breath and wheezing.    Cardiovascular:  Negative for chest pain, palpitations and leg swelling.   Gastrointestinal:  Positive for constipation. Negative for abdominal pain, blood in stool, diarrhea, nausea and vomiting.   Endocrine: Negative for cold intolerance, heat intolerance, polydipsia and polyuria.   Genitourinary:  Negative for difficulty urinating, dysuria, frequency, hematuria, menstrual problem and urgency.   Musculoskeletal:  Positive for arthralgias and joint swelling. Negative for myalgias and neck pain.   Integumentary:  Negative for rash and wound.   Allergic/Immunologic: Negative for environmental allergies and food allergies.   Neurological:  Positive for headaches. Negative for dizziness, tremors, seizures, syncope, weakness and light-headedness.   Hematological:  Negative for adenopathy. Does not bruise/bleed easily.   Psychiatric/Behavioral:  Positive for sleep disturbance. Negative for confusion, dysphoric mood, self-injury and  suicidal ideas. The patient is nervous/anxious.           Objective     Physical Exam  Vitals and nursing note reviewed.   Constitutional:       General: She is not in acute distress.     Appearance: Normal appearance. She is well-developed. She is not diaphoretic.   HENT:      Head: Normocephalic and atraumatic.      Right Ear: External ear normal.      Left Ear: External ear normal.      Nose: Nose normal.      Mouth/Throat:      Pharynx: No oropharyngeal exudate.   Eyes:      General: No scleral icterus.        Right eye: No discharge.         Left eye: No discharge.      Conjunctiva/sclera: Conjunctivae normal.      Pupils: Pupils are equal, round, and reactive to light.   Neck:      Thyroid: No thyromegaly.      Vascular: No JVD.   Cardiovascular:      Rate and Rhythm: Normal rate and regular rhythm.      Pulses: Normal pulses.      Heart sounds: Normal heart sounds. No murmur heard.  Pulmonary:      Effort: Pulmonary effort is normal. No respiratory distress.      Breath sounds: Normal breath sounds. No wheezing, rhonchi or rales.   Chest:      Chest wall: No tenderness.   Abdominal:      General: Bowel sounds are normal. There is no distension.      Palpations: Abdomen is soft.      Tenderness: There is no abdominal tenderness. There is no guarding or rebound.   Musculoskeletal:      Cervical back: Neck supple.      Right lower leg: No edema.      Left lower leg: No edema.   Lymphadenopathy:      Cervical: No cervical adenopathy.   Skin:     General: Skin is warm and dry.      Coloration: Skin is not pale.      Findings: No rash.   Neurological:      General: No focal deficit present.      Mental Status: She is alert and oriented to person, place, and time.      Gait: Gait normal.   Psychiatric:         Behavior: Behavior normal.         Thought Content: Thought content normal.         Judgment: Judgment normal.            Assessment and Plan     1. Annual physical exam  -     CBC auto differential; Future;  Expected date: 01/08/2025  -     Comprehensive metabolic panel; Future; Expected date: 01/08/2025  -     Hemoglobin A1c; Future; Expected date: 01/08/2025  -     Lipid panel; Future; Expected date: 01/08/2025  -     TSH; Future; Expected date: 01/08/2025  -     Urinalysis; Future; Expected date: 01/08/2025    2. Hyperlipidemia associated with type 2 diabetes mellitus  Overview:  Hyperlipidemia with intermediate risk, on atorvastatin 40mg every evening consider increase to 80mg every evening, will leave to 's opinion  Results for NABOR HERNANDEZ (MRN [id number]) as of 5/7/2021 11:19   Ref. Range 4/9/2020 09:45   Cholesterol Latest Ref Range: 120 - 199 mg/dL 205 (H)   HDL Latest Ref Range: 40 - 75 mg/dL 60   HDL/Cholesterol Ratio Latest Ref Range: 20.0 - 50.0 % 29.3   LDL Cholesterol External Latest Ref Range: 63 - 159 mg/dL 126.4   Non-HDL Cholesterol Latest Units: mg/dL 145   Total Cholesterol/HDL Ratio Latest Ref Range: 2.0 - 5.0  3.4   Triglycerides Latest Ref Range: 30 - 150 mg/dL 93   The 10-year ASCVD risk score (Bonneaucharity REY Jr., et al., 2013) is: 11.2%    Values used to calculate the score:      Age: 55 years      Sex: Female      Is Non- : Yes      Diabetic: Yes      Tobacco smoker: No      Systolic Blood Pressure: 135 mmHg      Is BP treated: Yes      HDL Cholesterol: 60 mg/dL      Total Cholesterol: 205 mg/dL    Orders:  -     CBC auto differential; Future; Expected date: 01/08/2025  -     Comprehensive metabolic panel; Future; Expected date: 01/08/2025  -     Hemoglobin A1c; Future; Expected date: 01/08/2025  -     Lipid panel; Future; Expected date: 01/08/2025  -     TSH; Future; Expected date: 01/08/2025  -     Urinalysis; Future; Expected date: 01/08/2025    3. Hypertension associated with diabetes  -     CBC auto differential; Future; Expected date: 01/08/2025  -     Comprehensive metabolic panel; Future; Expected date: 01/08/2025  -     Hemoglobin A1c; Future;  Expected date: 01/08/2025  -     Lipid panel; Future; Expected date: 01/08/2025  -     TSH; Future; Expected date: 01/08/2025  -     Urinalysis; Future; Expected date: 01/08/2025  -     valsartan (DIOVAN) 320 MG tablet; Take 1 tablet (320 mg total) by mouth once daily.  Dispense: 90 tablet; Refill: 3    4. Degeneration of intervertebral disc of lumbar region, unspecified whether pain present    5. Type 2 diabetes mellitus with diabetic polyneuropathy, unspecified whether long term insulin use  -     CBC auto differential; Future; Expected date: 01/08/2025  -     Comprehensive metabolic panel; Future; Expected date: 01/08/2025  -     Hemoglobin A1c; Future; Expected date: 01/08/2025  -     Lipid panel; Future; Expected date: 01/08/2025  -     TSH; Future; Expected date: 01/08/2025  -     Urinalysis; Future; Expected date: 01/08/2025  -     Microalbumin/Creatinine Ratio, Urine; Future  -     tirzepatide (MOUNJARO) 2.5 mg/0.5 mL PnIj; Inject 2.5 mg into the skin every 7 days.  Dispense: 2 Pen; Refill: 1    6. Severe obesity (BMI 35.0-39.9) with comorbidity  -     tirzepatide (MOUNJARO) 2.5 mg/0.5 mL PnIj; Inject 2.5 mg into the skin every 7 days.  Dispense: 2 Pen; Refill: 1    7. History of hepatitis B  Overview:  Hep B core total Ab (+), no active/chronic infection  Results for NABOR HERNANDEZ (MRN [id number]) as of 5/7/2021 11:24   Ref. Range 2/2/2021 12:02   HBV DNA, Qualitative PCR Latest Ref Range: Not detected  Not detected         8. Axial spondyloarthritis  Overview:  Results for NABOR HERNANDEZ (MRN [id number]) as of 5/7/2021 11:14   Ref. Range 9/27/2012 11:12   HLA B27 Unknown Positive     EXAMINATION:  XR HIPS BILATERAL 2 VIEW INCL AP PELVIS     CLINICAL HISTORY:  Low back pain     TECHNIQUE:  AP view of the pelvis and frogleg lateral views of both hips were performed.     COMPARISON:  No 09/09/2016 ne.     FINDINGS:  Mild DJD.  Mild narrowing of the hip joint spaces.  No fracture or bone  destruction identified     IMPRESSION:      See above        Electronically signed by: Cristi Leggett MD  Date:                                            01/30/2019  Time:                                           13:47                     Result Care Coordination      Result Notes       The x-rays of the hips show mild  Narrowing of the hips likely related to the ankylosing spondylitis. Also some sclerosis and irregularity right sacroiliac joint. Osteitis pubis and enthesitis around the greater trochanters ibrahima on right where you have tendinitis/bursitis. RJQ           Narrative & Impression  Technique: Routine MRI evaluation of the SI joints performed with and without the use of 10 cc of Gadavist IV contrast.       Comparison: Radiograph 09/09/2016.     Findings:     There are enhancing inflammatory changes about the anterior superior aspect of the left SI joint in the expected location of the medial lumbar or lumbosacral ligament, findings that are highly suggestive of enthesitis.  There is apparent trace enhancement on the posterior superior aspect of the right SI joint leads is overall nonspecific.     SI joints are symmetric.  No synovitis.  No joint effusions.  No osseous erosive changes.     Visualized musculature demonstrate normal bulk and signal intensity.  Piriformis muscles are symmetric.  Ischiofemoral spaces are unremarkable.     Adductor and abductor tendons are unremarkable.  Hamstring tendons are normal.     Subcutaneous soft tissues are normal.     Limited evaluation of the lower abdominal and pelvic organs is unremarkable.  Uterus is surgically absent.  IMPRESSION:         Inflammatory enthesitis affecting the anterior superior aspect of the left sacral ala with mild associated reactive marrow edema, findings that are favored to represent sequela of patient's reported history of spondyloarthropathy.  Note is made of possible trace inflammatory changes adjacent to the posterior aspect of the right  SI joints which may relate to a prominent vessel or mild additional enthesitis.     Unremarkable evaluation of the SI joints specifically without imaging findings to suggest sacroiliitis. No osseous erosive changes or joint effusions. No synovitis.  ______________________________________      Electronically signed by: ALEX CRAIG MD  Date:                                            09/11/16  Time:                                           20:30       Humira with Secondary inefficacy, tried to change to Simponi 50mg sc q 4 wks but insurance wouldn't cover, now on Enbrel Sureclick 50mg s q 7 days but has developed recurrent AAU OS  Humira with Secondary inefficacy, tried to change to Simponi 50mg sc q 4 wks but insurance wouldn't cover, now on Enbrel Sureclick 50mg s q 7 days but has developed recurrent AAU OS  started certolizumab after 1/11/21 visit as had AAU while on etanercept( with recurrence 1/28/21 after starting certolizumab but had not completed loading dose  and had had secondary failure adalimumab.      9. Insomnia, unspecified type    10. Sleep apnea, unspecified type    11. High risk medication use-sulfasalazine 2 gm        Blood work ordered      T2DM with neuropathy- stable with last HA1C of 5.7(5/24)<--5.5(11/23)<--5.7(5/23)<--6.7(6/22)<--8.3(3/22)<--7.7(5/21)<--8.3(4/20)       Continue Invokana and will change Ozempic to Mounjaro       Pt stopped the Metformin 2/2 GI SE's      HTN- continue Atenolol 100 mg, Norvasc 10 mg, HCTZ 25 mg, Valsartan 320 mg qd      HLD- stable, Lipitor changed to Crestor(has not been taking it every day)      Spondyloarthritis- stable on Sulfasalazine/Naproxen        Followed by Rheumatology      Anxiety- stable on Prozac/Klonopin, managed by [facility]      Severe obesity- pt advised on proper diet/exercise for weight loss      Insomnia- stable on Ambien 5 mg qHS PRN      Hx of Hep B- followed by Hepatology      GERD- stable on PPI      VAISHNAVI- pt unable to tolerate CPAP        F/u in 6 months

## 2025-01-08 NOTE — TELEPHONE ENCOUNTER
Refill Routing Note   Medication(s) are not appropriate for processing by [facility] for the following reason(s):        Required labs outdated  Required vitals abnormal    [facility] action(s):  Defer   Requires labs : Yes             Appointments  past 12m or future 3m with PCP    Date Provider   Last Visit   5/14/2024 Weston Begum, DO   Next Visit   1/8/2025 Weston Begum, DO   ED visits in past 90 days: 0        Note composed:8:23 AM 01/08/2025

## 2025-01-08 NOTE — TELEPHONE ENCOUNTER
Provider Staff:  Please note Refusal of medication.     Action required for this patient.      Requested Prescriptions     Refused Prescriptions Disp Refills    semaglutide (OZEMPIC) 2 mg/dose (8 mg/3 mL) PnIj 9 mL 0     Sig: Inject 2 mg into the skin every 7 days.     Refused By: YUNG WRIGHT     Reason for Refusal: Medication Discontinued      Thanks!  [facility]   Note composed: 01/08/2025 2:21 PM

## 2025-01-08 NOTE — TELEPHONE ENCOUNTER
Care Due:                  Date            Visit Type   Department     Provider  --------------------------------------------------------------------------------                                EP -                              PRIMARY      MET INTERNAL  Last Visit: 05-      CARE ([facility])   MEDICINE       Weston Begum  Next Visit: None Scheduled  None         None Found                                                            Last  Test          Frequency    Reason                     Performed    Due Date  --------------------------------------------------------------------------------    HBA1C.......  6 months...  canagliflozin,             05- 11-                             semaglutide..............    [employer] Embedded Care Due Messages. Reference number: [id number].   1/08/2025 7:50:25 AM CST

## 2025-01-09 ENCOUNTER — TELEPHONE (OUTPATIENT)
Dept: INTERNAL MEDICINE | Facility: CLINIC | Age: 60
End: 2025-01-09
Payer: COMMERCIAL

## 2025-01-09 ENCOUNTER — PATIENT MESSAGE (OUTPATIENT)
Dept: INTERNAL MEDICINE | Facility: CLINIC | Age: 60
End: 2025-01-09
Payer: COMMERCIAL

## 2025-01-09 DIAGNOSIS — E11.42 TYPE 2 DIABETES MELLITUS WITH DIABETIC POLYNEUROPATHY, UNSPECIFIED WHETHER LONG TERM INSULIN USE: Primary | ICD-10-CM

## 2025-01-09 LAB
HBV DNA SERPL NAA+PROBE-ACNC: NOT DETECTED IU/ML
HEPATITIS B VIRUS DNA: NORMAL

## 2025-01-09 RX ORDER — TRIAMCINOLONE ACETONIDE 0.25 MG/G
CREAM TOPICAL
Qty: 80 G | Refills: 1 | Status: SHIPPED | OUTPATIENT
Start: 2025-01-09

## 2025-01-10 NOTE — TELEPHONE ENCOUNTER
Pt f/u 1/8/2025 office visit for annual exam, reports fatigue and low energy; requesting order for Vitamin B 12 injection    Last B-12 lab 12/8/2020 - normal

## 2025-01-13 NOTE — TELEPHONE ENCOUNTER
No care due was identified.  [facility] Embedded Care Due Messages. Reference number: [id number].   1/13/2025 8:48:57 AM CST

## 2025-01-14 RX ORDER — ROSUVASTATIN CALCIUM 40 MG/1
40 TABLET, COATED ORAL NIGHTLY
Qty: 90 TABLET | Refills: 3 | Status: SHIPPED | OUTPATIENT
Start: 2025-01-14 | End: 2026-01-14

## 2025-01-14 NOTE — TELEPHONE ENCOUNTER
Refill Routing Note   Medication(s) are not appropriate for processing by [facility] for the following reason(s):        No active prescription written by provider: rosuvastatin  Drug-disease interaction: canagliflozin and Hypertension associated with diabetes     [facility] action(s):  Defer             Appointments  past 12m or future 3m with PCP    Date Provider   Last Visit   1/8/2025 Weston Begum, DO   Next Visit   Visit date not found Weston Begum, DO   ED visits in past 90 days: 0        Note composed:9:45 AM 01/14/2025

## 2025-01-15 ENCOUNTER — LAB VISIT (OUTPATIENT)
Dept: LAB | Facility: HOSPITAL | Age: 60
End: 2025-01-15
Attending: INTERNAL MEDICINE
Payer: COMMERCIAL

## 2025-01-15 DIAGNOSIS — R20.0 NUMBNESS: ICD-10-CM

## 2025-01-15 PROCEDURE — 36415 COLL VENOUS BLD VENIPUNCTURE: CPT | Mod: PN | Performed by: INTERNAL MEDICINE

## 2025-01-15 PROCEDURE — 82607 VITAMIN B-12: CPT | Performed by: INTERNAL MEDICINE

## 2025-01-16 LAB — VIT B12 SERPL-MCNC: 620 NG/L (ref 180–914)

## 2025-01-22 ENCOUNTER — OFFICE VISIT (OUTPATIENT)
Dept: PSYCHIATRY | Facility: CLINIC | Age: 60
End: 2025-01-22
Payer: COMMERCIAL

## 2025-01-22 DIAGNOSIS — F41.1 GENERALIZED ANXIETY DISORDER: Primary | ICD-10-CM

## 2025-01-22 RX ORDER — FLUOXETINE HYDROCHLORIDE 20 MG/1
20 CAPSULE ORAL 2 TIMES DAILY
Qty: 180 CAPSULE | Refills: 1 | Status: SHIPPED | OUTPATIENT
Start: 2025-01-22

## 2025-01-22 NOTE — PROGRESS NOTES
The patient location is: [place], [place]  The chief complaint leading to consultation is: anxiety    Visit type: audiovisual    Face to Face time with patient: 15 min  15 minutes of total time spent on the encounter, which includes face to face time and non-face to face time preparing to see the patient (eg, review of tests), Obtaining and/or reviewing separately obtained history, Documenting clinical information in the electronic or other health record, Independently interpreting results (not separately reported) and communicating results to the patient/family/caregiver, or Care coordination (not separately reported).     Each patient to whom he or she provides medical services by telemedicine is:  (1) informed of the relationship between the physician and patient and the respective role of any other health care provider with respect to management of the patient; and (2) notified that he or she may decline to receive medical services by telemedicine and may withdraw from such care at any time.    Notes:    ESTABLISHED OUTPATIENT VISIT   E/M LEVEL 4: [id number]    ENCOUNTER DATE: 1/22/2025  SITE: [facility], [address]    HISTORY    CHIEF COMPLAINT   Althea Vazquez is a 59 y.o. female who presents for follow up of anxiety.    HPI     Appears to be doing reasonably well psychiatrically. Appears euthymic during today's visit.    Satisfied with current psychotropic medication regimen.    Spiritual beliefs are supportive.    Has continued to stand up for herself, this is working well for her.    Travels for pleasure.    Psychiatric Review Of Systems - Is patient experiencing or having changes in:  sleep: takes Ambien  appetite: no  weight: working on losing weight  energy/anergy: no  interest/pleasure/anhedonia: no  somatic symptoms: no  libido: no  anxiety/panic: no  guilty/hopelessness: no  concentration: no  S.I.B.s/risky behavior: no  Irritability: no  Racing thoughts: no  Impulsive behaviors:  no  Paranoia:no  AVH:no    Recent alcohol: rare small amount  Recent drug: no    Medical ROS   Dental issue     PAST MEDICAL, FAMILY AND SOCIAL HISTORY: The patient's past medical, family and social history have been reviewed and updated as appropriate within the electronic medical record - see encounter notes.    PSYCHOTROPIC MEDICATIONS   Prozac 20 mg qam and 20 mg at bedtime, Clonazepam 0.5 mg prn for anxiety[has been using up to 2 days per week], Ambien 5 mg at bedtime prn[recently has been taking up to twice per week]    Scheduled and PRN Medications     Current Outpatient Medications:     amLODIPine (NORVASC) 10 MG tablet, Take 1 tablet (10 mg total) by mouth once daily., Disp: 90 tablet, Rfl: 2    aspirin (ECOTRIN) 81 MG EC tablet, Take 1 tablet (81 mg total) by mouth once daily., Disp: 30 tablet, Rfl: 0    atenoloL (TENORMIN) 100 MG tablet, Take 1 tablet (100 mg total) by mouth once daily., Disp: 90 tablet, Rfl: 2    blood sugar diagnostic Strp, Check blood sugars BID, Disp: 200 strip, Rfl: 11    blood-glucose meter kit, Use twice daily., Disp: 1 each, Rfl: 0    canagliflozin (INVOKANA) 300 mg Tab tablet, Take 1 tablet (300 mg total) by mouth daily before breakfast., Disp: 90 tablet, Rfl: 1    clobetasoL (TEMOVATE) 0.05 % external solution, Apply to affected area once daily (Patient not taking: Reported on 1/8/2025), Disp: 50 mL, Rfl: 6    clobetasoL (TEMOVATE) 0.05 % external solution, Apply topically once daily., Disp: 50 mL, Rfl: 6    clonazePAM (KLONOPIN) 0.5 MG tablet, TAKE 1 TABLET BY MOUTH ONCE DAILY AS NEEDED FOR ANXIETY, Disp: 90 tablet, Rfl: 0    cyclobenzaprine (FLEXERIL) 10 MG tablet, TAKE ONE TABLET BY MOUTH AT BEDTIME AS NEEDED, Disp: 90 tablet, Rfl: 2    diclofenac sodium (VOLTAREN) 1 % Gel, Apply 4 g topically 4 (four) times daily. Apply light film topically to knee up to four times daily, Disp: 3 each, Rfl: 2    docusate sodium (COLACE) 50 MG capsule, Take 1 capsule (50 mg total) by mouth 2  (two) times daily., Disp: 180 capsule, Rfl: 3    entecavir (BARACLUDE) 0.5 MG Tab, Take 1 tablet (0.5 mg total) by mouth once daily., Disp: 90 tablet, Rfl: 3    ergocalciferol (ERGOCALCIFEROL) 50,000 unit Cap, Take 1 capsule (50,000 Units total) by mouth every 7 days., Disp: 12 capsule, Rfl: 3    fluocinolone acetonide oiL 0.01 % Drop, Place 3 drops into both ears 2 (two) times a day., Disp: 20 mL, Rfl: 3    FLUoxetine 20 MG capsule, Take 1 capsule (20 mg total) by mouth 2 (two) times daily., Disp: 180 capsule, Rfl: 1    fluticasone propionate (FLONASE) 50 mcg/actuation nasal spray, 2 sprays (100 mcg total) by Each Nostril route once daily., Disp: 16 g, Rfl: 6    folic acid (FOLVITE) 1 MG tablet, Take 1 tablet (1 mg total) by mouth once daily., Disp: 90 tablet, Rfl: 1    golimumab (SIMPONI) 50 mg/0.5 mL PnIj, Inject 50 mg into the skin every 30 days., Disp: 1.5 mL, Rfl: 1    hydroCHLOROthiazide (HYDRODIURIL) 25 MG tablet, Take 1 tablet (25 mg total) by mouth once daily., Disp: 90 tablet, Rfl: 3    hydrocortisone 2.5 % cream, Apply topically to the affected area 2 (two) times daily, Disp: 28 g, Rfl: 8    ketoconazole (NIZORAL) 2 % shampoo, Apply topically twice a week. (Patient not taking: Reported on 1/8/2025), Disp: 120 mL, Rfl: 6    ketoconazole (NIZORAL) 2 % shampoo, Apply topically once a week., Disp: 120 mL, Rfl: 12    ketorolac 0.5% (ACULAR) 0.5 % Drop, Place 1 drop into the right eye 3 (three) times daily., Disp: 5 mL, Rfl: 3    ketorolac 0.5% (ACULAR) 0.5 % Drop, Place 1 drop into the left eye 3 (three) times daily., Disp: 5 mL, Rfl: 3    lancets (ACCU-CHEK SOFTCLIX LANCETS) Misc, Check blood sugars BID, Disp: 200 each, Rfl: 11    lancets (FREESTYLE LANCETS) 28 gauge Misc, test TWICE DAILY, Disp: 200 each, Rfl: 3    levocetirizine (XYZAL) 5 MG tablet, TAKE ONE TABLET BY MOUTH EVERY EVENING, Disp: 90 tablet, Rfl: 3    methotrexate 2.5 MG Tab, Take 6 tablets (15 mg total) by mouth every 7 days. Take 3 TABLETS  Wed AM and 3 TABLETS Wed PM only, Disp: 24 tablet, Rfl: 0    ofloxacin (OCUFLOX) 0.3 % ophthalmic solution, Place 1 drop into the right eye 3 (three) times daily., Disp: 5 mL, Rfl: 3    ofloxacin (OCUFLOX) 0.3 % ophthalmic solution, Place 1 drop into the left eye 3 (three) times daily., Disp: 5 mL, Rfl: 3    pantoprazole (PROTONIX) 40 MG tablet, Take 1 tablet (40 mg total) by mouth once daily., Disp: 90 tablet, Rfl: 0    polyethylene glycol (GLYCOLAX) 17 gram/dose powder, Mix and dissolve one capful (17 grams) into a beverage and take by mouth once daily., Disp: 510 g, Rfl: 11    prednisoLONE acetate (PRED FORTE) 1 % DrpS, Instill one drop into affect eye(s) as directed, Disp: 5 mL, Rfl: 1    prednisoLONE acetate (PRED FORTE) 1 % DrpS, Place 1 drop into the right eye 3 (three) times daily., Disp: 5 mL, Rfl: 3    prednisoLONE acetate (PRED FORTE) 1 % DrpS, Place 1 drop into the left eye 3 (three) times daily., Disp: 5 mL, Rfl: 3    rosuvastatin (CRESTOR) 40 MG Tab, Take 1 tablet (40 mg total) by mouth every evening., Disp: 90 tablet, Rfl: 3    sulfaSALAzine (AZULFIDINE) 500 MG EC tablet, Take 3 tablets (1,500 mg total) by mouth 2 (two) times daily., Disp: 540 tablet, Rfl: 0    tirzepatide (MOUNJARO) 2.5 mg/0.5 mL PnIj, Inject 2.5 mg into the skin every 7 days., Disp: 2 Pen, Rfl: 1    tiZANidine (ZANAFLEX) 2 MG tablet, Take 1-2 tablets (2-4 mg total) by mouth every 8 (eight) hours as needed. (Patient not taking: Reported on 1/8/2025), Disp: 90 tablet, Rfl: 2    triamcinolone acetonide 0.025% (KENALOG) 0.025 % cream, apply to affected area twice daily., Disp: 80 g, Rfl: 1    valsartan (DIOVAN) 320 MG tablet, Take 1 tablet (320 mg total) by mouth once daily., Disp: 90 tablet, Rfl: 3    vitamin D (VITAMIN D3) 1000 units Tab, Take 1,000 Units by mouth once daily., Disp: , Rfl:     zolpidem (AMBIEN) 5 MG Tab, Take 1 tablet (5 mg total) by mouth nightly as needed., Disp: 90 tablet, Rfl: 0  No current facility-administered  medications for this visit.    Facility-Administered Medications Ordered in Other Visits:     0.9%  NaCl infusion, , Intravenous, Continuous, Graciela Jerome MD    EXAMINATION    There were no vitals filed for this visit.      CONSTITUTIONAL  General Appearance: well nourished    MUSCULOSKELETAL  Muscle Strength and Tone: normal strength and tone  Abnormal Involuntary Movements: no abnormal movement noted  Gait and Station: normal gait    PSYCHIATRIC   Level of Consciousness: alert  Orientation: oriented to person, place and time  Grooming: well groomed  Psychomotor Behavior: no restlessness/agitation  Speech: normal in rate, rhythm and volume  Language: normal vocabulary  Mood: steady  Affect: full range and appropriate  Thought Process: logical and goal directed  Associations: intact associations  Thought Content: no SI/HI  Memory: grossly intact  Attention: intact to content of interview  Fund of Knowledge: appears adequate  Insight: good  Judgement: good    MEDICAL DECISION MAKING    DIAGNOSES  Anxiety d/o, unspecified    PROBLEM LIST AND MANAGEMENT PLANS    - anxiety: continue Prozac, prn Klonopin and prn Ambien as above  - rtc 3 months    Time with patient: 15 min    LABORATORY DATA  Lab Visit on 01/15/2025   Component Date Value Ref Range Status    Vitamin B-12 01/15/2025 620  180 - 914 ng/L Final    Comment: Test Performed by:  [facility]  [address] [place] [address]  : David Keenan Ph.D.; CLIA# [id number]     Lab Visit on 01/08/2025   Component Date Value Ref Range Status    Specimen UA 01/08/2025 Urine, Clean Catch   Final    Color, UA 01/08/2025 Yellow  Yellow, Straw, Norma Final    Appearance, UA 01/08/2025 Clear  Clear Final    pH, UA 01/08/2025 6.0  5.0 - 8.0 Final    Specific Gravity, UA 01/08/2025 >1.030 (A)  1.005 - 1.030 Final    Protein, UA 01/08/2025 Negative  Negative Final    Comment: Recommend a 24 hour urine protein or  a urine   protein/creatinine ratio if globulin induced proteinuria is  clinically suspected.      Glucose, UA 01/08/2025 4+ (A)  Negative Final    Ketones, UA 01/08/2025 Negative  Negative Final    Bilirubin (UA) 01/08/2025 Negative  Negative Final    Occult Blood UA 01/08/2025 Negative  Negative Final    Nitrite, UA 01/08/2025 Negative  Negative Final    Leukocytes, UA 01/08/2025 Negative  Negative Final    Microalbumin, Urine 01/08/2025 20.0  ug/mL Final    Creatinine, Urine 01/08/2025 115.0  15.0 - 325.0 mg/dL Final    Microalb/Creat Ratio 01/08/2025 17.4  0.0 - 30.0 ug/mg Final    RBC, UA 01/08/2025 0  0 - 4 /hpf Final    WBC, UA 01/08/2025 0  0 - 5 /hpf Final    Bacteria 01/08/2025 Occasional  None-Occ /hpf Final    Yeast, UA 01/08/2025 Rare (A)  None Final    Squam Epithel, UA 01/08/2025 2  /hpf Final    Microscopic Comment 01/08/2025 SEE COMMENT   Final    Comment: Other formed elements not mentioned in the report are not   present in the microscopic examination.      Lab Visit on 01/08/2025   Component Date Value Ref Range Status    Sed Rate 01/08/2025 24  0 - 36 mm/Hr Final    CRP 01/08/2025 10.3 (H)  0.0 - 8.2 mg/L Final    WBC 01/08/2025 7.89  3.90 - 12.70 K/uL Final    RBC 01/08/2025 5.09  4.00 - 5.40 M/uL Final    Hemoglobin 01/08/2025 14.9  12.0 - 16.0 g/dL Final    Hematocrit 01/08/2025 45.6  37.0 - 48.5 % Final    MCV 01/08/2025 90  82 - 98 fL Final    MCH 01/08/2025 29.3  27.0 - 31.0 pg Final    MCHC 01/08/2025 32.7  32.0 - 36.0 g/dL Final    RDW 01/08/2025 15.3 (H)  11.5 - 14.5 % Final    Platelets 01/08/2025 301  150 - 450 K/uL Final    MPV 01/08/2025 11.4  9.2 - 12.9 fL Final    Immature Granulocytes 01/08/2025 0.8 (H)  0.0 - 0.5 % Final    Gran # (ANC) 01/08/2025 2.5  1.8 - 7.7 K/uL Final    Immature Grans (Abs) 01/08/2025 0.06 (H)  0.00 - 0.04 K/uL Final    Comment: Mild elevation in immature granulocytes is non specific and   can be seen in a variety of conditions including stress response,    acute inflammation, trauma and pregnancy. Correlation with other   laboratory and clinical findings is essential.      Lymph # 01/08/2025 4.6  1.0 - 4.8 K/uL Final    Mono # 01/08/2025 0.6  0.3 - 1.0 K/uL Final    Eos # 01/08/2025 0.1  0.0 - 0.5 K/uL Final    Baso # 01/08/2025 0.03  0.00 - 0.20 K/uL Final    nRBC 01/08/2025 0  0 /100 WBC Final    Gran % 01/08/2025 31.6 (L)  38.0 - 73.0 % Final    Lymph % 01/08/2025 58.8 (H)  18.0 - 48.0 % Final    Mono % 01/08/2025 7.5  4.0 - 15.0 % Final    Eosinophil % 01/08/2025 0.9  0.0 - 8.0 % Final    Basophil % 01/08/2025 0.4  0.0 - 1.9 % Final    Differential Method 01/08/2025 Automated   Final    Sodium 01/08/2025 140  136 - 145 mmol/L Final    Potassium 01/08/2025 3.7  3.5 - 5.1 mmol/L Final    Chloride 01/08/2025 101  95 - 110 mmol/L Final    CO2 01/08/2025 28  23 - 29 mmol/L Final    Glucose 01/08/2025 139 (H)  70 - 110 mg/dL Final    BUN 01/08/2025 13  6 - 20 mg/dL Final    Creatinine 01/08/2025 0.8  0.5 - 1.4 mg/dL Final    Calcium 01/08/2025 9.7  8.7 - 10.5 mg/dL Final    Total Protein 01/08/2025 8.9 (H)  6.0 - 8.4 g/dL Final    Albumin 01/08/2025 4.5  3.5 - 5.2 g/dL Final    Total Bilirubin 01/08/2025 0.4  0.1 - 1.0 mg/dL Final    Comment: For infants and newborns, interpretation of results should be based  on gestational age, weight and in agreement with clinical  observations.    Premature Infant recommended reference ranges:  Up to 24 hours.............<8.0 mg/dL  Up to 48 hours............<12.0 mg/dL  3-5 days..................<15.0 mg/dL  6-29 days.................<15.0 mg/dL      Alkaline Phosphatase 01/08/2025 92  40 - 150 U/L Final    AST 01/08/2025 20  10 - 40 U/L Final    ALT 01/08/2025 19  10 - 44 U/L Final    eGFR 01/08/2025 >60.0  >60 mL/min/1.73 m^2 Final    Anion Gap 01/08/2025 11  8 - 16 mmol/L Final    Hepatitis B Virus PCR, Quant 01/08/2025 Not Detected  <12 IU/mL Final    Hepatitis B Virus DNA 01/08/2025 HBV DNA not detected  Not Detected Final     Hepatitis B Surface Ag 01/08/2025 Non-reactive  Non-reactive Final    WBC 01/08/2025 7.89  3.90 - 12.70 K/uL Final    RBC 01/08/2025 5.09  4.00 - 5.40 M/uL Final    Hemoglobin 01/08/2025 14.9  12.0 - 16.0 g/dL Final    Hematocrit 01/08/2025 45.6  37.0 - 48.5 % Final    MCV 01/08/2025 90  82 - 98 fL Final    MCH 01/08/2025 29.3  27.0 - 31.0 pg Final    MCHC 01/08/2025 32.7  32.0 - 36.0 g/dL Final    RDW 01/08/2025 15.3 (H)  11.5 - 14.5 % Final    Platelets 01/08/2025 301  150 - 450 K/uL Final    MPV 01/08/2025 11.4  9.2 - 12.9 fL Final    Immature Granulocytes 01/08/2025 0.8 (H)  0.0 - 0.5 % Final    Gran # (ANC) 01/08/2025 2.5  1.8 - 7.7 K/uL Final    Immature Grans (Abs) 01/08/2025 0.06 (H)  0.00 - 0.04 K/uL Final    Comment: Mild elevation in immature granulocytes is non specific and   can be seen in a variety of conditions including stress response,   acute inflammation, trauma and pregnancy. Correlation with other   laboratory and clinical findings is essential.      Lymph # 01/08/2025 4.6  1.0 - 4.8 K/uL Final    Mono # 01/08/2025 0.6  0.3 - 1.0 K/uL Final    Eos # 01/08/2025 0.1  0.0 - 0.5 K/uL Final    Baso # 01/08/2025 0.03  0.00 - 0.20 K/uL Final    nRBC 01/08/2025 0  0 /100 WBC Final    Gran % 01/08/2025 31.6 (L)  38.0 - 73.0 % Final    Lymph % 01/08/2025 58.8 (H)  18.0 - 48.0 % Final    Mono % 01/08/2025 7.5  4.0 - 15.0 % Final    Eosinophil % 01/08/2025 0.9  0.0 - 8.0 % Final    Basophil % 01/08/2025 0.4  0.0 - 1.9 % Final    Differential Method 01/08/2025 Automated   Final    Sodium 01/08/2025 140  136 - 145 mmol/L Final    Potassium 01/08/2025 3.7  3.5 - 5.1 mmol/L Final    Chloride 01/08/2025 101  95 - 110 mmol/L Final    CO2 01/08/2025 28  23 - 29 mmol/L Final    Glucose 01/08/2025 139 (H)  70 - 110 mg/dL Final    BUN 01/08/2025 13  6 - 20 mg/dL Final    Creatinine 01/08/2025 0.8  0.5 - 1.4 mg/dL Final    Calcium 01/08/2025 9.7  8.7 - 10.5 mg/dL Final    Total Protein 01/08/2025 8.9 (H)  6.0 - 8.4  g/dL Final    Albumin 01/08/2025 4.5  3.5 - 5.2 g/dL Final    Total Bilirubin 01/08/2025 0.4  0.1 - 1.0 mg/dL Final    Comment: For infants and newborns, interpretation of results should be based  on gestational age, weight and in agreement with clinical  observations.    Premature Infant recommended reference ranges:  Up to 24 hours.............<8.0 mg/dL  Up to 48 hours............<12.0 mg/dL  3-5 days..................<15.0 mg/dL  6-29 days.................<15.0 mg/dL      Alkaline Phosphatase 01/08/2025 92  40 - 150 U/L Final    AST 01/08/2025 20  10 - 40 U/L Final    ALT 01/08/2025 19  10 - 44 U/L Final    eGFR 01/08/2025 >60.0  >60 mL/min/1.73 m^2 Final    Anion Gap 01/08/2025 11  8 - 16 mmol/L Final    Hemoglobin A1C 01/08/2025 7.5 (H)  4.0 - 5.6 % Final    Comment: ADA Screening Guidelines:  5.7-6.4%  Consistent with prediabetes  >or=6.5%  Consistent with diabetes    High levels of fetal hemoglobin interfere with the HbA1C  assay. Heterozygous hemoglobin variants (HbS, HgC, etc)do  not significantly interfere with this assay.   However, presence of multiple variants may affect accuracy.      Estimated Avg Glucose 01/08/2025 169 (H)  68 - 131 mg/dL Final    Hemoglobin A1C 01/08/2025 7.5 (H)  4.0 - 5.6 % Final    Comment: ADA Screening Guidelines:  5.7-6.4%  Consistent with prediabetes  >or=6.5%  Consistent with diabetes    High levels of fetal hemoglobin interfere with the HbA1C  assay. Heterozygous hemoglobin variants (HbS, HgC, etc)do  not significantly interfere with this assay.   However, presence of multiple variants may affect accuracy.      Estimated Avg Glucose 01/08/2025 169 (H)  68 - 131 mg/dL Final    Cholesterol 01/08/2025 263 (H)  120 - 199 mg/dL Final    Comment: The National Cholesterol Education Program (NCEP) has set the  following guidelines (reference ranges) for Cholesterol:  Optimal.....................<200 mg/dL  Borderline High.............200-239 mg/dL  High........................> or  = 240 mg/dL      Triglycerides 01/08/2025 83  30 - 150 mg/dL Final    Comment: The National Cholesterol Education Program (NCEP) has set the  following guidelines (reference values) for triglycerides:  Normal......................<150 mg/dL  Borderline High.............150-199 mg/dL  High........................200-499 mg/dL      HDL 01/08/2025 62  40 - 75 mg/dL Final    Comment: The National Cholesterol Education Program (NCEP) has set the  following guidelines (reference values) for HDL Cholesterol:  Low...............<40 mg/dL  Optimal...........>60 mg/dL      LDL Cholesterol 01/08/2025 184.4 (H)  63.0 - 159.0 mg/dL Final    Comment: The National Cholesterol Education Program (NCEP) has set the  following guidelines (reference values) for LDL Cholesterol:  Optimal.......................<130 mg/dL  Borderline High...............130-159 mg/dL  High..........................160-189 mg/dL  Very High.....................>190 mg/dL      HDL/Cholesterol Ratio 01/08/2025 23.6  20.0 - 50.0 % Final    Total Cholesterol/HDL Ratio 01/08/2025 4.2  2.0 - 5.0 Final    Non-HDL Cholesterol 01/08/2025 201  mg/dL Final    Comment: Risk category and Non-HDL cholesterol goals:  Coronary heart disease (CHD)or equivalent (10-year risk of CHD >20%):  Non-HDL cholesterol goal     <130 mg/dL  Two or more CHD risk factors and 10-year risk of CHD <= 20%:  Non-HDL cholesterol goal     <160 mg/dL  0 to 1 CHD risk factor:  Non-HDL cholesterol goal     <190 mg/dL      TSH 01/08/2025 1.407  0.400 - 4.000 uIU/mL Final           Tip Oliveira

## 2025-01-23 ENCOUNTER — TELEPHONE (OUTPATIENT)
Dept: INTERNAL MEDICINE | Facility: CLINIC | Age: 60
End: 2025-01-23
Payer: COMMERCIAL

## 2025-01-23 DIAGNOSIS — E11.9 TYPE 2 DIABETES MELLITUS WITHOUT COMPLICATION, WITHOUT LONG-TERM CURRENT USE OF INSULIN: Primary | ICD-10-CM

## 2025-01-23 DIAGNOSIS — E78.5 HYPERLIPIDEMIA, UNSPECIFIED HYPERLIPIDEMIA TYPE: ICD-10-CM

## 2025-01-27 ENCOUNTER — OFFICE VISIT (OUTPATIENT)
Dept: SLEEP MEDICINE | Facility: CLINIC | Age: 60
End: 2025-01-27
Attending: PSYCHIATRY & NEUROLOGY
Payer: COMMERCIAL

## 2025-01-27 VITALS
DIASTOLIC BLOOD PRESSURE: 78 MMHG | HEART RATE: 90 BPM | BODY MASS INDEX: 37.35 KG/M2 | SYSTOLIC BLOOD PRESSURE: 121 MMHG | WEIGHT: 238.5 LBS

## 2025-01-27 DIAGNOSIS — G47.33 OSA (OBSTRUCTIVE SLEEP APNEA): Primary | ICD-10-CM

## 2025-01-27 PROCEDURE — 3066F NEPHROPATHY DOC TX: CPT | Mod: CPTII,S$GLB,, | Performed by: PSYCHIATRY & NEUROLOGY

## 2025-01-27 PROCEDURE — 1159F MED LIST DOCD IN RCRD: CPT | Mod: CPTII,S$GLB,, | Performed by: PSYCHIATRY & NEUROLOGY

## 2025-01-27 PROCEDURE — 3074F SYST BP LT 130 MM HG: CPT | Mod: CPTII,S$GLB,, | Performed by: PSYCHIATRY & NEUROLOGY

## 2025-01-27 PROCEDURE — 4010F ACE/ARB THERAPY RXD/TAKEN: CPT | Mod: CPTII,S$GLB,, | Performed by: PSYCHIATRY & NEUROLOGY

## 2025-01-27 PROCEDURE — 99999 PR PBB SHADOW E&M-EST. PATIENT-LVL IV: CPT | Mod: PBBFAC,,, | Performed by: PSYCHIATRY & NEUROLOGY

## 2025-01-27 PROCEDURE — 99214 OFFICE O/P EST MOD 30 MIN: CPT | Mod: S$GLB,,, | Performed by: PSYCHIATRY & NEUROLOGY

## 2025-01-27 PROCEDURE — 3051F HG A1C>EQUAL 7.0%<8.0%: CPT | Mod: CPTII,S$GLB,, | Performed by: PSYCHIATRY & NEUROLOGY

## 2025-01-27 PROCEDURE — 3061F NEG MICROALBUMINURIA REV: CPT | Mod: CPTII,S$GLB,, | Performed by: PSYCHIATRY & NEUROLOGY

## 2025-01-27 PROCEDURE — 3078F DIAST BP <80 MM HG: CPT | Mod: CPTII,S$GLB,, | Performed by: PSYCHIATRY & NEUROLOGY

## 2025-01-27 PROCEDURE — 3008F BODY MASS INDEX DOCD: CPT | Mod: CPTII,S$GLB,, | Performed by: PSYCHIATRY & NEUROLOGY

## 2025-01-27 NOTE — Clinical Note
Markos Ponce,  Can Althea Vazquez do CPAP for requal? She iesha saw me in clinic Failed compliance   Thank you!

## 2025-01-27 NOTE — PATIENT INSTRUCTIONS
When you see your dentist, please ask them about making a more advanced mouth device - OA (oral appliance) for Obstructive sleep apnea (VAISHNAVI)   if they give you the one of that knind, you may not even need CPAP anymore      _____________________  SLEEP LAB ([facility]) will contact you to schedulethe sleep study. Their number is [phone] (ext 2). Please call them if you do not hear from them in 2 weeks from now.  The [facility] is located on 7th floor of the [facility]; [facility] in [facility] ( 3rd floor [facility]).    SLEEP CLINIC (my assistant) will call you when the sleep study results are ready or I will message you through the portal with the results as we have discussed - if you have not heard from us by 2 weeks from the date of the study, or you can use [facility] to contact me.    Our clinic phone number is [phone] (ext 1)       You are advised to abstain from driving should you feel sleepy or drowsy.

## 2025-01-27 NOTE — PROGRESS NOTES
2025     9:34 AM 3/12/2024     6:33 PM 2018     2:29 PM   EPWORTH SLEEPINESS SCALE TOTAL SCORE    Total score 15  8 5       Patient-reported       Althea Vazquez is a 59 y.o. female seen today for cpap   follow up. Last seen on 3/13/2024.  Althea Vazquez had HST and started APAP since her last visit.     The patient denies improved sleep continuity and daytime sleepiness on PAP. ESS today is .  Denies break through snoring.  Reports occasional  dry mouth.  No aerophagia or air hunger. Reports occasional mask leaks and discomfort. Using a F40  mask; she can not narrow down the source of discomfort; just can not wear it over several hours.      F40 M - was all twisted with magnets attached to the wrong straps    Failed compliance /90 days      Althea Vazquez 2024 - 2025 Patient ID: [id number] : 1965 Age: 59 years Gender: Female [facility] [address] [place], [address] Compliance Report Compliance Payor Standard Usage 2024 - 2025 Usage days 16/150 days (11%) >= 4 hours 5 days (3%) < 4 hours 11 days (7%) Usage hours 46 hours 9 minutes Average usage (total days) 18 minutes Average usage (days used) 2 hours 53 minutes Median usage (days used) 2 hours 35 minutes Total used hours (value since last reset - 2025) 150 hours AirSense 11 AutoSet Serial number [id number] Mode AutoSet Min Pressure 5 cmH2O Max Pressure 20 cmH2O EPR Fulltime EPR level 3 Response Standard Therapy Pressure - cmH2O Median: 6.6 95th percentile: 9.5 Maximum: 11.2 Leaks - L/min Median: 14.4 95th percentile: 39.6 Maximum: 77.0 Events per hour AI: 4.5 HI: 0.6 AHI: 5.1 Apnea Index Central: 0.5 Obstructive: 3.1 Unknown: 0.9 RERA Index 0.2 Cheyne-Peraza respiration (average duration per night) 0 minutes (0%)    Will roder CPAP titration for requal _ Medicare          INTERVAL HISTORY:    3/13/2024:       Althea Vazquez is a 59 y.o. female is here  to be evaluated for a sleep disorder; referred by Weston Begum, *.    HAd PSG in 2014 - had UAAERS: gained 15 lbs since previosu stidu - weight goes up and ndown.  Never tereated for UARS>  Stil reports louid snoring; may be choking for air - once jum,ped out of the sleep;excessive daytime sleepiness and fatigue  Reports bad dreams upon retuirning to sleep.    + restless sleep; jumping out of her sleep - the patient herself is not aware.     Seeing pscychiatry for anxiety. Clonazepam - very PRN.      Rogeliolissette Carrion Taylor denied  witnessed apneas.    The patient does not feel rested upon awakening. Takes naps.     The patient  reports morning headaches and reports  dry mouth on awakening.   Rogeliolissette Carrion Taylor denies  nasal congestion.    The patient never had tonsillectomy, adenoidectomy or UPPP        Rogeliolissette Carrion Taylor  reports somniloquy. No somnambulism  The patient  denies auxiliary symptoms of narcolepsy including sleep onset paralysis, hypnagogic hallucinations, sleep attacks and cataplexy.    Rogeliolissette Carrion Taylor denied symptoms concerning for RLS; nocturnal leg movements have not been noticed.   The patient does not experience sleep related leg cramps.         Medications pertinent to sleep  disorders taken currently: Clonazepam  Previous  medications taken  for sleep disorders:  no    Sleep studies  PSG 2015 - Medicare criteriA:    Soft snoring was present. There were rare hypopneas, but they did not reach significance by AHI and O2 criteria. The overall AHI was 2.9 with an oxygen larisa of 88.0%. The supine AHI was 15.4 and the REM AHI was 6.2. The RDI (Respiratory Disturbance Index) was greater than the AHI due to the presence of frequent RERAs (respiratory effort related arousals), primarily while sleeping supine. The overall RDI was 11.1. The supine RDI was 15.4.             1/27/2025     9:34 AM 3/12/2024     6:33 PM 8/1/2018     2:29 PM   EPWORTH SLEEPINESS SCALE TOTAL  SCORE    Total score 15  8 5       Patient-reported             3/12/2024     6:33 PM   EPWORTH SLEEPINESS SCALE   Sitting and reading 1   Watching TV 3   Sitting, inactive in a public place (e.g. a theatre or a meeting) 1   As a passenger in a car for an hour without a break 1   Lying down to rest in the afternoon when circumstances permit 1   Sitting and talking to someone 0   Sitting quietly after a lunch without alcohol 1   In a car, while stopped for a few minutes in traffic 0   Total score 8               3/12/2024     6:33 PM   EPWORTH SLEEPINESS SCALE   Sitting and reading 1   Watching TV 3   Sitting, inactive in a public place (e.g. a theatre or a meeting) 1   As a passenger in a car for an hour without a break 1   Lying down to rest in the afternoon when circumstances permit 1   Sitting and talking to someone 0   Sitting quietly after a lunch without alcohol 1   In a car, while stopped for a few minutes in traffic 0   Total score 8         3/12/2024     6:44 PM   Sleep Clinic New Patient   What time do you go to bed on a week day? (Give a range) 9 or 10   What time do you go to bed on a day off? (Give a range) Dont work   How long does it take you to fall asleep? (Give a range) 20 to 25 minutes   On average, how many times per night do you wake up? One   How long does it take you to fall back into sleep? (Give a range) Some time I dont   What time do you wake up to start your day on a week day? (Give a range) 6:00   What time do you wake up to start your day on a day off? (Give a range) Dont work   What time do you get out of bed? (Give a range) 6:00   On average, how many hours do you sleep? 5   On average, how many naps do you take per day? One   Rate your sleep quality from 0 to 5 (0-poor, 5-great). 2   Have you experienced:  Weight gain?   How much weight have you lost or gained (in lbs.) in the last year? 20lb   On average, how many times per night do you go to the bathroom?  One   Have you ever  had a sleep study/CPAP machine/surgery for sleep apnea? No   Have you ever had a CPAP machine for sleep apnea? No   Have you ever had surgery for sleep apnea? No           3/12/2024     6:44 PM   Sleep Clinic ROS    Difficulty breathing through the nose?  Sometimes   Sore throat or dry mouth in the morning? Yes   Irregular or very fast heart beat?  No   Shortness of breath?  No   Acid reflux? Sometimes   Body aches and pains?  Yes   Morning headaches? Sometimes   Dizziness? Sometimes   Mood changes?  Sometimes   Do you exercise?  No   Do you feel like moving your legs a lot?  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/09/2016    History of hepatitis B 10/03/2016    Abnormal chest CT 10/07/2016    Gastroesophageal reflux disease 11/07/2016    Cervicogenic headache 04/17/2017    Severe obesity (BMI 35.0-39.9) with comorbidity 12/01/2017    Hyperlipidemia associated with type 2 diabetes mellitus 04/30/2018    Anxiety 04/30/2018    Hypertension associated with diabetes 09/12/2018    Tendinopathy of right gluteal region 01/30/2019    Diabetes mellitus with hyperglycemia 12/02/2019    Sacroiliitis 07/30/2020    Pain 02/17/2021    Greater trochanteric bursitis of left hip 04/21/2021    Sleep apnea 04/28/2024    VAISHNAVI (obstructive sleep apnea) 04/28/2024    Dysphagia 06/11/2024    History of colon polyps 06/11/2024     Resolved Ambulatory Problems      Diagnosis Date Noted    Reactive arthritis 09/06/2012    Effusion of right knee 09/06/2012    Long-term current use of steroids 09/27/2012    Fatigue 09/27/2012    Hyperpigmentation of skin 09/27/2012    Dactylitis 09/27/2012    Hypertension 10/01/2012    Anxiety 10/18/2012    Tachycardia 01/10/2013    Type II or unspecified type diabetes mellitus with neurological manifestations, not stated as uncontrolled(250.60) 05/29/2013    Right knee pain 04/21/2014    Iritis 05/13/2014    Depression     Knee pain 05/14/2014    Status post arthroscopy of right knee 5/14/2014 05/20/2014    Annual physical exam 08/28/2014    Numbness and tingling of right arm 08/28/2014    Menorrhagia 10/14/2014    Diabetic peripheral neuropathy - mild 10/21/2014    Hyperlipidemia 10/21/2014    Sleep difficulties 10/21/2014    Obesity 10/21/2014    Pre-op evaluation 11/19/2014    S/P laparoscopic hysterectomy 12/03/2014    Type II or unspecified type diabetes mellitus with neurological manifestations, not stated as uncontrolled(250.60) 01/02/2015    Angioedema of lips 02/10/2015    Essential hypertension 11/11/2015    Anxiety and depression 07/07/2016    Weakness of right lower extremity 07/07/2016    Weakness of right upper extremity 07/07/2016    Muscle weakness of lower extremity 07/18/2016    Difficulty walking 07/18/2016    Pain aggravated by walking 07/18/2016    Multiple falls 07/18/2016    Muscle weakness of right upper extremity 07/26/2016    Enthesitis related arthritis 09/12/2016    Cough 10/07/2016    Mass 10/13/2016    Decreased range of motion of neck 10/15/2016    Abnormality of gait 10/15/2016    Weakness of both legs 10/15/2016    Pain aggravated by physical activity 10/15/2016    Shoulder pain, bilateral 10/19/2016    Decreased range of motion of shoulder 10/19/2016    Muscle weakness of upper extremity 10/19/2016    Right shoulder pain 02/14/2017    Decreased range of motion (ROM) of shoulder 02/14/2017    Weakness of both  legs 03/12/2017    Decreased functional mobility and endurance 03/12/2017    Muscle spasms of neck 04/17/2017    Colon cancer screening 01/09/2018    Essential hypertension 04/30/2018    Morbid obesity with BMI of 40.0-44.9, adult 06/08/2018    Hypocalcemia 08/30/2019    Diabetes mellitus type 2 without retinopathy 12/02/2019    DM type 2, not at goal 12/02/2019    Immunosuppressed status 12/02/2019    Positive depression screening 12/02/2019    Osteoarthritis involving multiple joints on both sides of body 07/30/2020    Bilateral shoulder pain 08/04/2020    Upper extremity weakness 08/04/2020    Axillary abscess 05/07/2021     Past Medical History:   Diagnosis Date    Acid reflux     Arthritis     Cataract     Difficult intubation     Dry eyes     Dry mouth     Fever blister     Nausea & vomiting 02/04/2015    Type II diabetes mellitus 10/01/2012                PAST SURGICAL HISTORY:    Past Surgical History:   Procedure Laterality Date    CATARACT EXTRACTION W/  INTRAOCULAR LENS IMPLANT Right 1/3/2024    Procedure: EXTRACTION, CATARACT, WITH IOL INSERTION;  Surgeon: Soumya Mortensen MD;  Location: [facility] OR;  Service: Ophthalmology;  Laterality: Right;    CATARACT EXTRACTION W/  INTRAOCULAR LENS IMPLANT Left 2/7/2024    Procedure: EXTRACTION, CATARACT, WITH IOL INSERTION;  Surgeon: Soumya Mortensen MD;  Location: [facility] OR;  Service: Ophthalmology;  Laterality: Left;    COLONOSCOPY N/A 1/9/2018    Procedure: COLONOSCOPY;  Surgeon: Juwan Lopez MD;  [facility]  Service: Endoscopy;  Laterality: N/A;  per anesthesia, pt. needs to be on 2nd floor due to past Anesthesia problems    COLONOSCOPY N/A 7/16/2024    Procedure: COLONOSCOPY;  Surgeon: Pradeep Soriano MD;  Location: [facility] ENDO;  Service: Endoscopy;  Laterality: N/A;    EPIDURAL STEROID INJECTION INTO CERVICAL SPINE N/A 2/17/2021    Procedure: Injection-steroid-epidural-cervical--C7-T1 IL SHARONA;  Surgeon: Graciela Jerome MD;   Location: [facility] PAIN MGT;  Service: Pain Management;  Laterality: N/A;    EPIDURAL STEROID INJECTION INTO CERVICAL SPINE N/A 2/23/2023    Procedure: Injection-steroid-epidural-cervical  C7-T1;  Surgeon: Juvenal Segura MD;  [place] [facility] [place] [facility];  Service: Pain Management;  Laterality: N/A;    ESOPHAGOGASTRODUODENOSCOPY N/A 7/16/2024    Procedure: EGD (ESOPHAGOGASTRODUODENOSCOPY);  Surgeon: Pradeep Soriano MD;  Location: [facility] ENDO;  Service: Endoscopy;  Laterality: N/A;    HYSTERECTOMY  12/3/2014    INJECTION OF ANESTHETIC AGENT INTO SACROILIAC JOINT Left 4/21/2021    Procedure: LEFT SACROILIAC JOINT STEROID INJECTION;  Surgeon: Graciela Jerome MD;  Location: [facility] PAIN [facility];  Service: Pain Management;  Laterality: Left;    INJECTION OF JOINT Left 4/21/2021    Procedure: Injection, Joint--LEFT GTB;  Surgeon: Graciela Jerome MD;  Location: [facility] PAIN [facility];  Service: Pain Management;  Laterality: Left;    KNEE ARTHROSCOPY  5-14-14    right    TUBAL LIGATION           FAMILY HISTORY:                Family History   Problem Relation Name Age of Onset    Diabetes Mother      Cataracts Mother      Stroke Mother      Heart attack Mother      Depression Mother      Stroke Father      Hypertension Father      Hyperlipidemia Father      Colon cancer Father      Diabetes Maternal Aunt      Heart attack Paternal Grandmother      ADD / ADHD Son      No Known Problems Sister      No Known Problems Brother 2     No Known Problems Maternal Uncle      No Known Problems Paternal Aunt      No Known Problems Paternal Uncle      No Known Problems Maternal Grandmother      No Known Problems Maternal Grandfather      Cancer Paternal Grandfather          Lung CA    Melanoma Neg Hx      Eczema Neg Hx      Lupus Neg Hx      Psoriasis Neg Hx      Amblyopia Neg Hx      Blindness Neg Hx      Glaucoma Neg Hx      Macular degeneration Neg Hx      Retinal detachment Neg Hx      Strabismus Neg Hx      Thyroid disease Neg Hx      Suicide  Neg Hx      Acne Neg Hx      Cirrhosis Neg Hx      Asthma Neg Hx      Emphysema Neg Hx      Breast cancer Neg Hx      Ovarian cancer Neg Hx         SOCIAL HISTORY:          Tobacco:   Social History     Tobacco Use   Smoking Status Never   Smokeless Tobacco Never       alcohol use:    Social History     Substance and Sexual Activity   Alcohol Use Not Currently    Comment: occas.                   ALLERGIES:    Review of patient's allergies indicates:   Allergen Reactions    Bactrim [sulfamethoxazole-trimethoprim] Itching    Trulicity [dulaglutide] Diarrhea and Other (See Comments)     Vomiting, abdominal pain    Diflucan [fluconazole] Swelling and Other (See Comments)     Sore on mouth       CURRENT MEDICATIONS:    Current Outpatient Medications   Medication Sig Dispense Refill    amLODIPine (NORVASC) 10 MG tablet Take 1 tablet (10 mg total) by mouth once daily. 90 tablet 2    atenoloL (TENORMIN) 100 MG tablet Take 1 tablet (100 mg total) by mouth once daily. 90 tablet 2    blood sugar diagnostic Strp Check blood sugars  strip 11    blood-glucose meter kit Use twice daily. 1 each 0    canagliflozin (INVOKANA) 300 mg Tab tablet Take 1 tablet (300 mg total) by mouth daily before breakfast. 90 tablet 1    clobetasoL (TEMOVATE) 0.05 % external solution Apply to affected area once daily 50 mL 6    clobetasoL (TEMOVATE) 0.05 % external solution Apply topically once daily. 50 mL 6    clonazePAM (KLONOPIN) 0.5 MG tablet TAKE 1 TABLET BY MOUTH ONCE DAILY AS NEEDED FOR ANXIETY 90 tablet 0    cyclobenzaprine (FLEXERIL) 10 MG tablet TAKE ONE TABLET BY MOUTH AT BEDTIME AS NEEDED 90 tablet 2    entecavir (BARACLUDE) 0.5 MG Tab Take 1 tablet (0.5 mg total) by mouth once daily. 90 tablet 3    ergocalciferol (ERGOCALCIFEROL) 50,000 unit Cap Take 1 capsule (50,000 Units total) by mouth every 7 days. 12 capsule 3    fluocinolone acetonide oiL 0.01 % Drop Place 3 drops into both ears 2 (two) times a day. 20 mL 3    FLUoxetine 20  MG capsule Take 1 capsule (20 mg total) by mouth 2 (two) times daily. 180 capsule 1    fluticasone propionate (FLONASE) 50 mcg/actuation nasal spray 2 sprays (100 mcg total) by Each Nostril route once daily. 16 g 6    folic acid (FOLVITE) 1 MG tablet Take 1 tablet (1 mg total) by mouth once daily. 90 tablet 1    golimumab (SIMPONI) 50 mg/0.5 mL PnIj Inject 50 mg into the skin every 30 days. 1.5 mL 1    hydroCHLOROthiazide (HYDRODIURIL) 25 MG tablet Take 1 tablet (25 mg total) by mouth once daily. 90 tablet 3    hydrocortisone 2.5 % cream Apply topically to the affected area 2 (two) times daily 28 g 8    ketoconazole (NIZORAL) 2 % shampoo Apply topically twice a week. 120 mL 6    ketoconazole (NIZORAL) 2 % shampoo Apply topically once a week. 120 mL 12    ketorolac 0.5% (ACULAR) 0.5 % Drop Place 1 drop into the right eye 3 (three) times daily. 5 mL 3    ketorolac 0.5% (ACULAR) 0.5 % Drop Place 1 drop into the left eye 3 (three) times daily. 5 mL 3    lancets (ACCU-CHEK SOFTCLIX LANCETS) Misc Check blood sugars  each 11    lancets (FREESTYLE LANCETS) 28 gauge Misc test TWICE DAILY 200 each 3    levocetirizine (XYZAL) 5 MG tablet TAKE ONE TABLET BY MOUTH EVERY EVENING 90 tablet 3    methotrexate 2.5 MG Tab Take 6 tablets (15 mg total) by mouth every 7 days. Take 3 TABLETS Wed AM and 3 TABLETS Wed PM only 24 tablet 0    ofloxacin (OCUFLOX) 0.3 % ophthalmic solution Place 1 drop into the right eye 3 (three) times daily. 5 mL 3    ofloxacin (OCUFLOX) 0.3 % ophthalmic solution Place 1 drop into the left eye 3 (three) times daily. 5 mL 3    pantoprazole (PROTONIX) 40 MG tablet Take 1 tablet (40 mg total) by mouth once daily. 90 tablet 0    polyethylene glycol (GLYCOLAX) 17 gram/dose powder Mix and dissolve one capful (17 grams) into a beverage and take by mouth once daily. 510 g 11    prednisoLONE acetate (PRED FORTE) 1 % DrpS Instill one drop into affect eye(s) as directed 5 mL 1    prednisoLONE acetate (PRED FORTE)  1 % DrpS Place 1 drop into the right eye 3 (three) times daily. 5 mL 3    prednisoLONE acetate (PRED FORTE) 1 % DrpS Place 1 drop into the left eye 3 (three) times daily. 5 mL 3    rosuvastatin (CRESTOR) 40 MG Tab Take 1 tablet (40 mg total) by mouth every evening. 90 tablet 3    sulfaSALAzine (AZULFIDINE) 500 MG EC tablet Take 3 tablets (1,500 mg total) by mouth 2 (two) times daily. 540 tablet 0    tirzepatide (MOUNJARO) 2.5 mg/0.5 mL PnIj Inject 2.5 mg into the skin every 7 days. 2 Pen 1    tiZANidine (ZANAFLEX) 2 MG tablet Take 1-2 tablets (2-4 mg total) by mouth every 8 (eight) hours as needed. 90 tablet 2    triamcinolone acetonide 0.025% (KENALOG) 0.025 % cream apply to affected area twice daily. 80 g 1    valsartan (DIOVAN) 320 MG tablet Take 1 tablet (320 mg total) by mouth once daily. 90 tablet 3    vitamin D (VITAMIN D3) 1000 units Tab Take 1,000 Units by mouth once daily.      zolpidem (AMBIEN) 5 MG Tab Take 1 tablet (5 mg total) by mouth nightly as needed. 90 tablet 0    aspirin (ECOTRIN) 81 MG EC tablet Take 1 tablet (81 mg total) by mouth once daily. 30 tablet 0    diclofenac sodium (VOLTAREN) 1 % Gel Apply 4 g topically 4 (four) times daily. Apply light film topically to knee up to four times daily 3 each 2    docusate sodium (COLACE) 50 MG capsule Take 1 capsule (50 mg total) by mouth 2 (two) times daily. 180 capsule 3     No current facility-administered medications for this visit.     Facility-Administered Medications Ordered in Other Visits   Medication Dose Route Frequency Provider Last Rate Last Admin    0.9%  NaCl infusion   Intravenous Continuous Graciela Jerome MD                            PHYSICAL EXAM:  /78 (BP Location: Left arm, Patient Position: Sitting)   Pulse 90   Wt 108.2 kg (238 lb 8 oz)   LMP 11/25/2014   BMI 37.35 kg/m²   GENERAL: Overweight body habitus, well groomed.  HEENT:   HEENT:  Conjunctivae are non-erythematous; Pupils equal, round, and reactive to light; Nose  is symmetrical; Nasal mucosa is pink and moist; Modified Mallampati:III Posterior palate is low; Tonsils not visualized; Uvula is wide and elongated;Tongue is enlarged; Dentition is fair; No TMJ tenderness; Jaw opening and protrusion without click and without discomfort.  NECK: Supple. Neck circumference is 17.5 inches. No thyromegaly. No palpable nodes.     SKIN: On face and neck: No abrasions, no rashes, no lesions.  No subcutaneous nodules are palpable.  RESPIRATORY: Chest is clear to auscultation.  Normal chest expansion and non-labored breathing at rest.  CARDIOVASCULAR: Normal S1, S2.  No murmurs, gallops or rubs. No carotid bruits bilaterally.  No edema. No clubbing. No cyanosis.    NEURO: Oriented to time, place and person. Normal attention span and concentration. Gait normal.    PSYCH: Affect is full. Mood is normal  MUSCULOSKELETAL: Moves 4 extremities. Gait normal.           ASSESSMENT:    1. VAISHNAVI>  The patient symptomatically has  snoring, choking , excessive daytime sleepiness, and excessive daytime fatigue  with exam findings of crowded airway and elevated BMI. The patient has medical co-morbidities of diabetes  which can be worsened by VAISHNAVI. This warrants treatment. Failed initial compliance due to mask/pressure discomfort.          PLAN:  Compliance requirements have been discussed;  Shown the patient how to put her mask together correctly.  MAsk Resmed test run did not show significant leak.  The patient was reassured that now that she is treated for VAISHNAVI, she can sleep on her back again (which may help with mask leak)    Will order CPAP/BPAP titration for requal  We may update her mask post titration - Althea Vazquez will possibly benefit from a simpler built mask (unless she really needs a full face mask).    She will also check with her dentist re: OA (oral appliance) for VAISHNAVI as she is wearing  a mouth guard for tooth grinding.     During our discussion today, we talked about the etiology  of  VAISHNAVI as well as the potential ramifications of untreated sleep apnea, which could include daytime sleepiness, hypertension, heart disease and/or stroke.  We discussed potential treatment options, which could include weight loss, body positioning, continuous positive airway pressure (CPAP), or referral for surgical consideration. Meanwhile, she  is urged to avoid supine sleep, weight gain and alcoholic beverages since all of these can worsen VAISHNAVI.     The patient was given open opportunity to ask questions and/or express concerns about treatment plan. Two point patient identifier confirmed.       Precautions: The patient was advised to abstain from driving should he feel sleepy or drowsy.    Follow up: MD after the sleep study has been completed.     ESS ([place] Sleepiness Scale) and other sleep medicine related questionnaires were reviewed with the patient.      46-minute visit. >50% spent counseling patient and coordination of care.  The patient was  cautioned against drowsy driving.

## 2025-01-28 DIAGNOSIS — H20.9 UVEITIS: ICD-10-CM

## 2025-02-03 DIAGNOSIS — E11.42 TYPE 2 DIABETES MELLITUS WITH DIABETIC POLYNEUROPATHY, UNSPECIFIED WHETHER LONG TERM INSULIN USE: Chronic | ICD-10-CM

## 2025-02-03 DIAGNOSIS — E66.01 SEVERE OBESITY (BMI 35.0-39.9) WITH COMORBIDITY: ICD-10-CM

## 2025-02-03 RX ORDER — METHOTREXATE 2.5 MG/1
15 TABLET ORAL
Qty: 24 TABLET | Refills: 1 | Status: SHIPPED | OUTPATIENT
Start: 2025-02-03 | End: 2025-02-18 | Stop reason: SDUPTHER

## 2025-02-03 NOTE — TELEPHONE ENCOUNTER
Refill Routing Note   Medication(s) are not appropriate for processing by [facility] for the following reason(s):        New or recently adjusted medication    [facility] action(s):  Defer             Appointments  past 12m or future 3m with PCP    Date Provider   Last Visit   1/8/2025 Weston Begum, DO   Next Visit   Visit date not found Weston Begum, DO   ED visits in past 90 days: 0        Note composed:3:57 PM 02/03/2025

## 2025-02-03 NOTE — TELEPHONE ENCOUNTER
No care due was identified.  [facility] Embedded Care Due Messages. Reference number: [id number].   2/03/2025 7:33:30 AM CST

## 2025-02-04 RX ORDER — TIRZEPATIDE 2.5 MG/.5ML
2.5 INJECTION, SOLUTION SUBCUTANEOUS
Qty: 2 PEN | Refills: 1 | Status: SHIPPED | OUTPATIENT
Start: 2025-02-04

## 2025-02-13 ENCOUNTER — OFFICE VISIT (OUTPATIENT)
Dept: OPHTHALMOLOGY | Facility: CLINIC | Age: 60
End: 2025-02-13
Payer: MEDICARE

## 2025-02-13 DIAGNOSIS — H04.123 DRY EYE SYNDROME, BILATERAL: ICD-10-CM

## 2025-02-13 DIAGNOSIS — Z96.1 PSEUDOPHAKIA OF BOTH EYES: ICD-10-CM

## 2025-02-13 DIAGNOSIS — H35.89 RETINAL EXUDATES: Primary | ICD-10-CM

## 2025-02-13 DIAGNOSIS — H43.812 POSTERIOR VITREOUS DETACHMENT, LEFT: ICD-10-CM

## 2025-02-13 PROCEDURE — 3061F NEG MICROALBUMINURIA REV: CPT | Mod: CPTII,S$GLB,, | Performed by: OPTOMETRIST

## 2025-02-13 PROCEDURE — 3066F NEPHROPATHY DOC TX: CPT | Mod: CPTII,S$GLB,, | Performed by: OPTOMETRIST

## 2025-02-13 PROCEDURE — 99999 PR PBB SHADOW E&M-EST. PATIENT-LVL IV: CPT | Mod: PBBFAC,,, | Performed by: OPTOMETRIST

## 2025-02-13 PROCEDURE — 4010F ACE/ARB THERAPY RXD/TAKEN: CPT | Mod: CPTII,S$GLB,, | Performed by: OPTOMETRIST

## 2025-02-13 PROCEDURE — 92014 COMPRE OPH EXAM EST PT 1/>: CPT | Mod: S$GLB,,, | Performed by: OPTOMETRIST

## 2025-02-13 PROCEDURE — 3051F HG A1C>EQUAL 7.0%<8.0%: CPT | Mod: CPTII,S$GLB,, | Performed by: OPTOMETRIST

## 2025-02-13 PROCEDURE — 1160F RVW MEDS BY RX/DR IN RCRD: CPT | Mod: CPTII,S$GLB,, | Performed by: OPTOMETRIST

## 2025-02-13 PROCEDURE — 1159F MED LIST DOCD IN RCRD: CPT | Mod: CPTII,S$GLB,, | Performed by: OPTOMETRIST

## 2025-02-13 NOTE — PROGRESS NOTES
HPI     Diabetic Eye Exam            Comments: Pt co distance va OU, blurred even with correction. Pt states   her readers do fine.   Pt has not had an iritis flare up in some time.   No Pain OU   OTC gtts PRN OU   Pt uses Pred PRN OU when she feels discomfort coming on     Diagnosed with diabetes in 2012  Lab Results       Component                Value               Date                       HGBA1C                   7.5 (H)             01/08/2025                 HGBA1C                   7.5 (H)             01/08/2025                             Last edited by Luis M Oliveira on 2/13/2025  9:08 AM.            Assessment /Plan     For exam results, see Encounter Report.    Retinal exudates of left eye   -     Ambulatory referral/consult to Ophthalmology; Future; Expected date: 02/20/2025  RTC with Dr FLOREZ for retinal eval in 2-3 months.       Dry eye syndrome, bilateral  Discussed OTC tears 3-4 times daily PRN.     Pseudophakia of both eyes  S/p CE/IOL with Dr Mortensen, observe.     Posterior vitreous detachment, left  RD precautions reviewed.     RTC with Dr FLOREZ for retinal eval. OS.

## 2025-02-17 ENCOUNTER — TELEPHONE (OUTPATIENT)
Dept: SLEEP MEDICINE | Facility: OTHER | Age: 60
End: 2025-02-17
Payer: MEDICARE

## 2025-02-18 ENCOUNTER — OFFICE VISIT (OUTPATIENT)
Dept: RHEUMATOLOGY | Facility: CLINIC | Age: 60
End: 2025-02-18
Payer: MEDICARE

## 2025-02-18 VITALS
HEIGHT: 67 IN | WEIGHT: 245.38 LBS | BODY MASS INDEX: 38.51 KG/M2 | DIASTOLIC BLOOD PRESSURE: 81 MMHG | SYSTOLIC BLOOD PRESSURE: 129 MMHG | HEART RATE: 71 BPM

## 2025-02-18 DIAGNOSIS — H20.9 UVEITIS: ICD-10-CM

## 2025-02-18 DIAGNOSIS — M47.819 SPONDYLOARTHRITIS: ICD-10-CM

## 2025-02-18 DIAGNOSIS — M46.90 AXIAL SPONDYLOARTHRITIS: ICD-10-CM

## 2025-02-18 DIAGNOSIS — M45.A0 NON-RADIOGRAPHIC AXIAL SPONDYLOARTHRITIS: Primary | ICD-10-CM

## 2025-02-18 DIAGNOSIS — M02.30 REACTIVE ARTHRITIS: ICD-10-CM

## 2025-02-18 DIAGNOSIS — M19.90 INFLAMMATORY ARTHRITIS: ICD-10-CM

## 2025-02-18 DIAGNOSIS — Z86.19 HISTORY OF HEPATITIS B: ICD-10-CM

## 2025-02-18 DIAGNOSIS — Z79.899 HIGH RISK MEDICATION USE: ICD-10-CM

## 2025-02-18 RX ORDER — SULFASALAZINE 500 MG/1
1500 TABLET, DELAYED RELEASE ORAL 2 TIMES DAILY
Qty: 540 TABLET | Refills: 0 | Status: SHIPPED | OUTPATIENT
Start: 2025-02-18

## 2025-02-18 RX ORDER — GOLIMUMAB 50 MG/.5ML
50 INJECTION, SOLUTION SUBCUTANEOUS
Qty: 1.5 ML | Refills: 1 | Status: ACTIVE | OUTPATIENT
Start: 2025-02-18 | End: 2026-02-18

## 2025-02-18 RX ORDER — METHOTREXATE 2.5 MG/1
15 TABLET ORAL
Qty: 72 TABLET | Refills: 0 | Status: SHIPPED | OUTPATIENT
Start: 2025-02-18

## 2025-02-18 RX ORDER — ENTECAVIR 0.5 MG/1
0.5 TABLET, FILM COATED ORAL DAILY
Qty: 90 TABLET | Refills: 3 | Status: ACTIVE | OUTPATIENT
Start: 2025-02-18

## 2025-02-18 RX ORDER — FOLIC ACID 1 MG/1
1 TABLET ORAL DAILY
Qty: 90 TABLET | Refills: 3 | Status: SHIPPED | OUTPATIENT
Start: 2025-02-18 | End: 2026-02-18

## 2025-02-18 ASSESSMENT — ROUTINE ASSESSMENT OF PATIENT INDEX DATA (RAPID3)
FATIGUE SCORE: 5.5
WHEN YOU AWAKENED IN THE MORNING OVER THE LAST WEEK, PLEASE INDICATE THE AMOUNT OF TIME IT TAKES UNTIL YOU ARE AS LIMBER AS YOU WILL BE FOR THE DAY: 1
PAIN SCORE: 5.5
PSYCHOLOGICAL DISTRESS SCORE: 6.6
AM STIFFNESS SCORE: 1, YES
MDHAQ FUNCTION SCORE: 1
TOTAL RAPID3 SCORE: 4.28
PATIENT GLOBAL ASSESSMENT SCORE: 4

## 2025-02-18 ASSESSMENT — ANKYLOSING SPONDYLITIS DISEASE ACTIVITY SCORE (ASDAS-CRP)
CRP_MG_PER_LITER: 10.3
GLOBAL_ACTIVITY: 3
NBH_PAIN: 4
TOTAL_SCORE: 2.87
PAIN_SWELLING: 5
MORNING_STIFFNESS: 5

## 2025-02-18 NOTE — PROGRESS NOTES
2/17/2025     9:08 PM   Rapid3 Question Responses and Scores   MDHAQ Score 1   Psychologic Score 6.6   Pain Score 5.5   When you awakened in the morning OVER THE LAST WEEK, did you feel stiff? Yes   If Yes, please indicate the number of hours until you are as limber as you will be for the day 1   Fatigue Score 5.5   Global Health Score 4   RAPID3 Score 4.28    Answers submitted by the patient for this visit:  Rheumatology Questionnaire (Submitted on 2/17/2025)  fever: No  eye redness: No  mouth sores: No  headaches: No  shortness of breath: No  chest pain: No  trouble swallowing: No  diarrhea: No  constipation: No  unexpected weight change: No  genital sore: No  dysuria: No  During the last 3 days, have you had a skin rash?: No  Bruises or bleeds easily: No  cough: No

## 2025-02-18 NOTE — PROGRESS NOTES
I have personally taken the history and examined the patient and agree with the resident,s note as stated above        Answers submitted by the patient for this visit:  Rheumatology Questionnaire (Submitted on 2/17/2025)  fever: No  eye redness: No  mouth sores: No  headaches: No  shortness of breath: No  chest pain: No  trouble swallowing: No  diarrhea: No  constipation: No  unexpected weight change: No  genital sore: No  dysuria: No  During the last 3 days, have you had a skin rash?: No  Bruises or bleeds easily: No  cough: No    Schober's 10-14.5 cm improved  Nl lat flex and ext  Fabere neg bilat  Chest expansion 7 cm improved  Neck rom nl            Latest Reference Range & Units 01/08/25 11:26   WBC 3.90 - 12.70 K/uL  3.90 - 12.70 K/uL 7.89  7.89   RBC 4.00 - 5.40 M/uL  4.00 - 5.40 M/uL 5.09  5.09   Hemoglobin 12.0 - 16.0 g/dL  12.0 - 16.0 g/dL 14.9  14.9   Hematocrit 37.0 - 48.5 %  37.0 - 48.5 % 45.6  45.6   MCV 82 - 98 fL  82 - 98 fL 90  90   MCH 27.0 - 31.0 pg  27.0 - 31.0 pg 29.3  29.3   MCHC 32.0 - 36.0 g/dL  32.0 - 36.0 g/dL 32.7  32.7   RDW 11.5 - 14.5 %  11.5 - 14.5 % 15.3 (H)  15.3 (H)   Platelet Count 150 - 450 K/uL  150 - 450 K/uL 301  301   MPV 9.2 - 12.9 fL  9.2 - 12.9 fL 11.4  11.4   Gran % 38.0 - 73.0 %  38.0 - 73.0 % 31.6 (L)  31.6 (L)   Lymph % 18.0 - 48.0 %  18.0 - 48.0 % 58.8 (H)  58.8 (H)   Mono % 4.0 - 15.0 %  4.0 - 15.0 % 7.5  7.5   Eos % 0.0 - 8.0 %  0.0 - 8.0 % 0.9  0.9   Basophil % 0.0 - 1.9 %  0.0 - 1.9 % 0.4  0.4   Immature Granulocytes 0.0 - 0.5 %  0.0 - 0.5 % 0.8 (H)  0.8 (H)   Gran # (ANC) 1.8 - 7.7 K/uL  1.8 - 7.7 K/uL 2.5  2.5   Lymph # 1.0 - 4.8 K/uL  1.0 - 4.8 K/uL 4.6  4.6   Mono # 0.3 - 1.0 K/uL  0.3 - 1.0 K/uL 0.6  0.6   Eos # 0.0 - 0.5 K/uL  0.0 - 0.5 K/uL 0.1  0.1   Baso # 0.00 - 0.20 K/uL  0.00 - 0.20 K/uL 0.03  0.03   Immature Grans (Abs) 0.00 - 0.04 K/uL  0.00 - 0.04 K/uL 0.06 (H)  0.06 (H)   nRBC 0 /100 WBC  0 /100 WBC 0  0   Differential Method  Automated  Automated    Sed Rate 0 - 36 mm/Hr 24   Sodium 136 - 145 mmol/L  136 - 145 mmol/L 140  140   Potassium 3.5 - 5.1 mmol/L  3.5 - 5.1 mmol/L 3.7  3.7   Chloride 95 - 110 mmol/L  95 - 110 mmol/L 101  101   CO2 23 - 29 mmol/L  23 - 29 mmol/L 28  28   Anion Gap 8 - 16 mmol/L  8 - 16 mmol/L 11  11   BUN 6 - 20 mg/dL  6 - 20 mg/dL 13  13   Creatinine 0.5 - 1.4 mg/dL  0.5 - 1.4 mg/dL 0.8  0.8   eGFR >60 mL/min/1.73 m^2  >60 mL/min/1.73 m^2 >60.0  >60.0   Glucose 70 - 110 mg/dL  70 - 110 mg/dL 139 (H)  139 (H)   Calcium 8.7 - 10.5 mg/dL  8.7 - 10.5 mg/dL 9.7  9.7   ALP 40 - 150 U/L  40 - 150 U/L 92  92   PROTEIN TOTAL 6.0 - 8.4 g/dL  6.0 - 8.4 g/dL 8.9 (H)  8.9 (H)   Albumin 3.5 - 5.2 g/dL  3.5 - 5.2 g/dL 4.5  4.5   BILIRUBIN TOTAL 0.1 - 1.0 mg/dL  0.1 - 1.0 mg/dL 0.4  0.4   AST 10 - 40 U/L  10 - 40 U/L 20  20   ALT 10 - 44 U/L  10 - 44 U/L 19  19   CRP 0.0 - 8.2 mg/L 10.3 (H)   Cholesterol Total 120 - 199 mg/dL 263 (H)   HDL 40 - 75 mg/dL 62   HDL/Cholesterol Ratio 20.0 - 50.0 % 23.6   Non-HDL Cholesterol mg/dL 201   Total Cholesterol/HDL Ratio 2.0 - 5.0  4.2   Triglycerides 30 - 150 mg/dL 83   LDL Cholesterol 63.0 - 159.0 mg/dL 184.4 (H)   Hemoglobin A1C External 4.0 - 5.6 %  4.0 - 5.6 % 7.5 (H)  7.5 (H)   Estimated Avg Glucose 68 - 131 mg/dL  68 - 131 mg/dL 169 (H)  169 (H)   TSH 0.400 - 4.000 uIU/mL 1.407   Hepatitis B Surface Ag Non-reactive  Non-reactive   Hepatitis B Virus DNA Not Detected  HBV DNA not detected   Hepatitis B Virus PCR, Quant <12 IU/mL Not Detected   (H): Data is abnormally high  (L): Data is abnormally low    our knee x-rays show mild-moderate osteoarthritis of both knees. RJQ            PACS Images for ViTAL Hoh Viewer     Show images for X-ray Knee Ortho Bilateral with Flexion  All Reviewers List    Gabriel Arvizu MD on 8/21/2024 17:39   Gabriel Arvizu MD on 8/21/2024 17:37     X-ray Knee Ortho Bilateral with Flexion  Order: [id number]   Status: Final result       Next appt: 04/23/2025 at 08:40  AM in Lab (Laboratory, [facility])       Dx: Knee pain, unspecified chronicity, un...    Test Result Released: Yes (seen)       Messages: Seen    1 Result Note       1 Patient Communication  Details    Reading Physician Reading Date Result Priority   José Antonio Flannery MD  [phone]  8/21/2024 Routine     Narrative & Impression  EXAMINATION:  XR KNEE ORTHO BILAT WITH FLEXION     CLINICAL HISTORY:  Pain in unspecified knee     TECHNIQUE:  AP standing of both knees, PA flexion standing views of both knees, and Merchant views of both knees were performed.  Lateral views of both knees were also performed.     COMPARISON:  None     FINDINGS:  Mild-to-moderate medial compartment narrowing.  Small tricompartmental osteophytes.  No knee joint effusion.  No acute fracture or dislocation.     Impression:     No acute abnormality.        Electronically signed by:José Antonio Flannery  Date:                                            08/21/2024  Time:                                           09:45   EXAM: MRI SACROILIAC JOINTS W W/O CONTRAST     CLINICAL HISTORY:  Inflammatory spondyloarthropathy.     TECHNIQUE: Standard multiplanar pulse sequences with and without IV contrast.     COMPARISON: None.     FINDINGS:     Low-grade degenerative changes of the left SI joint joint space narrowing and minimal subchondral sclerosis and minimal subchondral marrow edema inferiorly.  No significant periarticular bone marrow edema or increased T2 signal within the joint space to indicate active sacroiliitis.  Surrounding soft tissues are normal.     Low-grade degenerative changes of the right SI joint.  No bone marrow edema or increased T2 signal within the joint indicate active sacroiliitis.  Negative for ankylosis.  Stranding soft tissues are normal.     Remaining visualized bony pelvis intact and normal in appearance.     Mild generalized disc bulging L5-S1 with small left para midline annular fissure/tear.  Severe facet arthropathy.  Central canal  patent.  Mild bilateral neural foraminal stenosis.  Intrapelvic contents are normal.        Impression:        1.  Low-grade degenerative changes of the SI joints bilaterally.  Negative for evidence of active sacroiliitis.     2.  Advanced degenerative changes L5-S1.     Finalized on: 8/21/2024 1:45 PM By:  Gabriel Casanova MD  [facility]# [id number]      2024-08-21 13:47:[id number]    [facility]    ASSESSMENT:     adalimumab with Secondary inefficacy, tried to change to golimumab 50mg sc q 4 wks but insurance wouldn't cover, then on Enbrel Sureclick 50mg s q 7 days but has developed recurrent AAU OS  Then started certolizumab after 1/11/21 visit  had AAU with recurrence 1/28/21 just after starting certolizumab but had not completed loading dose   certolizumab ineffective  Now on golimumab 50mg sc q 28 days           ASDAS-CRP 2.87  (HDA)   BASDAI 5.1(HDA)  BASFI 5.05 (severe functional limitation)        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 Now on golimumab other option:  anti-IL17( bimekizumab, ixekizumab or secukinumab)  Chronic hepatitis B HBV DNA neg  EH  129/81  Hyperlipidemia FKI510.2  5/20/24   on rosuvastatin 40mg nightly  Class 2 Body mass index is 38.43 kg/m².   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 LE pulses  Mild-moderate OA knees         PLAN:       F/u Desmond Osborne for  OA knees and low back pain  Continue methotrexate 15mg po once a week(divided dose) with folic acid 1mg daily for recurrent AAU OD refilled [facility]  Golimumab 50mg sc q 28 days refilled [facility]  Sulfasalazine.1500mg twice daily refilled [facility]  Vitamin D2 50,000 units q 7days and vitamin D3 1000 units  daily  entecvir 0.5mg daily refilled [facility]  Celecoxib 200mg   prn with pantoprazole 40mg daily for  prevention  Continue exercise program ibrahima quad strengthening HEP per handout  resume weight reduction, Mediterranean diet as feasible, decrease rice portions as she is doing semaglutide  RTC 3 months with standing labs

## 2025-02-18 NOTE — PROGRESS NOTES
Patient ID:  Althea Carrion Ashley    YOB: 1965     MRN:  [id number]     Subjective:     Chief Complaint:  Disease Management     History of Present Illness:  Pt is a 59 y.o. female with nr-axSpA, recurrent AAU  who presents today for f/u. LCV was 8/15/24. At that time she had no recurrence of AAU and was continued on same regimen.    Today: Pt is doing well. Denies new joint pains or swelling. She has occasional right knee swelling. Also endorses back stiffness in the morning that lasts for about an hour. She follows pain management for knee and back pain and was switched from Naproxen to Celebrex which she takes PRN (a few times per month) with relief of back and knee pain. Reports some stiffness both in morning and after exercise. Takes about 1 hour and a warm shower to improve in the morning. Voltaren gel helps knee and neck pain.    Followed with eye doctor last week. No recurrence of AAU. No change in prescription. Diagnosed with retinal exudates of left eye, dry eye syndrome bilaterally, pseudophakia bilaterally, posterior vitreous detachment of left eye. Referred to retinal specialist in 2-3 months.    Current medications: methotrexate 15 weekly, folic acid 1mg daily, golimumab (simponi) 50mg q 28 days, sulfasalazine 1500 BID, vitamin D2 50,000 weekly, D3 1000 daily, entecavir 0.5 daily    Exercise: She enjoys walking, chair exercises   Diet: Not following any particular diet.    Dry eyes - relieved with eye drops. When she does have joint swelling it is usually of her right knee. Suffers with constipation.  Denies hair loss, vision changes, dry mouth, oral/nasal ulcers, trouble swallowing, new rashes, joint swelling, morning stiffness.     Review of Systems   Review of Systems   Constitutional:  Negative for fever and unexpected weight change.   HENT:  Negative for mouth sores and trouble swallowing.    Eyes:  Negative for redness.   Respiratory:  Negative for cough and shortness of breath.     Cardiovascular:  Negative for chest pain.   Gastrointestinal:  Negative for constipation and diarrhea.   Genitourinary:  Negative for dysuria and genital sores.   Skin:  Negative for rash.   Neurological:  Negative for headaches.   Hematological:  Does not bruise/bleed easily.        Current Medications:    Current Outpatient Medications:     amLODIPine (NORVASC) 10 MG tablet, Take 1 tablet (10 mg total) by mouth once daily., Disp: 90 tablet, Rfl: 2    atenoloL (TENORMIN) 100 MG tablet, Take 1 tablet (100 mg total) by mouth once daily., Disp: 90 tablet, Rfl: 2    blood sugar diagnostic Strp, Check blood sugars BID, Disp: 200 strip, Rfl: 11    blood-glucose meter kit, Use twice daily., Disp: 1 each, Rfl: 0    canagliflozin (INVOKANA) 300 mg Tab tablet, Take 1 tablet (300 mg total) by mouth daily before breakfast., Disp: 90 tablet, Rfl: 1    clobetasoL (TEMOVATE) 0.05 % external solution, Apply to affected area once daily, Disp: 50 mL, Rfl: 6    clobetasoL (TEMOVATE) 0.05 % external solution, Apply topically once daily., Disp: 50 mL, Rfl: 6    clonazePAM (KLONOPIN) 0.5 MG tablet, TAKE 1 TABLET BY MOUTH ONCE DAILY AS NEEDED FOR ANXIETY, Disp: 90 tablet, Rfl: 0    cyclobenzaprine (FLEXERIL) 10 MG tablet, TAKE ONE TABLET BY MOUTH AT BEDTIME AS NEEDED, Disp: 90 tablet, Rfl: 2    entecavir (BARACLUDE) 0.5 MG Tab, Take 1 tablet (0.5 mg total) by mouth once daily., Disp: 90 tablet, Rfl: 3    ergocalciferol (ERGOCALCIFEROL) 50,000 unit Cap, Take 1 capsule (50,000 Units total) by mouth every 7 days., Disp: 12 capsule, Rfl: 3    fluocinolone acetonide oiL 0.01 % Drop, Place 3 drops into both ears 2 (two) times a day., Disp: 20 mL, Rfl: 3    FLUoxetine 20 MG capsule, Take 1 capsule (20 mg total) by mouth 2 (two) times daily., Disp: 180 capsule, Rfl: 1    fluticasone propionate (FLONASE) 50 mcg/actuation nasal spray, 2 sprays (100 mcg total) by Each Nostril route once daily., Disp: 16 g, Rfl: 6    folic acid (FOLVITE) 1 MG  tablet, Take 1 tablet (1 mg total) by mouth once daily., Disp: 90 tablet, Rfl: 1    golimumab (SIMPONI) 50 mg/0.5 mL PnIj, Inject 50 mg into the skin every 30 days., Disp: 1.5 mL, Rfl: 1    hydroCHLOROthiazide (HYDRODIURIL) 25 MG tablet, Take 1 tablet (25 mg total) by mouth once daily., Disp: 90 tablet, Rfl: 3    hydrocortisone 2.5 % cream, Apply topically to the affected area 2 (two) times daily, Disp: 28 g, Rfl: 8    ketoconazole (NIZORAL) 2 % shampoo, Apply topically twice a week., Disp: 120 mL, Rfl: 6    ketoconazole (NIZORAL) 2 % shampoo, Apply topically once a week., Disp: 120 mL, Rfl: 12    ketorolac 0.5% (ACULAR) 0.5 % Drop, Place 1 drop into the right eye 3 (three) times daily., Disp: 5 mL, Rfl: 3    ketorolac 0.5% (ACULAR) 0.5 % Drop, Place 1 drop into the left eye 3 (three) times daily., Disp: 5 mL, Rfl: 3    lancets (ACCU-CHEK SOFTCLIX LANCETS) Misc, Check blood sugars BID, Disp: 200 each, Rfl: 11    lancets (FREESTYLE LANCETS) 28 gauge Misc, test TWICE DAILY, Disp: 200 each, Rfl: 3    levocetirizine (XYZAL) 5 MG tablet, TAKE ONE TABLET BY MOUTH EVERY EVENING, Disp: 90 tablet, Rfl: 3    methotrexate 2.5 MG Tab, Take 6 tablets (15 mg total) by mouth every 7 days. Take 3 TABLETS Wed AM and 3 TABLETS Wed PM only, Disp: 24 tablet, Rfl: 1    ofloxacin (OCUFLOX) 0.3 % ophthalmic solution, Place 1 drop into the right eye 3 (three) times daily., Disp: 5 mL, Rfl: 3    ofloxacin (OCUFLOX) 0.3 % ophthalmic solution, Place 1 drop into the left eye 3 (three) times daily., Disp: 5 mL, Rfl: 3    pantoprazole (PROTONIX) 40 MG tablet, Take 1 tablet (40 mg total) by mouth once daily., Disp: 90 tablet, Rfl: 0    polyethylene glycol (GLYCOLAX) 17 gram/dose powder, Mix and dissolve one capful (17 grams) into a beverage and take by mouth once daily., Disp: 510 g, Rfl: 11    prednisoLONE acetate (PRED FORTE) 1 % DrpS, Instill one drop into affect eye(s) as directed, Disp: 5 mL, Rfl: 1    prednisoLONE acetate (PRED FORTE) 1 %  DrpS, Place 1 drop into the right eye 3 (three) times daily., Disp: 5 mL, Rfl: 3    prednisoLONE acetate (PRED FORTE) 1 % DrpS, Place 1 drop into the left eye 3 (three) times daily., Disp: 5 mL, Rfl: 3    rosuvastatin (CRESTOR) 40 MG Tab, Take 1 tablet (40 mg total) by mouth every evening., Disp: 90 tablet, Rfl: 3    sulfaSALAzine (AZULFIDINE) 500 MG EC tablet, Take 3 tablets (1,500 mg total) by mouth 2 (two) times daily., Disp: 540 tablet, Rfl: 0    tirzepatide (MOUNJARO) 2.5 mg/0.5 mL PnIj, Inject 2.5 mg into the skin every 7 days., Disp: 2 Pen, Rfl: 1    tiZANidine (ZANAFLEX) 2 MG tablet, Take 1-2 tablets (2-4 mg total) by mouth every 8 (eight) hours as needed., Disp: 90 tablet, Rfl: 2    triamcinolone acetonide 0.025% (KENALOG) 0.025 % cream, apply to affected area twice daily., Disp: 80 g, Rfl: 1    valsartan (DIOVAN) 320 MG tablet, Take 1 tablet (320 mg total) by mouth once daily., Disp: 90 tablet, Rfl: 3    vitamin D (VITAMIN D3) 1000 units Tab, Take 1,000 Units by mouth once daily., Disp: , Rfl:     zolpidem (AMBIEN) 5 MG Tab, Take 1 tablet (5 mg total) by mouth nightly as needed., Disp: 90 tablet, Rfl: 0    aspirin (ECOTRIN) 81 MG EC tablet, Take 1 tablet (81 mg total) by mouth once daily., Disp: 30 tablet, Rfl: 0    diclofenac sodium (VOLTAREN) 1 % Gel, Apply 4 g topically 4 (four) times daily. Apply light film topically to knee up to four times daily, Disp: 3 each, Rfl: 2    docusate sodium (COLACE) 50 MG capsule, Take 1 capsule (50 mg total) by mouth 2 (two) times daily., Disp: 180 capsule, Rfl: 3  No current facility-administered medications for this visit.    Facility-Administered Medications Ordered in Other Visits:     0.9%  NaCl infusion, , Intravenous, Continuous, Graciela Jerome MD     Objective:     Vitals:    02/18/25 0858   BP: 129/81   Pulse: 71        Body mass index is 38.43 kg/m².     Physical Exam   HENT:   Head: Normocephalic.   Right Ear: External ear normal.   Left Ear: External ear  normal.   Nose: Nose normal.   Eyes: Conjunctivae are normal.   Cardiovascular: Normal rate.   Pulmonary/Chest: Effort normal. No respiratory distress.   Musculoskeletal:         General: Normal range of motion.      Right shoulder: Normal.      Left shoulder: Normal.      Right elbow: Normal.      Left elbow: Normal.      Right wrist: Normal.      Left wrist: Normal.      Cervical back: Normal range of motion. No rigidity or tenderness.      Right knee: Swelling present. Tenderness present.      Left knee: Normal.   Neurological: She is alert.       Right Side Rheumatological Exam     Examination finds the shoulder, elbow, wrist, 1st PIP, 1st MCP, 2nd PIP, 2nd MCP, 3rd PIP, 3rd MCP, 4th PIP, 4th MCP, 5th PIP and 5th MCP normal.    The patient is tender to palpation of the knee    She has swelling of the knee    Muscle Strength (0-5 scale):  Deltoid:  5  Biceps: 5/5   Triceps:  5  : 5/5   Iliopsoas: 4.8  Quadriceps:  5   Distal Lower Extremity: 5    Left Side Rheumatological Exam     Examination finds the shoulder, elbow, wrist, knee, 1st PIP, 1st MCP, 2nd PIP, 2nd MCP, 3rd PIP, 3rd MCP, 4th PIP, 4th MCP, 5th PIP and 5th MCP normal.    Muscle Strength (0-5 scale):  Deltoid:  5  Biceps: 5/5   Triceps:  5  :  5/5   Iliopsoas: 5  Quadriceps:  5   Distal Lower Extremity: 5      Schober's 10-14.5 cm  Nl lat flex and ext  -Fabere bilaterally  Chest expansion 7 cm  Neck rom nl   O-W nl      10/31/2022   Tender (CABALLERO-28) 0 / 28    Swollen (CABALLERO-28) 0 / 28    Provider Global --   Patient Global 45 / 100   ESR 11 mm/hr   CRP 2.3 mg/L   CABALLERO-28 (ESR) 2.31 (Remission)   CABALLERO-28 (CRP) 2.02 (Remission)   CDAI Score --       Latest Reference Range & Units 01/08/25 11:26   WBC 3.90 - 12.70 K/uL  3.90 - 12.70 K/uL 7.89  7.89   RBC 4.00 - 5.40 M/uL  4.00 - 5.40 M/uL 5.09  5.09   Hemoglobin 12.0 - 16.0 g/dL  12.0 - 16.0 g/dL 14.9  14.9   Hematocrit 37.0 - 48.5 %  37.0 - 48.5 % 45.6  45.6   MCV 82 - 98 fL  82 - 98 fL 90  90   MCH  27.0 - 31.0 pg  27.0 - 31.0 pg 29.3  29.3   MCHC 32.0 - 36.0 g/dL  32.0 - 36.0 g/dL 32.7  32.7   RDW 11.5 - 14.5 %  11.5 - 14.5 % 15.3 (H)  15.3 (H)   Platelet Count 150 - 450 K/uL  150 - 450 K/uL 301  301   MPV 9.2 - 12.9 fL  9.2 - 12.9 fL 11.4  11.4   Gran % 38.0 - 73.0 %  38.0 - 73.0 % 31.6 (L)  31.6 (L)   Lymph % 18.0 - 48.0 %  18.0 - 48.0 % 58.8 (H)  58.8 (H)   Mono % 4.0 - 15.0 %  4.0 - 15.0 % 7.5  7.5   Eos % 0.0 - 8.0 %  0.0 - 8.0 % 0.9  0.9   Basophil % 0.0 - 1.9 %  0.0 - 1.9 % 0.4  0.4   Immature Granulocytes 0.0 - 0.5 %  0.0 - 0.5 % 0.8 (H)  0.8 (H)   Gran # (ANC) 1.8 - 7.7 K/uL  1.8 - 7.7 K/uL 2.5  2.5   Lymph # 1.0 - 4.8 K/uL  1.0 - 4.8 K/uL 4.6  4.6   Mono # 0.3 - 1.0 K/uL  0.3 - 1.0 K/uL 0.6  0.6   Eos # 0.0 - 0.5 K/uL  0.0 - 0.5 K/uL 0.1  0.1   Baso # 0.00 - 0.20 K/uL  0.00 - 0.20 K/uL 0.03  0.03   Immature Grans (Abs) 0.00 - 0.04 K/uL  0.00 - 0.04 K/uL 0.06 (H)  0.06 (H)   nRBC 0 /100 WBC  0 /100 WBC 0  0   Differential Method  Automated  Automated   Sed Rate 0 - 36 mm/Hr 24   Sodium 136 - 145 mmol/L  136 - 145 mmol/L 140  140   Potassium 3.5 - 5.1 mmol/L  3.5 - 5.1 mmol/L 3.7  3.7   Chloride 95 - 110 mmol/L  95 - 110 mmol/L 101  101   CO2 23 - 29 mmol/L  23 - 29 mmol/L 28  28   Anion Gap 8 - 16 mmol/L  8 - 16 mmol/L 11  11   BUN 6 - 20 mg/dL  6 - 20 mg/dL 13  13   Creatinine 0.5 - 1.4 mg/dL  0.5 - 1.4 mg/dL 0.8  0.8   eGFR >60 mL/min/1.73 m^2  >60 mL/min/1.73 m^2 >60.0  >60.0   Glucose 70 - 110 mg/dL  70 - 110 mg/dL 139 (H)  139 (H)   Calcium 8.7 - 10.5 mg/dL  8.7 - 10.5 mg/dL 9.7  9.7   ALP 40 - 150 U/L  40 - 150 U/L 92  92   PROTEIN TOTAL 6.0 - 8.4 g/dL  6.0 - 8.4 g/dL 8.9 (H)  8.9 (H)   Albumin 3.5 - 5.2 g/dL  3.5 - 5.2 g/dL 4.5  4.5   BILIRUBIN TOTAL 0.1 - 1.0 mg/dL  0.1 - 1.0 mg/dL 0.4  0.4   AST 10 - 40 U/L  10 - 40 U/L 20  20   ALT 10 - 44 U/L  10 - 44 U/L 19  19   CRP 0.0 - 8.2 mg/L 10.3 (H)   Cholesterol Total 120 - 199 mg/dL 263 (H)   HDL 40 - 75 mg/dL 62   HDL/Cholesterol Ratio 20.0 -  50.0 % 23.6   Non-HDL Cholesterol mg/dL 201   Total Cholesterol/HDL Ratio 2.0 - 5.0  4.2   Triglycerides 30 - 150 mg/dL 83   LDL Cholesterol 63.0 - 159.0 mg/dL 184.4 (H)   Hemoglobin A1C External 4.0 - 5.6 %  4.0 - 5.6 % 7.5 (H)  7.5 (H)   Estimated Avg Glucose 68 - 131 mg/dL  68 - 131 mg/dL 169 (H)  169 (H)   TSH 0.400 - 4.000 uIU/mL 1.407   Hepatitis B Surface Ag Non-reactive  Non-reactive   Hepatitis B Virus DNA Not Detected  HBV DNA not detected   Hepatitis B Virus PCR, Quant <12 IU/mL Not Detected   (H): Data is abnormally high  (L): Data is abnormally low       Assessment:     1. Non-radiographic axial spondyloarthritis         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 Now on golimumab other option:  anti-IL17( bimekizumab, ixekizumab or secukinumab)  Chronic hepatitis B HBV DNA neg  EH  129/81  Hyperlipidemia DLQ271.2  5/20/24   on rosuvastatin 40mg nightly  Class 2 Body mass index is 38.43 kg/m².   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 LE pulses  Mild-moderate OA knees      Plan:      Problem List Items Addressed This Visit          Orthopedic    Non-radiographic axial spondyloarthritis - Primary           F/u Desmond Luke for right knee and low back  Continue methotrexate 15mg po once a week(divided dose) with folic acid 1mg daily for recurrent AAU OD refilled [facility]  Golimumab 50mg sc q 28 days refilled [facility]  Sulfasalazine.1500mg twice daily refilled [facility]  Vitamin D2 50,000 units q 7days and vitamin D3 1000 units daily  entecvir 0.5mg daily refilled [facility]  Naproxen 500mg twice daily prn with pantoprazole 40mg daily for  prevention  Continue exercise program  ibrahima quad strengthening HEP per handout  resume weight reduction, Mediterranean diet as feasible, decrease rice portions as she is doing semaglutide  RTC 3 months with standing labs      Jaja Ramos, DO  PGY-1  [employer] [facility]

## 2025-03-06 ENCOUNTER — PATIENT MESSAGE (OUTPATIENT)
Dept: INTERNAL MEDICINE | Facility: CLINIC | Age: 60
End: 2025-03-06
Payer: MEDICARE

## 2025-03-06 DIAGNOSIS — E66.01 SEVERE OBESITY (BMI 35.0-39.9) WITH COMORBIDITY: ICD-10-CM

## 2025-03-06 DIAGNOSIS — E11.42 TYPE 2 DIABETES MELLITUS WITH DIABETIC POLYNEUROPATHY, UNSPECIFIED WHETHER LONG TERM INSULIN USE: Chronic | ICD-10-CM

## 2025-03-06 RX ORDER — TIRZEPATIDE 2.5 MG/.5ML
2.5 INJECTION, SOLUTION SUBCUTANEOUS
Qty: 12 PEN | Refills: 1 | Status: CANCELLED | OUTPATIENT
Start: 2025-03-06

## 2025-03-06 NOTE — TELEPHONE ENCOUNTER
Refill Routing Note   Medication(s) are not appropriate for processing by [facility] for the following reason(s):        New or recently adjusted medication  Pt requesting a 90 day supply    [facility] action(s):  Defer               Appointments  past 12m or future 3m with PCP    Date Provider   Last Visit   1/8/2025 Weston Begum, DO   Next Visit   Visit date not found Weston Begum, DO   ED visits in past 90 days: 0        Note composed:3:32 PM 03/06/2025

## 2025-03-06 NOTE — TELEPHONE ENCOUNTER
No care due was identified.  [facility] Embedded Care Due Messages. Reference number: [id number].   3/06/2025 11:52:53 AM CST

## 2025-03-10 DIAGNOSIS — M02.30 REACTIVE ARTHRITIS: ICD-10-CM

## 2025-03-10 RX ORDER — CYCLOBENZAPRINE HCL 10 MG
TABLET ORAL
Qty: 90 TABLET | Refills: 2 | Status: SHIPPED | OUTPATIENT
Start: 2025-03-10

## 2025-03-10 RX ORDER — FOLIC ACID 1 MG/1
1 TABLET ORAL DAILY
Qty: 90 TABLET | Refills: 1 | Status: CANCELLED | OUTPATIENT
Start: 2025-03-10 | End: 2026-03-10

## 2025-03-10 NOTE — TELEPHONE ENCOUNTER
No care due was identified.  [facility] Embedded Care Due Messages. Reference number: [id number].   3/10/2025 7:33:58 AM CDT

## 2025-03-11 DIAGNOSIS — E66.01 SEVERE OBESITY (BMI 35.0-39.9) WITH COMORBIDITY: ICD-10-CM

## 2025-03-11 DIAGNOSIS — E11.42 TYPE 2 DIABETES MELLITUS WITH DIABETIC POLYNEUROPATHY, UNSPECIFIED WHETHER LONG TERM INSULIN USE: Chronic | ICD-10-CM

## 2025-03-11 NOTE — TELEPHONE ENCOUNTER
No care due was identified.  [facility] Embedded Care Due Messages. Reference number: [id number].   3/11/2025 1:38:13 PM CDT

## 2025-03-11 NOTE — TELEPHONE ENCOUNTER
LOV with Weston Begum DO , 1/8/2025    Pt requesting 90 day supply of Mounjaro be sent to pharmacy. Pt also requesting PCP increase her dose from 2.5mg to 5mg. Pt states she is tolerating the 2.5mg well, denies side effects. Pended for your review

## 2025-03-11 NOTE — TELEPHONE ENCOUNTER
No care due was identified.  [facility] Embedded Care Due Messages. Reference number: [id number].   3/11/2025 1:42:24 PM CDT

## 2025-03-12 RX ORDER — TIRZEPATIDE 5 MG/.5ML
5 INJECTION, SOLUTION SUBCUTANEOUS
Qty: 9 ML | Refills: 3 | Status: SHIPPED | OUTPATIENT
Start: 2025-03-12

## 2025-03-12 RX ORDER — TIRZEPATIDE 2.5 MG/.5ML
2.5 INJECTION, SOLUTION SUBCUTANEOUS
Qty: 12 PEN | Refills: 1 | Status: SHIPPED | OUTPATIENT
Start: 2025-03-12

## 2025-03-12 NOTE — TELEPHONE ENCOUNTER
Refill Routing Note   Medication(s) are not appropriate for processing by [facility] for the following reason(s):        New or recently adjusted medication    [facility] action(s):  Defer      Medication Therapy Plan: Patient comment: Can you please send me a 3 month supply please i have trouble getting someone to get my med.      Appointments  past 12m or future 3m with PCP    Date Provider   Last Visit   1/8/2025 Weston Begum, DO   Next Visit   Visit date not found Weston Begum, DO   ED visits in past 90 days: 0        Note composed:7:06 AM 03/12/2025

## 2025-03-19 ENCOUNTER — TELEPHONE (OUTPATIENT)
Dept: SLEEP MEDICINE | Facility: OTHER | Age: 60
End: 2025-03-19
Payer: MEDICARE

## 2025-03-28 DIAGNOSIS — G47.00 INSOMNIA, UNSPECIFIED TYPE: ICD-10-CM

## 2025-03-28 DIAGNOSIS — F41.1 GENERALIZED ANXIETY DISORDER: ICD-10-CM

## 2025-03-28 DIAGNOSIS — F41.0 PANIC DISORDER WITHOUT AGORAPHOBIA: ICD-10-CM

## 2025-03-31 RX ORDER — CLONAZEPAM 0.5 MG/1
TABLET ORAL
Qty: 90 TABLET | Refills: 0 | OUTPATIENT
Start: 2025-03-31

## 2025-03-31 RX ORDER — ZOLPIDEM TARTRATE 5 MG/1
5 TABLET ORAL NIGHTLY PRN
Qty: 90 TABLET | Refills: 0 | OUTPATIENT
Start: 2025-03-31

## 2025-03-31 RX ORDER — FLUOXETINE HYDROCHLORIDE 20 MG/1
20 CAPSULE ORAL 2 TIMES DAILY
Qty: 180 CAPSULE | Refills: 1 | OUTPATIENT
Start: 2025-03-31

## 2025-04-15 ENCOUNTER — HOSPITAL ENCOUNTER (OUTPATIENT)
Dept: SLEEP MEDICINE | Facility: HOSPITAL | Age: 60
Discharge: HOME | End: 2025-04-15
Attending: PSYCHIATRY & NEUROLOGY
Payer: MEDICARE

## 2025-04-15 DIAGNOSIS — G47.33 OSA (OBSTRUCTIVE SLEEP APNEA): ICD-10-CM

## 2025-04-15 PROCEDURE — 95811 POLYSOM 6/>YRS CPAP 4/> PARM: CPT

## 2025-04-16 ENCOUNTER — RESULTS FOLLOW-UP (OUTPATIENT)
Dept: RHEUMATOLOGY | Facility: CLINIC | Age: 60
End: 2025-04-16

## 2025-04-16 ENCOUNTER — TELEPHONE (OUTPATIENT)
Dept: RHEUMATOLOGY | Facility: CLINIC | Age: 60
End: 2025-04-16
Payer: MEDICARE

## 2025-04-16 ENCOUNTER — LAB VISIT (OUTPATIENT)
Dept: LAB | Facility: HOSPITAL | Age: 60
End: 2025-04-16
Attending: INTERNAL MEDICINE
Payer: MEDICARE

## 2025-04-16 DIAGNOSIS — M46.90 AXIAL SPONDYLOARTHRITIS: ICD-10-CM

## 2025-04-16 DIAGNOSIS — B18.1 CHRONIC VIRAL HEPATITIS B WITHOUT DELTA AGENT AND WITHOUT COMA: ICD-10-CM

## 2025-04-16 DIAGNOSIS — E11.9 TYPE 2 DIABETES MELLITUS WITHOUT COMPLICATION, WITHOUT LONG-TERM CURRENT USE OF INSULIN: ICD-10-CM

## 2025-04-16 DIAGNOSIS — E78.5 HYPERLIPIDEMIA, UNSPECIFIED HYPERLIPIDEMIA TYPE: ICD-10-CM

## 2025-04-16 LAB
ABSOLUTE EOSINOPHIL (OHS): 0.06 K/UL
ABSOLUTE MONOCYTE (OHS): 0.49 K/UL (ref 0.3–1)
ABSOLUTE NEUTROPHIL COUNT (OHS): 2.23 K/UL (ref 1.8–7.7)
ALBUMIN SERPL BCP-MCNC: 4.8 G/DL (ref 3.5–5.2)
ALP SERPL-CCNC: 77 UNIT/L (ref 38–126)
ALT SERPL W/O P-5'-P-CCNC: 24 UNIT/L (ref 10–44)
ANION GAP (OHS): 11 MMOL/L (ref 8–16)
AST SERPL-CCNC: 28 UNIT/L (ref 15–46)
BASOPHILS # BLD AUTO: 0.04 K/UL
BASOPHILS NFR BLD AUTO: 0.7 %
BILIRUB SERPL-MCNC: 0.5 MG/DL (ref 0.1–1)
BUN SERPL-MCNC: 12 MG/DL (ref 7–17)
CALCIUM SERPL-MCNC: 9.8 MG/DL (ref 8.7–10.5)
CHLORIDE SERPL-SCNC: 102 MMOL/L (ref 95–110)
CHOLEST SERPL-MCNC: 173 MG/DL (ref 120–199)
CHOLEST/HDLC SERPL: 2.7 {RATIO} (ref 2–5)
CO2 SERPL-SCNC: 29 MMOL/L (ref 23–29)
CREAT SERPL-MCNC: 0.7 MG/DL (ref 0.5–1.4)
CRP SERPL-MCNC: 0.87 MG/DL
EAG (OHS): 148 MG/DL (ref 68–131)
ERYTHROCYTE [DISTWIDTH] IN BLOOD BY AUTOMATED COUNT: 13 % (ref 11.5–14.5)
ERYTHROCYTE [SEDIMENTATION RATE] IN BLOOD BY PHOTOMETRIC METHOD: <2 MM/HR
GFR SERPLBLD CREATININE-BSD FMLA CKD-EPI: >60 ML/MIN/1.73/M2
GLUCOSE SERPL-MCNC: 147 MG/DL (ref 70–110)
HBA1C MFR BLD: 6.8 % (ref 4–5.6)
HCT VFR BLD AUTO: 41.1 % (ref 37–48.5)
HDLC SERPL-MCNC: 63 MG/DL (ref 40–75)
HDLC SERPL: 36.4 % (ref 20–50)
HGB BLD-MCNC: 13.4 GM/DL (ref 12–16)
IMM GRANULOCYTES # BLD AUTO: 0.02 K/UL (ref 0–0.04)
IMM GRANULOCYTES NFR BLD AUTO: 0.4 % (ref 0–0.5)
LDLC SERPL CALC-MCNC: 96.6 MG/DL (ref 63–159)
LYMPHOCYTES # BLD AUTO: 2.78 K/UL (ref 1–4.8)
MCH RBC QN AUTO: 29.9 PG (ref 27–31)
MCHC RBC AUTO-ENTMCNC: 32.6 G/DL (ref 32–36)
MCV RBC AUTO: 92 FL (ref 82–98)
NONHDLC SERPL-MCNC: 110 MG/DL
NUCLEATED RBC (/100WBC) (OHS): 0 /100 WBC
PLATELET # BLD AUTO: 246 K/UL (ref 150–450)
PMV BLD AUTO: 10.6 FL (ref 9.2–12.9)
POTASSIUM SERPL-SCNC: 4.1 MMOL/L (ref 3.5–5.1)
PROT SERPL-MCNC: 8.3 GM/DL (ref 6–8.4)
RBC # BLD AUTO: 4.48 M/UL (ref 4–5.4)
RELATIVE EOSINOPHIL (OHS): 1.1 %
RELATIVE LYMPHOCYTE (OHS): 49.5 % (ref 18–48)
RELATIVE MONOCYTE (OHS): 8.7 % (ref 4–15)
RELATIVE NEUTROPHIL (OHS): 39.6 % (ref 38–73)
SODIUM SERPL-SCNC: 142 MMOL/L (ref 136–145)
TRIGL SERPL-MCNC: 67 MG/DL (ref 30–150)
WBC # BLD AUTO: 5.62 K/UL (ref 3.9–12.7)

## 2025-04-16 PROCEDURE — 83036 HEMOGLOBIN GLYCOSYLATED A1C: CPT | Mod: PN

## 2025-04-16 PROCEDURE — 86140 C-REACTIVE PROTEIN: CPT | Mod: PN

## 2025-04-16 PROCEDURE — 85025 COMPLETE CBC W/AUTO DIFF WBC: CPT | Mod: PN

## 2025-04-16 PROCEDURE — 87517 HEPATITIS B DNA QUANT: CPT | Mod: PN

## 2025-04-16 PROCEDURE — 85652 RBC SED RATE AUTOMATED: CPT | Mod: PN

## 2025-04-16 PROCEDURE — 80061 LIPID PANEL: CPT | Mod: PN

## 2025-04-16 PROCEDURE — 36415 COLL VENOUS BLD VENIPUNCTURE: CPT | Mod: PN

## 2025-04-16 PROCEDURE — 80053 COMPREHEN METABOLIC PANEL: CPT | Mod: PN

## 2025-04-16 RX ORDER — GOLIMUMAB 50 MG/.5ML
50 INJECTION, SOLUTION SUBCUTANEOUS
Qty: 1.5 ML | Refills: 1 | Status: ACTIVE | OUTPATIENT
Start: 2025-04-16 | End: 2026-04-16

## 2025-04-16 NOTE — PROGRESS NOTES
Patient was educated about the purpose of the wires and what data was being collected. Patient was informed that the technician may need to enter the room during the night to fix leads or make adjustments to the equipment.       Large Eson nasal mask used              Follow up instructions were provided to the patient.

## 2025-04-17 LAB — HBV DNA SERPL NAA+PROBE-ACNC: NORMAL [IU]/ML

## 2025-04-28 ENCOUNTER — OFFICE VISIT (OUTPATIENT)
Dept: SLEEP MEDICINE | Facility: CLINIC | Age: 60
End: 2025-04-28
Attending: PSYCHIATRY & NEUROLOGY
Payer: MEDICARE

## 2025-04-28 VITALS
DIASTOLIC BLOOD PRESSURE: 83 MMHG | HEART RATE: 75 BPM | HEIGHT: 67 IN | SYSTOLIC BLOOD PRESSURE: 128 MMHG | WEIGHT: 236.31 LBS | BODY MASS INDEX: 37.09 KG/M2

## 2025-04-28 DIAGNOSIS — L30.4 INTERTRIGO: ICD-10-CM

## 2025-04-28 DIAGNOSIS — G47.33 OSA (OBSTRUCTIVE SLEEP APNEA): Primary | ICD-10-CM

## 2025-04-28 PROCEDURE — 3008F BODY MASS INDEX DOCD: CPT | Mod: CPTII,S$GLB,, | Performed by: PSYCHIATRY & NEUROLOGY

## 2025-04-28 PROCEDURE — 3066F NEPHROPATHY DOC TX: CPT | Mod: CPTII,S$GLB,, | Performed by: PSYCHIATRY & NEUROLOGY

## 2025-04-28 PROCEDURE — 3074F SYST BP LT 130 MM HG: CPT | Mod: CPTII,S$GLB,, | Performed by: PSYCHIATRY & NEUROLOGY

## 2025-04-28 PROCEDURE — 3079F DIAST BP 80-89 MM HG: CPT | Mod: CPTII,S$GLB,, | Performed by: PSYCHIATRY & NEUROLOGY

## 2025-04-28 PROCEDURE — 99999 PR PBB SHADOW E&M-EST. PATIENT-LVL III: CPT | Mod: PBBFAC,,, | Performed by: PSYCHIATRY & NEUROLOGY

## 2025-04-28 PROCEDURE — 3044F HG A1C LEVEL LT 7.0%: CPT | Mod: CPTII,S$GLB,, | Performed by: PSYCHIATRY & NEUROLOGY

## 2025-04-28 PROCEDURE — 99214 OFFICE O/P EST MOD 30 MIN: CPT | Mod: S$GLB,,, | Performed by: PSYCHIATRY & NEUROLOGY

## 2025-04-28 PROCEDURE — 4010F ACE/ARB THERAPY RXD/TAKEN: CPT | Mod: CPTII,S$GLB,, | Performed by: PSYCHIATRY & NEUROLOGY

## 2025-04-28 PROCEDURE — 3061F NEG MICROALBUMINURIA REV: CPT | Mod: CPTII,S$GLB,, | Performed by: PSYCHIATRY & NEUROLOGY

## 2025-04-28 NOTE — PROGRESS NOTES
2025     7:27 PM 2025     9:34 AM 3/12/2024     6:33 PM 2018     2:29 PM   EPWORTH SLEEPINESS SCALE TOTAL SCORE    Total score 13  15  8 5       Patient-reported       Althea Vazquez is a 59 y.o. female seen today for cpap  follow up. Last seen on 2025      HAd CPAP requal study - will start a new compliance term Likes Eson  mask used  in the study    APAP 5-8 cm H2O  recommended; liked Eson mask over the full face mask     Interrogation:    Prior to failing compliance   Althea Vazquez 2024 - 2025 Patient ID: [id number] : 1965 Age: 59 years Gender: Female [facility] [address] [facility], [address] Compliance Report Compliance Payor Standard Usage 2024 - 2025 Usage days 14/120 days (12%) >= 4 hours 12 days (10%) < 4 hours 2 days (2%) Usage hours 68 hours 39 minutes Average usage (total days) 34 minutes Average usage (days used) 4 hours 54 minutes Median usage (days used) 5 hours 7 minutes Total used hours (value since last reset - 2025) 231 hours AirSense 11 AutoSet Serial number [id number] Mode AutoSet Min Pressure 5 cmH2O Max Pressure 20 cmH2O EPR Fulltime EPR level 3 Response Standard Therapy Pressure - cmH2O Median: 7.2 95th percentile: 9.6 Maximum: 10.9 Leaks - L/min Median: 11.7 95th percentile: 40.0 Maximum: 76.0 Events per hour AI: 1.6 HI: 0.2 AHI: 1.8 Apnea Index Central: 0.1 Obstructive: 1.0 Unknown: 0.5 RERA Index 0.6 Cheyne-Peraza respiration (average duration per night) 0 minutes (0%) Usage - hours Printed on 2025 - siXis version 4.48.0-6.0 Page 1 of 1        INTERVAL HISTORY:    2025:    Sleep studied: CPAP titration:   CPAP  at 8 cm, mask of patient's choice, chin strap, and heated humidification, which is essential in conjunction with PAP to prevent airway drying and irritation; alternatively, use AutoCPAP at 5-8 cm H2O.  Higher pressures reutlsed in central apneas emergence.                 2025     7:27 PM 2025     9:34 AM 3/12/2024     6:33 PM 2018     2:29 PM   EPWORTH SLEEPINESS SCALE TOTAL SCORE    Total score 13  15  8 5       Patient-reported       Althea Vazquez is a 59 y.o. female seen today for cpap   follow up. Last seen on 3/13/2024.  Althea Vazquez had HST and started APAP since her last visit.     The patient denies improved sleep continuity and daytime sleepiness on PAP. ESS today is .  Denies break through snoring.  Reports occasional  dry mouth.  No aerophagia or air hunger. Reports occasional mask leaks and discomfort. Using a F40  mask; she can not narrow down the source of discomfort; just can not wear it over several hours.      F40 M - was all twisted with magnets attached to the wrong straps    Failed compliance /90 days      Althea Vazquez 2024 - 2025 Patient ID: [id number] : 1965 Age: 59 years Gender: Female [facility] [address] [facility], [address] Compliance Report Compliance Payor Standard Usage 2024 - 2025 Usage days 16/150 days (11%) >= 4 hours 5 days (3%) < 4 hours 11 days (7%) Usage hours 46 hours 9 minutes Average usage (total days) 18 minutes Average usage (days used) 2 hours 53 minutes Median usage (days used) 2 hours 35 minutes Total used hours (value since last reset - 2025) 150 hours AirSense 11 AutoSet Serial number [id number] Mode AutoSet Min Pressure 5 cmH2O Max Pressure 20 cmH2O EPR Fulltime EPR level 3 Response Standard Therapy Pressure - cmH2O Median: 6.6 95th percentile: 9.5 Maximum: 11.2 Leaks - L/min Median: 14.4 95th percentile: 39.6 Maximum: 77.0 Events per hour AI: 4.5 HI: 0.6 AHI: 5.1 Apnea Index Central: 0.5 Obstructive: 3.1 Unknown: 0.9 RERA Index 0.2 Cheyne-Peraza respiration (average duration per night) 0 minutes (0%)    Will roder CPAP titration for requal _ Medicare          INTERVAL HISTORY:    3/13/2024:       Althea Carrion  Taylor is a 59 y.o. female is here to be evaluated for a sleep disorder; referred by No ref. provider found.    HAd PSG in 2014 - had UAAERS: gained 15 lbs since previosu stidu - weight goes up and ndown.  Never tereated for UARS>  Stil reports louid snoring; may be choking for air - once jum,ped out of the sleep;excessive daytime sleepiness and fatigue  Reports bad dreams upon retuirning to sleep.    + restless sleep; jumping out of her sleep - the patient herself is not aware.     Seeing pscychiatry for anxiety. Clonazepam - very PRN.      Althea Vazquez denied  witnessed apneas.    The patient does not feel rested upon awakening. Takes naps.     The patient  reports morning headaches and reports  dry mouth on awakening.   Althea Vazquez denies  nasal congestion.    The patient never had tonsillectomy, adenoidectomy or UPPP        Althea Vazquez  reports somniloquy. No somnambulism  The patient  denies auxiliary symptoms of narcolepsy including sleep onset paralysis, hypnagogic hallucinations, sleep attacks and cataplexy.    Althea Vazquez denied symptoms concerning for RLS; nocturnal leg movements have not been noticed.   The patient does not experience sleep related leg cramps.         Medications pertinent to sleep  disorders taken currently: Clonazepam  Previous  medications taken  for sleep disorders:  no    Sleep studies  PSG 2015 - Medicare criteriA:    Soft snoring was present. There were rare hypopneas, but they did not reach significance by AHI and O2 criteria. The overall AHI was 2.9 with an oxygen larisa of 88.0%. The supine AHI was 15.4 and the REM AHI was 6.2. The RDI (Respiratory Disturbance Index) was greater than the AHI due to the presence of frequent RERAs (respiratory effort related arousals), primarily while sleeping supine. The overall RDI was 11.1. The supine RDI was 15.4.             4/25/2025     7:27 PM 1/27/2025     9:34 AM 3/12/2024      6:33 PM 8/1/2018     2:29 PM   EPWORTH SLEEPINESS SCALE TOTAL SCORE    Total score 13  15  8 5       Patient-reported             3/12/2024     6:33 PM   EPWORTH SLEEPINESS SCALE   Sitting and reading 1   Watching TV 3   Sitting, inactive in a public place (e.g. a theatre or a meeting) 1   As a passenger in a car for an hour without a break 1   Lying down to rest in the afternoon when circumstances permit 1   Sitting and talking to someone 0   Sitting quietly after a lunch without alcohol 1   In a car, while stopped for a few minutes in traffic 0   Total score 8               3/12/2024     6:33 PM   EPWORTH SLEEPINESS SCALE   Sitting and reading 1   Watching TV 3   Sitting, inactive in a public place (e.g. a theatre or a meeting) 1   As a passenger in a car for an hour without a break 1   Lying down to rest in the afternoon when circumstances permit 1   Sitting and talking to someone 0   Sitting quietly after a lunch without alcohol 1   In a car, while stopped for a few minutes in traffic 0   Total score 8         3/12/2024     6:44 PM   Sleep Clinic New Patient   What time do you go to bed on a week day? (Give a range) 9 or 10   What time do you go to bed on a day off? (Give a range) Dont work   How long does it take you to fall asleep? (Give a range) 20 to 25 minutes   On average, how many times per night do you wake up? One   How long does it take you to fall back into sleep? (Give a range) Some time I dont   What time do you wake up to start your day on a week day? (Give a range) 6:00   What time do you wake up to start your day on a day off? (Give a range) Dont work   What time do you get out of bed? (Give a range) 6:00   On average, how many hours do you sleep? 5   On average, how many naps do you take per day? One   Rate your sleep quality from 0 to 5 (0-poor, 5-great). 2   Have you experienced:  Weight gain?   How much weight have you lost or gained (in lbs.) in the last year? 20lb   On average, how many  times per night do you go to the bathroom?  One   Have you ever had a sleep study/CPAP machine/surgery for sleep apnea? No   Have you ever had a CPAP machine for sleep apnea? No   Have you ever had surgery for sleep apnea? No           3/12/2024     6:44 PM   Sleep Clinic ROS    Difficulty breathing through the nose?  Sometimes   Sore throat or dry mouth in the morning? Yes   Irregular or very fast heart beat?  No   Shortness of breath?  No   Acid reflux? Sometimes   Body aches and pains?  Yes   Morning headaches? Sometimes   Dizziness? Sometimes   Mood changes?  Sometimes   Do you exercise?  No   Do you feel like moving your legs a lot?  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/09/2016    History of hepatitis B 10/03/2016    Abnormal chest CT 10/07/2016    Gastroesophageal reflux disease 11/07/2016    Cervicogenic headache 04/17/2017    Severe obesity (BMI 35.0-39.9) with comorbidity 12/01/2017    Hyperlipidemia associated with type 2 diabetes mellitus 04/30/2018    Anxiety 04/30/2018    Hypertension associated with diabetes 09/12/2018    Tendinopathy of right gluteal region 01/30/2019    Diabetes mellitus with hyperglycemia 12/02/2019    Sacroiliitis 07/30/2020    Pain 02/17/2021    Greater trochanteric bursitis of left hip 04/21/2021    Sleep apnea 04/28/2024    VAISHNAVI (obstructive sleep apnea) 04/28/2024    Dysphagia 06/11/2024    History of  colon polyps 06/11/2024     Resolved Ambulatory Problems     Diagnosis Date Noted    Reactive arthritis 09/06/2012    Effusion of right knee 09/06/2012    Long-term current use of steroids 09/27/2012    Fatigue 09/27/2012    Hyperpigmentation of skin 09/27/2012    Dactylitis 09/27/2012    Hypertension 10/01/2012    Anxiety 10/18/2012    Tachycardia 01/10/2013    Type II or unspecified type diabetes mellitus with neurological manifestations, not stated as uncontrolled(250.60) 05/29/2013    Right knee pain 04/21/2014    Iritis 05/13/2014    Depression     Knee pain 05/14/2014    Status post arthroscopy of right knee 5/14/2014 05/20/2014    Annual physical exam 08/28/2014    Numbness and tingling of right arm 08/28/2014    Menorrhagia 10/14/2014    Diabetic peripheral neuropathy - mild 10/21/2014    Hyperlipidemia 10/21/2014    Sleep difficulties 10/21/2014    Obesity 10/21/2014    Pre-op evaluation 11/19/2014    S/P laparoscopic hysterectomy 12/03/2014    Type II or unspecified type diabetes mellitus with neurological manifestations, not stated as uncontrolled(250.60) 01/02/2015    Angioedema of lips 02/10/2015    Essential hypertension 11/11/2015    Anxiety and depression 07/07/2016    Weakness of right lower extremity 07/07/2016    Weakness of right upper extremity 07/07/2016    Muscle weakness of lower extremity 07/18/2016    Difficulty walking 07/18/2016    Pain aggravated by walking 07/18/2016    Multiple falls 07/18/2016    Muscle weakness of right upper extremity 07/26/2016    Enthesitis related arthritis 09/12/2016    Cough 10/07/2016    Mass 10/13/2016    Decreased range of motion of neck 10/15/2016    Abnormality of gait 10/15/2016    Weakness of both legs 10/15/2016    Pain aggravated by physical activity 10/15/2016    Shoulder pain, bilateral 10/19/2016    Decreased range of motion of shoulder 10/19/2016    Muscle weakness of upper extremity 10/19/2016    Right shoulder pain 02/14/2017    Decreased range  of motion (ROM) of shoulder 02/14/2017    Weakness of both legs 03/12/2017    Decreased functional mobility and endurance 03/12/2017    Muscle spasms of neck 04/17/2017    Colon cancer screening 01/09/2018    Essential hypertension 04/30/2018    Morbid obesity with BMI of 40.0-44.9, adult 06/08/2018    Hypocalcemia 08/30/2019    Diabetes mellitus type 2 without retinopathy 12/02/2019    DM type 2, not at goal 12/02/2019    Immunosuppressed status 12/02/2019    Positive depression screening 12/02/2019    Osteoarthritis involving multiple joints on both sides of body 07/30/2020    Bilateral shoulder pain 08/04/2020    Upper extremity weakness 08/04/2020    Axillary abscess 05/07/2021     Past Medical History:   Diagnosis Date    Acid reflux     Arthritis     Cataract     Difficult intubation     Dry eyes     Dry mouth     Fever blister     Nausea & vomiting 02/04/2015    Type II diabetes mellitus 10/01/2012                PAST SURGICAL HISTORY:    Past Surgical History:   Procedure Laterality Date    CATARACT EXTRACTION W/  INTRAOCULAR LENS IMPLANT Right 1/3/2024    Procedure: EXTRACTION, CATARACT, WITH IOL INSERTION;  Surgeon: Soumya Mortensen MD;  Location: [facility] OR;  Service: Ophthalmology;  Laterality: Right;    CATARACT EXTRACTION W/  INTRAOCULAR LENS IMPLANT Left 2/7/2024    Procedure: EXTRACTION, CATARACT, WITH IOL INSERTION;  Surgeon: Soumya Mortensen MD;  Location: [facility] OR;  Service: Ophthalmology;  Laterality: Left;    COLONOSCOPY N/A 1/9/2018    Procedure: COLONOSCOPY;  Surgeon: Juwan Lopez MD;  Location: [facility] ([address] [place] [address]);  Service: Endoscopy;  Laterality: N/A;  per anesthesia, pt. needs to be on 2nd floor due to past Anesthesia problems    COLONOSCOPY N/A 7/16/2024    Procedure: COLONOSCOPY;  Surgeon: Pradeep Soriano MD;  Location: [facility] ENDO;  Service: Endoscopy;  Laterality: N/A;    EPIDURAL STEROID INJECTION INTO CERVICAL SPINE N/A 2/17/2021    Procedure:  Injection-steroid-epidural-cervical--C7-T1 IL SHARONA;  Surgeon: Graciela Jerome MD;  Location: [facility] PAIN MGT;  Service: Pain Management;  Laterality: N/A;    EPIDURAL STEROID INJECTION INTO CERVICAL SPINE N/A 2/23/2023    Procedure: Injection-steroid-epidural-cervical  C7-T1;  Surgeon: Juvenal Segura MD;  Location: [facility] PAIN MGT;  Service: Pain Management;  Laterality: N/A;    ESOPHAGOGASTRODUODENOSCOPY N/A 7/16/2024    Procedure: EGD (ESOPHAGOGASTRODUODENOSCOPY);  Surgeon: Pradeep Soriano MD;  Location: [facility] ENDO;  Service: Endoscopy;  Laterality: N/A;    HYSTERECTOMY  12/3/2014    INJECTION OF ANESTHETIC AGENT INTO SACROILIAC JOINT Left 4/21/2021    Procedure: LEFT SACROILIAC JOINT STEROID INJECTION;  Surgeon: Graciela Jerome MD;  Location: [facility] PAIN MGT;  Service: Pain Management;  Laterality: Left;    INJECTION OF JOINT Left 4/21/2021    Procedure: Injection, Joint--LEFT GTB;  Surgeon: Graciela Jerome MD;  Location: [facility] PAIN MGT;  Service: Pain Management;  Laterality: Left;    KNEE ARTHROSCOPY  5-14-14    right    TUBAL LIGATION           FAMILY HISTORY:                Family History   Problem Relation Name Age of Onset    Diabetes Mother      Cataracts Mother      Stroke Mother      Heart attack Mother      Depression Mother      Stroke Father      Hypertension Father      Hyperlipidemia Father      Colon cancer Father      Diabetes Maternal Aunt      Heart attack Paternal Grandmother      ADD / ADHD Son      No Known Problems Sister      No Known Problems Brother 2     No Known Problems Maternal Uncle      No Known Problems Paternal Aunt      No Known Problems Paternal Uncle      No Known Problems Maternal Grandmother      No Known Problems Maternal Grandfather      Cancer Paternal Grandfather          Lung CA    Melanoma Neg Hx      Eczema Neg Hx      Lupus Neg Hx      Psoriasis Neg Hx      Amblyopia Neg Hx      Blindness Neg Hx      Glaucoma Neg Hx      Macular degeneration Neg Hx      Retinal  detachment Neg Hx      Strabismus Neg Hx      Thyroid disease Neg Hx      Suicide Neg Hx      Acne Neg Hx      Cirrhosis Neg Hx      Asthma Neg Hx      Emphysema Neg Hx      Breast cancer Neg Hx      Ovarian cancer Neg Hx         SOCIAL HISTORY:          Tobacco:   Social History     Tobacco Use   Smoking Status Never   Smokeless Tobacco Never       alcohol use:    Social History     Substance and Sexual Activity   Alcohol Use Not Currently    Comment: occas.                   ALLERGIES:    Review of patient's allergies indicates:   Allergen Reactions    Bactrim [sulfamethoxazole-trimethoprim] Itching    Trulicity [dulaglutide] Diarrhea and Other (See Comments)     Vomiting, abdominal pain    Diflucan [fluconazole] Swelling and Other (See Comments)     Sore on mouth       CURRENT MEDICATIONS:    Current Outpatient Medications   Medication Sig Dispense Refill    amLODIPine (NORVASC) 10 MG tablet Take 1 tablet (10 mg total) by mouth once daily. 90 tablet 2    aspirin (ECOTRIN) 81 MG EC tablet Take 1 tablet (81 mg total) by mouth once daily. 30 tablet 0    atenoloL (TENORMIN) 100 MG tablet Take 1 tablet (100 mg total) by mouth once daily. 90 tablet 2    blood sugar diagnostic Strp Check blood sugars  strip 11    blood-glucose meter kit Use twice daily. 1 each 0    clobetasoL (TEMOVATE) 0.05 % external solution Apply to affected area once daily 50 mL 6    clobetasoL (TEMOVATE) 0.05 % external solution Apply topically once daily. 50 mL 6    clonazePAM (KLONOPIN) 0.5 MG tablet TAKE 1 TABLET BY MOUTH ONCE DAILY AS NEEDED FOR ANXIETY 90 tablet 0    cyclobenzaprine (FLEXERIL) 10 MG tablet TAKE ONE TABLET BY MOUTH AT BEDTIME AS NEEDED 90 tablet 2    dapagliflozin propanediol (FARXIGA) 10 mg tablet Take 1 tablet (10 mg total) by mouth every morning. 90 tablet 3    diclofenac sodium (VOLTAREN) 1 % Gel Apply 4 g topically 4 (four) times daily. Apply light film topically to knee up to four times daily 3 each 2    docusate  sodium (COLACE) 50 MG capsule Take 1 capsule (50 mg total) by mouth 2 (two) times daily. 180 capsule 3    entecavir (BARACLUDE) 0.5 MG Tab Take 1 tablet (0.5 mg total) by mouth once daily. 90 tablet 3    ergocalciferol (ERGOCALCIFEROL) 50,000 unit Cap Take 1 capsule (50,000 Units total) by mouth every 7 days. 12 capsule 3    fluocinolone acetonide oiL 0.01 % Drop Place 3 drops into both ears 2 (two) times a day. 20 mL 3    FLUoxetine 20 MG capsule Take 1 capsule (20 mg total) by mouth 2 (two) times daily. 180 capsule 1    fluticasone propionate (FLONASE) 50 mcg/actuation nasal spray 2 sprays (100 mcg total) by Each Nostril route once daily. 16 g 6    folic acid (FOLVITE) 1 MG tablet Take 1 tablet (1 mg total) by mouth once daily. 90 tablet 3    golimumab (SIMPONI) 50 mg/0.5 mL PnIj Inject 50 mg into the skin every 30 days. 1.5 mL 1    hydroCHLOROthiazide (HYDRODIURIL) 25 MG tablet Take 1 tablet (25 mg total) by mouth once daily. 90 tablet 3    hydrocortisone 2.5 % cream Apply topically to the affected area 2 (two) times daily 28 g 8    ketoconazole (NIZORAL) 2 % shampoo Apply topically twice a week. 120 mL 6    ketoconazole (NIZORAL) 2 % shampoo Apply topically once a week. 120 mL 12    ketorolac 0.5% (ACULAR) 0.5 % Drop Place 1 drop into the right eye 3 (three) times daily. 5 mL 3    ketorolac 0.5% (ACULAR) 0.5 % Drop Place 1 drop into the left eye 3 (three) times daily. 5 mL 3    lancets (ACCU-CHEK SOFTCLIX LANCETS) Misc Check blood sugars  each 11    lancets (FREESTYLE LANCETS) 28 gauge Misc test TWICE DAILY 200 each 3    levocetirizine (XYZAL) 5 MG tablet TAKE ONE TABLET BY MOUTH EVERY EVENING 90 tablet 3    methotrexate 2.5 MG Tab Take 6 tablets (15 mg total) by mouth every 7 days. Take 3 TABLETS Wed AM and 3 TABLETS Wed PM only 72 tablet 0    ofloxacin (OCUFLOX) 0.3 % ophthalmic solution Place 1 drop into the right eye 3 (three) times daily. 5 mL 3    ofloxacin (OCUFLOX) 0.3 % ophthalmic solution Place  "1 drop into the left eye 3 (three) times daily. 5 mL 3    pantoprazole (PROTONIX) 40 MG tablet Take 1 tablet (40 mg total) by mouth once daily. 90 tablet 0    polyethylene glycol (GLYCOLAX) 17 gram/dose powder Mix and dissolve one capful (17 grams) into a beverage and take by mouth once daily. 510 g 11    prednisoLONE acetate (PRED FORTE) 1 % DrpS Instill one drop into affect eye(s) as directed 5 mL 1    prednisoLONE acetate (PRED FORTE) 1 % DrpS Place 1 drop into the right eye 3 (three) times daily. 5 mL 3    prednisoLONE acetate (PRED FORTE) 1 % DrpS Place 1 drop into the left eye 3 (three) times daily. 5 mL 3    rosuvastatin (CRESTOR) 40 MG Tab Take 1 tablet (40 mg total) by mouth every evening. 90 tablet 3    sulfaSALAzine (AZULFIDINE ENTABS) 500 MG EC tablet Take 3 tablets (1,500 mg total) by mouth 2 (two) times daily. 540 tablet 0    tirzepatide (MOUNJARO) 2.5 mg/0.5 mL PnIj Inject 2.5 mg into the skin every 7 days. 12 Pen 1    tirzepatide (MOUNJARO) 5 mg/0.5 mL PnIj Inject 5 mg into the skin every 7 days. 9 mL 3    tiZANidine (ZANAFLEX) 2 MG tablet Take 1-2 tablets (2-4 mg total) by mouth every 8 (eight) hours as needed. 90 tablet 2    triamcinolone acetonide 0.025% (KENALOG) 0.025 % cream apply to affected area twice daily. 80 g 1    valsartan (DIOVAN) 320 MG tablet Take 1 tablet (320 mg total) by mouth once daily. 90 tablet 3    vitamin D (VITAMIN D3) 1000 units Tab Take 1,000 Units by mouth once daily.      zolpidem (AMBIEN) 5 MG Tab Take 1 tablet (5 mg total) by mouth nightly as needed. 90 tablet 0     No current facility-administered medications for this visit.     Facility-Administered Medications Ordered in Other Visits   Medication Dose Route Frequency Provider Last Rate Last Admin    0.9%  NaCl infusion   Intravenous Continuous Graciela Jerome MD                            PHYSICAL EXAM:  /83 (BP Location: Left arm, Patient Position: Sitting)   Pulse 75   Ht 5' 7" (1.702 m)   Wt 107.2 kg " (236 lb 5.3 oz)   LMP 11/25/2014   BMI 37.02 kg/m²   GENERAL: Overweight body habitus, well groomed.  HEENT:   HEENT:  Conjunctivae are non-erythematous; Pupils equal, round, and reactive to light; Nose is symmetrical; Nasal mucosa is pink and moist; Modified Mallampati:III Posterior palate is low; Tonsils not visualized; Uvula is wide and elongated;Tongue is enlarged; Dentition is fair; No TMJ tenderness; Jaw opening and protrusion without click and without discomfort.  NECK: Supple. Neck circumference is 17.5 inches. No thyromegaly. No palpable nodes.     SKIN: On face and neck: No abrasions, no rashes, no lesions.  No subcutaneous nodules are palpable.  RESPIRATORY: Chest is clear to auscultation.  Normal chest expansion and non-labored breathing at rest.  CARDIOVASCULAR: Normal S1, S2.  No murmurs, gallops or rubs. No carotid bruits bilaterally.  No edema. No clubbing. No cyanosis.    NEURO: Oriented to time, place and person. Normal attention span and concentration. Gait normal.    PSYCH: Affect is full. Mood is normal  MUSCULOSKELETAL: Moves 4 extremities. Gait normal.           ASSESSMENT:    1. VAISHNAVI.  The patient symptomatically has  snoring, choking , excessive daytime sleepiness, and excessive daytime fatigue  with exam findings of crowded airway and elevated BMI. The patient has medical co-morbidities of diabetes  which can be worsened by VAISHNAVI. This warrants treatment. Failed initial compliance due to mask/pressure discomfort.          PLAN:  Compliance requirements have been discussed;  Shown the patient how to put her mask together correctly.  Mask Resmed test run did not show significant leak.  The patient was reassured that now that she is treated for VAISHNAVI, she can sleep on her back again (which may help with mask leak)    Will order CPAP/BPAP titration for requal  We may update her mask post titration - Rogeliolissette Vazquez will possibly benefit from a simpler built mask (unless she really needs  a full face mask).    She will also check with her dentist re: OA (oral appliance) for VAISHNAVI as she is wearing  a mouth guard for tooth grinding.     During our discussion today, we talked about the etiology of  VAISHNAVI as well as the potential ramifications of untreated sleep apnea, which could include daytime sleepiness, hypertension, heart disease and/or stroke.  We discussed potential treatment options, which could include weight loss, body positioning, continuous positive airway pressure (CPAP), or referral for surgical consideration. Meanwhile, she  is urged to avoid supine sleep, weight gain and alcoholic beverages since all of these can worsen VAISHNAVI.     The patient was given open opportunity to ask questions and/or express concerns about treatment plan. Two point patient identifier confirmed.       Precautions: The patient was advised to abstain from driving should he feel sleepy or drowsy.    Follow up: MD after the sleep study has been completed.     ESS ([place] Sleepiness Scale) and other sleep medicine related questionnaires were reviewed with the patient.      46-minute visit. >50% spent counseling patient and coordination of care.  The patient was  cautioned against drowsy driving.                     Will order nasal mask to [facility] reg hose   Will inform DME to start new 90 days trial period.   Will change APAP to 5-8 cm H2o based on CPAP titration.

## 2025-04-29 RX ORDER — TRIAMCINOLONE ACETONIDE 0.25 MG/G
CREAM TOPICAL
Qty: 80 G | Refills: 1 | Status: SHIPPED | OUTPATIENT
Start: 2025-04-29

## 2025-05-07 DIAGNOSIS — E55.9 VITAMIN D DEFICIENCY: ICD-10-CM

## 2025-05-08 RX ORDER — ERGOCALCIFEROL 1.25 MG/1
50000 CAPSULE ORAL
Qty: 12 CAPSULE | Refills: 0 | Status: SHIPPED | OUTPATIENT
Start: 2025-05-08

## 2025-05-09 ENCOUNTER — OFFICE VISIT (OUTPATIENT)
Dept: OPHTHALMOLOGY | Facility: CLINIC | Age: 60
End: 2025-05-09
Payer: MEDICARE

## 2025-05-09 DIAGNOSIS — E11.42 TYPE 2 DIABETES MELLITUS WITH DIABETIC POLYNEUROPATHY, UNSPECIFIED WHETHER LONG TERM INSULIN USE: Chronic | ICD-10-CM

## 2025-05-09 DIAGNOSIS — H35.89 RETINAL EXUDATES: Primary | ICD-10-CM

## 2025-05-09 PROCEDURE — 99999 PR PBB SHADOW E&M-EST. PATIENT-LVL II: CPT | Mod: PBBFAC,,, | Performed by: OPHTHALMOLOGY

## 2025-05-09 NOTE — PROGRESS NOTES
===============================  Althea Vazquez,  5/9/2025 today   59 y.o. female   Last visit [facility]: :Visit date not found   Last visit eye dept. Visit date not found  VA:  Uncorrected distance visual acuity was 20/20 in the right eye and 20/20 in the left eye.  Tonometry       Tonometry (Applanation, 11:31 AM)         Right Left    Pressure 10 10                  Not recorded       Not recorded       Not recorded       Chief Complaint   Patient presents with    Follow-up     Retina Eval      Ophthalmic Medications       Ophthalmic - Anti-inflammatory, Glucocorticoids Start End     prednisoLONE acetate (PRED FORTE) 1 % DrpS 9/11/2023 --    Sig: Instill one drop into affect eye(s) as directed     prednisoLONE acetate (PRED FORTE) 1 % DrpS 12/5/2023 --    Sig: Place 1 drop into the right eye 3 (three) times daily.    Route: Right Eye     prednisoLONE acetate (PRED FORTE) 1 % DrpS 12/14/2023 --    Sig: Place 1 drop into the left eye 3 (three) times daily.    Route: Left Eye       Ophthalmic - Anti-inflammatory, NSAIDs Start End     ketorolac 0.5% (ACULAR) 0.5 % Drop 12/5/2023 --    Sig: Place 1 drop into the right eye 3 (three) times daily.    Route: Right Eye     ketorolac 0.5% (ACULAR) 0.5 % Drop 12/14/2023 --    Sig: Place 1 drop into the left eye 3 (three) times daily.    Route: Left Eye          ________________  5/9/2025 today  HPI     Follow-up            Comments: Retina Eval           Comments    1. DM dx 2012          Last edited by Natalia Childers on 5/9/2025 10:31 AM.      Problem List Items Addressed This Visit    None  Visit Diagnoses         Retinal exudates              PVD OD   Abel ring    Exudates OD   No angiopathy  A1c improved from previous  No iritis on exam today      RTC 1 year or PRN  Instructed to call 24/7 for any worsening of vision or symptoms. Check OU daily.   Gave my office and cell phone number.    ===============================

## 2025-05-13 ENCOUNTER — OFFICE VISIT (OUTPATIENT)
Dept: RHEUMATOLOGY | Facility: CLINIC | Age: 60
End: 2025-05-13
Payer: MEDICARE

## 2025-05-13 ENCOUNTER — HOSPITAL ENCOUNTER (OUTPATIENT)
Dept: RADIOLOGY | Facility: HOSPITAL | Age: 60
Discharge: HOME | End: 2025-05-13
Attending: INTERNAL MEDICINE
Payer: MEDICARE

## 2025-05-13 VITALS
HEART RATE: 73 BPM | BODY MASS INDEX: 37.06 KG/M2 | WEIGHT: 236.13 LBS | HEIGHT: 67 IN | DIASTOLIC BLOOD PRESSURE: 78 MMHG | SYSTOLIC BLOOD PRESSURE: 123 MMHG

## 2025-05-13 DIAGNOSIS — M02.30 REACTIVE ARTHRITIS: ICD-10-CM

## 2025-05-13 DIAGNOSIS — M47.819 SPONDYLOARTHRITIS: ICD-10-CM

## 2025-05-13 DIAGNOSIS — M54.12 LEFT CERVICAL RADICULOPATHY: Primary | ICD-10-CM

## 2025-05-13 DIAGNOSIS — M46.90 AXIAL SPONDYLOARTHRITIS: ICD-10-CM

## 2025-05-13 DIAGNOSIS — H20.9 UVEITIS: ICD-10-CM

## 2025-05-13 DIAGNOSIS — M19.90 INFLAMMATORY ARTHRITIS: ICD-10-CM

## 2025-05-13 DIAGNOSIS — M54.12 LEFT CERVICAL RADICULOPATHY: ICD-10-CM

## 2025-05-13 DIAGNOSIS — Z79.899 HIGH RISK MEDICATION USE: ICD-10-CM

## 2025-05-13 DIAGNOSIS — K21.9 GASTROESOPHAGEAL REFLUX DISEASE, UNSPECIFIED WHETHER ESOPHAGITIS PRESENT: ICD-10-CM

## 2025-05-13 DIAGNOSIS — E55.9 VITAMIN D DEFICIENCY: ICD-10-CM

## 2025-05-13 DIAGNOSIS — Z86.19 HISTORY OF HEPATITIS B: ICD-10-CM

## 2025-05-13 PROCEDURE — 99214 OFFICE O/P EST MOD 30 MIN: CPT | Mod: S$GLB,,, | Performed by: INTERNAL MEDICINE

## 2025-05-13 PROCEDURE — 99999 PR PBB SHADOW E&M-EST. PATIENT-LVL V: CPT | Mod: PBBFAC,,, | Performed by: INTERNAL MEDICINE

## 2025-05-13 PROCEDURE — 3078F DIAST BP <80 MM HG: CPT | Mod: CPTII,S$GLB,, | Performed by: INTERNAL MEDICINE

## 2025-05-13 PROCEDURE — 3044F HG A1C LEVEL LT 7.0%: CPT | Mod: CPTII,S$GLB,, | Performed by: INTERNAL MEDICINE

## 2025-05-13 PROCEDURE — 3066F NEPHROPATHY DOC TX: CPT | Mod: CPTII,S$GLB,, | Performed by: INTERNAL MEDICINE

## 2025-05-13 PROCEDURE — 72052 X-RAY EXAM NECK SPINE 6/>VWS: CPT | Mod: TC

## 2025-05-13 PROCEDURE — 72052 X-RAY EXAM NECK SPINE 6/>VWS: CPT | Mod: 26,,, | Performed by: INTERNAL MEDICINE

## 2025-05-13 PROCEDURE — 4010F ACE/ARB THERAPY RXD/TAKEN: CPT | Mod: CPTII,S$GLB,, | Performed by: INTERNAL MEDICINE

## 2025-05-13 PROCEDURE — 3061F NEG MICROALBUMINURIA REV: CPT | Mod: CPTII,S$GLB,, | Performed by: INTERNAL MEDICINE

## 2025-05-13 PROCEDURE — 1159F MED LIST DOCD IN RCRD: CPT | Mod: CPTII,S$GLB,, | Performed by: INTERNAL MEDICINE

## 2025-05-13 PROCEDURE — 3008F BODY MASS INDEX DOCD: CPT | Mod: CPTII,S$GLB,, | Performed by: INTERNAL MEDICINE

## 2025-05-13 PROCEDURE — 3074F SYST BP LT 130 MM HG: CPT | Mod: CPTII,S$GLB,, | Performed by: INTERNAL MEDICINE

## 2025-05-13 RX ORDER — GOLIMUMAB 50 MG/.5ML
50 INJECTION, SOLUTION SUBCUTANEOUS
Qty: 1.5 ML | Refills: 1 | Status: ACTIVE | OUTPATIENT
Start: 2025-05-13 | End: 2026-05-13

## 2025-05-13 RX ORDER — SULFASALAZINE 500 MG/1
1500 TABLET, DELAYED RELEASE ORAL 2 TIMES DAILY
Qty: 540 TABLET | Refills: 0 | Status: SHIPPED | OUTPATIENT
Start: 2025-05-13

## 2025-05-13 RX ORDER — PANTOPRAZOLE SODIUM 40 MG/1
40 TABLET, DELAYED RELEASE ORAL DAILY
Qty: 90 TABLET | Refills: 0 | Status: SHIPPED | OUTPATIENT
Start: 2025-05-13

## 2025-05-13 RX ORDER — METHOTREXATE 2.5 MG/1
15 TABLET ORAL
Qty: 72 TABLET | Refills: 0 | Status: SHIPPED | OUTPATIENT
Start: 2025-05-13

## 2025-05-13 RX ORDER — ERGOCALCIFEROL 1.25 MG/1
50000 CAPSULE ORAL
Qty: 12 CAPSULE | Refills: 0 | Status: SHIPPED | OUTPATIENT
Start: 2025-05-13

## 2025-05-13 RX ORDER — ENTECAVIR 0.5 MG/1
0.5 TABLET, FILM COATED ORAL DAILY
Qty: 90 TABLET | Refills: 3 | Status: ACTIVE | OUTPATIENT
Start: 2025-05-13

## 2025-05-13 ASSESSMENT — ANKYLOSING SPONDYLITIS DISEASE ACTIVITY SCORE (ASDAS-CRP)
PAIN_SWELLING: 4
TOTAL_SCORE: 3.4
NBH_PAIN: 7
GLOBAL_ACTIVITY: 6
MORNING_STIFFNESS: 5
CRP_MG_PER_LITER: 8.7

## 2025-05-13 ASSESSMENT — ROUTINE ASSESSMENT OF PATIENT INDEX DATA (RAPID3)
FATIGUE SCORE: 5
PAIN SCORE: 5
MDHAQ FUNCTION SCORE: 1
AM STIFFNESS SCORE: 1, YES
PATIENT GLOBAL ASSESSMENT SCORE: 4.5
PSYCHOLOGICAL DISTRESS SCORE: 6.6
WHEN YOU AWAKENED IN THE MORNING OVER THE LAST WEEK, PLEASE INDICATE THE AMOUNT OF TIME IT TAKES UNTIL YOU ARE AS LIMBER AS YOU WILL BE FOR THE DAY: 1
TOTAL RAPID3 SCORE: 4.28

## 2025-05-13 NOTE — PROGRESS NOTES
Patient ID:  Althea Carrion [place]    YOB: 1965     MRN:  [id number]     Subjective:     Chief Complaint:  Disease Management     History of Present Illness:  Pt is a 59 y.o. female with nr-axSpA, recurrent AAU  who presents today for f/u. LCV was 02/18/25. At that time she was doing well and continued on same regimen.    Today:   Patient states that she is doing well since last visit. States she has some good days and then some bad days, but overall doing well. Reports back pain is similar to last visit. Endorses morning stiffness lasting at most 1 hour that gets better with stretches. Continuing to do chair exercises daily as well. Walking some but limited by pain.   She endorses an intermittent shooting pain in her neck down her left arm which is new in the past 3 months. This is associated with numbness/tingling. She has chronic neck pain, and previously had been worked up for radiculopathy years ago with a C-spine MRI.     Exercise: She enjoys walking, chair exercises   Diet: Not following any particular diet, cut back on certain foods.    Dry eyes - relieved with eye drops.  Denies hair loss, vision changes, dry mouth, oral/nasal ulcers, trouble swallowing, new rashes, joint swelling, morning stiffness.       Review of Systems   Review of Systems   Constitutional:  Negative for fever and unexpected weight change.   HENT:  Negative for mouth sores and trouble swallowing.    Eyes:  Negative for redness.   Respiratory:  Negative for cough and shortness of breath.    Cardiovascular:  Negative for chest pain.   Gastrointestinal:  Negative for constipation and diarrhea.   Genitourinary:  Negative for dysuria and genital sores.   Skin:  Negative for rash.   Neurological:  Negative for headaches.   Hematological:  Does not bruise/bleed easily.        Current Medications:    Current Outpatient Medications:     amLODIPine (NORVASC) 10 MG tablet, Take 1 tablet (10 mg total) by mouth once daily., Disp: 90  tablet, Rfl: 2    atenoloL (TENORMIN) 100 MG tablet, Take 1 tablet (100 mg total) by mouth once daily., Disp: 90 tablet, Rfl: 2    blood sugar diagnostic Strp, Check blood sugars BID, Disp: 200 strip, Rfl: 11    blood-glucose meter kit, Use twice daily., Disp: 1 each, Rfl: 0    clobetasoL (TEMOVATE) 0.05 % external solution, Apply to affected area once daily, Disp: 50 mL, Rfl: 6    clobetasoL (TEMOVATE) 0.05 % external solution, Apply topically once daily., Disp: 50 mL, Rfl: 6    clonazePAM (KLONOPIN) 0.5 MG tablet, TAKE 1 TABLET BY MOUTH ONCE DAILY AS NEEDED FOR ANXIETY, Disp: 90 tablet, Rfl: 0    cyclobenzaprine (FLEXERIL) 10 MG tablet, TAKE ONE TABLET BY MOUTH AT BEDTIME AS NEEDED, Disp: 90 tablet, Rfl: 2    dapagliflozin propanediol (FARXIGA) 10 mg tablet, Take 1 tablet (10 mg total) by mouth every morning., Disp: 90 tablet, Rfl: 3    entecavir (BARACLUDE) 0.5 MG Tab, Take 1 tablet (0.5 mg total) by mouth once daily., Disp: 90 tablet, Rfl: 3    fluocinolone acetonide oiL 0.01 % Drop, Place 3 drops into both ears 2 (two) times a day., Disp: 20 mL, Rfl: 3    FLUoxetine 20 MG capsule, Take 1 capsule (20 mg total) by mouth 2 (two) times daily., Disp: 180 capsule, Rfl: 1    fluticasone propionate (FLONASE) 50 mcg/actuation nasal spray, 2 sprays (100 mcg total) by Each Nostril route once daily., Disp: 16 g, Rfl: 6    folic acid (FOLVITE) 1 MG tablet, Take 1 tablet (1 mg total) by mouth once daily., Disp: 90 tablet, Rfl: 3    hydroCHLOROthiazide (HYDRODIURIL) 25 MG tablet, Take 1 tablet (25 mg total) by mouth once daily., Disp: 90 tablet, Rfl: 3    hydrocortisone 2.5 % cream, Apply topically to the affected area 2 (two) times daily, Disp: 28 g, Rfl: 8    ketoconazole (NIZORAL) 2 % shampoo, Apply topically twice a week., Disp: 120 mL, Rfl: 6    ketoconazole (NIZORAL) 2 % shampoo, Apply topically once a week., Disp: 120 mL, Rfl: 12    ketorolac 0.5% (ACULAR) 0.5 % Drop, Place 1 drop into the right eye 3 (three) times  daily., Disp: 5 mL, Rfl: 3    ketorolac 0.5% (ACULAR) 0.5 % Drop, Place 1 drop into the left eye 3 (three) times daily., Disp: 5 mL, Rfl: 3    lancets (ACCU-CHEK SOFTCLIX LANCETS) Misc, Check blood sugars BID, Disp: 200 each, Rfl: 11    lancets (FREESTYLE LANCETS) 28 gauge Misc, test TWICE DAILY, Disp: 200 each, Rfl: 3    levocetirizine (XYZAL) 5 MG tablet, TAKE ONE TABLET BY MOUTH EVERY EVENING, Disp: 90 tablet, Rfl: 3    ofloxacin (OCUFLOX) 0.3 % ophthalmic solution, Place 1 drop into the right eye 3 (three) times daily., Disp: 5 mL, Rfl: 3    ofloxacin (OCUFLOX) 0.3 % ophthalmic solution, Place 1 drop into the left eye 3 (three) times daily., Disp: 5 mL, Rfl: 3    polyethylene glycol (GLYCOLAX) 17 gram/dose powder, Mix and dissolve one capful (17 grams) into a beverage and take by mouth once daily., Disp: 510 g, Rfl: 11    prednisoLONE acetate (PRED FORTE) 1 % DrpS, Instill one drop into affect eye(s) as directed, Disp: 5 mL, Rfl: 1    prednisoLONE acetate (PRED FORTE) 1 % DrpS, Place 1 drop into the right eye 3 (three) times daily., Disp: 5 mL, Rfl: 3    prednisoLONE acetate (PRED FORTE) 1 % DrpS, Place 1 drop into the left eye 3 (three) times daily., Disp: 5 mL, Rfl: 3    rosuvastatin (CRESTOR) 40 MG Tab, Take 1 tablet (40 mg total) by mouth every evening., Disp: 90 tablet, Rfl: 3    tirzepatide (MOUNJARO) 2.5 mg/0.5 mL PnIj, Inject 2.5 mg into the skin every 7 days., Disp: 12 Pen, Rfl: 1    tirzepatide (MOUNJARO) 5 mg/0.5 mL PnIj, Inject 5 mg into the skin every 7 days., Disp: 9 mL, Rfl: 3    tiZANidine (ZANAFLEX) 2 MG tablet, Take 1-2 tablets (2-4 mg total) by mouth every 8 (eight) hours as needed., Disp: 90 tablet, Rfl: 2    triamcinolone acetonide 0.025% (KENALOG) 0.025 % cream, apply to affected area twice daily., Disp: 80 g, Rfl: 1    valsartan (DIOVAN) 320 MG tablet, Take 1 tablet (320 mg total) by mouth once daily., Disp: 90 tablet, Rfl: 3    vitamin D (VITAMIN D3) 1000 units Tab, Take 1,000 Units by  mouth once daily., Disp: , Rfl:     zolpidem (AMBIEN) 5 MG Tab, Take 1 tablet (5 mg total) by mouth nightly as needed., Disp: 90 tablet, Rfl: 0    aspirin (ECOTRIN) 81 MG EC tablet, Take 1 tablet (81 mg total) by mouth once daily., Disp: 30 tablet, Rfl: 0    diclofenac sodium (VOLTAREN) 1 % Gel, Apply 4 g topically 4 (four) times daily. Apply light film topically to knee up to four times daily, Disp: 3 each, Rfl: 2    docusate sodium (COLACE) 50 MG capsule, Take 1 capsule (50 mg total) by mouth 2 (two) times daily., Disp: 180 capsule, Rfl: 3    ergocalciferol (VITAMIN D2) 50,000 unit Cap, Take 1 capsule (50,000 Units total) by mouth every 7 days., Disp: 12 capsule, Rfl: 0    golimumab (SIMPONI) 50 mg/0.5 mL PnIj, Inject 50 mg (1 pen) into the skin every 30 days., Disp: 1.5 mL, Rfl: 1    methotrexate 2.5 MG Tab, Take 6 tablets (15 mg total) by mouth every 7 days. Take 3 TABLETS Wed AM and 3 TABLETS Wed PM only, Disp: 72 tablet, Rfl: 0    pantoprazole (PROTONIX) 40 MG tablet, Take 1 tablet (40 mg total) by mouth once daily., Disp: 90 tablet, Rfl: 0    sulfaSALAzine (AZULFIDINE ENTABS) 500 MG EC tablet, Take 3 tablets (1,500 mg total) by mouth 2 (two) times daily., Disp: 540 tablet, Rfl: 0  No current facility-administered medications for this visit.    Facility-Administered Medications Ordered in Other Visits:     0.9%  NaCl infusion, , Intravenous, Continuous, Graciela Jerome MD     Objective:     Vitals:    05/13/25 1014   BP: 123/78   Pulse: 73       Body mass index is 36.98 kg/m².     Physical Exam   HENT:   Head: Normocephalic.   Right Ear: External ear normal.   Left Ear: External ear normal.   Nose: Nose normal.   Eyes: Conjunctivae are normal.   Cardiovascular: Normal rate.   Pulmonary/Chest: Effort normal. No respiratory distress.   Musculoskeletal:         General: Normal range of motion.      Right shoulder: Normal.      Left shoulder: Normal.      Right elbow: Normal.      Left elbow: Normal.      Right  wrist: Normal.      Left wrist: Normal.      Cervical back: Normal range of motion. No rigidity or tenderness.      Right knee: Swelling present.      Left knee: Normal.   Neurological: She is alert.       Right Side Rheumatological Exam     Examination finds the shoulder, elbow, wrist, 1st PIP, 1st MCP, 2nd PIP, 2nd MCP, 3rd PIP, 3rd MCP, 4th PIP, 4th MCP, 5th PIP and 5th MCP normal.    She has swelling of the knee    Muscle Strength (0-5 scale):  Deltoid:  5  Biceps: 5/5   Triceps:  5  : 5/5   Iliopsoas: 5  Quadriceps:  5   Distal Lower Extremity: 5    Left Side Rheumatological Exam     Examination finds the shoulder, elbow, wrist, knee, 1st PIP, 1st MCP, 2nd PIP, 2nd MCP, 3rd PIP, 3rd MCP, 4th PIP, 4th MCP, 5th PIP and 5th MCP normal.    Muscle Strength (0-5 scale):  Deltoid:  5  Biceps: 5/5   Triceps:  5  :  5/5   Iliopsoas: 5  Quadriceps:  5   Distal Lower Extremity: 5      Back/Neck Exam   Back Range of Motion   Extension:  normal  Flexion:  normal  Lateral Bend Right:  normal  Lateral Bend Left:  normal  Rotation Right:  normal  Rotation Left:  normal    Neck Range of Motion   Right Lateral Bend: normal  Left Lateral Bend: normal  Right Rotation: normal  Left Rotation: normal    Comments:    Schober's 10-15.5 cm  Chest expansion 6 cm  Neg JEN bilaterally         10/31/2022   Tender (CABALLERO-28) 0 / 28    Swollen (CABALLERO-28) 0 / 28    Provider Global --   Patient Global 45 / 100   ESR 11 mm/hr   CRP 2.3 mg/L   CABALLERO-28 (ESR) 2.31 (Remission)   CABALLERO-28 (CRP) 2.02 (Remission)   CDAI Score --        Assessment:     1. Left cervical radiculopathy    2. Axial spondyloarthritis    3. High risk medication use    4. Gastroesophageal reflux disease, unspecified whether esophagitis present    5. Reactive arthritis    6. Inflammatory arthritis    7. Spondyloarthritis    8. History of hepatitis B    9. Uveitis    10. Vitamin D deficiency      ASDAS-CRP 3.4  (HDA)   BASDAI 5.1(HDA)  BASFI 5.3 (severe functional  limitation)     Left cervical radiculopathy bulge C6-7 recurrent   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 Now on golimumab other option:  anti-IL17( bimekizumab, ixekizumab or secukinumab)  Chronic hepatitis B HBV DNA neg  EH  123/78  Hyperlipidemia KUI907.2  5/20/24   on rosuvastatin 40mg nightly  Class 2 Body mass index is 36.98 kg/m². 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 LE pulses  Mild-moderate OA knees      Plan:      Problem List Items Addressed This Visit          Endocrine    Vitamin D deficiency    Relevant Medications    ergocalciferol (VITAMIN D2) 50,000 unit Cap       GI    History of hepatitis B    Gastroesophageal reflux disease    Relevant Medications    pantoprazole (PROTONIX) 40 MG tablet       Orthopedic    Axial spondyloarthritis (Chronic)    Relevant Medications    golimumab (SIMPONI) 50 mg/0.5 mL PnIj    sulfaSALAzine (AZULFIDINE ENTABS) 500 MG EC tablet       Palliative Care    High risk medication use-sulfasalazine 2 gm    Relevant Medications    pantoprazole (PROTONIX) 40 MG tablet    sulfaSALAzine (AZULFIDINE ENTABS) 500 MG EC tablet     Other Visit Diagnoses         Left cervical radiculopathy    -  Primary    Relevant Orders    X-Ray Cervical Spine 5 View W Flex Extxt    Ambulatory Referral/Consult to Physical Therapy      Reactive arthritis        Relevant Medications    sulfaSALAzine (AZULFIDINE ENTABS) 500 MG EC tablet      Inflammatory arthritis        Relevant Medications    sulfaSALAzine (AZULFIDINE ENTABS) 500 MG EC tablet      Spondyloarthritis        Relevant Medications    sulfaSALAzine (AZULFIDINE ENTABS) 500 MG EC tablet      Uveitis        Relevant Medications     methotrexate 2.5 MG Tab             Cervical spine x-rays  Ref to PT for left cervical radiculopathy   F/u [facility] Luke for left cervical radiculopathy  Thermophore moist heating pad to  neck  Diclofenac gel to neck up to four times daily   Continue methotrexate 15mg po once a week (divided dose) with folic acid 1mg daily for recurrent AAU OD refilled [facility]  Golimumab 50mg sc q 28 days refilled [facility]  Sulfasalazine.1500mg twice daily refilled [facility]  Vitamin D2 50,000 units q 7days and vitamin D3 1000 units daily  entecvir 0.5mg daily refilled [facility]  Celecoxib 200mg prn with pantoprazole 40mg daily for  prevention  Continue exercise program ibrahima quad strengthening HEP per handout  resume weight reduction, Mediterranean diet as feasible, decrease rice portions as she is doing tirzepatide  RTC 3 months with standing labs      Grey Cutler M.D.  PGY-1  [employer] [facility]

## 2025-05-13 NOTE — PROGRESS NOTES
I have personally taken the history and examined the patient and agree with the resident,s note as stated above      Answers submitted by the patient for this visit:  Rheumatology Questionnaire (Submitted on 5/13/2025)  fever: No  eye redness: No  mouth sores: No  headaches: No  shortness of breath: No  chest pain: No  trouble swallowing: No  diarrhea: No  constipation: No  unexpected weight change: No  genital sore: No  dysuria: No  During the last 3 days, have you had a skin rash?: No  Bruises or bleeds easily: No  cough: No     Latest Reference Range & Units 04/16/25 07:43   WBC 3.90 - 12.70 K/uL 5.62   RBC 4.00 - 5.40 M/uL 4.48   Hemoglobin 12.0 - 16.0 gm/dL 13.4   Hematocrit 37.0 - 48.5 % 41.1   MCV 82 - 98 fL 92   MCH 27.0 - 31.0 pg 29.9   MCHC 32.0 - 36.0 g/dL 32.6   RDW 11.5 - 14.5 % 13.0   Platelet Count 150 - 450 K/uL 246   MPV 9.2 - 12.9 fL 10.6   Neut % 38 - 73 % 39.6   Lymph % 18 - 48 % 49.5 (H)   Mono % 4 - 15 % 8.7   Eos % <=8 % 1.1   Basophil % <=1.9 % 0.7   Immature Granulocytes 0.0 - 0.5 % 0.4   Gran # (ANC) 1.8 - 7.7 K/uL 2.23   Lymph # 1 - 4.8 K/uL 2.78   Mono # 0.3 - 1 K/uL 0.49   Eos # <=0.5 K/uL 0.06   Baso # <=0.2 K/uL 0.04   Immature Grans (Abs) 0.00 - 0.04 K/uL 0.02   nRBC <=0 /100 WBC 0   Sed Rate <=36 mm/hr <2   Sodium 136 - 145 mmol/L 142   Potassium 3.5 - 5.1 mmol/L 4.1   Chloride 95 - 110 mmol/L 102   CO2 23 - 29 mmol/L 29   Anion Gap 8 - 16 mmol/L 11   BUN 7 - 17 mg/dL 12   Creatinine 0.5 - 1.4 mg/dL 0.7   eGFR >60 mL/min/1.73/m2 >60   Glucose 70 - 110 mg/dL 147 (H)   Calcium 8.7 - 10.5 mg/dL 9.8   ALP 38 - 126 unit/L 77   PROTEIN TOTAL 6.0 - 8.4 gm/dL 8.3   Albumin 3.5 - 5.2 g/dL 4.8   BILIRUBIN TOTAL 0.1 - 1.0 mg/dL 0.5   AST 15 - 46 unit/L 28   ALT 10 - 44 unit/L 24   CRP <=1.00 mg/dL 0.87   Cholesterol Total 120 - 199 mg/dL 173   HDL 40 - 75 mg/dL 63   HDL/Cholesterol Ratio 20.0 - 50.0 % 36.4   Non-HDL Cholesterol mg/dL 110   Total Cholesterol/HDL Ratio 2.0 - 5.0  2.7    Triglycerides 30 - 150 mg/dL 67   LDL Cholesterol 63.0 - 159.0 mg/dL 96.6   Hemoglobin A1C External 4.0 - 5.6 % 6.8 (H)   Estimated Avg Glucose 68 - 131 mg/dL 148 (H)   HBV DNA, Qualitative PCR HBV DNA not detected   HBV DNA not detected   (H): Data is abnormally high      EXAMINATION:  MRI CERVICAL SPINE WITHOUT CONTRAST     CLINICAL HISTORY:  Other spondylosis with radiculopathy, cervical regionneck and left arm pain;     TECHNIQUE:  MR Cervical spine without contrast. Sagittal T1, T2, STIR. Axial 3D, T2. Coronal T1.     COMPARISON:  MRI of the cervical spine from September 11, 2016.     FINDINGS:  Vertebral body height is normal and alignment is maintained. Marrow signal is within normal limits. The craniocervical junction is unremarkable. No abnormal cord signal or cord deformity.  T2 hyperintense, likely cystic focus within the left aspect of the thyroid gland is unchanged.  Intervertebral disc levels are as follows:     C2-C3 disc: No significant disc pathology. Normal facet joints. No spinal canal or neural foraminal stenosis.     C3-C4 disc: Posterior disc bulge with a posterior annular fissure that is new compared to prior.  Normal uncovertebral joints and facet joints.  The thecal sac measures 9 mm AP.  Previously the thecal sac measured 10 mm AP.     C4-C5 disc: Disc space height loss with a posterior disc bulge.  Anterior osteophytes.  Normal vertebral joints and facet joints.  No significant stenosis.  The thecal sac measures 12 mm AP.  These findings are similar to prior.     C5-C6 disc: Posterior disc bulge.  Normal uncovertebral joints and facet joints.  The thecal sac measures 11 mm AP.  No significant foraminal stenosis.  Previously the thecal sac measured 11 mm AP at this level as well.     C6-C7 disc: Posterior disc bulge with a posterior annular fissure.  This is new compared to the prior study.  Normal uncovertebral joints and facet joints.  The thecal sac measures 11 mm AP.  No significant  foraminal stenosis.  Previously the thecal sac measured 13 mm.     C7-T1 disc: Posterior disc bulge.  Normal facet joints.  The thecal sac measures 12 mm AP.  No significant foraminal stenosis.  These findings are similar to prior.     Impression:     1. There are new posterior disc bulges with annular fissures at C3-C4 and C6-C7.  There is now mild spinal stenosis at C3-C4.  2. No significant foraminal stenosis.        Electronically signed by:Stiven Spain Jr., MD  Date:                                            11/16/2020  Time:                                           13:45        Schober's 10-15.5 cm further improved now normal  Nl lat flex and ext  Fabere neg bilat  Chest expansion 6 cm slightly decreased  Neck rom nl       Neuro UE strength nl   reflexes 1/2+ bilateral and symmetrical      ASSESSMENT:     adalimumab with Secondary inefficacy, tried to change to golimumab 50mg sc q 4 wks but insurance wouldn't cover, then on Enbrel Sureclick 50mg s q 7 days but has developed recurrent AAU OS  Then started certolizumab after 1/11/21 visit  had AAU with recurrence 1/28/21 just after starting certolizumab but had not completed loading dose   certolizumab ineffective  Now on golimumab 50mg sc q 28 days      ax-SpA:     ASDAS-CRP 3.4  (HDA)   BASDAI 5.1(HDA)  BASFI 5.3 (severe functional limitation)     Recurrent Left cervical radiculopathy  with posterior disc bulges C3-4 C6-7 and mild spinal stenosis C3-4 on previous cervical MRI nl neuro exam today, intermittent symptoms  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 Now on golimumab other option:  anti-IL17( bimekizumab, ixekizumab or secukinumab)  Chronic hepatitis B HBV DNA neg on entecavir  EH  123/78  Hyperlipidemia CAT684.2  5/20/24   on rosuvastatin 40mg nightly  Class 2 Body mass index is 36.98 kg/m². Now on tirzepatide   DXA 2/5/24  normal   Chronic lumbar spondylosis   MRI with severe TMT OA ?  Charcot neuroarthropathy  Vitamin D deficiency    36  2/15/23  on vitamin D2 50,000 IU weekly and vitamin D3 1000 IU  daily  Nl LE pulses  Mild-moderate OA knees         PLAN:     Cervical spine x-rays  Ref to PT for left cervical radiculopathy   F/u [facility] Luke for  left cervical radiculopathy  Thermophore moist heating pad to  neck  Diclofenac gel to neck up to four times daily   Continue methotrexate 15mg po once a week(divided dose) with folic acid 1mg daily for recurrent AAU OD refilled [facility]  Golimumab 50mg sc q 28 days refilled [facility]  Sulfasalazine.1500mg twice daily refilled [facility]  Vitamin D2 50,000 units q 7days and vitamin D3 1000 units daily  entecvir 0.5mg daily refilled [facility]  Celecoxib 200mg   prn with pantoprazole 40mg daily for  prevention  Continue exercise program ibrahima quad strengthening HEP per handout  resume weight reduction, Mediterranean diet as feasible, decrease rice portions as she is doing semaglutide  RTC 3 months with standing labs

## 2025-05-13 NOTE — PROGRESS NOTES
5/13/2025     7:56 AM   Rapid3 Question Responses and Scores   MDHAQ Score 1   Psychologic Score 6.6   Pain Score 5   When you awakened in the morning OVER THE LAST WEEK, did you feel stiff? Yes   If Yes, please indicate the number of hours until you are as limber as you will be for the day 1   Fatigue Score 5   Global Health Score 4.5   RAPID3 Score 4.28    Answers submitted by the patient for this visit:  Rheumatology Questionnaire (Submitted on 5/13/2025)  fever: No  eye redness: No  mouth sores: No  headaches: No  shortness of breath: No  chest pain: No  trouble swallowing: No  diarrhea: No  constipation: No  unexpected weight change: No  genital sore: No  dysuria: No  During the last 3 days, have you had a skin rash?: No  Bruises or bleeds easily: No  cough: No

## 2025-05-13 NOTE — PATIENT INSTRUCTIONS
Cervical spine x-rays  Ref to PT for left cervical radiculopathy   F/u [facility] Luke for  left cervical radiculopathy  Thermophore moist heating pad to  neck  Diclofenac gel to neck up to four times daily   Continue methotrexate 15mg po once a week(divided dose) with folic acid 1mg daily for recurrent AAU OD refilled [facility]  Golimumab 50mg sc q 28 days refilled [facility]  Sulfasalazine.1500mg twice daily refilled [facility]  Vitamin D2 50,000 units q 7days and vitamin D3 1000 units daily  entecvir 0.5mg daily refilled [facility]  Celecoxib 200mg   prn with pantoprazole 40mg daily for  prevention  Continue exercise program ibrahima quad strengthening HEP per handout  resume weight reduction, Mediterranean diet as feasible, decrease rice portions as she is doing semaglutide  RTC 3 months with standing labs  
Opt out
Normal rate, regular rhythm

## 2025-05-14 ENCOUNTER — RESULTS FOLLOW-UP (OUTPATIENT)
Dept: RHEUMATOLOGY | Facility: CLINIC | Age: 60
End: 2025-05-14

## 2025-05-14 NOTE — PROGRESS NOTES
Your neck x-ray shows some degenerative spurring ibrahima C6-7 and less so C5-6 and C7-T1. Be sure and schedule appt with Desmond Osborne in Pain Management for this and proceed with PT I ordered.  Also moist heating pad(Thermophore) and diclofenac gel to the neck RJQ

## 2025-05-22 DIAGNOSIS — E55.9 VITAMIN D DEFICIENCY: ICD-10-CM

## 2025-05-22 DIAGNOSIS — L21.9 SEBORRHEIC DERMATITIS: ICD-10-CM

## 2025-05-27 RX ORDER — CLOBETASOL PROPIONATE 0.5 MG/ML
SOLUTION TOPICAL DAILY
Qty: 50 ML | Refills: 0 | Status: SHIPPED | OUTPATIENT
Start: 2025-05-27

## 2025-05-28 ENCOUNTER — OFFICE VISIT (OUTPATIENT)
Dept: PSYCHIATRY | Facility: CLINIC | Age: 60
End: 2025-05-28
Payer: MEDICAID

## 2025-05-28 ENCOUNTER — OFFICE VISIT (OUTPATIENT)
Dept: INTERNAL MEDICINE | Facility: CLINIC | Age: 60
End: 2025-05-28
Payer: MEDICARE

## 2025-05-28 VITALS
BODY MASS INDEX: 36.36 KG/M2 | TEMPERATURE: 99 F | HEIGHT: 67 IN | DIASTOLIC BLOOD PRESSURE: 74 MMHG | OXYGEN SATURATION: 95 % | HEART RATE: 94 BPM | WEIGHT: 231.69 LBS | SYSTOLIC BLOOD PRESSURE: 124 MMHG

## 2025-05-28 DIAGNOSIS — R05.1 ACUTE COUGH: Primary | ICD-10-CM

## 2025-05-28 DIAGNOSIS — F41.1 GENERALIZED ANXIETY DISORDER: Primary | ICD-10-CM

## 2025-05-28 DIAGNOSIS — R07.89 TIGHTNESS IN CHEST: ICD-10-CM

## 2025-05-28 DIAGNOSIS — J02.9 PHARYNGITIS, UNSPECIFIED ETIOLOGY: ICD-10-CM

## 2025-05-28 DIAGNOSIS — J04.0 LARYNGITIS: ICD-10-CM

## 2025-05-28 DIAGNOSIS — J30.1 SEASONAL ALLERGIC RHINITIS DUE TO POLLEN: ICD-10-CM

## 2025-05-28 DIAGNOSIS — R09.81 NASAL SINUS CONGESTION: ICD-10-CM

## 2025-05-28 PROCEDURE — 99999 PR PBB SHADOW E&M-EST. PATIENT-LVL V: CPT | Mod: PBBFAC,,, | Performed by: NURSE PRACTITIONER

## 2025-05-28 RX ORDER — FLUTICASONE PROPIONATE 50 MCG
1 SPRAY, SUSPENSION (ML) NASAL DAILY
Qty: 16 G | Refills: 2 | Status: SHIPPED | OUTPATIENT
Start: 2025-05-28

## 2025-05-28 RX ORDER — AZELASTINE 1 MG/ML
1 SPRAY, METERED NASAL 2 TIMES DAILY
Qty: 30 ML | Refills: 5 | Status: SHIPPED | OUTPATIENT
Start: 2025-05-28 | End: 2026-05-28

## 2025-05-28 RX ORDER — AZITHROMYCIN 250 MG/1
TABLET, FILM COATED ORAL
Qty: 6 TABLET | Refills: 0 | Status: SHIPPED | OUTPATIENT
Start: 2025-05-28 | End: 2025-06-02

## 2025-05-28 RX ORDER — TRIAMCINOLONE ACETONIDE 40 MG/ML
40 INJECTION, SUSPENSION INTRA-ARTICULAR; INTRAMUSCULAR
Status: COMPLETED | OUTPATIENT
Start: 2025-05-28 | End: 2025-05-28

## 2025-05-28 RX ORDER — ALBUTEROL SULFATE 90 UG/1
2 INHALANT RESPIRATORY (INHALATION)
Qty: 18 G | Refills: 2 | Status: SHIPPED | OUTPATIENT
Start: 2025-05-28 | End: 2026-05-28

## 2025-05-28 RX ADMIN — TRIAMCINOLONE ACETONIDE 40 MG: 40 INJECTION, SUSPENSION INTRA-ARTICULAR; INTRAMUSCULAR at 11:05

## 2025-05-28 NOTE — PROGRESS NOTES
The patient location is: [place], [place]  The chief complaint leading to consultation is: anxiety    Visit type: audiovisual    Face to Face time with patient: 5 min  5 minutes of total time spent on the encounter, which includes face to face time and non-face to face time preparing to see the patient (eg, review of tests), Obtaining and/or reviewing separately obtained history, Documenting clinical information in the electronic or other health record, Independently interpreting results (not separately reported) and communicating results to the patient/family/caregiver, or Care coordination (not separately reported).     Each patient to whom he or she provides medical services by telemedicine is:  (1) informed of the relationship between the physician and patient and the respective role of any other health care provider with respect to management of the patient; and (2) notified that he or she may decline to receive medical services by telemedicine and may withdraw from such care at any time.    Notes:    ESTABLISHED OUTPATIENT VISIT   E/M LEVEL 2: [id number]    ENCOUNTER DATE: 5/28/2025  SITE: [facility], [address]    HISTORY    CHIEF COMPLAINT   Althea Vazquez is a 59 y.o. female who presents for follow up of anxiety.    HPI     Appears to be doing reasonably well psychiatrically. Appears euthymic during today's visit.    Satisfied with current psychotropic medication regimen.    Spiritual beliefs are supportive.    Has continued to stand up for herself, this is working well for her.    Travels for pleasure.    Psychiatric Review Of Systems - Is patient experiencing or having changes in:  sleep: takes Ambien  appetite: no  weight: working on losing weight  energy/anergy: no  interest/pleasure/anhedonia: no  somatic symptoms: no  libido: no  anxiety/panic: no  guilty/hopelessness: no  concentration: no  S.I.B.s/risky behavior: no  Irritability: no  Racing thoughts: no  Impulsive behaviors:  no  Paranoia:no  AVH:no    Recent alcohol: rare small amount  Recent drug: no    Medical ROS   Dental issue     PAST MEDICAL, FAMILY AND SOCIAL HISTORY: The patient's past medical, family and social history have been reviewed and updated as appropriate within the electronic medical record - see encounter notes.    PSYCHOTROPIC MEDICATIONS   Prozac 20 mg qam and 20 mg at bedtime, Clonazepam 0.5 mg prn for anxiety[has been using rarely], Ambien 5 mg at bedtime prn[recently has been taking up to twice per week]    Scheduled and PRN Medications     Current Outpatient Medications:     amLODIPine (NORVASC) 10 MG tablet, Take 1 tablet (10 mg total) by mouth once daily., Disp: 90 tablet, Rfl: 2    aspirin (ECOTRIN) 81 MG EC tablet, Take 1 tablet (81 mg total) by mouth once daily., Disp: 30 tablet, Rfl: 0    atenoloL (TENORMIN) 100 MG tablet, Take 1 tablet (100 mg total) by mouth once daily., Disp: 90 tablet, Rfl: 2    blood sugar diagnostic Strp, Check blood sugars BID, Disp: 200 strip, Rfl: 11    blood-glucose meter kit, Use twice daily., Disp: 1 each, Rfl: 0    clobetasoL (TEMOVATE) 0.05 % external solution, Apply to affected area once daily, Disp: 50 mL, Rfl: 6    clobetasoL (TEMOVATE) 0.05 % external solution, Apply topically once daily., Disp: 50 mL, Rfl: 0    clonazePAM (KLONOPIN) 0.5 MG tablet, TAKE 1 TABLET BY MOUTH ONCE DAILY AS NEEDED FOR ANXIETY, Disp: 90 tablet, Rfl: 0    cyclobenzaprine (FLEXERIL) 10 MG tablet, TAKE ONE TABLET BY MOUTH AT BEDTIME AS NEEDED, Disp: 90 tablet, Rfl: 2    dapagliflozin propanediol (FARXIGA) 10 mg tablet, Take 1 tablet (10 mg total) by mouth every morning., Disp: 90 tablet, Rfl: 3    diclofenac sodium (VOLTAREN) 1 % Gel, Apply 4 g topically 4 (four) times daily. Apply light film topically to knee up to four times daily, Disp: 3 each, Rfl: 2    docusate sodium (COLACE) 50 MG capsule, Take 1 capsule (50 mg total) by mouth 2 (two) times daily., Disp: 180 capsule, Rfl: 3    entecavir  (BARACLUDE) 0.5 MG Tab, Take 1 tablet (0.5 mg total) by mouth once daily., Disp: 90 tablet, Rfl: 3    ergocalciferol (VITAMIN D2) 50,000 unit Cap, Take 1 capsule (50,000 Units total) by mouth every 7 days., Disp: 12 capsule, Rfl: 0    fluocinolone acetonide oiL 0.01 % Drop, Place 3 drops into both ears 2 (two) times a day., Disp: 20 mL, Rfl: 3    FLUoxetine 20 MG capsule, Take 1 capsule (20 mg total) by mouth 2 (two) times daily., Disp: 180 capsule, Rfl: 1    fluticasone propionate (FLONASE) 50 mcg/actuation nasal spray, 2 sprays (100 mcg total) by Each Nostril route once daily., Disp: 16 g, Rfl: 6    folic acid (FOLVITE) 1 MG tablet, Take 1 tablet (1 mg total) by mouth once daily., Disp: 90 tablet, Rfl: 3    golimumab (SIMPONI) 50 mg/0.5 mL PnIj, Inject 50 mg (1 pen) into the skin every 30 days., Disp: 1.5 mL, Rfl: 1    hydroCHLOROthiazide (HYDRODIURIL) 25 MG tablet, Take 1 tablet (25 mg total) by mouth once daily., Disp: 90 tablet, Rfl: 3    hydrocortisone 2.5 % cream, Apply topically to the affected area 2 (two) times daily, Disp: 28 g, Rfl: 8    ketoconazole (NIZORAL) 2 % shampoo, Apply topically twice a week., Disp: 120 mL, Rfl: 6    ketoconazole (NIZORAL) 2 % shampoo, Apply topically once a week., Disp: 120 mL, Rfl: 12    ketorolac 0.5% (ACULAR) 0.5 % Drop, Place 1 drop into the right eye 3 (three) times daily., Disp: 5 mL, Rfl: 3    ketorolac 0.5% (ACULAR) 0.5 % Drop, Place 1 drop into the left eye 3 (three) times daily., Disp: 5 mL, Rfl: 3    lancets (ACCU-CHEK SOFTCLIX LANCETS) Misc, Check blood sugars BID, Disp: 200 each, Rfl: 11    lancets (FREESTYLE LANCETS) 28 gauge Misc, test TWICE DAILY, Disp: 200 each, Rfl: 3    levocetirizine (XYZAL) 5 MG tablet, TAKE ONE TABLET BY MOUTH EVERY EVENING, Disp: 90 tablet, Rfl: 3    methotrexate 2.5 MG Tab, Take 6 tablets (15 mg total) by mouth every 7 days. Take 3 TABLETS Wed AM and 3 TABLETS Wed PM only, Disp: 72 tablet, Rfl: 0    ofloxacin (OCUFLOX) 0.3 % ophthalmic  solution, Place 1 drop into the right eye 3 (three) times daily., Disp: 5 mL, Rfl: 3    ofloxacin (OCUFLOX) 0.3 % ophthalmic solution, Place 1 drop into the left eye 3 (three) times daily., Disp: 5 mL, Rfl: 3    pantoprazole (PROTONIX) 40 MG tablet, Take 1 tablet (40 mg total) by mouth once daily., Disp: 90 tablet, Rfl: 0    polyethylene glycol (GLYCOLAX) 17 gram/dose powder, Mix and dissolve one capful (17 grams) into a beverage and take by mouth once daily., Disp: 510 g, Rfl: 11    prednisoLONE acetate (PRED FORTE) 1 % DrpS, Instill one drop into affect eye(s) as directed, Disp: 5 mL, Rfl: 1    prednisoLONE acetate (PRED FORTE) 1 % DrpS, Place 1 drop into the right eye 3 (three) times daily., Disp: 5 mL, Rfl: 3    prednisoLONE acetate (PRED FORTE) 1 % DrpS, Place 1 drop into the left eye 3 (three) times daily., Disp: 5 mL, Rfl: 3    rosuvastatin (CRESTOR) 40 MG Tab, Take 1 tablet (40 mg total) by mouth every evening., Disp: 90 tablet, Rfl: 3    sulfaSALAzine (AZULFIDINE ENTABS) 500 MG EC tablet, Take 3 tablets (1,500 mg total) by mouth 2 (two) times daily., Disp: 540 tablet, Rfl: 0    tirzepatide (MOUNJARO) 2.5 mg/0.5 mL PnIj, Inject 2.5 mg into the skin every 7 days., Disp: 12 Pen, Rfl: 1    tirzepatide (MOUNJARO) 5 mg/0.5 mL PnIj, Inject 5 mg into the skin every 7 days., Disp: 9 mL, Rfl: 3    tiZANidine (ZANAFLEX) 2 MG tablet, Take 1-2 tablets (2-4 mg total) by mouth every 8 (eight) hours as needed., Disp: 90 tablet, Rfl: 2    triamcinolone acetonide 0.025% (KENALOG) 0.025 % cream, apply to affected area twice daily., Disp: 80 g, Rfl: 1    valsartan (DIOVAN) 320 MG tablet, Take 1 tablet (320 mg total) by mouth once daily., Disp: 90 tablet, Rfl: 3    vitamin D (VITAMIN D3) 1000 units Tab, Take 1,000 Units by mouth once daily., Disp: , Rfl:     zolpidem (AMBIEN) 5 MG Tab, Take 1 tablet (5 mg total) by mouth nightly as needed., Disp: 90 tablet, Rfl: 0  No current facility-administered medications for this  visit.    Facility-Administered Medications Ordered in Other Visits:     0.9%  NaCl infusion, , Intravenous, Continuous, Graciela Jerome MD    EXAMINATION    There were no vitals filed for this visit.      CONSTITUTIONAL  General Appearance: well nourished    MUSCULOSKELETAL  Muscle Strength and Tone: normal strength and tone  Abnormal Involuntary Movements: no abnormal movement noted  Gait and Station: normal gait    PSYCHIATRIC   Level of Consciousness: alert  Orientation: oriented to person, place and time  Grooming: well groomed  Psychomotor Behavior: no restlessness/agitation  Speech: normal in rate, rhythm and volume  Language: normal vocabulary  Mood: steady  Affect: full range and appropriate  Thought Process: logical and goal directed  Associations: intact associations  Thought Content: no SI/HI  Memory: grossly intact  Attention: intact to content of interview  Fund of Knowledge: appears adequate  Insight: good  Judgement: good    MEDICAL DECISION MAKING    DIAGNOSES  Anxiety d/o, unspecified    PROBLEM LIST AND MANAGEMENT PLANS    - anxiety: continue Prozac, prn Klonopin and prn Ambien as above  - rtc 3 months    Time with patient: 5 min    LABORATORY DATA  Lab Visit on 04/16/2025   Component Date Value Ref Range Status    Sed Rate 04/16/2025 <2  <=36 mm/hr Final    CRP 04/16/2025 0.87  <=1.00 mg/dL Final    Sodium 04/16/2025 142  136 - 145 mmol/L Final    Potassium 04/16/2025 4.1  3.5 - 5.1 mmol/L Final    Chloride 04/16/2025 102  95 - 110 mmol/L Final    CO2 04/16/2025 29  23 - 29 mmol/L Final    Glucose 04/16/2025 147 (H)  70 - 110 mg/dL Final    BUN 04/16/2025 12  7 - 17 mg/dL Final    Creatinine 04/16/2025 0.7  0.5 - 1.4 mg/dL Final    Calcium 04/16/2025 9.8  8.7 - 10.5 mg/dL Final    Protein Total 04/16/2025 8.3  6.0 - 8.4 gm/dL Final    Albumin 04/16/2025 4.8  3.5 - 5.2 g/dL Final    Bilirubin Total 04/16/2025 0.5  0.1 - 1.0 mg/dL Final    For infants and newborns, interpretation of results should  be based   on gestational age, weight and in agreement with clinical   observations.    Premature Infant recommended reference ranges:   0-24 hours:  <8.0 mg/dL   24-48 hours: <12.0 mg/dL   3-5 days:    <15.0 mg/dL   6-29 days:   <15.0 mg/dL    ALP 04/16/2025 77  38 - 126 unit/L Final    AST 04/16/2025 28  15 - 46 unit/L Final    ALT 04/16/2025 24  10 - 44 unit/L Final    Anion Gap 04/16/2025 11  8 - 16 mmol/L Final    eGFR 04/16/2025 >60  >60 mL/min/1.73/m2 Final    Estimated GFR calculated using the CKD-EPI creatinine (2021) equation.    Hepatitis B DNA, Qualitative 04/16/2025 HBV DNA not detected  HBV DNA not detected  Final    Hemoglobin A1c 04/16/2025 6.8 (H)  4.0 - 5.6 % Final    ADA Screening Guidelines:  5.7-6.4%  Consistent with prediabetes  >=6.5%  Consistent with diabetes    High levels of fetal hemoglobin interfere with the HbA1C  assay. Heterozygous hemoglobin variants (HbS, HgC, etc)do  not significantly interfere with this assay.   However, presence of multiple variants may affect accuracy.    Estimated Average Glucose 04/16/2025 148 (H)  68 - 131 mg/dL Final    Cholesterol Total 04/16/2025 173  120 - 199 mg/dL Final    The National Cholesterol Education Program (NCEP) has set the  following guidelines (reference ranges) for Cholesterol:  Optimal.....................<200 mg/dL  Borderline High.............200-239 mg/dL  High........................> or = 240 mg/dL    Triglyceride 04/16/2025 67  30 - 150 mg/dL Final    The National Cholesterol Education Program (NCEP) has set the  following guidelines (reference values) for triglycerides:  Normal......................<150 mg/dL  Borderline High.............150-199 mg/dL  High........................200-499 mg/dL    HDL Cholesterol 04/16/2025 63  40 - 75 mg/dL Final    The National Cholesterol Education Program (NCEP) has set the   following guidelines (reference values) for HDL Cholesterol:   Low...............<40 mg/dL   Optimal...........>60 mg/dL     LDL Cholesterol 04/16/2025 96.6  63.0 - 159.0 mg/dL Final    The National Cholesterol Education Program (NCEP) has set the  following guidelines (reference values) for LDL Cholesterol:  Optimal.......................<130 mg/dL  Borderline High...............130-159 mg/dL  High..........................160-189 mg/dL  Very High.....................>190 mg/dL  LDL calculated using the Friedewald equation.    HDL/Cholesterol Ratio 04/16/2025 36.4  20.0 - 50.0 % Final    Cholesterol/HDL Ratio 04/16/2025 2.7  2.0 - 5.0 Final    Non HDL Cholesterol 04/16/2025 110  mg/dL Final    Risk category and Non-HDL cholesterol goals:  Coronary heart disease (CHD)or equivalent (10-year risk of CHD >20%):  Non-HDL cholesterol goal     <130 mg/dL  Two or more CHD risk factors and 10-year risk of CHD <= 20%:  Non-HDL cholesterol goal     <160 mg/dL  0 to 1 CHD risk factor:  Non-HDL cholesterol goal     <190 mg/dL    WBC 04/16/2025 5.62  3.90 - 12.70 K/uL Final    RBC 04/16/2025 4.48  4.00 - 5.40 M/uL Final    HGB 04/16/2025 13.4  12.0 - 16.0 gm/dL Final    HCT 04/16/2025 41.1  37.0 - 48.5 % Final    MCV 04/16/2025 92  82 - 98 fL Final    MCH 04/16/2025 29.9  27.0 - 31.0 pg Final    MCHC 04/16/2025 32.6  32.0 - 36.0 g/dL Final    RDW 04/16/2025 13.0  11.5 - 14.5 % Final    Platelet Count 04/16/2025 246  150 - 450 K/uL Final    MPV 04/16/2025 10.6  9.2 - 12.9 fL Final    Nucleated RBC 04/16/2025 0  <=0 /100 WBC Final    Neut % 04/16/2025 39.6  38 - 73 % Final    Lymph % 04/16/2025 49.5 (H)  18 - 48 % Final    Mono % 04/16/2025 8.7  4 - 15 % Final    Eos % 04/16/2025 1.1  <=8 % Final    Basophil % 04/16/2025 0.7  <=1.9 % Final    Imm Grans % 04/16/2025 0.4  0.0 - 0.5 % Final    Neut # 04/16/2025 2.23  1.8 - 7.7 K/uL Final    Lymph # 04/16/2025 2.78  1 - 4.8 K/uL Final    Mono # 04/16/2025 0.49  0.3 - 1 K/uL Final    Eos # 04/16/2025 0.06  <=0.5 K/uL Final    Baso # 04/16/2025 0.04  <=0.2 K/uL Final    Imm Grans # 04/16/2025 0.02  0.00 -  0.04 K/uL Final    Mild elevation in immature granulocytes is non specific and can be seen in a variety of conditions including stress response, acute inflammation, trauma and pregnancy. Correlation with other laboratory and clinical findings is essential.           Tip Oliveira

## 2025-05-28 NOTE — PROGRESS NOTES
Subjective:       Patient ID: Althea Vazquez is a 59 y.o. female.    Chief Complaint: Cough, Sinus Problem, Otalgia, Sore Throat, Chest Pain, Chest Congestion, Fatigue, and Headache    History of Present Illness    CHIEF COMPLAINT:  Patient presents today with cough and nasal congestion that started on Saturday    HISTORY OF PRESENT ILLNESS:  She reports symptoms began Saturday after exposure to her ill grandson with similar symptoms. Her most severe symptom is cough, which is productive and accompanied by chest and throat pain. She also experiences cold symptoms, body aches, and sinus congestion. Sleep is disturbed due to coughing and mucus production. She is using Maria Esther-Robbins Cold and Vicks for symptom management.    ALLERGIES:  She has seasonal allergies with symptoms of itchy ears and eyes, denying throat itching. She reports allergies to Bactrim and Trulicity.    MEDICAL HISTORY:  She has diabetes with recent hemoglobin A1C of 6.8.    CURRENT MEDICATIONS:  She takes Farxiga for diabetes management, Mounjaro at a lower dose, and uses Flonase nasal spray daily.    ROS:  Constitutional: +sleep disturbances, +waking at night coughing  Eyes: +eye itchiness  ENT: +nasal congestion, +hoarseness, +ear pruritus  Respiratory: +cough, +wheezing, +pain with respiration, +productive cough  Musculoskeletal: +body aches       Objective:      Physical Exam  Vitals reviewed.   Constitutional:       Appearance: Normal appearance. She is obese.   HENT:      Head: Normocephalic and atraumatic.      Right Ear: Tympanic membrane normal.      Left Ear: Tympanic membrane normal.      Nose: Congestion and rhinorrhea present.      Right Turbinates: Swollen.      Left Turbinates: Swollen.      Mouth/Throat:      Pharynx: Posterior oropharyngeal erythema and postnasal drip present.   Eyes:      Conjunctiva/sclera: Conjunctivae normal.      Pupils: Pupils are equal, round, and reactive to light.   Cardiovascular:      Rate and  Rhythm: Normal rate and regular rhythm.   Pulmonary:      Effort: Pulmonary effort is normal.      Breath sounds: Examination of the right-middle field reveals wheezing. Examination of the right-lower field reveals wheezing. Wheezing present.   Abdominal:      General: Bowel sounds are normal.      Palpations: Abdomen is soft.   Musculoskeletal:         General: Normal range of motion.      Cervical back: Normal range of motion and neck supple.   Skin:     General: Skin is warm.   Neurological:      General: No focal deficit present.   Psychiatric:         Mood and Affect: Mood normal.       Assessment:       1. Acute cough  - fluticasone propionate (FLONASE) 50 mcg/actuation nasal spray; Take 1 spray (50 mcg total) by Each Nostril route once daily.  Dispense: 16 g; Refill: 2  - albuterol (VENTOLIN HFA) 90 mcg/actuation inhaler; Inhale 2 puffs into the lungs every 4 to 6 hours as needed (cough). Rescue  Dispense: 18 g; Refill: 2  - triamcinolone acetonide injection 40 mg  - azithromycin (Z-GALLO) 250 MG tablet; Take 2 tablets by mouth on day 1; Take 1 tablet by mouth on days 2-5  Dispense: 6 tablet; Refill: 0  - azelastine (ASTELIN) 137 mcg (0.1 %) nasal spray; Take 1 spray (137 mcg total) by Nasal route 2 (two) times daily.  Dispense: 30 mL; Refill: 5    2. Nasal sinus congestion  - fluticasone propionate (FLONASE) 50 mcg/actuation nasal spray; Take 1 spray (50 mcg total) by Each Nostril route once daily.  Dispense: 16 g; Refill: 2  - albuterol (VENTOLIN HFA) 90 mcg/actuation inhaler; Inhale 2 puffs into the lungs every 4 to 6 hours as needed (cough). Rescue  Dispense: 18 g; Refill: 2  - triamcinolone acetonide injection 40 mg  - azithromycin (Z-GALLO) 250 MG tablet; Take 2 tablets by mouth on day 1; Take 1 tablet by mouth on days 2-5  Dispense: 6 tablet; Refill: 0  - azelastine (ASTELIN) 137 mcg (0.1 %) nasal spray; Take 1 spray (137 mcg total) by Nasal route 2 (two) times daily.  Dispense: 30 mL; Refill: 5    3.  Laryngitis  - fluticasone propionate (FLONASE) 50 mcg/actuation nasal spray; Take 1 spray (50 mcg total) by Each Nostril route once daily.  Dispense: 16 g; Refill: 2  - albuterol (VENTOLIN HFA) 90 mcg/actuation inhaler; Inhale 2 puffs into the lungs every 4 to 6 hours as needed (cough). Rescue  Dispense: 18 g; Refill: 2  - triamcinolone acetonide injection 40 mg  - azithromycin (Z-GALLO) 250 MG tablet; Take 2 tablets by mouth on day 1; Take 1 tablet by mouth on days 2-5  Dispense: 6 tablet; Refill: 0  - azelastine (ASTELIN) 137 mcg (0.1 %) nasal spray; Take 1 spray (137 mcg total) by Nasal route 2 (two) times daily.  Dispense: 30 mL; Refill: 5    4. Pharyngitis, unspecified etiology  - fluticasone propionate (FLONASE) 50 mcg/actuation nasal spray; Take 1 spray (50 mcg total) by Each Nostril route once daily.  Dispense: 16 g; Refill: 2  - albuterol (VENTOLIN HFA) 90 mcg/actuation inhaler; Inhale 2 puffs into the lungs every 4 to 6 hours as needed (cough). Rescue  Dispense: 18 g; Refill: 2  - triamcinolone acetonide injection 40 mg  - azithromycin (Z-GALLO) 250 MG tablet; Take 2 tablets by mouth on day 1; Take 1 tablet by mouth on days 2-5  Dispense: 6 tablet; Refill: 0  - azelastine (ASTELIN) 137 mcg (0.1 %) nasal spray; Take 1 spray (137 mcg total) by Nasal route 2 (two) times daily.  Dispense: 30 mL; Refill: 5    5. Tightness in chest  -Low suspicion for cardiac etiology. Likely due to recent coughing fits.   - fluticasone propionate (FLONASE) 50 mcg/actuation nasal spray; Take 1 spray (50 mcg total) by Each Nostril route once daily.  Dispense: 16 g; Refill: 2  - albuterol (VENTOLIN HFA) 90 mcg/actuation inhaler; Inhale 2 puffs into the lungs every 4 to 6 hours as needed (cough). Rescue  Dispense: 18 g; Refill: 2  - triamcinolone acetonide injection 40 mg  - azithromycin (Z-GALLO) 250 MG tablet; Take 2 tablets by mouth on day 1; Take 1 tablet by mouth on days 2-5  Dispense: 6 tablet; Refill: 0  - azelastine (ASTELIN) 137  mcg (0.1 %) nasal spray; Take 1 spray (137 mcg total) by Nasal route 2 (two) times daily.  Dispense: 30 mL; Refill: 5    6. Seasonal allergic rhinitis due to pollen  - fluticasone propionate (FLONASE) 50 mcg/actuation nasal spray; Take 1 spray (50 mcg total) by Each Nostril route once daily.  Dispense: 16 g; Refill: 2  - albuterol (VENTOLIN HFA) 90 mcg/actuation inhaler; Inhale 2 puffs into the lungs every 4 to 6 hours as needed (cough). Rescue  Dispense: 18 g; Refill: 2  - triamcinolone acetonide injection 40 mg  - azithromycin (Z-GALLO) 250 MG tablet; Take 2 tablets by mouth on day 1; Take 1 tablet by mouth on days 2-5  Dispense: 6 tablet; Refill: 0  - azelastine (ASTELIN) 137 mcg (0.1 %) nasal spray; Take 1 spray (137 mcg total) by Nasal route 2 (two) times daily.  Dispense: 30 mL; Refill: 5      Plan:       Assessment & Plan    IMPRESSION:  Presenting with cough, nasal congestion, and laryngitis likely due to viral infection and allergies.  Explained post-nasal drip as cause of coughing and its relation to allergy symptoms.    PLAN SUMMARY:  - Administered steroid injection for laryngopharyngitis  - Prescribed Z-Gallo (azithromycin), to be used if symptoms don't improve by Saturday  - Increased Flonase to twice daily  - Prescribed Astelin nasal spray twice daily  - Prescribed Ventolin (albuterol) inhaler, 2 puffs every 4-6 hours as needed  - Educated on proper inhaler technique  - Instructed to start allergy medications at first sign of symptoms  - Advised about potential temporary increase in blood sugar due to steroid injection    ACUTE LARYNGOPHARYNGITIS:  - Patient reports coughing as the worst symptom, with associated chest pain and loss of voice due to inflammation of vocal cords.  - Administered steroid injection in office.  - Prescribed Z-Gallo (azithromycin), to be used only if symptoms do not improve by Saturday.  - Recommend Vicks vapor rub for symptom relief.    ASTHMA EXACERBATION:  - Patient presents  with wheezing, chest tightness, coughing fits, and difficulty sleeping due to coughing.  - Prescribed Ventolin (albuterol) inhaler, 2 puffs every 4-6 hours as needed, to relax lung muscles, address spasms, and relieve coughing.  - Instructed to take a dose before bedtime to prevent nighttime coughing.  - Discussed potential side effects including temporary increased heart rate.  - Educated on proper inhaler technique, including priming, inhalation, and breath-holding.    TYPE 2 DIABETES WITH HYPERGLYCEMIA:  - Patient checks blood sugar every morning with last A1C of 6.8.  - Advised that the steroid injection administered for laryngopharyngitis may cause temporary increase in blood sugar.    ALLERGIC RHINITIS:  - Patient reports sinus congestion, pressure, and drainage.  - Currently using Flonase daily.  - Increased Flonase to twice daily and prescribed Astelin nasal spray twice daily for enhanced allergy management with localized antihistamine effect.  - Explained Flonase's role in reducing sinus swelling and improving drainage.  - Instructed to begin using allergy medications at first sign of symptoms (sniffling, itching ears/eyes) for prevention.    This note was generated with the assistance of ambient listening technology. Verbal consent was obtained by the patient and accompanying visitor(s) for the recording of patient appointment to facilitate this note. I attest to having reviewed and edited the generated note for accuracy, though some syntax or spelling errors may persist. Please contact the author of this note for any clarification.

## 2025-05-29 RX ORDER — HYDROCORTISONE 25 MG/G
CREAM TOPICAL 2 TIMES DAILY
Qty: 30 G | Refills: 8 | Status: SHIPPED | OUTPATIENT
Start: 2025-05-29

## 2025-05-29 NOTE — TELEPHONE ENCOUNTER
No care due was identified.  [facility] Embedded Care Due Messages. Reference number: [id number].   5/29/2025 10:26:14 AM CDT

## 2025-05-30 RX ORDER — ERGOCALCIFEROL 1.25 MG/1
50000 CAPSULE ORAL
Qty: 12 CAPSULE | Refills: 0 | OUTPATIENT
Start: 2025-05-30

## 2025-06-11 DIAGNOSIS — L30.4 INTERTRIGO: ICD-10-CM

## 2025-06-11 DIAGNOSIS — J20.9 ACUTE BRONCHITIS, UNSPECIFIED ORGANISM: ICD-10-CM

## 2025-06-11 DIAGNOSIS — K21.9 GASTROESOPHAGEAL REFLUX DISEASE, UNSPECIFIED WHETHER ESOPHAGITIS PRESENT: ICD-10-CM

## 2025-06-11 DIAGNOSIS — Z79.899 HIGH RISK MEDICATION USE: ICD-10-CM

## 2025-06-11 DIAGNOSIS — R51.9 SINUS HEADACHE: ICD-10-CM

## 2025-06-11 RX ORDER — AMLODIPINE BESYLATE 10 MG/1
10 TABLET ORAL DAILY
Qty: 90 TABLET | Refills: 1 | Status: SHIPPED | OUTPATIENT
Start: 2025-06-11

## 2025-06-11 RX ORDER — LEVOCETIRIZINE DIHYDROCHLORIDE 5 MG/1
5 TABLET, FILM COATED ORAL DAILY
Qty: 90 TABLET | Refills: 1 | Status: SHIPPED | OUTPATIENT
Start: 2025-06-11

## 2025-06-11 RX ORDER — PANTOPRAZOLE SODIUM 40 MG/1
40 TABLET, DELAYED RELEASE ORAL DAILY
Qty: 90 TABLET | Refills: 0 | Status: SHIPPED | OUTPATIENT
Start: 2025-06-11

## 2025-06-11 RX ORDER — ATENOLOL 100 MG/1
100 TABLET ORAL DAILY
Qty: 90 TABLET | Refills: 1 | Status: SHIPPED | OUTPATIENT
Start: 2025-06-11

## 2025-06-11 NOTE — TELEPHONE ENCOUNTER
No care due was identified.  [facility] Embedded Care Due Messages. Reference number: [id number].   6/11/2025 9:52:00 AM CDT

## 2025-06-11 NOTE — TELEPHONE ENCOUNTER
Refill Decision Note   Althea Vazquez  is requesting a refill authorization.  Brief Assessment and Rationale for Refill:  Approve     Medication Therapy Plan:         Comments:     Note composed:10:20 AM 06/11/2025

## 2025-06-13 RX ORDER — TRIAMCINOLONE ACETONIDE 0.25 MG/G
CREAM TOPICAL
Qty: 80 G | Refills: 1 | Status: SHIPPED | OUTPATIENT
Start: 2025-06-13

## 2025-07-09 ENCOUNTER — OFFICE VISIT (OUTPATIENT)
Dept: SLEEP MEDICINE | Facility: CLINIC | Age: 60
End: 2025-07-09
Attending: PSYCHIATRY & NEUROLOGY
Payer: MEDICARE

## 2025-07-09 VITALS
SYSTOLIC BLOOD PRESSURE: 156 MMHG | OXYGEN SATURATION: 97 % | DIASTOLIC BLOOD PRESSURE: 73 MMHG | BODY MASS INDEX: 36.56 KG/M2 | WEIGHT: 233.38 LBS | HEART RATE: 80 BPM

## 2025-07-09 DIAGNOSIS — I10 HYPERTENSION, UNSPECIFIED TYPE: ICD-10-CM

## 2025-07-09 DIAGNOSIS — G47.33 OBSTRUCTIVE SLEEP APNEA: Primary | ICD-10-CM

## 2025-07-09 PROCEDURE — 99999 PR PBB SHADOW E&M-EST. PATIENT-LVL IV: CPT | Mod: PBBFAC,,,

## 2025-07-09 NOTE — PROGRESS NOTES
Althea Vazquez is a 59 y.o. female seen today for a Sleep Apnea   follow-up.    Subjective      HPI: VAISHNAVI  Symptoms:  [x]  Dry Mouth on Awakening []  Aerophagia/Air Hunger [x]  Mask Leaks/Discomfort []  Daytime Sleepiness []  Breakthrough Snoring [x] Pressure Discomfort [] None  Current Treatments: [x] APAP [] BIPAP [] ASV [] Positional Therapy [] OA [] None  CPAP Adherence: [x] Uses > 4 hrs/night [] Uses < 4 hrs/night [] Nonadherent   Treatments Effects: [] Improved Daytimes Sleepiness [] Improved Sleep Continuity [] Unable to Tolerate [] Sleep Deterioration  VAISHNAVI: [] Improving [] Worsening [] Unchanged [] Stable  Comments: Patient here for sleep apnea follow up, previously seen by Dr. Lemons 04/28/2025, CPAP titration completed for Requalification, recommeded 5-8 cmH20, CPAP adjusted. Patient likes N20 like mask over F40. Difficulty tolerating CPAP > 4 hours with current F40 mask due to discomfort, claustraphobia, feels like she is breathing stale air or has no air flow. Also reports dry mouth on awakening, keeps glass of water by bedside. Has not adjusted humidity to help with symptoms. Has noticed improvement in daytime sleepiness and sleep continuity when using cpap.         3/12/2024     6:44 PM   Sleep Clinic New Patient   What time do you go to bed on a week day? (Give a range) 9 or 10   What time do you go to bed on a day off? (Give a range) Dont work   How long does it take you to fall asleep? (Give a range) 20 to 25 minutes   On average, how many times per night do you wake up? One   How long does it take you to fall back into sleep? (Give a range) Some time I dont   What time do you wake up to start your day on a week day? (Give a range) 6:00   What time do you wake up to start your day on a day off? (Give a range) Dont work   What time do you get out of bed? (Give a range) 6:00   On average, how many hours do you sleep? 5   On average, how many naps do you take per day? One   Rate your  sleep quality from 0 to 5 (0-poor, 5-great). 2   Have you experienced:  Weight gain?   How much weight have you lost or gained (in lbs.) in the last year? 20lb   On average, how many times per night do you go to the bathroom?  One   Have you ever had a sleep study/CPAP machine/surgery for sleep apnea? No   Have you ever had a CPAP machine for sleep apnea? No   Have you ever had surgery for sleep apnea? No           7/9/2025     9:08 AM 4/25/2025     7:27 PM 1/27/2025     9:34 AM 3/12/2024     6:33 PM 8/1/2018     2:29 PM   EPWORTH SLEEPINESS SCALE TOTAL SCORE    Total score 10  13  15  8 5       Patient-reported    Proxy-reported     (Validated Sleepiness Questionnaire with higher score indicating greater sleepiness (0-24)             Objective     CPAP Data:    Interrogation: Resmed Airsense 11 : 5-8 cmH?O; (60 days): 78% usage; Avg Usage: 5.8 hrs; Avg Pressure: 7.7; Leak: 23.1 L/min; AHI: 2.6; has not met compliance    DME: [facility], Machine: Resmed Airsense 11, Mask: Airfit F40, setup date: 07/02/2025    Records Reviewed:   Lab Results   Component Value Date    TSH 1.407 01/08/2025    CO2 29 04/16/2025    HGBA1C 6.8 (H) 04/16/2025    FERRITIN 144 12/02/2020      No echocardiogram results found for the past 12 months  Sleep Studies: Diagnostic: Date: 4/16/2024, .HST, AHI: 10, RDI: 16, Min O2: 82.8%, mild VAISHNAVI  Therapy: Date: 4/15/2025,  CPAP Titration, Pressures Explored: 5-8cmH20, Sleep Apnea controlled at 8cmH20  Document on 4/28/2024 11:21 PM by Ruth Ann Lemons MD: Althea Adams  [id number]       Physical Exam:  Vitals: Blood pressure (!) 156/73, pulse 80, weight 105.9 kg (233 lb 6.4 oz), last menstrual period 11/25/2014, SpO2 97%.    Gen: Well Appearing, demonstrates insight  Skin: No rash or lesions on bridge of nose or mouth        Assessment & Plan  Obstructive sleep apnea  Benefiting from CPAP use in terms of sleep continuity and daytime sleepiness. ESS: 10, AHI: 2.6  VAISHNAVI Treatment: Continue APAP:  6-9  PAP Adjustments: Adjusted pressure: 5-8 to 6-9  Adjusted Humidity: 4-5  Recommended calling DME company for mask change N20 or [employer]   Discussed therapy report in detail, using graphs and charts  Patient is urged to avoid supine sleep, weight gain and alcoholic beverages since all of these can worsen VAISHNAVI.    Precautions: The patient was advised to abstain from driving should he feel sleepy or drowsy.   Renewed Prescription for Supplies  Follow up: 1 year  Orders:    Supples - Nasal    Hypertension, unspecified type  Follow up with pcp for continued management  Continue taking medications.

## 2025-07-26 ENCOUNTER — TELEPHONE (OUTPATIENT)
Dept: RHEUMATOLOGY | Facility: CLINIC | Age: 60
End: 2025-07-26
Payer: MEDICARE

## 2025-07-26 DIAGNOSIS — M02.311 REACTIVE ARTHRITIS OF RIGHT SHOULDER: ICD-10-CM

## 2025-07-26 DIAGNOSIS — Z79.899 HIGH RISK MEDICATION USE: Primary | ICD-10-CM

## 2025-07-26 NOTE — TELEPHONE ENCOUNTER
----- Message from Med Assistant Marija sent at 7/25/2025  3:01 PM CDT -----  Regarding: FW: TB Test  Hey doc can you add orders please.  ----- Message -----  From: Komal Madsen, PharmKEYUR  Sent: 7/25/2025   9:01 AM CDT  To: [facility] Staff  Subject: TB Test                                          Hi,    [facility] fills this patient's Simponi. Her last TB test is from 10/31/2022. If agreeable, please order updated TB test for patient to have drawn to ensure continued safety while on Simponi.    Thank you,  Komal Madsen, PharmD  Clinical Pharmacist - [facility]  [address] [place] [id number]  (P) [phone]  (F) [phone]

## 2025-08-09 DIAGNOSIS — M47.819 SPONDYLOARTHRITIS: ICD-10-CM

## 2025-08-09 DIAGNOSIS — M02.30 REACTIVE ARTHRITIS: ICD-10-CM

## 2025-08-09 DIAGNOSIS — Z79.899 HIGH RISK MEDICATION USE: ICD-10-CM

## 2025-08-09 DIAGNOSIS — M19.90 INFLAMMATORY ARTHRITIS: ICD-10-CM

## 2025-08-14 ENCOUNTER — PATIENT MESSAGE (OUTPATIENT)
Dept: RHEUMATOLOGY | Facility: CLINIC | Age: 60
End: 2025-08-14
Payer: MEDICARE

## 2025-08-14 ENCOUNTER — TELEPHONE (OUTPATIENT)
Dept: RHEUMATOLOGY | Facility: CLINIC | Age: 60
End: 2025-08-14
Payer: MEDICARE

## 2025-08-14 DIAGNOSIS — B19.10 HEPATITIS B INFECTION WITHOUT DELTA AGENT WITHOUT HEPATIC COMA, UNSPECIFIED CHRONICITY: Primary | ICD-10-CM

## 2025-08-14 DIAGNOSIS — Z11.59 ENCOUNTER FOR SCREENING FOR OTHER VIRAL DISEASES: ICD-10-CM

## 2025-08-14 DIAGNOSIS — M46.90 AXIAL SPONDYLOARTHRITIS: ICD-10-CM

## 2025-08-14 RX ORDER — SULFASALAZINE 500 MG/1
1500 TABLET, DELAYED RELEASE ORAL 2 TIMES DAILY
Qty: 180 TABLET | Refills: 0 | Status: SHIPPED | OUTPATIENT
Start: 2025-08-14

## 2025-08-15 ENCOUNTER — PATIENT MESSAGE (OUTPATIENT)
Dept: RHEUMATOLOGY | Facility: CLINIC | Age: 60
End: 2025-08-15
Payer: MEDICARE

## 2025-08-15 DIAGNOSIS — E66.01 SEVERE OBESITY (BMI 35.0-39.9) WITH COMORBIDITY: ICD-10-CM

## 2025-08-15 DIAGNOSIS — L21.9 SEBORRHEIC DERMATITIS: ICD-10-CM

## 2025-08-15 DIAGNOSIS — H20.9 UVEITIS: ICD-10-CM

## 2025-08-15 DIAGNOSIS — L30.4 INTERTRIGO: ICD-10-CM

## 2025-08-15 DIAGNOSIS — E11.42 TYPE 2 DIABETES MELLITUS WITH DIABETIC POLYNEUROPATHY, UNSPECIFIED WHETHER LONG TERM INSULIN USE: Chronic | ICD-10-CM

## 2025-08-15 RX ORDER — TIRZEPATIDE 2.5 MG/.5ML
2.5 INJECTION, SOLUTION SUBCUTANEOUS
Qty: 12 PEN | Refills: 0 | Status: SHIPPED | OUTPATIENT
Start: 2025-08-15

## 2025-08-15 RX ORDER — TRIAMCINOLONE ACETONIDE 0.25 MG/G
CREAM TOPICAL
Qty: 80 G | Refills: 0 | Status: SHIPPED | OUTPATIENT
Start: 2025-08-15

## 2025-08-19 ENCOUNTER — TELEPHONE (OUTPATIENT)
Dept: RHEUMATOLOGY | Facility: CLINIC | Age: 60
End: 2025-08-19
Payer: MEDICARE

## 2025-08-19 RX ORDER — METHOTREXATE 2.5 MG/1
15 TABLET ORAL
Qty: 24 TABLET | Refills: 0 | Status: SHIPPED | OUTPATIENT
Start: 2025-08-19

## 2025-08-19 RX ORDER — CLOBETASOL PROPIONATE 0.5 MG/ML
SOLUTION TOPICAL DAILY
Qty: 50 ML | Refills: 0 | Status: SHIPPED | OUTPATIENT
Start: 2025-08-19

## 2025-08-20 ENCOUNTER — TELEPHONE (OUTPATIENT)
Dept: RHEUMATOLOGY | Facility: CLINIC | Age: 60
End: 2025-08-20
Payer: MEDICARE

## 2025-08-20 ENCOUNTER — LAB VISIT (OUTPATIENT)
Dept: LAB | Facility: HOSPITAL | Age: 60
End: 2025-08-20
Attending: INTERNAL MEDICINE
Payer: MEDICARE

## 2025-08-20 DIAGNOSIS — Z11.59 ENCOUNTER FOR SCREENING FOR OTHER VIRAL DISEASES: ICD-10-CM

## 2025-08-20 DIAGNOSIS — M46.90 AXIAL SPONDYLOARTHRITIS: ICD-10-CM

## 2025-08-20 DIAGNOSIS — B19.10 HEPATITIS B INFECTION WITHOUT DELTA AGENT WITHOUT HEPATIC COMA, UNSPECIFIED CHRONICITY: ICD-10-CM

## 2025-08-20 DIAGNOSIS — E83.51 HYPOCALCEMIA: Primary | ICD-10-CM

## 2025-08-20 LAB
ABSOLUTE EOSINOPHIL (OHS): 0.06 K/UL
ABSOLUTE MONOCYTE (OHS): 0.55 K/UL (ref 0.3–1)
ABSOLUTE NEUTROPHIL COUNT (OHS): 3.38 K/UL (ref 1.8–7.7)
ALBUMIN SERPL BCP-MCNC: 4.2 G/DL (ref 3.5–5.2)
ALP SERPL-CCNC: 80 UNIT/L (ref 38–126)
ALT SERPL W/O P-5'-P-CCNC: 17 UNIT/L (ref 10–44)
ANION GAP (OHS): 6 MMOL/L (ref 8–16)
AST SERPL-CCNC: 22 UNIT/L (ref 15–46)
BASOPHILS # BLD AUTO: 0.04 K/UL
BASOPHILS NFR BLD AUTO: 0.6 %
BILIRUB SERPL-MCNC: 0.3 MG/DL (ref 0.1–1)
BUN SERPL-MCNC: 10 MG/DL (ref 7–17)
CALCIUM SERPL-MCNC: 8.6 MG/DL (ref 8.7–10.5)
CHLORIDE SERPL-SCNC: 100 MMOL/L (ref 95–110)
CO2 SERPL-SCNC: 30 MMOL/L (ref 23–29)
CREAT SERPL-MCNC: 0.6 MG/DL (ref 0.5–1.4)
CRP SERPL-MCNC: 1.08 MG/DL
ERYTHROCYTE [DISTWIDTH] IN BLOOD BY AUTOMATED COUNT: 13.8 % (ref 11.5–14.5)
ERYTHROCYTE [SEDIMENTATION RATE] IN BLOOD BY PHOTOMETRIC METHOD: 44 MM/HR
GFR SERPLBLD CREATININE-BSD FMLA CKD-EPI: >60 ML/MIN/1.73/M2
GLUCOSE SERPL-MCNC: 243 MG/DL (ref 70–110)
HBV SURFACE AG SERPL QL IA: NORMAL
HCT VFR BLD AUTO: 39.4 % (ref 37–48.5)
HGB BLD-MCNC: 13.1 GM/DL (ref 12–16)
IMM GRANULOCYTES # BLD AUTO: 0.06 K/UL (ref 0–0.04)
IMM GRANULOCYTES NFR BLD AUTO: 0.9 % (ref 0–0.5)
LYMPHOCYTES # BLD AUTO: 2.9 K/UL (ref 1–4.8)
MCH RBC QN AUTO: 29.2 PG (ref 27–31)
MCHC RBC AUTO-ENTMCNC: 33.2 G/DL (ref 32–36)
MCV RBC AUTO: 88 FL (ref 82–98)
NUCLEATED RBC (/100WBC) (OHS): 0 /100 WBC
PLATELET # BLD AUTO: 273 K/UL (ref 150–450)
PMV BLD AUTO: 11.7 FL (ref 9.2–12.9)
POTASSIUM SERPL-SCNC: 4 MMOL/L (ref 3.5–5.1)
PROT SERPL-MCNC: 7.5 GM/DL (ref 6–8.4)
RBC # BLD AUTO: 4.48 M/UL (ref 4–5.4)
RELATIVE EOSINOPHIL (OHS): 0.9 %
RELATIVE LYMPHOCYTE (OHS): 41.5 % (ref 18–48)
RELATIVE MONOCYTE (OHS): 7.9 % (ref 4–15)
RELATIVE NEUTROPHIL (OHS): 48.2 % (ref 38–73)
SODIUM SERPL-SCNC: 136 MMOL/L (ref 136–145)
WBC # BLD AUTO: 6.99 K/UL (ref 3.9–12.7)

## 2025-08-20 PROCEDURE — 86140 C-REACTIVE PROTEIN: CPT | Mod: PN

## 2025-08-20 PROCEDURE — 87340 HEPATITIS B SURFACE AG IA: CPT | Mod: PN

## 2025-08-20 PROCEDURE — 85652 RBC SED RATE AUTOMATED: CPT | Mod: PN

## 2025-08-20 PROCEDURE — 85025 COMPLETE CBC W/AUTO DIFF WBC: CPT | Mod: PN

## 2025-08-20 PROCEDURE — 36415 COLL VENOUS BLD VENIPUNCTURE: CPT | Mod: PN

## 2025-08-20 PROCEDURE — 84295 ASSAY OF SERUM SODIUM: CPT | Mod: PN

## 2025-08-20 PROCEDURE — 87517 HEPATITIS B DNA QUANT: CPT | Mod: PN

## 2025-08-21 LAB — HBV DNA SERPL NAA+PROBE-ACNC: NORMAL [IU]/ML

## 2025-08-27 ENCOUNTER — LAB VISIT (OUTPATIENT)
Dept: LAB | Facility: HOSPITAL | Age: 60
End: 2025-08-27
Attending: INTERNAL MEDICINE
Payer: MEDICARE

## 2025-08-27 DIAGNOSIS — M02.311 REACTIVE ARTHRITIS OF RIGHT SHOULDER: ICD-10-CM

## 2025-08-27 DIAGNOSIS — Z79.899 HIGH RISK MEDICATION USE: ICD-10-CM

## 2025-08-27 PROCEDURE — 36415 COLL VENOUS BLD VENIPUNCTURE: CPT | Mod: PN

## 2025-08-27 PROCEDURE — 86480 TB TEST CELL IMMUN MEASURE: CPT | Mod: PN

## 2025-08-28 LAB
MITOGEN MINUS NIL (OHS): 9.94
NIL TB SYNCED (OHS): 0.06
QUANTIFERON GOLD INTERP (OHS): NEGATIVE
TB1 AG MINUS NIL (OHS): 0.02
TB2 AG MINUS NIL (OHS): 0.03

## (undated) DEVICE — EXPANDER PUPIL 7.0MM

## (undated) DEVICE — GLASSES EYE PROTECTIVE

## (undated) DEVICE — DRESSING TRANS 2X2 TEGADERM

## (undated) DEVICE — DRAPE OPHTHALMIC 48X62 FEN

## (undated) DEVICE — SYR LUER LOCK 1CC

## (undated) DEVICE — GLOVE BIOGEL ECLIPSE SZ 6.5

## (undated) DEVICE — DRESSING LEUKOPLAST FLEX 1X3IN

## (undated) DEVICE — DUOVISC

## (undated) DEVICE — Device

## (undated) DEVICE — SOL BETADINE 5%